# Patient Record
Sex: MALE | Race: WHITE | Employment: OTHER | URBAN - NONMETROPOLITAN AREA
[De-identification: names, ages, dates, MRNs, and addresses within clinical notes are randomized per-mention and may not be internally consistent; named-entity substitution may affect disease eponyms.]

---

## 2020-01-01 ENCOUNTER — HOSPITAL ENCOUNTER (INPATIENT)
Age: 55
LOS: 2 days | Discharge: ANOTHER HOSPITAL | DRG: 432 | End: 2020-12-14
Attending: EMERGENCY MEDICINE | Admitting: INTERNAL MEDICINE
Payer: COMMERCIAL

## 2020-01-01 ENCOUNTER — APPOINTMENT (OUTPATIENT)
Dept: GENERAL RADIOLOGY | Age: 55
DRG: 432 | End: 2020-01-01
Payer: COMMERCIAL

## 2020-01-01 ENCOUNTER — TELEPHONE (OUTPATIENT)
Dept: FAMILY MEDICINE CLINIC | Age: 55
End: 2020-01-01

## 2020-01-01 ENCOUNTER — APPOINTMENT (OUTPATIENT)
Dept: ULTRASOUND IMAGING | Age: 55
DRG: 432 | End: 2020-01-01
Payer: COMMERCIAL

## 2020-01-01 ENCOUNTER — HOSPITAL ENCOUNTER (INPATIENT)
Age: 55
LOS: 2 days | Discharge: ANOTHER HOSPITAL | DRG: 432 | End: 2020-11-23
Attending: EMERGENCY MEDICINE | Admitting: INTERNAL MEDICINE
Payer: COMMERCIAL

## 2020-01-01 ENCOUNTER — APPOINTMENT (OUTPATIENT)
Dept: CT IMAGING | Age: 55
DRG: 432 | End: 2020-01-01
Payer: COMMERCIAL

## 2020-01-01 ENCOUNTER — OFFICE VISIT (OUTPATIENT)
Dept: FAMILY MEDICINE CLINIC | Age: 55
End: 2020-01-01
Payer: COMMERCIAL

## 2020-01-01 ENCOUNTER — TELEPHONE (OUTPATIENT)
Dept: PULMONOLOGY | Age: 55
End: 2020-01-01

## 2020-01-01 ENCOUNTER — OFFICE VISIT (OUTPATIENT)
Dept: PULMONOLOGY | Age: 55
End: 2020-01-01
Payer: COMMERCIAL

## 2020-01-01 VITALS
TEMPERATURE: 98.2 F | SYSTOLIC BLOOD PRESSURE: 106 MMHG | RESPIRATION RATE: 18 BRPM | OXYGEN SATURATION: 93 % | DIASTOLIC BLOOD PRESSURE: 70 MMHG | BODY MASS INDEX: 33.96 KG/M2 | HEIGHT: 74 IN | HEART RATE: 69 BPM | WEIGHT: 264.6 LBS

## 2020-01-01 VITALS
TEMPERATURE: 97.1 F | HEART RATE: 70 BPM | BODY MASS INDEX: 35.92 KG/M2 | SYSTOLIC BLOOD PRESSURE: 90 MMHG | WEIGHT: 279.8 LBS | RESPIRATION RATE: 16 BRPM | DIASTOLIC BLOOD PRESSURE: 68 MMHG | OXYGEN SATURATION: 99 %

## 2020-01-01 VITALS
SYSTOLIC BLOOD PRESSURE: 102 MMHG | TEMPERATURE: 98.2 F | HEIGHT: 74 IN | WEIGHT: 315 LBS | RESPIRATION RATE: 16 BRPM | OXYGEN SATURATION: 95 % | BODY MASS INDEX: 40.43 KG/M2 | DIASTOLIC BLOOD PRESSURE: 62 MMHG | HEART RATE: 98 BPM

## 2020-01-01 VITALS
WEIGHT: 261 LBS | OXYGEN SATURATION: 93 % | HEART RATE: 62 BPM | TEMPERATURE: 97.9 F | DIASTOLIC BLOOD PRESSURE: 62 MMHG | SYSTOLIC BLOOD PRESSURE: 118 MMHG | BODY MASS INDEX: 33.5 KG/M2 | HEIGHT: 74 IN

## 2020-01-01 DIAGNOSIS — R06.81 WITNESSED EPISODE OF APNEA: ICD-10-CM

## 2020-01-01 DIAGNOSIS — E66.9 OBESITY (BMI 30-39.9): ICD-10-CM

## 2020-01-01 DIAGNOSIS — G47.10 HYPERSOMNIA: Primary | ICD-10-CM

## 2020-01-01 DIAGNOSIS — R06.83 LOUD SNORING: ICD-10-CM

## 2020-01-01 DIAGNOSIS — J90 PLEURAL EFFUSION: ICD-10-CM

## 2020-01-01 LAB
AFP-TUMOR MARKER: 3.4 UG/L
ALBUMIN SERPL-MCNC: 2.9 G/DL (ref 3.5–5.1)
ALBUMIN SERPL-MCNC: 3.2 G/DL (ref 3.5–5.1)
ALBUMIN SERPL-MCNC: 3.3 G/DL (ref 3.5–5.1)
ALBUMIN SERPL-MCNC: 3.5 G/DL (ref 3.5–5.1)
ALBUMIN SERPL-MCNC: 3.7 G/DL (ref 3.5–5.1)
ALP BLD-CCNC: 118 U/L (ref 38–126)
ALP BLD-CCNC: 143 U/L (ref 38–126)
ALP BLD-CCNC: 83 U/L (ref 38–126)
ALP BLD-CCNC: 96 U/L (ref 38–126)
ALP BLD-CCNC: 96 U/L (ref 38–126)
ALT SERPL-CCNC: 15 U/L (ref 11–66)
ALT SERPL-CCNC: 17 U/L (ref 11–66)
ALT SERPL-CCNC: 19 U/L (ref 11–66)
ALT SERPL-CCNC: 20 U/L (ref 11–66)
ALT SERPL-CCNC: 24 U/L (ref 11–66)
AMMONIA: 53 UMOL/L (ref 11–60)
AMMONIA: 95 UMOL/L (ref 11–60)
AMPHETAMINE+METHAMPHETAMINE URINE SCREEN: NEGATIVE
ANAEROBIC CULTURE: ABNORMAL
ANAEROBIC CULTURE: NORMAL
ANAEROBIC CULTURE: NORMAL
ANION GAP SERPL CALCULATED.3IONS-SCNC: 11 MEQ/L (ref 8–16)
ANION GAP SERPL CALCULATED.3IONS-SCNC: 12 MEQ/L (ref 8–16)
ANION GAP SERPL CALCULATED.3IONS-SCNC: 12 MEQ/L (ref 8–16)
ANION GAP SERPL CALCULATED.3IONS-SCNC: 15 MEQ/L (ref 8–16)
ANION GAP SERPL CALCULATED.3IONS-SCNC: 18 MEQ/L (ref 8–16)
ANISOCYTOSIS: PRESENT
ANISOCYTOSIS: PRESENT
APTT: 58.7 SECONDS (ref 22–38)
AST SERPL-CCNC: 33 U/L (ref 5–40)
AST SERPL-CCNC: 38 U/L (ref 5–40)
AST SERPL-CCNC: 46 U/L (ref 5–40)
AST SERPL-CCNC: 46 U/L (ref 5–40)
AST SERPL-CCNC: 57 U/L (ref 5–40)
AVERAGE GLUCOSE: 78 MG/DL (ref 70–126)
BACTERIA: ABNORMAL
BARBITURATE QUANTITATIVE URINE: NEGATIVE
BASOPHILIA: ABNORMAL
BASOPHILIA: ABNORMAL
BASOPHILIC STIPPLING: ABNORMAL
BASOPHILS # BLD: 0.5 %
BASOPHILS # BLD: 0.8 %
BASOPHILS # BLD: 0.9 %
BASOPHILS ABSOLUTE: 0 THOU/MM3 (ref 0–0.1)
BASOPHILS ABSOLUTE: 0.1 THOU/MM3 (ref 0–0.1)
BASOPHILS ABSOLUTE: 0.1 THOU/MM3 (ref 0–0.1)
BENZODIAZEPINE QUANTITATIVE URINE: NEGATIVE
BILIRUB SERPL-MCNC: 4.4 MG/DL (ref 0.3–1.2)
BILIRUB SERPL-MCNC: 5.1 MG/DL (ref 0.3–1.2)
BILIRUB SERPL-MCNC: 5.2 MG/DL (ref 0.3–1.2)
BILIRUB SERPL-MCNC: 5.4 MG/DL (ref 0.3–1.2)
BILIRUB SERPL-MCNC: 5.9 MG/DL (ref 0.3–1.2)
BILIRUBIN DIRECT: 1.7 MG/DL (ref 0–0.3)
BILIRUBIN DIRECT: 1.9 MG/DL (ref 0–0.3)
BILIRUBIN DIRECT: 2 MG/DL (ref 0–0.3)
BILIRUBIN URINE: NEGATIVE
BLOOD CULTURE, ROUTINE: NORMAL
BLOOD, URINE: ABNORMAL
BODY FLUID CULTURE, STERILE: ABNORMAL
BODY FLUID CULTURE, STERILE: ABNORMAL
BODY FLUID CULTURE, STERILE: NORMAL
BODY FLUID CULTURE, STERILE: NORMAL
BODY FLUID RBC: 9000 /CUMM
BODY FLUID RBC: NORMAL /CUMM
BODY FLUID RBC: NORMAL /CUMM
BUN BLDV-MCNC: 15 MG/DL (ref 7–22)
BUN BLDV-MCNC: 17 MG/DL (ref 7–22)
BUN BLDV-MCNC: 20 MG/DL (ref 7–22)
BUN BLDV-MCNC: 25 MG/DL (ref 7–22)
BUN BLDV-MCNC: 25 MG/DL (ref 7–22)
CALCIUM SERPL-MCNC: 8.7 MG/DL (ref 8.5–10.5)
CALCIUM SERPL-MCNC: 8.9 MG/DL (ref 8.5–10.5)
CALCIUM SERPL-MCNC: 9.3 MG/DL (ref 8.5–10.5)
CALCIUM SERPL-MCNC: 9.3 MG/DL (ref 8.5–10.5)
CALCIUM SERPL-MCNC: 9.8 MG/DL (ref 8.5–10.5)
CANNABINOID QUANTITATIVE URINE: NEGATIVE
CASTS: ABNORMAL /LPF
CASTS: ABNORMAL /LPF
CHARACTER, BODY FLUID: ABNORMAL
CHARACTER, BODY FLUID: NORMAL
CHARACTER, BODY FLUID: NORMAL
CHARACTER, URINE: CLEAR
CHLORIDE BLD-SCNC: 86 MEQ/L (ref 98–111)
CHLORIDE BLD-SCNC: 88 MEQ/L (ref 98–111)
CHLORIDE BLD-SCNC: 93 MEQ/L (ref 98–111)
CHLORIDE BLD-SCNC: 94 MEQ/L (ref 98–111)
CHLORIDE BLD-SCNC: 95 MEQ/L (ref 98–111)
CHLORIDE, URINE: 51 MEQ/L
CO2: 22 MEQ/L (ref 23–33)
CO2: 24 MEQ/L (ref 23–33)
CO2: 26 MEQ/L (ref 23–33)
CO2: 30 MEQ/L (ref 23–33)
CO2: 30 MEQ/L (ref 23–33)
COCAINE METABOLITE QUANTITATIVE URINE: NEGATIVE
COLOR: ABNORMAL
COLOR: NORMAL
COLOR: NORMAL
COLOR: YELLOW
CREAT SERPL-MCNC: 1.1 MG/DL (ref 0.4–1.2)
CREAT SERPL-MCNC: 1.3 MG/DL (ref 0.4–1.2)
CREAT SERPL-MCNC: 1.5 MG/DL (ref 0.4–1.2)
CREAT SERPL-MCNC: 2 MG/DL (ref 0.4–1.2)
CREAT SERPL-MCNC: 2.1 MG/DL (ref 0.4–1.2)
CREATININE URINE: 58.8 MG/DL
CRYSTALS: ABNORMAL
D-DIMER QUANTITATIVE: 4341 NG/ML FEU (ref 0–500)
EKG ATRIAL RATE: 102 BPM
EKG ATRIAL RATE: 104 BPM
EKG Q-T INTERVAL: 398 MS
EKG Q-T INTERVAL: 436 MS
EKG QRS DURATION: 86 MS
EKG QRS DURATION: 90 MS
EKG QTC CALCULATION (BAZETT): 446 MS
EKG QTC CALCULATION (BAZETT): 518 MS
EKG R AXIS: 48 DEGREES
EKG R AXIS: 59 DEGREES
EKG T AXIS: 143 DEGREES
EKG T AXIS: 147 DEGREES
EKG VENTRICULAR RATE: 102 BPM
EKG VENTRICULAR RATE: 63 BPM
EOSINOPHIL # BLD: 1.3 %
EOSINOPHIL # BLD: 1.7 %
EOSINOPHIL # BLD: 1.9 %
EOSINOPHIL # BLD: 2 %
EOSINOPHIL # BLD: 2.3 %
EOSINOPHILS ABSOLUTE: 0.1 THOU/MM3 (ref 0–0.4)
EPITHELIAL CELLS, UA: ABNORMAL /HPF
ERYTHROCYTE [DISTWIDTH] IN BLOOD BY AUTOMATED COUNT: 15.4 % (ref 11.5–14.5)
ERYTHROCYTE [DISTWIDTH] IN BLOOD BY AUTOMATED COUNT: 15.5 % (ref 11.5–14.5)
ERYTHROCYTE [DISTWIDTH] IN BLOOD BY AUTOMATED COUNT: 16.1 % (ref 11.5–14.5)
ERYTHROCYTE [DISTWIDTH] IN BLOOD BY AUTOMATED COUNT: 16.2 % (ref 11.5–14.5)
ERYTHROCYTE [DISTWIDTH] IN BLOOD BY AUTOMATED COUNT: 16.3 % (ref 11.5–14.5)
ERYTHROCYTE [DISTWIDTH] IN BLOOD BY AUTOMATED COUNT: 53.4 FL (ref 35–45)
ERYTHROCYTE [DISTWIDTH] IN BLOOD BY AUTOMATED COUNT: 54.4 FL (ref 35–45)
ERYTHROCYTE [DISTWIDTH] IN BLOOD BY AUTOMATED COUNT: 57.4 FL (ref 35–45)
ERYTHROCYTE [DISTWIDTH] IN BLOOD BY AUTOMATED COUNT: 57.9 FL (ref 35–45)
ERYTHROCYTE [DISTWIDTH] IN BLOOD BY AUTOMATED COUNT: 58.4 FL (ref 35–45)
GFR SERPL CREATININE-BSD FRML MDRD: 33 ML/MIN/1.73M2
GFR SERPL CREATININE-BSD FRML MDRD: 35 ML/MIN/1.73M2
GFR SERPL CREATININE-BSD FRML MDRD: 49 ML/MIN/1.73M2
GFR SERPL CREATININE-BSD FRML MDRD: 57 ML/MIN/1.73M2
GFR SERPL CREATININE-BSD FRML MDRD: 70 ML/MIN/1.73M2
GLUCOSE BLD-MCNC: 103 MG/DL (ref 70–108)
GLUCOSE BLD-MCNC: 112 MG/DL (ref 70–108)
GLUCOSE BLD-MCNC: 113 MG/DL (ref 70–108)
GLUCOSE BLD-MCNC: 114 MG/DL (ref 70–108)
GLUCOSE BLD-MCNC: 114 MG/DL (ref 70–108)
GLUCOSE BLD-MCNC: 116 MG/DL (ref 70–108)
GLUCOSE BLD-MCNC: 118 MG/DL (ref 70–108)
GLUCOSE BLD-MCNC: 118 MG/DL (ref 70–108)
GLUCOSE BLD-MCNC: 125 MG/DL (ref 70–108)
GLUCOSE BLD-MCNC: 132 MG/DL (ref 70–108)
GLUCOSE BLD-MCNC: 134 MG/DL (ref 70–108)
GLUCOSE BLD-MCNC: 136 MG/DL (ref 70–108)
GLUCOSE BLD-MCNC: 161 MG/DL (ref 70–108)
GLUCOSE BLD-MCNC: 165 MG/DL (ref 70–108)
GLUCOSE BLD-MCNC: 172 MG/DL (ref 70–108)
GLUCOSE BLD-MCNC: 183 MG/DL (ref 70–108)
GLUCOSE, FLUID: 127 MG/DL
GLUCOSE, FLUID: 134 MG/DL
GLUCOSE, FLUID: 154 MG/DL
GLUCOSE, URINE: NEGATIVE MG/DL
GRAM STAIN RESULT: ABNORMAL
GRAM STAIN RESULT: NORMAL
GRAM STAIN RESULT: NORMAL
HBA1C MFR BLD: 4.6 % (ref 4.4–6.4)
HCT VFR BLD CALC: 28.9 % (ref 42–52)
HCT VFR BLD CALC: 30.6 % (ref 42–52)
HCT VFR BLD CALC: 31.4 % (ref 42–52)
HCT VFR BLD CALC: 33.6 % (ref 42–52)
HCT VFR BLD CALC: 37.2 % (ref 42–52)
HEMOGLOBIN: 10.4 GM/DL (ref 14–18)
HEMOGLOBIN: 10.8 GM/DL (ref 14–18)
HEMOGLOBIN: 11.6 GM/DL (ref 14–18)
HEMOGLOBIN: 12.6 GM/DL (ref 14–18)
HEMOGLOBIN: 9.9 GM/DL (ref 14–18)
HEPATITIS C ANTIBODY: NEGATIVE
IMMATURE GRANS (ABS): 0.01 THOU/MM3 (ref 0–0.07)
IMMATURE GRANS (ABS): 0.02 THOU/MM3 (ref 0–0.07)
IMMATURE GRANS (ABS): 0.03 THOU/MM3 (ref 0–0.07)
IMMATURE GRANULOCYTES: 0.2 %
IMMATURE GRANULOCYTES: 0.3 %
IMMATURE GRANULOCYTES: 0.3 %
IMMATURE GRANULOCYTES: 0.4 %
IMMATURE GRANULOCYTES: 0.4 %
INR BLD: 1.73 (ref 0.85–1.13)
INR BLD: 1.82 (ref 0.85–1.13)
INR BLD: 3.32 (ref 0.85–1.13)
KETONES, URINE: NEGATIVE
LD, FLUID: 130 U/L
LD, FLUID: 80 U/L
LD, FLUID: 91 U/L
LD: 346 U/L (ref 100–190)
LEUKOCYTE EST, POC: NEGATIVE
LYMPHOCYTES # BLD: 12.7 %
LYMPHOCYTES # BLD: 13.3 %
LYMPHOCYTES # BLD: 13.5 %
LYMPHOCYTES # BLD: 19.1 %
LYMPHOCYTES # BLD: 22.3 %
LYMPHOCYTES ABSOLUTE: 0.8 THOU/MM3 (ref 1–4.8)
LYMPHOCYTES ABSOLUTE: 0.8 THOU/MM3 (ref 1–4.8)
LYMPHOCYTES ABSOLUTE: 1 THOU/MM3 (ref 1–4.8)
LYMPHOCYTES ABSOLUTE: 1.1 THOU/MM3 (ref 1–4.8)
LYMPHOCYTES ABSOLUTE: 1.2 THOU/MM3 (ref 1–4.8)
MAGNESIUM: 1.2 MG/DL (ref 1.6–2.4)
MAGNESIUM: 1.4 MG/DL (ref 1.6–2.4)
MAGNESIUM: 1.4 MG/DL (ref 1.6–2.4)
MAGNESIUM: 1.6 MG/DL (ref 1.6–2.4)
MCH RBC QN AUTO: 32.8 PG (ref 26–33)
MCH RBC QN AUTO: 33 PG (ref 26–33)
MCH RBC QN AUTO: 33.1 PG (ref 26–33)
MCH RBC QN AUTO: 33.2 PG (ref 26–33)
MCH RBC QN AUTO: 33.3 PG (ref 26–33)
MCHC RBC AUTO-ENTMCNC: 33.9 GM/DL (ref 32.2–35.5)
MCHC RBC AUTO-ENTMCNC: 34 GM/DL (ref 32.2–35.5)
MCHC RBC AUTO-ENTMCNC: 34.3 GM/DL (ref 32.2–35.5)
MCHC RBC AUTO-ENTMCNC: 34.4 GM/DL (ref 32.2–35.5)
MCHC RBC AUTO-ENTMCNC: 34.5 GM/DL (ref 32.2–35.5)
MCV RBC AUTO: 95.4 FL (ref 80–94)
MCV RBC AUTO: 95.7 FL (ref 80–94)
MCV RBC AUTO: 97 FL (ref 80–94)
MCV RBC AUTO: 97.5 FL (ref 80–94)
MCV RBC AUTO: 98.4 FL (ref 80–94)
MESOTHELIAL CELLS BODY FLUID: ABNORMAL
MESOTHELIAL CELLS BODY FLUID: NORMAL
MESOTHELIAL CELLS BODY FLUID: NORMAL
MISC. #1 REFERENCE GROUP TEST: NORMAL
MISCELLANEOUS LAB TEST RESULT: ABNORMAL
MONOCYTES # BLD: 14.8 %
MONOCYTES # BLD: 14.9 %
MONOCYTES # BLD: 15.4 %
MONOCYTES # BLD: 17.2 %
MONOCYTES # BLD: 9.6 %
MONOCYTES ABSOLUTE: 0.6 THOU/MM3 (ref 0.4–1.3)
MONOCYTES ABSOLUTE: 0.8 THOU/MM3 (ref 0.4–1.3)
MONOCYTES ABSOLUTE: 0.9 THOU/MM3 (ref 0.4–1.3)
MONOCYTES ABSOLUTE: 1 THOU/MM3 (ref 0.4–1.3)
MONOCYTES ABSOLUTE: 1.2 THOU/MM3 (ref 0.4–1.3)
MONONUCLEAR CELLS BODY FLUID: 68.6 %
MONONUCLEAR CELLS BODY FLUID: 77.6 %
MONONUCLEAR CELLS BODY FLUID: 89.1 %
NITRITE, URINE: NEGATIVE
NUCLEATED RED BLOOD CELLS: 0 /100 WBC
OPIATES, URINE: NEGATIVE
ORGANISM: ABNORMAL
OSMOLALITY CALCULATION: 257.8 MOSMOL/KG (ref 275–300)
OSMOLALITY CALCULATION: 261.9 MOSMOL/KG (ref 275–300)
OSMOLALITY CALCULATION: 272.3 MOSMOL/KG (ref 275–300)
OXYCODONE: NEGATIVE
PATHOLOGIST REVIEW: ABNORMAL
PATHOLOGIST REVIEW: NORMAL
PATHOLOGIST REVIEW: NORMAL
PH FLUID: 7.66
PH FLUID: 7.71
PH FLUID: 8.01
PH UA: 5 (ref 5–9)
PHENCYCLIDINE QUANTITATIVE URINE: NEGATIVE
PLATELET # BLD: 100 THOU/MM3 (ref 130–400)
PLATELET # BLD: 112 THOU/MM3 (ref 130–400)
PLATELET # BLD: 123 THOU/MM3 (ref 130–400)
PLATELET # BLD: 78 THOU/MM3 (ref 130–400)
PLATELET # BLD: 95 THOU/MM3 (ref 130–400)
PLATELET ESTIMATE: ABNORMAL
PLATELET ESTIMATE: ABNORMAL
PMV BLD AUTO: 10.3 FL (ref 9.4–12.4)
PMV BLD AUTO: 10.4 FL (ref 9.4–12.4)
PMV BLD AUTO: 10.7 FL (ref 9.4–12.4)
PMV BLD AUTO: 10.8 FL (ref 9.4–12.4)
PMV BLD AUTO: 10.9 FL (ref 9.4–12.4)
POIKILOCYTES: ABNORMAL
POLYMORPHONUCLEAR CELLS BODY FLUID: 10.9 %
POLYMORPHONUCLEAR CELLS BODY FLUID: 22.4 %
POLYMORPHONUCLEAR CELLS BODY FLUID: 31.4 %
POTASSIUM REFLEX MAGNESIUM: 3.4 MEQ/L (ref 3.5–5.2)
POTASSIUM REFLEX MAGNESIUM: 3.5 MEQ/L (ref 3.5–5.2)
POTASSIUM REFLEX MAGNESIUM: 3.6 MEQ/L (ref 3.5–5.2)
POTASSIUM REFLEX MAGNESIUM: 4.2 MEQ/L (ref 3.5–5.2)
POTASSIUM SERPL-SCNC: 3.7 MEQ/L (ref 3.5–5.2)
PRO-BNP: 610 PG/ML (ref 0–900)
PRO-BNP: 790.4 PG/ML (ref 0–900)
PROT/CREAT RATIO, UR: 0.08
PROTEIN FLUID: 1.8 GM/DL
PROTEIN FLUID: 1.9 GM/DL
PROTEIN FLUID: 2.5 GM/DL
PROTEIN UA: NEGATIVE MG/DL
PROTEIN, URINE: 4.7 MG/DL
RBC # BLD: 2.98 MILL/MM3 (ref 4.7–6.1)
RBC # BLD: 3.14 MILL/MM3 (ref 4.7–6.1)
RBC # BLD: 3.29 MILL/MM3 (ref 4.7–6.1)
RBC # BLD: 3.51 MILL/MM3 (ref 4.7–6.1)
RBC # BLD: 3.78 MILL/MM3 (ref 4.7–6.1)
RBC URINE: ABNORMAL /HPF
RENAL EPITHELIAL, UA: ABNORMAL
SARS-COV-2, NAAT: NOT DETECTED
SCAN OF BLOOD SMEAR: NORMAL
SCAN OF BLOOD SMEAR: NORMAL
SEG NEUTROPHILS: 59.3 %
SEG NEUTROPHILS: 63.1 %
SEG NEUTROPHILS: 66.4 %
SEG NEUTROPHILS: 68.9 %
SEG NEUTROPHILS: 74.9 %
SEGMENTED NEUTROPHILS ABSOLUTE COUNT: 3.1 THOU/MM3 (ref 1.8–7.7)
SEGMENTED NEUTROPHILS ABSOLUTE COUNT: 3.7 THOU/MM3 (ref 1.8–7.7)
SEGMENTED NEUTROPHILS ABSOLUTE COUNT: 3.9 THOU/MM3 (ref 1.8–7.7)
SEGMENTED NEUTROPHILS ABSOLUTE COUNT: 4.9 THOU/MM3 (ref 1.8–7.7)
SEGMENTED NEUTROPHILS ABSOLUTE COUNT: 5.2 THOU/MM3 (ref 1.8–7.7)
SODIUM BLD-SCNC: 127 MEQ/L (ref 135–145)
SODIUM BLD-SCNC: 130 MEQ/L (ref 135–145)
SODIUM BLD-SCNC: 132 MEQ/L (ref 135–145)
SODIUM BLD-SCNC: 133 MEQ/L (ref 135–145)
SODIUM BLD-SCNC: 134 MEQ/L (ref 135–145)
SODIUM URINE: 43 MEQ/L
SPECIFIC GRAVITY UA: 1.01 (ref 1–1.03)
SPECIMEN: ABNORMAL
SPECIMEN: NORMAL
SPECIMEN: NORMAL
TOTAL NUCLEATED CELLS BODY FLUID: 339 /CUMM (ref 0–500)
TOTAL NUCLEATED CELLS BODY FLUID: 454 /CUMM (ref 0–500)
TOTAL NUCLEATED CELLS BODY FLUID: 511 /CUMM (ref 0–500)
TOTAL PROTEIN: 5.6 G/DL (ref 6.1–8)
TOTAL PROTEIN: 5.8 G/DL (ref 6.1–8)
TOTAL PROTEIN: 6.4 G/DL (ref 6.1–8)
TOTAL PROTEIN: 6.8 G/DL (ref 6.1–8)
TOTAL PROTEIN: 6.9 G/DL (ref 6.1–8)
TOTAL PROTEIN: 7 G/DL (ref 6.1–8)
TOTAL VOLUME RECEIVED BODY FLUID: 2000 ML
TOTAL VOLUME RECEIVED BODY FLUID: 30 ML
TOTAL VOLUME RECEIVED BODY FLUID: 35 ML
TROPONIN T: 0.02 NG/ML
TROPONIN T: < 0.01 NG/ML
UROBILINOGEN, URINE: 0.2 EU/DL (ref 0–1)
WBC # BLD: 5.2 THOU/MM3 (ref 4.8–10.8)
WBC # BLD: 5.8 THOU/MM3 (ref 4.8–10.8)
WBC # BLD: 5.9 THOU/MM3 (ref 4.8–10.8)
WBC # BLD: 6.6 THOU/MM3 (ref 4.8–10.8)
WBC # BLD: 7.5 THOU/MM3 (ref 4.8–10.8)
WBC UA: ABNORMAL /HPF
YEAST: ABNORMAL

## 2020-01-01 PROCEDURE — 82140 ASSAY OF AMMONIA: CPT

## 2020-01-01 PROCEDURE — 87205 SMEAR GRAM STAIN: CPT

## 2020-01-01 PROCEDURE — 99233 SBSQ HOSP IP/OBS HIGH 50: CPT | Performed by: INTERNAL MEDICINE

## 2020-01-01 PROCEDURE — 80053 COMPREHEN METABOLIC PANEL: CPT

## 2020-01-01 PROCEDURE — 89050 BODY FLUID CELL COUNT: CPT

## 2020-01-01 PROCEDURE — 84157 ASSAY OF PROTEIN OTHER: CPT

## 2020-01-01 PROCEDURE — 2580000003 HC RX 258: Performed by: INTERNAL MEDICINE

## 2020-01-01 PROCEDURE — 85025 COMPLETE CBC W/AUTO DIFF WBC: CPT

## 2020-01-01 PROCEDURE — 82105 ALPHA-FETOPROTEIN SERUM: CPT

## 2020-01-01 PROCEDURE — 2700000000 HC OXYGEN THERAPY PER DAY

## 2020-01-01 PROCEDURE — 6370000000 HC RX 637 (ALT 250 FOR IP): Performed by: INTERNAL MEDICINE

## 2020-01-01 PROCEDURE — 83735 ASSAY OF MAGNESIUM: CPT

## 2020-01-01 PROCEDURE — P9047 ALBUMIN (HUMAN), 25%, 50ML: HCPCS | Performed by: INTERNAL MEDICINE

## 2020-01-01 PROCEDURE — 6360000002 HC RX W HCPCS: Performed by: INTERNAL MEDICINE

## 2020-01-01 PROCEDURE — 97116 GAIT TRAINING THERAPY: CPT

## 2020-01-01 PROCEDURE — 88305 TISSUE EXAM BY PATHOLOGIST: CPT

## 2020-01-01 PROCEDURE — 85610 PROTHROMBIN TIME: CPT

## 2020-01-01 PROCEDURE — 99223 1ST HOSP IP/OBS HIGH 75: CPT | Performed by: INTERNAL MEDICINE

## 2020-01-01 PROCEDURE — 87102 FUNGUS ISOLATION CULTURE: CPT

## 2020-01-01 PROCEDURE — 2500000003 HC RX 250 WO HCPCS: Performed by: INTERNAL MEDICINE

## 2020-01-01 PROCEDURE — 32551 INSERTION OF CHEST TUBE: CPT

## 2020-01-01 PROCEDURE — 2060000000 HC ICU INTERMEDIATE R&B

## 2020-01-01 PROCEDURE — 71045 X-RAY EXAM CHEST 1 VIEW: CPT

## 2020-01-01 PROCEDURE — 94760 N-INVAS EAR/PLS OXIMETRY 1: CPT

## 2020-01-01 PROCEDURE — 87040 BLOOD CULTURE FOR BACTERIA: CPT

## 2020-01-01 PROCEDURE — 83615 LACTATE (LD) (LDH) ENZYME: CPT

## 2020-01-01 PROCEDURE — 88112 CYTOPATH CELL ENHANCE TECH: CPT

## 2020-01-01 PROCEDURE — 99239 HOSP IP/OBS DSCHRG MGMT >30: CPT | Performed by: INTERNAL MEDICINE

## 2020-01-01 PROCEDURE — 99285 EMERGENCY DEPT VISIT HI MDM: CPT

## 2020-01-01 PROCEDURE — 82948 REAGENT STRIP/BLOOD GLUCOSE: CPT

## 2020-01-01 PROCEDURE — 94660 CPAP INITIATION&MGMT: CPT

## 2020-01-01 PROCEDURE — 6370000000 HC RX 637 (ALT 250 FOR IP): Performed by: EMERGENCY MEDICINE

## 2020-01-01 PROCEDURE — 99232 SBSQ HOSP IP/OBS MODERATE 35: CPT | Performed by: INTERNAL MEDICINE

## 2020-01-01 PROCEDURE — 71250 CT THORAX DX C-: CPT

## 2020-01-01 PROCEDURE — 93010 ELECTROCARDIOGRAM REPORT: CPT | Performed by: NUCLEAR MEDICINE

## 2020-01-01 PROCEDURE — 80076 HEPATIC FUNCTION PANEL: CPT

## 2020-01-01 PROCEDURE — 87070 CULTURE OTHR SPECIMN AEROBIC: CPT

## 2020-01-01 PROCEDURE — 87075 CULTR BACTERIA EXCEPT BLOOD: CPT

## 2020-01-01 PROCEDURE — 99253 IP/OBS CNSLTJ NEW/EST LOW 45: CPT | Performed by: INTERNAL MEDICINE

## 2020-01-01 PROCEDURE — 36415 COLL VENOUS BLD VENIPUNCTURE: CPT

## 2020-01-01 PROCEDURE — 97166 OT EVAL MOD COMPLEX 45 MIN: CPT

## 2020-01-01 PROCEDURE — 84484 ASSAY OF TROPONIN QUANT: CPT

## 2020-01-01 PROCEDURE — 80048 BASIC METABOLIC PNL TOTAL CA: CPT

## 2020-01-01 PROCEDURE — U0002 COVID-19 LAB TEST NON-CDC: HCPCS

## 2020-01-01 PROCEDURE — 97162 PT EVAL MOD COMPLEX 30 MIN: CPT

## 2020-01-01 PROCEDURE — 99254 IP/OBS CNSLTJ NEW/EST MOD 60: CPT | Performed by: INTERNAL MEDICINE

## 2020-01-01 PROCEDURE — 76705 ECHO EXAM OF ABDOMEN: CPT

## 2020-01-01 PROCEDURE — 83036 HEMOGLOBIN GLYCOSYLATED A1C: CPT

## 2020-01-01 PROCEDURE — 82248 BILIRUBIN DIRECT: CPT

## 2020-01-01 PROCEDURE — 82945 GLUCOSE OTHER FLUID: CPT

## 2020-01-01 PROCEDURE — 83986 ASSAY PH BODY FLUID NOS: CPT

## 2020-01-01 PROCEDURE — 84155 ASSAY OF PROTEIN SERUM: CPT

## 2020-01-01 PROCEDURE — 86803 HEPATITIS C AB TEST: CPT

## 2020-01-01 PROCEDURE — 97530 THERAPEUTIC ACTIVITIES: CPT

## 2020-01-01 PROCEDURE — 85379 FIBRIN DEGRADATION QUANT: CPT

## 2020-01-01 PROCEDURE — 93005 ELECTROCARDIOGRAM TRACING: CPT | Performed by: EMERGENCY MEDICINE

## 2020-01-01 PROCEDURE — 93005 ELECTROCARDIOGRAM TRACING: CPT | Performed by: INTERNAL MEDICINE

## 2020-01-01 PROCEDURE — 93975 VASCULAR STUDY: CPT

## 2020-01-01 PROCEDURE — 76770 US EXAM ABDO BACK WALL COMP: CPT

## 2020-01-01 PROCEDURE — 0W993ZZ DRAINAGE OF RIGHT PLEURAL CAVITY, PERCUTANEOUS APPROACH: ICD-10-PCS | Performed by: INTERNAL MEDICINE

## 2020-01-01 PROCEDURE — 99283 EMERGENCY DEPT VISIT LOW MDM: CPT

## 2020-01-01 PROCEDURE — 99214 OFFICE O/P EST MOD 30 MIN: CPT | Performed by: INTERNAL MEDICINE

## 2020-01-01 PROCEDURE — 2140000000 HC CCU INTERMEDIATE R&B

## 2020-01-01 PROCEDURE — 94761 N-INVAS EAR/PLS OXIMETRY MLT: CPT

## 2020-01-01 PROCEDURE — 85730 THROMBOPLASTIN TIME PARTIAL: CPT

## 2020-01-01 PROCEDURE — 83880 ASSAY OF NATRIURETIC PEPTIDE: CPT

## 2020-01-01 PROCEDURE — 0W9930Z DRAINAGE OF RIGHT PLEURAL CAVITY WITH DRAINAGE DEVICE, PERCUTANEOUS APPROACH: ICD-10-PCS | Performed by: STUDENT IN AN ORGANIZED HEALTH CARE EDUCATION/TRAINING PROGRAM

## 2020-01-01 PROCEDURE — 87116 MYCOBACTERIA CULTURE: CPT

## 2020-01-01 PROCEDURE — 99204 OFFICE O/P NEW MOD 45 MIN: CPT | Performed by: NURSE PRACTITIONER

## 2020-01-01 RX ORDER — PANTOPRAZOLE SODIUM 40 MG/1
40 TABLET, DELAYED RELEASE ORAL DAILY
COMMUNITY
End: 2021-01-01 | Stop reason: SDUPTHER

## 2020-01-01 RX ORDER — ALBUMIN (HUMAN) 12.5 G/50ML
25 SOLUTION INTRAVENOUS EVERY 6 HOURS
Status: COMPLETED | OUTPATIENT
Start: 2020-01-01 | End: 2020-01-01

## 2020-01-01 RX ORDER — SPIRONOLACTONE 25 MG/1
100 TABLET ORAL DAILY
Status: DISCONTINUED | OUTPATIENT
Start: 2020-01-01 | End: 2020-01-01 | Stop reason: HOSPADM

## 2020-01-01 RX ORDER — VARENICLINE TARTRATE 1 MG/1
1 TABLET, FILM COATED ORAL 2 TIMES DAILY
Status: ON HOLD | COMMUNITY
End: 2021-01-01

## 2020-01-01 RX ORDER — SODIUM CHLORIDE 0.9 % (FLUSH) 0.9 %
10 SYRINGE (ML) INJECTION EVERY 12 HOURS SCHEDULED
Status: DISCONTINUED | OUTPATIENT
Start: 2020-01-01 | End: 2020-01-01 | Stop reason: HOSPADM

## 2020-01-01 RX ORDER — SODIUM CHLORIDE 0.9 % (FLUSH) 0.9 %
10 SYRINGE (ML) INJECTION EVERY 12 HOURS SCHEDULED
Status: DISCONTINUED | OUTPATIENT
Start: 2020-01-01 | End: 2020-01-01 | Stop reason: SDUPTHER

## 2020-01-01 RX ORDER — POTASSIUM CHLORIDE 20 MEQ/1
40 TABLET, EXTENDED RELEASE ORAL ONCE
Status: COMPLETED | OUTPATIENT
Start: 2020-01-01 | End: 2020-01-01

## 2020-01-01 RX ORDER — ONDANSETRON 2 MG/ML
4 INJECTION INTRAMUSCULAR; INTRAVENOUS EVERY 6 HOURS PRN
Status: DISCONTINUED | OUTPATIENT
Start: 2020-01-01 | End: 2020-01-01 | Stop reason: HOSPADM

## 2020-01-01 RX ORDER — DILTIAZEM HYDROCHLORIDE 240 MG/1
240 CAPSULE, EXTENDED RELEASE ORAL DAILY
COMMUNITY
End: 2021-01-01 | Stop reason: ALTCHOICE

## 2020-01-01 RX ORDER — SODIUM CHLORIDE 0.9 % (FLUSH) 0.9 %
10 SYRINGE (ML) INJECTION PRN
Status: DISCONTINUED | OUTPATIENT
Start: 2020-01-01 | End: 2020-01-01 | Stop reason: SDUPTHER

## 2020-01-01 RX ORDER — GABAPENTIN 300 MG/1
300 CAPSULE ORAL 3 TIMES DAILY
COMMUNITY
End: 2021-01-01

## 2020-01-01 RX ORDER — GABAPENTIN 300 MG/1
300 CAPSULE ORAL 3 TIMES DAILY
Status: DISCONTINUED | OUTPATIENT
Start: 2020-01-01 | End: 2020-01-01

## 2020-01-01 RX ORDER — GLUCOSAMINE HCL/CHONDROITIN SU 500-400 MG
CAPSULE ORAL
Qty: 200 STRIP | Refills: 3 | Status: SHIPPED | OUTPATIENT
Start: 2020-01-01

## 2020-01-01 RX ORDER — POLYETHYLENE GLYCOL 3350 17 G/17G
17 POWDER, FOR SOLUTION ORAL DAILY PRN
Status: DISCONTINUED | OUTPATIENT
Start: 2020-01-01 | End: 2020-01-01 | Stop reason: HOSPADM

## 2020-01-01 RX ORDER — DILTIAZEM HYDROCHLORIDE 240 MG/1
240 CAPSULE, COATED, EXTENDED RELEASE ORAL DAILY
Status: DISCONTINUED | OUTPATIENT
Start: 2020-01-01 | End: 2020-01-01

## 2020-01-01 RX ORDER — SPIRONOLACTONE 100 MG/1
50 TABLET, FILM COATED ORAL DAILY
COMMUNITY
End: 2021-01-01

## 2020-01-01 RX ORDER — BLOOD-GLUCOSE METER
EACH MISCELLANEOUS
Qty: 1 KIT | Refills: 0 | Status: SHIPPED | OUTPATIENT
Start: 2020-01-01

## 2020-01-01 RX ORDER — SODIUM CHLORIDE 0.9 % (FLUSH) 0.9 %
10 SYRINGE (ML) INJECTION PRN
Status: DISCONTINUED | OUTPATIENT
Start: 2020-01-01 | End: 2020-01-01 | Stop reason: HOSPADM

## 2020-01-01 RX ORDER — MIDODRINE HYDROCHLORIDE 10 MG/1
10 TABLET ORAL
DISCHARGE
Start: 2020-01-01 | End: 2020-01-01 | Stop reason: ALTCHOICE

## 2020-01-01 RX ORDER — MIDODRINE HYDROCHLORIDE 10 MG/1
10 TABLET ORAL
Status: DISCONTINUED | OUTPATIENT
Start: 2020-01-01 | End: 2020-01-01 | Stop reason: HOSPADM

## 2020-01-01 RX ORDER — SPIRONOLACTONE 25 MG/1
25 TABLET ORAL DAILY
COMMUNITY
End: 2020-01-01 | Stop reason: ALTCHOICE

## 2020-01-01 RX ORDER — CHLORDIAZEPOXIDE HYDROCHLORIDE 25 MG/1
25 CAPSULE, GELATIN COATED ORAL 3 TIMES DAILY PRN
Status: ON HOLD | COMMUNITY
End: 2020-01-01 | Stop reason: HOSPADM

## 2020-01-01 RX ORDER — LACTULOSE 10 G/15ML
20 SOLUTION ORAL 3 TIMES DAILY
Status: DISCONTINUED | OUTPATIENT
Start: 2020-01-01 | End: 2020-01-01 | Stop reason: HOSPADM

## 2020-01-01 RX ORDER — GABAPENTIN 300 MG/1
300 CAPSULE ORAL 3 TIMES DAILY
Status: DISCONTINUED | OUTPATIENT
Start: 2020-01-01 | End: 2020-01-01 | Stop reason: HOSPADM

## 2020-01-01 RX ORDER — TORSEMIDE 20 MG/1
40 TABLET ORAL 2 TIMES DAILY
Status: DISCONTINUED | OUTPATIENT
Start: 2020-01-01 | End: 2020-01-01 | Stop reason: HOSPADM

## 2020-01-01 RX ORDER — DEXTROSE MONOHYDRATE 25 G/50ML
12.5 INJECTION, SOLUTION INTRAVENOUS PRN
Status: DISCONTINUED | OUTPATIENT
Start: 2020-01-01 | End: 2020-01-01 | Stop reason: HOSPADM

## 2020-01-01 RX ORDER — MAGNESIUM SULFATE HEPTAHYDRATE 40 MG/ML
2000 INJECTION, SOLUTION INTRAVENOUS ONCE
Status: COMPLETED | OUTPATIENT
Start: 2020-01-01 | End: 2020-01-01

## 2020-01-01 RX ORDER — ACETAMINOPHEN 650 MG/1
650 SUPPOSITORY RECTAL EVERY 6 HOURS PRN
Status: DISCONTINUED | OUTPATIENT
Start: 2020-01-01 | End: 2020-01-01 | Stop reason: HOSPADM

## 2020-01-01 RX ORDER — LACTULOSE 10 G/15ML
20 SOLUTION ORAL 3 TIMES DAILY
Status: DISCONTINUED | OUTPATIENT
Start: 2020-01-01 | End: 2020-01-01

## 2020-01-01 RX ORDER — PROMETHAZINE HYDROCHLORIDE 25 MG/1
12.5 TABLET ORAL EVERY 6 HOURS PRN
Status: DISCONTINUED | OUTPATIENT
Start: 2020-01-01 | End: 2020-01-01 | Stop reason: HOSPADM

## 2020-01-01 RX ORDER — DILTIAZEM HYDROCHLORIDE 120 MG/1
240 CAPSULE, COATED, EXTENDED RELEASE ORAL DAILY
Status: DISCONTINUED | OUTPATIENT
Start: 2020-01-01 | End: 2020-01-01 | Stop reason: HOSPADM

## 2020-01-01 RX ORDER — SODIUM CHLORIDE 0.9 % (FLUSH) 0.9 %
10 SYRINGE (ML) INJECTION EVERY 12 HOURS SCHEDULED
Status: DISCONTINUED | OUTPATIENT
Start: 2020-01-01 | End: 2020-01-01

## 2020-01-01 RX ORDER — OCTREOTIDE ACETATE 100 UG/ML
100 INJECTION, SOLUTION INTRAVENOUS; SUBCUTANEOUS EVERY 8 HOURS
Status: DISCONTINUED | OUTPATIENT
Start: 2020-01-01 | End: 2020-01-01 | Stop reason: HOSPADM

## 2020-01-01 RX ORDER — LANCETS 30 GAUGE
1 EACH MISCELLANEOUS DAILY
Qty: 200 EACH | Refills: 3 | Status: SHIPPED | OUTPATIENT
Start: 2020-01-01

## 2020-01-01 RX ORDER — FUROSEMIDE 10 MG/ML
40 INJECTION INTRAMUSCULAR; INTRAVENOUS DAILY
Status: DISCONTINUED | OUTPATIENT
Start: 2020-01-01 | End: 2020-01-01

## 2020-01-01 RX ORDER — SODIUM CHLORIDE 0.9 % (FLUSH) 0.9 %
10 SYRINGE (ML) INJECTION PRN
Status: DISCONTINUED | OUTPATIENT
Start: 2020-01-01 | End: 2020-01-01

## 2020-01-01 RX ORDER — ESCITALOPRAM OXALATE 10 MG/1
10 TABLET ORAL DAILY
Qty: 30 TABLET | Refills: 1 | Status: SHIPPED | OUTPATIENT
Start: 2020-01-01 | End: 2021-01-01 | Stop reason: ALTCHOICE

## 2020-01-01 RX ORDER — CHLORHEXIDINE GLUCONATE 0.12 MG/ML
15 RINSE ORAL 2 TIMES DAILY
Status: DISCONTINUED | OUTPATIENT
Start: 2020-01-01 | End: 2020-01-01 | Stop reason: HOSPADM

## 2020-01-01 RX ORDER — GABAPENTIN 400 MG/1
400 CAPSULE ORAL 3 TIMES DAILY
Status: DISCONTINUED | OUTPATIENT
Start: 2020-01-01 | End: 2020-01-01 | Stop reason: HOSPADM

## 2020-01-01 RX ORDER — FUROSEMIDE 40 MG/1
40 TABLET ORAL DAILY
Status: DISCONTINUED | OUTPATIENT
Start: 2020-01-01 | End: 2020-01-01 | Stop reason: HOSPADM

## 2020-01-01 RX ORDER — DILTIAZEM HYDROCHLORIDE 240 MG/1
240 CAPSULE, EXTENDED RELEASE ORAL DAILY
Status: ON HOLD | COMMUNITY
End: 2020-01-01 | Stop reason: HOSPADM

## 2020-01-01 RX ORDER — SPIRONOLACTONE 25 MG/1
25 TABLET ORAL DAILY
Status: DISCONTINUED | OUTPATIENT
Start: 2020-01-01 | End: 2020-01-01

## 2020-01-01 RX ORDER — METOPROLOL TARTRATE 50 MG/1
50 TABLET, FILM COATED ORAL 2 TIMES DAILY
COMMUNITY
End: 2020-01-01 | Stop reason: ALTCHOICE

## 2020-01-01 RX ORDER — METOPROLOL TARTRATE 100 MG/1
100 TABLET ORAL 2 TIMES DAILY
COMMUNITY

## 2020-01-01 RX ORDER — LACTULOSE 10 G/15ML
10 SOLUTION ORAL 2 TIMES DAILY
Status: DISCONTINUED | OUTPATIENT
Start: 2020-01-01 | End: 2020-01-01 | Stop reason: HOSPADM

## 2020-01-01 RX ORDER — SPIRONOLACTONE 25 MG/1
50 TABLET ORAL DAILY
Status: DISCONTINUED | OUTPATIENT
Start: 2020-01-01 | End: 2020-01-01 | Stop reason: HOSPADM

## 2020-01-01 RX ORDER — METOPROLOL TARTRATE 50 MG/1
50 TABLET, FILM COATED ORAL 2 TIMES DAILY
Status: DISCONTINUED | OUTPATIENT
Start: 2020-01-01 | End: 2020-01-01 | Stop reason: HOSPADM

## 2020-01-01 RX ORDER — DEXTROSE MONOHYDRATE 50 MG/ML
100 INJECTION, SOLUTION INTRAVENOUS PRN
Status: DISCONTINUED | OUTPATIENT
Start: 2020-01-01 | End: 2020-01-01 | Stop reason: HOSPADM

## 2020-01-01 RX ORDER — METOPROLOL TARTRATE 100 MG/1
100 TABLET ORAL 2 TIMES DAILY
Status: DISCONTINUED | OUTPATIENT
Start: 2020-01-01 | End: 2020-01-01 | Stop reason: HOSPADM

## 2020-01-01 RX ORDER — ASPIRIN 81 MG/1
81 TABLET ORAL DAILY
Status: DISCONTINUED | OUTPATIENT
Start: 2020-01-01 | End: 2020-01-01 | Stop reason: HOSPADM

## 2020-01-01 RX ORDER — TORSEMIDE 20 MG/1
20 TABLET ORAL DAILY
COMMUNITY
End: 2021-01-01 | Stop reason: ALTCHOICE

## 2020-01-01 RX ORDER — NICOTINE POLACRILEX 4 MG
15 LOZENGE BUCCAL PRN
Status: DISCONTINUED | OUTPATIENT
Start: 2020-01-01 | End: 2020-01-01 | Stop reason: HOSPADM

## 2020-01-01 RX ORDER — ACETAMINOPHEN 325 MG/1
650 TABLET ORAL EVERY 6 HOURS PRN
Status: DISCONTINUED | OUTPATIENT
Start: 2020-01-01 | End: 2020-01-01 | Stop reason: HOSPADM

## 2020-01-01 RX ORDER — LACTULOSE 10 G/10G
45 SOLUTION ORAL 3 TIMES DAILY
Status: ON HOLD | COMMUNITY
End: 2021-01-01 | Stop reason: SDUPTHER

## 2020-01-01 RX ORDER — FLUTICASONE PROPIONATE 50 MCG
1 SPRAY, SUSPENSION (ML) NASAL DAILY
Status: DISCONTINUED | OUTPATIENT
Start: 2020-01-01 | End: 2020-01-01 | Stop reason: HOSPADM

## 2020-01-01 RX ORDER — PANTOPRAZOLE SODIUM 40 MG/1
40 TABLET, DELAYED RELEASE ORAL 2 TIMES DAILY
Status: DISCONTINUED | OUTPATIENT
Start: 2020-01-01 | End: 2020-01-01 | Stop reason: HOSPADM

## 2020-01-01 RX ORDER — DILTIAZEM HYDROCHLORIDE 240 MG/1
240 CAPSULE, COATED, EXTENDED RELEASE ORAL DAILY
Status: DISCONTINUED | OUTPATIENT
Start: 2020-01-01 | End: 2020-01-01 | Stop reason: HOSPADM

## 2020-01-01 RX ORDER — CHLORDIAZEPOXIDE HYDROCHLORIDE 25 MG/1
25 CAPSULE, GELATIN COATED ORAL 3 TIMES DAILY PRN
Status: DISCONTINUED | OUTPATIENT
Start: 2020-01-01 | End: 2020-01-01 | Stop reason: HOSPADM

## 2020-01-01 RX ORDER — PANTOPRAZOLE SODIUM 40 MG/1
40 TABLET, DELAYED RELEASE ORAL DAILY
Status: DISCONTINUED | OUTPATIENT
Start: 2020-01-01 | End: 2020-01-01 | Stop reason: HOSPADM

## 2020-01-01 RX ADMIN — METOPROLOL TARTRATE 100 MG: 100 TABLET, FILM COATED ORAL at 08:35

## 2020-01-01 RX ADMIN — FUROSEMIDE 40 MG: 10 INJECTION, SOLUTION INTRAMUSCULAR; INTRAVENOUS at 00:22

## 2020-01-01 RX ADMIN — GABAPENTIN 300 MG: 300 CAPSULE ORAL at 00:10

## 2020-01-01 RX ADMIN — ALBUMIN (HUMAN) 25 G: 0.25 INJECTION, SOLUTION INTRAVENOUS at 23:59

## 2020-01-01 RX ADMIN — METOPROLOL TARTRATE 50 MG: 50 TABLET, FILM COATED ORAL at 08:44

## 2020-01-01 RX ADMIN — Medication 10 ML: at 08:41

## 2020-01-01 RX ADMIN — OCTREOTIDE ACETATE 100 MCG: 100 INJECTION, SOLUTION INTRAVENOUS; SUBCUTANEOUS at 23:55

## 2020-01-01 RX ADMIN — PANTOPRAZOLE SODIUM 40 MG: 40 TABLET, DELAYED RELEASE ORAL at 08:41

## 2020-01-01 RX ADMIN — OCTREOTIDE ACETATE 100 MCG: 100 INJECTION, SOLUTION INTRAVENOUS; SUBCUTANEOUS at 13:21

## 2020-01-01 RX ADMIN — Medication 10 ML: at 22:17

## 2020-01-01 RX ADMIN — LACTULOSE 10 G: 20 SOLUTION ORAL at 20:53

## 2020-01-01 RX ADMIN — METOPROLOL TARTRATE 50 MG: 50 TABLET, FILM COATED ORAL at 20:53

## 2020-01-01 RX ADMIN — DILTIAZEM HYDROCHLORIDE 240 MG: 240 CAPSULE, EXTENDED RELEASE ORAL at 12:37

## 2020-01-01 RX ADMIN — Medication 10 ML: at 00:30

## 2020-01-01 RX ADMIN — Medication 15 ML: at 20:29

## 2020-01-01 RX ADMIN — LACTULOSE 20 G: 20 SOLUTION ORAL at 20:29

## 2020-01-01 RX ADMIN — LACTULOSE 20 G: 20 SOLUTION ORAL at 22:17

## 2020-01-01 RX ADMIN — Medication 10 ML: at 08:35

## 2020-01-01 RX ADMIN — GABAPENTIN 400 MG: 400 CAPSULE ORAL at 13:32

## 2020-01-01 RX ADMIN — MIDODRINE HYDROCHLORIDE 10 MG: 10 TABLET ORAL at 19:00

## 2020-01-01 RX ADMIN — ALBUMIN (HUMAN) 25 G: 0.25 INJECTION, SOLUTION INTRAVENOUS at 06:09

## 2020-01-01 RX ADMIN — LACTULOSE 10 G: 20 SOLUTION ORAL at 00:09

## 2020-01-01 RX ADMIN — METOPROLOL TARTRATE 50 MG: 50 TABLET, FILM COATED ORAL at 00:10

## 2020-01-01 RX ADMIN — OCTREOTIDE ACETATE 100 MCG: 100 INJECTION, SOLUTION INTRAVENOUS; SUBCUTANEOUS at 06:06

## 2020-01-01 RX ADMIN — GABAPENTIN 300 MG: 300 CAPSULE ORAL at 08:31

## 2020-01-01 RX ADMIN — SPIRONOLACTONE 100 MG: 25 TABLET ORAL at 08:35

## 2020-01-01 RX ADMIN — ACETAMINOPHEN 650 MG: 325 TABLET ORAL at 00:19

## 2020-01-01 RX ADMIN — LACTULOSE 20 G: 20 SOLUTION ORAL at 08:41

## 2020-01-01 RX ADMIN — LACTULOSE 10 G: 20 SOLUTION ORAL at 08:35

## 2020-01-01 RX ADMIN — POTASSIUM CHLORIDE 40 MEQ: 1500 TABLET, EXTENDED RELEASE ORAL at 12:48

## 2020-01-01 RX ADMIN — ALBUMIN (HUMAN) 25 G: 0.25 INJECTION, SOLUTION INTRAVENOUS at 13:22

## 2020-01-01 RX ADMIN — SPIRONOLACTONE 50 MG: 25 TABLET ORAL at 11:21

## 2020-01-01 RX ADMIN — Medication 15 ML: at 13:31

## 2020-01-01 RX ADMIN — Medication 10 ML: at 08:47

## 2020-01-01 RX ADMIN — APIXABAN 5 MG: 5 TABLET, FILM COATED ORAL at 00:10

## 2020-01-01 RX ADMIN — DILTIAZEM HYDROCHLORIDE 240 MG: 120 CAPSULE, EXTENDED RELEASE ORAL at 08:31

## 2020-01-01 RX ADMIN — MIDODRINE HYDROCHLORIDE 10 MG: 10 TABLET ORAL at 17:13

## 2020-01-01 RX ADMIN — MIDODRINE HYDROCHLORIDE 10 MG: 10 TABLET ORAL at 13:20

## 2020-01-01 RX ADMIN — PANTOPRAZOLE SODIUM 40 MG: 40 TABLET, DELAYED RELEASE ORAL at 00:19

## 2020-01-01 RX ADMIN — GABAPENTIN 400 MG: 400 CAPSULE ORAL at 20:29

## 2020-01-01 RX ADMIN — INSULIN LISPRO 1 UNITS: 100 INJECTION, SOLUTION INTRAVENOUS; SUBCUTANEOUS at 20:54

## 2020-01-01 RX ADMIN — GABAPENTIN 300 MG: 300 CAPSULE ORAL at 22:17

## 2020-01-01 RX ADMIN — OCTREOTIDE ACETATE 100 MCG: 100 INJECTION, SOLUTION INTRAVENOUS; SUBCUTANEOUS at 12:48

## 2020-01-01 RX ADMIN — TORSEMIDE 40 MG: 20 TABLET ORAL at 20:29

## 2020-01-01 RX ADMIN — INSULIN LISPRO 1 UNITS: 100 INJECTION, SOLUTION INTRAVENOUS; SUBCUTANEOUS at 12:26

## 2020-01-01 RX ADMIN — PANTOPRAZOLE SODIUM 40 MG: 40 TABLET, DELAYED RELEASE ORAL at 20:53

## 2020-01-01 RX ADMIN — TORSEMIDE 40 MG: 20 TABLET ORAL at 22:17

## 2020-01-01 RX ADMIN — LACTULOSE 20 G: 20 SOLUTION ORAL at 13:32

## 2020-01-01 RX ADMIN — TORSEMIDE 40 MG: 20 TABLET ORAL at 08:35

## 2020-01-01 RX ADMIN — LACTULOSE 10 G: 20 SOLUTION ORAL at 08:44

## 2020-01-01 RX ADMIN — ALBUMIN (HUMAN) 25 G: 0.25 INJECTION, SOLUTION INTRAVENOUS at 16:58

## 2020-01-01 RX ADMIN — CHLORDIAZEPOXIDE HYDROCHLORIDE 25 MG: 25 CAPSULE ORAL at 00:10

## 2020-01-01 RX ADMIN — ALBUMIN (HUMAN) 25 G: 0.25 INJECTION, SOLUTION INTRAVENOUS at 12:49

## 2020-01-01 RX ADMIN — LACTULOSE 20 G: 20 SOLUTION ORAL at 16:05

## 2020-01-01 RX ADMIN — PANTOPRAZOLE SODIUM 40 MG: 40 TABLET, DELAYED RELEASE ORAL at 08:44

## 2020-01-01 RX ADMIN — FLUTICASONE PROPIONATE 1 SPRAY: 50 SPRAY, METERED NASAL at 18:10

## 2020-01-01 RX ADMIN — ALBUMIN (HUMAN) 25 G: 0.25 INJECTION, SOLUTION INTRAVENOUS at 19:01

## 2020-01-01 RX ADMIN — MIDODRINE HYDROCHLORIDE 10 MG: 10 TABLET ORAL at 12:37

## 2020-01-01 RX ADMIN — PROMETHAZINE HYDROCHLORIDE 12.5 MG: 25 TABLET ORAL at 14:30

## 2020-01-01 RX ADMIN — Medication 10 ML: at 20:30

## 2020-01-01 RX ADMIN — GABAPENTIN 300 MG: 300 CAPSULE ORAL at 08:35

## 2020-01-01 RX ADMIN — MAGNESIUM SULFATE HEPTAHYDRATE 2000 MG: 40 INJECTION, SOLUTION INTRAVENOUS at 00:30

## 2020-01-01 SDOH — HEALTH STABILITY: MENTAL HEALTH: HOW OFTEN DO YOU HAVE A DRINK CONTAINING ALCOHOL?: 4 OR MORE TIMES A WEEK

## 2020-01-01 ASSESSMENT — PAIN - FUNCTIONAL ASSESSMENT
PAIN_FUNCTIONAL_ASSESSMENT: ACTIVITIES ARE NOT PREVENTED
PAIN_FUNCTIONAL_ASSESSMENT: ACTIVITIES ARE NOT PREVENTED
PAIN_FUNCTIONAL_ASSESSMENT: PREVENTS OR INTERFERES SOME ACTIVE ACTIVITIES AND ADLS

## 2020-01-01 ASSESSMENT — PAIN DESCRIPTION - ORIENTATION
ORIENTATION: RIGHT;MID
ORIENTATION: RIGHT
ORIENTATION: RIGHT;ANTERIOR
ORIENTATION: LEFT;RIGHT

## 2020-01-01 ASSESSMENT — PATIENT HEALTH QUESTIONNAIRE - PHQ9
SUM OF ALL RESPONSES TO PHQ QUESTIONS 1-9: 0
SUM OF ALL RESPONSES TO PHQ9 QUESTIONS 1 & 2: 0
1. LITTLE INTEREST OR PLEASURE IN DOING THINGS: 0
2. FEELING DOWN, DEPRESSED OR HOPELESS: 0
SUM OF ALL RESPONSES TO PHQ QUESTIONS 1-9: 0
SUM OF ALL RESPONSES TO PHQ QUESTIONS 1-9: 0

## 2020-01-01 ASSESSMENT — ENCOUNTER SYMPTOMS
BLOOD IN STOOL: 0
VOMITING: 0
CHEST TIGHTNESS: 0
SINUS PRESSURE: 0
DIARRHEA: 0
VOICE CHANGE: 0
WHEEZING: 0
EYE DISCHARGE: 0
BACK PAIN: 0
ABDOMINAL PAIN: 0
BLOOD IN STOOL: 0
RHINORRHEA: 0
SINUS PAIN: 0
BACK PAIN: 0
DIARRHEA: 0
ABDOMINAL PAIN: 0
TROUBLE SWALLOWING: 0
CHEST TIGHTNESS: 0
COUGH: 0
ABDOMINAL PAIN: 0
SORE THROAT: 0
EYES NEGATIVE: 1
VOMITING: 0
SHORTNESS OF BREATH: 1
CONSTIPATION: 0
TROUBLE SWALLOWING: 0
SHORTNESS OF BREATH: 1
COUGH: 0
NAUSEA: 0
SHORTNESS OF BREATH: 1
CHEST TIGHTNESS: 0
COLOR CHANGE: 0
EYE PAIN: 0

## 2020-01-01 ASSESSMENT — PAIN SCALES - GENERAL
PAINLEVEL_OUTOF10: 4
PAINLEVEL_OUTOF10: 0
PAINLEVEL_OUTOF10: 6
PAINLEVEL_OUTOF10: 9
PAINLEVEL_OUTOF10: 7
PAINLEVEL_OUTOF10: 10
PAINLEVEL_OUTOF10: 0
PAINLEVEL_OUTOF10: 10
PAINLEVEL_OUTOF10: 0
PAINLEVEL_OUTOF10: 4
PAINLEVEL_OUTOF10: 0

## 2020-01-01 ASSESSMENT — PAIN DESCRIPTION - PROGRESSION
CLINICAL_PROGRESSION: NOT CHANGED

## 2020-01-01 ASSESSMENT — PAIN DESCRIPTION - LOCATION
LOCATION: LEG;KNEE
LOCATION: BACK;CHEST
LOCATION: CHEST
LOCATION: LEG
LOCATION: CHEST

## 2020-01-01 ASSESSMENT — PAIN DESCRIPTION - DESCRIPTORS
DESCRIPTORS: ACHING
DESCRIPTORS: TIGHTNESS
DESCRIPTORS: ACHING
DESCRIPTORS: ACHING

## 2020-01-01 ASSESSMENT — PAIN DESCRIPTION - PAIN TYPE
TYPE: ACUTE PAIN
TYPE: ACUTE PAIN
TYPE: ACUTE PAIN;SURGICAL PAIN
TYPE: ACUTE PAIN
TYPE: ACUTE PAIN

## 2020-01-01 ASSESSMENT — PAIN DESCRIPTION - ONSET
ONSET: ON-GOING

## 2020-01-01 ASSESSMENT — PAIN DESCRIPTION - FREQUENCY
FREQUENCY: CONTINUOUS

## 2020-11-21 PROBLEM — J91.8 PLEURAL EFFUSION ASSOCIATED WITH HEPATIC DISORDER: Status: ACTIVE | Noted: 2020-01-01

## 2020-11-21 PROBLEM — K76.9 PLEURAL EFFUSION ASSOCIATED WITH HEPATIC DISORDER: Status: ACTIVE | Noted: 2020-01-01

## 2020-11-21 NOTE — ACP (ADVANCE CARE PLANNING)
Advance Care Planning     Advance Care Planning Activator (Inpatient)  Conversation Note      Date of ACP Conversation: 11/21/2020    Conversation Conducted with: Patient with Decision Making Capacity    ACP Activator: [id number] Decision Maker:   If no Authorized Decision Maker has previously been identified, then patient chooses Health Care Decision Maker:  \"Who would you like to name as your primary health care decision-maker? \"               Name: Yogesh Molina        Relationship: brother POA          Phone number: [phone]  \"Can this person be reached easily? \" Yes  \"Who would you like to name as your back-up decision maker? \"   Name: Raul Ellsworth        Relationship: [email or web address]          Phone number: [phone]   \"Can this person be reached easily? \" Yes    Care Preferences    Ventilation: \"If you were in your present state of health and suddenly became very ill and were unable to breathe on your own, what would your preference be about the use of a ventilator (breathing machine) if it were available to you? \"      Would the patient desire the use of ventilator (breathing machine)?: yes    \"If your health worsens and it becomes clear that your chance of recovery is unlikely, what would your preference be about the use of a ventilator (breathing machine) if it were available to you? \"     Would the patient desire the use of ventilator (breathing machine)?: No    Resuscitation  \"CPR works best to restart the heart when there is a sudden event, like a heart attack, in someone who is otherwise healthy. Unfortunately, CPR does not typically restart the heart for people who have serious health conditions or who are very sick. \"    \"In the event your heart stopped as a result of an underlying serious health condition, would you want attempts to be made to restart your heart (answer \"yes\" for attempt to resuscitate) or would you prefer a natural death (answer \"no\" for do not attempt to resuscitate)? \" yes     [] Yes   [x] No   Educated Patient / Connor Delatorre regarding differences between Advance Directives and portable DNR orders. Length of ACP Conversation in minutes:  5  Conversation Outcomes:  [x] ACP discussion completed  [] Existing advance directive reviewed with patient; no changes to patient's previously recorded wishes  [] New Advance Directive completed  [] Portable Do Not Rescitate prepared for Provider review and signature  [] POLST/POST/MOLST/MOST prepared for Provider review and signature      Follow-up plan:    [] Schedule follow-up conversation to continue planning  [x] Referred individual to Provider for additional questions/concerns   [] Advised patient/agent/surrogate to review completed ACP document and update if needed with changes in condition, patient preferences or care setting    [] This note routed to one or more involved healthcare providers      Pt came into ED due to SOB and leg edema. Met with pt and brother Yancy Carrero in ED room 22 to discuss ACP. Pt would like to remain full code - or ask his brother Luis Hoffman who is his POA at this time. He is a  at [employer]. He has no children. No Sabianist. Understands that we are a [address] and will not be offended by anything that we might do.

## 2020-11-21 NOTE — ED NOTES
ED nurse-to-nurse bedside report    Chief Complaint   Patient presents with    Shortness of Breath    Leg Swelling      LOC: alert and orientated to name, place, date  Vital signs   Vitals:    11/21/20 1229 11/21/20 1415 11/21/20 1556   BP: 106/69  110/61   Pulse: 103 93 103   Resp: 24 22 20   Temp: 98.7 °F (37.1 °C)     TempSrc: Oral     SpO2: 93% 96% 97%   Weight: (!) 320 lb (145.2 kg)     Height: 6' 2\" (1.88 m)        Pain:    Pain Interventions: none  Pain Goal: 0  Oxygen: No    Current needs required room air     Telemetry: Yes  LDAs:   Peripheral IV 11/21/20 Left Antecubital (Active)   Site Assessment Clean; Intact;Dry 11/21/20 1258   Line Status Blood return noted;Normal saline locked 11/21/20 1258   Dressing Status Intact;Dry;Clean 11/21/20 1258   Dressing Intervention New 11/21/20 1258     Continuous Infusions:   Mobility: Requires assistance * 1  Mccarty Fall Risk Score: No flowsheet data found.   Fall Interventions: side rails up; call light in reach  Report given to: Vinnie Spivey RN  11/21/20 0508

## 2020-11-21 NOTE — ED NOTES
ED to inpatient nurses report    Chief Complaint   Patient presents with    Shortness of Breath    Leg Swelling      Present to ED from home  LOC: alert and orientated to name, place, date  Vital signs   Vitals:    11/21/20 1229 11/21/20 1415 11/21/20 1556   BP: 106/69  110/61   Pulse: 103 93 103   Resp: 24 22 20   Temp: 98.7 °F (37.1 °C)     TempSrc: Oral     SpO2: 93% 96% 97%   Weight: (!) 320 lb (145.2 kg)     Height: 6' 2\" (1.88 m)        Oxygen Baseline room air    Current needs required room air   LDAs:   Peripheral IV 11/21/20 Left Antecubital (Active)   Site Assessment Clean; Intact;Dry 11/21/20 1258   Line Status Blood return noted;Normal saline locked 11/21/20 1258   Dressing Status Intact;Dry;Clean 11/21/20 1258   Dressing Intervention New 11/21/20 1258     Mobility: Requires assistance * 1  Pending ED orders: none  Present condition: stable; chest tube posterior axillary   Person of contract brother Eneida Mcclelland, phone number [phone]  Our promise was given to family; patient    Electronically signed by Sherry Schaefer RN on 11/21/2020 at [address] [facility], [facility]  11/21/20 [facility]  11/21/20 9782

## 2020-11-21 NOTE — ED NOTES
Pt to the ED with SOB and leg edema/pain. Has been on diuretics and after a recent hospitalization, his diuretic dose was decreased. POx WNL on room air. EKG completed. On tele.       Tanvi Lance RN  11/21/20 3053

## 2020-11-21 NOTE — H&P
Hospitalist H+P      Patient:  Los Oliveros    Unit/Bed:22/022A  YOB: 1965  MRN: [id number]   Acct: [de-identified]     PCP: Dariusz Chavis MD  Date of Admission: 11/21/2020        Assessment and Plan:        1. 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 Patient underwent thoracentesis while in the emergency department the fluid will be sent for analysis. Will obtain a pulmonary consult. 2. Cirrhosis of liver: Patient admits to drinking 2 bottles of wine per day but has not had any for approximately 2 weeks. We will check ammonia level, will also obtain a GI consult. Will trend hepatic profile  3. Atrial fibrillation currently rate controlled: We will continue with Lopressor Eliquis and diltiazem. Will obtain a baseline EKG. 4. Diabetes mellitus: We will hold his Metformin with a creatinine of 2.1 placing on insulin sliding scale. We will check a hemoglobin A1c urine for microalbumin. 5. Pancytopenia: Possibly secondary to his long-term alcohol consumption, will need to abstain. 6. COPD: Currently stable  7. Leg edema bilateral doubt if due to DVT patient is anticoagulated on Eliquis. 8. Elevated INR: Secondary to Eliquis cannot be reversed with vitamin K.  9. Congestive heart failure history of: We will obtain an echocardiogram for baseline. We will check a BNP, will place the patient on low-sodium diet. CC: Shortness of breath and leg swelling. HPI: 31-year-old obese  male presents shortness of breath and leg edema. Patient has a past medical history of atrial fibrillation, diabetes, cirrhosis, COPD, and CHF. Patient states that this occurred on Wednesday after road trip from [place].  He states his leg swelling is bilateral with little pain. His family doctor has recently adjusted his diuretics. He does not admit to any chest pain, fever, numbness, or tingling.   Patient admits to drinking 2 bottles of wine daily but states he has not had a had a drink for 2 weeks. ROS (14 point review of systems completed. Pertinent positives noted. Otherwise ROS is negative) : Shortness of breath, leg edema    PMH:  Per HPI and       Diagnosis Date    Advanced cirrhosis of liver (HCC)     Atrial fibrillation (HCC)     CHF (congestive heart failure) (HCC)     Chronic kidney disease     Diabetes mellitus ([facility]     Gallstones     GERD (gastroesophageal reflux disease)     Headache     Portal hypertension ([facility]      SHX:        Procedure Laterality Date    APPENDECTOMY      HERNIA REPAIR       FHX: History reviewed. No pertinent family history. SOCHX:   Social History     Socioeconomic History    Marital status: Single     Spouse name: None    Number of children: None    Years of education: None    Highest education level: None   Occupational History    None   Social Needs    Financial resource strain: None    Food insecurity     Worry: None     Inability: None    Transportation needs     Medical: None     Non-medical: None   Tobacco Use    Smoking status: Former Smoker    Smokeless tobacco: Never Used   Substance and Sexual Activity    Alcohol use: None    Drug use: None    Sexual activity: None   Lifestyle    Physical activity     Days per week: None     Minutes per session: None    Stress: None   Relationships    Social connections     Talks on phone: None     Gets together: None     Attends Mandaen service: None     Active member of club or organization: None     Attends meetings of clubs or organizations: None     Relationship status: None    Intimate partner violence     Fear of current or ex partner: None     Emotionally abused: None     Physically abused: None     Forced sexual activity: None   Other Topics Concern    None   Social History Narrative    None      Allergies: Patient has no known allergies.   Medications:       sodium chloride flush  10 mL Intravenous 2 times per day     Prior to Admission medications    Medication Sig Start Date End Date Taking? Authorizing Provider   ASPIRIN 81 PO Take by mouth daily   Yes Historical Provider, MD   varenicline (CHANTIX) 1 MG tablet Take 1 mg by mouth 2 times daily   Yes Historical Provider, MD   chlordiazePOXIDE (LIBRIUM) 25 MG capsule Take 25 mg by mouth 3 times daily as needed for Anxiety. Yes Historical Provider, MD   dilTIAZem (DILACOR XR) 240 MG extended release capsule Take 240 mg by mouth daily   Yes Historical Provider, MD   apixaban (ELIQUIS) 5 MG TABS tablet Take by mouth 2 times daily   Yes Historical Provider, MD   gabapentin (NEURONTIN) 300 MG capsule Take 300 mg by mouth 3 times daily. Yes Historical Provider, MD   lactulose (CEPHULAC) 10 g packet Take 10 g by mouth 2 times daily as needed   Yes Historical Provider, MD   metFORMIN (GLUCOPHAGE) 500 MG tablet Take 500 mg by mouth 2 times daily (with meals)   Yes Historical Provider, MD   metoprolol tartrate (LOPRESSOR) 50 MG tablet Take 50 mg by mouth 2 times daily   Yes Historical Provider, MD   pantoprazole (PROTONIX) 40 MG tablet Take 40 mg by mouth 2 times daily   Yes Historical Provider, MD   spironolactone (ALDACTONE) 25 MG tablet Take 25 mg by mouth daily   Yes Historical Provider, MD      PHYSICAL EXAM:    /61   Pulse 103   Temp 98.7 °F (37.1 °C) (Oral)   Resp 20   Ht 6' 2\" (1.88 m)   Wt (!) 320 lb (145.2 kg)   SpO2 97%   BMI 41.09 kg/m²     General appearance: Mild distress, appears stated age and cooperative. HEENT:  Normal cephalic, atraumatic without obvious deformity. Pupils equal, round, and reactive to light. Extra ocular muscles intact. Conjunctivae/corneas clear. Sclera jaundice  Neck: Supple, with full range of motion. no jugular venous distention. Trachea midline. no carotid bruits  Respiratory: No breath sounds on the right   cardiovascular:  Regular rate and rhythm with normal S1/S2 without murmurs, rubs or gallops. PMI non displaced  Abdomen: Obese with normal bowel sounds. No guarding, rebound. Ascites  Musculoskeletal: Bilateral lower extremity edema. Skin: Skin color, texture, turgor normal.  No rashes or lesions, or suspicious lesions. Neurologic:  Neurovascularly intact without any focal sensory/motor deficits. Cranial nerves: II-XII intact, grossly non-focal.  Psychiatric:  Alert and oriented, thought content appropriate, normal insight  Capillary Refill: Brisk,< 2 seconds   Peripheral Pulses: +2 palpable, equal bilaterally upper and lower extremities  Lymphatics: no lymphadenopathy    Data: (All radiographs, tracings, PFTs, and imaging are personally viewed and interpreted unless otherwise noted).  WBC 6.6   Hemoglobin 12.6   Platelets 828   Creatinine 2.1   AST 57   ALT 24   Total bili 5.9  Recent Labs     11/21/20  1331   WBC 6.6   HGB 12.6*   HCT 37.2*   *      Recent Labs     11/21/20  1331   *   K 3.5   CL 93*   CO2 22*   BUN 25*   CREATININE 2.1*   CALCIUM 9.3      Recent Labs     11/21/20  1331   AST 57*   ALT 24   BILITOT 5.9*   ALKPHOS 143*      Recent Labs     11/21/20  1331   INR 3.32*       No results for input(s): Kofi Breeze in the last 72 hours. Radiology reports-images in PACS reviewed   Xr Chest Portable    Result Date: 11/21/2020  PROCEDURE: XR CHEST PORTABLE CLINICAL INFORMATION: 56-year-old male with shortness of breath. COMPARISON: No prior study. TECHNIQUE: AP upright view of the chest was obtained. FINDINGS: There is near complete opacification of the right hemithorax. This may be due to a large pleural effusion. Pneumonia may also be present. The right side of the cardiac silhouette is obscured. The left lung appears clear. There is no pneumothorax. Visualized portions of the upper abdomen are within normal limits. The osseous structures are intact. No acute fractures or suspicious osseous lesions.      Near complete opacification of the right hemithorax. This may be due to a large pleural effusion. Pneumonia may also be present. **This report has been created using voice recognition software. It may contain minor errors which are inherent in voice recognition technology. ** Final report electronically signed by Dr Paty Tan on 11/21/2020 1:50 PM      Electronically signed by Rocío Childers DO on 11/21/2020 at 4:28 PM

## 2020-11-21 NOTE — ED NOTES
Chest tube taped and secured at this time. Suction turned off at this time and tube clamped. XR at bedside for tube placement.       Celso De RN  11/21/20 1998

## 2020-11-22 NOTE — PROGRESS NOTES
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 Pig tail cathter placed in the ED, which I called Pulmonary to removed as this is likely a hepatic hydrothorax. Pt will need to be transferred to tertiary care center for hepatology evaluation. Cont PO Lasix, Aldactone, lactulose. Hold blood thinners. --KARAN: elevated Cr levels, can possibly have HRS. Spoke with hepatologist at [facility], while pt is waiting for bed will start Albumin, Octreotide, and Midodrine. Nephrology consulted. --Atrial Fib with RVR: currently in atrial fibrillation. Hold all blood thinners given coagulapathy. Cont Lopressor 50 BID with BP hold parameters.     -Pleural Effusion with Associated Hepatic Hydrothorax: pig tail catheter placed in ED. CXR revealed a large pleural effusion on the right side, a 14f bore chest tube place with output of 2L overnight, emily pink colored fluid. Removed today by Pulmonary medicine.     --COPD: no wheezing. Resume home inhalers    --Type 2 DM: Humalog sliding scale. Diabetic diet. Accuchecks. Dispo: spoke with the Hepatologist at [facility], might take until tomorrow until pt gets a bed. Recommended to start Albumin, Octreotide, and Midodrine in the meantime. Hold all blood thinners             Chief Complaint: sob     Subjective: no acute events overnight. Pt sitting up in chair, had lengthy discussion with both patient and brother at bedside. Has overall poor prognosis if pt does not get a liver. They would like to get transferred to [facility] to seek transplant evaluation.  Will guarding, rebound. Ascites  Musculoskeletal: Bilateral lower extremity edema. Skin: Skin color, texture, turgor normal.  No rashes or lesions, or suspicious lesions. Neurologic:  Neurovascularly intact without any focal sensory/motor deficits. Cranial nerves: II-XII intact, grossly non-focal.  Psychiatric:  Alert and oriented, thought content appropriate, normal insight  Capillary Refill: Brisk,< 2 seconds   Peripheral Pulses: +2 palpable, equal bilaterally upper and lower extremities  Lymphatics: no lymphadenopathy      Labs:   Recent Labs     11/22/20  0531   WBC 5.8   HGB 10.4*   HCT 30.6*   *     Recent Labs     11/22/20  0531   *   K 3.4*   CL 95*   CO2 24   BUN 25*   CREATININE 2.0*   CALCIUM 8.9     Recent Labs     11/22/20  0531   AST 46*   ALT 20   BILIDIR 2.0*   BILITOT 4.4*   ALKPHOS 118     Recent Labs     11/21/20  1331   INR 3.32*     No results for input(s): CKTOTAL, TROPONINI in the last 72 hours. Urinalysis:    No results found for: Crystal Buckley, [address] Pete Spivey Professor Florencio Brown [facility] 298, [address] [place] [address] 89., [facility], [facility] 99    Radiology:   All imaging reviewed     Diet: DIET CARB CONTROL; Carb Control: 3 carb choices (45 gms)/meal; Low Sodium (2 GM)      Code Status: Full Code            Electronically signed by Navin Armando MD on 11/22/2020 at 12:23 PM

## 2020-11-22 NOTE — CONSULTS
[address] [id number]                                  CONSULTATION    PATIENT NAME: Suman Naidu                        :        1965  MED REC NO:   [id number]                           ROOM:       0002  ACCOUNT NO:   [de-identified]                           ADMIT DATE: 2020  PROVIDER:     Yin Fish. Andrew Herzog M.D.    Richar Johnson:  2020    REASON FOR CONSULTATION:  We were asked to see the patient for  cirrhosis. HISTORY OF PRESENT ILLNESS:  The patient is a 62-year-old male who has  known cirrhosis (presumably due to alcohol); atrial fibrillation, on  Eliquis; diabetes; COPD; tobacco use in the past and coagulopathy. The patient could not tell me at what point he was diagnosed with  cirrhosis, but he has long history of alcohol abuse. He states he has  abstained from alcohol for at least several weeks. He admits to  drinking two bottles of wine daily. This has been intermittent  according to patient. The patient states earlier in  he was to be evaluated for cirrhosis,  but this was put on hold \"due to [place]. \"  He notes he has had problems  with edema. He states he did have an EGD and he states was \"a little  bit of bleeding\" and there may have been some mild varices. He states  \"did not need to treat anything. \"    The patient presented with shortness of breath and pleural effusion,  which was drained. He was seen by Dr. Tanya Toney and Dr. Roly Cardoza.  When I  came to the floor, I was informed by the nurse that he is in the process  of being transferred to [facility] which is appropriate. He states he does not need a paracentesis or he did have a pleural  effusion as noted. The patient had CAT scan of the chest showing the  gallstones, cirrhosis and ascites. MEDICATIONS:  As an outpatient are:  Aspirin, librium, _____, Eliquis,  Neurontin, lactulose, Glucophage, Lopressor, Protonix and Aldactone.     SOCIAL needs to  stop any alcohol use. As noted above, he has pending transfer to [facility]  which is appropriate. 2.  We discussed the MELD score. I told him he had a MELD score of 33. This suggests approximately 50% mortality in the next three months. He  had the elevated bilirubin, elevated PT/INR. Although this may be  contributed to by the Eliquis, I suspect this due to the underlying  liver disease. 3.  He does have ascites. He has been on Lasix and Aldactone. 4.  On Lactulose, may have a degree of hepatic encephalopathy. 5.  Atrial fibrillation. He is on Eliquis and Cardizem. 6.  Diabetes, on metformin. He may need to be changed to insulin. 7.  COPD. 8.  History of CHF. 9.  Pleural effusion. He did improve after this was tapped. 10.  Normally we will check alpha-fetoprotein level, right upper  quadrant ultrasound with Dopplers, but since he is being transferred to  [facility], we will defer any further workup to the transplant center. We will sign off at this time and be available as needed. He can follow  up with us. He may just follow up with [facility] at this point. They  can further discuss the implications of the high MELD score and we can  see if this improves. He may show some improvement with abstinence. Total time was 55 minutes. NINA Millan M.D.    D: 11/22/2020 12:07:12       T: 11/22/2020 13:23:47     [email or web address]  Job#: [id number]     Doc#: [id number]    CC:

## 2020-11-22 NOTE — PROGRESS NOTES
[address]  PHYSICAL THERAPY MISSED TREATMENT NOTE  [facility] ICU STEPDOWN TELEMETRY 4K    Date: 2020  Patient Name: Javier Greenwood        MRN: [id number]   : 1965  (48 y.o.)  Gender: male                REASON FOR MISSED TREATMENT:  Patient refused treatment. Stated he was exhausted and wanted to sleep. Already had seen OT this morning. PT will check back later as able.

## 2020-11-22 NOTE — CONSULTS
[facility]    Patient - Kamala Thyaer   MRN -  [id number]   [place] # - [de-identified]   - 1965      Date of Admission -  2020 12:20 PM  Date of evaluation -  2020  Room - -02002-A   [address] [place] [address] Road, MD Primary Care Physician - Leno Steven MD   Chief Complaint   CC: Large hepatic hydrothorax  Active Hospital Problem List      Active Hospital Problems    Diagnosis Date Noted    Pleural effusion associated with hepatic disorder [K76.9, J91.8] 2020     HPI   Kamala Thayer is a 48 y.o. male presents to ED with SOB, alcoholic known h/o cirrhosis, CXR revealed a large pleural effusion on the right side, a 14f bore chest tube place with output of 2L overnight, emily pink colored fluid. CT reveals chest tube in the right upper chest coiled around the medial pleura and just posterior to the trachea. INR 3.5  H/O cirrhosis, AF on Eliquis, COPD, DM    Past Medical History         Diagnosis Date    Advanced cirrhosis of liver (HCC)     Atrial fibrillation (HCC)     CHF (congestive heart failure) (HCC)     Chronic kidney disease     COPD (chronic obstructive pulmonary disease) (HCC)     Diabetes mellitus (HCC)     Gallstones     GERD (gastroesophageal reflux disease)     Headache     Portal hypertension (HCC)       Past Surgical History           Procedure Laterality Date    APPENDECTOMY      HERNIA REPAIR       Diet    DIET CARB CONTROL; Carb Control: 3 carb choices (45 gms)/meal; Low Sodium (2 GM)  Allergies    Patient has no known allergies.   Social History     Social History     Socioeconomic History    Marital status: Single     Spouse name: Not on file    Number of children: Not on file    Years of education: Not on file    Highest education level: Not on file   Occupational History    Not on file   Social Needs    Financial resource strain: Not on file    Food insecurity     Worry: Not on file Inability: Not on file    Transportation needs     Medical: Not on file     Non-medical: Not on file   Tobacco Use    Smoking status: Former Smoker    Smokeless tobacco: Never Used   Substance and Sexual Activity    Alcohol use: Yes     Frequency: 4 or more times a week     Comment: 2 bottles of wine daily    Drug use: Not on file    Sexual activity: Not on file   Lifestyle    Physical activity     Days per week: Not on file     Minutes per session: Not on file    Stress: Not on file   Relationships    Social connections     Talks on phone: Not on file     Gets together: Not on file     Attends Congregational service: Not on file     Active member of club or organization: Not on file     Attends meetings of clubs or organizations: Not on file     Relationship status: Not on file    Intimate partner violence     Fear of current or ex partner: Not on file     Emotionally abused: Not on file     Physically abused: Not on file     Forced sexual activity: Not on file   Other Topics Concern    Not on file   Social History Narrative    Not on file     Family History    History reviewed. No pertinent family history. ROS    General/Constitutional: weakness, poor appetite  HENT: Negative. Eyes: Negative. Upper respiratory tract: No nasal stuffiness or post nasal drip. Lower respiratory tract/ lungs: No cough or sputum production. No hemoptysis. Cardiovascular: Bilateral LE edema  Gastrointestinal: No nausea or vomiting. Neurological: generalized weakness  Extremities: B lower edema   Musculoskeletal: no complaints  Genitourinary: No complaints. Hematological: Negative. Denies easy buising  Skin: No itching.   Meds    Current Medications    [START ON 11/23/2020] furosemide  40 mg Oral Daily    spironolactone  50 mg Oral Daily    [Held by provider] apixaban  5 mg Oral BID    [Held by provider] aspirin  81 mg Oral Daily    [Held by provider] dilTIAZem  240 mg Oral Daily    [Held by provider] gabapentin  300 mg Oral TID    lactulose  10 g Oral BID    metoprolol tartrate  50 mg Oral BID    pantoprazole  40 mg Oral BID    sodium chloride flush  10 mL Intravenous 2 times per day    insulin lispro  0-6 Units Subcutaneous TID WC    insulin lispro  0-3 Units Subcutaneous Nightly     chlordiazePOXIDE, sodium chloride flush, acetaminophen **OR** acetaminophen, promethazine **OR** ondansetron, glucose, dextrose, glucagon (rDNA), dextrose  IV Drips/Infusions   dextrose       Vitals    Vitals    height is 6' 2\" (1.88 m) and weight is 327 lb (148.3 kg) (abnormal). His oral temperature is 97.5 °F (36.4 °C). His blood pressure is 94/54 (abnormal) and his pulse is 98. His respiration is 20 and oxygen saturation is 96%. O2 Flow Rate (L/min): 2 L/min  I/O    Intake/Output Summary (Last 24 hours) at 11/22/2020 1103  Last data filed at 11/22/2020 6433  Gross per 24 hour   Intake 508.96 ml   Output 2900 ml   Net -2391.04 ml     Patient Vitals for the past 96 hrs (Last 3 readings):   Weight   11/21/20 2047 (!) 327 lb (148.3 kg)   11/21/20 1229 (!) 320 lb (145.2 kg)     Exam   Constitutional: obese gentleman on room air, looks tired and chronically but no distressed. Head: Normocephalic and atraumatic. Mouth/Throat: Oropharynx is clear and moist.  No oral thrush. Eyes: Conjunctivae are normal. Pupils are equal, round, and reactive to light. No scleral icterus. Neck: Neck supple. No JVD or tracheal deviation present. Cardiovascular: Regular rate, regular rhythm, S1 and S2 with no murmur. No peripheral edema  Pulmonary/Chest: decreased BS right hemithorax, pigtail in R anterior axillary line 7th ICS  Abdominal: Soft distended, fluid wave  Musculoskeletal: bilateral LE edema  Lymphadenopathy:  No cervical adenopathy. Neurological: Patient is alert and oriented to person, place, and time. Skin: Skin is warm and dry.       Labs   ABG  No results found for: PH, PO2, PCO2, HCO3, O2SAT  No results found for: IFIO2, MODE, SETTIDVOL, SETPEEP  CBC  Recent Labs     11/21/20  1331 11/22/20  0531   WBC 6.6 5.8   RBC 3.78* 3.14*   HGB 12.6* 10.4*   HCT 37.2* 30.6*   MCV 98.4* 97.5*   MCH 33.3* 33.1*   MCHC 33.9 34.0   * 112*   MPV 10.8 10.9      BMP  Recent Labs     11/21/20  1331 11/21/20  2228 11/22/20  0531   *  --  134*   K 3.5  --  3.4*   CL 93*  --  95*   CO2 22*  --  24   BUN 25*  --  25*   CREATININE 2.1*  --  2.0*   GLUCOSE 125*  --  112*   MG 1.4* 1.2* 1.6   CALCIUM 9.3  --  8.9     LFT  Recent Labs     11/21/20  1331 11/22/20  0531   AST 57* 46*   ALT 24 20   BILITOT 5.9* 4.4*   ALKPHOS 143* 118     TROP  Lab Results   Component Value Date    TROPONINT < 0.010 11/21/2020     BNP  Lab Results   Component Value Date    PROBNP 610.0 11/21/2020     D-Dimer  Lab Results   Component Value Date    DDIMER 4341.00 11/21/2020     Lactic Acid  No results for input(s): LACTA in the last 72 hours. INR  Recent Labs     11/21/20  1331   INR 3.32*     PTT  No results for input(s): APTT in the last 72 hours. Glucose  Recent Labs     11/22/20  0624   POCGLU 116*     UA   Recent Labs     11/22/20  0334   COLORU ORANGE   . PFTs   N/A  Echo    N/A  Cultures    Procalcitonin  No results found for: [facility]    Radiology    CXR    CT Scans  FINDINGS: There is a small caliber pigtail drainage catheter in the upper right chest. Pigtails coiled and is along the medial pleura just posterior to the trachea. There is large fluid collection there is loss of volume of most of the right lung. Minimal residual aeration of the anterior right upper lobe is present. There is also an anterior small pneumothorax seen at the right lung base. Left lung is clear. Multiple nonenlarged mediastinal lymph nodes are present. No axillary lymphadenopathy is seen. Upper abdomen there is ascites. Liver is cirrhotic in appearance. Gallbladder is distended with multiple gallstones.  Spleen is unremarkable.         Bones    No suspicious bone lesions           Impression    1.  Right hydropneumothorax. Very small basilar pneumothorax. Collapse of most of the right lung with minimal aerated right upper lobe. 2. Ascites. Cholelithiasis. Cirrhotic liver.                   **This report has been created using voice recognition software.  It may contain minor errors which are inherent in voice recognition technology. **         Final report electronically signed by Dr. Aurora Cm on 11/21/2020 5:36 PM      CXR:         FINDINGS: Interval placement of a pigtail drainage catheter entering at the upper chest laterally C3-4 level anteriorly. There is been improved aeration of the upper chest. Most of the right chest remains completely opacified. There is no midline shift    noted. Left lung is clear. Heart size normal. Mediastinum is not widened. There is no pneumothorax identified              Impression    Placement of a pigtail drainage catheter right chest with improved aeration of the uppermost long. No evidence of pneumothorax however position is not determined                   **This report has been created using voice recognition software.  It may contain minor errors which are inherent in voice recognition technology. **         Final report electronically signed by Dr. 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  Discussed with Chan Harrell, primary team GI and RN    Thank you for the consult and allowing us to participate in the care of your patient. Case discussed with nurse and patient/family. Questions and concerns addressed. Meds and Orders reviewed.     Electronically signed by     Ame Barker MD on 11/22/2020 at 11:03 AM

## 2020-11-22 NOTE — PROGRESS NOTES
[facility] ICU STEPDOWN TELEMETRY 4K  EVALUATION    Time:   Time In: 6  Time Out: 6339  Timed Code Treatment Minutes: 16 Minutes  Minutes: 26          Date: 2020  Patient Name: Bharati Buckley,   Gender: male      MRN: [id number]  : 1965  (48 y.o.)  Referring Practitioner: Alirio Ribera DO  Diagnosis: Pleural Effusion  Additional Pertinent Hx: per 2020 ED note; Bharati Buckley is a 48 y.o. male with past medical history of atrial fibrillation, diabetes, cirrhosis, COPD, and CHF who presents to the emergency department for evaluation of lower extremity edema and shortness of breath. Patient states this occurred on Wednesday after a road trip from [place].  He states that the leg swelling is bilaterally with little pain. Restrictions/Precautions:  Restrictions/Precautions: Fall Risk, General Precautions  Position Activity Restriction  Other position/activity restrictions: R thoracentesis tube-watch gait belt placement. no pushing/pulling with R UE. Subjective  Chart Reviewed: Yes, Orders, Progress Notes, History and Physical, Imaging  Patient assessed for rehabilitation services?: Yes  Response to previous treatment: Patient with no complaints from previous session  Family / Caregiver Present: No    Subjective: RN approved session, patient pleasant and cooperative. A & Ox 4. responds to some questions with random remarks and question cognition at times. reports mild discomfort in R lateral rib region; did not rate pain.     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 VISION:Corrected    HEARING:  WFL    COGNITION: Decreased Insight    RANGE OF MOTION:  Right Upper Extremity: WFL  Left Upper Extremity:  WFL    STRENGTH:  Left Upper Extremity:  WFL    ADL:   Lower Extremity Dressing: Moderate Assistance. required assist for R foot to don sock, required SBA for L sock and was able to cross leg to reach. .  Donned compression socks while supine in bed with MAX A. BALANCE:  Sitting Balance:  Contact Guard Assistance. due to change in position and has not been active since admission to hospital.   Standing Balance: Contact Guard Assistance. with use of 2 w/w for support    BED MOBILITY:  Supine to Sit: Minimal Assistance cues for tech and hand placement    TRANSFERS:  Sit to Stand:  Minimal Assistance. patient fearful he was too heavy for this writer; encouragement provided and reassurance of his safety. cues for hand placement. FUNCTIONAL MOBILITY:  Assistive Device: Rolling Walker  Assist Level:  Contact Guard Assistance. Distance: from EOB to recliner       Exercise:  none completed this date    Activity Tolerance:  Patient tolerance of  treatment: good. Patient on O2 and able to tolerate treatment with no signs of SOB or fatigue t/o evaluation. Cues for pacing self to prevent SOB. Assessment:  Assessment: Patient is pleasant and cooperative and motivated to participate in therapy. His activity tolerance is limited due to his recent pleural effusion with thoracentesis which affects his ability to participate in his ADL routine and complete functional transfers at this time.   Performance deficits / Impairments: Decreased functional mobility , Decreased ADL status, Decreased endurance, Decreased balance, Decreased high-level IADLs  Prognosis: Good  REQUIRES OT FOLLOW UP: Yes  Decision Making: Medium Complexity    Treatment Initiated: Treatment and education initiated within context of evaluation. Evaluation time included review of current medical information, gathering information related to past medical, social and functional history, completion of standardized testing, formal and informal observation of tasks, assessment of data and development of plan of care and goals. Treatment time included skilled education and facilitation of tasks to increase safety and independence with ADL's for improved functional independence and quality of life. Discharge Recommendations:  Continue to assess pending progress, Patient would benefit from continued therapy after discharge    Patient Education:  OT Education: OT Role, Precautions, ADL Adaptive Strategies, Plan of Care  Barriers to Learning: none    Equipment Recommendations:       Plan:  Times per week: 3-5 x  Times per day: Daily  Current Treatment Recommendations: Endurance Training, Neuromuscular Re-education, Patient/Caregiver Education & Training, Self-Care / ADL. See long-term goal time frame for expected duration of plan of care. If no long-term goals established, a short length of stay is anticipated. Goals:  Patient goals : return [facility]  Short term goals  Time Frame for Short term goals: by discharge  Short term goal 1: Patient will toelrate 5 min functional standing with (S) with O2 > or equal to 90% to incresae activity tolerance for ADL routine. Short term goal 2: patient will complete ADL routine wtih (S). Short term goal 3: Patient will functionally ambulate to bathroom with least restrictive device with (S). Short term goal 4: Patient will complete functional transfers with (S). Following session, patient left in safe position with all fall risk precautions in place.

## 2020-11-22 NOTE — ED NOTES
ED nurse-to-nurse bedside report    Chief Complaint   Patient presents with    Shortness of Breath    Leg Swelling      LOC: alert and orientated to name, place, date  Vital signs   Vitals:    11/21/20 1415 11/21/20 1556 11/21/20 1700 11/21/20 1830   BP:  110/61     Pulse: 93 103 85 86   Resp: 22 20 18 18   Temp:       TempSrc:       SpO2: 96% 97% 95% 97%   Weight:       Height:          Pain:    Pain Interventions: 0  Pain Goal: none  Oxygen: Yes    Current needs required 2 L NC   Telemetry: Yes  LDAs:   Peripheral IV 11/21/20 Left Antecubital (Active)   Site Assessment Clean; Intact;Dry 11/21/20 1258   Line Status Blood return noted;Normal saline locked 11/21/20 1258   Dressing Status Intact;Dry;Clean 11/21/20 1258   Dressing Intervention New 11/21/20 1258     Continuous Infusions:   Mobility: Requires assistance * 1  [place] Fall Risk Score: No flowsheet data found.   Fall Interventions: side rails up; all light in reach  Report given to: Mario Draper., RN  11/21/20 7699

## 2020-11-22 NOTE — CONSULTS
Advanced cirrhosis of liver (HCC)     Atrial fibrillation (HCC)     CHF (congestive heart failure) (HCC)     Chronic kidney disease     COPD (chronic obstructive pulmonary disease) (HCC)     Diabetes mellitus (HCC)     Gallstones     GERD (gastroesophageal reflux disease)     Headache     Portal hypertension (HCC)        Past Surgical History:  Past Surgical History:   Procedure Laterality Date    APPENDECTOMY      HERNIA REPAIR         Family History:  History reviewed. No pertinent family history.     Social History:  Social History     Socioeconomic History    Marital status: Single     Spouse name: Not on file    Number of children: Not on file    Years of education: Not on file    Highest education level: Not on file   Occupational History    Not on file   Social Needs    Financial resource strain: Not on file    Food insecurity     Worry: Not on file     Inability: Not on file    Transportation needs     Medical: Not on file     Non-medical: Not on file   Tobacco Use    Smoking status: Former Smoker    Smokeless tobacco: Never Used   Substance and Sexual Activity    Alcohol use: Yes     Frequency: 4 or more times a week     Comment: 2 bottles of wine daily    Drug use: Not on file    Sexual activity: Not on file   Lifestyle    Physical activity     Days per week: Not on file     Minutes per session: Not on file    Stress: Not on file   Relationships    Social connections     Talks on phone: Not on file     Gets together: Not on file     Attends Jain service: Not on file     Active member of club or organization: Not on file     Attends meetings of clubs or organizations: Not on file     Relationship status: Not on file    Intimate partner violence     Fear of current or ex partner: Not on file     Emotionally abused: Not on file     Physically abused: Not on file     Forced sexual activity: Not on file   Other Topics Concern    Not on file   Social History Narrative    Not on file       Home Meds:  Prior to Admission medications    Medication Sig Start Date End Date Taking? Authorizing Provider   ASPIRIN 81 PO Take by mouth daily   Yes Historical Provider, MD   varenicline (CHANTIX) 1 MG tablet Take 1 mg by mouth 2 times daily   Yes Historical Provider, MD   chlordiazePOXIDE (LIBRIUM) 25 MG capsule Take 25 mg by mouth 3 times daily as needed for Anxiety. Yes Historical Provider, MD   dilTIAZem (DILACOR XR) 240 MG extended release capsule Take 240 mg by mouth daily   Yes Historical Provider, MD   apixaban (ELIQUIS) 5 MG TABS tablet Take by mouth 2 times daily   Yes Historical Provider, MD   gabapentin (NEURONTIN) 300 MG capsule Take 300 mg by mouth 3 times daily. Yes Historical Provider, MD   lactulose (CEPHULAC) 10 g packet Take 10 g by mouth 2 times daily as needed   Yes Historical Provider, MD   metFORMIN (GLUCOPHAGE) 500 MG tablet Take 500 mg by mouth 2 times daily (with meals)   Yes Historical Provider, MD   metoprolol tartrate (LOPRESSOR) 50 MG tablet Take 50 mg by mouth 2 times daily   Yes Historical Provider, MD   pantoprazole (PROTONIX) 40 MG tablet Take 40 mg by mouth 2 times daily   Yes Historical Provider, MD   spironolactone (ALDACTONE) 25 MG tablet Take 25 mg by mouth daily   Yes Historical Provider, MD       Review of Systems:  Constitutional: Positive for shortness of breath, weight gain as well as generalized weakness and bilateral lower extremity swelling  Head: Negative for headaches  Eyes: Negative for blurry vision or discharge  Ears: Negative for ear pain or hearing changes  Nose: Negative for runny nose or epistaxis  Respiratory: Positive for shortness of breath. Negative for cough or sputum production. Negative for hemoptysis  Cardiovascular: Negative for chest pain  GI: Positive for nausea without any vomiting or diarrhea. Negative for hematochezia and melena.   Positive for abdominal distention and increasing abdominal girth  : Negative for discharge, dysuria, or hematuria  Skin: Negative for rash  Musculoskeletal: Positive for multiple joint pains. Positive for back pain. Moves all ext  Neuro: Negative for numbness or tingling, negative for slurred speech  Psychiatric: Reports stable mood, negative for depression or insomnia    All other review of systems were reviewed and negative    Current Meds:  Infusion:    dextrose       Meds:    [START ON 11/23/2020] furosemide  40 mg Oral Daily    spironolactone  50 mg Oral Daily    [Held by provider] apixaban  5 mg Oral BID    [Held by provider] aspirin  81 mg Oral Daily    [Held by provider] dilTIAZem  240 mg Oral Daily    [Held by provider] gabapentin  300 mg Oral TID    lactulose  10 g Oral BID    metoprolol tartrate  50 mg Oral BID    pantoprazole  40 mg Oral BID    sodium chloride flush  10 mL Intravenous 2 times per day    insulin lispro  0-6 Units Subcutaneous TID WC    insulin lispro  0-3 Units Subcutaneous Nightly     Meds prn: chlordiazePOXIDE, sodium chloride flush, acetaminophen **OR** acetaminophen, promethazine **OR** ondansetron, glucose, dextrose, glucagon (rDNA), dextrose     Allergies/Intolerances: ALLERGIES: Patient has no known allergies. 24HR INTAKE/OUTPUT:      Intake/Output Summary (Last 24 hours) at 11/22/2020 1048  Last data filed at 11/22/2020 0835  Gross per 24 hour   Intake 508.96 ml   Output 2900 ml   Net -2391.04 ml     I/O last 3 completed shifts: In: 499 [P.O.:450; I.V.:49]  Out: 2900 [Urine:900; Chest Tube:2000]  I/O this shift:  In: 10 [I.V.:10]  Out: -   Admission weight: (!) 320 lb (145.2 kg)  Wt Readings from Last 3 Encounters:   11/21/20 (!) 327 lb (148.3 kg)     Body mass index is 41.98 kg/m².     Physical Examination:  VITALS:   Vitals:    11/21/20 2345 11/22/20 0332 11/22/20 0445 11/22/20 0815   BP: 108/67 (!) 169/118 (!) 104/59 (!) 94/54   Pulse: 108 90  98   Resp: 20 20 20   Temp: 98.2 °F (36.8 °C) 98 °F (36.7 °C)  97.5 °F (36.4 °C)   TempSrc: Oral Oral  Oral SpO2: 93% 94%  96%   Weight:       Height:         Weight:   Wt Readings from Last 3 Encounters:   11/21/20 (!) 327 lb (148.3 kg)     Constitutional and General Appearance: alert and cooperative with exam, appears comfortable, no distress, not diaphoretic, ill-appearing  Eyes: Very mild icteric sclera, mild pallor conjunctiva, no discharge seen from left eye or right eye  Ears and Nose: normal external appearance of left and right ear. Normal external appearance of nose. No active drainage from nose. Oral: moist oral mucus membranes  Neck: No jugular venous distention, appears symmetric  Lungs: Air entry diminished at the bases, no crackles or rales, no use of accessory muscles or labored breathing  Chest: No chest wall tenderness  Heart: S1, S2  Extremities: Bilateral 2-3+ LE edema, no tenderness  GI: Abdominal distention, no guarding  Skin: no rash seen on exposed extremities, warm to touch  Musculo: moves all extremities  Neuro: no slurred speech, no facial drooping  Psychiatric: Normal mood and affect, Not agitated    Lab Data  CBC:   Recent Labs     11/21/20  1331 11/22/20  0531   WBC 6.6 5.8   HGB 12.6* 10.4*   HCT 37.2* 30.6*   * 112*     BMP:  Recent Labs     11/21/20  1331 11/21/20  2228 11/22/20  0531   *  --  134*   K 3.5  --  3.4*   CL 93*  --  95*   CO2 22*  --  24   BUN 25*  --  25*   CREATININE 2.1*  --  2.0*   GLUCOSE 125*  --  112*   CALCIUM 9.3  --  8.9   MG 1.4* 1.2* 1.6     PTH: @PTH@  TSH: No results for input(s): TSH in the last 72 hours.   HgBa1c:   Recent Labs     11/21/20  2134   LABA1C 4.6     Hepatic:   Recent Labs     11/21/20  1331 11/22/20  0531   LABALBU 3.3* 2.9*   AST 57* 46*   ALT 24 20   BILITOT 5.9* 4.4*   ALKPHOS 143* 118       Additional Labs: No UA available on this admission  Diagnostics: CT chest shows right hydropneumothorax with small basilar pneumothorax, collapse of most of the right lung with minimally aerated right upper lobe, ascites and cirrhosis of the liver    Old labs and diagnostics reviewed. Echo: October 2020 (care everywhere): EF 60 to 65%, mildly dilated right ventricle, severely dilated left and right atrium    Impression and Plan:  1. Acute kidney injury. Will need further evaluation. Will send UA, urine sodium and urine chloride. Will obtain baseline kidney ultrasound to evaluate echogenicity and kidney size. Multiple risk factors to consider including diabetes, heavy use of nonsteroidals (patient claims to be taking 6 to 12 tablets of ibuprofen every day), as well as acute decompensated liver cirrhosis with ascites and therefore cannot rule out acute hepatorenal syndrome/physiology. Will need urgent urine sodium and urine chloride. For now agree with holding diuretics. Give IV fluids/IV albumin. Agree with octreotide and midodrine for supportive management. Blood pressure is relatively stable. No ACE inhibitors no ARB. No nonsteroidals. This was clearly discussed with patient. 2. Mild hyponatremia in setting of fluid overload and underlying cirrhosis. Will monitor  3. Mild hypokalemia. Will replace with 40 mEq potassium x1 dose  4. Acute decompensated liver cirrhosis with ascites. GI following. Plan is for patient to be [facility] to [facility] when bed available  5. Alcoholic cirrhosis with ascites. Patient reports drinking 2 bottles of wine and sometimes more. 6. Excessive use of nonsteroidals. Patient reports taking 6 to 12 tablets of ibuprofen every day. Discussed with patient and strongly advised against all type of nonsteroidals. Patient and brother both voiced understanding. 7. Hypertension. 8. History of diabetes mellitus. Will check urine protein creatinine ratio, UA as well as hemoglobin A1c.  9. Atrial fibrillation on Eliquis. 10. Hypomagnesemia secondary to chronic alcoholism. Replace    Thank you for the consult. Please feel free to call me if you have any questions.    Discussed with patient and brother    Kayleigh Linton

## 2020-11-22 NOTE — CONSULTS
Patient seen examined,   Note dictated. A-P 1. Cirrhosis. He states he was going to be evaluated for this before he moved. We discussed his meld score is 33 with over 50% 3 month mortality. I emphazied no more alcohol. He is being transferred to [facility] which is appropriate. We will sign off and be available as needed.

## 2020-11-23 NOTE — PROGRESS NOTES
[facility] here to take patient to [facility] - report was called earlier and papers were faxed to [phone]. Patient leaves with a 20 gauge INT in the LFA - on room air - urinated prior to transfer - on a stretcher - with no other lines or drains. Papers were handed to [facility] to give to [facility]. [facility] called and notified of late transfer.

## 2020-11-23 NOTE — PROGRESS NOTES
[address] [place] [address]  INPATIENT PHYSICAL THERAPY  EVALUATION  STR ICU STEPDOWN TELEMETRY 4K - 4K-02/002-A    Time In: 8858  Time Out: 7061  Timed Code Treatment Minutes: 23 Minutes  Minutes: 34    Date: 2020  Patient Name: Nathan Alba,  Gender:  male        MRN: [id number]  : 1965  (48 y.o.)      Referring Practitioner: Bibi Palacio DO  Diagnosis: Pleural effusion associated with hepatic disorder  Additional Pertinent Hx: Per H&P: \"48year-old obese  male presents shortness of breath and leg edema. Patient has a past medical history of atrial fibrillation, diabetes, cirrhosis, COPD, and CHF. Patient states that this occurred on Wednesday after road trip from [place].  He states his leg swelling is bilateral with little pain. His family doctor has recently adjusted his diuretics. He does not admit to any chest pain, fever, numbness, or tingling. Patient admits to drinking 2 bottles of wine daily but states he has not had a had a drink for 2 weeks. \"     Restrictions/Precautions:  Restrictions/Precautions: Fall Risk, General Precautions  Position Activity Restriction  Other position/activity restrictions: R thoracentesis tube-watch gait belt placement. no pushing/pulling with R UE. Subjective:  Chart Reviewed: Yes  Patient assessed for rehabilitation services?: Yes  Family / Caregiver Present: No  Subjective: RN apporved therapy session. Pt supine in bed upon entry and agreeable to therapy services. Post session py in bedside chair with all needs in reach. Chair alarm on.     General:  Overall Orientation Status: Within Functional Limits  Follows Commands: Within Functional Limits    Vision: Impaired  Vision Exceptions: Wears glasses for distance, Wears glasses for reading    Hearing: Within functional limits    Pain: reports no pain    Social/Functional History:    Lives With: Family(2 brothers)  Type of Home: Apartment  Home Layout: One level  Home Access: Level entry(garage has 1 stair)  Home Equipment: Quad cane     Bathroom Shower/Tub: Tub/Shower unit  Bathroom Toilet: Standard  Bathroom Equipment: Shower chair, Toilet raiser  Bathroom Accessibility: Accessible    Receives Help From: Family  ADL Assistance: Independent  Homemaking Assistance: Independent  Ambulation Assistance: Independent  Transfer Assistance: Independent    Active : Yes  Type of occupation: 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 OBJECTIVE:  Range of Motion:  Bilateral Lower Extremity: WFL    Strength:  Bilateral Lower Extremity: GRACIELA/[facility] [facility] - 4/5 for bilat hip flexion, 5/5 on rest      Balance:  Static Standing Balance: Stand By Assistance, with cues for safety  Dynamic Standing Balance: Stand By Assistance, [employer], with cues for safety, pt reports unsteadiness with standing    Bed Mobility:  Rolling to Left: Stand By Assistance, with verbal cues , with increased time for completion   Sit to Supine: Stand By Assistance, with verbal cues , with increased time for completion    With HOB flat    Transfers:  Sit to Stand: Stand By Assistance, with increased time for completion, cues for hand placement, to/from chair with arms  Stand to Sit:Stand By Assistance, with increased time for completion, cues for hand placement, to/from chair with arms    Ambulation:  Stand By Assistance, with verbal cues , with increased time for completion  Distance: 250 ft  Surface: Level Tile  Device:No Device  Gait Deviations:  Decreased Step Length Bilaterally, Decreased Weight Shift Left, Lean to Right, Decreased Arm Swing, Decreased Trunk Rotation, Decreased Gait Speed, Decreased Heel Strike on Left, Wide Base of Support and Mild Path Deviations  Required verbal cues to improve posture during gait    Exercise:  Patient was guided in 1 set(s) 10 reps of exercise to both lower extremities.   Ankle pumps, Heelslides, Hip abduction/adduction, Seated marches and Standing janelle. Exercises were completed for increased independence with functional mobility. Pt required cueing for proper exercises completion     Functional Outcome Measures: Completed  AM-PAC Inpatient Mobility without Stair Climbing Raw Score : 15  AM-PAC Inpatient without Stair Climbing T-Scale Score : 43.03    ASSESSMENT:  Activity Tolerance:  Patient tolerance of  treatment: good. Treatment Initiated: Treatment and education initiated within context of evaluation. Evaluation time included review of current medical information, gathering information related to past medical, social and functional history, completion of standardized testing, formal and informal observation of tasks, assessment of data and development of plan of care and goals. Treatment time included skilled education and facilitation of tasks to increase safety and independence with functional mobility for improved independence and quality of life. Pt educated on assistive device use as needed. Pt educated on proper cane placement and sequencing during gait. Assessment: Body structures, Functions, Activity limitations: Decreased functional mobility , Decreased posture, Decreased endurance, Decreased sensation, Decreased balance  Assessment: Pt demonstrates bed mobility, transfers, and gait with stand by assist. Pt educated on assistive device use only as needed. Pt instructed on proper use of cane. Pt would benefit from skilled PT services for duration of admission to increase functional independence and mobility.   Prognosis: Good    REQUIRES PT FOLLOW UP: Yes    Discharge Recommendations:  Discharge Recommendations: Home with assist PRN    Patient Education:  PT Education: Goals, Equipment, Transfer Training, PT Role, Functional Mobility Training, Gait Training    Equipment Recommendations:  Equipment Needed: No    Plan:  Times per week: 3-5x GM  Current Treatment Recommendations: Gait Training, Balance Training, Functional Mobility Training, Endurance Training, Transfer Training, Safety Education & Training    Goals:  Patient goals : d/c to [facility] term goals  Time Frame for Short term goals: by d/c  Short term goal 1: supine <> sit with supervision A for safe bed mobility  Short term goal 2: sit <> stand with no AD and supervision A for ease of transfer  Short term goal 3: ambualte >300 ft with no AD or cane in R hand for safe community ambulation  Short term goal 4: stand 5 minutes with no AD while performing dynamic standinf activity for increase of functional independence  Long term goals  Time Frame for Long term goals : N/A due to short ELOS    Following session, patient left in safe position with all fall risk precautions in place.

## 2020-11-23 NOTE — PROGRESS NOTES
[address] THERAPY MISSED TREATMENT NOTE  STRZ ICU STEPDOWN TELEMETRY 4K  4K-02/002-A      Date: 2020  Patient Name: Kirill Joseph        CSN: [id number]   : 1965  (48 y.o.)  Gender: male   Referring Practitioner: Anabell Rodriguez DO  Diagnosis: Pleural Effusion         REASON FOR MISSED TREATMENT: Pt stated that he just woke up from a nap and he did not feel like getting up at all. Pt stated he just got up and went to the bathroom a little bit ago.

## 2020-11-23 NOTE — PROGRESS NOTES
[facility]   Nephrology progress note  11/23/2020, 10:55 AM      Pt Name:    Canelo Alicia  MRN:     [id number]     [place]:    1965  Admit Date:    11/21/2020 12:20 PM  Primary Care Physician:  Adis Boyce MD   Room number  4K-02/002-A    Chief Complaint: Nephrology following for KARAN    Subjective:  Patient seen and examined  No chest pain or shortness of breath  Feels okay  Feels better today    Objective:  24HR INTAKE/OUTPUT:      Intake/Output Summary (Last 24 hours) at 11/23/2020 1055  Last data filed at 11/23/2020 0847  Gross per 24 hour   Intake 992.52 ml   Output 3375 ml   Net -2382.48 ml     I/O last 3 completed shifts: In: 1292.5 [P.O.:1140; I.V.:152.5]  Out: 3375 [OSRBY:7355; Chest Tube:2000]  I/O this shift:  In: 10 [I.V.:10]  Out: -   Admission weight: (!) 320 lb (145.2 kg)  Wt Readings from Last 3 Encounters:   11/21/20 (!) 327 lb (148.3 kg)     Body mass index is 41.98 kg/m².     Physical examination  VITALS:     Vitals:    11/22/20 2023 11/22/20 2347 11/23/20 0355 11/23/20 0844   BP: 109/78 108/65 99/64 (!) 161/114   Pulse: 95 113 103 128   Resp: 16 18 20 20   Temp: 97.8 °F (36.6 °C) 98.2 °F (36.8 °C) 98.5 °F (36.9 °C) 98.2 °F (36.8 °C)   TempSrc: Oral Oral Oral Axillary   SpO2: 95% 92% 93% 94%   Weight:       Height:         General Appearance: alert and cooperative with exam, appears comfortable, no distress  Mouth/Throat: Oral mucosa moist  Neck: No JVD  Lungs: Air entry B/L, no rales, no use of accessory muscles  Heart:  S1, S2 heard  GI: soft, non-tender, no guarding  Extremities: Bilateral pitting LE edema      Lab Data  CBC:   Recent Labs     11/21/20  1331 11/22/20  0531 11/23/20  0432   WBC 6.6 5.8 5.2   HGB 12.6* 10.4* 9.9*   HCT 37.2* 30.6* 28.9*   * 112* 100*     BMP:  Recent Labs     11/21/20  1331 11/21/20 2228 11/22/20  0531 11/23/20  0432   *  --  134* 132*   K 3.5  --  3.4* 3.6   CL 93*  --  95* 94*   CO2 22*  --  24 26   BUN 25*  --  25* 20   CREATININE 2.1*  --  2.0* 1.5*   GLUCOSE 125*  --  112* 113*   CALCIUM 9.3  --  8.9 8.7   MG 1.4* 1.2* 1.6  --      Hepatic:   Recent Labs     11/21/20  1331 11/22/20  0531 11/23/20  0432   LABALBU 3.3* 2.9* 3.2*   AST 57* 46* 38   ALT 24 20 17   BILITOT 5.9* 4.4* 5.4*   ALKPHOS 143* 118 96         Meds:  Infusion:    dextrose       Meds:    [Held by provider] furosemide  40 mg Oral Daily    [Held by provider] spironolactone  50 mg Oral Daily    midodrine  10 mg Oral TID WC    albumin human  25 g Intravenous Q6H    octreotide  100 mcg Subcutaneous Q8H    [Held by provider] apixaban  5 mg Oral BID    [Held by provider] aspirin  81 mg Oral Daily    [Held by provider] dilTIAZem  240 mg Oral Daily    [Held by provider] gabapentin  300 mg Oral TID    lactulose  10 g Oral BID    metoprolol tartrate  50 mg Oral BID    pantoprazole  40 mg Oral BID    sodium chloride flush  10 mL Intravenous 2 times per day    insulin lispro  0-6 Units Subcutaneous TID WC    insulin lispro  0-3 Units Subcutaneous Nightly     Meds prn: chlordiazePOXIDE, sodium chloride flush, acetaminophen **OR** acetaminophen, promethazine **OR** ondansetron, glucose, dextrose, glucagon (rDNA), dextrose       Impression and Plan:  1. KARAN: Overall improved  Appears to be mostly prerenal  Creatinine down to 1.5  Continue with current management  UA mostly benign  Ultrasound shows nonobstructive kidney stone  Unlikely hepatorenal at this time    2. Acute decompensated liver cirrhosis with ascites  Primary team transferring patient to [facility] later today  3. Hyponatremia likely secondary to liver cirrhosis  4. Hypokalemia. Will replace with 40 mEq potassium x1 dose  5. Alcoholic cirrhosis with ascites  6. History of excessive nonsteroidal use  7. Acute decompensated liver cirrhosis  8. Atrial fibrillation on Eliquis  9. Hypomagnesemia secondary to chronic alcohol abuse.   Will monitor, improved    D/W patient     Rk Hutchison MD  [facility]

## 2020-11-23 NOTE — FLOWSHEET NOTE
11/23/20 1431   Handoff   Communication Given Transfer Handoff   Oncoming Nurse/Offgoing Nurse Ammy Tejeda / Xiao Martell Telephone   Time Handoff Given 0806       RN from [address] [place] [address] called this RN and received report. Phone number to writer's phone given and phone number to Jorge Gibson was taken: [phone]. Questions and concerns answered - Patient updated, expected transfer time at 1800.

## 2020-11-24 NOTE — PROGRESS NOTES
Physician Progress Note      PATIENT:               Mando Story  CSN #:                  [id number]  :                       1965  ADMIT DATE:       2020 12:20 PM  [address] DATE:        2020 7:00 PM  RESPONDING  PROVIDER #:        Deyanira Livingston MD          QUERY TEXT:    Patient admitted with alcoholic cirrhosis with ascites and noted to have   hepatic failure. If possible, please respond below and document in the   progress notes and discharge summary the acuity of the hepatic failure: The medical record reflects the following:  Risk Factors: alcoholic cirrhosis with ascites  Clinical Indicators: Pulmonary noted hepatic failure;  GI note states,   \"On Lactulose, may have a degree of hepatic encephalopathy\"; A&O x 4  Treatment: Lactulose, albumin, IV Lasix, Aldactone, labs, imaging, GI consult,   pending [facility] transfer    Thank you! Ronan Talbot, RN, BSN, RHIT, CCDS, [facility]  RN Clinical   [phone]  Options provided:  -- Acute hepatic failure  -- Chronic hepatic failure  -- Acute on chronic hepatic failure  -- Other - I will add my own diagnosis  -- Disagree - Not applicable / Not valid  -- Disagree - Clinically unable to determine / Unknown  -- Refer to Clinical Documentation Reviewer    PROVIDER RESPONSE TEXT:    This patient has acute on chronic hepatic failure. Query created by: Gallo Martinez on 2020 11:41 AM      QUERY TEXT:    Attending,    Pt admitted with alcoholic cirrhosis with ascites. Pt noted to have KARAN, CKD,   and CHF. If possible, please respond below and document in the progress   notes and discharge summary if you are evaluating and/or treating any of the   following: The medical record reflects the following:  Risk Factors: alcoholic cirrhosis with ascites, liver failure, KARAN, CKD, CHF  Clinical Indicators: urine sodium 43; KARAN  Treatment: GI & Nephrology consults, labs, diuresis, lactulose    Thank you!     Ronan Talbot RN, BSN, RHIT, CCDS, CRCR  RN Clinical   [phone]  Options provided:  -- Hepatorenal syndrome  -- Hepatorenal syndrome ruled out  -- Other - I will add my own diagnosis  -- Disagree - Not applicable / Not valid  -- Disagree - Clinically unable to determine / Unknown  -- Refer to Clinical Documentation Reviewer    PROVIDER RESPONSE TEXT:    Hepatorenal syndrome ruled out.     Query created by: Gallo Martinez on 11/23/2020 11:48 AM      Electronically signed by:  Deyanira Livingston MD 11/24/2020 8:45 AM

## 2020-12-02 NOTE — TELEPHONE ENCOUNTER
If pt is having severe issues breathing, he needs to go to ED. The office is not the correct place for evaluation for severe breathing issues. Sebastian.  ES

## 2020-12-02 NOTE — TELEPHONE ENCOUNTER
Pt's brother Wanda Shelton called in to request a Same Day appt due to the Pt was discharged yesterday from hospital and is having  issues breathing even though he was sent home on oxygen and was advised by the hospital to to follow up with doctor.     Please call Lizzy back at [phone]

## 2020-12-04 PROBLEM — I10 ESSENTIAL HYPERTENSION: Status: ACTIVE | Noted: 2020-01-01

## 2020-12-04 PROBLEM — E11.9 TYPE 2 DIABETES MELLITUS WITHOUT COMPLICATION (HCC): Status: ACTIVE | Noted: 2020-01-01

## 2020-12-04 PROBLEM — R60.0 LOWER LEG EDEMA: Status: ACTIVE | Noted: 2020-01-01

## 2020-12-04 PROBLEM — K70.30 ALCOHOLIC CIRRHOSIS OF LIVER WITHOUT ASCITES (HCC): Status: ACTIVE | Noted: 2020-04-03

## 2020-12-04 PROBLEM — I50.30 DIASTOLIC HEART FAILURE (HCC): Status: ACTIVE | Noted: 2020-01-01

## 2020-12-04 PROBLEM — E66.9 OBESITY, CLASS II, BMI 35-39.9: Status: ACTIVE | Noted: 2020-01-01

## 2020-12-04 PROBLEM — D68.9 COAGULOPATHY (HCC): Status: ACTIVE | Noted: 2020-01-01

## 2020-12-04 PROBLEM — I48.0 PAROXYSMAL ATRIAL FIBRILLATION (HCC): Status: ACTIVE | Noted: 2020-01-01

## 2020-12-04 PROBLEM — N17.9 ACUTE RENAL FAILURE (ARF) (HCC): Status: ACTIVE | Noted: 2020-01-01

## 2020-12-04 PROBLEM — D69.6 THROMBOCYTOPENIA (HCC): Status: ACTIVE | Noted: 2020-01-01

## 2020-12-04 PROBLEM — N17.9 ACUTE KIDNEY INJURY (HCC): Status: ACTIVE | Noted: 2020-01-01

## 2020-12-04 PROBLEM — I50.33 ACUTE ON CHRONIC DIASTOLIC CONGESTIVE HEART FAILURE (HCC): Status: ACTIVE | Noted: 2020-01-01

## 2020-12-04 PROBLEM — G93.41 ACUTE METABOLIC ENCEPHALOPATHY: Status: ACTIVE | Noted: 2020-01-01

## 2020-12-04 NOTE — LETTER
[id number] [place] [id number]  Phone: [phone]  Fax: [phone]    FELIX Zamora CNP        December 4, 2020     Patient: Kamala Thayer   YOB: 1965   Date of Visit: 12/4/2020       To Whom It May Concern: It is my medical opinion that Kamala Thayer requires a disability parking placard for multiple medical reasons. Duration of need: 5 years    If you have any questions or concerns, please don't hesitate to call.     Sincerely,          FELIX Zamora CNP

## 2020-12-04 NOTE — PROGRESS NOTES
Chief Complaint   Patient presents with    New Patient     pt new to office from [place], here with brother Kaylee Andres today, was admitted at [facility] for SOB, increased bilateral swelling lower legs, plueral effusion right leg, concerned about anxiety , trouble sleeping     Shortness of Breath     on home O2,     Other     needs orders for home health     Medication Refill     needs new orders for Diabetic supplies 4 times daily and as needed,        SUBJECTIVE     Lily Winters is a 48 y.o.male      Pt presents to establish PCP. Pt recently drove from [place] to [facility] to live with family. He presented to ED on 11/21/20 for c/o sob. He was later transferred to [facility] with end stage liver cirrhosis to be evaluated for liver transplant. He states his Liver is better than they thought originally and there are no plans for transplant at this time. Patient did have a thoracentesis completed and had dieretics adjusted. His abd bloating has greatly improved and he is seeing improvement of leg edema. He is wearing compression stockings as well. Is on a 2L fluid restriction. He has been having issues with breathing and is currently wearing oxygen at 2L. Had right pleural effusion while hospitalized. He has a virtual consult for sleep study through [facility] on Monday, but wants to establish most of his specialists locally. Breathing has mildly improved, but continues to become sob with any exertion. Pt is currently on Lovenox for a-fib as [facility] did not want him on oral anticoagulants at this time due to his liver. Cardiology is to address this at appt next month. Pt has diabetes and would like to get a new glucometer and testing supplies. Discharge diagnosis from [facility] at that time were Acute Alcoholic Decompensated Liver Cirrhosis with Ascites, KARAN, Atrial Fib with RVR, Pleural Effusion with Associated Hepatic Hydrothorax. Pt admits to some anxiety with his health and with the unknown.  He is not drinking or smoking currently. Sleep-OK  Interest- OK  Guilt- OK  Energy- OK  Concentration- OK  Appetite- OK  Psychomotor Retardation- NO  Suicidal/ Homicidal Ideations- NO  Anxiety-Increased. Feeling anxious daily. Unknown is hard for him.     Cardiology - Dr Jonelle Zhang - Referral today to Dr Marvin Mason  Pulmonology - Referral today to Dr Homero Patterson - Dr Fransico Tee      Current medical problems include:  Patient Active Problem List   Diagnosis    Pleural effusion associated with hepatic disorder    Acute on chronic diastolic congestive heart failure (HCC)    Al[address]olic cirrhosis of liver without ascites ([address]    Essential hypertension    Paroxysmal atrial fibrillation (HCC)    Thrombocytopenia (HCC)    Type 2 diabetes mellitus without complication ([address]    Acute kidney injury ([address]    Acute metabolic encephalopathy    Acute renal failure (ARF) ([address]    Coagulopathy ([address]    Diastolic heart failure ([address]    Lower leg edema    Obesity, Class II, BMI 35-39.9       Past Medical History:   Diagnosis Date    Advanced cirrhosis of liver (HCC)     Atrial fibrillation ([address]     CHF (congestive heart failure) (HCC)     Chronic kidney disease     COPD (chronic obstructive pulmonary disease) ([address]     Diabetes mellitus ([address]     Gallstones     GERD (gastroesophageal reflux disease)     Headache     Portal hypertension (HCC)        Past Surgical History:   Procedure Laterality Date    APPENDECTOMY      HERNIA REPAIR         No Known Allergies       Current Outpatient Medications:     dilTIAZem (DILACOR XR) 240 MG extended release capsule, Take 240 mg by mouth daily, Disp: , Rfl:     enoxaparin (LOVENOX) 150 MG/ML injection, Inject 1 mg/kg into the skin every 12 hours Expel 0.1 mL & inject 0.9 mL every 12 hours, Disp: , Rfl:     torsemide (DEMADEX) 20 MG tablet, Take 20 mg by mouth daily Take 2 tablets by mouth 2 times daily, Disp: , Rfl:     metoprolol (LOPRESSOR) 100 MG tablet, Take 100 mg by mouth 2 times daily, Disp: , Rfl:     spironolactone (ALDACTONE) 100 MG tablet, Take 100 mg by mouth daily Take 1 tablet by mouth daily, Disp: , Rfl:     escitalopram (LEXAPRO) 10 MG tablet, Take 1 tablet by mouth daily, Disp: 30 tablet, Rfl: 1    Lancets MISC, 1 each by Does not apply route daily Use to test blood sugar 4 times daily and as needed DX:E11.9, Disp: 200 each, Rfl: 3    blood glucose monitor strips, Test 4 times daily and as needed for irregular blood glucose. Dispense sufficient amount for indicated testing frequency plus additional to accommodate PRN testing needs. DX E11.9, Disp: 200 strip, Rfl: 3    Blood Glucose Monitoring Suppl (ONE TOUCH ULTRA 2) w/Device KIT, Use to test 4 times daily and as needed DX: E11.9, Disp: 1 kit, Rfl: 0    ASPIRIN 81 PO, Take by mouth daily, Disp: , Rfl:     gabapentin (NEURONTIN) 300 MG capsule, Take 300 mg by mouth 3 times daily. , Disp: , Rfl:     lactulose (CEPHULAC) 10 g packet, Take 15 mLs by mouth 2 times daily as needed , Disp: , Rfl:     metFORMIN (GLUCOPHAGE) 500 MG tablet, Take 500 mg by mouth 2 times daily (with meals), Disp: , Rfl:     pantoprazole (PROTONIX) 40 MG tablet, Take 40 mg by mouth daily , Disp: , Rfl:     varenicline (CHANTIX) 1 MG tablet, Take 1 mg by mouth 2 times daily, Disp: , Rfl:     History reviewed. No pertinent family history. Social History     Tobacco Use    Smoking status: Former Smoker    Smokeless tobacco: Never Used   Substance Use Topics    Alcohol use: Yes     Frequency: 4 or more times a week     Comment: 2 bottles of wine daily    Drug use: Not on file         Review of Systems   Constitutional: Negative for chills, fatigue, fever and unexpected weight change. HENT: Negative. Eyes: Negative. Respiratory: Positive for shortness of breath. Negative for chest tightness. Cardiovascular: Positive for leg swelling. Negative for chest pain and palpitations. Gastrointestinal: Negative for abdominal pain and blood in stool. Genitourinary: Negative for dysuria. Musculoskeletal: Negative for joint swelling. Skin: Negative for rash. Neurological: Positive for weakness. Negative for dizziness. Psychiatric/Behavioral: The patient is nervous/anxious. All other systems reviewed and are negative. OBJECTIVE     BP 90/68   Pulse 70   Temp 97.1 °F (36.2 °C) (Temporal)   Resp 16   Wt 279 lb 12.8 oz (126.9 kg)   SpO2 99%   BMI 35.92 kg/m²     Physical Exam  Vitals signs and nursing note reviewed. Constitutional:       Appearance: He is well-developed. HENT:      Head: Normocephalic and atraumatic. Right Ear: External ear normal.      Left Ear: External ear normal.      Nose: Nose normal.   Eyes:      Conjunctiva/sclera: Conjunctivae normal.      Pupils: Pupils are equal, round, and reactive to light. Neck:      Musculoskeletal: Normal range of motion and neck supple. Cardiovascular:      Rate and Rhythm: Normal rate. Rhythm irregular. Pulmonary:      Effort: Pulmonary effort is normal.      Breath sounds: Decreased breath sounds present. Abdominal:      General: Bowel sounds are normal.      Palpations: Abdomen is soft. Musculoskeletal: Normal range of motion. Right lower leg: Edema (compression stockings in place) present. Left lower leg: Edema (Compression stockings in place) present. Skin:     General: Skin is warm and dry. Neurological:      Mental Status: He is alert and oriented to person, place, and time. Deep Tendon Reflexes: Reflexes are normal and symmetric. Psychiatric:         Behavior: Behavior normal.         Thought Content: Thought content normal.         Judgment: Judgment normal.             There is no immunization history on file for this patient.       Health Maintenance   Topic Date Due    Hepatitis A vaccine (1 of 2 - Risk 2-dose series) 04/17/1967    Diabetic foot exam  04/17/1976    Diabetic retinal exam  04/17/1976    Lipid screen  04/17/1976    HIV screen  04/17/1981    Diabetic microalbuminuria test  04/17/1984    Hepatitis B vaccine (1 of 3 - Risk 3-dose series) 04/17/1985    DTaP/Tdap/Td vaccine (1 - Tdap) 04/17/1985    Shingles Vaccine (1 of 2) 04/17/2016    Colon cancer screen colonoscopy  04/17/2016    Flu vaccine (1) 09/01/2020    A1C test (Diabetic or Prediabetic)  11/21/2021    Potassium monitoring  11/23/2021    Creatinine monitoring  11/23/2021    Hib vaccine  Aged Out    Meningococcal (ACWY) vaccine  Aged Out    Pneumococcal 0-64 years Vaccine  Aged Out         ASSESSMENT       Diagnosis Orders   1. Alcoholic cirrhosis of liver without ascites (HCC)  Amb External Referral To Gastroenterology    CBC Auto Differential   2. Shortness of breath  Geovani Bates MD, Pulmonary Disease, [facility]    Full PFT Study With Bronchodilator   3. Essential hypertension  Comprehensive Metabolic Panel    CBC Auto Differential   4. Paroxysmal atrial fibrillation (HCC)     5. Type 2 diabetes mellitus without complication, without long-term current use of insulin [facility] Internal Medicine - Dietitian    T4, Free    TSH without Reflex    Comprehensive Metabolic Panel    Hemoglobin A1C    Amb External Referral To Podiatry    Lancets MISC    blood glucose monitor strips    Blood Glucose Monitoring Suppl (ONE TOUCH ULTRA 2) w/Device KIT   6. Obesity, Class II, BMI 35-39.9     7. Pleural effusion associated with hepatic disorder  Jose Donaldson MD, Pulmonary Disease, [facility]   8. Screening PSA (prostate specific antigen)  PSA, Prostatic Specific Antigen   9. Anemia, unspecified type  CBC Auto Differential   10. Physical deconditioning  [address] [place] [address]. Weakness  [address] [place] [address].  Screening, lipid  Lipid Panel           PLAN       Requested Prescriptions     Signed Prescriptions Disp Refills    escitalopram (LEXAPRO) 10 MG tablet 30 tablet 1     Sig: Take 1 tablet by mouth daily    Lancets MISC 200 each 3     Si each by Does not apply route daily Use to test blood sugar 4 times daily and as needed DX:E11.9    blood glucose monitor strips 200 strip 3     Sig: Test 4 times daily and as needed for irregular blood glucose. Dispense sufficient amount for indicated testing frequency plus additional to accommodate PRN testing needs.  DX E11.9    Blood Glucose Monitoring Suppl (ONE TOUCH ULTRA 2) w/Device KIT 1 kit 0     Sig: Use to test 4 times daily and as needed DX: E11.9       Orders Placed This Encounter   Procedures    T4, Free     Standing Status:   Future     Standing Expiration Date:   2021    TSH without Reflex     Standing Status:   Future     Standing Expiration Date:   2021    Comprehensive Metabolic Panel     Standing Status:   Future     Standing Expiration Date:   2021    CBC Auto Differential     Standing Status:   Future     Standing Expiration Date:   2021    Hemoglobin A1C     Standing Status:   Future     Standing Expiration Date:   2021    PSA, Prostatic Specific Antigen     Standing Status:   Future     Standing Expiration Date:   2021    Lipid Panel     Standing Status:   Future     Standing Expiration Date:   2021     Order Specific Question:   Is Patient Fasting?/# of Hours     Answer:   [id number] [facility]     Referral Priority:   Routine     Referral Type:   Home Health Care     Referral Reason:   Specialty Services Required     Referral Location:   [facility]     [facility]     Number of Visits Requested:   1    Amb External Referral To Gastroenterology     Referral Priority:   Routine     Referral Type:   Consult for Advice and Opinion     Referral Reason:   Specialty Services Required     Referred to Provider:   Renee Lane MD     Requested Specialty:   Gastroenterology     Number of Visits Requested:   [address], [address] Steve Hodgson MD, Pulmonary Disease, [facility]     Referral Priority:   Routine     Referral Type:   Eval and Treat     Referral Reason:   Specialty Services Required     Referred to Provider:   Matthew Apple MD     Requested Specialty:   Pulmonary Disease     Number of Visits Requested:   [facility] Internal Medicine - Dietitian     Referral Priority:   Routine     Referral Type:   Eval and Treat     Referral Reason:   Specialty Services Required     Requested Specialty:   Dietitian     Number of Visits Requested:   1    Amb External Referral To Podiatry     Referral Priority:   Routine     Referral Type:   Consult for Advice and Opinion     Referred to Provider:   Reinaldo Bailey DPM     Requested Specialty:   Podiatry     Number of Visits Requested:   1    Full PFT Study With Bronchodilator     Standing Status:   Future     Standing Expiration Date:   12/4/2021       Handicap placard signed  Labs - A1C, CMP, CBC, TSH, Free T4, PSA, Lipid prior to appt next month  Continue current medications  Referral to GI, pulmonology, podiatry, dietician, [facility] placed today  PFTs ordered. Pt aware he will need covid testing prior  After discussion with patient, Will start Lexapro 10 mg. Pt will follow up with ES in 1 month. New glucometer and testing supplies sent to pharmacy  Follow up in 1 month as previously scheduled      Electronically signed by Basil Romberg, APRN - CNP on 12/4/2020 at 1:41 PM   Greater than 30 minutes was spent in face-to face contact with patient with greater than 50% in counseling and coordination of care.

## 2020-12-09 NOTE — PROGRESS NOTES
Neck Circumference -  17.75 inches   Mallampati - 4    Lung Nodule Screening     [] Qualifies    [x] Does not qualify   [] Declined    [] Completed

## 2020-12-09 NOTE — TELEPHONE ENCOUNTER
It is possible that he could be having side effects from the Lexapro. Go ahead and stop it. I can start something new if patient desires or we can wait and see. Ear bleeding and nose bleed would not be related to Lexapro, but could be from blood thinners. Does he use Qtips or possibly been scratching his ear? Evaluation in office if he develops any drainage, pain, signs of infection. If patient is unable to keep down any food/fluids and dehydration is a concern, would recommend evaluation in ED.

## 2020-12-09 NOTE — TELEPHONE ENCOUNTER
Spoke to [employer] of [employer], he will continue to monitor pt at this time, will call back if any changes.  Pt will hold off on lexapro at this time, pt reports not a lot of anxiety right now

## 2020-12-09 NOTE — PROGRESS NOTES
Subjective:      Patient ID: Beth Ramirez is a 48 y.o. male. CC: Hepatic hydrothorax    HPI      Lilly Larson has alcoholic cirrhosis calculated MELD  Score of 33 by our GI service with implies a bad prognosis. I saw Lilly Larson briefly during hospital admission where he presented to ED with SOB, CT chest revealed a large right side pleural effusion and a small bore chest tube was placed, later on Lilly Larson was transferred to [facility] to be evaluated for the liver transplant team.  As part of w/u Lilly Larson has been scheduled for a sleep study in [facility] also waiting for PFTs. Very debilitated on efficacious Demadex/Spironolactone regimen for anasarca, ascites, pleural effusions, on wheel chair, supplemental oxygen 2 lpm 24/7    C/O chronic fatigue, interrupted sleep with frequent arousals, snores, has breathing difficulty to sleep with gasping arousals, witnessed episodes of apnea by family members. Falls sleep at inappropriate times like while eating, talking on the phone, family gatherings. Goes to bed at 9 pm and wakes up at 9 am spends 12h on bed, estimates 6h of sleep. Takes 1h to fall sleep and wakes up 4 times a night. BMI 29   SAQLI: 26  ESS: 21     Review of Systems   Constitutional: Positive for appetite change, fatigue and unexpected weight change. HENT: Negative for trouble swallowing and voice change. Eyes: Negative for visual disturbance. Respiratory: Positive for cough, choking, chest tightness and shortness of breath. Negative for apnea, wheezing and stridor. Cardiovascular: Positive for leg swelling. Negative for chest pain and palpitations. Gastrointestinal: Positive for abdominal distention and abdominal pain. Endocrine: Negative for polyuria. Genitourinary: Positive for decreased urine volume. Negative for difficulty urinating. Musculoskeletal: Positive for arthralgias, back pain and gait problem. Allergic/Immunologic: Positive for environmental allergies. Negative for immunocompromised state. Neurological: Positive for weakness. Hematological: Negative for adenopathy. Does not bruise/bleed easily. Psychiatric/Behavioral: Positive for sleep disturbance. Objective:   Physical Exam  Nursing note reviewed. Constitutional:       General: He is not in acute distress. Comments: Overweigh BMI 29.9   HENT:      Head: Normocephalic. Nose: Nose normal.      Mouth/Throat:      Mouth: Mucous membranes are moist.      Comments: Large tongue, crowded pharynx, mallampati class 3 . Eyes:      Pupils: Pupils are equal, round, and reactive to light. Neck:      Musculoskeletal: Normal range of motion. Comments: 17.7 inches circumference  Cardiovascular:      Rate and Rhythm: Normal rate and regular rhythm. Pulmonary:      Effort: Pulmonary effort is normal.      Comments: Decreased BS both bases  Abdominal:      General: Bowel sounds are normal. There is distension. Palpations: Abdomen is soft. Musculoskeletal:      Right lower leg: No edema. Left lower leg: No edema. Lymphadenopathy:      Cervical: No cervical adenopathy. Skin:     General: Skin is warm. Capillary Refill: Capillary refill takes less than 2 seconds. Neurological:      Mental Status: He is alert. Assessment:      .   Diagnosis Orders   1. Hypersomnia  Sleep Study with PAP Titration    XR CHEST (2 VW)   2. Loud snoring  Sleep Study with PAP Titration    XR CHEST (2 VW)   3. Witnessed episode of apnea  Sleep Study with PAP Titration    XR CHEST (2 VW)   4. Obesity (BMI 30-39. 9)  Sleep Study with PAP Titration    XR CHEST (2 VW)   5.  Pleural effusion  Sleep Study with PAP Titration    XR CHEST (2 VW)           Plan:      Orders Placed This Encounter   Procedures    XR CHEST (2 VW)     Standing Status:   Future     Standing Expiration Date:   12/9/2021    Sleep Study with PAP Titration     Standing Status:   Future     Standing Expiration Date:   12/9/2021

## 2020-12-12 PROBLEM — K74.69 DECOMPENSATED LIVER DISEASE (HCC): Status: ACTIVE | Noted: 2020-01-01

## 2020-12-12 NOTE — ED NOTES
ED to inpatient nurses report-  [phone]    Chief Complaint   Patient presents with    Shortness of Breath      Present to ED from home, Is to have [address] visits and Home PT started   LOC: alert and orientated to name, place, date  Vital signs   Vitals:    12/12/20 1601 12/12/20 1640 12/12/20 1737 12/12/20 1836   BP: 105/76 102/66 103/73 102/74   Pulse: 67 69 59 61   Resp: 18 20 11 15   Temp:       TempSrc:       SpO2: 96% 95% 95% 94%   Weight:       Height:            Current needs required- Will need C-Pap, Wears C-Pap at home   LDAs:   Peripheral IV 12/12/20 Left Antecubital (Active)   Site Assessment Clean;Dry; Intact 12/12/20 1448   Line Status Blood return noted;Brisk blood return;Capped;Normal saline locked; Flushed;Specimen collected 12/12/20 1448   Dressing Status Clean;Dry; Intact 12/12/20 1448   Dressing Intervention New 12/12/20 1448     Mobility: Requires assistance * 1  Present condition: Stable     Electronically signed by Jamila Byers RN on 12/12/2020 at [address] [place] [address]  12/12/20 [id number]

## 2020-12-12 NOTE — ED NOTES
Up to bedside commode with assistance. Returned to bed and sitting side of cot. Tolerated well. Oxygen continues at 4L NC. Waiting admission to 3B.        Jose Roberto Mcknight RN  12/12/20 1463

## 2020-12-12 NOTE — H&P
History & Physical        Patient:  Marie Walton  YOB: 1965    MRN: [id number]     Acct: [de-identified]    PCP: Candy Ambriz MD    Date of Admission: 12/12/2020    Date of Service: Pt seen/examined on 12/12/2020   and Admitted to Inpatient with expected LOS greater than two midnights due to medical therapy. Chief Complaint:  Sob, confusion       History Of Present Illness:      48 y.o. male who presented to [address] [place] [address] with increased sob and confusion. Has a pmh of Alcoholic Cirrhosis, A. Fib (on Toprol, Cardizem and Lovenox BID), COPD, DM2, HFpEF and Chronic Hypoxic Resp Failure on 2L NC. Pt was initially admitted to [place] [address] on 11/21 and [facility] for diagnosis of hepatic hydrothorax. At the time while here prior to transfer, pt had 3L removed by thoracentesis consistent with transudate effusion and had a pig tail catheter placed, which was removed prior to his [facility]. While [facility], imaging significant for TTE hyperdynamic EF>70%, severe biatrial enlargement, dilated RV, RVSP 42 & CXR showed worsening R pleural effusion. He was started on BiPAP and aggressively diuresed with improvement of symptoms. Pt was then discharged home on 12/1/2020 on Torsemide 40 BID and Aldactone 100 and on 2L NC. Pt reports to have started to feel sob as soon as being home, which continued to get worse each day. First it was with exertion, now at rest making it difficult to sleep. Pt reports taking all his diuretics, following a low Na and fluid restriction diet. Pt also reports having 2 bowel movements a day on average, compliant with his lactulose. Pt and brother (who is at bedside) do report increased confusion recently as well. Denies any recent fevers, chills, cough, chest pain, abdominal pain, blood in stool/urine, burning with urination. In the ED, pt on 4L NC. Na 127, .4, troponin 0.020. Ammonia 95. WBC 7.5, Hb 11.6, PLT 95k. INR 7.82.      CXR showing complete opacification of right hemithorax       Past Medical History:          Diagnosis Date    Advanced cirrhosis of liver (HCC)     Atrial fibrillation (HCC)     CHF (congestive heart failure) (HCC)     Chronic kidney disease     [facility]     Diabetes mellitus ([facility]     Gallstones     GERD (gastroesophageal reflux disease)     Headache     Portal hypertension (HCC)        Past Surgical History:          Procedure Laterality Date    APPENDECTOMY      HERNIA REPAIR         Medications Prior to Admission:      Prior to Admission medications    Medication Sig Start Date End Date Taking? Authorizing Provider   dilTIAZem (DILACOR XR) 240 MG extended release capsule Take 240 mg by mouth daily    Historical Provider, MD   enoxaparin (LOVENOX) 150 MG/ML injection Inject 1 mg/kg into the skin every 12 hours Expel 0.1 mL & inject 0.9 mL every 12 hours    Historical Provider, MD   torsemide (DEMADEX) 20 MG tablet Take 20 mg by mouth daily Take 2 tablets by mouth 2 times daily    Historical Provider, MD   metoprolol (LOPRESSOR) 100 MG tablet Take 100 mg by mouth 2 times daily    Historical Provider, MD   spironolactone (ALDACTONE) 100 MG tablet Take 100 mg by mouth daily Take 1 tablet by mouth daily    Historical Provider, MD   escitalopram (LEXAPRO) 10 MG tablet Take 1 tablet by mouth daily 12/4/20   FELIX Jaffe CNP   Lancets MISC 1 each by Does not apply route daily Use to test blood sugar 4 times daily and as needed DX:E11.9 12/4/20   FELIX Jaffe CNP   blood glucose monitor strips Test 4 times daily and as needed for irregular blood glucose. Dispense sufficient amount for indicated testing frequency plus additional to accommodate PRN testing needs.  DX E11.9 12/4/20   FELIX Jaffe CNP   Blood Glucose Monitoring Suppl (ONE TOUCH ULTRA 2) w/Device KIT Use to test 4 times daily and as needed DX: E11.9 12/4/20   FELIX Jaffe CNP ASPIRIN 81 PO Take by mouth daily    Historical Provider, MD   varenicline (CHANTIX) 1 MG tablet Take 1 mg by mouth 2 times daily    Historical Provider, MD   gabapentin (NEURONTIN) 300 MG capsule Take 300 mg by mouth 3 times daily. Historical Provider, MD   lactulose (CEPHULAC) 10 g packet Take 15 mLs by mouth 2 times daily as needed     Historical Provider, MD   metFORMIN (GLUCOPHAGE) 500 MG tablet Take 500 mg by mouth 2 times daily (with meals)    Historical Provider, MD   pantoprazole (PROTONIX) 40 MG tablet Take 40 mg by mouth daily     Historical Provider, MD       Allergies:  Patient has no known allergies.     Social History:     Social History     Socioeconomic History    Marital status: Single     Spouse name: None    Number of children: None    Years of education: None    Highest education level: None   Occupational History    None   Social Needs    Financial resource strain: None    Food insecurity     Worry: None     Inability: None    Transportation needs     Medical: None     Non-medical: None   Tobacco Use    Smoking status: Former Smoker     Packs/day: 1.00     Years: 29.00     Pack years: 29.00     Types: Cigarettes     Quit date: 2020     Years since quittin.0    Smokeless tobacco: Never Used   Substance and Sexual Activity    Alcohol use: Not Currently     Frequency: 4 or more times a week     Comment: No alcohol since 21    Drug use: Never    Sexual activity: None   Lifestyle    Physical activity     Days per week: None     Minutes per session: None    Stress: None   Relationships    Social connections     Talks on phone: None     Gets together: None     Attends Alevism service: None     Active member of club or organization: None     Attends meetings of clubs or organizations: None     Relationship status: None    Intimate partner violence     Fear of current or ex partner: None     Emotionally abused: None     Physically abused: None     Forced sexual activity: None   Other Topics Concern    None   Social History Narrative    None       Family History:       Reviewed in detail and negative for DM, CAD, Cancer, CVA. Positive as follows:    History reviewed. No pertinent family history. Diet:  No diet orders on file    REVIEW OF SYSTEMS:   Pertinent positives as noted in the HPI. All other systems reviewed and negative. PHYSICAL EXAM:    /66   Pulse 69   Temp 98.2 °F (36.8 °C) (Oral)   Resp 20   Ht 6' 2\" (1.88 m)   Wt 261 lb (118.4 kg)   SpO2 95%   BMI 33.51 kg/m²     General appearance:  Acute on chronically ill appearing elderly male   HEENT:  Normal cephalic, atraumatic without obvious deformity. Pupils equal, round, and reactive to light. Extra ocular muscles intact. Scleral icterus   Neck: Supple, with full range of motion. No jugular venous distention. Trachea midline. Respiratory:  Increased respiratory effort, difficult to assess breath sounds on the right   Cardiovascular:  Regular rate and rhythm with normal S1/S2 without murmurs, rubs or gallops. Abdomen: Soft, non-tender, non-distended with normal bowel sounds. Musculoskeletal:  No clubbing, cyanosis or edema bilaterally. Full range of motion without deformity. Skin: Skin color, texture, turgor normal.  No rashes or lesions. Neurologic:  Neurovascularly intact without any focal sensory/motor deficits.  Cranial nerves: II-XII intact, grossly non-focal.  Psychiatric:  Alert and oriented, thought content appropriate, normal insight  Capillary Refill: Brisk,< 3 seconds   Peripheral Pulses: +2 palpable, equal bilaterally       Labs:     Recent Labs     12/12/20  1448   WBC 7.5   HGB 11.6*   HCT 33.6*   PLT 95*     Recent Labs     12/12/20  1448   *   K 4.2   CL 86*   CO2 30   BUN 17   CREATININE 1.1   CALCIUM 9.8     Recent Labs     12/12/20  1448   AST 46*   ALT 19   BILITOT 5.2*   ALKPHOS 96     Recent Labs     12/12/20  1448   INR 1.82*     No results for input(s): Katelyn Ng in the last 72 hours. Urinalysis:      Lab Results   Component Value Date    NITRU NEGATIVE 11/22/2020    WBCUA 0-2 11/22/2020    BACTERIA NONE SEEN 11/22/2020    RBCUA 3-5 11/22/2020    BLOODU TRACE 11/22/2020    SPECGRAV 1.010 11/22/2020       Radiology:     CXR: I have reviewed the CXR with the following interpretation: see below     XR CHEST PORTABLE   Final Result   1. There is complete opacification of the right hemithorax which likely represents a combination of consolidation and large right-sided pleural effusion. Further characterization could be obtained with chest CT. **This report has been created using voice recognition software. It may contain minor errors which are inherent in voice recognition technology. **      Final report electronically signed by Dr. Landy Roche on 12/12/2020 2:59 PM            Code Status: Prior      ASSESSMENT:    RONN/Betito Sheffield 1106 Problems    Diagnosis Date Noted    Decompensated liver disease ([facility] [K74.69] 12/12/2020    Paroxysmal atrial fibrillation ([facility] [I48.0] 12/04/2020    Obesity, Class II, BMI 35-39.9 [E66.9] 11/23/2020    Pleural effusion associated with hepatic disorder [K76.9, J91.8] 11/21/2020    Acute metabolic encephalopathy [Y66.30] 02/59/9802    Alcoholic cirrhosis of liver without ascites ([facility] [facility] [K70.30] 04/03/2020       Assessment/Plan:     1. 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 2L NC at baseline, currently needing 4L NC. CXR showing complete opacification of right hemithorax. Spoke with Pulmonary, will likely need a pleurex cathter placed for this recurrent hydrothorax. Consult IR. Hold all blood thinners. 2. Decompensated Alcoholic Liver Cirrhosis: complicated by HE, Esophageal Varices, Portal HTN, Anemia, thrombocytopenia and hepatic hydrothorax.  Pending liver U/S to assess for any ascites. No history of prior SBP. EGD on 11/30 showing non bleeding grade 1 EV. Consult GI. Pending UA. 3. Hepatic Encephalopathy due to Hyperammonemia: elevated Ammonia 93. 2/2 to 2. Lactulose titrated to 3-4 BM's a day. 4. Paroxysmal Atrial Fibrillation: CHADSVASC 3 (HTN, DM, HF). Currently rate controlled on Lopressor and Cardizem and anticoagulated on Lovenox BID. Will hold blood thinners for now. 5. MAX: never diagnosed, high risk given STOP-BANG score of 7. Outpatient sleep study. 6. COPD: no PFT's on file. Follows up with Dr. Corina Montalvo. Appreciate recs. 7. Type 2 DM: hold home Metformin, place on Humalog Sliding Scale, Diabetic Diet. Accu-checks. 8. Tobacco Abuse: nicotine patch         Thank you Shine Arellano MD for the opportunity to be involved in this patient's care.     Electronically signed by Malena Finch MD on 12/12/2020 at 4:52 PM

## 2020-12-12 NOTE — ED PROVIDER NOTES
[facility]    EMERGENCY MEDICINE     Pt Name: Rl Bliss  MRN: [id number]  [place] 1965  Date of evaluation: 12/12/2020  Provider: Kristal Ferraro DO, [place] COMPLAINT       Chief Complaint   Patient presents with    Shortness of Breath       HISTORY OF PRESENT ILLNESS    Rl Bliss is a pleasant 48 y.o. male who presents to the emergency department from home for evaluation of progressive worsening shortness of breath, orthopnea, and fatigue. The patient has a history of alcoholic cirrhosis with a right-sided hepatic hydrothorax history recently, presented to the emergency department a month ago, was admitted at that point transfer to [facility] for further evaluation and care. He has been compliant with his Demadex/spironolactone for ascites, pleural effusion, and anasarca but despite this, states that although the swelling is down some, in the last several days to week he has developed more worsening shortness of breath. He is on 2 L via nasal cannula, and with recent increase to 4 L. His history of A. fib, has been maintaining himself on Lovenox shots twice daily with no complications. His brother is in the room today, describes the patient symptomology, also describes patients with significantly more anxious over the past week to 2 weeks as well. No cough, no fever    Triage notes and Nursing notes were reviewed by myself. Any discrepancies are addressed above.     PAST MEDICAL HISTORY     Past Medical History:   Diagnosis Date    Advanced cirrhosis of liver ([address]     Atrial fibrillation (HCC)     CHF (congestive heart failure) (HCC)     Chronic kidney disease     COPD (chronic obstructive pulmonary disease) (HCC)     Diabetes mellitus ([address]     Gallstones     GERD (gastroesophageal reflux disease)     Headache     Portal hypertension (HCC)        SURGICAL HISTORY       Past Surgical History:   Procedure Laterality Date    APPENDECTOMY      HERNIA REPAIR CURRENT MEDICATIONS       Previous Medications    ASPIRIN 81 PO    Take by mouth daily    BLOOD GLUCOSE MONITOR STRIPS    Test 4 times daily and as needed for irregular blood glucose. Dispense sufficient amount for indicated testing frequency plus additional to accommodate PRN testing needs. DX E11.9    BLOOD GLUCOSE MONITORING SUPPL (ONE TOUCH ULTRA 2) W/DEVICE KIT    Use to test 4 times daily and as needed DX: E11.9    DILTIAZEM (DILACOR XR) 240 MG EXTENDED RELEASE CAPSULE    Take 240 mg by mouth daily    ENOXAPARIN (LOVENOX) 150 MG/ML INJECTION    Inject 1 mg/kg into the skin every 12 hours Expel 0.1 mL & inject 0.9 mL every 12 hours    ESCITALOPRAM (LEXAPRO) 10 MG TABLET    Take 1 tablet by mouth daily    GABAPENTIN (NEURONTIN) 300 MG CAPSULE    Take 300 mg by mouth 3 times daily. LACTULOSE (CEPHULAC) 10 G PACKET    Take 15 mLs by mouth 2 times daily as needed     LANCETS MISC    1 each by Does not apply route daily Use to test blood sugar 4 times daily and as needed DX:E11.9    METFORMIN (GLUCOPHAGE) 500 MG TABLET    Take 500 mg by mouth 2 times daily (with meals)    METOPROLOL (LOPRESSOR) 100 MG TABLET    Take 100 mg by mouth 2 times daily    PANTOPRAZOLE (PROTONIX) 40 MG TABLET    Take 40 mg by mouth daily     SPIRONOLACTONE (ALDACTONE) 100 MG TABLET    Take 100 mg by mouth daily Take 1 tablet by mouth daily    TORSEMIDE (DEMADEX) 20 MG TABLET    Take 20 mg by mouth daily Take 2 tablets by mouth 2 times daily    VARENICLINE (CHANTIX) 1 MG TABLET    Take 1 mg by mouth 2 times daily       ALLERGIES     Patient has no known allergies. FAMILY HISTORY     History reviewed. No pertinent family history.      SOCIAL HISTORY       Social History     Socioeconomic History    Marital status: Single     Spouse name: None    Number of children: None    Years of education: None    Highest education level: None   Occupational History    None   Social Needs    Financial resource strain: None   [employer] insecurity     Worry: None     Inability: None    Transportation needs     Medical: None     Non-medical: None   Tobacco Use    Smoking status: Former Smoker     Packs/day: 1.00     Years: 29.00     Pack years: 29.00     Types: Cigarettes     Quit date: 2020     Years since quittin.0    Smokeless tobacco: Never Used   Substance and Sexual Activity    Alcohol use: Not Currently     Frequency: 4 or more times a week     Comment: No alcohol since 21    Drug use: Never    Sexual activity: None   Lifestyle    Physical activity     Days per week: None     Minutes per session: None    Stress: None   Relationships    Social connections     Talks on phone: None     Gets together: None     Attends Orthodoxy service: None     Active member of club or organization: None     Attends meetings of clubs or organizations: None     Relationship status: None    Intimate partner violence     Fear of current or ex partner: None     Emotionally abused: None     Physically abused: None     Forced sexual activity: None   Other Topics Concern    None   Social History Narrative    None       REVIEW OF SYSTEMS     Review of Systems   Constitutional: Negative for chills, diaphoresis and fever. Decreasing weight on the diuretics   HENT: Negative for trouble swallowing and voice change. Eyes: Negative for visual disturbance. Respiratory: Positive for shortness of breath. Negative for cough and chest tightness. Cardiovascular: Negative for chest pain and leg swelling. Gastrointestinal: Negative for abdominal pain, blood in stool, diarrhea, nausea and vomiting. Genitourinary: Negative for dysuria, frequency and hematuria. Musculoskeletal: Negative for back pain and neck pain. Skin: Negative for rash and wound. Neurological: Negative for speech difficulty, weakness, numbness and headaches. Psychiatric/Behavioral: Negative for confusion.        Except as noted above the remainder of the review of systems was reviewed and is. PHYSICAL EXAM    (up to 7 for level 4, 8 or more for level 5)     ED Triage Vitals   BP Temp Temp Source Pulse Resp SpO2 Height Weight   12/12/20 1357 12/12/20 1424 12/12/20 1424 12/12/20 1357 12/12/20 1357 12/12/20 1357 12/12/20 1424 12/12/20 1424   106/70 98.2 °F (36.8 °C) Oral 80 26 94 % 6' 2\" (1.88 m) 261 lb (118.4 kg)       Physical Exam  Vitals signs and nursing note reviewed. Constitutional:       General: He is not in acute distress. Appearance: He is well-developed. He is not ill-appearing, toxic-appearing or diaphoretic. Comments: Chronically ill 67-year-old male no acute distress, but is tachypneic while at rest.  On 4 L via nasal cannula with oxygen saturations of 95%. HENT:      Head: Normocephalic and atraumatic. Eyes:      General: No scleral icterus. Conjunctiva/sclera: Conjunctivae normal.      Right eye: Right conjunctiva is not injected. Left eye: Left conjunctiva is not injected. Pupils: Pupils are equal, round, and reactive to light. Neck:      Musculoskeletal: Normal range of motion and neck supple. Thyroid: No thyromegaly. Trachea: No tracheal deviation. Cardiovascular:      Rate and Rhythm: Normal rate and regular rhythm. Heart sounds: Normal heart sounds. No murmur. No friction rub. No gallop. Pulmonary:      Effort: No respiratory distress. Breath sounds: No stridor. Examination of the right-middle field reveals decreased breath sounds. Examination of the right-lower field reveals decreased breath sounds. Decreased breath sounds present. No wheezing or rales. Comments: Increased work of breathing at rest  Abdominal:      General: Bowel sounds are normal. There is no distension. Palpations: Abdomen is soft. There is no mass. Tenderness: There is no abdominal tenderness. There is no guarding or rebound.       Comments: Negative Bauer's sign  Nontender McBurney's Point  Negative Rovsig's sign  No bruising or echymosis of abdomen   Musculoskeletal:         General: No tenderness. Right lower leg: He exhibits no tenderness. No edema. Left lower leg: He exhibits no tenderness. Edema present. Comments: Negative Lew's Sign bilaterally  Mild pitting edema left lower extremity   Lymphadenopathy:      Cervical: No cervical adenopathy. Skin:     General: Skin is warm and dry. Coloration: Skin is not pale. Findings: No erythema or rash. Neurological:      Mental Status: He is alert and oriented to person, place, and time. Cranial Nerves: No cranial nerve deficit. Motor: No abnormal muscle tone. Coordination: Coordination normal.      Comments: No nystagmus   Psychiatric:         Behavior: Behavior normal.         Thought Content: Thought content normal.         DIAGNOSTIC RESULTS     EKG:(none if blank)  All EKG's are interpreted by [facility] Phys[facility] who either signs or Co-signs this chart in the absence of a cardiologist.    Atrial fibrillation, rate controlled, no acute ischemic changes    RADIOLOGY: (none if blank)   Interpretation per the Radiologistbelow, if available at the time of this note:    XR CHEST PORTABLE   Final Result   1. There is complete opacification of the right hemithorax which likely represents a combination of consolidation and large right-sided pleural effusion. Further characterization could be obtained with chest CT. **This report has been created using voice recognition software. It may contain minor errors which are inherent in voice recognition technology. **      Final report electronically signed by Dr. Carlotta Nazario on 12/12/2020 2:59 PM          LABS:  Labs Reviewed   CBC WITH AUTO DIFFERENTIAL - Abnormal; Notable for the following components:       Result Value    RBC 3.51 (*)     Hemoglobin 11.6 (*)     Hematocrit 33.6 (*)     MCV 95.7 (*)     RDW-CV 15.5 (*)     RDW-SD 54.4 (*)     Platelets 95 (*) All other components within normal limits   COMPREHENSIVE METABOLIC PANEL W/ REFLEX TO MG FOR LOW K - Abnormal; Notable for the following components:    Glucose 118 (*)     Sodium 127 (*)     Chloride 86 (*)     AST 46 (*)     Total Bilirubin 5.2 (*)     All other components within normal limits   TROPONIN - Abnormal; Notable for the following components:    Troponin T 0.020 (*)     All other components within normal limits   AMMONIA - Abnormal; Notable for the following components:    Ammonia 95 (*)     All other components within normal limits   PROTIME-INR - Abnormal; Notable for the following components:    INR 1.82 (*)     All other components within normal limits   APTT - Abnormal; Notable for the following components:    aPTT 58.7 (*)     All other components within normal limits   GLOMERULAR FILTRATION RATE, ESTIMATED - Abnormal; Notable for the following components:    Est, Glom Filt Rate 70 (*)     All other components within normal limits   OSMOLALITY - Abnormal; Notable for the following components:    Osmolality Calc 257.8 (*)     All other components within normal limits   BRAIN NATRIURETIC PEPTIDE   ANION GAP   SCAN OF BLOOD SMEAR       All other labs were within normal range or not returned as of this dictation. Please note, any cultures that may have been sent were not resulted at the time of this patient visit. EMERGENCY DEPARTMENT COURSE andMedical Decision Making:     MDM/   Patient presents with increased oxygen requirement, has evidence of recurrent right-sided hydrothorax, is not in any distress at this time, although has notable exertional dyspnea and tachypnea despite the increased oxygen. He will be admitted for further evaluation care. Discussed potential for a therapeutic thoracentesis now, spoke with Dr. Dyllan Howard of IR who felt prudent that we wait for the procedure given the patient is on Lovenox at this time.   Spoke with hospitalist who agrees to admit but did asked that we discussed with pulmonology. Dr. Maggy Anderson is in agreement and will see the patient in consult      ED Medications administered this visit:    Medications   lactulose (CHRONULAC) 10 GM/15ML solution 20 g (20 g Oral Given 12/12/20 9131)   sodium chloride flush 0.9 % injection 10 mL (has no administration in time range)   sodium chloride flush 0.9 % injection 10 mL (has no administration in time range)         Procedures: (None if blank)       CLINICAL       1. Hydrothorax    2. Dyspnea and respiratory abnormalities    3. Alcoholic cirrhosis, unspecified whether ascites present ([facility]    4. Hepatic encephalopathy ([facility]          DISPOSITION/PLAN   DISPOSITION Decision To Admit 12/12/2020 04:22:32 PM      PATIENT REFERRED TO:  No follow-up provider specified.     DISCHARGE MEDICATIONS:  New Prescriptions    No medications on file              (Please note that portions of this note were completed with a voice recognition program.  Efforts were made to edit the dictations but occasionallywords are mis-transcribed.)      Dania Spencer DO,FACEP (electronically signed)  Attending Physician, Emergency [address] [place], [place] [address]DO robert  12/12/20 [id number]

## 2020-12-13 NOTE — PROCEDURES
PROCEDURE:   US guided right Thoracentesis    INDICATIONS:   Recurrent pleural effusion     CONTRAINDICATIONS:  None   PROCEDURE : Dr. Noemí Fernández: Obtained from patient family after risks and benefits explained including pneumothorax and bleeding     A time-out was completed verifying correct patient, procedure, site, positioning, and implant(s) or special equipment if applicable. PROCEDURE SUMMARY:      The patient was on sitting position leaning on a table. An US was used to identify a pleural fluid pocket by the right lower chest. A pleural fluid catheter attached to a 20 G needle was placed at the 7th ICS. 1800 ml of serosanguinous fluid was obtained and sent for analysis. A follow up CXR was requested. Patient tolerated procedure well without immediate complications. Bleeding was minimum.          COMPLICATIONS: None           Evangelina Maya MD  Pulmonary/critical care

## 2020-12-13 NOTE — PROGRESS NOTES
Dr. Mercedes Rosen in patient room, consent obtained, thoracentesis complete. 2000 ml body fluid obtained.

## 2020-12-13 NOTE — PROGRESS NOTES
Pt admitted to  [phone] in a wheelchair and from ED. Complaints: Shortness of breath. IV none infusing into the antecubital left, condition patent and no redness at a rate of 0 mls/ hour with about 0 mls in the bag still. IV site free of s/s of infection or infiltration. Vital signs obtained. Assessment and data collection initiated. Two nurse skin assessment performed by Liz SHIRLEY and Efren Burt RN. Oriented to room. Policies and procedures for 3B explained. Liz SHIRLEY discussed hourly rounding with patient addressing 5 P's. Fall prevention and safety brochure discussed with patient. Bed alarm on. Call light in reach. The best day to schedule a follow up Dr appointment is:  Monday a.m. Explained patients right to have family, representative or physician notified of their admission. Patient has Declined for physician to be notified. Patient has Declined for family/representative to be notified. All questions answered with no further questions at this time.

## 2020-12-13 NOTE — ED NOTES
ED nurse-to-nurse bedside report    Chief Complaint   Patient presents with    Shortness of Breath      LOC: alert and orientated to name, place, date  Vital signs   Vitals:    12/12/20 1601 12/12/20 1640 12/12/20 1737 12/12/20 1836   BP: 105/76 102/66 103/73 102/74   Pulse: 67 69 59 61   Resp: 18 20 11 15   Temp:       TempSrc:       SpO2: 96% 95% 95% 94%   Weight:       Height:          Pain:  Denies   Oxygen: Yes    Current needs required - Admit to [facility]  Telemetry: Yes  LDAs:   Peripheral IV 12/12/20 Left Antecubital (Active)   Site Assessment Clean;Dry; Intact 12/12/20 1448   Line Status Blood return noted;Brisk blood return;Capped;Normal saline locked; Flushed;Specimen collected 12/12/20 1448   Dressing Status Clean;Dry; Intact 12/12/20 1448   Dressing Intervention New 12/12/20 1448     Continuous Infusions: INT   dextrose       Mobility: Requires assistance * 1, Bedside commode   Lul Fall Risk Score: No flowsheet data found.   Fall Interventions: Fall band   Report given to: Irene Velez RN  12/12/20 3316

## 2020-12-13 NOTE — CONSULTS
failure) ([facility]     Chronic kidney disease     COPD (chronic obstructive pulmonary disease) ([facility])     Diabetes mellitus ([facility]     Gallstones     GERD (gastroesophageal reflux disease)     Headache     Portal hypertension ([facility])        Home Medications:  Prior to Admission medications    Medication Sig Start Date End Date Taking? Authorizing Provider   dilTIAZem (DILACOR XR) 240 MG extended release capsule Take 240 mg by mouth daily   Yes Historical Provider, MD   enoxaparin (LOVENOX) 150 MG/ML injection Inject 1 mg/kg into the skin every 12 hours Expel 0.1 mL & inject 0.9 mL every 12 hours   Yes Historical Provider, MD   torsemide (DEMADEX) 20 MG tablet Take 20 mg by mouth daily Take 2 tablets by mouth 2 times daily   Yes Historical Provider, MD   metoprolol (LOPRESSOR) 100 MG tablet Take 100 mg by mouth 2 times daily   Yes Historical Provider, MD   spironolactone (ALDACTONE) 100 MG tablet Take 100 mg by mouth daily Take 1 tablet by mouth daily   Yes Historical Provider, MD   escitalopram (LEXAPRO) 10 MG tablet Take 1 tablet by mouth daily 12/4/20  Yes FELIX Antonio CNP   ASPIRIN 81 PO Take by mouth daily   Yes Historical Provider, MD   gabapentin (NEURONTIN) 300 MG capsule Take 300 mg by mouth 3 times daily. Yes Historical Provider, MD   lactulose (CEPHULAC) 10 g packet Take 15 mLs by mouth 2 times daily as needed    Yes Historical Provider, MD   metFORMIN (GLUCOPHAGE) 500 MG tablet Take 500 mg by mouth 2 times daily (with meals)   Yes Historical Provider, MD   pantoprazole (PROTONIX) 40 MG tablet Take 40 mg by mouth daily    Yes Historical Provider, MD   Lancets MISC 1 each by Does not apply route daily Use to test blood sugar 4 times daily and as needed DX:E11.9 12/4/20   FELIX Nam CNP   blood glucose monitor strips Test 4 times daily and as needed for irregular blood glucose.  Dispense sufficient amount for indicated testing frequency plus additional to accommodate PRN testing needs. DX E11.9 12/4/20   Jodine Cola, APRN - CNP   Blood Glucose Monitoring Suppl (ONE TOUCH ULTRA 2) w/Device KIT Use to test 4 times daily and as needed DX: E11.9 12/4/20   Jodine ColaFELIX - CNP   varenicline (CHANTIX) 1 MG tablet Take 1 mg by mouth 2 times daily    Historical Provider, MD       Surgical History:  Past Surgical History:   Procedure Laterality Date    APPENDECTOMY      HERNIA REPAIR         Family History:  History reviewed. No pertinent family history. Past GI History:  Esophageal varices, alcoholic cirrhosis, recurrent hepatic hydrothorax, EGD  Dr. Marybeth Hernandez patient    Allergies:  Patient has no known allergies. Social History:   TOBACCO:   reports that he quit smoking about 4 weeks ago. His smoking use included cigarettes. He has a 29.00 pack-year smoking history. He has never used smokeless tobacco.  ETOH:   reports previous alcohol use. Review Of Systems  GENERAL: No fever, chills or weight loss. EYES:  No  blurred vision, double vision   CARDIOVASCULAR: No chest pain or palpitations. RESPIRATORY:  +dyspnea  MUSCULOSKELETAL: No new painful or swollen joints or myalgias. :   No dysuria or hematuria. SKIN:  No rashes or jaundice. NEUROLOGIC:  No headaches or seizures, numbness or tingling of arms, or legs. PSYCH:  No anxiety or depression. ENDOCRINE:  No polyuria or polydipsia. BLOOD:  No anemia, bleeding disorder, blood or blood product transfusion. PHYSICAL EXAM:  /68   Pulse 67   Temp 98.2 °F (36.8 °C) (Oral)   Resp 18   Ht 6' 2\" (1.88 m)   Wt 264 lb 9.6 oz (120 kg)   SpO2 94%   BMI 33.97 kg/m²     General appearance: Acute on chronically ill appearing male  HEENT: Normal cephalic, atraumatic without obvious deformity. Pupils equal, round, and reactive to light. Neck: Supple, with full range of motion. No jugular venous distention. Trachea midline. Respiratory:  Increased respiratory effort.  Clear, diminished to auscultation, bilaterally without Rales/Wheezes/Rhonchi. Distant breath sounds on the right. Cardiovascular: Regular rate and rhythm without murmurs, rubs or gallops. Abdomen: Soft, non-tender, non-distended with active bowel sounds. Musculoskeletal: No clubbing, cyanosis or edema bilaterally. Skin: Pink, warm, dry. No rashes or lesions. Psychiatric: Alert and oriented, thought content appropriate, normal insight    Labs:   Recent Labs     12/13/20  0621   WBC 5.9   HGB 10.8*   HCT 31.4*   PLT 78*     Recent Labs     12/13/20  0621   *   K 3.7   CL 88*   CO2 30   BUN 15   CREATININE 1.3*   CALCIUM 9.3     Recent Labs     12/13/20  0621   AST 33   ALT 15   BILIDIR 1.7*   BILITOT 5.1*   ALKPHOS 83     Recent Labs     12/13/20  0621   INR 1.73*       Radiology:   CXR 12/12/20      Impression   1. There is complete opacification of the right hemithorax which likely represents a combination of consolidation and large right-sided pleural effusion. Further characterization could be obtained with chest CT. US abdomen 12/12/20      Impression   Presence of ascites localized to the right upper quadrant. Code Status: Full Code    ASSESSMENT:  1. Decompensated alcoholic cirrhosis  2. Recurrent hepatic hydrothorax  3. Acute on chronic respiratory failure secondary to #2  4. Hepatic encephalopathy- resolved  5. afib- on aspirin & BB  6. Hyperbilirubinemia  7. Thrombocytopenia  8. COPD  9. DM  10. MAX  11. Tobacco abuse  12. GERD  13. CKD  14. Esophageal varices noted on EGD 11/30/20, not banded  15. Chronic anemia  16.  Very acutely ill appearing male    PLAN:     Monitor H & H, transfuse prn   Nursing to monitor stool output   Continue PPI daily, home dose   Continue Aldactone   Continue Torsemide   Monitor hepatic labs daily   Avoid hepatotoxic meds   Daily weights   Continue Lactulose TID   Do not recommend PleurX drain   2g sodium, carb count diet with 2000 mL fluid restriction   Strict I & O   Pulmonology on board   Recommend transfer to [facility] for possible TIPS and liver transplant eval   Case discussed at length with primary Dr. Justin Irby who has agreed to facilitate transfer   Supportive car per primary team  Will follow       Case reviewed and impression/plan reviewed in collaboration with Dr. Caitlyn Sr  Electronically signed by FELIX Snyder CNP on 12/13/2020 at 11:01 AM    [facility]  Thank you for the consultation.

## 2020-12-13 NOTE — PROGRESS NOTES
Hospitalist Progress Note      Patient:  Antonette Bhatia    Unit/Bed:3B-27/027-A  YOB: 1965  MRN: [id number]   Acct: [de-identified]   PCP: Annmarie Huston MD  Date of Admission: 12/12/2020    Assessment/Plan:     1. 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 2L NC at baseline, currently needing 4L NC. CXR showing complete opacification of right hemithorax. 12/13: Discussed with GI and they recommended against Pleurx catheter and recommended transfer to tertiary center for TIPS and transplantation evaluation. Will initiate transfer to [facility] pending bed availability. Discussed with pulmonary services, will get thoracentesis for symptomatic relief. Patient understands that this likely will recur.     2. Decompensated Alcoholic Liver Cirrhosis: complicated by HE, Esophageal Varices, Portal HTN, Anemia, thrombocytopenia and hepatic hydrothorax. EGD on 11/30 showing non bleeding grade 1 EV.     12/13: No confusion today. Continue lactulose, Aldactone, metoprolol, torsemide.      3. Paroxysmal Atrial Fibrillation: CHADSVASC 3. Currently rate controlled on Lopressor and Cardizem. Continue to hold Lovenox for now.     4.  Likely underlying MAX:  Outpatient sleep study.      6. COPD -stable     7. Type 2 DM: hold home Metformin, place on Humalog Sliding Scale, Diabetic Diet. Accu-checks.     8. Tobacco Abuse: nicotine patch     Chief Complaint: SOB    Initial H and P:-      48 y.o. male who presented to [facility]. [place] [address] with increased sob and confusion. Has a pmh of Alcoholic Cirrhosis, A. Fib (on Toprol, Cardizem and Lovenox BID), COPD, DM2, HFpEF and Chronic Hypoxic Resp Failure on 2L NC. Pt was initially admitted to [place] [address] on 11/21 and transferred to [facility] on 11/23 for diagnosis of hepatic hydrothorax.  At the time while here prior to transfer, pt had 3L removed by thoracentesis consistent with transudate effusion and had a pig tail catheter placed, which was removed prior to his transfer to [facility]. While at [facility], imaging significant for TTE hyperdynamic EF>70%, severe biatrial enlargement, dilated RV, RVSP 42 & CXR showed worsening R pleural effusion. He was started on BiPAP and aggressively diuresed with improvement of symptoms. Pt was then discharged home on 12/1/2020 on Torsemide 40 BID and Aldactone 100 and on 2L NC. Pt reports to have started to feel sob as soon as being home, which continued to get worse each day. First it was with exertion, now at rest making it difficult to sleep. Pt reports taking all his diuretics, following a low Na and fluid restriction diet. Pt also reports having 2 bowel movements a day on average, compliant with his lactulose. Pt and brother (who is at bedside) do report increased confusion recently as well. Denies any recent fevers, chills, cough, chest pain, abdominal pain, blood in stool/urine, burning with urination.      In the ED, pt on 4L NC. Na 127, .4, troponin 0.020. Ammonia 95. WBC 7.5, Hb 11.6, PLT 95k. INR 7.82.      CXR showing complete opacification of right hemithorax        Subjective (past 24 hours):     Still feels short of breath, fatigue. States that his dyspnea is worsened with minimal ambulation. Denies any chest pain. No other complaints. Past medical history, family history, social history and allergies reviewed again and is unchanged since admission. ROS (12 point review of systems completed. Pertinent positives noted.  Otherwise ROS is negative)     Medications:  Reviewed    Infusion Medications    dextrose       Scheduled Medications    gabapentin  400 mg Oral TID    fluticasone  1 spray Each Nostril Daily    chlorhexidine  15 mL Mouth/Throat BID    dilTIAZem  240 mg Oral Daily    metoprolol  100 mg Oral BID    pantoprazole  40 mg Oral Daily    spironolactone  100 mg Oral Daily    torsemide  40 mg Oral BID    sodium chloride flush  10 mL Intravenous 2 times per day    lactulose  20 g Oral TID    insulin lispro  0-6 Units Subcutaneous TID WC    insulin lispro  0-3 Units Subcutaneous Nightly     PRN Meds: sodium chloride flush, promethazine **OR** ondansetron, polyethylene glycol, acetaminophen **OR** acetaminophen, glucose, dextrose, glucagon (rDNA), dextrose      Intake/Output Summary (Last 24 hours) at 12/13/2020 1433  Last data filed at 12/13/2020 1057  Gross per 24 hour   Intake 660 ml   Output 1300 ml   Net -640 ml       Diet:  DIET CARB CONTROL; Carb Control: 3 carb choices (45 gms)/meal; Low Sodium (2 GM); Daily Fluid Restriction: 2000 ml    Exam:  /68   Pulse 67   Temp 98.2 °F (36.8 °C) (Oral)   Resp 18   Ht 6' 2\" (1.88 m)   Wt 264 lb 9.6 oz (120 kg)   SpO2 94%   BMI 33.97 kg/m²   General appearance: No apparent distress, appears stated age and cooperative. HEENT: Pupils equal, round, and reactive to light. Conjunctivae/corneas clear. Neck: Supple, with full range of motion. No jugular venous distention. Trachea midline. Respiratory:  Normal respiratory effort. Clear to auscultation, bilaterally without Rales/Wheezes/Rhonchi. Cardiovascular: Regular rate and rhythm with normal S1/S2 without murmurs, rubs or gallops. Abdomen: Soft, non-tender, non-distended with normal bowel sounds. Musculoskeletal: passive and active ROM x 4 extremities. Skin: Skin color, texture, turgor normal.  No rashes or lesions. Neurologic:  Neurovascularly intact without any focal sensory/motor deficits.  Cranial nerves: II-XII intact, grossly non-focal.  Psychiatric: Alert and oriented, thought content appropriate, normal insight  Capillary Refill: Brisk,< 3 seconds   Peripheral Pulses: +2 palpable, equal bilaterally     Labs:   Recent Labs     12/12/20  1448 12/13/20  0621   WBC 7.5 5.9   HGB 11.6* 10.8*   HCT 33.6* 31.4*   PLT 95* 78*     Recent Labs     12/12/20  1448 12/13/20  0621   * 130*   K 4.2 3.7   CL 86* 88*   CO2 30 30   BUN 17 15   CREATININE 1.1 1.3*   CALCIUM 9.8 9.3     Recent Labs     12/12/20  1448 12/13/20  0621   AST 46* 33   ALT 19 15   BILIDIR  --  1.7*   BILITOT 5.2* 5.1*   ALKPHOS 96 83     Recent Labs     12/12/20  1448 12/13/20  0621   INR 1.82* 1.73*     No results for input(s): Maynor Barrow in the last 72 hours. Microbiology:    Blood culture #1:   Lab Results   Component Value Date    BC No growth-preliminary No growth  11/21/2020       Blood culture #2:No results found for: April     Organism:  Lab Results   Component Value Date    ORG Staphylococcus epidermidis 11/22/2020         Lab Results   Component Value Date    LABGRAM  11/22/2020     performed on cytospun specimen Few segmented neutrophils observed. No bacteria seen. MRSA culture only:No results found for: [facility]    Urine culture: No results found for: LABURIN    Respiratory culture: No results found for: CULTRESP    Aerobic and Anaerobic :  No results found for: LABAERO  Lab Results   Component Value Date    LABANAE No growth-preliminary No anaerobes isolated  11/22/2020       Urinalysis:      Lab Results   Component Value Date    NITRU NEGATIVE 11/22/2020    WBCUA 0-2 11/22/2020    BACTERIA NONE SEEN 11/22/2020    RBCUA 3-5 11/22/2020    BLOODU TRACE 11/22/2020    SPECGRAV 1.010 11/22/2020       Radiology:  US ABDOMEN LIMITED   Final Result   Presence of ascites localized to the right upper quadrant. **This report has been created using voice recognition software. It may contain minor errors which are inherent in voice recognition technology. **      Final report electronically signed by Dr. Katherine Zavala on 12/12/2020 6:38 PM      XR CHEST PORTABLE   Final Result   1. There is complete opacification of the right hemithorax which likely represents a combination of consolidation and large right-sided pleural effusion.  Further characterization could be obtained with chest CT. **This report has been created using voice recognition software. It may contain minor errors which are inherent in voice recognition technology. **      Final report electronically signed by Dr. Carlotta Nazario on 12/12/2020 2:59 PM        Xr Chest Portable    Result Date: 12/12/2020  PROCEDURE: XR CHEST PORTABLE CLINICAL INFORMATION: shortness of breath COMPARISON: 11/21/2020 TECHNIQUE:  AP mobile chest single view  FINDINGS: There is complete opacification of the right hemithorax which may represent a combination of consolidation and large right-sided pleural effusion. The heart size is normal. No acute osseous findings are seen. 1. There is complete opacification of the right hemithorax which likely represents a combination of consolidation and large right-sided pleural effusion. Further characterization could be obtained with chest CT. **This report has been created using voice recognition software. It may contain minor errors which are inherent in voice recognition technology. ** Final report electronically signed by Dr. Carlotta Nazario on 12/12/2020 2:59 PM    Us Abdomen Limited    Result Date: 12/12/2020  PROCEDURE: US ABDOMEN LIMITED CLINICAL INFORMATION: asses for ascites . COMPARISON: No prior study. TECHNIQUE: Grayscale ultrasound FINDINGS: Pocket of fluid in the right upper quadrant of the abdomen 3 other quadrants are submitted show no abnormal fluid collections. Presence of ascites localized to the right upper quadrant. **This report has been created using voice recognition software. It may contain minor errors which are inherent in voice recognition technology. ** Final report electronically signed by Dr. Deb Encinas on 12/12/2020 6:38 PM      Electronically signed by Guy Arciniega MD on 12/13/2020 at 2:33 PM

## 2020-12-13 NOTE — CONSULTS
[facility]    Patient - Austin Chapman   MRN -  [id number]   [facility] # - [de-identified]   - 1965      Date of Admission -  2020  1:43 PM  Date of evaluation -  2020  Room - [id number]   86 Aysha Hodgson MD Primary Care Physician - Inna Canela MD   Chief Complaint   Right pleural effusion   Active Hospital Problem List      Active Hospital Problems    Diagnosis Date Noted    Decompensated liver disease [facility]) [K74.69] 2020    Paroxysmal atrial fibrillation ([facility] [address] [I48.0] 2020    Obesity, Class II, BMI 35-39.9 [E66.9] 2020    Pleural effusion associated with hepatic disorder [K76.9, J91.8] 2020    Acute metabolic encephalopathy [X72.52]     Alcoholic cirrhosis of liver without ascites ([facility] [address] [K70.30] 2020     HPI   Austin Chapman is a 48 y.o. male with past medical history of alcoholic liver cirrhosis, atrial fibrillation, ascites and hepatic hydrothorax admitted for worsening shortness of breath and CXR showing complete opacification of the right chest with increasing right sided pleural effusion. Pulmonology consulted for thoracentesis. Patient was discharged from [facility]  after being admitted for decompensated liver cirrhosis and hepatic hydrothorax for which he underwent a thoracentesis. He was also evaluated for TIPS procedure. She is non-smoker. He quit drinking in October.   Past Medical History         Diagnosis Date    Advanced cirrhosis of liver (HCC)     Atrial fibrillation (HCC)     CHF (congestive heart failure) (HCC)     Chronic kidney disease     COPD (chronic obstructive pulmonary disease) (HCC)     Diabetes mellitus (HCC)     Gallstones     GERD (gastroesophageal reflux disease)     Headache     Portal hypertension (HCC)       Past Surgical History           Procedure Laterality Date    APPENDECTOMY      HERNIA REPAIR Diet    DIET CARB CONTROL; Carb Control: 3 carb choices (45 gms)/meal; Low Sodium (2 GM); Daily Fluid Restriction: 2000 ml  Allergies    Patient has no known allergies. Social History     Social History     Socioeconomic History    Marital status: Single     Spouse name: Not on file    Number of children: Not on file    Years of education: Not on file    Highest education level: Not on file   Occupational History    Not on file   Social Needs    Financial resource strain: Not on file    Food insecurity     Worry: Not on file     Inability: Not on file    Transportation needs     Medical: Not on file     Non-medical: Not on file   Tobacco Use    Smoking status: Former Smoker     Packs/day: 1.00     Years: 29.00     Pack years: 29.00     Types: Cigarettes     Quit date: 2020     Years since quittin.0    Smokeless tobacco: Never Used   Substance and Sexual Activity    Alcohol use: Not Currently     Frequency: 4 or more times a week     Comment: No alcohol since 21    Drug use: Never    Sexual activity: Not on file   Lifestyle    Physical activity     Days per week: Not on file     Minutes per session: Not on file    Stress: Not on file   Relationships    Social connections     Talks on phone: Not on file     Gets together: Not on file     Attends Evangelical service: Not on file     Active member of club or organization: Not on file     Attends meetings of clubs or organizations: Not on file     Relationship status: Not on file    Intimate partner violence     Fear of current or ex partner: Not on file     Emotionally abused: Not on file     Physically abused: Not on file     Forced sexual activity: Not on file   Other Topics Concern    Not on file   Social History Narrative    Not on file     Family History    History reviewed. No pertinent family history.   Sleep History    Patient was scheduled to have sleep study by the end of this month  ROS    General/Constitutional: Weight loss  HENT: Negative. Eyes: Negative. Upper respiratory tract: No nasal stuffiness or post nasal drip. Lower respiratory tract/ lungs: No cough or sputum production. No hemoptysis. Cardiovascular: No palpitations, chest pain or edema. Gastrointestinal: No nausea or vomiting. Neurological: No focal neurological weakness. Extremities: No tenderness. Musculoskeletal: no complaints  Genitourinary: No complaints. Hematological: Negative. Denies easy buising  Skin: No itching. Meds    Current Medications    gabapentin  400 mg Oral TID    fluticasone  1 spray Each Nostril Daily    chlorhexidine  15 mL Mouth/Throat BID    dilTIAZem  240 mg Oral Daily    metoprolol  100 mg Oral BID    pantoprazole  40 mg Oral Daily    spironolactone  100 mg Oral Daily    torsemide  40 mg Oral BID    sodium chloride flush  10 mL Intravenous 2 times per day    lactulose  20 g Oral TID    insulin lispro  0-6 Units Subcutaneous TID WC    insulin lispro  0-3 Units Subcutaneous Nightly     sodium chloride flush, promethazine **OR** ondansetron, polyethylene glycol, acetaminophen **OR** acetaminophen, glucose, dextrose, glucagon (rDNA), dextrose  IV Drips/Infusions   dextrose       Vitals    Vitals    height is 6' 2\" (1.88 m) and weight is 264 lb 9.6 oz (120 kg). His oral temperature is 98.2 °F (36.8 °C). His blood pressure is 102/68 and his pulse is 67. His respiration is 18 and oxygen saturation is 94%. O2 Flow Rate (L/min): 4 L/min  I/O    Intake/Output Summary (Last 24 hours) at 12/13/2020 1416  Last data filed at 12/13/2020 1057  Gross per 24 hour   Intake 660 ml   Output 1300 ml   Net -640 ml     Patient Vitals for the past 96 hrs (Last 3 readings):   Weight   12/12/20 2030 264 lb 9.6 oz (120 kg)   12/12/20 1424 261 lb (118.4 kg)     Exam   Constitutional: Patient appears moderately built and moderately nourished. Head: Normocephalic and atraumatic. Mouth/Throat: Oropharynx is clear and moist.  No oral thrush. Eyes: Conjunctivae are normal. Pupils are equal, round, and reactive to light. No scleral icterus. Neck: Neck supple. No JVD or tracheal deviation present. Cardiovascular: Regular rate, regular rhythm, S1 and S2 with no murmur. No peripheral edema  Pulmonary/Chest: Diminished breath sounds on the right chest  Abdominal: Soft. Bowel sounds audible. No distension or tenderness to palp  Musculoskeletal: Moves all extremities  Lymphadenopathy:  No cervical adenopathy. Neurological: Patient is alert and oriented to person, place, and time. Skin: Skin is warm and dry. Labs   ABG  No results found for: PH, PO2, PCO2, HCO3, O2SAT  No results found for: IFIO2, MODE, SETTIDVOL, SETPEEP  CBC  Recent Labs     12/12/20  1448 12/13/20  0621   WBC 7.5 5.9   RBC 3.51* 3.29*   HGB 11.6* 10.8*   HCT 33.6* 31.4*   MCV 95.7* 95.4*   MCH 33.0 32.8   MCHC 34.5 34.4   PLT 95* 78*   MPV 10.3 10.4      BMP  Recent Labs     12/12/20  1448 12/13/20  0621   * 130*   K 4.2 3.7   CL 86* 88*   CO2 30 30   BUN 17 15   CREATININE 1.1 1.3*   GLUCOSE 118* 103   MG  --  1.4*   CALCIUM 9.8 9.3     LFT  Recent Labs     12/12/20  1448 12/13/20  0621   AST 46* 33   ALT 19 15   BILITOT 5.2* 5.1*   ALKPHOS 96 83     TROP  Lab Results   Component Value Date    TROPONINT 0.020 12/12/2020    TROPONINT < 0.010 11/21/2020     BNP  Lab Results   Component Value Date    PROBNP 790.4 12/12/2020    PROBNP 610.0 11/21/2020     D-Dimer  Lab Results   Component Value Date    DDIMER 4341.00 11/21/2020     Lactic Acid  No results for input(s): LACTA in the last 72 hours.   INR  Recent Labs     12/12/20  1448 12/13/20  0621   INR 1.82* 1.73*     PTT  Recent Labs     12/12/20  1448   APTT 58.7*     Glucose  Recent Labs     12/12/20  2214 12/13/20  0746 12/13/20  1201   POCGLU 183* 118* 165*     UA No results for input(s): Sanjay Atilio, COLORU, CLARITYU, MUCUS, PROTEINU, BLOODU, RBCUA, WBCUA, BACTERIA, NITRU, GLUCOSEU, BILIRUBINUR, UROBILINOGEN,

## 2020-12-14 NOTE — FLOWSHEET NOTE
12/13/20 2017   Oxygen Therapy   SpO2 91 %   Pulse Oximeter Device Mode Intermittent   Pulse Oximeter Device Location Finger   O2 Device Nasal cannula   O2 Flow Rate (L/min) 4 L/min     Pt moved to 6L NC at this time.

## 2020-12-14 NOTE — FLOWSHEET NOTE
12/13/20 2345   Provider Notification   Reason for Communication Evaluate   Provider Name [facility]   Provider Notification Advance Practice Clinician (CNS, NP, CNM, CRNA, PA)   Method of Communication Secure Message   Response No new orders   Notification Time [address] notified of BP 85/52, MAP 64. Via [facility]TR returned page and stated to get a manual BP.    [address] notified of manual /68. [facility] stated that if the BP is stable in 15 minutes, pt may be transferred to [facility].    0012-/70 manually. Per [facility], okay to transfer to [facility].

## 2020-12-23 NOTE — DISCHARGE SUMMARY
Hospitalist Discharge Summary        Patient: Los Oliveros  YOB: 1965  MRN: [id number]   Acct: [de-identified]    Primary Care Physician: Dariusz Chavis MD    Admit date  12/12/2020    Discharge date:  12/23/2020 12:07 PM    Chief Complaint on presentation :-  SOB    Discharge Assessment and Plan:-      1. 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 2L NC at baseline, currently needing 4L NC. CXR showing complete opacification of right hemithorax.     12/13: Discussed with GI and they recommended against Pleurx catheter and recommended transfer to tertiary center for TIPS and transplantation evaluation. Will initiate transfer to [facility] pending bed availability. Discussed with pulmonary services, will get thoracentesis for symptomatic relief. Patient understands that this likely will recur. 12/14: Patient was transferred to [facility] overnight. Had thoracentesis yesterday.     2. Decompensated Alcoholic Liver Cirrhosis: complicated by HE, Esophageal Varices, Portal HTN, Anemia, thrombocytopenia and hepatic hydrothorax. EGD on 11/30 showing non bleeding grade 1 EV.      12/13: No confusion today. Continue lactulose, Aldactone, metoprolol, torsemide.      3. Paroxysmal Atrial Fibrillation: CHADSVASC 3. Currently rate controlled on Lopressor and Cardizem. Continue to hold Lovenox for now.     4.  Likely underlying MAX:  Outpatient sleep study.      6. COPD -stable     7. Type 2 DM: hold home Metformin, place on Humalog Sliding Scale, Diabetic Diet. Accu-checks.     8. Tobacco Abuse: nicotine patch     Initial H and P and Hospital course:-    48 y. o. male who presented to [facility] with increased sob and confusion. Has a pmh of Alcoholic Cirrhosis, A. Fib (on Toprol, Cardizem and Lovenox BID), COPD, DM2, HFpEF and Chronic Hypoxic Resp Failure on 2L NC.  Pt was initially admitted to [place] [address] on 11/21 and transferred to [facility] on 11/23 for diagnosis of hepatic hydrothorax. At the time while here prior to transfer, pt had 3L removed by thoracentesis consistent with transudate effusion and had a pig tail catheter placed, which was removed prior to his transfer to [facility]. While at [facility], imaging significant for TTE hyperdynamic EF>70%, severe biatrial enlargement, dilated RV, RVSP 42 & CXR showed worsening R pleural effusion. He was started on BiPAP and aggressively diuresed with improvement of symptoms. Pt was then discharged home on 12/1/2020 on Torsemide 40 BID and Aldactone 100 and on 2L NC. Pt reports to have started to feel sob as soon as being home, which continued to get worse each day. First it was with exertion, now at rest making it difficult to sleep. Pt reports taking all his diuretics, following a low Na and fluid restriction diet. Pt also reports having 2 bowel movements a day on average, compliant with his lactulose. Pt and brother (who is at bedside) do report increased confusion recently as well. Denies any recent fevers, chills, cough, chest pain, abdominal pain, blood in stool/urine, burning with urination.      In the ED, pt on 4L NC. Na 127, .4, troponin 0.020. Ammonia 95. WBC 7.5, Hb 11.6, PLT 95k. INR 7.82.      CXR showing complete opacification of right hemithorax     Patient was admitted for worsening shortness of breath. Complicated case with known history of hepatic hydrothorax. Was last seen a month ago and was transferred to [facility]. Patient was evaluated by GI services who recommended patient to be transferred to tertiary center for possible TIPS, liver transplant evaluation. Patient also had paracentesis prior to transfer. Was transferred overnight in stable condition. Physical Exam:-  Vitals:   No data found.   Weight:   Weight: 264 lb 9.6 oz (120 kg)   24 hour intake/output:   No intake or output data in the 24 hours ending 12/23/20 1207    1. General appearance: No apparent distress, appears stated age and cooperative. 2. HEENT: Normal cephalic, atraumatic without obvious deformity. Pupils equal, round, and reactive to light. Extra ocular muscles intact. Conjunctivae/corneas clear. 3. Neck: Supple, with full range of motion. No jugular venous distention. Trachea midline. 4. Respiratory:  Normal respiratory effort. Clear to auscultation, bilaterally without Rales/Wheezes/Rhonchi. 5. Cardiovascular: Regular rate and rhythm with normal S1/S2 without murmurs, rubs or gallops. 6. Abdomen: Soft, non-tender, non-distended with normal bowel sounds. 7. Musculoskeletal:  No clubbing, cyanosis or edema bilaterally. 8. Skin: Skin color, texture, turgor normal.  No rashes or lesions. 9. Neurologic:  Neurovascularly intact without any focal sensory/motor deficits. Cranial nerves: II-XII intact, grossly non-focal.  10. Psychiatric: Alert and oriented, thought content appropriate, normal insight  11. Capillary Refill: Brisk,< 3 seconds   12. Peripheral Pulses: +2 palpable, equal bilaterally       Discharge Medications:-      Medication List      ASK your doctor about these medications    ASPIRIN 81 PO     blood glucose test strips  Test 4 times daily and as needed for irregular blood glucose. Dispense sufficient amount for indicated testing frequency plus additional to accommodate PRN testing needs.  DX E11.9     Chantix 1 MG tablet  Generic drug: varenicline     dilTIAZem 240 MG extended release capsule  Commonly known as: DILACOR XR     enoxaparin 150 MG/ML injection  Commonly known as: LOVENOX     escitalopram 10 MG tablet  Commonly known as: Lexapro  Take 1 tablet by mouth daily     gabapentin 300 MG capsule  Commonly known as: NEURONTIN     lactulose 10 g packet  Commonly known as: CEPHULAC     Lancets Misc  1 each by Does not apply route daily Use to test blood sugar 4 times daily and as needed DX:E11.9     metFORMIN 500 MG tablet  Commonly known as: GLUCOPHAGE     metoprolol 100 MG tablet  Commonly known as: LOPRESSOR     ONE TOUCH ULTRA 2 w/Device Kit  Use to test 4 times daily and as needed DX: E11.9     pantoprazole 40 MG tablet  Commonly known as: PROTONIX     spironolactone 100 MG tablet  Commonly known as: ALDACTONE     torsemide 20 MG tablet  Commonly known as: DEMADEX             Labs :-  No results found for this or any previous visit (from the past 72 hour(s)). Microbiology:    Blood culture #1:   Lab Results   Component Value Date    BC No growth-preliminary No growth  11/21/2020       Blood culture #2:No results found for: Clary Michel    Organism:    Lab Results   Component Value Date    LABGRAM  12/13/2020     performed on cytospun specimen Rare segmented neutrophils observed. No bacteria seen. MRSA culture only:No results found for: [facility]    Urine culture: No results found for: Joy Gutierrez  Lab Results   Component Value Date    ORG Staphylococcus epidermidis 11/22/2020        Respiratory culture: No results found for: CULTRESP    Aerobic and Anaerobic :  No results found for: LABAERO  Lab Results   Component Value Date    LABANAE No growth-preliminary No growth  12/13/2020       Urinalysis:      Lab Results   Component Value Date    NITRU NEGATIVE 11/22/2020    WBCUA 0-2 11/22/2020    BACTERIA NONE SEEN 11/22/2020    RBCUA 3-5 11/22/2020    BLOODU TRACE 11/22/2020    SPECGRAV 1.010 11/22/2020       Radiology:-  Xr Chest Portable    Result Date: 12/13/2020  PROCEDURE: XR CHEST PORTABLE CLINICAL INFORMATION: Thoracentesis . COMPARISON: 12/12/2020 TECHNIQUE: Portable upright FINDINGS: There remains complete opacification of the right lung. There is shift the mediastinum to the right. Left lung is clear. No pneumothorax is seen on the left. There is probable loss of volume right lung with shift the mediastinum to the right there remains complete opacification of the right lung.  **This report has been created using voice recognition software. It may contain minor errors which are inherent in voice recognition technology. ** Final report electronically signed by Dr. Merritt Chou on 12/13/2020 4:27 PM    Xr Chest Portable    Result Date: 12/12/2020  PROCEDURE: XR CHEST PORTABLE CLINICAL INFORMATION: shortness of breath COMPARISON: 11/21/2020 TECHNIQUE:  AP mobile chest single view  FINDINGS: There is complete opacification of the right hemithorax which may represent a combination of consolidation and large right-sided pleural effusion. The heart size is normal. No acute osseous findings are seen. 1. There is complete opacification of the right hemithorax which likely represents a combination of consolidation and large right-sided pleural effusion. Further characterization could be obtained with chest CT. **This report has been created using voice recognition software. It may contain minor errors which are inherent in voice recognition technology. ** Final report electronically signed by Dr. Marques Hutton on 12/12/2020 2:59 PM    Us Abdomen Limited    Result Date: 12/12/2020  PROCEDURE: US ABDOMEN LIMITED CLINICAL INFORMATION: asses for ascites . COMPARISON: No prior study. TECHNIQUE: Grayscale ultrasound FINDINGS: Pocket of fluid in the right upper quadrant of the abdomen 3 other quadrants are submitted show no abnormal fluid collections. Presence of ascites localized to the right upper quadrant. **This report has been created using voice recognition software. It may contain minor errors which are inherent in voice recognition technology. ** Final report electronically signed by Dr. Merritt Chou on 12/12/2020 6:38 PM       Follow-up scheduled after discharge :-    in the next few days with Betsy Ashraf MD    Consultations during this hospital stay:-  [] NONE [] Cardiology  [] Nephrology  [] Hemo onco   [] GI   [] ID  [] Endocrine  [] Pulm    [] Neuro    [] Psych   [] Urology  [] ENT   [] G SURGERY   []Ortho    []CV surg    [] Palliative [] Hospice [] Pain management   []    []TCU   [] PT/OT  OTHERS:-    Disposition: [facility]  Condition at Discharge: Stable    Time Spent:- 35 minutes    Electronically signed by Yahir Bee MD on 12/23/2020 at 12:07 PM  Discharging Hospitalist

## 2021-01-01 ENCOUNTER — NURSE ONLY (OUTPATIENT)
Dept: LAB | Age: 56
End: 2021-01-01

## 2021-01-01 ENCOUNTER — APPOINTMENT (OUTPATIENT)
Dept: GENERAL RADIOLOGY | Age: 56
DRG: 441 | End: 2021-01-01
Payer: COMMERCIAL

## 2021-01-01 ENCOUNTER — APPOINTMENT (OUTPATIENT)
Dept: ULTRASOUND IMAGING | Age: 56
DRG: 640 | End: 2021-01-01
Payer: COMMERCIAL

## 2021-01-01 ENCOUNTER — HOSPITAL ENCOUNTER (OUTPATIENT)
Age: 56
Discharge: HOME | DRG: 432 | End: 2021-05-18
Payer: COMMERCIAL

## 2021-01-01 ENCOUNTER — APPOINTMENT (OUTPATIENT)
Dept: ULTRASOUND IMAGING | Age: 56
DRG: 432 | End: 2021-01-01
Payer: COMMERCIAL

## 2021-01-01 ENCOUNTER — CARE COORDINATION (OUTPATIENT)
Dept: CASE MANAGEMENT | Age: 56
End: 2021-01-01

## 2021-01-01 ENCOUNTER — APPOINTMENT (OUTPATIENT)
Dept: MRI IMAGING | Age: 56
DRG: 432 | End: 2021-01-01
Payer: COMMERCIAL

## 2021-01-01 ENCOUNTER — OFFICE VISIT (OUTPATIENT)
Dept: FAMILY MEDICINE CLINIC | Age: 56
End: 2021-01-01
Payer: COMMERCIAL

## 2021-01-01 ENCOUNTER — APPOINTMENT (OUTPATIENT)
Dept: ULTRASOUND IMAGING | Age: 56
DRG: 187 | End: 2021-01-01
Payer: COMMERCIAL

## 2021-01-01 ENCOUNTER — APPOINTMENT (OUTPATIENT)
Dept: GENERAL RADIOLOGY | Age: 56
DRG: 432 | End: 2021-01-01
Payer: COMMERCIAL

## 2021-01-01 ENCOUNTER — APPOINTMENT (OUTPATIENT)
Dept: ULTRASOUND IMAGING | Age: 56
DRG: 441 | End: 2021-01-01
Payer: COMMERCIAL

## 2021-01-01 ENCOUNTER — HOSPITAL ENCOUNTER (OUTPATIENT)
Dept: GENERAL RADIOLOGY | Age: 56
Discharge: HOME | DRG: 441 | End: 2021-04-22
Payer: COMMERCIAL

## 2021-01-01 ENCOUNTER — CARE COORDINATION (OUTPATIENT)
Dept: CARE COORDINATION | Age: 56
End: 2021-01-01

## 2021-01-01 ENCOUNTER — TELEPHONE (OUTPATIENT)
Dept: FAMILY MEDICINE CLINIC | Age: 56
End: 2021-01-01

## 2021-01-01 ENCOUNTER — APPOINTMENT (OUTPATIENT)
Dept: CT IMAGING | Age: 56
DRG: 432 | End: 2021-01-01
Payer: COMMERCIAL

## 2021-01-01 ENCOUNTER — TELEPHONE (OUTPATIENT)
Dept: PULMONOLOGY | Age: 56
End: 2021-01-01

## 2021-01-01 ENCOUNTER — OFFICE VISIT (OUTPATIENT)
Dept: PULMONOLOGY | Age: 56
End: 2021-01-01
Payer: COMMERCIAL

## 2021-01-01 ENCOUNTER — HOSPITAL ENCOUNTER (INPATIENT)
Age: 56
LOS: 8 days | Discharge: HOME | DRG: 640 | End: 2021-04-07
Attending: EMERGENCY MEDICINE | Admitting: INTERNAL MEDICINE
Payer: COMMERCIAL

## 2021-01-01 ENCOUNTER — APPOINTMENT (OUTPATIENT)
Dept: CT IMAGING | Age: 56
DRG: 640 | End: 2021-01-01
Payer: COMMERCIAL

## 2021-01-01 ENCOUNTER — APPOINTMENT (OUTPATIENT)
Dept: GENERAL RADIOLOGY | Age: 56
DRG: 187 | End: 2021-01-01
Payer: COMMERCIAL

## 2021-01-01 ENCOUNTER — APPOINTMENT (OUTPATIENT)
Dept: GENERAL RADIOLOGY | Age: 56
DRG: 640 | End: 2021-01-01
Payer: COMMERCIAL

## 2021-01-01 ENCOUNTER — APPOINTMENT (OUTPATIENT)
Dept: CT IMAGING | Age: 56
DRG: 441 | End: 2021-01-01
Payer: COMMERCIAL

## 2021-01-01 ENCOUNTER — HOSPITAL ENCOUNTER (INPATIENT)
Age: 56
LOS: 9 days | Discharge: ANOTHER HOSPITAL | DRG: 432 | End: 2021-05-30
Attending: EMERGENCY MEDICINE | Admitting: INTERNAL MEDICINE
Payer: COMMERCIAL

## 2021-01-01 ENCOUNTER — HOSPITAL ENCOUNTER (OUTPATIENT)
Dept: NURSING | Age: 56
Discharge: HOME | DRG: 441 | End: 2021-04-22
Payer: COMMERCIAL

## 2021-01-01 ENCOUNTER — HOSPITAL ENCOUNTER (OUTPATIENT)
Dept: GENERAL RADIOLOGY | Age: 56
Discharge: HOME | DRG: 432 | End: 2021-05-18
Payer: COMMERCIAL

## 2021-01-01 ENCOUNTER — HOSPITAL ENCOUNTER (OUTPATIENT)
Dept: SLEEP CENTER | Age: 56
Discharge: HOME | End: 2021-02-20
Payer: COMMERCIAL

## 2021-01-01 ENCOUNTER — HOSPITAL ENCOUNTER (OUTPATIENT)
Dept: SLEEP CENTER | Age: 56
Discharge: HOME | End: 2021-01-30
Payer: COMMERCIAL

## 2021-01-01 ENCOUNTER — TELEPHONE (OUTPATIENT)
Dept: NEPHROLOGY | Age: 56
End: 2021-01-01

## 2021-01-01 ENCOUNTER — VIRTUAL VISIT (OUTPATIENT)
Dept: INTERNAL MEDICINE CLINIC | Age: 56
End: 2021-01-01
Payer: COMMERCIAL

## 2021-01-01 ENCOUNTER — APPOINTMENT (OUTPATIENT)
Dept: INTERVENTIONAL RADIOLOGY/VASCULAR | Age: 56
DRG: 441 | End: 2021-01-01
Payer: COMMERCIAL

## 2021-01-01 ENCOUNTER — TELEPHONE (OUTPATIENT)
Dept: SLEEP CENTER | Age: 56
End: 2021-01-01

## 2021-01-01 ENCOUNTER — OFFICE VISIT (OUTPATIENT)
Dept: ENT CLINIC | Age: 56
End: 2021-01-01
Payer: COMMERCIAL

## 2021-01-01 ENCOUNTER — HOSPITAL ENCOUNTER (INPATIENT)
Age: 56
LOS: 3 days | Discharge: LEFT BEFORE DISCHARGE | DRG: 432 | End: 2021-03-18
Attending: INTERNAL MEDICINE | Admitting: INTERNAL MEDICINE
Payer: COMMERCIAL

## 2021-01-01 ENCOUNTER — TELEPHONE (OUTPATIENT)
Dept: CARDIOLOGY CLINIC | Age: 56
End: 2021-01-01

## 2021-01-01 ENCOUNTER — HOSPITAL ENCOUNTER (INPATIENT)
Age: 56
LOS: 3 days | Discharge: HOME | DRG: 187 | End: 2021-02-27
Attending: INTERNAL MEDICINE | Admitting: INTERNAL MEDICINE
Payer: COMMERCIAL

## 2021-01-01 ENCOUNTER — HOSPITAL ENCOUNTER (OUTPATIENT)
Dept: ULTRASOUND IMAGING | Age: 56
Discharge: HOME | DRG: 441 | End: 2021-04-22
Payer: COMMERCIAL

## 2021-01-01 ENCOUNTER — APPOINTMENT (OUTPATIENT)
Dept: CT IMAGING | Age: 56
DRG: 187 | End: 2021-01-01
Payer: COMMERCIAL

## 2021-01-01 ENCOUNTER — APPOINTMENT (OUTPATIENT)
Dept: NUCLEAR MEDICINE | Age: 56
DRG: 432 | End: 2021-01-01
Payer: COMMERCIAL

## 2021-01-01 ENCOUNTER — HOSPITAL ENCOUNTER (INPATIENT)
Age: 56
LOS: 13 days | Discharge: HOME | DRG: 441 | End: 2021-05-06
Attending: NURSE PRACTITIONER
Payer: COMMERCIAL

## 2021-01-01 VITALS
HEIGHT: 74 IN | TEMPERATURE: 97.3 F | DIASTOLIC BLOOD PRESSURE: 76 MMHG | RESPIRATION RATE: 16 BRPM | BODY MASS INDEX: 30.45 KG/M2 | WEIGHT: 237.3 LBS | SYSTOLIC BLOOD PRESSURE: 128 MMHG | HEART RATE: 88 BPM

## 2021-01-01 VITALS
DIASTOLIC BLOOD PRESSURE: 78 MMHG | OXYGEN SATURATION: 94 % | HEART RATE: 157 BPM | TEMPERATURE: 97.4 F | SYSTOLIC BLOOD PRESSURE: 97 MMHG | RESPIRATION RATE: 18 BRPM | BODY MASS INDEX: 30.65 KG/M2 | HEIGHT: 74 IN | WEIGHT: 238.8 LBS

## 2021-01-01 VITALS
WEIGHT: 235.5 LBS | HEART RATE: 94 BPM | TEMPERATURE: 97.4 F | BODY MASS INDEX: 30.22 KG/M2 | HEIGHT: 74 IN | RESPIRATION RATE: 18 BRPM | DIASTOLIC BLOOD PRESSURE: 87 MMHG | OXYGEN SATURATION: 95 % | SYSTOLIC BLOOD PRESSURE: 109 MMHG

## 2021-01-01 VITALS
HEIGHT: 74 IN | HEART RATE: 130 BPM | OXYGEN SATURATION: 88 % | SYSTOLIC BLOOD PRESSURE: 92 MMHG | WEIGHT: 246 LBS | BODY MASS INDEX: 31.57 KG/M2 | DIASTOLIC BLOOD PRESSURE: 72 MMHG

## 2021-01-01 VITALS
BODY MASS INDEX: 29.89 KG/M2 | HEART RATE: 64 BPM | SYSTOLIC BLOOD PRESSURE: 114 MMHG | DIASTOLIC BLOOD PRESSURE: 68 MMHG | RESPIRATION RATE: 16 BRPM | TEMPERATURE: 97.2 F | WEIGHT: 232.8 LBS

## 2021-01-01 VITALS
BODY MASS INDEX: 30.22 KG/M2 | WEIGHT: 235.4 LBS | SYSTOLIC BLOOD PRESSURE: 130 MMHG | TEMPERATURE: 96.7 F | DIASTOLIC BLOOD PRESSURE: 86 MMHG | HEART RATE: 142 BPM | OXYGEN SATURATION: 99 %

## 2021-01-01 VITALS
WEIGHT: 240.6 LBS | BODY MASS INDEX: 30.88 KG/M2 | TEMPERATURE: 97.2 F | HEIGHT: 74 IN | DIASTOLIC BLOOD PRESSURE: 72 MMHG | SYSTOLIC BLOOD PRESSURE: 118 MMHG | HEART RATE: 128 BPM | RESPIRATION RATE: 18 BRPM

## 2021-01-01 VITALS
TEMPERATURE: 98.2 F | DIASTOLIC BLOOD PRESSURE: 59 MMHG | WEIGHT: 248.24 LBS | SYSTOLIC BLOOD PRESSURE: 92 MMHG | BODY MASS INDEX: 31.87 KG/M2 | OXYGEN SATURATION: 97 % | HEART RATE: 110 BPM | RESPIRATION RATE: 17 BRPM

## 2021-01-01 VITALS
DIASTOLIC BLOOD PRESSURE: 58 MMHG | TEMPERATURE: 97 F | SYSTOLIC BLOOD PRESSURE: 84 MMHG | RESPIRATION RATE: 16 BRPM | BODY MASS INDEX: 30.56 KG/M2 | OXYGEN SATURATION: 97 % | HEART RATE: 64 BPM | WEIGHT: 238 LBS

## 2021-01-01 VITALS
HEART RATE: 77 BPM | TEMPERATURE: 97 F | SYSTOLIC BLOOD PRESSURE: 130 MMHG | RESPIRATION RATE: 20 BRPM | DIASTOLIC BLOOD PRESSURE: 72 MMHG

## 2021-01-01 VITALS
TEMPERATURE: 98.1 F | RESPIRATION RATE: 18 BRPM | BODY MASS INDEX: 30.99 KG/M2 | OXYGEN SATURATION: 94 % | DIASTOLIC BLOOD PRESSURE: 64 MMHG | HEART RATE: 123 BPM | HEIGHT: 74 IN | SYSTOLIC BLOOD PRESSURE: 94 MMHG | WEIGHT: 241.5 LBS

## 2021-01-01 VITALS — HEART RATE: 133 BPM | SYSTOLIC BLOOD PRESSURE: 118 MMHG | OXYGEN SATURATION: 99 % | DIASTOLIC BLOOD PRESSURE: 70 MMHG

## 2021-01-01 VITALS
DIASTOLIC BLOOD PRESSURE: 65 MMHG | TEMPERATURE: 97.4 F | SYSTOLIC BLOOD PRESSURE: 106 MMHG | RESPIRATION RATE: 16 BRPM | HEIGHT: 74 IN | HEART RATE: 112 BPM | WEIGHT: 264.4 LBS | OXYGEN SATURATION: 95 % | BODY MASS INDEX: 33.93 KG/M2

## 2021-01-01 VITALS
TEMPERATURE: 96.9 F | OXYGEN SATURATION: 96 % | SYSTOLIC BLOOD PRESSURE: 90 MMHG | RESPIRATION RATE: 16 BRPM | DIASTOLIC BLOOD PRESSURE: 52 MMHG | HEART RATE: 84 BPM

## 2021-01-01 DIAGNOSIS — K74.60 ADVANCED CIRRHOSIS OF LIVER (HCC): ICD-10-CM

## 2021-01-01 DIAGNOSIS — J94.8 HYDROTHORAX: ICD-10-CM

## 2021-01-01 DIAGNOSIS — D69.6 THROMBOCYTOPENIA (HCC): ICD-10-CM

## 2021-01-01 DIAGNOSIS — G47.33 OSA (OBSTRUCTIVE SLEEP APNEA): ICD-10-CM

## 2021-01-01 DIAGNOSIS — R53.1 WEAKNESS GENERALIZED: ICD-10-CM

## 2021-01-01 DIAGNOSIS — N18.9 ACUTE KIDNEY INJURY SUPERIMPOSED ON CKD (HCC): ICD-10-CM

## 2021-01-01 DIAGNOSIS — N17.9 ACUTE KIDNEY INJURY SUPERIMPOSED ON CKD (HCC): Primary | ICD-10-CM

## 2021-01-01 DIAGNOSIS — G47.10 HYPERSOMNIA: ICD-10-CM

## 2021-01-01 DIAGNOSIS — R04.0 EPISTAXIS: Primary | ICD-10-CM

## 2021-01-01 DIAGNOSIS — Z48.02 VISIT FOR SUTURE REMOVAL: Primary | ICD-10-CM

## 2021-01-01 DIAGNOSIS — K76.9 PLEURAL EFFUSION ASSOCIATED WITH HEPATIC DISORDER: ICD-10-CM

## 2021-01-01 DIAGNOSIS — Z87.891 PERSONAL HISTORY OF TOBACCO USE: ICD-10-CM

## 2021-01-01 DIAGNOSIS — E87.1 HYPONATREMIA: ICD-10-CM

## 2021-01-01 DIAGNOSIS — R06.81 WITNESSED EPISODE OF APNEA: ICD-10-CM

## 2021-01-01 DIAGNOSIS — J91.8 PLEURAL EFFUSION ASSOCIATED WITH HEPATIC DISORDER: ICD-10-CM

## 2021-01-01 DIAGNOSIS — K70.31 ALCOHOLIC CIRRHOSIS OF LIVER WITH ASCITES (HCC): ICD-10-CM

## 2021-01-01 DIAGNOSIS — N18.9 CHRONIC KIDNEY DISEASE, UNSPECIFIED CKD STAGE: ICD-10-CM

## 2021-01-01 DIAGNOSIS — D68.9 COAGULOPATHY (HCC): ICD-10-CM

## 2021-01-01 DIAGNOSIS — D64.9 ANEMIA, UNSPECIFIED TYPE: ICD-10-CM

## 2021-01-01 DIAGNOSIS — N17.9 ACUTE KIDNEY INJURY (HCC): Primary | ICD-10-CM

## 2021-01-01 DIAGNOSIS — I95.9 HYPOTENSION, UNSPECIFIED HYPOTENSION TYPE: ICD-10-CM

## 2021-01-01 DIAGNOSIS — I48.0 PAROXYSMAL ATRIAL FIBRILLATION (HCC): ICD-10-CM

## 2021-01-01 DIAGNOSIS — Z12.5 SCREENING PSA (PROSTATE SPECIFIC ANTIGEN): ICD-10-CM

## 2021-01-01 DIAGNOSIS — I10 ESSENTIAL HYPERTENSION: ICD-10-CM

## 2021-01-01 DIAGNOSIS — E11.9 TYPE 2 DIABETES MELLITUS WITHOUT COMPLICATION, WITHOUT LONG-TERM CURRENT USE OF INSULIN (HCC): ICD-10-CM

## 2021-01-01 DIAGNOSIS — Z78.9 IMPAIRED ACTIVITIES OF DAILY LIVING: ICD-10-CM

## 2021-01-01 DIAGNOSIS — R09.89 POOR CIRCULATION: ICD-10-CM

## 2021-01-01 DIAGNOSIS — K72.10 CHRONIC LIVER FAILURE WITHOUT HEPATIC COMA (HCC): ICD-10-CM

## 2021-01-01 DIAGNOSIS — R09.81 SINUS CONGESTION: ICD-10-CM

## 2021-01-01 DIAGNOSIS — J90 PLEURAL EFFUSION: ICD-10-CM

## 2021-01-01 DIAGNOSIS — R53.1 WEAKNESS: ICD-10-CM

## 2021-01-01 DIAGNOSIS — R18.8 OTHER ASCITES: ICD-10-CM

## 2021-01-01 DIAGNOSIS — I50.33 ACUTE ON CHRONIC DIASTOLIC CONGESTIVE HEART FAILURE (HCC): ICD-10-CM

## 2021-01-01 DIAGNOSIS — R06.02 SOB (SHORTNESS OF BREATH): ICD-10-CM

## 2021-01-01 DIAGNOSIS — J90 RECURRENT RIGHT PLEURAL EFFUSION: ICD-10-CM

## 2021-01-01 DIAGNOSIS — Z87.19 HISTORY OF CIRRHOSIS: ICD-10-CM

## 2021-01-01 DIAGNOSIS — I50.32 CHRONIC DIASTOLIC HEART FAILURE (HCC): Primary | ICD-10-CM

## 2021-01-01 DIAGNOSIS — E66.9 OBESITY, CLASS II, BMI 35-39.9: ICD-10-CM

## 2021-01-01 DIAGNOSIS — N17.9 AKI (ACUTE KIDNEY INJURY) (HCC): ICD-10-CM

## 2021-01-01 DIAGNOSIS — K70.31 ALCOHOLIC CIRRHOSIS OF LIVER WITH ASCITES (HCC): Primary | ICD-10-CM

## 2021-01-01 DIAGNOSIS — E11.9 TYPE 2 DIABETES MELLITUS WITHOUT COMPLICATION, WITHOUT LONG-TERM CURRENT USE OF INSULIN (HCC): Primary | ICD-10-CM

## 2021-01-01 DIAGNOSIS — R79.89 ABNORMAL LFTS: Primary | ICD-10-CM

## 2021-01-01 DIAGNOSIS — R60.0 LOWER LEG EDEMA: ICD-10-CM

## 2021-01-01 DIAGNOSIS — K74.60 ADVANCED CIRRHOSIS OF LIVER (HCC): Primary | ICD-10-CM

## 2021-01-01 DIAGNOSIS — R06.02 SOB (SHORTNESS OF BREATH): Primary | ICD-10-CM
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
## 2021-01-01 DIAGNOSIS — J34.0 NASAL ABSCESS: Primary | ICD-10-CM

## 2021-01-01 DIAGNOSIS — N17.9 ACUTE KIDNEY INJURY SUPERIMPOSED ON CKD (HCC): ICD-10-CM

## 2021-01-01 DIAGNOSIS — J34.89 NASAL MUCOSA DRY: ICD-10-CM

## 2021-01-01 DIAGNOSIS — K70.30 ALCOHOLIC CIRRHOSIS OF LIVER WITHOUT ASCITES (HCC): ICD-10-CM

## 2021-01-01 DIAGNOSIS — K74.69 DECOMPENSATED LIVER DISEASE (HCC): ICD-10-CM

## 2021-01-01 DIAGNOSIS — K72.10 END STAGE LIVER DISEASE (HCC): Primary | ICD-10-CM

## 2021-01-01 DIAGNOSIS — E11.42 TYPE 2 DIABETES MELLITUS WITH DIABETIC POLYNEUROPATHY, WITHOUT LONG-TERM CURRENT USE OF INSULIN (HCC): ICD-10-CM

## 2021-01-01 DIAGNOSIS — R53.81 PHYSICAL DECONDITIONING: Primary | ICD-10-CM

## 2021-01-01 DIAGNOSIS — K72.10 END STAGE LIVER DISEASE (HCC): ICD-10-CM

## 2021-01-01 DIAGNOSIS — G47.33 OSA (OBSTRUCTIVE SLEEP APNEA): Primary | ICD-10-CM

## 2021-01-01 DIAGNOSIS — R53.1 GENERALIZED WEAKNESS: ICD-10-CM

## 2021-01-01 DIAGNOSIS — J90 PLEURAL EFFUSION ON LEFT: ICD-10-CM

## 2021-01-01 DIAGNOSIS — R06.83 LOUD SNORING: ICD-10-CM

## 2021-01-01 DIAGNOSIS — I50.32 CHRONIC DIASTOLIC HEART FAILURE (HCC): ICD-10-CM

## 2021-01-01 DIAGNOSIS — N18.9 ACUTE KIDNEY INJURY SUPERIMPOSED ON CKD (HCC): Primary | ICD-10-CM

## 2021-01-01 DIAGNOSIS — R06.02 SHORTNESS OF BREATH: Primary | ICD-10-CM

## 2021-01-01 DIAGNOSIS — J34.89 SORE IN NOSE: ICD-10-CM

## 2021-01-01 DIAGNOSIS — E66.9 OBESITY (BMI 30-39.9): ICD-10-CM

## 2021-01-01 LAB
ABO: NORMAL
ACANTHOCYTES: ABNORMAL
AEROBIC CULTURE: NORMAL
AFB CULTURE & SMEAR: NORMAL
AFP-TUMOR MARKER: 2.7 UG/L
AFP-TUMOR MARKER: 4.3 UG/L
ALBUMIN FLUID: 0.3 GM/DL
ALBUMIN FLUID: 0.6 GM/DL
ALBUMIN FLUID: 1.1 GM/DL
ALBUMIN SERPL-MCNC: 2.5 G/DL (ref 3.5–5.1)
ALBUMIN SERPL-MCNC: 2.6 G/DL (ref 3.5–5.1)
ALBUMIN SERPL-MCNC: 2.6 G/DL (ref 3.5–5.1)
ALBUMIN SERPL-MCNC: 2.7 G/DL (ref 3.5–5.1)
ALBUMIN SERPL-MCNC: 2.8 G/DL (ref 3.5–5.1)
ALBUMIN SERPL-MCNC: 2.9 G/DL (ref 3.5–5.1)
ALBUMIN SERPL-MCNC: 2.9 G/DL (ref 3.5–5.1)
ALBUMIN SERPL-MCNC: 3 G/DL (ref 3.5–5.1)
ALBUMIN SERPL-MCNC: 3.1 G/DL (ref 3.5–5.1)
ALBUMIN SERPL-MCNC: 3.2 G/DL (ref 3.5–5.1)
ALBUMIN SERPL-MCNC: 3.3 G/DL (ref 3.5–5.1)
ALBUMIN SERPL-MCNC: 3.4 G/DL (ref 3.5–5.1)
ALBUMIN SERPL-MCNC: 3.5 G/DL (ref 3.5–5.1)
ALBUMIN SERPL-MCNC: 3.6 G/DL (ref 3.5–5.1)
ALBUMIN SERPL-MCNC: 3.7 G/DL (ref 3.5–5.1)
ALBUMIN SERPL-MCNC: 3.7 G/DL (ref 3.5–5.1)
ALBUMIN SERPL-MCNC: 3.8 G/DL (ref 3.5–5.1)
ALBUMIN SERPL-MCNC: 3.9 G/DL (ref 3.5–5.1)
ALLEN TEST: ABNORMAL
ALLEN TEST: POSITIVE
ALP BLD-CCNC: 101 U/L (ref 38–126)
ALP BLD-CCNC: 104 U/L (ref 38–126)
ALP BLD-CCNC: 109 U/L (ref 38–126)
ALP BLD-CCNC: 110 U/L (ref 38–126)
ALP BLD-CCNC: 111 U/L (ref 38–126)
ALP BLD-CCNC: 111 U/L (ref 38–126)
ALP BLD-CCNC: 112 U/L (ref 38–126)
ALP BLD-CCNC: 113 U/L (ref 38–126)
ALP BLD-CCNC: 113 U/L (ref 38–126)
ALP BLD-CCNC: 118 U/L (ref 38–126)
ALP BLD-CCNC: 124 U/L (ref 38–126)
ALP BLD-CCNC: 128 U/L (ref 38–126)
ALP BLD-CCNC: 130 U/L (ref 38–126)
ALP BLD-CCNC: 131 U/L (ref 38–126)
ALP BLD-CCNC: 133 U/L (ref 38–126)
ALP BLD-CCNC: 143 U/L (ref 38–126)
ALP BLD-CCNC: 144 U/L (ref 38–126)
ALP BLD-CCNC: 153 U/L (ref 38–126)
ALP BLD-CCNC: 161 U/L (ref 38–126)
ALP BLD-CCNC: 169 U/L (ref 38–126)
ALP BLD-CCNC: 170 U/L (ref 38–126)
ALP BLD-CCNC: 175 U/L (ref 38–126)
ALP BLD-CCNC: 177 U/L (ref 38–126)
ALP BLD-CCNC: 184 U/L (ref 38–126)
ALP BLD-CCNC: 198 U/L (ref 38–126)
ALP BLD-CCNC: 82 U/L (ref 38–126)
ALP BLD-CCNC: 87 U/L (ref 38–126)
ALP BLD-CCNC: 88 U/L (ref 38–126)
ALP BLD-CCNC: 90 U/L (ref 38–126)
ALP BLD-CCNC: 92 U/L (ref 38–126)
ALP BLD-CCNC: 96 U/L (ref 38–126)
ALP BLD-CCNC: 96 U/L (ref 38–126)
ALP BLD-CCNC: 99 U/L (ref 38–126)
ALT SERPL-CCNC: 12 U/L (ref 11–66)
ALT SERPL-CCNC: 14 U/L (ref 11–66)
ALT SERPL-CCNC: 15 U/L (ref 11–66)
ALT SERPL-CCNC: 15 U/L (ref 11–66)
ALT SERPL-CCNC: 16 U/L (ref 11–66)
ALT SERPL-CCNC: 17 U/L (ref 11–66)
ALT SERPL-CCNC: 18 U/L (ref 11–66)
ALT SERPL-CCNC: 19 U/L (ref 11–66)
ALT SERPL-CCNC: 21 U/L (ref 11–66)
ALT SERPL-CCNC: 22 U/L (ref 11–66)
ALT SERPL-CCNC: 23 U/L (ref 11–66)
ALT SERPL-CCNC: 25 U/L (ref 11–66)
ALT SERPL-CCNC: 26 U/L (ref 11–66)
ALT SERPL-CCNC: 27 U/L (ref 11–66)
ALT SERPL-CCNC: 27 U/L (ref 11–66)
ALT SERPL-CCNC: 28 U/L (ref 11–66)
ALT SERPL-CCNC: 30 U/L (ref 11–66)
ALT SERPL-CCNC: 31 U/L (ref 11–66)
ALT SERPL-CCNC: 33 U/L (ref 11–66)
ALT SERPL-CCNC: 33 U/L (ref 11–66)
ALT SERPL-CCNC: 34 U/L (ref 11–66)
AMMONIA: 20 UMOL/L (ref 11–60)
AMMONIA: 21 UMOL/L (ref 11–60)
AMMONIA: 24 UMOL/L (ref 11–60)
AMMONIA: 26 UMOL/L (ref 11–60)
AMMONIA: 32 UMOL/L (ref 11–60)
AMMONIA: 35 UMOL/L (ref 11–60)
AMMONIA: 35 UMOL/L (ref 11–60)
AMMONIA: 37 UMOL/L (ref 11–60)
AMMONIA: 38 UMOL/L (ref 11–60)
AMMONIA: 41 UMOL/L (ref 11–60)
AMMONIA: 52 UMOL/L (ref 11–60)
AMMONIA: 53 UMOL/L (ref 11–60)
AMMONIA: 56 UMOL/L (ref 11–60)
AMMONIA: 57 UMOL/L (ref 11–60)
AMMONIA: 59 UMOL/L (ref 11–60)
AMMONIA: 69 UMOL/L (ref 11–60)
AMMONIA: 78 UMOL/L (ref 11–60)
AMMONIA: 78 UMOL/L (ref 11–60)
AMORPHOUS: ABNORMAL
AMYLASE FLUID: 5 U/L
AMYLASE FLUID: 5 U/L
AMYLASE FLUID: 6 U/L
AMYLASE FLUID: 7 U/L
AMYLASE FLUID: 8 U/L
ANAEROBIC CULTURE: NORMAL
ANION GAP SERPL CALCULATED.3IONS-SCNC: 11 MEQ/L (ref 8–16)
ANION GAP SERPL CALCULATED.3IONS-SCNC: 12 MEQ/L (ref 8–16)
ANION GAP SERPL CALCULATED.3IONS-SCNC: 13 MEQ/L (ref 8–16)
ANION GAP SERPL CALCULATED.3IONS-SCNC: 14 MEQ/L (ref 8–16)
ANION GAP SERPL CALCULATED.3IONS-SCNC: 15 MEQ/L (ref 8–16)
ANION GAP SERPL CALCULATED.3IONS-SCNC: 15 MEQ/L (ref 8–16)
ANION GAP SERPL CALCULATED.3IONS-SCNC: 16 MEQ/L (ref 8–16)
ANION GAP SERPL CALCULATED.3IONS-SCNC: 17 MEQ/L (ref 8–16)
ANION GAP SERPL CALCULATED.3IONS-SCNC: 18 MEQ/L (ref 8–16)
ANION GAP SERPL CALCULATED.3IONS-SCNC: 19 MEQ/L (ref 8–16)
ANION GAP SERPL CALCULATED.3IONS-SCNC: 19 MEQ/L (ref 8–16)
ANION GAP SERPL CALCULATED.3IONS-SCNC: 20 MEQ/L (ref 8–16)
ANION GAP SERPL CALCULATED.3IONS-SCNC: 20 MEQ/L (ref 8–16)
ANION GAP SERPL CALCULATED.3IONS-SCNC: 21 MEQ/L (ref 8–16)
ANION GAP SERPL CALCULATED.3IONS-SCNC: 23 MEQ/L (ref 8–16)
ANION GAP SERPL CALCULATED.3IONS-SCNC: 7 MEQ/L (ref 8–16)
ANION GAP SERPL CALCULATED.3IONS-SCNC: 8 MEQ/L (ref 8–16)
ANION GAP SERPL CALCULATED.3IONS-SCNC: 9 MEQ/L (ref 8–16)
ANISOCYTOSIS: PRESENT
ANTIBODY SCREEN: NORMAL
APTT: 39.7 SECONDS (ref 22–38)
APTT: 46.5 SECONDS (ref 22–38)
APTT: 48.7 SECONDS (ref 22–38)
APTT: 51.7 SECONDS (ref 22–38)
APTT: 51.8 SECONDS (ref 22–38)
APTT: 52.3 SECONDS (ref 22–38)
AST SERPL-CCNC: 29 U/L (ref 5–40)
AST SERPL-CCNC: 30 U/L (ref 5–40)
AST SERPL-CCNC: 30 U/L (ref 5–40)
AST SERPL-CCNC: 31 U/L (ref 5–40)
AST SERPL-CCNC: 32 U/L (ref 5–40)
AST SERPL-CCNC: 32 U/L (ref 5–40)
AST SERPL-CCNC: 33 U/L (ref 5–40)
AST SERPL-CCNC: 38 U/L (ref 5–40)
AST SERPL-CCNC: 39 U/L (ref 5–40)
AST SERPL-CCNC: 39 U/L (ref 5–40)
AST SERPL-CCNC: 41 U/L (ref 5–40)
AST SERPL-CCNC: 44 U/L (ref 5–40)
AST SERPL-CCNC: 44 U/L (ref 5–40)
AST SERPL-CCNC: 47 U/L (ref 5–40)
AST SERPL-CCNC: 48 U/L (ref 5–40)
AST SERPL-CCNC: 50 U/L (ref 5–40)
AST SERPL-CCNC: 51 U/L (ref 5–40)
AST SERPL-CCNC: 52 U/L (ref 5–40)
AST SERPL-CCNC: 52 U/L (ref 5–40)
AST SERPL-CCNC: 53 U/L (ref 5–40)
AST SERPL-CCNC: 53 U/L (ref 5–40)
AST SERPL-CCNC: 55 U/L (ref 5–40)
AST SERPL-CCNC: 56 U/L (ref 5–40)
AST SERPL-CCNC: 59 U/L (ref 5–40)
AST SERPL-CCNC: 60 U/L (ref 5–40)
AST SERPL-CCNC: 60 U/L (ref 5–40)
AST SERPL-CCNC: 63 U/L (ref 5–40)
AST SERPL-CCNC: 64 U/L (ref 5–40)
AST SERPL-CCNC: 68 U/L (ref 5–40)
AST SERPL-CCNC: 69 U/L (ref 5–40)
AST SERPL-CCNC: 76 U/L (ref 5–40)
AVERAGE GLUCOSE: 66 MG/DL (ref 70–126)
BACTERIA: ABNORMAL
BACTERIA: ABNORMAL /HPF
BACTERIA: NORMAL
BASE EXCESS (CALCULATED): -0.8 MMOL/L (ref -2.5–2.5)
BASE EXCESS (CALCULATED): -3.1 MMOL/L (ref -2.5–2.5)
BASOPHILS # BLD: 0 %
BASOPHILS # BLD: 0.2 %
BASOPHILS # BLD: 0.6 %
BASOPHILS # BLD: 0.6 %
BASOPHILS # BLD: 0.9 %
BASOPHILS # BLD: 1.1 %
BASOPHILS # BLD: 1.1 %
BASOPHILS # BLD: 1.2 %
BASOPHILS # BLD: 1.3 %
BASOPHILS # BLD: 1.3 %
BASOPHILS # BLD: 1.4 %
BASOPHILS # BLD: 1.6 %
BASOPHILS # BLD: 1.7 %
BASOPHILS # BLD: 1.7 %
BASOPHILS # BLD: 1.8 %
BASOPHILS # BLD: 1.8 %
BASOPHILS ABSOLUTE: 0 THOU/MM3 (ref 0–0.1)
BASOPHILS ABSOLUTE: 0.1 THOU/MM3 (ref 0–0.1)
BILIRUB SERPL-MCNC: 10.6 MG/DL (ref 0.3–1.2)
BILIRUB SERPL-MCNC: 3.9 MG/DL (ref 0.3–1.2)
BILIRUB SERPL-MCNC: 4.4 MG/DL (ref 0.3–1.2)
BILIRUB SERPL-MCNC: 4.6 MG/DL (ref 0.3–1.2)
BILIRUB SERPL-MCNC: 4.9 MG/DL (ref 0.3–1.2)
BILIRUB SERPL-MCNC: 4.9 MG/DL (ref 0.3–1.2)
BILIRUB SERPL-MCNC: 5 MG/DL (ref 0.3–1.2)
BILIRUB SERPL-MCNC: 5.1 MG/DL (ref 0.3–1.2)
BILIRUB SERPL-MCNC: 5.2 MG/DL (ref 0.3–1.2)
BILIRUB SERPL-MCNC: 5.3 MG/DL (ref 0.3–1.2)
BILIRUB SERPL-MCNC: 5.6 MG/DL (ref 0.3–1.2)
BILIRUB SERPL-MCNC: 5.8 MG/DL (ref 0.3–1.2)
BILIRUB SERPL-MCNC: 5.9 MG/DL (ref 0.3–1.2)
BILIRUB SERPL-MCNC: 6.1 MG/DL (ref 0.3–1.2)
BILIRUB SERPL-MCNC: 6.2 MG/DL (ref 0.3–1.2)
BILIRUB SERPL-MCNC: 6.3 MG/DL (ref 0.3–1.2)
BILIRUB SERPL-MCNC: 6.4 MG/DL (ref 0.3–1.2)
BILIRUB SERPL-MCNC: 6.6 MG/DL (ref 0.3–1.2)
BILIRUB SERPL-MCNC: 6.6 MG/DL (ref 0.3–1.2)
BILIRUB SERPL-MCNC: 6.7 MG/DL (ref 0.3–1.2)
BILIRUB SERPL-MCNC: 6.9 MG/DL (ref 0.3–1.2)
BILIRUB SERPL-MCNC: 6.9 MG/DL (ref 0.3–1.2)
BILIRUB SERPL-MCNC: 7 MG/DL (ref 0.3–1.2)
BILIRUB SERPL-MCNC: 7 MG/DL (ref 0.3–1.2)
BILIRUB SERPL-MCNC: 7.4 MG/DL (ref 0.3–1.2)
BILIRUB SERPL-MCNC: 7.5 MG/DL (ref 0.3–1.2)
BILIRUB SERPL-MCNC: 7.5 MG/DL (ref 0.3–1.2)
BILIRUB SERPL-MCNC: 7.7 MG/DL (ref 0.3–1.2)
BILIRUB SERPL-MCNC: 8.2 MG/DL (ref 0.3–1.2)
BILIRUB SERPL-MCNC: 8.3 MG/DL (ref 0.3–1.2)
BILIRUB SERPL-MCNC: 8.8 MG/DL (ref 0.3–1.2)
BILIRUBIN DIRECT: 1.7 MG/DL (ref 0–0.3)
BILIRUBIN DIRECT: 1.8 MG/DL (ref 0–0.3)
BILIRUBIN DIRECT: 1.9 MG/DL (ref 0–0.3)
BILIRUBIN DIRECT: 2.2 MG/DL (ref 0–0.3)
BILIRUBIN DIRECT: 2.2 MG/DL (ref 0–0.3)
BILIRUBIN DIRECT: 2.3 MG/DL (ref 0–0.3)
BILIRUBIN DIRECT: 2.5 MG/DL (ref 0–0.3)
BILIRUBIN DIRECT: 2.6 MG/DL (ref 0–0.3)
BILIRUBIN DIRECT: 2.7 MG/DL (ref 0–0.3)
BILIRUBIN DIRECT: 3.7 MG/DL (ref 0–0.3)
BILIRUBIN URINE: ABNORMAL
BILIRUBIN URINE: ABNORMAL
BILIRUBIN URINE: NEGATIVE
BILIRUBIN URINE: NEGATIVE
BLOOD CULTURE, ROUTINE: NORMAL
BLOOD, URINE: ABNORMAL
BLOOD, URINE: ABNORMAL
BLOOD, URINE: NEGATIVE
BLOOD, URINE: NEGATIVE
BODY FLUID CULTURE, STERILE: NORMAL
BODY FLUID RBC: 5000 /CUMM
BODY FLUID RBC: < 2000 /CUMM
BODY FLUID RBC: NORMAL /CUMM
BUN BLDV-MCNC: 18 MG/DL (ref 7–22)
BUN BLDV-MCNC: 20 MG/DL (ref 7–22)
BUN BLDV-MCNC: 21 MG/DL (ref 7–22)
BUN BLDV-MCNC: 21 MG/DL (ref 7–22)
BUN BLDV-MCNC: 22 MG/DL (ref 7–22)
BUN BLDV-MCNC: 24 MG/DL (ref 7–22)
BUN BLDV-MCNC: 25 MG/DL (ref 7–22)
BUN BLDV-MCNC: 25 MG/DL (ref 7–22)
BUN BLDV-MCNC: 26 MG/DL (ref 7–22)
BUN BLDV-MCNC: 26 MG/DL (ref 7–22)
BUN BLDV-MCNC: 27 MG/DL (ref 7–22)
BUN BLDV-MCNC: 28 MG/DL (ref 7–22)
BUN BLDV-MCNC: 29 MG/DL (ref 7–22)
BUN BLDV-MCNC: 29 MG/DL (ref 7–22)
BUN BLDV-MCNC: 30 MG/DL (ref 7–22)
BUN BLDV-MCNC: 31 MG/DL (ref 7–22)
BUN BLDV-MCNC: 31 MG/DL (ref 7–22)
BUN BLDV-MCNC: 32 MG/DL (ref 7–22)
BUN BLDV-MCNC: 34 MG/DL (ref 7–22)
BUN BLDV-MCNC: 36 MG/DL (ref 7–22)
BUN BLDV-MCNC: 36 MG/DL (ref 7–22)
BUN BLDV-MCNC: 37 MG/DL (ref 7–22)
BUN BLDV-MCNC: 38 MG/DL (ref 7–22)
BUN BLDV-MCNC: 39 MG/DL (ref 7–22)
BUN BLDV-MCNC: 39 MG/DL (ref 7–22)
BUN BLDV-MCNC: 42 MG/DL (ref 7–22)
BUN BLDV-MCNC: 43 MG/DL (ref 7–22)
BUN BLDV-MCNC: 45 MG/DL (ref 7–22)
BUN BLDV-MCNC: 45 MG/DL (ref 7–22)
BUN BLDV-MCNC: 47 MG/DL (ref 7–22)
BUN BLDV-MCNC: 49 MG/DL (ref 7–22)
BUN BLDV-MCNC: 50 MG/DL (ref 7–22)
BUN BLDV-MCNC: 50 MG/DL (ref 7–22)
BUN BLDV-MCNC: 52 MG/DL (ref 7–22)
BUN BLDV-MCNC: 52 MG/DL (ref 7–22)
BUN BLDV-MCNC: 54 MG/DL (ref 7–22)
BUN BLDV-MCNC: 55 MG/DL (ref 7–22)
BUN BLDV-MCNC: 55 MG/DL (ref 7–22)
BUN BLDV-MCNC: 56 MG/DL (ref 7–22)
BUN BLDV-MCNC: 57 MG/DL (ref 7–22)
BUN BLDV-MCNC: 62 MG/DL (ref 7–22)
BUN BLDV-MCNC: 64 MG/DL (ref 7–22)
CALCIUM SERPL-MCNC: 10 MG/DL (ref 8.5–10.5)
CALCIUM SERPL-MCNC: 10 MG/DL (ref 8.5–10.5)
CALCIUM SERPL-MCNC: 8.5 MG/DL (ref 8.5–10.5)
CALCIUM SERPL-MCNC: 8.6 MG/DL (ref 8.5–10.5)
CALCIUM SERPL-MCNC: 8.6 MG/DL (ref 8.5–10.5)
CALCIUM SERPL-MCNC: 8.7 MG/DL (ref 8.5–10.5)
CALCIUM SERPL-MCNC: 8.8 MG/DL (ref 8.5–10.5)
CALCIUM SERPL-MCNC: 8.9 MG/DL (ref 8.5–10.5)
CALCIUM SERPL-MCNC: 9 MG/DL (ref 8.5–10.5)
CALCIUM SERPL-MCNC: 9.1 MG/DL (ref 8.5–10.5)
CALCIUM SERPL-MCNC: 9.1 MG/DL (ref 8.5–10.5)
CALCIUM SERPL-MCNC: 9.2 MG/DL (ref 8.5–10.5)
CALCIUM SERPL-MCNC: 9.3 MG/DL (ref 8.5–10.5)
CALCIUM SERPL-MCNC: 9.4 MG/DL (ref 8.5–10.5)
CALCIUM SERPL-MCNC: 9.5 MG/DL (ref 8.5–10.5)
CALCIUM SERPL-MCNC: 9.6 MG/DL (ref 8.5–10.5)
CALCIUM SERPL-MCNC: 9.7 MG/DL (ref 8.5–10.5)
CALCIUM SERPL-MCNC: 9.8 MG/DL (ref 8.5–10.5)
CALCIUM SERPL-MCNC: 9.8 MG/DL (ref 8.5–10.5)
CASTS UA: ABNORMAL /LPF
CASTS: ABNORMAL /LPF
CASTS: ABNORMAL /LPF
CASTS: NORMAL /LPF
CASTS: NORMAL /LPF
CHARACTER, BODY FLUID: CLEAR
CHARACTER, BODY FLUID: NORMAL
CHARACTER, URINE: ABNORMAL
CHARACTER, URINE: CLEAR
CHLORIDE BLD-SCNC: 84 MEQ/L (ref 98–111)
CHLORIDE BLD-SCNC: 86 MEQ/L (ref 98–111)
CHLORIDE BLD-SCNC: 86 MEQ/L (ref 98–111)
CHLORIDE BLD-SCNC: 88 MEQ/L (ref 98–111)
CHLORIDE BLD-SCNC: 89 MEQ/L (ref 98–111)
CHLORIDE BLD-SCNC: 90 MEQ/L (ref 98–111)
CHLORIDE BLD-SCNC: 91 MEQ/L (ref 98–111)
CHLORIDE BLD-SCNC: 92 MEQ/L (ref 98–111)
CHLORIDE BLD-SCNC: 93 MEQ/L (ref 98–111)
CHLORIDE BLD-SCNC: 94 MEQ/L (ref 98–111)
CHLORIDE BLD-SCNC: 95 MEQ/L (ref 98–111)
CHLORIDE BLD-SCNC: 96 MEQ/L (ref 98–111)
CHLORIDE BLD-SCNC: 96 MEQ/L (ref 98–111)
CHLORIDE BLD-SCNC: 97 MEQ/L (ref 98–111)
CHLORIDE BLD-SCNC: 97 MEQ/L (ref 98–111)
CHLORIDE BLD-SCNC: 98 MEQ/L (ref 98–111)
CHLORIDE, URINE: < 20 MEQ/L
CHLORIDE, URINE: < 20 MEQ/L
CO2: 14 MEQ/L (ref 23–33)
CO2: 15 MEQ/L (ref 23–33)
CO2: 16 MEQ/L (ref 23–33)
CO2: 17 MEQ/L (ref 23–33)
CO2: 17 MEQ/L (ref 23–33)
CO2: 18 MEQ/L (ref 23–33)
CO2: 19 MEQ/L (ref 23–33)
CO2: 20 MEQ/L (ref 23–33)
CO2: 21 MEQ/L (ref 23–33)
CO2: 22 MEQ/L (ref 23–33)
CO2: 23 MEQ/L (ref 23–33)
CO2: 24 MEQ/L (ref 23–33)
CO2: 24 MEQ/L (ref 23–33)
CO2: 25 MEQ/L (ref 23–33)
CO2: 26 MEQ/L (ref 23–33)
CO2: 28 MEQ/L (ref 23–33)
COLLECTED BY:: ABNORMAL
COLLECTED BY:: ABNORMAL
COLOR: ABNORMAL
COLOR: NORMAL
COLOR: YELLOW
CORTISOL COLLECTION INFO: NORMAL
CORTISOL: 4.48 UG/DL
CREAT SERPL-MCNC: 1.3 MG/DL (ref 0.4–1.2)
CREAT SERPL-MCNC: 1.5 MG/DL (ref 0.4–1.2)
CREAT SERPL-MCNC: 1.5 MG/DL (ref 0.4–1.2)
CREAT SERPL-MCNC: 1.9 MG/DL (ref 0.4–1.2)
CREAT SERPL-MCNC: 2 MG/DL (ref 0.4–1.2)
CREAT SERPL-MCNC: 2.1 MG/DL (ref 0.4–1.2)
CREAT SERPL-MCNC: 2.2 MG/DL (ref 0.4–1.2)
CREAT SERPL-MCNC: 2.4 MG/DL (ref 0.4–1.2)
CREAT SERPL-MCNC: 2.6 MG/DL (ref 0.4–1.2)
CREAT SERPL-MCNC: 2.9 MG/DL (ref 0.4–1.2)
CREAT SERPL-MCNC: 3 MG/DL (ref 0.4–1.2)
CREAT SERPL-MCNC: 3.2 MG/DL (ref 0.4–1.2)
CREAT SERPL-MCNC: 3.3 MG/DL (ref 0.4–1.2)
CREAT SERPL-MCNC: 3.4 MG/DL (ref 0.4–1.2)
CREAT SERPL-MCNC: 3.5 MG/DL (ref 0.4–1.2)
CREAT SERPL-MCNC: 3.6 MG/DL (ref 0.4–1.2)
CREAT SERPL-MCNC: 3.8 MG/DL (ref 0.4–1.2)
CREAT SERPL-MCNC: 3.9 MG/DL (ref 0.4–1.2)
CREAT SERPL-MCNC: 4 MG/DL (ref 0.4–1.2)
CREAT SERPL-MCNC: 4 MG/DL (ref 0.4–1.2)
CREAT SERPL-MCNC: 4.1 MG/DL (ref 0.4–1.2)
CREAT SERPL-MCNC: 4.2 MG/DL (ref 0.4–1.2)
CREAT SERPL-MCNC: 4.3 MG/DL (ref 0.4–1.2)
CREAT SERPL-MCNC: 4.5 MG/DL (ref 0.4–1.2)
CREAT SERPL-MCNC: 4.6 MG/DL (ref 0.4–1.2)
CREAT SERPL-MCNC: 4.6 MG/DL (ref 0.4–1.2)
CREAT SERPL-MCNC: 4.8 MG/DL (ref 0.4–1.2)
CREAT SERPL-MCNC: 4.8 MG/DL (ref 0.4–1.2)
CREAT SERPL-MCNC: 5 MG/DL (ref 0.4–1.2)
CREAT SERPL-MCNC: 5.1 MG/DL (ref 0.4–1.2)
CREAT SERPL-MCNC: 5.1 MG/DL (ref 0.4–1.2)
CREAT SERPL-MCNC: 5.3 MG/DL (ref 0.4–1.2)
CREAT SERPL-MCNC: 5.5 MG/DL (ref 0.4–1.2)
CREAT SERPL-MCNC: 5.5 MG/DL (ref 0.4–1.2)
CREAT SERPL-MCNC: 5.7 MG/DL (ref 0.4–1.2)
CREAT SERPL-MCNC: 6.1 MG/DL (ref 0.4–1.2)
CREAT SERPL-MCNC: 6.1 MG/DL (ref 0.4–1.2)
CREAT SERPL-MCNC: 6.2 MG/DL (ref 0.4–1.2)
CREAT SERPL-MCNC: 6.3 MG/DL (ref 0.4–1.2)
CREAT SERPL-MCNC: 6.7 MG/DL (ref 0.4–1.2)
CREATININE URINE: 134.2 MG/DL
CREATININE URINE: 94.5 MG/DL
CRENATED RBC'S: ABNORMAL
CRYSTALS, UA: ABNORMAL
CRYSTALS: ABNORMAL
CRYSTALS: NORMAL
D-DIMER QUANTITATIVE: 5566 NG/ML FEU (ref 0–500)
DEVICE: ABNORMAL
DEVICE: ABNORMAL
DIFFERENTIAL TYPE: ABNORMAL
DIFFERENTIAL TYPE: ABNORMAL
DIGOXIN LEVEL: 1 NG/ML (ref 0.5–2)
EKG ATRIAL RATE: 100 BPM
EKG ATRIAL RATE: 159 BPM
EKG ATRIAL RATE: 315 BPM
EKG ATRIAL RATE: 337 BPM
EKG ATRIAL RATE: 92 BPM
EKG P-R INTERVAL: 96 MS
EKG Q-T INTERVAL: 330 MS
EKG Q-T INTERVAL: 358 MS
EKG Q-T INTERVAL: 358 MS
EKG Q-T INTERVAL: 370 MS
EKG Q-T INTERVAL: 396 MS
EKG Q-T INTERVAL: 398 MS
EKG Q-T INTERVAL: 432 MS
EKG QRS DURATION: 64 MS
EKG QRS DURATION: 64 MS
EKG QRS DURATION: 68 MS
EKG QRS DURATION: 80 MS
EKG QRS DURATION: 86 MS
EKG QRS DURATION: 88 MS
EKG QRS DURATION: 96 MS
EKG QTC CALCULATION (BAZETT): 412 MS
EKG QTC CALCULATION (BAZETT): 479 MS
EKG QTC CALCULATION (BAZETT): 479 MS
EKG QTC CALCULATION (BAZETT): 492 MS
EKG QTC CALCULATION (BAZETT): 501 MS
EKG QTC CALCULATION (BAZETT): 508 MS
EKG QTC CALCULATION (BAZETT): 516 MS
EKG R AXIS: 12 DEGREES
EKG R AXIS: 15 DEGREES
EKG R AXIS: 23 DEGREES
EKG R AXIS: 26 DEGREES
EKG R AXIS: 4 DEGREES
EKG T AXIS: -111 DEGREES
EKG T AXIS: -117 DEGREES
EKG T AXIS: -139 DEGREES
EKG T AXIS: -76 DEGREES
EKG T AXIS: 102 DEGREES
EKG T AXIS: 116 DEGREES
EKG T AXIS: 61 DEGREES
EKG VENTRICULAR RATE: 108 BPM
EKG VENTRICULAR RATE: 117 BPM
EKG VENTRICULAR RATE: 118 BPM
EKG VENTRICULAR RATE: 74 BPM
EKG VENTRICULAR RATE: 92 BPM
EKG VENTRICULAR RATE: 94 BPM
EKG VENTRICULAR RATE: 99 BPM
EOSINOPHIL # BLD: 0 %
EOSINOPHIL # BLD: 0 %
EOSINOPHIL # BLD: 0.6 %
EOSINOPHIL # BLD: 1.4 %
EOSINOPHIL # BLD: 1.7 %
EOSINOPHIL # BLD: 2.2 %
EOSINOPHIL # BLD: 2.5 %
EOSINOPHIL # BLD: 2.6 %
EOSINOPHIL # BLD: 2.7 %
EOSINOPHIL # BLD: 2.8 %
EOSINOPHIL # BLD: 3.3 %
EOSINOPHIL # BLD: 3.7 %
EOSINOPHIL # BLD: 4.2 %
EOSINOPHIL # BLD: 5 %
EOSINOPHIL # BLD: 5.4 %
EOSINOPHIL # BLD: 5.5 %
EOSINOPHIL # BLD: 5.6 %
EOSINOPHIL # BLD: 5.7 %
EOSINOPHIL # BLD: 5.9 %
EOSINOPHIL # BLD: 7 %
EOSINOPHIL # BLD: 7 %
EOSINOPHIL # BLD: 7.5 %
EOSINOPHIL # BLD: 8.5 %
EOSINOPHIL # BLD: 8.6 %
EOSINOPHILS ABSOLUTE: 0 THOU/MM3 (ref 0–0.4)
EOSINOPHILS ABSOLUTE: 0 THOU/MM3 (ref 0–0.4)
EOSINOPHILS ABSOLUTE: 0.1 THOU/MM3 (ref 0–0.4)
EOSINOPHILS ABSOLUTE: 0.2 THOU/MM3 (ref 0–0.4)
EOSINOPHILS ABSOLUTE: 0.3 THOU/MM3 (ref 0–0.4)
EOSINOPHILS ABSOLUTE: 0.4 THOU/MM3 (ref 0–0.4)
EOSINOPHILS ABSOLUTE: 0.5 THOU/MM3 (ref 0–0.4)
EOSINOPHILS ABSOLUTE: 0.5 THOU/MM3 (ref 0–0.4)
EOSINOPHILS ABSOLUTE: 0.6 THOU/MM3 (ref 0–0.4)
EOSINOPHILS ABSOLUTE: 0.6 THOU/MM3 (ref 0–0.4)
EPITHELIAL CELLS, UA: ABNORMAL /HPF
EPITHELIAL CELLS, UA: ABNORMAL /HPF
EPITHELIAL CELLS, UA: NORMAL /HPF
ERYTHROCYTE [DISTWIDTH] IN BLOOD BY AUTOMATED COUNT: 16.5 % (ref 11.5–14.5)
ERYTHROCYTE [DISTWIDTH] IN BLOOD BY AUTOMATED COUNT: 16.6 % (ref 11.5–14.5)
ERYTHROCYTE [DISTWIDTH] IN BLOOD BY AUTOMATED COUNT: 16.7 % (ref 11.5–14.5)
ERYTHROCYTE [DISTWIDTH] IN BLOOD BY AUTOMATED COUNT: 16.8 % (ref 11.5–14.5)
ERYTHROCYTE [DISTWIDTH] IN BLOOD BY AUTOMATED COUNT: 16.9 % (ref 11.5–14.5)
ERYTHROCYTE [DISTWIDTH] IN BLOOD BY AUTOMATED COUNT: 16.9 % (ref 11.5–14.5)
ERYTHROCYTE [DISTWIDTH] IN BLOOD BY AUTOMATED COUNT: 17 % (ref 11.5–14.5)
ERYTHROCYTE [DISTWIDTH] IN BLOOD BY AUTOMATED COUNT: 17.1 % (ref 11.5–14.5)
ERYTHROCYTE [DISTWIDTH] IN BLOOD BY AUTOMATED COUNT: 17.2 % (ref 11.5–14.5)
ERYTHROCYTE [DISTWIDTH] IN BLOOD BY AUTOMATED COUNT: 17.5 % (ref 11.5–14.5)
ERYTHROCYTE [DISTWIDTH] IN BLOOD BY AUTOMATED COUNT: 17.9 % (ref 11.5–14.5)
ERYTHROCYTE [DISTWIDTH] IN BLOOD BY AUTOMATED COUNT: 18 % (ref 11.5–14.5)
ERYTHROCYTE [DISTWIDTH] IN BLOOD BY AUTOMATED COUNT: 18.2 % (ref 11.5–14.5)
ERYTHROCYTE [DISTWIDTH] IN BLOOD BY AUTOMATED COUNT: 18.2 % (ref 11.5–14.5)
ERYTHROCYTE [DISTWIDTH] IN BLOOD BY AUTOMATED COUNT: 18.3 % (ref 11.5–14.5)
ERYTHROCYTE [DISTWIDTH] IN BLOOD BY AUTOMATED COUNT: 18.4 % (ref 11.5–14.5)
ERYTHROCYTE [DISTWIDTH] IN BLOOD BY AUTOMATED COUNT: 18.5 % (ref 11.5–14.5)
ERYTHROCYTE [DISTWIDTH] IN BLOOD BY AUTOMATED COUNT: 18.6 % (ref 11.5–14.5)
ERYTHROCYTE [DISTWIDTH] IN BLOOD BY AUTOMATED COUNT: 18.6 % (ref 11.5–14.5)
ERYTHROCYTE [DISTWIDTH] IN BLOOD BY AUTOMATED COUNT: 18.8 % (ref 11.5–14.5)
ERYTHROCYTE [DISTWIDTH] IN BLOOD BY AUTOMATED COUNT: 19 % (ref 11.5–14.5)
ERYTHROCYTE [DISTWIDTH] IN BLOOD BY AUTOMATED COUNT: 19.1 % (ref 11.5–14.5)
ERYTHROCYTE [DISTWIDTH] IN BLOOD BY AUTOMATED COUNT: 19.3 % (ref 11.5–14.5)
ERYTHROCYTE [DISTWIDTH] IN BLOOD BY AUTOMATED COUNT: 19.5 % (ref 11.5–14.5)
ERYTHROCYTE [DISTWIDTH] IN BLOOD BY AUTOMATED COUNT: 19.8 % (ref 11.5–14.5)
ERYTHROCYTE [DISTWIDTH] IN BLOOD BY AUTOMATED COUNT: 57.1 FL (ref 35–45)
ERYTHROCYTE [DISTWIDTH] IN BLOOD BY AUTOMATED COUNT: 57.7 FL (ref 35–45)
ERYTHROCYTE [DISTWIDTH] IN BLOOD BY AUTOMATED COUNT: 57.7 FL (ref 35–45)
ERYTHROCYTE [DISTWIDTH] IN BLOOD BY AUTOMATED COUNT: 58.2 FL (ref 35–45)
ERYTHROCYTE [DISTWIDTH] IN BLOOD BY AUTOMATED COUNT: 58.4 FL (ref 35–45)
ERYTHROCYTE [DISTWIDTH] IN BLOOD BY AUTOMATED COUNT: 59 FL (ref 35–45)
ERYTHROCYTE [DISTWIDTH] IN BLOOD BY AUTOMATED COUNT: 59.1 FL (ref 35–45)
ERYTHROCYTE [DISTWIDTH] IN BLOOD BY AUTOMATED COUNT: 59.2 FL (ref 35–45)
ERYTHROCYTE [DISTWIDTH] IN BLOOD BY AUTOMATED COUNT: 59.3 FL (ref 35–45)
ERYTHROCYTE [DISTWIDTH] IN BLOOD BY AUTOMATED COUNT: 59.9 FL (ref 35–45)
ERYTHROCYTE [DISTWIDTH] IN BLOOD BY AUTOMATED COUNT: 60 FL (ref 35–45)
ERYTHROCYTE [DISTWIDTH] IN BLOOD BY AUTOMATED COUNT: 60.1 FL (ref 35–45)
ERYTHROCYTE [DISTWIDTH] IN BLOOD BY AUTOMATED COUNT: 60.9 FL (ref 35–45)
ERYTHROCYTE [DISTWIDTH] IN BLOOD BY AUTOMATED COUNT: 61.4 FL (ref 35–45)
ERYTHROCYTE [DISTWIDTH] IN BLOOD BY AUTOMATED COUNT: 61.4 FL (ref 35–45)
ERYTHROCYTE [DISTWIDTH] IN BLOOD BY AUTOMATED COUNT: 61.7 FL (ref 35–45)
ERYTHROCYTE [DISTWIDTH] IN BLOOD BY AUTOMATED COUNT: 62.4 FL (ref 35–45)
ERYTHROCYTE [DISTWIDTH] IN BLOOD BY AUTOMATED COUNT: 62.5 FL (ref 35–45)
ERYTHROCYTE [DISTWIDTH] IN BLOOD BY AUTOMATED COUNT: 62.8 FL (ref 35–45)
ERYTHROCYTE [DISTWIDTH] IN BLOOD BY AUTOMATED COUNT: 63.4 FL (ref 35–45)
ERYTHROCYTE [DISTWIDTH] IN BLOOD BY AUTOMATED COUNT: 63.6 FL (ref 35–45)
ERYTHROCYTE [DISTWIDTH] IN BLOOD BY AUTOMATED COUNT: 63.7 FL (ref 35–45)
ERYTHROCYTE [DISTWIDTH] IN BLOOD BY AUTOMATED COUNT: 63.8 FL (ref 35–45)
ERYTHROCYTE [DISTWIDTH] IN BLOOD BY AUTOMATED COUNT: 64.1 FL (ref 35–45)
ERYTHROCYTE [DISTWIDTH] IN BLOOD BY AUTOMATED COUNT: 64.3 FL (ref 35–45)
ERYTHROCYTE [DISTWIDTH] IN BLOOD BY AUTOMATED COUNT: 66.1 FL (ref 35–45)
ERYTHROCYTE [DISTWIDTH] IN BLOOD BY AUTOMATED COUNT: 66.3 FL (ref 35–45)
ERYTHROCYTE [DISTWIDTH] IN BLOOD BY AUTOMATED COUNT: 67.2 FL (ref 35–45)
ERYTHROCYTE [DISTWIDTH] IN BLOOD BY AUTOMATED COUNT: 68.7 FL (ref 35–45)
ERYTHROCYTE [DISTWIDTH] IN BLOOD BY AUTOMATED COUNT: 69 FL (ref 35–45)
ERYTHROCYTE [DISTWIDTH] IN BLOOD BY AUTOMATED COUNT: 69.4 FL (ref 35–45)
ERYTHROCYTE [DISTWIDTH] IN BLOOD BY AUTOMATED COUNT: 70 FL (ref 35–45)
ERYTHROCYTE [DISTWIDTH] IN BLOOD BY AUTOMATED COUNT: 71.3 FL (ref 35–45)
ERYTHROCYTE [DISTWIDTH] IN BLOOD BY AUTOMATED COUNT: 72.2 FL (ref 35–45)
ERYTHROCYTE [DISTWIDTH] IN BLOOD BY AUTOMATED COUNT: 74.3 FL (ref 35–45)
ERYTHROCYTE [DISTWIDTH] IN BLOOD BY AUTOMATED COUNT: 75.4 FL (ref 35–45)
ETHYL ALCOHOL, SERUM: < 0.01 %
ETHYL ALCOHOL, SERUM: < 0.01 %
FERRITIN: 548 NG/ML (ref 22–322)
FERRITIN: 808 NG/ML (ref 22–322)
FIBRINOGEN: 64 MG/100ML (ref 155–475)
FIBRINOGEN: 65 MG/100ML (ref 155–475)
FIBRINOGEN: 76 MG/100ML (ref 155–475)
FIBRINOGEN: 79 MG/100ML (ref 155–475)
FOLATE: 9.5 NG/ML (ref 4.8–24.2)
FUNGUS IDENTIFIED: NORMAL
FUNGUS IDENTIFIED: NORMAL
FUNGUS SMEAR: NORMAL
FUNGUS SMEAR: NORMAL
GFR SERPL CREATININE-BSD FRML MDRD: 10 ML/MIN/1.73M2
GFR SERPL CREATININE-BSD FRML MDRD: 11 ML/MIN/1.73M2
GFR SERPL CREATININE-BSD FRML MDRD: 12 ML/MIN/1.73M2
GFR SERPL CREATININE-BSD FRML MDRD: 13 ML/MIN/1.73M2
GFR SERPL CREATININE-BSD FRML MDRD: 14 ML/MIN/1.73M2
GFR SERPL CREATININE-BSD FRML MDRD: 14 ML/MIN/1.73M2
GFR SERPL CREATININE-BSD FRML MDRD: 15 ML/MIN/1.73M2
GFR SERPL CREATININE-BSD FRML MDRD: 15 ML/MIN/1.73M2
GFR SERPL CREATININE-BSD FRML MDRD: 16 ML/MIN/1.73M2
GFR SERPL CREATININE-BSD FRML MDRD: 17 ML/MIN/1.73M2
GFR SERPL CREATININE-BSD FRML MDRD: 18 ML/MIN/1.73M2
GFR SERPL CREATININE-BSD FRML MDRD: 18 ML/MIN/1.73M2
GFR SERPL CREATININE-BSD FRML MDRD: 19 ML/MIN/1.73M2
GFR SERPL CREATININE-BSD FRML MDRD: 20 ML/MIN/1.73M2
GFR SERPL CREATININE-BSD FRML MDRD: 20 ML/MIN/1.73M2
GFR SERPL CREATININE-BSD FRML MDRD: 22 ML/MIN/1.73M2
GFR SERPL CREATININE-BSD FRML MDRD: 23 ML/MIN/1.73M2
GFR SERPL CREATININE-BSD FRML MDRD: 26 ML/MIN/1.73M2
GFR SERPL CREATININE-BSD FRML MDRD: 28 ML/MIN/1.73M2
GFR SERPL CREATININE-BSD FRML MDRD: 31 ML/MIN/1.73M2
GFR SERPL CREATININE-BSD FRML MDRD: 33 ML/MIN/1.73M2
GFR SERPL CREATININE-BSD FRML MDRD: 35 ML/MIN/1.73M2
GFR SERPL CREATININE-BSD FRML MDRD: 37 ML/MIN/1.73M2
GFR SERPL CREATININE-BSD FRML MDRD: 49 ML/MIN/1.73M2
GFR SERPL CREATININE-BSD FRML MDRD: 49 ML/MIN/1.73M2
GFR SERPL CREATININE-BSD FRML MDRD: 57 ML/MIN/1.73M2
GFR SERPL CREATININE-BSD FRML MDRD: 9 ML/MIN/1.73M2
GLUCOSE BLD-MCNC: 100 MG/DL (ref 70–108)
GLUCOSE BLD-MCNC: 100 MG/DL (ref 70–108)
GLUCOSE BLD-MCNC: 101 MG/DL (ref 70–108)
GLUCOSE BLD-MCNC: 102 MG/DL (ref 70–108)
GLUCOSE BLD-MCNC: 102 MG/DL (ref 70–108)
GLUCOSE BLD-MCNC: 103 MG/DL (ref 70–108)
GLUCOSE BLD-MCNC: 103 MG/DL (ref 70–108)
GLUCOSE BLD-MCNC: 104 MG/DL (ref 70–108)
GLUCOSE BLD-MCNC: 105 MG/DL (ref 70–108)
GLUCOSE BLD-MCNC: 106 MG/DL (ref 70–108)
GLUCOSE BLD-MCNC: 107 MG/DL (ref 70–108)
GLUCOSE BLD-MCNC: 108 MG/DL (ref 70–108)
GLUCOSE BLD-MCNC: 110 MG/DL (ref 70–108)
GLUCOSE BLD-MCNC: 111 MG/DL (ref 70–108)
GLUCOSE BLD-MCNC: 112 MG/DL (ref 70–108)
GLUCOSE BLD-MCNC: 113 MG/DL (ref 70–108)
GLUCOSE BLD-MCNC: 114 MG/DL (ref 70–108)
GLUCOSE BLD-MCNC: 115 MG/DL (ref 70–108)
GLUCOSE BLD-MCNC: 116 MG/DL (ref 70–108)
GLUCOSE BLD-MCNC: 117 MG/DL (ref 70–108)
GLUCOSE BLD-MCNC: 118 MG/DL (ref 70–108)
GLUCOSE BLD-MCNC: 119 MG/DL (ref 70–108)
GLUCOSE BLD-MCNC: 119 MG/DL (ref 70–108)
GLUCOSE BLD-MCNC: 120 MG/DL (ref 70–108)
GLUCOSE BLD-MCNC: 120 MG/DL (ref 70–108)
GLUCOSE BLD-MCNC: 121 MG/DL (ref 70–108)
GLUCOSE BLD-MCNC: 122 MG/DL (ref 70–108)
GLUCOSE BLD-MCNC: 123 MG/DL (ref 70–108)
GLUCOSE BLD-MCNC: 124 MG/DL (ref 70–108)
GLUCOSE BLD-MCNC: 125 MG/DL (ref 70–108)
GLUCOSE BLD-MCNC: 125 MG/DL (ref 70–108)
GLUCOSE BLD-MCNC: 126 MG/DL (ref 70–108)
GLUCOSE BLD-MCNC: 127 MG/DL (ref 70–108)
GLUCOSE BLD-MCNC: 129 MG/DL (ref 70–108)
GLUCOSE BLD-MCNC: 130 MG/DL (ref 70–108)
GLUCOSE BLD-MCNC: 131 MG/DL (ref 70–108)
GLUCOSE BLD-MCNC: 131 MG/DL (ref 70–108)
GLUCOSE BLD-MCNC: 132 MG/DL (ref 70–108)
GLUCOSE BLD-MCNC: 133 MG/DL (ref 70–108)
GLUCOSE BLD-MCNC: 134 MG/DL (ref 70–108)
GLUCOSE BLD-MCNC: 135 MG/DL (ref 70–108)
GLUCOSE BLD-MCNC: 135 MG/DL (ref 70–108)
GLUCOSE BLD-MCNC: 136 MG/DL (ref 70–108)
GLUCOSE BLD-MCNC: 137 MG/DL (ref 70–108)
GLUCOSE BLD-MCNC: 137 MG/DL (ref 70–108)
GLUCOSE BLD-MCNC: 138 MG/DL (ref 70–108)
GLUCOSE BLD-MCNC: 140 MG/DL (ref 70–108)
GLUCOSE BLD-MCNC: 142 MG/DL (ref 70–108)
GLUCOSE BLD-MCNC: 144 MG/DL (ref 70–108)
GLUCOSE BLD-MCNC: 145 MG/DL (ref 70–108)
GLUCOSE BLD-MCNC: 145 MG/DL (ref 70–108)
GLUCOSE BLD-MCNC: 146 MG/DL (ref 70–108)
GLUCOSE BLD-MCNC: 148 MG/DL (ref 70–108)
GLUCOSE BLD-MCNC: 149 MG/DL (ref 70–108)
GLUCOSE BLD-MCNC: 151 MG/DL (ref 70–108)
GLUCOSE BLD-MCNC: 152 MG/DL (ref 70–108)
GLUCOSE BLD-MCNC: 153 MG/DL (ref 70–108)
GLUCOSE BLD-MCNC: 154 MG/DL (ref 70–108)
GLUCOSE BLD-MCNC: 155 MG/DL (ref 70–108)
GLUCOSE BLD-MCNC: 157 MG/DL (ref 70–108)
GLUCOSE BLD-MCNC: 158 MG/DL (ref 70–108)
GLUCOSE BLD-MCNC: 158 MG/DL (ref 70–108)
GLUCOSE BLD-MCNC: 160 MG/DL (ref 70–108)
GLUCOSE BLD-MCNC: 162 MG/DL (ref 70–108)
GLUCOSE BLD-MCNC: 162 MG/DL (ref 70–108)
GLUCOSE BLD-MCNC: 166 MG/DL (ref 70–108)
GLUCOSE BLD-MCNC: 167 MG/DL (ref 70–108)
GLUCOSE BLD-MCNC: 173 MG/DL (ref 70–108)
GLUCOSE BLD-MCNC: 174 MG/DL (ref 70–108)
GLUCOSE BLD-MCNC: 176 MG/DL (ref 70–108)
GLUCOSE BLD-MCNC: 177 MG/DL (ref 70–108)
GLUCOSE BLD-MCNC: 179 MG/DL (ref 70–108)
GLUCOSE BLD-MCNC: 187 MG/DL (ref 70–108)
GLUCOSE BLD-MCNC: 194 MG/DL (ref 70–108)
GLUCOSE BLD-MCNC: 230 MG/DL (ref 70–108)
GLUCOSE BLD-MCNC: 245 MG/DL (ref 70–108)
GLUCOSE BLD-MCNC: 257 MG/DL (ref 70–108)
GLUCOSE BLD-MCNC: 265 MG/DL (ref 70–108)
GLUCOSE BLD-MCNC: 60 MG/DL (ref 70–108)
GLUCOSE BLD-MCNC: 62 MG/DL (ref 70–108)
GLUCOSE BLD-MCNC: 76 MG/DL (ref 70–108)
GLUCOSE BLD-MCNC: 83 MG/DL (ref 70–108)
GLUCOSE BLD-MCNC: 84 MG/DL (ref 70–108)
GLUCOSE BLD-MCNC: 85 MG/DL (ref 70–108)
GLUCOSE BLD-MCNC: 86 MG/DL (ref 70–108)
GLUCOSE BLD-MCNC: 87 MG/DL (ref 70–108)
GLUCOSE BLD-MCNC: 87 MG/DL (ref 70–108)
GLUCOSE BLD-MCNC: 90 MG/DL (ref 70–108)
GLUCOSE BLD-MCNC: 91 MG/DL (ref 70–108)
GLUCOSE BLD-MCNC: 92 MG/DL (ref 70–108)
GLUCOSE BLD-MCNC: 92 MG/DL (ref 70–108)
GLUCOSE BLD-MCNC: 93 MG/DL (ref 70–108)
GLUCOSE BLD-MCNC: 94 MG/DL (ref 70–108)
GLUCOSE BLD-MCNC: 95 MG/DL (ref 70–108)
GLUCOSE BLD-MCNC: 96 MG/DL (ref 70–108)
GLUCOSE BLD-MCNC: 97 MG/DL (ref 70–108)
GLUCOSE BLD-MCNC: 98 MG/DL (ref 70–108)
GLUCOSE BLD-MCNC: 99 MG/DL (ref 70–108)
GLUCOSE BLD-MCNC: 99 MG/DL (ref 70–108)
GLUCOSE FASTING: 140 MG/DL (ref 70–108)
GLUCOSE URINE: NEGATIVE MG/DL
GLUCOSE, FLUID: 122 MG/DL
GLUCOSE, FLUID: 126 MG/DL
GLUCOSE, FLUID: 139 MG/DL
GLUCOSE, URINE: NEGATIVE MG/DL
GRAM STAIN RESULT: NORMAL
HBA1C MFR BLD: < 4.2 % (ref 4.4–6.4)
HBV SURFACE AB TITR SER: NEGATIVE {TITER}
HCO3: 21 MMOL/L (ref 23–28)
HCO3: 24 MMOL/L (ref 23–28)
HCT VFR BLD CALC: 21.8 % (ref 42–52)
HCT VFR BLD CALC: 22.7 % (ref 42–52)
HCT VFR BLD CALC: 23.3 % (ref 42–52)
HCT VFR BLD CALC: 23.6 % (ref 42–52)
HCT VFR BLD CALC: 23.6 % (ref 42–52)
HCT VFR BLD CALC: 23.7 % (ref 42–52)
HCT VFR BLD CALC: 24.2 % (ref 42–52)
HCT VFR BLD CALC: 24.3 % (ref 42–52)
HCT VFR BLD CALC: 24.5 % (ref 42–52)
HCT VFR BLD CALC: 24.7 % (ref 42–52)
HCT VFR BLD CALC: 24.9 % (ref 42–52)
HCT VFR BLD CALC: 25.2 % (ref 42–52)
HCT VFR BLD CALC: 25.6 % (ref 42–52)
HCT VFR BLD CALC: 25.7 % (ref 42–52)
HCT VFR BLD CALC: 25.8 % (ref 42–52)
HCT VFR BLD CALC: 25.9 % (ref 42–52)
HCT VFR BLD CALC: 26.2 % (ref 42–52)
HCT VFR BLD CALC: 26.3 % (ref 42–52)
HCT VFR BLD CALC: 26.5 % (ref 42–52)
HCT VFR BLD CALC: 26.8 % (ref 42–52)
HCT VFR BLD CALC: 27.1 % (ref 42–52)
HCT VFR BLD CALC: 27.8 % (ref 42–52)
HCT VFR BLD CALC: 28.9 % (ref 42–52)
HCT VFR BLD CALC: 29.6 % (ref 42–52)
HCT VFR BLD CALC: 29.7 % (ref 42–52)
HCT VFR BLD CALC: 30.1 % (ref 42–52)
HCT VFR BLD CALC: 30.7 % (ref 42–52)
HCT VFR BLD CALC: 31.8 % (ref 42–52)
HCT VFR BLD CALC: 32 % (ref 42–52)
HCT VFR BLD CALC: 32 % (ref 42–52)
HCT VFR BLD CALC: 32.2 % (ref 42–52)
HCT VFR BLD CALC: 32.6 % (ref 42–52)
HCT VFR BLD CALC: 32.6 % (ref 42–52)
HCT VFR BLD CALC: 33.9 % (ref 42–52)
HCT VFR BLD CALC: 34.8 % (ref 42–52)
HCT VFR BLD CALC: 34.8 % (ref 42–52)
HCT VFR BLD CALC: 37.8 % (ref 42–52)
HCT VFR BLD CALC: 37.9 % (ref 42–52)
HCT VFR BLD CALC: 38.1 % (ref 42–52)
HCT VFR BLD CALC: 38.3 % (ref 42–52)
HEMOCCULT STL QL: NEGATIVE
HEMOGLOBIN: 10 GM/DL (ref 14–18)
HEMOGLOBIN: 10.2 GM/DL (ref 14–18)
HEMOGLOBIN: 10.4 GM/DL (ref 14–18)
HEMOGLOBIN: 10.4 GM/DL (ref 14–18)
HEMOGLOBIN: 10.5 GM/DL (ref 14–18)
HEMOGLOBIN: 10.6 GM/DL (ref 14–18)
HEMOGLOBIN: 10.6 GM/DL (ref 14–18)
HEMOGLOBIN: 10.9 GM/DL (ref 14–18)
HEMOGLOBIN: 11 GM/DL (ref 14–18)
HEMOGLOBIN: 11.1 GM/DL (ref 14–18)
HEMOGLOBIN: 11.4 GM/DL (ref 14–18)
HEMOGLOBIN: 11.6 GM/DL (ref 14–18)
HEMOGLOBIN: 11.9 GM/DL (ref 14–18)
HEMOGLOBIN: 12.5 GM/DL (ref 14–18)
HEMOGLOBIN: 12.6 GM/DL (ref 14–18)
HEMOGLOBIN: 12.8 GM/DL (ref 14–18)
HEMOGLOBIN: 13 GM/DL (ref 14–18)
HEMOGLOBIN: 7.1 GM/DL (ref 14–18)
HEMOGLOBIN: 7.3 GM/DL (ref 14–18)
HEMOGLOBIN: 7.5 GM/DL (ref 14–18)
HEMOGLOBIN: 7.5 GM/DL (ref 14–18)
HEMOGLOBIN: 7.7 GM/DL (ref 14–18)
HEMOGLOBIN: 7.7 GM/DL (ref 14–18)
HEMOGLOBIN: 7.9 GM/DL (ref 14–18)
HEMOGLOBIN: 8 GM/DL (ref 14–18)
HEMOGLOBIN: 8 GM/DL (ref 14–18)
HEMOGLOBIN: 8.1 GM/DL (ref 14–18)
HEMOGLOBIN: 8.2 GM/DL (ref 14–18)
HEMOGLOBIN: 8.5 GM/DL (ref 14–18)
HEMOGLOBIN: 8.5 GM/DL (ref 14–18)
HEMOGLOBIN: 8.6 GM/DL (ref 14–18)
HEMOGLOBIN: 8.6 GM/DL (ref 14–18)
HEMOGLOBIN: 8.8 GM/DL (ref 14–18)
HEMOGLOBIN: 8.8 GM/DL (ref 14–18)
HEMOGLOBIN: 9 GM/DL (ref 14–18)
HEMOGLOBIN: 9 GM/DL (ref 14–18)
HEMOGLOBIN: 9.9 GM/DL (ref 14–18)
HEPATITIS B CORE IGM ANTIBODY: NEGATIVE
HEPATITIS B SURFACE ANTIGEN: NEGATIVE
ICTOTEST: NEGATIVE
ICTOTEST: NEGATIVE
IFIO2: 21
IMMATURE GRANS (ABS): 0.02 THOU/MM3 (ref 0–0.07)
IMMATURE GRANS (ABS): 0.03 THOU/MM3 (ref 0–0.07)
IMMATURE GRANS (ABS): 0.04 THOU/MM3 (ref 0–0.07)
IMMATURE GRANS (ABS): 0.05 THOU/MM3 (ref 0–0.07)
IMMATURE GRANS (ABS): 0.06 THOU/MM3 (ref 0–0.07)
IMMATURE GRANS (ABS): 0.06 THOU/MM3 (ref 0–0.07)
IMMATURE GRANULOCYTES: 0.3 %
IMMATURE GRANULOCYTES: 0.4 %
IMMATURE GRANULOCYTES: 0.5 %
IMMATURE GRANULOCYTES: 0.6 %
IMMATURE GRANULOCYTES: 0.7 %
IMMATURE GRANULOCYTES: 0.9 %
IMMATURE GRANULOCYTES: 0.9 %
IMMATURE GRANULOCYTES: 1 %
INR BLD: 1.66 (ref 0.85–1.13)
INR BLD: 1.7 (ref 0.85–1.13)
INR BLD: 1.7 (ref 0.85–1.13)
INR BLD: 1.75 (ref 0.85–1.13)
INR BLD: 1.78 (ref 0.85–1.13)
INR BLD: 1.8 (ref 0.85–1.13)
INR BLD: 1.8 (ref 0.85–1.13)
INR BLD: 1.81 (ref 0.85–1.13)
INR BLD: 1.85 (ref 0.85–1.13)
INR BLD: 1.86 (ref 0.85–1.13)
INR BLD: 1.86 (ref 0.85–1.13)
INR BLD: 1.89 (ref 0.85–1.13)
INR BLD: 1.92 (ref 0.85–1.13)
INR BLD: 1.94 (ref 0.85–1.13)
INR BLD: 1.94 (ref 0.85–1.13)
INR BLD: 1.97 (ref 0.85–1.13)
INR BLD: 2 (ref 0.85–1.13)
INR BLD: 2.03 (ref 0.85–1.13)
INR BLD: 2.05 (ref 0.85–1.13)
INR BLD: 2.2 (ref 0.85–1.13)
INR BLD: 2.21 (ref 0.85–1.13)
INR BLD: 2.31 (ref 0.85–1.13)
INR BLD: 2.36 (ref 0.85–1.13)
INR BLD: 2.37 (ref 0.85–1.13)
INR BLD: 2.47 (ref 0.85–1.13)
INR BLD: 2.48 (ref 0.85–1.13)
INR BLD: 2.75 (ref 0.85–1.13)
INR BLD: 2.76 (ref 0.85–1.13)
INR BLD: 2.76 (ref 0.85–1.13)
INTERPRETATION: NORMAL
INTERPRETATION: NORMAL
IRON SATURATION: 84 % (ref 20–50)
IRON SATURATION: 84 % (ref 20–50)
IRON: 88 UG/DL (ref 65–195)
IRON: 89 UG/DL (ref 65–195)
KETONES, URINE: ABNORMAL
KETONES, URINE: ABNORMAL
KETONES, URINE: NEGATIVE
KETONES, URINE: NEGATIVE
LACTIC ACID, SEPSIS: 3.8 MMOL/L (ref 0.5–1.9)
LACTIC ACID, SEPSIS: 4 MMOL/L (ref 0.5–1.9)
LACTIC ACID: 2.7 MMOL/L (ref 0.5–2)
LACTIC ACID: 4 MMOL/L (ref 0.5–2)
LD, FLUID: 105 U/L
LD, FLUID: 76 U/L
LD, FLUID: 83 U/L
LD: 231 U/L (ref 100–190)
LD: 257 U/L (ref 100–190)
LEUKOCYTE EST, POC: NEGATIVE
LEUKOCYTE ESTERASE, URINE: ABNORMAL
LEUKOCYTE ESTERASE, URINE: ABNORMAL
LEUKOCYTE ESTERASE, URINE: NEGATIVE
LIPASE: 27.5 U/L (ref 5.6–51.3)
LYMPHOCYTES # BLD: 10.8 %
LYMPHOCYTES # BLD: 11.4 %
LYMPHOCYTES # BLD: 11.4 %
LYMPHOCYTES # BLD: 11.7 %
LYMPHOCYTES # BLD: 12.1 %
LYMPHOCYTES # BLD: 12.3 %
LYMPHOCYTES # BLD: 12.4 %
LYMPHOCYTES # BLD: 12.8 %
LYMPHOCYTES # BLD: 12.8 %
LYMPHOCYTES # BLD: 12.9 %
LYMPHOCYTES # BLD: 13.1 %
LYMPHOCYTES # BLD: 13.5 %
LYMPHOCYTES # BLD: 13.7 %
LYMPHOCYTES # BLD: 14.8 %
LYMPHOCYTES # BLD: 14.9 %
LYMPHOCYTES # BLD: 16.5 %
LYMPHOCYTES # BLD: 17.3 %
LYMPHOCYTES # BLD: 18.6 %
LYMPHOCYTES # BLD: 4 %
LYMPHOCYTES # BLD: 4.6 %
LYMPHOCYTES # BLD: 7.2 %
LYMPHOCYTES # BLD: 7.2 %
LYMPHOCYTES # BLD: 8.1 %
LYMPHOCYTES # BLD: 8.2 %
LYMPHOCYTES # BLD: 8.6 %
LYMPHOCYTES # BLD: 9.4 %
LYMPHOCYTES ABSOLUTE: 0.3 THOU/MM3 (ref 1–4.8)
LYMPHOCYTES ABSOLUTE: 0.3 THOU/MM3 (ref 1–4.8)
LYMPHOCYTES ABSOLUTE: 0.4 THOU/MM3 (ref 1–4.8)
LYMPHOCYTES ABSOLUTE: 0.5 THOU/MM3 (ref 1–4.8)
LYMPHOCYTES ABSOLUTE: 0.6 THOU/MM3 (ref 1–4.8)
LYMPHOCYTES ABSOLUTE: 0.7 THOU/MM3 (ref 1–4.8)
LYMPHOCYTES ABSOLUTE: 0.8 THOU/MM3 (ref 1–4.8)
LYMPHOCYTES ABSOLUTE: 0.9 THOU/MM3 (ref 1–4.8)
LYMPHOCYTES ABSOLUTE: 1 THOU/MM3 (ref 1–4.8)
MAGNESIUM: 1.4 MG/DL (ref 1.6–2.4)
MAGNESIUM: 1.5 MG/DL (ref 1.6–2.4)
MAGNESIUM: 1.7 MG/DL (ref 1.6–2.4)
MAGNESIUM: 1.8 MG/DL (ref 1.6–2.4)
MAGNESIUM: 1.9 MG/DL (ref 1.6–2.4)
MAGNESIUM: 2 MG/DL (ref 1.6–2.4)
MAGNESIUM: 2 MG/DL (ref 1.6–2.4)
MAGNESIUM: 2.1 MG/DL (ref 1.6–2.4)
MAGNESIUM: 2.2 MG/DL (ref 1.6–2.4)
MAGNESIUM: 2.3 MG/DL (ref 1.6–2.4)
MCH RBC QN AUTO: 31.3 PG (ref 26–33)
MCH RBC QN AUTO: 31.5 PG (ref 26–33)
MCH RBC QN AUTO: 31.5 PG (ref 26–33)
MCH RBC QN AUTO: 31.8 PG (ref 26–33)
MCH RBC QN AUTO: 31.9 PG (ref 26–33)
MCH RBC QN AUTO: 32 PG (ref 26–33)
MCH RBC QN AUTO: 32.1 PG (ref 26–33)
MCH RBC QN AUTO: 32.3 PG (ref 26–33)
MCH RBC QN AUTO: 32.4 PG (ref 26–33)
MCH RBC QN AUTO: 32.5 PG (ref 26–33)
MCH RBC QN AUTO: 32.6 PG (ref 26–33)
MCH RBC QN AUTO: 32.7 PG (ref 26–33)
MCH RBC QN AUTO: 32.7 PG (ref 26–33)
MCH RBC QN AUTO: 32.8 PG (ref 26–33)
MCH RBC QN AUTO: 33 PG (ref 26–33)
MCH RBC QN AUTO: 33.1 PG (ref 26–33)
MCH RBC QN AUTO: 33.2 PG (ref 26–33)
MCH RBC QN AUTO: 33.5 PG (ref 26–33)
MCHC RBC AUTO-ENTMCNC: 31.3 GM/DL (ref 32.2–35.5)
MCHC RBC AUTO-ENTMCNC: 31.3 GM/DL (ref 32.2–35.5)
MCHC RBC AUTO-ENTMCNC: 31.6 GM/DL (ref 32.2–35.5)
MCHC RBC AUTO-ENTMCNC: 31.7 GM/DL (ref 32.2–35.5)
MCHC RBC AUTO-ENTMCNC: 31.8 GM/DL (ref 32.2–35.5)
MCHC RBC AUTO-ENTMCNC: 31.9 GM/DL (ref 32.2–35.5)
MCHC RBC AUTO-ENTMCNC: 32.2 GM/DL (ref 32.2–35.5)
MCHC RBC AUTO-ENTMCNC: 32.4 GM/DL (ref 32.2–35.5)
MCHC RBC AUTO-ENTMCNC: 32.4 GM/DL (ref 32.2–35.5)
MCHC RBC AUTO-ENTMCNC: 32.5 GM/DL (ref 32.2–35.5)
MCHC RBC AUTO-ENTMCNC: 32.6 GM/DL (ref 32.2–35.5)
MCHC RBC AUTO-ENTMCNC: 33 GM/DL (ref 32.2–35.5)
MCHC RBC AUTO-ENTMCNC: 33.1 GM/DL (ref 32.2–35.5)
MCHC RBC AUTO-ENTMCNC: 33.2 GM/DL (ref 32.2–35.5)
MCHC RBC AUTO-ENTMCNC: 33.3 GM/DL (ref 32.2–35.5)
MCHC RBC AUTO-ENTMCNC: 33.3 GM/DL (ref 32.2–35.5)
MCHC RBC AUTO-ENTMCNC: 33.4 GM/DL (ref 32.2–35.5)
MCHC RBC AUTO-ENTMCNC: 33.5 GM/DL (ref 32.2–35.5)
MCHC RBC AUTO-ENTMCNC: 33.6 GM/DL (ref 32.2–35.5)
MCHC RBC AUTO-ENTMCNC: 33.7 GM/DL (ref 32.2–35.5)
MCHC RBC AUTO-ENTMCNC: 33.9 GM/DL (ref 32.2–35.5)
MCHC RBC AUTO-ENTMCNC: 34 GM/DL (ref 32.2–35.5)
MCHC RBC AUTO-ENTMCNC: 34.1 GM/DL (ref 32.2–35.5)
MCHC RBC AUTO-ENTMCNC: 34.1 GM/DL (ref 32.2–35.5)
MCHC RBC AUTO-ENTMCNC: 34.2 GM/DL (ref 32.2–35.5)
MCHC RBC AUTO-ENTMCNC: 34.2 GM/DL (ref 32.2–35.5)
MCHC RBC AUTO-ENTMCNC: 34.3 GM/DL (ref 32.2–35.5)
MCHC RBC AUTO-ENTMCNC: 34.5 GM/DL (ref 32.2–35.5)
MCHC RBC AUTO-ENTMCNC: 34.6 GM/DL (ref 32.2–35.5)
MCHC RBC AUTO-ENTMCNC: 34.6 GM/DL (ref 32.2–35.5)
MCV RBC AUTO: 100 FL (ref 80–94)
MCV RBC AUTO: 100.4 FL (ref 80–94)
MCV RBC AUTO: 101.1 FL (ref 80–94)
MCV RBC AUTO: 101.3 FL (ref 80–94)
MCV RBC AUTO: 101.5 FL (ref 80–94)
MCV RBC AUTO: 102.7 FL (ref 80–94)
MCV RBC AUTO: 103 FL (ref 80–94)
MCV RBC AUTO: 103.5 FL (ref 80–94)
MCV RBC AUTO: 103.5 FL (ref 80–94)
MCV RBC AUTO: 103.6 FL (ref 80–94)
MCV RBC AUTO: 103.7 FL (ref 80–94)
MCV RBC AUTO: 104.9 FL (ref 80–94)
MCV RBC AUTO: 92.1 FL (ref 80–94)
MCV RBC AUTO: 93.3 FL (ref 80–94)
MCV RBC AUTO: 94 FL (ref 80–94)
MCV RBC AUTO: 94 FL (ref 80–94)
MCV RBC AUTO: 94.4 FL (ref 80–94)
MCV RBC AUTO: 95.2 FL (ref 80–94)
MCV RBC AUTO: 95.3 FL (ref 80–94)
MCV RBC AUTO: 95.4 FL (ref 80–94)
MCV RBC AUTO: 95.5 FL (ref 80–94)
MCV RBC AUTO: 95.8 FL (ref 80–94)
MCV RBC AUTO: 95.9 FL (ref 80–94)
MCV RBC AUTO: 95.9 FL (ref 80–94)
MCV RBC AUTO: 96 FL (ref 80–94)
MCV RBC AUTO: 96.1 FL (ref 80–94)
MCV RBC AUTO: 96.4 FL (ref 80–94)
MCV RBC AUTO: 96.8 FL (ref 80–94)
MCV RBC AUTO: 97.2 FL (ref 80–94)
MCV RBC AUTO: 97.6 FL (ref 80–94)
MCV RBC AUTO: 97.7 FL (ref 80–94)
MCV RBC AUTO: 98.2 FL (ref 80–94)
MCV RBC AUTO: 98.3 FL (ref 80–94)
MCV RBC AUTO: 98.5 FL (ref 80–94)
MCV RBC AUTO: 98.7 FL (ref 80–94)
MCV RBC AUTO: 98.8 FL (ref 80–94)
MCV RBC AUTO: 99.2 FL (ref 80–94)
MCV RBC AUTO: 99.6 FL (ref 80–94)
MESOTHELIAL CELLS BODY FLUID: NORMAL
MISCELLANEOUS LAB TEST RESULT: ABNORMAL
MISCELLANEOUS LAB TEST RESULT: NORMAL
MONOCYTES # BLD: 10.3 %
MONOCYTES # BLD: 10.4 %
MONOCYTES # BLD: 10.7 %
MONOCYTES # BLD: 11.5 %
MONOCYTES # BLD: 12.3 %
MONOCYTES # BLD: 12.4 %
MONOCYTES # BLD: 12.6 %
MONOCYTES # BLD: 12.6 %
MONOCYTES # BLD: 12.7 %
MONOCYTES # BLD: 12.7 %
MONOCYTES # BLD: 13 %
MONOCYTES # BLD: 13.2 %
MONOCYTES # BLD: 13.2 %
MONOCYTES # BLD: 13.3 %
MONOCYTES # BLD: 13.3 %
MONOCYTES # BLD: 13.4 %
MONOCYTES # BLD: 13.5 %
MONOCYTES # BLD: 13.5 %
MONOCYTES # BLD: 13.6 %
MONOCYTES # BLD: 13.7 %
MONOCYTES # BLD: 13.9 %
MONOCYTES # BLD: 14.3 %
MONOCYTES # BLD: 14.6 %
MONOCYTES # BLD: 14.7 %
MONOCYTES # BLD: 15.2 %
MONOCYTES # BLD: 9.8 %
MONOCYTES ABSOLUTE: 0.6 THOU/MM3 (ref 0.4–1.3)
MONOCYTES ABSOLUTE: 0.7 THOU/MM3 (ref 0.4–1.3)
MONOCYTES ABSOLUTE: 0.8 THOU/MM3 (ref 0.4–1.3)
MONOCYTES ABSOLUTE: 0.9 THOU/MM3 (ref 0.4–1.3)
MONOCYTES ABSOLUTE: 1 THOU/MM3 (ref 0.4–1.3)
MONONUCLEAR CELLS BODY FLUID: 43.8 %
MONONUCLEAR CELLS BODY FLUID: 61.4 %
MONONUCLEAR CELLS BODY FLUID: 69.8 %
MONONUCLEAR CELLS BODY FLUID: 75 %
MONONUCLEAR CELLS BODY FLUID: 79.7 %
MONONUCLEAR CELLS BODY FLUID: 83.9 %
MONONUCLEAR CELLS BODY FLUID: 84.8 %
MONONUCLEAR CELLS BODY FLUID: 85.4 %
MRSA SCREEN RT-PCR: NEGATIVE
MRSA SCREEN: NORMAL
MUCUS: ABNORMAL
NITRITE, URINE: NEGATIVE
NUCLEATED RED BLOOD CELLS: 0 /100 WBC
NUCLEATED RED BLOOD CELLS: 1 /100 WBC
O2 SATURATION: 91 %
O2 SATURATION: 96 %
ORGANISM: ABNORMAL
OSMOLALITY CALCULATION: 252.4 MOSMOL/KG (ref 275–300)
OSMOLALITY CALCULATION: 257.9 MOSMOL/KG (ref 275–300)
OSMOLALITY CALCULATION: 258.3 MOSMOL/KG (ref 275–300)
OSMOLALITY CALCULATION: 264.2 MOSMOL/KG (ref 275–300)
OSMOLALITY CALCULATION: 268 MOSMOL/KG (ref 275–300)
OSMOLALITY CALCULATION: 271.9 MOSMOL/KG (ref 275–300)
OSMOLALITY URINE: 288 MOSMOL/KG (ref 250–750)
OSMOLALITY URINE: 356 MOSMOL/KG (ref 250–750)
PATHOLOGIST REVIEW: ABNORMAL
PATHOLOGIST REVIEW: NORMAL
PCO2: 30 MMHG (ref 35–45)
PCO2: 36 MMHG (ref 35–45)
PH BLOOD GAS: 7.42 (ref 7.35–7.45)
PH BLOOD GAS: 7.44 (ref 7.35–7.45)
PH FLUID: 7.64
PH FLUID: 7.79
PH UA: 5 (ref 5–9)
PH UA: 5 (ref 5–9)
PH UA: 5.5 (ref 5–9)
PH UA: 5.5 (ref 5–9)
PH VENOUS: 7.27 (ref 7.31–7.41)
PHOSPHORUS: 2.4 MG/DL (ref 2.4–4.7)
PHOSPHORUS: 2.9 MG/DL (ref 2.4–4.7)
PHOSPHORUS: 3.4 MG/DL (ref 2.4–4.7)
PHOSPHORUS: 3.5 MG/DL (ref 2.4–4.7)
PHOSPHORUS: 3.8 MG/DL (ref 2.4–4.7)
PHOSPHORUS: 4.2 MG/DL (ref 2.4–4.7)
PHOSPHORUS: 4.7 MG/DL (ref 2.4–4.7)
PHOSPHORUS: 4.7 MG/DL (ref 2.4–4.7)
PHOSPHORUS: 4.8 MG/DL (ref 2.4–4.7)
PHOSPHORUS: 4.9 MG/DL (ref 2.4–4.7)
PHOSPHORUS: 6.1 MG/DL (ref 2.4–4.7)
PHOSPHORUS: 6.5 MG/DL (ref 2.4–4.7)
PLATELET # BLD: 101 THOU/MM3 (ref 130–400)
PLATELET # BLD: 102 THOU/MM3 (ref 130–400)
PLATELET # BLD: 108 THOU/MM3 (ref 130–400)
PLATELET # BLD: 120 THOU/MM3 (ref 130–400)
PLATELET # BLD: 30 THOU/MM3 (ref 130–400)
PLATELET # BLD: 33 THOU/MM3 (ref 130–400)
PLATELET # BLD: 35 THOU/MM3 (ref 130–400)
PLATELET # BLD: 35 THOU/MM3 (ref 130–400)
PLATELET # BLD: 38 THOU/MM3 (ref 130–400)
PLATELET # BLD: 39 THOU/MM3 (ref 130–400)
PLATELET # BLD: 42 THOU/MM3 (ref 130–400)
PLATELET # BLD: 44 THOU/MM3 (ref 130–400)
PLATELET # BLD: 45 THOU/MM3 (ref 130–400)
PLATELET # BLD: 50 THOU/MM3 (ref 130–400)
PLATELET # BLD: 50 THOU/MM3 (ref 130–400)
PLATELET # BLD: 52 THOU/MM3 (ref 130–400)
PLATELET # BLD: 55 THOU/MM3 (ref 130–400)
PLATELET # BLD: 57 THOU/MM3 (ref 130–400)
PLATELET # BLD: 57 THOU/MM3 (ref 130–400)
PLATELET # BLD: 59 THOU/MM3 (ref 130–400)
PLATELET # BLD: 61 THOU/MM3 (ref 130–400)
PLATELET # BLD: 62 THOU/MM3 (ref 130–400)
PLATELET # BLD: 62 THOU/MM3 (ref 130–400)
PLATELET # BLD: 65 THOU/MM3 (ref 130–400)
PLATELET # BLD: 70 THOU/MM3 (ref 130–400)
PLATELET # BLD: 71 THOU/MM3 (ref 130–400)
PLATELET # BLD: 71 THOU/MM3 (ref 130–400)
PLATELET # BLD: 74 THOU/MM3 (ref 130–400)
PLATELET # BLD: 75 THOU/MM3 (ref 130–400)
PLATELET # BLD: 80 THOU/MM3 (ref 130–400)
PLATELET # BLD: 80 THOU/MM3 (ref 130–400)
PLATELET # BLD: 82 THOU/MM3 (ref 130–400)
PLATELET # BLD: 83 THOU/MM3 (ref 130–400)
PLATELET # BLD: 95 THOU/MM3 (ref 130–400)
PLATELET ESTIMATE: ABNORMAL
PMV BLD AUTO: 10 FL (ref 9.4–12.4)
PMV BLD AUTO: 10.3 FL (ref 9.4–12.4)
PMV BLD AUTO: 10.5 FL (ref 9.4–12.4)
PMV BLD AUTO: 10.6 FL (ref 9.4–12.4)
PMV BLD AUTO: 10.6 FL (ref 9.4–12.4)
PMV BLD AUTO: 10.7 FL (ref 9.4–12.4)
PMV BLD AUTO: 10.9 FL (ref 9.4–12.4)
PMV BLD AUTO: 10.9 FL (ref 9.4–12.4)
PMV BLD AUTO: 11 FL (ref 9.4–12.4)
PMV BLD AUTO: 11.1 FL (ref 9.4–12.4)
PMV BLD AUTO: 11.2 FL (ref 9.4–12.4)
PMV BLD AUTO: 11.4 FL (ref 9.4–12.4)
PMV BLD AUTO: 11.5 FL (ref 9.4–12.4)
PMV BLD AUTO: 11.6 FL (ref 9.4–12.4)
PMV BLD AUTO: 11.7 FL (ref 9.4–12.4)
PMV BLD AUTO: 11.9 FL (ref 9.4–12.4)
PMV BLD AUTO: 12 FL (ref 9.4–12.4)
PMV BLD AUTO: 12 FL (ref 9.4–12.4)
PMV BLD AUTO: 12.2 FL (ref 9.4–12.4)
PMV BLD AUTO: 12.2 FL (ref 9.4–12.4)
PMV BLD AUTO: 12.4 FL (ref 9.4–12.4)
PMV BLD AUTO: 12.5 FL (ref 9.4–12.4)
PMV BLD AUTO: 12.7 FL (ref 9.4–12.4)
PO2: 58 MMHG (ref 71–104)
PO2: 74 MMHG (ref 71–104)
POIKILOCYTES: ABNORMAL
POLYMORPHONUCLEAR CELLS BODY FLUID: 14.6 %
POLYMORPHONUCLEAR CELLS BODY FLUID: 15.2 %
POLYMORPHONUCLEAR CELLS BODY FLUID: 16.1 %
POLYMORPHONUCLEAR CELLS BODY FLUID: 20.3 %
POLYMORPHONUCLEAR CELLS BODY FLUID: 25 %
POLYMORPHONUCLEAR CELLS BODY FLUID: 30.2 %
POLYMORPHONUCLEAR CELLS BODY FLUID: 38.6 %
POLYMORPHONUCLEAR CELLS BODY FLUID: 56.2 %
POTASSIUM REFLEX MAGNESIUM: 3 MEQ/L (ref 3.5–5.2)
POTASSIUM REFLEX MAGNESIUM: 3.3 MEQ/L (ref 3.5–5.2)
POTASSIUM REFLEX MAGNESIUM: 3.4 MEQ/L (ref 3.5–5.2)
POTASSIUM REFLEX MAGNESIUM: 3.5 MEQ/L (ref 3.5–5.2)
POTASSIUM REFLEX MAGNESIUM: 3.7 MEQ/L (ref 3.5–5.2)
POTASSIUM REFLEX MAGNESIUM: 4.1 MEQ/L (ref 3.5–5.2)
POTASSIUM SERPL-SCNC: 3 MEQ/L (ref 3.5–5.2)
POTASSIUM SERPL-SCNC: 3.1 MEQ/L (ref 3.5–5.2)
POTASSIUM SERPL-SCNC: 3.5 MEQ/L (ref 3.5–5.2)
POTASSIUM SERPL-SCNC: 3.6 MEQ/L (ref 3.5–5.2)
POTASSIUM SERPL-SCNC: 3.7 MEQ/L (ref 3.5–5.2)
POTASSIUM SERPL-SCNC: 3.7 MEQ/L (ref 3.5–5.2)
POTASSIUM SERPL-SCNC: 3.8 MEQ/L (ref 3.5–5.2)
POTASSIUM SERPL-SCNC: 3.9 MEQ/L (ref 3.5–5.2)
POTASSIUM SERPL-SCNC: 4 MEQ/L (ref 3.5–5.2)
POTASSIUM SERPL-SCNC: 4 MEQ/L (ref 3.5–5.2)
POTASSIUM SERPL-SCNC: 4.1 MEQ/L (ref 3.5–5.2)
POTASSIUM SERPL-SCNC: 4.2 MEQ/L (ref 3.5–5.2)
POTASSIUM SERPL-SCNC: 4.3 MEQ/L (ref 3.5–5.2)
POTASSIUM SERPL-SCNC: 4.4 MEQ/L (ref 3.5–5.2)
POTASSIUM SERPL-SCNC: 4.5 MEQ/L (ref 3.5–5.2)
POTASSIUM SERPL-SCNC: 4.6 MEQ/L (ref 3.5–5.2)
POTASSIUM SERPL-SCNC: 4.7 MEQ/L (ref 3.5–5.2)
POTASSIUM SERPL-SCNC: 4.7 MEQ/L (ref 3.5–5.2)
POTASSIUM SERPL-SCNC: 4.8 MEQ/L (ref 3.5–5.2)
POTASSIUM SERPL-SCNC: 4.9 MEQ/L (ref 3.5–5.2)
POTASSIUM SERPL-SCNC: 5 MEQ/L (ref 3.5–5.2)
POTASSIUM SERPL-SCNC: 5.1 MEQ/L (ref 3.5–5.2)
POTASSIUM SERPL-SCNC: 5.7 MEQ/L (ref 3.5–5.2)
POTASSIUM, URINE: 25.3 MEQ/L
PRO-BNP: 1455 PG/ML (ref 0–900)
PRO-BNP: 4770 PG/ML (ref 0–900)
PRO-BNP: ABNORMAL PG/ML (ref 0–900)
PROTEIN FLUID: 0.8 GM/DL
PROTEIN FLUID: 0.9 GM/DL
PROTEIN FLUID: 1 GM/DL
PROTEIN FLUID: 1 GM/DL
PROTEIN FLUID: 1.1 GM/DL
PROTEIN FLUID: 1.4 GM/DL
PROTEIN FLUID: 1.6 GM/DL
PROTEIN UA: 100
PROTEIN UA: NEGATIVE MG/DL
RBC # BLD: 2.21 MILL/MM3 (ref 4.7–6.1)
RBC # BLD: 2.22 MILL/MM3 (ref 4.7–6.1)
RBC # BLD: 2.29 MILL/MM3 (ref 4.7–6.1)
RBC # BLD: 2.33 MILL/MM3 (ref 4.7–6.1)
RBC # BLD: 2.37 MILL/MM3 (ref 4.7–6.1)
RBC # BLD: 2.38 MILL/MM3 (ref 4.7–6.1)
RBC # BLD: 2.43 MILL/MM3 (ref 4.7–6.1)
RBC # BLD: 2.44 MILL/MM3 (ref 4.7–6.1)
RBC # BLD: 2.48 MILL/MM3 (ref 4.7–6.1)
RBC # BLD: 2.5 MILL/MM3 (ref 4.7–6.1)
RBC # BLD: 2.52 MILL/MM3 (ref 4.7–6.1)
RBC # BLD: 2.53 MILL/MM3 (ref 4.7–6.1)
RBC # BLD: 2.59 MILL/MM3 (ref 4.7–6.1)
RBC # BLD: 2.59 MILL/MM3 (ref 4.7–6.1)
RBC # BLD: 2.61 MILL/MM3 (ref 4.7–6.1)
RBC # BLD: 2.62 MILL/MM3 (ref 4.7–6.1)
RBC # BLD: 2.63 MILL/MM3 (ref 4.7–6.1)
RBC # BLD: 2.71 MILL/MM3 (ref 4.7–6.1)
RBC # BLD: 2.74 MILL/MM3 (ref 4.7–6.1)
RBC # BLD: 2.82 MILL/MM3 (ref 4.7–6.1)
RBC # BLD: 3.03 MILL/MM3 (ref 4.7–6.1)
RBC # BLD: 3.04 MILL/MM3 (ref 4.7–6.1)
RBC # BLD: 3.15 MILL/MM3 (ref 4.7–6.1)
RBC # BLD: 3.17 MILL/MM3 (ref 4.7–6.1)
RBC # BLD: 3.19 MILL/MM3 (ref 4.7–6.1)
RBC # BLD: 3.24 MILL/MM3 (ref 4.7–6.1)
RBC # BLD: 3.27 MILL/MM3 (ref 4.7–6.1)
RBC # BLD: 3.33 MILL/MM3 (ref 4.7–6.1)
RBC # BLD: 3.33 MILL/MM3 (ref 4.7–6.1)
RBC # BLD: 3.38 MILL/MM3 (ref 4.7–6.1)
RBC # BLD: 3.4 MILL/MM3 (ref 4.7–6.1)
RBC # BLD: 3.53 MILL/MM3 (ref 4.7–6.1)
RBC # BLD: 3.63 MILL/MM3 (ref 4.7–6.1)
RBC # BLD: 3.78 MILL/MM3 (ref 4.7–6.1)
RBC # BLD: 3.83 MILL/MM3 (ref 4.7–6.1)
RBC # BLD: 4 MILL/MM3 (ref 4.7–6.1)
RBC # BLD: 4 MILL/MM3 (ref 4.7–6.1)
RBC # BLD: 4.03 MILL/MM3 (ref 4.7–6.1)
RBC URINE: > 200 /HPF
RBC URINE: ABNORMAL /HPF
RBC URINE: NORMAL /HPF
REASON FOR REJECTION: NORMAL
REASON FOR REJECTION: NORMAL
REJECTED TEST: NORMAL
REJECTED TEST: NORMAL
RENAL EPITHELIAL, UA: ABNORMAL
RENAL EPITHELIAL, UA: ABNORMAL
RENAL EPITHELIAL, UA: NORMAL
REVIEWED BY: NORMAL
REVIEWED BY: NORMAL
RH FACTOR: NORMAL
ROULEAUX: SLIGHT
SCAN OF BLOOD SMEAR: NORMAL
SEG NEUTROPHILS: 63.8 %
SEG NEUTROPHILS: 64.2 %
SEG NEUTROPHILS: 64.2 %
SEG NEUTROPHILS: 64.5 %
SEG NEUTROPHILS: 64.7 %
SEG NEUTROPHILS: 64.8 %
SEG NEUTROPHILS: 64.9 %
SEG NEUTROPHILS: 65.4 %
SEG NEUTROPHILS: 66.1 %
SEG NEUTROPHILS: 66.4 %
SEG NEUTROPHILS: 66.8 %
SEG NEUTROPHILS: 68.1 %
SEG NEUTROPHILS: 68.6 %
SEG NEUTROPHILS: 69 %
SEG NEUTROPHILS: 69 %
SEG NEUTROPHILS: 69.2 %
SEG NEUTROPHILS: 69.7 %
SEG NEUTROPHILS: 70.3 %
SEG NEUTROPHILS: 71.4 %
SEG NEUTROPHILS: 71.9 %
SEG NEUTROPHILS: 73.7 %
SEG NEUTROPHILS: 74.4 %
SEG NEUTROPHILS: 77.6 %
SEG NEUTROPHILS: 80.5 %
SEG NEUTROPHILS: 81.9 %
SEG NEUTROPHILS: 83.9 %
SEGMENTED NEUTROPHILS ABSOLUTE COUNT: 3.2 THOU/MM3 (ref 1.8–7.7)
SEGMENTED NEUTROPHILS ABSOLUTE COUNT: 3.3 THOU/MM3 (ref 1.8–7.7)
SEGMENTED NEUTROPHILS ABSOLUTE COUNT: 3.3 THOU/MM3 (ref 1.8–7.7)
SEGMENTED NEUTROPHILS ABSOLUTE COUNT: 3.4 THOU/MM3 (ref 1.8–7.7)
SEGMENTED NEUTROPHILS ABSOLUTE COUNT: 3.5 THOU/MM3 (ref 1.8–7.7)
SEGMENTED NEUTROPHILS ABSOLUTE COUNT: 3.7 THOU/MM3 (ref 1.8–7.7)
SEGMENTED NEUTROPHILS ABSOLUTE COUNT: 3.7 THOU/MM3 (ref 1.8–7.7)
SEGMENTED NEUTROPHILS ABSOLUTE COUNT: 3.8 THOU/MM3 (ref 1.8–7.7)
SEGMENTED NEUTROPHILS ABSOLUTE COUNT: 4 THOU/MM3 (ref 1.8–7.7)
SEGMENTED NEUTROPHILS ABSOLUTE COUNT: 4 THOU/MM3 (ref 1.8–7.7)
SEGMENTED NEUTROPHILS ABSOLUTE COUNT: 4.1 THOU/MM3 (ref 1.8–7.7)
SEGMENTED NEUTROPHILS ABSOLUTE COUNT: 4.2 THOU/MM3 (ref 1.8–7.7)
SEGMENTED NEUTROPHILS ABSOLUTE COUNT: 4.2 THOU/MM3 (ref 1.8–7.7)
SEGMENTED NEUTROPHILS ABSOLUTE COUNT: 4.4 THOU/MM3 (ref 1.8–7.7)
SEGMENTED NEUTROPHILS ABSOLUTE COUNT: 4.4 THOU/MM3 (ref 1.8–7.7)
SEGMENTED NEUTROPHILS ABSOLUTE COUNT: 4.6 THOU/MM3 (ref 1.8–7.7)
SEGMENTED NEUTROPHILS ABSOLUTE COUNT: 4.7 THOU/MM3 (ref 1.8–7.7)
SEGMENTED NEUTROPHILS ABSOLUTE COUNT: 4.8 THOU/MM3 (ref 1.8–7.7)
SEGMENTED NEUTROPHILS ABSOLUTE COUNT: 4.9 THOU/MM3 (ref 1.8–7.7)
SEGMENTED NEUTROPHILS ABSOLUTE COUNT: 5 THOU/MM3 (ref 1.8–7.7)
SEGMENTED NEUTROPHILS ABSOLUTE COUNT: 5.5 THOU/MM3 (ref 1.8–7.7)
SEGMENTED NEUTROPHILS ABSOLUTE COUNT: 5.5 THOU/MM3 (ref 1.8–7.7)
SEGMENTED NEUTROPHILS ABSOLUTE COUNT: 6 THOU/MM3 (ref 1.8–7.7)
SEGMENTED NEUTROPHILS ABSOLUTE COUNT: 7 THOU/MM3 (ref 1.8–7.7)
SMEAR REVIEW: NORMAL
SMEAR REVIEW: NORMAL
SODIUM BLD-SCNC: 122 MEQ/L (ref 135–145)
SODIUM BLD-SCNC: 123 MEQ/L (ref 135–145)
SODIUM BLD-SCNC: 124 MEQ/L (ref 135–145)
SODIUM BLD-SCNC: 125 MEQ/L (ref 135–145)
SODIUM BLD-SCNC: 126 MEQ/L (ref 135–145)
SODIUM BLD-SCNC: 127 MEQ/L (ref 135–145)
SODIUM BLD-SCNC: 128 MEQ/L (ref 135–145)
SODIUM BLD-SCNC: 129 MEQ/L (ref 135–145)
SODIUM BLD-SCNC: 130 MEQ/L (ref 135–145)
SODIUM BLD-SCNC: 131 MEQ/L (ref 135–145)
SODIUM BLD-SCNC: 131 MEQ/L (ref 135–145)
SODIUM BLD-SCNC: 132 MEQ/L (ref 135–145)
SODIUM BLD-SCNC: 132 MEQ/L (ref 135–145)
SODIUM BLD-SCNC: 133 MEQ/L (ref 135–145)
SODIUM BLD-SCNC: 134 MEQ/L (ref 135–145)
SODIUM BLD-SCNC: 135 MEQ/L (ref 135–145)
SODIUM URINE: < 20 MEQ/L
SOURCE, BLOOD GAS: ABNORMAL
SOURCE, BLOOD GAS: ABNORMAL
SPECIFIC GRAVITY UA: 1.01 (ref 1–1.03)
SPECIFIC GRAVITY UA: 1.01 (ref 1–1.03)
SPECIFIC GRAVITY UA: 1.02 (ref 1–1.03)
SPECIFIC GRAVITY, URINE: 1.02 (ref 1–1.03)
SPECIMEN: NORMAL
SPHEROCYTES: ABNORMAL
STATUS: NORMAL
STATUS: NORMAL
TARGET CELLS: ABNORMAL
TARGET CELLS: ABNORMAL
TOTAL IRON BINDING CAPACITY: < 105 UG/DL (ref 171–450)
TOTAL IRON BINDING CAPACITY: < 106 UG/DL (ref 171–450)
TOTAL NUCLEATED CELLS BODY FLUID: 152 /CUMM (ref 0–500)
TOTAL NUCLEATED CELLS BODY FLUID: 171 /CUMM (ref 0–500)
TOTAL NUCLEATED CELLS BODY FLUID: 239 /CUMM (ref 0–500)
TOTAL NUCLEATED CELLS BODY FLUID: 348 /CUMM (ref 0–500)
TOTAL NUCLEATED CELLS BODY FLUID: 55 /CUMM (ref 0–500)
TOTAL NUCLEATED CELLS BODY FLUID: 72 /CUMM (ref 0–500)
TOTAL NUCLEATED CELLS BODY FLUID: 80 /CUMM (ref 0–500)
TOTAL NUCLEATED CELLS BODY FLUID: 87 /CUMM (ref 0–500)
TOTAL PROTEIN: 4.7 G/DL (ref 6.1–8)
TOTAL PROTEIN: 4.8 G/DL (ref 6.1–8)
TOTAL PROTEIN: 4.9 G/DL (ref 6.1–8)
TOTAL PROTEIN: 5 G/DL (ref 6.1–8)
TOTAL PROTEIN: 5.2 G/DL (ref 6.1–8)
TOTAL PROTEIN: 5.4 G/DL (ref 6.1–8)
TOTAL PROTEIN: 5.5 G/DL (ref 6.1–8)
TOTAL PROTEIN: 5.5 G/DL (ref 6.1–8)
TOTAL PROTEIN: 5.6 G/DL (ref 6.1–8)
TOTAL PROTEIN: 5.6 G/DL (ref 6.1–8)
TOTAL PROTEIN: 5.7 G/DL (ref 6.1–8)
TOTAL PROTEIN: 5.7 G/DL (ref 6.1–8)
TOTAL PROTEIN: 5.8 G/DL (ref 6.1–8)
TOTAL PROTEIN: 5.9 G/DL (ref 6.1–8)
TOTAL PROTEIN: 6 G/DL (ref 6.1–8)
TOTAL PROTEIN: 6 G/DL (ref 6.1–8)
TOTAL PROTEIN: 6.1 G/DL (ref 6.1–8)
TOTAL PROTEIN: 6.2 G/DL (ref 6.1–8)
TOTAL PROTEIN: 6.4 G/DL (ref 6.1–8)
TOTAL PROTEIN: 6.4 G/DL (ref 6.1–8)
TOTAL PROTEIN: 6.5 G/DL (ref 6.1–8)
TOTAL PROTEIN: 6.6 G/DL (ref 6.1–8)
TOTAL PROTEIN: 6.8 G/DL (ref 6.1–8)
TOTAL VOLUME RECEIVED BODY FLUID: 70 ML
TOTAL VOLUME RECEIVED BODY FLUID: 73 ML
TROPONIN T: 0.04 NG/ML
TROPONIN T: 0.05 NG/ML
TROPONIN T: 0.1 NG/ML
TSH SERPL DL<=0.05 MIU/L-ACNC: 2.05 UIU/ML (ref 0.4–4.2)
UREA NITROGEN, UR: 352 MG/DL
UREA NITROGEN, UR: 585 MG/DL
URINE CULTURE REFLEX: ABNORMAL
URINE CULTURE REFLEX: ABNORMAL
UROBILINOGEN, URINE: 0.2 EU/DL (ref 0–1)
VANCOMYCIN RESISTANT ENTEROCOCCUS: POSITIVE
VITAMIN B-12: > 2000 PG/ML (ref 211–911)
WBC # BLD: 2.9 THOU/MM3 (ref 4.8–10.8)
WBC # BLD: 4.3 THOU/MM3 (ref 4.8–10.8)
WBC # BLD: 5 THOU/MM3 (ref 4.8–10.8)
WBC # BLD: 5 THOU/MM3 (ref 4.8–10.8)
WBC # BLD: 5.1 THOU/MM3 (ref 4.8–10.8)
WBC # BLD: 5.2 THOU/MM3 (ref 4.8–10.8)
WBC # BLD: 5.3 THOU/MM3 (ref 4.8–10.8)
WBC # BLD: 5.3 THOU/MM3 (ref 4.8–10.8)
WBC # BLD: 5.4 THOU/MM3 (ref 4.8–10.8)
WBC # BLD: 5.4 THOU/MM3 (ref 4.8–10.8)
WBC # BLD: 5.6 THOU/MM3 (ref 4.8–10.8)
WBC # BLD: 5.7 THOU/MM3 (ref 4.8–10.8)
WBC # BLD: 5.9 THOU/MM3 (ref 4.8–10.8)
WBC # BLD: 6 THOU/MM3 (ref 4.8–10.8)
WBC # BLD: 6.1 THOU/MM3 (ref 4.8–10.8)
WBC # BLD: 6.3 THOU/MM3 (ref 4.8–10.8)
WBC # BLD: 6.5 THOU/MM3 (ref 4.8–10.8)
WBC # BLD: 6.6 THOU/MM3 (ref 4.8–10.8)
WBC # BLD: 6.6 THOU/MM3 (ref 4.8–10.8)
WBC # BLD: 6.7 THOU/MM3 (ref 4.8–10.8)
WBC # BLD: 6.7 THOU/MM3 (ref 4.8–10.8)
WBC # BLD: 6.9 THOU/MM3 (ref 4.8–10.8)
WBC # BLD: 7 THOU/MM3 (ref 4.8–10.8)
WBC # BLD: 7.6 THOU/MM3 (ref 4.8–10.8)
WBC # BLD: 8.1 THOU/MM3 (ref 4.8–10.8)
WBC # BLD: 8.7 THOU/MM3 (ref 4.8–10.8)
WBC # BLD: 9.3 THOU/MM3 (ref 4.8–10.8)
WBC UA: ABNORMAL /HPF
WBC UA: ABNORMAL /HPF
WBC UA: NORMAL /HPF
YEAST: ABNORMAL
YEAST: ABNORMAL
YEAST: NORMAL

## 2021-01-01 PROCEDURE — 6370000000 HC RX 637 (ALT 250 FOR IP): Performed by: INTERNAL MEDICINE

## 2021-01-01 PROCEDURE — 90935 HEMODIALYSIS ONE EVALUATION: CPT

## 2021-01-01 PROCEDURE — 99285 EMERGENCY DEPT VISIT HI MDM: CPT

## 2021-01-01 PROCEDURE — 2060000000 HC ICU INTERMEDIATE R&B

## 2021-01-01 PROCEDURE — 6370000000 HC RX 637 (ALT 250 FOR IP): Performed by: NURSE PRACTITIONER

## 2021-01-01 PROCEDURE — 82948 REAGENT STRIP/BLOOD GLUCOSE: CPT

## 2021-01-01 PROCEDURE — 85027 COMPLETE CBC AUTOMATED: CPT

## 2021-01-01 PROCEDURE — 6360000002 HC RX W HCPCS: Performed by: INTERNAL MEDICINE

## 2021-01-01 PROCEDURE — 80076 HEPATIC FUNCTION PANEL: CPT

## 2021-01-01 PROCEDURE — 6360000002 HC RX W HCPCS: Performed by: STUDENT IN AN ORGANIZED HEALTH CARE EDUCATION/TRAINING PROGRAM

## 2021-01-01 PROCEDURE — 88112 CYTOPATH CELL ENHANCE TECH: CPT

## 2021-01-01 PROCEDURE — 02H633Z INSERTION OF INFUSION DEVICE INTO RIGHT ATRIUM, PERCUTANEOUS APPROACH: ICD-10-PCS | Performed by: RADIOLOGY

## 2021-01-01 PROCEDURE — 82140 ASSAY OF AMMONIA: CPT

## 2021-01-01 PROCEDURE — 94640 AIRWAY INHALATION TREATMENT: CPT

## 2021-01-01 PROCEDURE — 99232 SBSQ HOSP IP/OBS MODERATE 35: CPT | Performed by: FAMILY MEDICINE

## 2021-01-01 PROCEDURE — 36415 COLL VENOUS BLD VENIPUNCTURE: CPT

## 2021-01-01 PROCEDURE — 87086 URINE CULTURE/COLONY COUNT: CPT

## 2021-01-01 PROCEDURE — 94760 N-INVAS EAR/PLS OXIMETRY 1: CPT

## 2021-01-01 PROCEDURE — 1200000003 HC TELEMETRY R&B

## 2021-01-01 PROCEDURE — 82247 BILIRUBIN TOTAL: CPT

## 2021-01-01 PROCEDURE — 1111F DSCHRG MED/CURRENT MED MERGE: CPT | Performed by: NURSE PRACTITIONER

## 2021-01-01 PROCEDURE — 84157 ASSAY OF PROTEIN OTHER: CPT

## 2021-01-01 PROCEDURE — 83690 ASSAY OF LIPASE: CPT

## 2021-01-01 PROCEDURE — 99233 SBSQ HOSP IP/OBS HIGH 50: CPT | Performed by: INTERNAL MEDICINE

## 2021-01-01 PROCEDURE — 2580000003 HC RX 258: Performed by: STUDENT IN AN ORGANIZED HEALTH CARE EDUCATION/TRAINING PROGRAM

## 2021-01-01 PROCEDURE — 82042 OTHER SOURCE ALBUMIN QUAN EA: CPT

## 2021-01-01 PROCEDURE — G8482 FLU IMMUNIZE ORDER/ADMIN: HCPCS | Performed by: NURSE PRACTITIONER

## 2021-01-01 PROCEDURE — 97110 THERAPEUTIC EXERCISES: CPT

## 2021-01-01 PROCEDURE — 89050 BODY FLUID CELL COUNT: CPT

## 2021-01-01 PROCEDURE — P9047 ALBUMIN (HUMAN), 25%, 50ML: HCPCS | Performed by: INTERNAL MEDICINE

## 2021-01-01 PROCEDURE — 99232 SBSQ HOSP IP/OBS MODERATE 35: CPT | Performed by: INTERNAL MEDICINE

## 2021-01-01 PROCEDURE — 97530 THERAPEUTIC ACTIVITIES: CPT

## 2021-01-01 PROCEDURE — 83735 ASSAY OF MAGNESIUM: CPT

## 2021-01-01 PROCEDURE — 84100 ASSAY OF PHOSPHORUS: CPT

## 2021-01-01 PROCEDURE — 2500000003 HC RX 250 WO HCPCS: Performed by: STUDENT IN AN ORGANIZED HEALTH CARE EDUCATION/TRAINING PROGRAM

## 2021-01-01 PROCEDURE — 82570 ASSAY OF URINE CREATININE: CPT

## 2021-01-01 PROCEDURE — C9113 INJ PANTOPRAZOLE SODIUM, VIA: HCPCS | Performed by: NURSE PRACTITIONER

## 2021-01-01 PROCEDURE — 99239 HOSP IP/OBS DSCHRG MGMT >30: CPT | Performed by: INTERNAL MEDICINE

## 2021-01-01 PROCEDURE — P9045 ALBUMIN (HUMAN), 5%, 250 ML: HCPCS | Performed by: INTERNAL MEDICINE

## 2021-01-01 PROCEDURE — 6370000000 HC RX 637 (ALT 250 FOR IP): Performed by: PHYSICIAN ASSISTANT

## 2021-01-01 PROCEDURE — 99222 1ST HOSP IP/OBS MODERATE 55: CPT | Performed by: NUCLEAR MEDICINE

## 2021-01-01 PROCEDURE — 85730 THROMBOPLASTIN TIME PARTIAL: CPT

## 2021-01-01 PROCEDURE — 99233 SBSQ HOSP IP/OBS HIGH 50: CPT | Performed by: FAMILY MEDICINE

## 2021-01-01 PROCEDURE — 85610 PROTHROMBIN TIME: CPT

## 2021-01-01 PROCEDURE — 83036 HEMOGLOBIN GLYCOSYLATED A1C: CPT

## 2021-01-01 PROCEDURE — 82945 GLUCOSE OTHER FLUID: CPT

## 2021-01-01 PROCEDURE — 86901 BLOOD TYPING SEROLOGIC RH(D): CPT

## 2021-01-01 PROCEDURE — 1036F TOBACCO NON-USER: CPT | Performed by: PHYSICIAN ASSISTANT

## 2021-01-01 PROCEDURE — 80048 BASIC METABOLIC PNL TOTAL CA: CPT

## 2021-01-01 PROCEDURE — 2580000003 HC RX 258: Performed by: NURSE PRACTITIONER

## 2021-01-01 PROCEDURE — 99495 TRANSJ CARE MGMT MOD F2F 14D: CPT | Performed by: NURSE PRACTITIONER

## 2021-01-01 PROCEDURE — 87075 CULTR BACTERIA EXCEPT BLOOD: CPT

## 2021-01-01 PROCEDURE — 80053 COMPREHEN METABOLIC PANEL: CPT

## 2021-01-01 PROCEDURE — 1200000000 HC SEMI PRIVATE

## 2021-01-01 PROCEDURE — 2140000000 HC CCU INTERMEDIATE R&B

## 2021-01-01 PROCEDURE — 87186 SC STD MICRODIL/AGAR DIL: CPT

## 2021-01-01 PROCEDURE — A9537 TC99M MEBROFENIN: HCPCS | Performed by: INTERNAL MEDICINE

## 2021-01-01 PROCEDURE — 99213 OFFICE O/P EST LOW 20 MIN: CPT | Performed by: PHYSICIAN ASSISTANT

## 2021-01-01 PROCEDURE — P9047 ALBUMIN (HUMAN), 25%, 50ML: HCPCS | Performed by: STUDENT IN AN ORGANIZED HEALTH CARE EDUCATION/TRAINING PROGRAM

## 2021-01-01 PROCEDURE — 82248 BILIRUBIN DIRECT: CPT

## 2021-01-01 PROCEDURE — 2580000003 HC RX 258: Performed by: INTERNAL MEDICINE

## 2021-01-01 PROCEDURE — 6360000002 HC RX W HCPCS: Performed by: NURSE PRACTITIONER

## 2021-01-01 PROCEDURE — 99222 1ST HOSP IP/OBS MODERATE 55: CPT | Performed by: INTERNAL MEDICINE

## 2021-01-01 PROCEDURE — 85379 FIBRIN DEGRADATION QUANT: CPT

## 2021-01-01 PROCEDURE — 3017F COLORECTAL CA SCREEN DOC REV: CPT | Performed by: NURSE PRACTITIONER

## 2021-01-01 PROCEDURE — 3017F COLORECTAL CA SCREEN DOC REV: CPT | Performed by: FAMILY MEDICINE

## 2021-01-01 PROCEDURE — 85018 HEMOGLOBIN: CPT

## 2021-01-01 PROCEDURE — 83880 ASSAY OF NATRIURETIC PEPTIDE: CPT

## 2021-01-01 PROCEDURE — 87205 SMEAR GRAM STAIN: CPT

## 2021-01-01 PROCEDURE — 97116 GAIT TRAINING THERAPY: CPT

## 2021-01-01 PROCEDURE — G0378 HOSPITAL OBSERVATION PER HR: HCPCS

## 2021-01-01 PROCEDURE — G8427 DOCREV CUR MEDS BY ELIG CLIN: HCPCS | Performed by: PHYSICIAN ASSISTANT

## 2021-01-01 PROCEDURE — 30901 CONTROL OF NOSEBLEED: CPT | Performed by: PHYSICIAN ASSISTANT

## 2021-01-01 PROCEDURE — 82607 VITAMIN B-12: CPT

## 2021-01-01 PROCEDURE — 87500 VANOMYCIN DNA AMP PROBE: CPT

## 2021-01-01 PROCEDURE — 99239 HOSP IP/OBS DSCHRG MGMT >30: CPT | Performed by: HOSPITALIST

## 2021-01-01 PROCEDURE — 86705 HEP B CORE ANTIBODY IGM: CPT

## 2021-01-01 PROCEDURE — 85025 COMPLETE CBC W/AUTO DIFF WBC: CPT

## 2021-01-01 PROCEDURE — 85049 AUTOMATED PLATELET COUNT: CPT

## 2021-01-01 PROCEDURE — 6360000004 HC RX CONTRAST MEDICATION: Performed by: INTERNAL MEDICINE

## 2021-01-01 PROCEDURE — 88305 TISSUE EXAM BY PATHOLOGIST: CPT

## 2021-01-01 PROCEDURE — 3430000000 HC RX DIAGNOSTIC RADIOPHARMACEUTICAL: Performed by: INTERNAL MEDICINE

## 2021-01-01 PROCEDURE — 93005 ELECTROCARDIOGRAM TRACING: CPT | Performed by: INTERNAL MEDICINE

## 2021-01-01 PROCEDURE — 81003 URINALYSIS AUTO W/O SCOPE: CPT

## 2021-01-01 PROCEDURE — 0W9G3ZZ DRAINAGE OF PERITONEAL CAVITY, PERCUTANEOUS APPROACH: ICD-10-PCS | Performed by: RADIOLOGY

## 2021-01-01 PROCEDURE — P9035 PLATELET PHERES LEUKOREDUCED: HCPCS

## 2021-01-01 PROCEDURE — 87081 CULTURE SCREEN ONLY: CPT

## 2021-01-01 PROCEDURE — 87070 CULTURE OTHR SPECIMN AEROBIC: CPT

## 2021-01-01 PROCEDURE — 32555 ASPIRATE PLEURA W/ IMAGING: CPT

## 2021-01-01 PROCEDURE — 82803 BLOOD GASES ANY COMBINATION: CPT

## 2021-01-01 PROCEDURE — 36430 TRANSFUSION BLD/BLD COMPNT: CPT

## 2021-01-01 PROCEDURE — G8417 CALC BMI ABV UP PARAM F/U: HCPCS | Performed by: NURSE PRACTITIONER

## 2021-01-01 PROCEDURE — 84484 ASSAY OF TROPONIN QUANT: CPT

## 2021-01-01 PROCEDURE — 51798 US URINE CAPACITY MEASURE: CPT

## 2021-01-01 PROCEDURE — 94660 CPAP INITIATION&MGMT: CPT

## 2021-01-01 PROCEDURE — 82436 ASSAY OF URINE CHLORIDE: CPT

## 2021-01-01 PROCEDURE — 82728 ASSAY OF FERRITIN: CPT

## 2021-01-01 PROCEDURE — 86923 COMPATIBILITY TEST ELECTRIC: CPT

## 2021-01-01 PROCEDURE — 74018 RADEX ABDOMEN 1 VIEW: CPT

## 2021-01-01 PROCEDURE — 77001 FLUOROGUIDE FOR VEIN DEVICE: CPT | Performed by: RADIOLOGY

## 2021-01-01 PROCEDURE — 76770 US EXAM ABDO BACK WALL COMP: CPT

## 2021-01-01 PROCEDURE — 6360000002 HC RX W HCPCS

## 2021-01-01 PROCEDURE — 6370000000 HC RX 637 (ALT 250 FOR IP): Performed by: FAMILY MEDICINE

## 2021-01-01 PROCEDURE — 93005 ELECTROCARDIOGRAM TRACING: CPT | Performed by: STUDENT IN AN ORGANIZED HEALTH CARE EDUCATION/TRAINING PROGRAM

## 2021-01-01 PROCEDURE — 86706 HEP B SURFACE ANTIBODY: CPT

## 2021-01-01 PROCEDURE — 82272 OCCULT BLD FECES 1-3 TESTS: CPT

## 2021-01-01 PROCEDURE — 2500000003 HC RX 250 WO HCPCS: Performed by: FAMILY MEDICINE

## 2021-01-01 PROCEDURE — 71045 X-RAY EXAM CHEST 1 VIEW: CPT

## 2021-01-01 PROCEDURE — 84300 ASSAY OF URINE SODIUM: CPT

## 2021-01-01 PROCEDURE — 99222 1ST HOSP IP/OBS MODERATE 55: CPT | Performed by: SURGERY

## 2021-01-01 PROCEDURE — 6370000000 HC RX 637 (ALT 250 FOR IP): Performed by: STUDENT IN AN ORGANIZED HEALTH CARE EDUCATION/TRAINING PROGRAM

## 2021-01-01 PROCEDURE — 84540 ASSAY OF URINE/UREA-N: CPT

## 2021-01-01 PROCEDURE — 6360000002 HC RX W HCPCS: Performed by: RADIOLOGY

## 2021-01-01 PROCEDURE — 84443 ASSAY THYROID STIM HORMONE: CPT

## 2021-01-01 PROCEDURE — 99223 1ST HOSP IP/OBS HIGH 75: CPT | Performed by: INTERNAL MEDICINE

## 2021-01-01 PROCEDURE — 87040 BLOOD CULTURE FOR BACTERIA: CPT

## 2021-01-01 PROCEDURE — 87340 HEPATITIS B SURFACE AG IA: CPT

## 2021-01-01 PROCEDURE — 97535 SELF CARE MNGMENT TRAINING: CPT

## 2021-01-01 PROCEDURE — P9016 RBC LEUKOCYTES REDUCED: HCPCS

## 2021-01-01 PROCEDURE — 99232 SBSQ HOSP IP/OBS MODERATE 35: CPT | Performed by: UROLOGY

## 2021-01-01 PROCEDURE — G8427 DOCREV CUR MEDS BY ELIG CLIN: HCPCS | Performed by: NURSE PRACTITIONER

## 2021-01-01 PROCEDURE — 99283 EMERGENCY DEPT VISIT LOW MDM: CPT

## 2021-01-01 PROCEDURE — 86900 BLOOD TYPING SEROLOGIC ABO: CPT

## 2021-01-01 PROCEDURE — 2580000003 HC RX 258: Performed by: HOSPITALIST

## 2021-01-01 PROCEDURE — 36600 WITHDRAWAL OF ARTERIAL BLOOD: CPT

## 2021-01-01 PROCEDURE — P9047 ALBUMIN (HUMAN), 25%, 50ML: HCPCS | Performed by: NURSE PRACTITIONER

## 2021-01-01 PROCEDURE — 99220 PR INITIAL OBSERVATION CARE/DAY 70 MINUTES: CPT | Performed by: INTERNAL MEDICINE

## 2021-01-01 PROCEDURE — G8427 DOCREV CUR MEDS BY ELIG CLIN: HCPCS | Performed by: FAMILY MEDICINE

## 2021-01-01 PROCEDURE — 99214 OFFICE O/P EST MOD 30 MIN: CPT | Performed by: NURSE PRACTITIONER

## 2021-01-01 PROCEDURE — 90935 HEMODIALYSIS ONE EVALUATION: CPT | Performed by: INTERNAL MEDICINE

## 2021-01-01 PROCEDURE — 49083 ABD PARACENTESIS W/IMAGING: CPT

## 2021-01-01 PROCEDURE — 2500000003 HC RX 250 WO HCPCS: Performed by: INTERNAL MEDICINE

## 2021-01-01 PROCEDURE — 99213 OFFICE O/P EST LOW 20 MIN: CPT | Performed by: NURSE PRACTITIONER

## 2021-01-01 PROCEDURE — 99284 EMERGENCY DEPT VISIT MOD MDM: CPT

## 2021-01-01 PROCEDURE — 99232 SBSQ HOSP IP/OBS MODERATE 35: CPT | Performed by: PHYSICIAN ASSISTANT

## 2021-01-01 PROCEDURE — 82800 BLOOD PH: CPT

## 2021-01-01 PROCEDURE — 1111F DSCHRG MED/CURRENT MED MERGE: CPT | Performed by: PHYSICIAN ASSISTANT

## 2021-01-01 PROCEDURE — 82077 ASSAY SPEC XCP UR&BREATH IA: CPT

## 2021-01-01 PROCEDURE — 71046 X-RAY EXAM CHEST 2 VIEWS: CPT

## 2021-01-01 PROCEDURE — 94761 N-INVAS EAR/PLS OXIMETRY MLT: CPT

## 2021-01-01 PROCEDURE — P9041 ALBUMIN (HUMAN),5%, 50ML: HCPCS | Performed by: INTERNAL MEDICINE

## 2021-01-01 PROCEDURE — 1036F TOBACCO NON-USER: CPT | Performed by: NURSE PRACTITIONER

## 2021-01-01 PROCEDURE — 99232 SBSQ HOSP IP/OBS MODERATE 35: CPT | Performed by: STUDENT IN AN ORGANIZED HEALTH CARE EDUCATION/TRAINING PROGRAM

## 2021-01-01 PROCEDURE — 85014 HEMATOCRIT: CPT

## 2021-01-01 PROCEDURE — 5A1D70Z PERFORMANCE OF URINARY FILTRATION, INTERMITTENT, LESS THAN 6 HOURS PER DAY: ICD-10-PCS | Performed by: INTERNAL MEDICINE

## 2021-01-01 PROCEDURE — 97166 OT EVAL MOD COMPLEX 45 MIN: CPT

## 2021-01-01 PROCEDURE — 6360000002 HC RX W HCPCS: Performed by: FAMILY MEDICINE

## 2021-01-01 PROCEDURE — 97163 PT EVAL HIGH COMPLEX 45 MIN: CPT

## 2021-01-01 PROCEDURE — 0W993ZZ DRAINAGE OF RIGHT PLEURAL CAVITY, PERCUTANEOUS APPROACH: ICD-10-PCS | Performed by: RADIOLOGY

## 2021-01-01 PROCEDURE — 82150 ASSAY OF AMYLASE: CPT

## 2021-01-01 PROCEDURE — 95811 POLYSOM 6/>YRS CPAP 4/> PARM: CPT

## 2021-01-01 PROCEDURE — 82105 ALPHA-FETOPROTEIN SERUM: CPT

## 2021-01-01 PROCEDURE — 82746 ASSAY OF FOLIC ACID SERUM: CPT

## 2021-01-01 PROCEDURE — 83935 ASSAY OF URINE OSMOLALITY: CPT

## 2021-01-01 PROCEDURE — 97162 PT EVAL MOD COMPLEX 30 MIN: CPT

## 2021-01-01 PROCEDURE — 83550 IRON BINDING TEST: CPT

## 2021-01-01 PROCEDURE — 99221 1ST HOSP IP/OBS SF/LOW 40: CPT | Performed by: UROLOGY

## 2021-01-01 PROCEDURE — P9047 ALBUMIN (HUMAN), 25%, 50ML: HCPCS | Performed by: FAMILY MEDICINE

## 2021-01-01 PROCEDURE — 81001 URINALYSIS AUTO W/SCOPE: CPT

## 2021-01-01 PROCEDURE — 85385 FIBRINOGEN ANTIGEN: CPT

## 2021-01-01 PROCEDURE — 99214 OFFICE O/P EST MOD 30 MIN: CPT | Performed by: FAMILY MEDICINE

## 2021-01-01 PROCEDURE — 87641 MR-STAPH DNA AMP PROBE: CPT

## 2021-01-01 PROCEDURE — 96365 THER/PROPH/DIAG IV INF INIT: CPT

## 2021-01-01 PROCEDURE — 97802 MEDICAL NUTRITION INDIV IN: CPT | Performed by: DIETITIAN, REGISTERED

## 2021-01-01 PROCEDURE — 99238 HOSP IP/OBS DSCHRG MGMT 30/<: CPT | Performed by: INTERNAL MEDICINE

## 2021-01-01 PROCEDURE — 93010 ELECTROCARDIOGRAM REPORT: CPT | Performed by: INTERNAL MEDICINE

## 2021-01-01 PROCEDURE — G8417 CALC BMI ABV UP PARAM F/U: HCPCS | Performed by: FAMILY MEDICINE

## 2021-01-01 PROCEDURE — G8417 CALC BMI ABV UP PARAM F/U: HCPCS | Performed by: PHYSICIAN ASSISTANT

## 2021-01-01 PROCEDURE — 84132 ASSAY OF SERUM POTASSIUM: CPT

## 2021-01-01 PROCEDURE — 6370000000 HC RX 637 (ALT 250 FOR IP): Performed by: HOSPITALIST

## 2021-01-01 PROCEDURE — 95810 POLYSOM 6/> YRS 4/> PARAM: CPT

## 2021-01-01 PROCEDURE — G8482 FLU IMMUNIZE ORDER/ADMIN: HCPCS | Performed by: FAMILY MEDICINE

## 2021-01-01 PROCEDURE — 83540 ASSAY OF IRON: CPT

## 2021-01-01 PROCEDURE — 99221 1ST HOSP IP/OBS SF/LOW 40: CPT | Performed by: PHYSICIAN ASSISTANT

## 2021-01-01 PROCEDURE — 70450 CT HEAD/BRAIN W/O DYE: CPT

## 2021-01-01 PROCEDURE — 36556 INSERT NON-TUNNEL CV CATH: CPT | Performed by: RADIOLOGY

## 2021-01-01 PROCEDURE — 83986 ASSAY PH BODY FLUID NOS: CPT

## 2021-01-01 PROCEDURE — 87102 FUNGUS ISOLATION CULTURE: CPT

## 2021-01-01 PROCEDURE — 99231 SBSQ HOSP IP/OBS SF/LOW 25: CPT | Performed by: NURSE PRACTITIONER

## 2021-01-01 PROCEDURE — 2709999900 IR FLUORO GUIDED CVA DEVICE PLMT/REPLACE/REMOVAL

## 2021-01-01 PROCEDURE — APPSS30 APP SPLIT SHARED TIME 16-30 MINUTES: Performed by: NURSE PRACTITIONER

## 2021-01-01 PROCEDURE — 84155 ASSAY OF PROTEIN SERUM: CPT

## 2021-01-01 PROCEDURE — 72148 MRI LUMBAR SPINE W/O DYE: CPT

## 2021-01-01 PROCEDURE — 99233 SBSQ HOSP IP/OBS HIGH 50: CPT | Performed by: HOSPITALIST

## 2021-01-01 PROCEDURE — 1036F TOBACCO NON-USER: CPT | Performed by: FAMILY MEDICINE

## 2021-01-01 PROCEDURE — 76705 ECHO EXAM OF ABDOMEN: CPT

## 2021-01-01 PROCEDURE — 93975 VASCULAR STUDY: CPT

## 2021-01-01 PROCEDURE — 99232 SBSQ HOSP IP/OBS MODERATE 35: CPT | Performed by: SURGERY

## 2021-01-01 PROCEDURE — 3046F HEMOGLOBIN A1C LEVEL >9.0%: CPT | Performed by: NURSE PRACTITIONER

## 2021-01-01 PROCEDURE — 3017F COLORECTAL CA SCREEN DOC REV: CPT | Performed by: PHYSICIAN ASSISTANT

## 2021-01-01 PROCEDURE — 72125 CT NECK SPINE W/O DYE: CPT

## 2021-01-01 PROCEDURE — 99223 1ST HOSP IP/OBS HIGH 75: CPT | Performed by: NURSE PRACTITIONER

## 2021-01-01 PROCEDURE — 78226 HEPATOBILIARY SYSTEM IMAGING: CPT

## 2021-01-01 PROCEDURE — 3046F HEMOGLOBIN A1C LEVEL >9.0%: CPT | Performed by: FAMILY MEDICINE

## 2021-01-01 PROCEDURE — 86850 RBC ANTIBODY SCREEN: CPT

## 2021-01-01 PROCEDURE — 2022F DILAT RTA XM EVC RTNOPTHY: CPT | Performed by: NURSE PRACTITIONER

## 2021-01-01 PROCEDURE — 82533 TOTAL CORTISOL: CPT

## 2021-01-01 PROCEDURE — 99232 SBSQ HOSP IP/OBS MODERATE 35: CPT | Performed by: NURSE PRACTITIONER

## 2021-01-01 PROCEDURE — 83605 ASSAY OF LACTIC ACID: CPT

## 2021-01-01 PROCEDURE — 87116 MYCOBACTERIA CULTURE: CPT

## 2021-01-01 PROCEDURE — 74176 CT ABD & PELVIS W/O CONTRAST: CPT

## 2021-01-01 PROCEDURE — 30903 CONTROL OF NOSEBLEED: CPT | Performed by: NURSE PRACTITIONER

## 2021-01-01 PROCEDURE — 83615 LACTATE (LD) (LDH) ENZYME: CPT

## 2021-01-01 PROCEDURE — 2580000003 HC RX 258

## 2021-01-01 PROCEDURE — 2500000003 HC RX 250 WO HCPCS

## 2021-01-01 PROCEDURE — 2022F DILAT RTA XM EVC RTNOPTHY: CPT | Performed by: FAMILY MEDICINE

## 2021-01-01 PROCEDURE — 80162 ASSAY OF DIGOXIN TOTAL: CPT

## 2021-01-01 PROCEDURE — 1111F DSCHRG MED/CURRENT MED MERGE: CPT | Performed by: FAMILY MEDICINE

## 2021-01-01 PROCEDURE — 93005 ELECTROCARDIOGRAM TRACING: CPT | Performed by: PHYSICIAN ASSISTANT

## 2021-01-01 PROCEDURE — P9012 CRYOPRECIPITATE EACH UNIT: HCPCS

## 2021-01-01 PROCEDURE — 87077 CULTURE AEROBIC IDENTIFY: CPT

## 2021-01-01 PROCEDURE — 99215 OFFICE O/P EST HI 40 MIN: CPT | Performed by: NURSE PRACTITIONER

## 2021-01-01 PROCEDURE — 92610 EVALUATE SWALLOWING FUNCTION: CPT

## 2021-01-01 PROCEDURE — P9017 PLASMA 1 DONOR FRZ W/IN 8 HR: HCPCS

## 2021-01-01 PROCEDURE — 84133 ASSAY OF URINE POTASSIUM: CPT

## 2021-01-01 PROCEDURE — 6360000002 HC RX W HCPCS: Performed by: HOSPITALIST

## 2021-01-01 PROCEDURE — 93005 ELECTROCARDIOGRAM TRACING: CPT | Performed by: EMERGENCY MEDICINE

## 2021-01-01 PROCEDURE — 93010 ELECTROCARDIOGRAM REPORT: CPT | Performed by: NUCLEAR MEDICINE

## 2021-01-01 RX ORDER — BUMETANIDE 1 MG/1
1 TABLET ORAL DAILY
Qty: 30 TABLET | Refills: 1 | Status: ON HOLD | OUTPATIENT
Start: 2021-01-01 | End: 2021-01-01 | Stop reason: HOSPADM

## 2021-01-01 RX ORDER — ONDANSETRON 2 MG/ML
4 INJECTION INTRAMUSCULAR; INTRAVENOUS EVERY 6 HOURS PRN
Status: DISCONTINUED | OUTPATIENT
Start: 2021-01-01 | End: 2021-01-01 | Stop reason: HOSPADM

## 2021-01-01 RX ORDER — PREDNISONE 20 MG/1
60 TABLET ORAL DAILY
Status: COMPLETED | OUTPATIENT
Start: 2021-01-01 | End: 2021-01-01

## 2021-01-01 RX ORDER — AMIODARONE HYDROCHLORIDE 200 MG/1
200 TABLET ORAL DAILY
Status: DISCONTINUED | OUTPATIENT
Start: 2021-01-01 | End: 2021-01-01 | Stop reason: HOSPADM

## 2021-01-01 RX ORDER — NICOTINE POLACRILEX 4 MG
15 LOZENGE BUCCAL PRN
Status: DISCONTINUED | OUTPATIENT
Start: 2021-01-01 | End: 2021-01-01 | Stop reason: HOSPADM

## 2021-01-01 RX ORDER — 0.9 % SODIUM CHLORIDE 0.9 %
500 INTRAVENOUS SOLUTION INTRAVENOUS ONCE
Status: COMPLETED | OUTPATIENT
Start: 2021-01-01 | End: 2021-01-01

## 2021-01-01 RX ORDER — LACTULOSE 10 G/15ML
30 SOLUTION ORAL ONCE
Status: COMPLETED | OUTPATIENT
Start: 2021-01-01 | End: 2021-01-01

## 2021-01-01 RX ORDER — METOPROLOL TARTRATE 100 MG/1
100 TABLET ORAL 2 TIMES DAILY
Status: DISCONTINUED | OUTPATIENT
Start: 2021-01-01 | End: 2021-01-01 | Stop reason: HOSPADM

## 2021-01-01 RX ORDER — SPIRONOLACTONE 25 MG/1
50 TABLET ORAL 2 TIMES DAILY
Status: DISCONTINUED | OUTPATIENT
Start: 2021-01-01 | End: 2021-01-01 | Stop reason: HOSPADM

## 2021-01-01 RX ORDER — PROMETHAZINE HYDROCHLORIDE 25 MG/1
12.5 TABLET ORAL EVERY 6 HOURS PRN
Status: DISCONTINUED | OUTPATIENT
Start: 2021-01-01 | End: 2021-01-01 | Stop reason: HOSPADM

## 2021-01-01 RX ORDER — SODIUM CHLORIDE 0.9 % (FLUSH) 0.9 %
10 SYRINGE (ML) INJECTION PRN
Status: DISCONTINUED | OUTPATIENT
Start: 2021-01-01 | End: 2021-01-01 | Stop reason: HOSPADM

## 2021-01-01 RX ORDER — WHEELCHAIR
EACH MISCELLANEOUS
Qty: 1 EACH | Refills: 0 | Status: SHIPPED | OUTPATIENT
Start: 2021-01-01

## 2021-01-01 RX ORDER — LACTULOSE 10 G/15ML
45 SOLUTION ORAL 2 TIMES DAILY
Status: DISCONTINUED | OUTPATIENT
Start: 2021-01-01 | End: 2021-01-01

## 2021-01-01 RX ORDER — ALBUTEROL SULFATE 90 UG/1
2 AEROSOL, METERED RESPIRATORY (INHALATION) EVERY 6 HOURS PRN
COMMUNITY
End: 2021-01-01 | Stop reason: SDUPTHER

## 2021-01-01 RX ORDER — ALBUMIN (HUMAN) 12.5 G/50ML
25 SOLUTION INTRAVENOUS ONCE
Status: COMPLETED | OUTPATIENT
Start: 2021-01-01 | End: 2021-01-01

## 2021-01-01 RX ORDER — SODIUM CHLORIDE 0.9 % (FLUSH) 0.9 %
10 SYRINGE (ML) INJECTION EVERY 12 HOURS SCHEDULED
Status: DISCONTINUED | OUTPATIENT
Start: 2021-01-01 | End: 2021-01-01 | Stop reason: HOSPADM

## 2021-01-01 RX ORDER — MIDODRINE HYDROCHLORIDE 10 MG/1
10 TABLET ORAL 3 TIMES DAILY
Status: DISCONTINUED | OUTPATIENT
Start: 2021-01-01 | End: 2021-01-01

## 2021-01-01 RX ORDER — SPIRONOLACTONE 25 MG/1
25 TABLET ORAL DAILY
Status: DISCONTINUED | OUTPATIENT
Start: 2021-01-01 | End: 2021-01-01 | Stop reason: HOSPADM

## 2021-01-01 RX ORDER — SODIUM CHLORIDE 9 MG/ML
INJECTION, SOLUTION INTRAVENOUS CONTINUOUS
Status: DISCONTINUED | OUTPATIENT
Start: 2021-01-01 | End: 2021-01-01

## 2021-01-01 RX ORDER — SPIRONOLACTONE 50 MG/1
50 TABLET, FILM COATED ORAL 2 TIMES DAILY
Qty: 30 TABLET | Refills: 2 | Status: ON HOLD | OUTPATIENT
Start: 2021-01-01 | End: 2021-01-01 | Stop reason: HOSPADM

## 2021-01-01 RX ORDER — SPIRONOLACTONE 50 MG/1
25 TABLET, FILM COATED ORAL 2 TIMES DAILY
Status: ON HOLD | COMMUNITY
End: 2021-01-01 | Stop reason: HOSPADM

## 2021-01-01 RX ORDER — SODIUM CHLORIDE 9 MG/ML
25 INJECTION, SOLUTION INTRAVENOUS PRN
Status: DISCONTINUED | OUTPATIENT
Start: 2021-01-01 | End: 2021-01-01 | Stop reason: HOSPADM

## 2021-01-01 RX ORDER — SODIUM CHLORIDE 0.9 % (FLUSH) 0.9 %
5-40 SYRINGE (ML) INJECTION PRN
Status: DISCONTINUED | OUTPATIENT
Start: 2021-01-01 | End: 2021-01-01 | Stop reason: HOSPADM

## 2021-01-01 RX ORDER — CALCIUM CARBONATE 200(500)MG
500 TABLET,CHEWABLE ORAL 4 TIMES DAILY PRN
Status: DISCONTINUED | OUTPATIENT
Start: 2021-01-01 | End: 2021-01-01 | Stop reason: HOSPADM

## 2021-01-01 RX ORDER — ALBUMIN (HUMAN) 12.5 G/50ML
25 SOLUTION INTRAVENOUS
Status: COMPLETED | OUTPATIENT
Start: 2021-01-01 | End: 2021-01-01

## 2021-01-01 RX ORDER — SODIUM CHLORIDE 0.9 % (FLUSH) 0.9 %
10 SYRINGE (ML) INJECTION PRN
Status: DISCONTINUED | OUTPATIENT
Start: 2021-01-01 | End: 2021-01-01

## 2021-01-01 RX ORDER — DEXTROSE MONOHYDRATE 50 MG/ML
100 INJECTION, SOLUTION INTRAVENOUS PRN
Status: DISCONTINUED | OUTPATIENT
Start: 2021-01-01 | End: 2021-01-01 | Stop reason: HOSPADM

## 2021-01-01 RX ORDER — SIMETHICONE 80 MG
80 TABLET,CHEWABLE ORAL EVERY 6 HOURS PRN
Status: DISCONTINUED | OUTPATIENT
Start: 2021-01-01 | End: 2021-01-01 | Stop reason: HOSPADM

## 2021-01-01 RX ORDER — DEXTROSE MONOHYDRATE 25 G/50ML
12.5 INJECTION, SOLUTION INTRAVENOUS PRN
Status: DISCONTINUED | OUTPATIENT
Start: 2021-01-01 | End: 2021-01-01 | Stop reason: HOSPADM

## 2021-01-01 RX ORDER — LACTULOSE 10 G/15ML
30 SOLUTION ORAL 3 TIMES DAILY
Status: DISCONTINUED | OUTPATIENT
Start: 2021-01-01 | End: 2021-01-01

## 2021-01-01 RX ORDER — ALBUMIN, HUMAN INJ 5% 5 %
12.5 SOLUTION INTRAVENOUS
Status: COMPLETED | OUTPATIENT
Start: 2021-01-01 | End: 2021-01-01

## 2021-01-01 RX ORDER — ALBUMIN, HUMAN INJ 5% 5 %
25 SOLUTION INTRAVENOUS ONCE
Status: COMPLETED | OUTPATIENT
Start: 2021-01-01 | End: 2021-01-01

## 2021-01-01 RX ORDER — ACETAMINOPHEN 650 MG/1
650 SUPPOSITORY RECTAL EVERY 6 HOURS PRN
Status: DISCONTINUED | OUTPATIENT
Start: 2021-01-01 | End: 2021-01-01 | Stop reason: HOSPADM

## 2021-01-01 RX ORDER — MIDODRINE HYDROCHLORIDE 10 MG/1
10 TABLET ORAL 2 TIMES DAILY
Status: CANCELLED | OUTPATIENT
Start: 2021-01-01

## 2021-01-01 RX ORDER — AMMONIUM LACTATE 12 G/100G
CREAM TOPICAL PRN
COMMUNITY

## 2021-01-01 RX ORDER — MORPHINE SULFATE 4 MG/ML
4 INJECTION, SOLUTION INTRAMUSCULAR; INTRAVENOUS ONCE
Status: COMPLETED | OUTPATIENT
Start: 2021-01-01 | End: 2021-01-01

## 2021-01-01 RX ORDER — LACTULOSE 10 G/15ML
SOLUTION ORAL
COMMUNITY
Start: 2021-01-01

## 2021-01-01 RX ORDER — POTASSIUM CHLORIDE 20 MEQ/1
20 TABLET, EXTENDED RELEASE ORAL ONCE
Status: COMPLETED | OUTPATIENT
Start: 2021-01-01 | End: 2021-01-01

## 2021-01-01 RX ORDER — LACTULOSE 10 G/15ML
10 SOLUTION ORAL 3 TIMES DAILY
Status: DISCONTINUED | OUTPATIENT
Start: 2021-01-01 | End: 2021-01-01

## 2021-01-01 RX ORDER — OCTREOTIDE ACETATE 100 UG/ML
200 INJECTION, SOLUTION INTRAVENOUS; SUBCUTANEOUS EVERY 8 HOURS
Status: DISCONTINUED | OUTPATIENT
Start: 2021-01-01 | End: 2021-01-01

## 2021-01-01 RX ORDER — BUMETANIDE 1 MG/1
1 TABLET ORAL 2 TIMES DAILY
Status: DISCONTINUED | OUTPATIENT
Start: 2021-01-01 | End: 2021-01-01

## 2021-01-01 RX ORDER — 0.9 % SODIUM CHLORIDE 0.9 %
1000 INTRAVENOUS SOLUTION INTRAVENOUS ONCE
Status: DISCONTINUED | OUTPATIENT
Start: 2021-01-01 | End: 2021-01-01

## 2021-01-01 RX ORDER — BLOOD-GLUCOSE,RECEIVER,CONT
EACH MISCELLANEOUS
Qty: 1 DEVICE | Refills: 1 | Status: SHIPPED | OUTPATIENT
Start: 2021-01-01

## 2021-01-01 RX ORDER — ACETAMINOPHEN 325 MG/1
650 TABLET ORAL EVERY 6 HOURS PRN
Status: DISCONTINUED | OUTPATIENT
Start: 2021-01-01 | End: 2021-01-01 | Stop reason: HOSPADM

## 2021-01-01 RX ORDER — LACTULOSE 10 G/15ML
45 SOLUTION ORAL 3 TIMES DAILY
Status: DISCONTINUED | OUTPATIENT
Start: 2021-01-01 | End: 2021-01-01 | Stop reason: HOSPADM

## 2021-01-01 RX ORDER — SODIUM CHLORIDE 9 MG/ML
INJECTION, SOLUTION INTRAVENOUS PRN
Status: DISCONTINUED | OUTPATIENT
Start: 2021-01-01 | End: 2021-01-01 | Stop reason: HOSPADM

## 2021-01-01 RX ORDER — SPIRONOLACTONE 50 MG/1
50 TABLET, FILM COATED ORAL DAILY
COMMUNITY
Start: 2021-01-01 | End: 2021-01-01 | Stop reason: HOSPADM

## 2021-01-01 RX ORDER — BISACODYL 10 MG
10 SUPPOSITORY, RECTAL RECTAL DAILY
Status: DISPENSED | OUTPATIENT
Start: 2021-01-01 | End: 2021-01-01

## 2021-01-01 RX ORDER — HEPARIN SODIUM 5000 [USP'U]/ML
5000 INJECTION, SOLUTION INTRAVENOUS; SUBCUTANEOUS EVERY 8 HOURS
Status: DISCONTINUED | OUTPATIENT
Start: 2021-01-01 | End: 2021-01-01

## 2021-01-01 RX ORDER — OXYMETAZOLINE HYDROCHLORIDE 0.05 G/100ML
2 SPRAY NASAL 3 TIMES DAILY
Status: DISPENSED | OUTPATIENT
Start: 2021-01-01 | End: 2021-01-01

## 2021-01-01 RX ORDER — MIDODRINE HYDROCHLORIDE 10 MG/1
10 TABLET ORAL 2 TIMES DAILY WITH MEALS
Status: DISCONTINUED | OUTPATIENT
Start: 2021-01-01 | End: 2021-01-01

## 2021-01-01 RX ORDER — TRAMADOL HYDROCHLORIDE 50 MG/1
TABLET ORAL
Status: DISPENSED
Start: 2021-01-01 | End: 2021-01-01

## 2021-01-01 RX ORDER — LACTULOSE 10 G/15ML
45 SOLUTION ORAL DAILY
Status: DISCONTINUED | OUTPATIENT
Start: 2021-01-01 | End: 2021-01-01

## 2021-01-01 RX ORDER — SPIRONOLACTONE 25 MG/1
50 TABLET ORAL 2 TIMES DAILY
Status: DISCONTINUED | OUTPATIENT
Start: 2021-01-01 | End: 2021-01-01

## 2021-01-01 RX ORDER — ACETAMINOPHEN 325 MG/1
650 TABLET ORAL EVERY 4 HOURS PRN
Status: DISCONTINUED | OUTPATIENT
Start: 2021-01-01 | End: 2021-01-01

## 2021-01-01 RX ORDER — CALCIUM CARBONATE 200(500)MG
500 TABLET,CHEWABLE ORAL ONCE
Status: COMPLETED | OUTPATIENT
Start: 2021-01-01 | End: 2021-01-01

## 2021-01-01 RX ORDER — SODIUM CHLORIDE 0.9 % (FLUSH) 0.9 %
10 SYRINGE (ML) INJECTION EVERY 12 HOURS SCHEDULED
Status: DISCONTINUED | OUTPATIENT
Start: 2021-01-01 | End: 2021-01-01

## 2021-01-01 RX ORDER — LACTULOSE 10 G/15ML
30 SOLUTION ORAL 3 TIMES DAILY
Status: DISCONTINUED | OUTPATIENT
Start: 2021-01-01 | End: 2021-01-01 | Stop reason: HOSPADM

## 2021-01-01 RX ORDER — PREDNISONE 20 MG/1
40 TABLET ORAL DAILY
Status: COMPLETED | OUTPATIENT
Start: 2021-01-01 | End: 2021-01-01

## 2021-01-01 RX ORDER — AMIODARONE HYDROCHLORIDE 200 MG/1
200 TABLET ORAL DAILY
Status: DISCONTINUED | OUTPATIENT
Start: 2021-01-01 | End: 2021-01-01

## 2021-01-01 RX ORDER — GABAPENTIN 100 MG/1
100 CAPSULE ORAL 2 TIMES DAILY
Status: DISCONTINUED | OUTPATIENT
Start: 2021-01-01 | End: 2021-01-01

## 2021-01-01 RX ORDER — LIDOCAINE 4 G/G
1 PATCH TOPICAL DAILY
Status: DISCONTINUED | OUTPATIENT
Start: 2021-01-01 | End: 2021-01-01 | Stop reason: HOSPADM

## 2021-01-01 RX ORDER — MAGNESIUM SULFATE IN WATER 40 MG/ML
2000 INJECTION, SOLUTION INTRAVENOUS ONCE
Status: COMPLETED | OUTPATIENT
Start: 2021-01-01 | End: 2021-01-01

## 2021-01-01 RX ORDER — LACTULOSE 10 G/15ML
30 SOLUTION ORAL EVERY 6 HOURS SCHEDULED
Status: DISCONTINUED | OUTPATIENT
Start: 2021-01-01 | End: 2021-01-01 | Stop reason: HOSPADM

## 2021-01-01 RX ORDER — SODIUM CHLORIDE, SODIUM LACTATE, POTASSIUM CHLORIDE, AND CALCIUM CHLORIDE .6; .31; .03; .02 G/100ML; G/100ML; G/100ML; G/100ML
500 INJECTION, SOLUTION INTRAVENOUS ONCE
Status: COMPLETED | OUTPATIENT
Start: 2021-01-01 | End: 2021-01-01

## 2021-01-01 RX ORDER — AMIODARONE HYDROCHLORIDE 200 MG/1
200 TABLET ORAL DAILY
Qty: 30 TABLET | Refills: 0 | Status: SHIPPED | OUTPATIENT
Start: 2021-01-01 | End: 2021-01-01

## 2021-01-01 RX ORDER — PREDNISONE 1 MG/1
5 TABLET ORAL DAILY
Status: DISCONTINUED | OUTPATIENT
Start: 2021-01-01 | End: 2021-01-01 | Stop reason: HOSPADM

## 2021-01-01 RX ORDER — LACTULOSE 10 G/15ML
45 SOLUTION ORAL 3 TIMES DAILY
Status: DISCONTINUED | OUTPATIENT
Start: 2021-01-01 | End: 2021-01-01

## 2021-01-01 RX ORDER — PHYTONADIONE 5 MG/1
2.5 TABLET ORAL ONCE
Status: COMPLETED | OUTPATIENT
Start: 2021-01-01 | End: 2021-01-01

## 2021-01-01 RX ORDER — ASPIRIN 81 MG/1
81 TABLET ORAL DAILY
Status: DISCONTINUED | OUTPATIENT
Start: 2021-01-01 | End: 2021-01-01 | Stop reason: HOSPADM

## 2021-01-01 RX ORDER — MIDODRINE HYDROCHLORIDE 5 MG/1
15 TABLET ORAL 3 TIMES DAILY
Qty: 270 TABLET | Refills: 0 | Status: SHIPPED | OUTPATIENT
Start: 2021-01-01 | End: 2021-01-01 | Stop reason: SDUPTHER

## 2021-01-01 RX ORDER — ALBUTEROL SULFATE 90 UG/1
2 AEROSOL, METERED RESPIRATORY (INHALATION) EVERY 6 HOURS PRN
Qty: 1 INHALER | Refills: 2 | Status: CANCELLED | OUTPATIENT
Start: 2021-01-01

## 2021-01-01 RX ORDER — MIDODRINE HYDROCHLORIDE 10 MG/1
10 TABLET ORAL 2 TIMES DAILY
Status: ON HOLD | COMMUNITY
End: 2021-01-01 | Stop reason: SDUPTHER

## 2021-01-01 RX ORDER — SODIUM CHLORIDE 0.9 % (FLUSH) 0.9 %
5-40 SYRINGE (ML) INJECTION EVERY 12 HOURS SCHEDULED
Status: DISCONTINUED | OUTPATIENT
Start: 2021-01-01 | End: 2021-01-01 | Stop reason: HOSPADM

## 2021-01-01 RX ORDER — DIAPER,BRIEF,INFANT-TODD,DISP
EACH MISCELLANEOUS 2 TIMES DAILY
Status: DISCONTINUED | OUTPATIENT
Start: 2021-01-01 | End: 2021-01-01 | Stop reason: HOSPADM

## 2021-01-01 RX ORDER — POLYETHYLENE GLYCOL 3350 17 G/17G
17 POWDER, FOR SOLUTION ORAL DAILY PRN
Status: DISCONTINUED | OUTPATIENT
Start: 2021-01-01 | End: 2021-01-01 | Stop reason: HOSPADM

## 2021-01-01 RX ORDER — GABAPENTIN 100 MG/1
200 CAPSULE ORAL 2 TIMES DAILY
Status: DISCONTINUED | OUTPATIENT
Start: 2021-01-01 | End: 2021-01-01 | Stop reason: HOSPADM

## 2021-01-01 RX ORDER — GABAPENTIN 300 MG/1
300 CAPSULE ORAL 3 TIMES DAILY
Qty: 90 CAPSULE | Refills: 0 | Status: ON HOLD | OUTPATIENT
Start: 2021-01-01 | End: 2021-01-01 | Stop reason: SDUPTHER

## 2021-01-01 RX ORDER — ASPIRIN 81 MG/1
81 TABLET, CHEWABLE ORAL DAILY
Status: CANCELLED | OUTPATIENT
Start: 2021-01-01

## 2021-01-01 RX ORDER — GABAPENTIN 300 MG/1
300 CAPSULE ORAL 3 TIMES DAILY
Status: DISCONTINUED | OUTPATIENT
Start: 2021-01-01 | End: 2021-01-01 | Stop reason: HOSPADM

## 2021-01-01 RX ORDER — ALBUMIN (HUMAN) 12.5 G/50ML
25 SOLUTION INTRAVENOUS ONCE
Status: DISCONTINUED | OUTPATIENT
Start: 2021-01-01 | End: 2021-01-01

## 2021-01-01 RX ORDER — TRAMADOL HYDROCHLORIDE 50 MG/1
25 TABLET ORAL EVERY 6 HOURS PRN
Status: DISCONTINUED | OUTPATIENT
Start: 2021-01-01 | End: 2021-01-01

## 2021-01-01 RX ORDER — SULFAMETHOXAZOLE AND TRIMETHOPRIM 800; 160 MG/1; MG/1
1 TABLET ORAL 2 TIMES DAILY
Qty: 14 TABLET | Refills: 0 | Status: SHIPPED | OUTPATIENT
Start: 2021-01-01 | End: 2021-01-01 | Stop reason: SINTOL

## 2021-01-01 RX ORDER — DIGOXIN 0.25 MG/ML
250 INJECTION INTRAMUSCULAR; INTRAVENOUS DAILY
Status: DISCONTINUED | OUTPATIENT
Start: 2021-01-01 | End: 2021-01-01 | Stop reason: HOSPADM

## 2021-01-01 RX ORDER — BLOOD-GLUCOSE SENSOR
EACH MISCELLANEOUS
Qty: 9 EACH | Refills: 3 | Status: SHIPPED | OUTPATIENT
Start: 2021-01-01

## 2021-01-01 RX ORDER — BLOOD-GLUCOSE TRANSMITTER
EACH MISCELLANEOUS
Qty: 9 EACH | Refills: 3 | Status: SHIPPED | OUTPATIENT
Start: 2021-01-01

## 2021-01-01 RX ORDER — METOCLOPRAMIDE HYDROCHLORIDE 5 MG/ML
5 INJECTION INTRAMUSCULAR; INTRAVENOUS EVERY 6 HOURS
Status: DISCONTINUED | OUTPATIENT
Start: 2021-01-01 | End: 2021-01-01

## 2021-01-01 RX ORDER — VARENICLINE TARTRATE 0.5 MG/1
TABLET, FILM COATED ORAL
Qty: 49 TABLET | Refills: 0 | Status: SHIPPED | OUTPATIENT
Start: 2021-01-01

## 2021-01-01 RX ORDER — LACTULOSE 10 G/10G
30 SOLUTION ORAL 3 TIMES DAILY
Qty: 270 PACKET | Refills: 0 | Status: SHIPPED | OUTPATIENT
Start: 2021-01-01 | End: 2021-01-01 | Stop reason: SDUPTHER

## 2021-01-01 RX ORDER — IPRATROPIUM BROMIDE AND ALBUTEROL SULFATE 2.5; .5 MG/3ML; MG/3ML
1 SOLUTION RESPIRATORY (INHALATION) EVERY 4 HOURS PRN
Status: DISCONTINUED | OUTPATIENT
Start: 2021-01-01 | End: 2021-01-01

## 2021-01-01 RX ORDER — BUMETANIDE 1 MG/1
1 TABLET ORAL 2 TIMES DAILY
Qty: 30 TABLET | Refills: 2 | Status: SHIPPED | OUTPATIENT
Start: 2021-01-01

## 2021-01-01 RX ORDER — PANTOPRAZOLE SODIUM 40 MG/1
40 TABLET, DELAYED RELEASE ORAL DAILY
Status: DISCONTINUED | OUTPATIENT
Start: 2021-01-01 | End: 2021-01-01 | Stop reason: HOSPADM

## 2021-01-01 RX ORDER — PANTOPRAZOLE SODIUM 40 MG/1
40 TABLET, DELAYED RELEASE ORAL
Status: DISCONTINUED | OUTPATIENT
Start: 2021-01-01 | End: 2021-01-01 | Stop reason: HOSPADM

## 2021-01-01 RX ORDER — PROMETHAZINE HYDROCHLORIDE 25 MG/1
12.5 TABLET ORAL EVERY 8 HOURS
Status: DISCONTINUED | OUTPATIENT
Start: 2021-01-01 | End: 2021-01-01

## 2021-01-01 RX ORDER — BUMETANIDE 1 MG/1
1 TABLET ORAL 2 TIMES DAILY
Status: DISCONTINUED | OUTPATIENT
Start: 2021-01-01 | End: 2021-01-01 | Stop reason: HOSPADM

## 2021-01-01 RX ORDER — NEOSTIGMINE METHYLSULFATE 1 MG/ML
0.5 INJECTION, SOLUTION INTRAVENOUS EVERY 6 HOURS
Status: DISCONTINUED | OUTPATIENT
Start: 2021-01-01 | End: 2021-01-01 | Stop reason: HOSPADM

## 2021-01-01 RX ORDER — DEXTROSE AND SODIUM CHLORIDE 5; .9 G/100ML; G/100ML
INJECTION, SOLUTION INTRAVENOUS CONTINUOUS
Status: DISCONTINUED | OUTPATIENT
Start: 2021-01-01 | End: 2021-01-01

## 2021-01-01 RX ORDER — ONDANSETRON 2 MG/ML
8 INJECTION INTRAMUSCULAR; INTRAVENOUS EVERY 8 HOURS
Status: DISCONTINUED | OUTPATIENT
Start: 2021-01-01 | End: 2021-01-01 | Stop reason: HOSPADM

## 2021-01-01 RX ORDER — POTASSIUM CHLORIDE 20 MEQ/1
40 TABLET, EXTENDED RELEASE ORAL ONCE
Status: COMPLETED | OUTPATIENT
Start: 2021-01-01 | End: 2021-01-01

## 2021-01-01 RX ORDER — DICYCLOMINE HYDROCHLORIDE 10 MG/1
10 CAPSULE ORAL
Status: DISCONTINUED | OUTPATIENT
Start: 2021-01-01 | End: 2021-01-01

## 2021-01-01 RX ORDER — ONDANSETRON 2 MG/ML
4 INJECTION INTRAMUSCULAR; INTRAVENOUS EVERY 6 HOURS PRN
Status: DISCONTINUED | OUTPATIENT
Start: 2021-01-01 | End: 2021-01-01

## 2021-01-01 RX ORDER — ALBUMIN, HUMAN INJ 5% 5 %
25 SOLUTION INTRAVENOUS EVERY 8 HOURS
Status: DISCONTINUED | OUTPATIENT
Start: 2021-01-01 | End: 2021-01-01

## 2021-01-01 RX ORDER — BUMETANIDE 1 MG/1
1 TABLET ORAL DAILY
Qty: 30 TABLET | Refills: 1 | Status: CANCELLED | OUTPATIENT
Start: 2021-01-01

## 2021-01-01 RX ORDER — IPRATROPIUM BROMIDE AND ALBUTEROL SULFATE 2.5; .5 MG/3ML; MG/3ML
1 SOLUTION RESPIRATORY (INHALATION) EVERY 4 HOURS PRN
Status: DISCONTINUED | OUTPATIENT
Start: 2021-01-01 | End: 2021-01-01 | Stop reason: HOSPADM

## 2021-01-01 RX ORDER — ALBUMIN (HUMAN) 12.5 G/50ML
25 SOLUTION INTRAVENOUS EVERY 8 HOURS
Status: COMPLETED | OUTPATIENT
Start: 2021-01-01 | End: 2021-01-01

## 2021-01-01 RX ORDER — PROCHLORPERAZINE EDISYLATE 5 MG/ML
10 INJECTION INTRAMUSCULAR; INTRAVENOUS ONCE
Status: COMPLETED | OUTPATIENT
Start: 2021-01-01 | End: 2021-01-01

## 2021-01-01 RX ORDER — DIPHENHYDRAMINE HYDROCHLORIDE 50 MG/ML
25 INJECTION INTRAMUSCULAR; INTRAVENOUS ONCE
Status: COMPLETED | OUTPATIENT
Start: 2021-01-01 | End: 2021-01-01

## 2021-01-01 RX ORDER — SODIUM BICARBONATE 650 MG/1
1300 TABLET ORAL 3 TIMES DAILY
Status: DISCONTINUED | OUTPATIENT
Start: 2021-01-01 | End: 2021-01-01 | Stop reason: HOSPADM

## 2021-01-01 RX ORDER — METOPROLOL TARTRATE 50 MG/1
50 TABLET, FILM COATED ORAL 2 TIMES DAILY
Status: DISCONTINUED | OUTPATIENT
Start: 2021-01-01 | End: 2021-01-01

## 2021-01-01 RX ORDER — GABAPENTIN 100 MG/1
200 CAPSULE ORAL 2 TIMES DAILY
Qty: 120 CAPSULE | Refills: 0 | Status: SHIPPED | OUTPATIENT
Start: 2021-01-01 | End: 2021-01-01

## 2021-01-01 RX ORDER — CLINDAMYCIN HYDROCHLORIDE 300 MG/1
300 CAPSULE ORAL 4 TIMES DAILY
Qty: 28 CAPSULE | Refills: 0 | Status: SHIPPED | OUTPATIENT
Start: 2021-01-01 | End: 2021-01-01

## 2021-01-01 RX ORDER — SODIUM CHLORIDE 1000 MG
2 TABLET, SOLUBLE MISCELLANEOUS
Status: DISCONTINUED | OUTPATIENT
Start: 2021-01-01 | End: 2021-01-01 | Stop reason: HOSPADM

## 2021-01-01 RX ORDER — DEXTROSE MONOHYDRATE 25 G/50ML
INJECTION, SOLUTION INTRAVENOUS
Status: COMPLETED
Start: 2021-01-01 | End: 2021-01-01

## 2021-01-01 RX ORDER — SPIRONOLACTONE 50 MG/1
50 TABLET, FILM COATED ORAL 2 TIMES DAILY
Qty: 30 TABLET | Refills: 3 | Status: SHIPPED | OUTPATIENT
Start: 2021-01-01 | End: 2021-01-01

## 2021-01-01 RX ORDER — DIPHENHYDRAMINE HCL 25 MG
25 TABLET ORAL ONCE
Status: COMPLETED | OUTPATIENT
Start: 2021-01-01 | End: 2021-01-01

## 2021-01-01 RX ORDER — ALBUTEROL SULFATE 90 UG/1
2 AEROSOL, METERED RESPIRATORY (INHALATION) EVERY 6 HOURS PRN
Qty: 1 INHALER | Refills: 5 | Status: SHIPPED | OUTPATIENT
Start: 2021-01-01

## 2021-01-01 RX ORDER — MIDODRINE HYDROCHLORIDE 10 MG/1
10 TABLET ORAL 3 TIMES DAILY
Qty: 90 TABLET | Refills: 0 | Status: ON HOLD | OUTPATIENT
Start: 2021-01-01 | End: 2021-01-01 | Stop reason: SDUPTHER

## 2021-01-01 RX ORDER — GABAPENTIN 300 MG/1
300 CAPSULE ORAL 3 TIMES DAILY
Status: DISCONTINUED | OUTPATIENT
Start: 2021-01-01 | End: 2021-01-01

## 2021-01-01 RX ORDER — MAGNESIUM SULFATE IN WATER 40 MG/ML
2000 INJECTION, SOLUTION INTRAVENOUS PRN
Status: DISCONTINUED | OUTPATIENT
Start: 2021-01-01 | End: 2021-01-01 | Stop reason: HOSPADM

## 2021-01-01 RX ORDER — ONDANSETRON 4 MG/1
4 TABLET, FILM COATED ORAL EVERY 8 HOURS PRN
COMMUNITY
End: 2021-01-01

## 2021-01-01 RX ORDER — POTASSIUM CHLORIDE 20 MEQ/1
20 TABLET, EXTENDED RELEASE ORAL
Status: DISCONTINUED | OUTPATIENT
Start: 2021-01-01 | End: 2021-01-01 | Stop reason: HOSPADM

## 2021-01-01 RX ORDER — BUMETANIDE 1 MG/1
1 TABLET ORAL DAILY
COMMUNITY
End: 2021-01-01 | Stop reason: SDUPTHER

## 2021-01-01 RX ORDER — SPIRONOLACTONE 50 MG/1
50 TABLET, FILM COATED ORAL DAILY
Qty: 30 TABLET | Refills: 0 | Status: CANCELLED | OUTPATIENT
Start: 2021-01-01 | End: 2021-01-01

## 2021-01-01 RX ORDER — MIDODRINE HYDROCHLORIDE 2.5 MG/1
2.5 TABLET ORAL ONCE
Status: COMPLETED | OUTPATIENT
Start: 2021-01-01 | End: 2021-01-01

## 2021-01-01 RX ORDER — ALBUMIN (HUMAN) 12.5 G/50ML
25 SOLUTION INTRAVENOUS EVERY 6 HOURS
Status: COMPLETED | OUTPATIENT
Start: 2021-01-01 | End: 2021-01-01

## 2021-01-01 RX ORDER — SODIUM CHLORIDE AND POTASSIUM CHLORIDE .9; .15 G/100ML; G/100ML
SOLUTION INTRAVENOUS CONTINUOUS
Status: DISCONTINUED | OUTPATIENT
Start: 2021-01-01 | End: 2021-01-01

## 2021-01-01 RX ORDER — LACTULOSE 10 G/10G
30 SOLUTION ORAL 3 TIMES DAILY
Qty: 270 PACKET | Refills: 2 | Status: SHIPPED | OUTPATIENT
Start: 2021-01-01 | End: 2021-01-01

## 2021-01-01 RX ORDER — GABAPENTIN 300 MG/1
CAPSULE ORAL
Qty: 90 CAPSULE | Refills: 0 | OUTPATIENT
Start: 2021-01-01

## 2021-01-01 RX ORDER — ALBUTEROL SULFATE 2.5 MG/3ML
2.5 SOLUTION RESPIRATORY (INHALATION) EVERY 6 HOURS PRN
Status: DISCONTINUED | OUTPATIENT
Start: 2021-01-01 | End: 2021-01-01 | Stop reason: HOSPADM

## 2021-01-01 RX ORDER — FAMOTIDINE 20 MG/1
20 TABLET, FILM COATED ORAL DAILY
Status: DISCONTINUED | OUTPATIENT
Start: 2021-01-01 | End: 2021-01-01 | Stop reason: HOSPADM

## 2021-01-01 RX ORDER — SPIRONOLACTONE 25 MG/1
25 TABLET ORAL 2 TIMES DAILY
Status: DISCONTINUED | OUTPATIENT
Start: 2021-01-01 | End: 2021-01-01

## 2021-01-01 RX ORDER — MIDODRINE HYDROCHLORIDE 2.5 MG/1
2.5 TABLET ORAL 3 TIMES DAILY
Qty: 90 TABLET | Refills: 0 | Status: ON HOLD | OUTPATIENT
Start: 2021-01-01 | End: 2021-01-01 | Stop reason: HOSPADM

## 2021-01-01 RX ORDER — BUMETANIDE 0.25 MG/ML
1 INJECTION, SOLUTION INTRAMUSCULAR; INTRAVENOUS 2 TIMES DAILY
Status: DISCONTINUED | OUTPATIENT
Start: 2021-01-01 | End: 2021-01-01 | Stop reason: HOSPADM

## 2021-01-01 RX ORDER — PANTOPRAZOLE SODIUM 40 MG/1
40 TABLET, DELAYED RELEASE ORAL DAILY
Qty: 30 TABLET | Refills: 1 | Status: ON HOLD | OUTPATIENT
Start: 2021-01-01 | End: 2021-01-01 | Stop reason: HOSPADM

## 2021-01-01 RX ORDER — FAMOTIDINE 20 MG/1
20 TABLET, FILM COATED ORAL DAILY
Status: DISCONTINUED | OUTPATIENT
Start: 2021-01-01 | End: 2021-01-01

## 2021-01-01 RX ORDER — METOPROLOL TARTRATE 100 MG/1
100 TABLET ORAL 2 TIMES DAILY
Status: DISCONTINUED | OUTPATIENT
Start: 2021-01-01 | End: 2021-01-01

## 2021-01-01 RX ORDER — MIDODRINE HYDROCHLORIDE 10 MG/1
10 TABLET ORAL
Status: DISCONTINUED | OUTPATIENT
Start: 2021-01-01 | End: 2021-01-01

## 2021-01-01 RX ORDER — PREDNISONE 20 MG/1
20 TABLET ORAL DAILY
Status: DISCONTINUED | OUTPATIENT
Start: 2021-01-01 | End: 2021-01-01 | Stop reason: HOSPADM

## 2021-01-01 RX ORDER — DEXAMETHASONE SODIUM PHOSPHATE 4 MG/ML
4 INJECTION, SOLUTION INTRA-ARTICULAR; INTRALESIONAL; INTRAMUSCULAR; INTRAVENOUS; SOFT TISSUE ONCE
Status: COMPLETED | OUTPATIENT
Start: 2021-01-01 | End: 2021-01-01

## 2021-01-01 RX ORDER — METOPROLOL TARTRATE 5 MG/5ML
5 INJECTION INTRAVENOUS EVERY 6 HOURS PRN
Status: DISCONTINUED | OUTPATIENT
Start: 2021-01-01 | End: 2021-01-01

## 2021-01-01 RX ORDER — METOPROLOL TARTRATE 50 MG/1
100 TABLET, FILM COATED ORAL 2 TIMES DAILY
Status: DISCONTINUED | OUTPATIENT
Start: 2021-01-01 | End: 2021-01-01

## 2021-01-01 RX ORDER — PANTOPRAZOLE SODIUM 40 MG/10ML
40 INJECTION, POWDER, LYOPHILIZED, FOR SOLUTION INTRAVENOUS DAILY
Status: DISCONTINUED | OUTPATIENT
Start: 2021-01-01 | End: 2021-01-01 | Stop reason: HOSPADM

## 2021-01-01 RX ORDER — ALBUTEROL SULFATE 2.5 MG/3ML
2.5 SOLUTION RESPIRATORY (INHALATION) EVERY 4 HOURS PRN
Status: DISCONTINUED | OUTPATIENT
Start: 2021-01-01 | End: 2021-01-01 | Stop reason: HOSPADM

## 2021-01-01 RX ORDER — PROMETHAZINE HYDROCHLORIDE 25 MG/1
12.5 TABLET ORAL EVERY 6 HOURS PRN
Status: DISCONTINUED | OUTPATIENT
Start: 2021-01-01 | End: 2021-01-01

## 2021-01-01 RX ORDER — MIDODRINE HYDROCHLORIDE 5 MG/1
10 TABLET ORAL PRN
COMMUNITY
End: 2021-01-01 | Stop reason: DRUGHIGH

## 2021-01-01 RX ORDER — OCTREOTIDE ACETATE 100 UG/ML
100 INJECTION, SOLUTION INTRAVENOUS; SUBCUTANEOUS EVERY 8 HOURS
Status: DISCONTINUED | OUTPATIENT
Start: 2021-01-01 | End: 2021-01-01 | Stop reason: HOSPADM

## 2021-01-01 RX ORDER — MIDODRINE HYDROCHLORIDE 5 MG/1
15 TABLET ORAL 3 TIMES DAILY
Qty: 270 TABLET | Refills: 0 | Status: SHIPPED | OUTPATIENT
Start: 2021-01-01 | End: 2021-01-01

## 2021-01-01 RX ORDER — FAMOTIDINE 20 MG/1
20 TABLET, FILM COATED ORAL DAILY
Qty: 30 TABLET | Refills: 0 | Status: SHIPPED | OUTPATIENT
Start: 2021-01-01 | End: 2021-01-01

## 2021-01-01 RX ORDER — MIDODRINE HYDROCHLORIDE 10 MG/1
20 TABLET ORAL
Status: DISCONTINUED | OUTPATIENT
Start: 2021-01-01 | End: 2021-01-01 | Stop reason: HOSPADM

## 2021-01-01 RX ORDER — PREDNISONE 10 MG/1
10 TABLET ORAL DAILY
Status: DISCONTINUED | OUTPATIENT
Start: 2021-01-01 | End: 2021-01-01 | Stop reason: HOSPADM

## 2021-01-01 RX ORDER — DIGOXIN 0.25 MG/ML
500 INJECTION INTRAMUSCULAR; INTRAVENOUS ONCE
Status: COMPLETED | OUTPATIENT
Start: 2021-01-01 | End: 2021-01-01

## 2021-01-01 RX ADMIN — GABAPENTIN 200 MG: 100 CAPSULE ORAL at 21:00

## 2021-01-01 RX ADMIN — HYDROCORTISONE: 1 CREAM TOPICAL at 09:26

## 2021-01-01 RX ADMIN — SPIRONOLACTONE 50 MG: 25 TABLET ORAL at 21:03

## 2021-01-01 RX ADMIN — LACTULOSE 45 G: 20 SOLUTION ORAL at 20:01

## 2021-01-01 RX ADMIN — MIDODRINE HYDROCHLORIDE 15 MG: 10 TABLET ORAL at 18:10

## 2021-01-01 RX ADMIN — BUMETANIDE 1 MG: 0.25 INJECTION, SOLUTION INTRAMUSCULAR; INTRAVENOUS at 23:44

## 2021-01-01 RX ADMIN — SODIUM BICARBONATE 1300 MG: 650 TABLET ORAL at 19:55

## 2021-01-01 RX ADMIN — OCTREOTIDE ACETATE 100 MCG: 100 INJECTION, SOLUTION INTRAVENOUS; SUBCUTANEOUS at 15:54

## 2021-01-01 RX ADMIN — SODIUM CHLORIDE, POTASSIUM CHLORIDE, SODIUM LACTATE AND CALCIUM CHLORIDE 500 ML: 600; 310; 30; 20 INJECTION, SOLUTION INTRAVENOUS at 23:30

## 2021-01-01 RX ADMIN — SODIUM CHLORIDE, PRESERVATIVE FREE 10 ML: 5 INJECTION INTRAVENOUS at 21:55

## 2021-01-01 RX ADMIN — OCTREOTIDE ACETATE 100 MCG: 100 INJECTION, SOLUTION INTRAVENOUS; SUBCUTANEOUS at 22:20

## 2021-01-01 RX ADMIN — ANTACID TABLETS 500 MG: 500 TABLET, CHEWABLE ORAL at 22:41

## 2021-01-01 RX ADMIN — METOPROLOL TARTRATE 25 MG: 25 TABLET, FILM COATED ORAL at 12:03

## 2021-01-01 RX ADMIN — INSULIN LISPRO 1 UNITS: 100 INJECTION, SOLUTION INTRAVENOUS; SUBCUTANEOUS at 21:27

## 2021-01-01 RX ADMIN — RIFAXIMIN 550 MG: 550 TABLET ORAL at 08:04

## 2021-01-01 RX ADMIN — FAMOTIDINE 20 MG: 20 TABLET, FILM COATED ORAL at 12:44

## 2021-01-01 RX ADMIN — RIFAXIMIN 550 MG: 550 TABLET ORAL at 22:36

## 2021-01-01 RX ADMIN — TIOTROPIUM BROMIDE INHALATION SPRAY 2 PUFF: 3.12 SPRAY, METERED RESPIRATORY (INHALATION) at 09:40

## 2021-01-01 RX ADMIN — METOCLOPRAMIDE 5 MG: 5 INJECTION, SOLUTION INTRAMUSCULAR; INTRAVENOUS at 04:48

## 2021-01-01 RX ADMIN — OCTREOTIDE ACETATE 200 MCG: 100 INJECTION, SOLUTION INTRAVENOUS; SUBCUTANEOUS at 03:00

## 2021-01-01 RX ADMIN — GABAPENTIN 200 MG: 100 CAPSULE ORAL at 22:04

## 2021-01-01 RX ADMIN — RIFAXIMIN 550 MG: 550 TABLET ORAL at 08:12

## 2021-01-01 RX ADMIN — DILTIAZEM HYDROCHLORIDE 15 MG: 30 TABLET, FILM COATED ORAL at 17:45

## 2021-01-01 RX ADMIN — MIDODRINE HYDROCHLORIDE 15 MG: 10 TABLET ORAL at 11:38

## 2021-01-01 RX ADMIN — RIFAXIMIN 550 MG: 550 TABLET ORAL at 22:03

## 2021-01-01 RX ADMIN — HYDROCORTISONE: 1 CREAM TOPICAL at 15:55

## 2021-01-01 RX ADMIN — GABAPENTIN 300 MG: 300 CAPSULE ORAL at 21:38

## 2021-01-01 RX ADMIN — RIFAXIMIN 550 MG: 550 TABLET ORAL at 21:07

## 2021-01-01 RX ADMIN — DILTIAZEM HYDROCHLORIDE 30 MG: 30 TABLET, FILM COATED ORAL at 20:47

## 2021-01-01 RX ADMIN — PIPERACILLIN AND TAZOBACTAM 2250 MG: 2; .25 INJECTION, POWDER, LYOPHILIZED, FOR SOLUTION INTRAVENOUS at 21:10

## 2021-01-01 RX ADMIN — GABAPENTIN 200 MG: 100 CAPSULE ORAL at 08:55

## 2021-01-01 RX ADMIN — METOPROLOL TARTRATE 25 MG: 25 TABLET, FILM COATED ORAL at 11:39

## 2021-01-01 RX ADMIN — ASPIRIN 81 MG: 81 TABLET, COATED ORAL at 08:27

## 2021-01-01 RX ADMIN — MIDODRINE HYDROCHLORIDE 15 MG: 10 TABLET ORAL at 12:35

## 2021-01-01 RX ADMIN — SODIUM CHLORIDE, PRESERVATIVE FREE 10 ML: 5 INJECTION INTRAVENOUS at 08:34

## 2021-01-01 RX ADMIN — GABAPENTIN 300 MG: 300 CAPSULE ORAL at 20:39

## 2021-01-01 RX ADMIN — METOCLOPRAMIDE 5 MG: 5 INJECTION, SOLUTION INTRAMUSCULAR; INTRAVENOUS at 12:44

## 2021-01-01 RX ADMIN — LACTULOSE 10 G: 20 SOLUTION ORAL at 08:55

## 2021-01-01 RX ADMIN — METOPROLOL TARTRATE 100 MG: 100 TABLET, FILM COATED ORAL at 08:54

## 2021-01-01 RX ADMIN — SODIUM CHLORIDE AND POTASSIUM CHLORIDE: .9; .15 SOLUTION INTRAVENOUS at 18:27

## 2021-01-01 RX ADMIN — RIFAXIMIN 550 MG: 550 TABLET ORAL at 09:16

## 2021-01-01 RX ADMIN — RIFAXIMIN 550 MG: 550 TABLET ORAL at 11:03

## 2021-01-01 RX ADMIN — OCTREOTIDE ACETATE 100 MCG: 100 INJECTION, SOLUTION INTRAVENOUS; SUBCUTANEOUS at 08:51

## 2021-01-01 RX ADMIN — LACTULOSE 45 G: 20 SOLUTION ORAL at 12:44

## 2021-01-01 RX ADMIN — GABAPENTIN 200 MG: 100 CAPSULE ORAL at 21:51

## 2021-01-01 RX ADMIN — AMIODARONE HYDROCHLORIDE 0.5 MG/MIN: 1.8 INJECTION, SOLUTION INTRAVENOUS at 18:33

## 2021-01-01 RX ADMIN — SODIUM CHLORIDE, PRESERVATIVE FREE 10 ML: 5 INJECTION INTRAVENOUS at 09:20

## 2021-01-01 RX ADMIN — MIDODRINE HYDROCHLORIDE 10 MG: 10 TABLET ORAL at 16:05

## 2021-01-01 RX ADMIN — MIDODRINE HYDROCHLORIDE 15 MG: 10 TABLET ORAL at 16:39

## 2021-01-01 RX ADMIN — GABAPENTIN 200 MG: 100 CAPSULE ORAL at 20:35

## 2021-01-01 RX ADMIN — METOCLOPRAMIDE 5 MG: 5 INJECTION, SOLUTION INTRAMUSCULAR; INTRAVENOUS at 17:36

## 2021-01-01 RX ADMIN — LACTULOSE 30 G: 20 SOLUTION ORAL at 05:01

## 2021-01-01 RX ADMIN — MIDODRINE HYDROCHLORIDE 15 MG: 10 TABLET ORAL at 17:20

## 2021-01-01 RX ADMIN — OCTREOTIDE ACETATE 100 MCG: 100 INJECTION, SOLUTION INTRAVENOUS; SUBCUTANEOUS at 11:23

## 2021-01-01 RX ADMIN — MIDODRINE HYDROCHLORIDE 15 MG: 10 TABLET ORAL at 11:41

## 2021-01-01 RX ADMIN — LACTULOSE 30 G: 20 SOLUTION ORAL at 13:59

## 2021-01-01 RX ADMIN — HYDROCORTISONE: 1 CREAM TOPICAL at 12:22

## 2021-01-01 RX ADMIN — SODIUM CHLORIDE, PRESERVATIVE FREE 10 ML: 5 INJECTION INTRAVENOUS at 20:53

## 2021-01-01 RX ADMIN — OCTREOTIDE ACETATE 100 MCG: 100 INJECTION, SOLUTION INTRAVENOUS; SUBCUTANEOUS at 22:04

## 2021-01-01 RX ADMIN — TIOTROPIUM BROMIDE INHALATION SPRAY 2 PUFF: 3.12 SPRAY, METERED RESPIRATORY (INHALATION) at 08:31

## 2021-01-01 RX ADMIN — MIDODRINE HYDROCHLORIDE 15 MG: 10 TABLET ORAL at 13:17

## 2021-01-01 RX ADMIN — HEPARIN SODIUM 5000 UNITS: 5000 INJECTION INTRAVENOUS; SUBCUTANEOUS at 17:30

## 2021-01-01 RX ADMIN — PANTOPRAZOLE SODIUM 40 MG: 40 TABLET, DELAYED RELEASE ORAL at 05:13

## 2021-01-01 RX ADMIN — DILTIAZEM HYDROCHLORIDE 30 MG: 30 TABLET, FILM COATED ORAL at 13:09

## 2021-01-01 RX ADMIN — INSULIN LISPRO 6 UNITS: 100 INJECTION, SOLUTION INTRAVENOUS; SUBCUTANEOUS at 13:18

## 2021-01-01 RX ADMIN — EPOETIN ALFA-EPBX 5000 UNITS: 10000 INJECTION, SOLUTION INTRAVENOUS; SUBCUTANEOUS at 22:39

## 2021-01-01 RX ADMIN — ONDANSETRON 8 MG: 2 INJECTION INTRAMUSCULAR; INTRAVENOUS at 23:39

## 2021-01-01 RX ADMIN — FAMOTIDINE 20 MG: 20 TABLET ORAL at 09:25

## 2021-01-01 RX ADMIN — ASPIRIN 81 MG: 81 TABLET, COATED ORAL at 09:25

## 2021-01-01 RX ADMIN — LACTULOSE 30 G: 20 SOLUTION ORAL at 05:53

## 2021-01-01 RX ADMIN — RIFAXIMIN 550 MG: 550 TABLET ORAL at 20:35

## 2021-01-01 RX ADMIN — SODIUM CHLORIDE 500 ML: 9 INJECTION, SOLUTION INTRAVENOUS at 20:47

## 2021-01-01 RX ADMIN — MIDODRINE HYDROCHLORIDE 10 MG: 10 TABLET ORAL at 23:48

## 2021-01-01 RX ADMIN — GABAPENTIN 200 MG: 100 CAPSULE ORAL at 12:00

## 2021-01-01 RX ADMIN — EPOETIN ALFA-EPBX 3000 UNITS: 3000 INJECTION, SOLUTION INTRAVENOUS; SUBCUTANEOUS at 15:52

## 2021-01-01 RX ADMIN — MIDODRINE HYDROCHLORIDE 15 MG: 10 TABLET ORAL at 12:03

## 2021-01-01 RX ADMIN — MORPHINE SULFATE 4 MG: 4 INJECTION, SOLUTION INTRAMUSCULAR; INTRAVENOUS at 08:53

## 2021-01-01 RX ADMIN — OCTREOTIDE ACETATE 200 MCG: 100 INJECTION, SOLUTION INTRAVENOUS; SUBCUTANEOUS at 18:33

## 2021-01-01 RX ADMIN — GABAPENTIN 300 MG: 300 CAPSULE ORAL at 20:25

## 2021-01-01 RX ADMIN — OCTREOTIDE ACETATE 100 MCG: 100 INJECTION, SOLUTION INTRAVENOUS; SUBCUTANEOUS at 08:29

## 2021-01-01 RX ADMIN — LACTULOSE 45 G: 20 SOLUTION ORAL at 21:24

## 2021-01-01 RX ADMIN — MIDODRINE HYDROCHLORIDE 15 MG: 10 TABLET ORAL at 13:57

## 2021-01-01 RX ADMIN — MIDODRINE HYDROCHLORIDE 15 MG: 10 TABLET ORAL at 22:08

## 2021-01-01 RX ADMIN — SODIUM BICARBONATE 1300 MG: 650 TABLET ORAL at 14:54

## 2021-01-01 RX ADMIN — MIDODRINE HYDROCHLORIDE 2.5 MG: 2.5 TABLET ORAL at 10:39

## 2021-01-01 RX ADMIN — SODIUM CHLORIDE, PRESERVATIVE FREE 10 ML: 5 INJECTION INTRAVENOUS at 08:04

## 2021-01-01 RX ADMIN — LACTULOSE 45 G: 20 SOLUTION ORAL at 08:49

## 2021-01-01 RX ADMIN — TRAMADOL HYDROCHLORIDE 25 MG: 50 TABLET, FILM COATED ORAL at 19:12

## 2021-01-01 RX ADMIN — SODIUM BICARBONATE 1300 MG: 650 TABLET ORAL at 08:55

## 2021-01-01 RX ADMIN — DEXTROSE MONOHYDRATE 50 ML: 25 INJECTION, SOLUTION INTRAVENOUS at 07:44

## 2021-01-01 RX ADMIN — HYDROCORTISONE: 1 CREAM TOPICAL at 08:55

## 2021-01-01 RX ADMIN — SPIRONOLACTONE 50 MG: 25 TABLET ORAL at 07:43

## 2021-01-01 RX ADMIN — ALBUMIN (HUMAN) 25 G: 0.25 INJECTION, SOLUTION INTRAVENOUS at 18:15

## 2021-01-01 RX ADMIN — RIFAXIMIN 550 MG: 550 TABLET ORAL at 22:05

## 2021-01-01 RX ADMIN — SODIUM CHLORIDE, POTASSIUM CHLORIDE, SODIUM LACTATE AND CALCIUM CHLORIDE 500 ML: 600; 310; 30; 20 INJECTION, SOLUTION INTRAVENOUS at 17:26

## 2021-01-01 RX ADMIN — OCTREOTIDE ACETATE 100 MCG: 100 INJECTION, SOLUTION INTRAVENOUS; SUBCUTANEOUS at 13:30

## 2021-01-01 RX ADMIN — RIFAXIMIN 550 MG: 550 TABLET ORAL at 20:56

## 2021-01-01 RX ADMIN — SODIUM CHLORIDE, PRESERVATIVE FREE 10 ML: 5 INJECTION INTRAVENOUS at 18:10

## 2021-01-01 RX ADMIN — ONDANSETRON 4 MG: 2 INJECTION INTRAMUSCULAR; INTRAVENOUS at 13:28

## 2021-01-01 RX ADMIN — METOPROLOL TARTRATE 5 MG: 5 INJECTION INTRAVENOUS at 06:41

## 2021-01-01 RX ADMIN — TIOTROPIUM BROMIDE INHALATION SPRAY 2 PUFF: 3.12 SPRAY, METERED RESPIRATORY (INHALATION) at 08:42

## 2021-01-01 RX ADMIN — HYDROCORTISONE: 1 CREAM TOPICAL at 20:45

## 2021-01-01 RX ADMIN — MIDODRINE HYDROCHLORIDE 15 MG: 10 TABLET ORAL at 13:30

## 2021-01-01 RX ADMIN — MIDODRINE HYDROCHLORIDE 15 MG: 10 TABLET ORAL at 08:26

## 2021-01-01 RX ADMIN — GABAPENTIN 200 MG: 100 CAPSULE ORAL at 22:36

## 2021-01-01 RX ADMIN — ONDANSETRON 4 MG: 2 INJECTION INTRAMUSCULAR; INTRAVENOUS at 03:26

## 2021-01-01 RX ADMIN — CEFTRIAXONE SODIUM 1000 MG: 1 INJECTION, POWDER, FOR SOLUTION INTRAMUSCULAR; INTRAVENOUS at 13:30

## 2021-01-01 RX ADMIN — GABAPENTIN 300 MG: 300 CAPSULE ORAL at 10:00

## 2021-01-01 RX ADMIN — OCTREOTIDE ACETATE 100 MCG: 100 INJECTION, SOLUTION INTRAVENOUS; SUBCUTANEOUS at 05:53

## 2021-01-01 RX ADMIN — INSULIN LISPRO 2 UNITS: 100 INJECTION, SOLUTION INTRAVENOUS; SUBCUTANEOUS at 12:35

## 2021-01-01 RX ADMIN — TRAMADOL HYDROCHLORIDE 25 MG: 50 TABLET, FILM COATED ORAL at 21:44

## 2021-01-01 RX ADMIN — ALBUMIN (HUMAN) 25 G: 0.25 INJECTION, SOLUTION INTRAVENOUS at 09:15

## 2021-01-01 RX ADMIN — FAMOTIDINE 20 MG: 20 TABLET ORAL at 12:04

## 2021-01-01 RX ADMIN — LACTULOSE 30 G: 20 SOLUTION ORAL at 13:23

## 2021-01-01 RX ADMIN — ALBUMIN (HUMAN) 25 G: 0.25 INJECTION, SOLUTION INTRAVENOUS at 01:12

## 2021-01-01 RX ADMIN — FAMOTIDINE 20 MG: 20 TABLET, FILM COATED ORAL at 08:34

## 2021-01-01 RX ADMIN — SODIUM CHLORIDE, PRESERVATIVE FREE 10 ML: 5 INJECTION INTRAVENOUS at 09:31

## 2021-01-01 RX ADMIN — RIFAXIMIN 550 MG: 550 TABLET ORAL at 21:27

## 2021-01-01 RX ADMIN — MIDODRINE HYDROCHLORIDE 15 MG: 10 TABLET ORAL at 16:04

## 2021-01-01 RX ADMIN — LACTULOSE 45 G: 20 SOLUTION ORAL at 08:50

## 2021-01-01 RX ADMIN — DILTIAZEM HYDROCHLORIDE 15 MG: 30 TABLET, FILM COATED ORAL at 23:21

## 2021-01-01 RX ADMIN — MIDODRINE HYDROCHLORIDE 10 MG: 10 TABLET ORAL at 06:54

## 2021-01-01 RX ADMIN — LACTULOSE 45 G: 20 SOLUTION ORAL at 15:43

## 2021-01-01 RX ADMIN — DILTIAZEM HYDROCHLORIDE 30 MG: 30 TABLET, FILM COATED ORAL at 13:23

## 2021-01-01 RX ADMIN — SODIUM CHLORIDE, PRESERVATIVE FREE 10 ML: 5 INJECTION INTRAVENOUS at 20:35

## 2021-01-01 RX ADMIN — GABAPENTIN 200 MG: 100 CAPSULE ORAL at 20:52

## 2021-01-01 RX ADMIN — ONDANSETRON 4 MG: 2 INJECTION INTRAMUSCULAR; INTRAVENOUS at 08:36

## 2021-01-01 RX ADMIN — GABAPENTIN 300 MG: 300 CAPSULE ORAL at 23:48

## 2021-01-01 RX ADMIN — FAMOTIDINE 20 MG: 20 TABLET, FILM COATED ORAL at 13:04

## 2021-01-01 RX ADMIN — GABAPENTIN 200 MG: 100 CAPSULE ORAL at 10:28

## 2021-01-01 RX ADMIN — OCTREOTIDE ACETATE 100 MCG: 100 INJECTION, SOLUTION INTRAVENOUS; SUBCUTANEOUS at 06:11

## 2021-01-01 RX ADMIN — LACTULOSE 30 G: 20 SOLUTION ORAL at 09:18

## 2021-01-01 RX ADMIN — RIFAXIMIN 550 MG: 550 TABLET ORAL at 20:47

## 2021-01-01 RX ADMIN — LACTULOSE 30 G: 20 SOLUTION ORAL at 05:52

## 2021-01-01 RX ADMIN — ALBUMIN (HUMAN) 25 G: 0.25 INJECTION, SOLUTION INTRAVENOUS at 13:18

## 2021-01-01 RX ADMIN — MIDODRINE HYDROCHLORIDE 10 MG: 10 TABLET ORAL at 20:26

## 2021-01-01 RX ADMIN — ALBUMIN (HUMAN) 25 G: 0.25 INJECTION, SOLUTION INTRAVENOUS at 08:03

## 2021-01-01 RX ADMIN — METOPROLOL TARTRATE 5 MG: 5 INJECTION INTRAVENOUS at 18:14

## 2021-01-01 RX ADMIN — LACTULOSE 45 G: 20 SOLUTION ORAL at 21:54

## 2021-01-01 RX ADMIN — LACTULOSE 30 G: 20 SOLUTION ORAL at 00:42

## 2021-01-01 RX ADMIN — TIOTROPIUM BROMIDE INHALATION SPRAY 2 PUFF: 3.12 SPRAY, METERED RESPIRATORY (INHALATION) at 08:06

## 2021-01-01 RX ADMIN — HYDROCORTISONE: 1 CREAM TOPICAL at 09:04

## 2021-01-01 RX ADMIN — SODIUM BICARBONATE 1300 MG: 650 TABLET ORAL at 20:21

## 2021-01-01 RX ADMIN — LACTULOSE 45 G: 20 SOLUTION ORAL at 20:22

## 2021-01-01 RX ADMIN — MIDODRINE HYDROCHLORIDE 15 MG: 10 TABLET ORAL at 17:23

## 2021-01-01 RX ADMIN — SODIUM CHLORIDE, PRESERVATIVE FREE 10 ML: 5 INJECTION INTRAVENOUS at 21:42

## 2021-01-01 RX ADMIN — MIDODRINE HYDROCHLORIDE 15 MG: 10 TABLET ORAL at 07:42

## 2021-01-01 RX ADMIN — TIOTROPIUM BROMIDE INHALATION SPRAY 2 PUFF: 3.12 SPRAY, METERED RESPIRATORY (INHALATION) at 08:46

## 2021-01-01 RX ADMIN — ALBUMIN (HUMAN) 25 G: 0.25 INJECTION, SOLUTION INTRAVENOUS at 08:33

## 2021-01-01 RX ADMIN — GABAPENTIN 200 MG: 100 CAPSULE ORAL at 08:58

## 2021-01-01 RX ADMIN — DILTIAZEM HYDROCHLORIDE 30 MG: 30 TABLET, FILM COATED ORAL at 09:17

## 2021-01-01 RX ADMIN — SPIRONOLACTONE 50 MG: 25 TABLET ORAL at 08:21

## 2021-01-01 RX ADMIN — METOCLOPRAMIDE 5 MG: 5 INJECTION, SOLUTION INTRAMUSCULAR; INTRAVENOUS at 12:21

## 2021-01-01 RX ADMIN — AMIODARONE HYDROCHLORIDE 200 MG: 200 TABLET ORAL at 22:36

## 2021-01-01 RX ADMIN — MIDODRINE HYDROCHLORIDE 10 MG: 10 TABLET ORAL at 18:47

## 2021-01-01 RX ADMIN — DILTIAZEM HYDROCHLORIDE 30 MG: 30 TABLET, FILM COATED ORAL at 08:05

## 2021-01-01 RX ADMIN — PANTOPRAZOLE SODIUM 40 MG: 40 TABLET, DELAYED RELEASE ORAL at 05:57

## 2021-01-01 RX ADMIN — RIFAXIMIN 550 MG: 550 TABLET ORAL at 10:24

## 2021-01-01 RX ADMIN — ALBUMIN (HUMAN) 25 G: 0.25 INJECTION, SOLUTION INTRAVENOUS at 13:35

## 2021-01-01 RX ADMIN — LACTULOSE 30 G: 20 SOLUTION ORAL at 20:48

## 2021-01-01 RX ADMIN — MIDODRINE HYDROCHLORIDE 15 MG: 10 TABLET ORAL at 09:18

## 2021-01-01 RX ADMIN — GABAPENTIN 200 MG: 100 CAPSULE ORAL at 21:07

## 2021-01-01 RX ADMIN — DEXAMETHASONE SODIUM PHOSPHATE 4 MG: 4 INJECTION, SOLUTION INTRA-ARTICULAR; INTRALESIONAL; INTRAMUSCULAR; INTRAVENOUS; SOFT TISSUE at 15:22

## 2021-01-01 RX ADMIN — METOPROLOL TARTRATE 75 MG: 25 TABLET ORAL at 21:11

## 2021-01-01 RX ADMIN — INSULIN LISPRO 2 UNITS: 100 INJECTION, SOLUTION INTRAVENOUS; SUBCUTANEOUS at 08:38

## 2021-01-01 RX ADMIN — PIPERACILLIN AND TAZOBACTAM 2250 MG: 2; .25 INJECTION, POWDER, LYOPHILIZED, FOR SOLUTION INTRAVENOUS at 20:17

## 2021-01-01 RX ADMIN — INSULIN LISPRO 6 UNITS: 100 INJECTION, SOLUTION INTRAVENOUS; SUBCUTANEOUS at 16:16

## 2021-01-01 RX ADMIN — LACTULOSE 30 G: 20 SOLUTION ORAL at 22:34

## 2021-01-01 RX ADMIN — PANTOPRAZOLE SODIUM 40 MG: 40 INJECTION, POWDER, FOR SOLUTION INTRAVENOUS at 17:07

## 2021-01-01 RX ADMIN — LACTULOSE 30 G: 20 SOLUTION ORAL at 17:21

## 2021-01-01 RX ADMIN — SODIUM CHLORIDE, PRESERVATIVE FREE 10 ML: 5 INJECTION INTRAVENOUS at 21:19

## 2021-01-01 RX ADMIN — LACTULOSE 10 G: 20 SOLUTION ORAL at 09:26

## 2021-01-01 RX ADMIN — SODIUM CHLORIDE, PRESERVATIVE FREE 10 ML: 5 INJECTION INTRAVENOUS at 13:35

## 2021-01-01 RX ADMIN — MIDODRINE HYDROCHLORIDE 15 MG: 10 TABLET ORAL at 22:10

## 2021-01-01 RX ADMIN — TIOTROPIUM BROMIDE INHALATION SPRAY 2 PUFF: 3.12 SPRAY, METERED RESPIRATORY (INHALATION) at 09:42

## 2021-01-01 RX ADMIN — MIDODRINE HYDROCHLORIDE 15 MG: 10 TABLET ORAL at 14:54

## 2021-01-01 RX ADMIN — POTASSIUM BICARBONATE 40 MEQ: 782 TABLET, EFFERVESCENT ORAL at 10:35

## 2021-01-01 RX ADMIN — GABAPENTIN 200 MG: 100 CAPSULE ORAL at 20:47

## 2021-01-01 RX ADMIN — RIFAXIMIN 550 MG: 550 TABLET ORAL at 14:27

## 2021-01-01 RX ADMIN — GABAPENTIN 200 MG: 100 CAPSULE ORAL at 20:56

## 2021-01-01 RX ADMIN — AMIODARONE HYDROCHLORIDE 200 MG: 200 TABLET ORAL at 09:16

## 2021-01-01 RX ADMIN — RIFAXIMIN 550 MG: 550 TABLET ORAL at 21:42

## 2021-01-01 RX ADMIN — FAMOTIDINE 20 MG: 20 TABLET, FILM COATED ORAL at 09:18

## 2021-01-01 RX ADMIN — ONDANSETRON 4 MG: 2 INJECTION INTRAMUSCULAR; INTRAVENOUS at 01:54

## 2021-01-01 RX ADMIN — PHYTONADIONE 2.5 MG: 5 TABLET ORAL at 17:50

## 2021-01-01 RX ADMIN — MIDODRINE HYDROCHLORIDE 15 MG: 10 TABLET ORAL at 12:27

## 2021-01-01 RX ADMIN — AMIODARONE HYDROCHLORIDE 200 MG: 200 TABLET ORAL at 09:28

## 2021-01-01 RX ADMIN — SODIUM BICARBONATE 1300 MG: 650 TABLET ORAL at 12:21

## 2021-01-01 RX ADMIN — MIDODRINE HYDROCHLORIDE 10 MG: 10 TABLET ORAL at 12:44

## 2021-01-01 RX ADMIN — MIDODRINE HYDROCHLORIDE 15 MG: 10 TABLET ORAL at 08:58

## 2021-01-01 RX ADMIN — RIFAXIMIN 550 MG: 550 TABLET ORAL at 09:18

## 2021-01-01 RX ADMIN — LACTULOSE 30 G: 20 SOLUTION ORAL at 05:06

## 2021-01-01 RX ADMIN — METOCLOPRAMIDE 5 MG: 5 INJECTION, SOLUTION INTRAMUSCULAR; INTRAVENOUS at 23:37

## 2021-01-01 RX ADMIN — LACTULOSE 30 G: 20 SOLUTION ORAL at 23:11

## 2021-01-01 RX ADMIN — INSULIN LISPRO 2 UNITS: 100 INJECTION, SOLUTION INTRAVENOUS; SUBCUTANEOUS at 09:14

## 2021-01-01 RX ADMIN — LACTULOSE 45 G: 20 SOLUTION ORAL at 15:55

## 2021-01-01 RX ADMIN — GABAPENTIN 200 MG: 100 CAPSULE ORAL at 09:25

## 2021-01-01 RX ADMIN — ALBUMIN (HUMAN) 25 G: 0.25 INJECTION, SOLUTION INTRAVENOUS at 18:14

## 2021-01-01 RX ADMIN — SODIUM BICARBONATE 1300 MG: 650 TABLET ORAL at 21:07

## 2021-01-01 RX ADMIN — LACTULOSE 30 G: 20 SOLUTION ORAL at 13:27

## 2021-01-01 RX ADMIN — SIMETHICONE 80 MG: 80 TABLET, CHEWABLE ORAL at 18:18

## 2021-01-01 RX ADMIN — SODIUM BICARBONATE 1300 MG: 650 TABLET ORAL at 12:29

## 2021-01-01 RX ADMIN — AMIODARONE HYDROCHLORIDE 200 MG: 200 TABLET ORAL at 09:25

## 2021-01-01 RX ADMIN — SODIUM CHLORIDE, PRESERVATIVE FREE 10 ML: 5 INJECTION INTRAVENOUS at 23:15

## 2021-01-01 RX ADMIN — LACTULOSE 30 G: 20 SOLUTION ORAL at 20:26

## 2021-01-01 RX ADMIN — NEOSTIGMINE METHYLSULFATE 0.5 MG: 1 INJECTION, SOLUTION INTRAVENOUS at 20:37

## 2021-01-01 RX ADMIN — OCTREOTIDE ACETATE 200 MCG: 100 INJECTION, SOLUTION INTRAVENOUS; SUBCUTANEOUS at 05:00

## 2021-01-01 RX ADMIN — SODIUM CHLORIDE, PRESERVATIVE FREE 10 ML: 5 INJECTION INTRAVENOUS at 20:40

## 2021-01-01 RX ADMIN — MIDODRINE HYDROCHLORIDE 15 MG: 10 TABLET ORAL at 10:28

## 2021-01-01 RX ADMIN — MIDODRINE HYDROCHLORIDE 15 MG: 10 TABLET ORAL at 09:08

## 2021-01-01 RX ADMIN — MIDODRINE HYDROCHLORIDE 15 MG: 10 TABLET ORAL at 21:51

## 2021-01-01 RX ADMIN — MIDODRINE HYDROCHLORIDE 15 MG: 10 TABLET ORAL at 20:57

## 2021-01-01 RX ADMIN — OCTREOTIDE ACETATE 100 MCG: 100 INJECTION, SOLUTION INTRAVENOUS; SUBCUTANEOUS at 22:13

## 2021-01-01 RX ADMIN — DILTIAZEM HYDROCHLORIDE 30 MG: 30 TABLET, FILM COATED ORAL at 07:45

## 2021-01-01 RX ADMIN — ASPIRIN 81 MG: 81 TABLET, COATED ORAL at 15:48

## 2021-01-01 RX ADMIN — ASPIRIN 81 MG: 81 TABLET, COATED ORAL at 10:00

## 2021-01-01 RX ADMIN — MIDODRINE HYDROCHLORIDE 15 MG: 10 TABLET ORAL at 10:08

## 2021-01-01 RX ADMIN — OCTREOTIDE ACETATE 100 MCG: 100 INJECTION, SOLUTION INTRAVENOUS; SUBCUTANEOUS at 00:12

## 2021-01-01 RX ADMIN — ALBUMIN (HUMAN) 25 G: 0.25 INJECTION, SOLUTION INTRAVENOUS at 08:48

## 2021-01-01 RX ADMIN — NEOSTIGMINE METHYLSULFATE 0.5 MG: 1 INJECTION, SOLUTION INTRAVENOUS at 17:44

## 2021-01-01 RX ADMIN — RIFAXIMIN 550 MG: 550 TABLET ORAL at 12:00

## 2021-01-01 RX ADMIN — MIDODRINE HYDROCHLORIDE 15 MG: 10 TABLET ORAL at 17:26

## 2021-01-01 RX ADMIN — SODIUM BICARBONATE: 84 INJECTION, SOLUTION INTRAVENOUS at 10:23

## 2021-01-01 RX ADMIN — RIFAXIMIN 550 MG: 550 TABLET ORAL at 08:44

## 2021-01-01 RX ADMIN — OCTREOTIDE ACETATE 200 MCG: 100 INJECTION, SOLUTION INTRAVENOUS; SUBCUTANEOUS at 21:00

## 2021-01-01 RX ADMIN — DIPHENHYDRAMINE HYDROCHLORIDE 25 MG: 50 INJECTION, SOLUTION INTRAMUSCULAR; INTRAVENOUS at 09:43

## 2021-01-01 RX ADMIN — Medication 2 G: at 17:45

## 2021-01-01 RX ADMIN — HEPARIN SODIUM 5000 UNITS: 5000 INJECTION INTRAVENOUS; SUBCUTANEOUS at 23:02

## 2021-01-01 RX ADMIN — AMIODARONE HYDROCHLORIDE 200 MG: 200 TABLET ORAL at 08:37

## 2021-01-01 RX ADMIN — SODIUM BICARBONATE 1300 MG: 650 TABLET ORAL at 09:03

## 2021-01-01 RX ADMIN — SODIUM CHLORIDE, PRESERVATIVE FREE 10 ML: 5 INJECTION INTRAVENOUS at 08:25

## 2021-01-01 RX ADMIN — TIOTROPIUM BROMIDE INHALATION SPRAY 2 PUFF: 3.12 SPRAY, METERED RESPIRATORY (INHALATION) at 09:47

## 2021-01-01 RX ADMIN — MIDODRINE HYDROCHLORIDE 15 MG: 10 TABLET ORAL at 22:03

## 2021-01-01 RX ADMIN — GABAPENTIN 200 MG: 300 CAPSULE ORAL at 21:21

## 2021-01-01 RX ADMIN — MIDODRINE HYDROCHLORIDE 10 MG: 10 TABLET ORAL at 08:05

## 2021-01-01 RX ADMIN — AMIODARONE HYDROCHLORIDE 200 MG: 200 TABLET ORAL at 12:21

## 2021-01-01 RX ADMIN — METOPROLOL TARTRATE 50 MG: 50 TABLET, FILM COATED ORAL at 08:36

## 2021-01-01 RX ADMIN — TIOTROPIUM BROMIDE INHALATION SPRAY 2 PUFF: 3.12 SPRAY, METERED RESPIRATORY (INHALATION) at 10:55

## 2021-01-01 RX ADMIN — METOCLOPRAMIDE 5 MG: 5 INJECTION, SOLUTION INTRAMUSCULAR; INTRAVENOUS at 05:33

## 2021-01-01 RX ADMIN — RIFAXIMIN 550 MG: 550 TABLET ORAL at 10:28

## 2021-01-01 RX ADMIN — METOPROLOL TARTRATE 25 MG: 25 TABLET, FILM COATED ORAL at 18:10

## 2021-01-01 RX ADMIN — TRAMADOL HYDROCHLORIDE 25 MG: 50 TABLET, FILM COATED ORAL at 03:38

## 2021-01-01 RX ADMIN — SODIUM CHLORIDE, PRESERVATIVE FREE 10 ML: 5 INJECTION INTRAVENOUS at 12:43

## 2021-01-01 RX ADMIN — FAMOTIDINE 20 MG: 20 TABLET, FILM COATED ORAL at 08:13

## 2021-01-01 RX ADMIN — PIPERACILLIN AND TAZOBACTAM 2250 MG: 2; .25 INJECTION, POWDER, LYOPHILIZED, FOR SOLUTION INTRAVENOUS at 05:23

## 2021-01-01 RX ADMIN — GABAPENTIN 300 MG: 300 CAPSULE ORAL at 09:18

## 2021-01-01 RX ADMIN — LACTULOSE 45 G: 20 SOLUTION ORAL at 21:50

## 2021-01-01 RX ADMIN — LACTULOSE 45 G: 20 SOLUTION ORAL at 09:47

## 2021-01-01 RX ADMIN — SODIUM CHLORIDE, PRESERVATIVE FREE 10 ML: 5 INJECTION INTRAVENOUS at 20:58

## 2021-01-01 RX ADMIN — OCTREOTIDE ACETATE 100 MCG: 100 INJECTION, SOLUTION INTRAVENOUS; SUBCUTANEOUS at 23:26

## 2021-01-01 RX ADMIN — TIOTROPIUM BROMIDE INHALATION SPRAY 2 PUFF: 3.12 SPRAY, METERED RESPIRATORY (INHALATION) at 08:28

## 2021-01-01 RX ADMIN — SODIUM CHLORIDE, PRESERVATIVE FREE 10 ML: 5 INJECTION INTRAVENOUS at 21:03

## 2021-01-01 RX ADMIN — MIDODRINE HYDROCHLORIDE 20 MG: 10 TABLET ORAL at 17:34

## 2021-01-01 RX ADMIN — FAMOTIDINE 20 MG: 20 TABLET ORAL at 08:55

## 2021-01-01 RX ADMIN — METOCLOPRAMIDE 5 MG: 5 INJECTION, SOLUTION INTRAMUSCULAR; INTRAVENOUS at 18:35

## 2021-01-01 RX ADMIN — AMIODARONE HYDROCHLORIDE 0.5 MG/MIN: 1.8 INJECTION, SOLUTION INTRAVENOUS at 05:10

## 2021-01-01 RX ADMIN — TRAMADOL HYDROCHLORIDE 25 MG: 50 TABLET, FILM COATED ORAL at 21:27

## 2021-01-01 RX ADMIN — ONDANSETRON 4 MG: 2 INJECTION INTRAMUSCULAR; INTRAVENOUS at 13:01

## 2021-01-01 RX ADMIN — HYDROCORTISONE: 1 CREAM TOPICAL at 19:56

## 2021-01-01 RX ADMIN — DILTIAZEM HYDROCHLORIDE 15 MG: 30 TABLET, FILM COATED ORAL at 08:54

## 2021-01-01 RX ADMIN — METOPROLOL TARTRATE 12.5 MG: 25 TABLET, FILM COATED ORAL at 11:43

## 2021-01-01 RX ADMIN — DILTIAZEM HYDROCHLORIDE 15 MG: 30 TABLET, FILM COATED ORAL at 05:58

## 2021-01-01 RX ADMIN — INSULIN LISPRO 1 UNITS: 100 INJECTION, SOLUTION INTRAVENOUS; SUBCUTANEOUS at 23:01

## 2021-01-01 RX ADMIN — DILTIAZEM HYDROCHLORIDE 30 MG: 30 TABLET, FILM COATED ORAL at 15:55

## 2021-01-01 RX ADMIN — MIDODRINE HYDROCHLORIDE 15 MG: 10 TABLET ORAL at 08:55

## 2021-01-01 RX ADMIN — BUMETANIDE 1 MG: 0.25 INJECTION, SOLUTION INTRAMUSCULAR; INTRAVENOUS at 20:46

## 2021-01-01 RX ADMIN — ONDANSETRON 8 MG: 2 INJECTION INTRAMUSCULAR; INTRAVENOUS at 23:20

## 2021-01-01 RX ADMIN — NEOSTIGMINE METHYLSULFATE 0.5 MG: 1 INJECTION, SOLUTION INTRAVENOUS at 09:32

## 2021-01-01 RX ADMIN — MIDODRINE HYDROCHLORIDE 15 MG: 10 TABLET ORAL at 18:08

## 2021-01-01 RX ADMIN — GABAPENTIN 200 MG: 100 CAPSULE ORAL at 09:47

## 2021-01-01 RX ADMIN — SPIRONOLACTONE 25 MG: 25 TABLET ORAL at 09:17

## 2021-01-01 RX ADMIN — BUMETANIDE 1 MG: 1 TABLET ORAL at 21:21

## 2021-01-01 RX ADMIN — PANTOPRAZOLE SODIUM 40 MG: 40 TABLET, DELAYED RELEASE ORAL at 10:50

## 2021-01-01 RX ADMIN — MIDODRINE HYDROCHLORIDE 10 MG: 10 TABLET ORAL at 13:02

## 2021-01-01 RX ADMIN — OCTREOTIDE ACETATE 200 MCG: 100 INJECTION, SOLUTION INTRAVENOUS; SUBCUTANEOUS at 21:53

## 2021-01-01 RX ADMIN — SODIUM CHLORIDE, PRESERVATIVE FREE 10 ML: 5 INJECTION INTRAVENOUS at 20:54

## 2021-01-01 RX ADMIN — ALBUMIN (HUMAN) 25 G: 0.25 INJECTION, SOLUTION INTRAVENOUS at 22:21

## 2021-01-01 RX ADMIN — PANTOPRAZOLE SODIUM 40 MG: 40 TABLET, DELAYED RELEASE ORAL at 07:43

## 2021-01-01 RX ADMIN — LACTULOSE 30 G: 20 SOLUTION ORAL at 13:58

## 2021-01-01 RX ADMIN — METOPROLOL TARTRATE 100 MG: 100 TABLET, FILM COATED ORAL at 08:05

## 2021-01-01 RX ADMIN — TIOTROPIUM BROMIDE INHALATION SPRAY 2 PUFF: 3.12 SPRAY, METERED RESPIRATORY (INHALATION) at 09:14

## 2021-01-01 RX ADMIN — LACTULOSE 30 G: 20 SOLUTION ORAL at 18:00

## 2021-01-01 RX ADMIN — ONDANSETRON 4 MG: 2 INJECTION INTRAMUSCULAR; INTRAVENOUS at 03:40

## 2021-01-01 RX ADMIN — SODIUM CHLORIDE, PRESERVATIVE FREE 10 ML: 5 INJECTION INTRAVENOUS at 09:26

## 2021-01-01 RX ADMIN — BUMETANIDE 1 MG: 0.25 INJECTION, SOLUTION INTRAMUSCULAR; INTRAVENOUS at 08:22

## 2021-01-01 RX ADMIN — MIDODRINE HYDROCHLORIDE 15 MG: 10 TABLET ORAL at 16:36

## 2021-01-01 RX ADMIN — AMIODARONE HYDROCHLORIDE 200 MG: 200 TABLET ORAL at 10:24

## 2021-01-01 RX ADMIN — POTASSIUM CHLORIDE 20 MEQ: 1500 TABLET, EXTENDED RELEASE ORAL at 00:50

## 2021-01-01 RX ADMIN — SODIUM CHLORIDE, PRESERVATIVE FREE 10 ML: 5 INJECTION INTRAVENOUS at 21:01

## 2021-01-01 RX ADMIN — FAMOTIDINE 20 MG: 20 TABLET ORAL at 09:28

## 2021-01-01 RX ADMIN — SODIUM CHLORIDE, PRESERVATIVE FREE 10 ML: 5 INJECTION INTRAVENOUS at 20:01

## 2021-01-01 RX ADMIN — RIFAXIMIN 550 MG: 550 TABLET ORAL at 22:35

## 2021-01-01 RX ADMIN — MIDODRINE HYDROCHLORIDE 15 MG: 10 TABLET ORAL at 08:49

## 2021-01-01 RX ADMIN — MIDODRINE HYDROCHLORIDE 15 MG: 10 TABLET ORAL at 11:39

## 2021-01-01 RX ADMIN — PREDNISONE 60 MG: 20 TABLET ORAL at 09:24

## 2021-01-01 RX ADMIN — DILTIAZEM HYDROCHLORIDE 30 MG: 30 TABLET, FILM COATED ORAL at 22:35

## 2021-01-01 RX ADMIN — SIMETHICONE 80 MG: 80 TABLET, CHEWABLE ORAL at 09:18

## 2021-01-01 RX ADMIN — SODIUM BICARBONATE: 84 INJECTION, SOLUTION INTRAVENOUS at 15:52

## 2021-01-01 RX ADMIN — METOCLOPRAMIDE 5 MG: 5 INJECTION, SOLUTION INTRAMUSCULAR; INTRAVENOUS at 04:21

## 2021-01-01 RX ADMIN — RIFAXIMIN 550 MG: 550 TABLET ORAL at 20:38

## 2021-01-01 RX ADMIN — DILTIAZEM HYDROCHLORIDE 30 MG: 30 TABLET, FILM COATED ORAL at 20:32

## 2021-01-01 RX ADMIN — MIDODRINE HYDROCHLORIDE 15 MG: 10 TABLET ORAL at 17:56

## 2021-01-01 RX ADMIN — PANTOPRAZOLE SODIUM 40 MG: 40 TABLET, DELAYED RELEASE ORAL at 08:54

## 2021-01-01 RX ADMIN — OCTREOTIDE ACETATE 100 MCG: 100 INJECTION, SOLUTION INTRAVENOUS; SUBCUTANEOUS at 12:29

## 2021-01-01 RX ADMIN — AMIODARONE HYDROCHLORIDE 200 MG: 200 TABLET ORAL at 10:28

## 2021-01-01 RX ADMIN — GABAPENTIN 200 MG: 100 CAPSULE ORAL at 12:44

## 2021-01-01 RX ADMIN — TIOTROPIUM BROMIDE INHALATION SPRAY 2 PUFF: 3.12 SPRAY, METERED RESPIRATORY (INHALATION) at 07:46

## 2021-01-01 RX ADMIN — OXYMETAZOLINE HYDROCHLORIDE 2 SPRAY: 0.05 SPRAY NASAL at 08:55

## 2021-01-01 RX ADMIN — TIOTROPIUM BROMIDE INHALATION SPRAY 2 PUFF: 3.12 SPRAY, METERED RESPIRATORY (INHALATION) at 13:19

## 2021-01-01 RX ADMIN — LACTULOSE 30 G: 20 SOLUTION ORAL at 04:43

## 2021-01-01 RX ADMIN — LACTULOSE 30 G: 20 SOLUTION ORAL at 15:45

## 2021-01-01 RX ADMIN — LACTULOSE 45 G: 20 SOLUTION ORAL at 11:01

## 2021-01-01 RX ADMIN — SIMETHICONE 80 MG: 80 TABLET, CHEWABLE ORAL at 13:55

## 2021-01-01 RX ADMIN — SODIUM CHLORIDE, PRESERVATIVE FREE 10 ML: 5 INJECTION INTRAVENOUS at 20:13

## 2021-01-01 RX ADMIN — PANTOPRAZOLE SODIUM 40 MG: 40 TABLET, DELAYED RELEASE ORAL at 21:54

## 2021-01-01 RX ADMIN — RIFAXIMIN 550 MG: 550 TABLET ORAL at 08:29

## 2021-01-01 RX ADMIN — BUMETANIDE 1 MG: 1 TABLET ORAL at 20:47

## 2021-01-01 RX ADMIN — PREDNISONE 60 MG: 20 TABLET ORAL at 13:41

## 2021-01-01 RX ADMIN — SPIRONOLACTONE 25 MG: 25 TABLET ORAL at 17:56

## 2021-01-01 RX ADMIN — MIDODRINE HYDROCHLORIDE 15 MG: 10 TABLET ORAL at 13:29

## 2021-01-01 RX ADMIN — OCTREOTIDE ACETATE 100 MCG: 100 INJECTION, SOLUTION INTRAVENOUS; SUBCUTANEOUS at 05:37

## 2021-01-01 RX ADMIN — GABAPENTIN 200 MG: 100 CAPSULE ORAL at 09:28

## 2021-01-01 RX ADMIN — LACTULOSE 45 G: 20 SOLUTION ORAL at 09:15

## 2021-01-01 RX ADMIN — MIDODRINE HYDROCHLORIDE 15 MG: 10 TABLET ORAL at 20:06

## 2021-01-01 RX ADMIN — TIOTROPIUM BROMIDE INHALATION SPRAY 2 PUFF: 3.12 SPRAY, METERED RESPIRATORY (INHALATION) at 07:47

## 2021-01-01 RX ADMIN — FAMOTIDINE 20 MG: 20 TABLET ORAL at 10:28

## 2021-01-01 RX ADMIN — ALBUMIN (HUMAN) 25 G: 0.25 INJECTION, SOLUTION INTRAVENOUS at 10:09

## 2021-01-01 RX ADMIN — GABAPENTIN 200 MG: 100 CAPSULE ORAL at 09:11

## 2021-01-01 RX ADMIN — OXYMETAZOLINE HYDROCHLORIDE 2 SPRAY: 0.05 SPRAY NASAL at 19:56

## 2021-01-01 RX ADMIN — FAMOTIDINE 20 MG: 20 TABLET, FILM COATED ORAL at 09:17

## 2021-01-01 RX ADMIN — HYDROCORTISONE: 1 CREAM TOPICAL at 20:07

## 2021-01-01 RX ADMIN — LACTULOSE 45 G: 20 SOLUTION ORAL at 17:34

## 2021-01-01 RX ADMIN — OCTREOTIDE ACETATE 200 MCG: 100 INJECTION, SOLUTION INTRAVENOUS; SUBCUTANEOUS at 05:07

## 2021-01-01 RX ADMIN — ALBUMIN (HUMAN) 25 G: 0.25 INJECTION, SOLUTION INTRAVENOUS at 20:50

## 2021-01-01 RX ADMIN — SPIRONOLACTONE 50 MG: 25 TABLET ORAL at 21:11

## 2021-01-01 RX ADMIN — INSULIN LISPRO 1 UNITS: 100 INJECTION, SOLUTION INTRAVENOUS; SUBCUTANEOUS at 20:56

## 2021-01-01 RX ADMIN — RIFAXIMIN 550 MG: 550 TABLET ORAL at 10:07

## 2021-01-01 RX ADMIN — ONDANSETRON 8 MG: 2 INJECTION INTRAMUSCULAR; INTRAVENOUS at 15:23

## 2021-01-01 RX ADMIN — SODIUM CHLORIDE, POTASSIUM CHLORIDE, SODIUM LACTATE AND CALCIUM CHLORIDE 500 ML: 600; 310; 30; 20 INJECTION, SOLUTION INTRAVENOUS at 20:30

## 2021-01-01 RX ADMIN — SODIUM BICARBONATE 1300 MG: 650 TABLET ORAL at 22:04

## 2021-01-01 RX ADMIN — PANTOPRAZOLE SODIUM 40 MG: 40 TABLET, DELAYED RELEASE ORAL at 05:30

## 2021-01-01 RX ADMIN — TIOTROPIUM BROMIDE INHALATION SPRAY 2 PUFF: 3.12 SPRAY, METERED RESPIRATORY (INHALATION) at 04:18

## 2021-01-01 RX ADMIN — MIDODRINE HYDROCHLORIDE 15 MG: 10 TABLET ORAL at 08:22

## 2021-01-01 RX ADMIN — TIOTROPIUM BROMIDE INHALATION SPRAY 2 PUFF: 3.12 SPRAY, METERED RESPIRATORY (INHALATION) at 08:50

## 2021-01-01 RX ADMIN — FAMOTIDINE 20 MG: 20 TABLET ORAL at 10:24

## 2021-01-01 RX ADMIN — SODIUM CHLORIDE AND POTASSIUM CHLORIDE: .9; .15 SOLUTION INTRAVENOUS at 10:00

## 2021-01-01 RX ADMIN — MIDODRINE HYDROCHLORIDE 10 MG: 10 TABLET ORAL at 09:16

## 2021-01-01 RX ADMIN — GABAPENTIN 200 MG: 100 CAPSULE ORAL at 21:08

## 2021-01-01 RX ADMIN — LACTULOSE 30 G: 20 SOLUTION ORAL at 13:53

## 2021-01-01 RX ADMIN — LACTULOSE 20 G: 20 SOLUTION ORAL at 18:49

## 2021-01-01 RX ADMIN — LACTULOSE 45 G: 20 SOLUTION ORAL at 20:50

## 2021-01-01 RX ADMIN — LACTULOSE 20 G: 20 SOLUTION ORAL at 17:57

## 2021-01-01 RX ADMIN — RIFAXIMIN 550 MG: 550 TABLET ORAL at 08:59

## 2021-01-01 RX ADMIN — ALBUMIN (HUMAN) 25 G: 0.25 INJECTION, SOLUTION INTRAVENOUS at 12:37

## 2021-01-01 RX ADMIN — MIDODRINE HYDROCHLORIDE 15 MG: 10 TABLET ORAL at 22:58

## 2021-01-01 RX ADMIN — SODIUM CHLORIDE: 9 INJECTION, SOLUTION INTRAVENOUS at 17:49

## 2021-01-01 RX ADMIN — MIDODRINE HYDROCHLORIDE 15 MG: 10 TABLET ORAL at 12:51

## 2021-01-01 RX ADMIN — LACTULOSE 45 G: 20 SOLUTION ORAL at 09:28

## 2021-01-01 RX ADMIN — ALBUMIN (HUMAN) 25 G: 0.25 INJECTION, SOLUTION INTRAVENOUS at 14:11

## 2021-01-01 RX ADMIN — ALBUMIN (HUMAN) 25 G: 0.25 INJECTION, SOLUTION INTRAVENOUS at 15:04

## 2021-01-01 RX ADMIN — SODIUM BICARBONATE 1300 MG: 650 TABLET ORAL at 08:48

## 2021-01-01 RX ADMIN — MIDODRINE HYDROCHLORIDE 15 MG: 10 TABLET ORAL at 12:29

## 2021-01-01 RX ADMIN — METOPROLOL TARTRATE 50 MG: 50 TABLET, FILM COATED ORAL at 20:06

## 2021-01-01 RX ADMIN — GABAPENTIN 300 MG: 300 CAPSULE ORAL at 20:46

## 2021-01-01 RX ADMIN — POTASSIUM CHLORIDE 40 MEQ: 1500 TABLET, EXTENDED RELEASE ORAL at 09:55

## 2021-01-01 RX ADMIN — LACTULOSE 45 G: 10 SOLUTION ORAL at 10:11

## 2021-01-01 RX ADMIN — SODIUM CHLORIDE, PRESERVATIVE FREE 10 ML: 5 INJECTION INTRAVENOUS at 20:38

## 2021-01-01 RX ADMIN — RIFAXIMIN 550 MG: 550 TABLET ORAL at 23:16

## 2021-01-01 RX ADMIN — MIDODRINE HYDROCHLORIDE 15 MG: 10 TABLET ORAL at 09:11

## 2021-01-01 RX ADMIN — INSULIN LISPRO 1 UNITS: 100 INJECTION, SOLUTION INTRAVENOUS; SUBCUTANEOUS at 22:41

## 2021-01-01 RX ADMIN — DILTIAZEM HYDROCHLORIDE 30 MG: 30 TABLET, FILM COATED ORAL at 21:42

## 2021-01-01 RX ADMIN — RIFAXIMIN 550 MG: 550 TABLET ORAL at 20:46

## 2021-01-01 RX ADMIN — SODIUM CHLORIDE, PRESERVATIVE FREE 10 ML: 5 INJECTION INTRAVENOUS at 09:57

## 2021-01-01 RX ADMIN — HYDROCORTISONE: 1 CREAM TOPICAL at 08:38

## 2021-01-01 RX ADMIN — MIDODRINE HYDROCHLORIDE 15 MG: 10 TABLET ORAL at 21:07

## 2021-01-01 RX ADMIN — MIDODRINE HYDROCHLORIDE 15 MG: 10 TABLET ORAL at 12:58

## 2021-01-01 RX ADMIN — MIDODRINE HYDROCHLORIDE 15 MG: 10 TABLET ORAL at 12:00

## 2021-01-01 RX ADMIN — SODIUM CHLORIDE, PRESERVATIVE FREE 10 ML: 5 INJECTION INTRAVENOUS at 08:54

## 2021-01-01 RX ADMIN — FAMOTIDINE 20 MG: 20 TABLET, FILM COATED ORAL at 08:27

## 2021-01-01 RX ADMIN — ALBUMIN (HUMAN) 25 G: 12.5 INJECTION, SOLUTION INTRAVENOUS at 21:14

## 2021-01-01 RX ADMIN — DILTIAZEM HYDROCHLORIDE 15 MG: 30 TABLET, FILM COATED ORAL at 21:54

## 2021-01-01 RX ADMIN — LACTULOSE 45 G: 20 SOLUTION ORAL at 20:35

## 2021-01-01 RX ADMIN — LACTULOSE 45 G: 20 SOLUTION ORAL at 09:25

## 2021-01-01 RX ADMIN — GABAPENTIN 300 MG: 300 CAPSULE ORAL at 08:22

## 2021-01-01 RX ADMIN — GABAPENTIN 200 MG: 100 CAPSULE ORAL at 21:04

## 2021-01-01 RX ADMIN — ONDANSETRON 4 MG: 2 INJECTION INTRAMUSCULAR; INTRAVENOUS at 18:05

## 2021-01-01 RX ADMIN — LACTULOSE 30 G: 20 SOLUTION ORAL at 23:28

## 2021-01-01 RX ADMIN — AMIODARONE HYDROCHLORIDE 200 MG: 200 TABLET ORAL at 12:00

## 2021-01-01 RX ADMIN — ONDANSETRON 4 MG: 2 INJECTION INTRAMUSCULAR; INTRAVENOUS at 21:03

## 2021-01-01 RX ADMIN — LACTULOSE 45 G: 20 SOLUTION ORAL at 20:56

## 2021-01-01 RX ADMIN — SODIUM BICARBONATE 1300 MG: 650 TABLET ORAL at 10:23

## 2021-01-01 RX ADMIN — NEOSTIGMINE METHYLSULFATE 0.5 MG: 1 INJECTION, SOLUTION INTRAVENOUS at 11:42

## 2021-01-01 RX ADMIN — LACTULOSE 45 G: 20 SOLUTION ORAL at 12:04

## 2021-01-01 RX ADMIN — DILTIAZEM HYDROCHLORIDE 30 MG: 30 TABLET, FILM COATED ORAL at 08:13

## 2021-01-01 RX ADMIN — AMIODARONE HYDROCHLORIDE 200 MG: 200 TABLET ORAL at 07:53

## 2021-01-01 RX ADMIN — ALBUMIN (HUMAN) 25 G: 0.25 INJECTION, SOLUTION INTRAVENOUS at 16:33

## 2021-01-01 RX ADMIN — LACTULOSE 30 G: 20 SOLUTION ORAL at 20:39

## 2021-01-01 RX ADMIN — SODIUM CHLORIDE, PRESERVATIVE FREE 10 ML: 5 INJECTION INTRAVENOUS at 21:27

## 2021-01-01 RX ADMIN — ALBUMIN (HUMAN) 25 G: 0.25 INJECTION, SOLUTION INTRAVENOUS at 13:25

## 2021-01-01 RX ADMIN — SODIUM CHLORIDE, PRESERVATIVE FREE 10 ML: 5 INJECTION INTRAVENOUS at 08:13

## 2021-01-01 RX ADMIN — FAMOTIDINE 20 MG: 20 TABLET, FILM COATED ORAL at 08:58

## 2021-01-01 RX ADMIN — PANTOPRAZOLE SODIUM 40 MG: 40 INJECTION, POWDER, FOR SOLUTION INTRAVENOUS at 08:34

## 2021-01-01 RX ADMIN — OCTREOTIDE ACETATE 200 MCG: 100 INJECTION, SOLUTION INTRAVENOUS; SUBCUTANEOUS at 13:32

## 2021-01-01 RX ADMIN — NEOSTIGMINE METHYLSULFATE 0.5 MG: 1 INJECTION, SOLUTION INTRAVENOUS at 20:40

## 2021-01-01 RX ADMIN — TRAMADOL HYDROCHLORIDE 25 MG: 50 TABLET, FILM COATED ORAL at 13:08

## 2021-01-01 RX ADMIN — RIFAXIMIN 550 MG: 550 TABLET ORAL at 20:32

## 2021-01-01 RX ADMIN — MIDODRINE HYDROCHLORIDE 15 MG: 10 TABLET ORAL at 18:00

## 2021-01-01 RX ADMIN — SODIUM BICARBONATE 1300 MG: 650 TABLET ORAL at 21:27

## 2021-01-01 RX ADMIN — METOPROLOL TARTRATE 75 MG: 25 TABLET ORAL at 21:03

## 2021-01-01 RX ADMIN — RIFAXIMIN 550 MG: 550 TABLET ORAL at 21:06

## 2021-01-01 RX ADMIN — RIFAXIMIN 550 MG: 550 TABLET ORAL at 09:17

## 2021-01-01 RX ADMIN — MIDODRINE HYDROCHLORIDE 15 MG: 10 TABLET ORAL at 10:00

## 2021-01-01 RX ADMIN — LACTULOSE 45 G: 20 SOLUTION ORAL at 22:18

## 2021-01-01 RX ADMIN — GABAPENTIN 300 MG: 300 CAPSULE ORAL at 22:10

## 2021-01-01 RX ADMIN — METOCLOPRAMIDE 5 MG: 5 INJECTION, SOLUTION INTRAMUSCULAR; INTRAVENOUS at 10:07

## 2021-01-01 RX ADMIN — EPOETIN ALFA-EPBX 3000 UNITS: 3000 INJECTION, SOLUTION INTRAVENOUS; SUBCUTANEOUS at 08:50

## 2021-01-01 RX ADMIN — LACTULOSE 45 G: 20 SOLUTION ORAL at 08:55

## 2021-01-01 RX ADMIN — LACTULOSE 30 G: 20 SOLUTION ORAL at 11:38

## 2021-01-01 RX ADMIN — HEPARIN SODIUM 5000 UNITS: 5000 INJECTION INTRAVENOUS; SUBCUTANEOUS at 01:12

## 2021-01-01 RX ADMIN — DILTIAZEM HYDROCHLORIDE 15 MG: 30 TABLET, FILM COATED ORAL at 23:14

## 2021-01-01 RX ADMIN — RIFAXIMIN 550 MG: 550 TABLET ORAL at 21:56

## 2021-01-01 RX ADMIN — RIFAXIMIN 550 MG: 550 TABLET ORAL at 21:51

## 2021-01-01 RX ADMIN — MIDODRINE HYDROCHLORIDE 10 MG: 10 TABLET ORAL at 19:36

## 2021-01-01 RX ADMIN — METOPROLOL TARTRATE 50 MG: 50 TABLET, FILM COATED ORAL at 21:04

## 2021-01-01 RX ADMIN — METOPROLOL TARTRATE 50 MG: 50 TABLET, FILM COATED ORAL at 09:17

## 2021-01-01 RX ADMIN — RIFAXIMIN 550 MG: 550 TABLET ORAL at 21:21

## 2021-01-01 RX ADMIN — LACTULOSE 45 G: 10 SOLUTION ORAL at 09:11

## 2021-01-01 RX ADMIN — DILTIAZEM HYDROCHLORIDE 15 MG: 30 TABLET, FILM COATED ORAL at 16:04

## 2021-01-01 RX ADMIN — MIDODRINE HYDROCHLORIDE 15 MG: 10 TABLET ORAL at 09:03

## 2021-01-01 RX ADMIN — LACTULOSE 30 G: 20 SOLUTION ORAL at 18:08

## 2021-01-01 RX ADMIN — LACTULOSE 45 G: 20 SOLUTION ORAL at 21:03

## 2021-01-01 RX ADMIN — METOPROLOL TARTRATE 25 MG: 25 TABLET, FILM COATED ORAL at 23:28

## 2021-01-01 RX ADMIN — AMIODARONE HYDROCHLORIDE 200 MG: 200 TABLET ORAL at 17:33

## 2021-01-01 RX ADMIN — SODIUM BICARBONATE 1300 MG: 650 TABLET ORAL at 17:20

## 2021-01-01 RX ADMIN — FAMOTIDINE 20 MG: 20 TABLET ORAL at 09:11

## 2021-01-01 RX ADMIN — GABAPENTIN 200 MG: 300 CAPSULE ORAL at 20:29

## 2021-01-01 RX ADMIN — SODIUM CHLORIDE, PRESERVATIVE FREE 10 ML: 5 INJECTION INTRAVENOUS at 10:51

## 2021-01-01 RX ADMIN — SODIUM CHLORIDE: 9 INJECTION, SOLUTION INTRAVENOUS at 21:22

## 2021-01-01 RX ADMIN — OCTREOTIDE ACETATE 100 MCG: 100 INJECTION, SOLUTION INTRAVENOUS; SUBCUTANEOUS at 13:21

## 2021-01-01 RX ADMIN — AMIODARONE HYDROCHLORIDE 200 MG: 200 TABLET ORAL at 09:03

## 2021-01-01 RX ADMIN — RIFAXIMIN 550 MG: 550 TABLET ORAL at 12:21

## 2021-01-01 RX ADMIN — ALBUMIN (HUMAN) 12.5 G: 12.5 INJECTION, SOLUTION INTRAVENOUS at 23:32

## 2021-01-01 RX ADMIN — GABAPENTIN 200 MG: 100 CAPSULE ORAL at 10:23

## 2021-01-01 RX ADMIN — SPIRONOLACTONE 50 MG: 25 TABLET ORAL at 10:50

## 2021-01-01 RX ADMIN — GABAPENTIN 300 MG: 300 CAPSULE ORAL at 08:54

## 2021-01-01 RX ADMIN — OCTREOTIDE ACETATE 100 MCG: 100 INJECTION, SOLUTION INTRAVENOUS; SUBCUTANEOUS at 14:55

## 2021-01-01 RX ADMIN — ONDANSETRON 4 MG: 2 INJECTION INTRAMUSCULAR; INTRAVENOUS at 15:00

## 2021-01-01 RX ADMIN — MIDODRINE HYDROCHLORIDE 15 MG: 10 TABLET ORAL at 12:13

## 2021-01-01 RX ADMIN — LACTULOSE 45 G: 10 SOLUTION ORAL at 10:24

## 2021-01-01 RX ADMIN — ALBUMIN (HUMAN) 25 G: 0.25 INJECTION, SOLUTION INTRAVENOUS at 05:16

## 2021-01-01 RX ADMIN — SODIUM BICARBONATE 1300 MG: 650 TABLET ORAL at 13:26

## 2021-01-01 RX ADMIN — AMIODARONE HYDROCHLORIDE 200 MG: 200 TABLET ORAL at 08:49

## 2021-01-01 RX ADMIN — ONDANSETRON 4 MG: 2 INJECTION INTRAMUSCULAR; INTRAVENOUS at 11:37

## 2021-01-01 RX ADMIN — GABAPENTIN 200 MG: 100 CAPSULE ORAL at 20:08

## 2021-01-01 RX ADMIN — METOPROLOL TARTRATE 25 MG: 25 TABLET, FILM COATED ORAL at 00:42

## 2021-01-01 RX ADMIN — MAGNESIUM SULFATE HEPTAHYDRATE 1000 MG: 500 INJECTION, SOLUTION INTRAMUSCULAR; INTRAVENOUS at 00:50

## 2021-01-01 RX ADMIN — LACTULOSE 45 G: 20 SOLUTION ORAL at 13:26

## 2021-01-01 RX ADMIN — SODIUM CHLORIDE, PRESERVATIVE FREE 10 ML: 5 INJECTION INTRAVENOUS at 20:21

## 2021-01-01 RX ADMIN — METOPROLOL TARTRATE 12.5 MG: 25 TABLET, FILM COATED ORAL at 20:38

## 2021-01-01 RX ADMIN — GABAPENTIN 200 MG: 300 CAPSULE ORAL at 08:26

## 2021-01-01 RX ADMIN — MIDODRINE HYDROCHLORIDE 10 MG: 10 TABLET ORAL at 17:03

## 2021-01-01 RX ADMIN — OCTREOTIDE ACETATE 100 MCG: 100 INJECTION, SOLUTION INTRAVENOUS; SUBCUTANEOUS at 03:33

## 2021-01-01 RX ADMIN — LACTULOSE 30 G: 20 SOLUTION ORAL at 14:22

## 2021-01-01 RX ADMIN — Medication 2 G: at 11:35

## 2021-01-01 RX ADMIN — OXYMETAZOLINE HYDROCHLORIDE 2 SPRAY: 0.05 SPRAY NASAL at 15:43

## 2021-01-01 RX ADMIN — MIDODRINE HYDROCHLORIDE 15 MG: 10 TABLET ORAL at 12:21

## 2021-01-01 RX ADMIN — DILTIAZEM HYDROCHLORIDE 30 MG: 30 TABLET, FILM COATED ORAL at 18:00

## 2021-01-01 RX ADMIN — TRAMADOL HYDROCHLORIDE 25 MG: 50 TABLET, FILM COATED ORAL at 17:03

## 2021-01-01 RX ADMIN — RIFAXIMIN 550 MG: 550 TABLET ORAL at 21:11

## 2021-01-01 RX ADMIN — RIFAXIMIN 550 MG: 550 TABLET ORAL at 10:00

## 2021-01-01 RX ADMIN — GABAPENTIN 200 MG: 100 CAPSULE ORAL at 19:56

## 2021-01-01 RX ADMIN — DICYCLOMINE HYDROCHLORIDE 10 MG: 10 CAPSULE ORAL at 11:40

## 2021-01-01 RX ADMIN — DILTIAZEM HYDROCHLORIDE 15 MG: 30 TABLET, FILM COATED ORAL at 10:28

## 2021-01-01 RX ADMIN — OCTREOTIDE ACETATE 100 MCG: 100 INJECTION, SOLUTION INTRAVENOUS; SUBCUTANEOUS at 12:50

## 2021-01-01 RX ADMIN — OCTREOTIDE ACETATE 100 MCG: 100 INJECTION, SOLUTION INTRAVENOUS; SUBCUTANEOUS at 21:55

## 2021-01-01 RX ADMIN — GABAPENTIN 200 MG: 100 CAPSULE ORAL at 11:02

## 2021-01-01 RX ADMIN — ALBUMIN (HUMAN) 25 G: 0.25 INJECTION, SOLUTION INTRAVENOUS at 15:10

## 2021-01-01 RX ADMIN — GABAPENTIN 300 MG: 300 CAPSULE ORAL at 10:50

## 2021-01-01 RX ADMIN — MIDODRINE HYDROCHLORIDE 15 MG: 10 TABLET ORAL at 08:28

## 2021-01-01 RX ADMIN — AMIODARONE HYDROCHLORIDE 200 MG: 200 TABLET ORAL at 23:41

## 2021-01-01 RX ADMIN — HYDROCORTISONE: 1 CREAM TOPICAL at 08:56

## 2021-01-01 RX ADMIN — OCTREOTIDE ACETATE 200 MCG: 100 INJECTION, SOLUTION INTRAVENOUS; SUBCUTANEOUS at 23:32

## 2021-01-01 RX ADMIN — ALBUMIN (HUMAN) 25 G: 0.25 INJECTION, SOLUTION INTRAVENOUS at 10:22

## 2021-01-01 RX ADMIN — LACTULOSE 10 G: 20 SOLUTION ORAL at 21:53

## 2021-01-01 RX ADMIN — LACTULOSE 30 G: 20 SOLUTION ORAL at 22:55

## 2021-01-01 RX ADMIN — LACTULOSE 45 G: 20 SOLUTION ORAL at 07:50

## 2021-01-01 RX ADMIN — BUMETANIDE 1 MG: 0.25 INJECTION, SOLUTION INTRAMUSCULAR; INTRAVENOUS at 21:04

## 2021-01-01 RX ADMIN — TRAMADOL HYDROCHLORIDE 25 MG: 50 TABLET, FILM COATED ORAL at 13:14

## 2021-01-01 RX ADMIN — OCTREOTIDE ACETATE 100 MCG: 100 INJECTION, SOLUTION INTRAVENOUS; SUBCUTANEOUS at 04:40

## 2021-01-01 RX ADMIN — SODIUM CHLORIDE, PRESERVATIVE FREE 10 ML: 5 INJECTION INTRAVENOUS at 21:11

## 2021-01-01 RX ADMIN — MIDODRINE HYDROCHLORIDE 20 MG: 10 TABLET ORAL at 07:50

## 2021-01-01 RX ADMIN — POTASSIUM BICARBONATE 40 MEQ: 782 TABLET, EFFERVESCENT ORAL at 22:37

## 2021-01-01 RX ADMIN — LACTULOSE 45 G: 20 SOLUTION ORAL at 21:23

## 2021-01-01 RX ADMIN — PROCHLORPERAZINE EDISYLATE 10 MG: 5 INJECTION INTRAMUSCULAR; INTRAVENOUS at 04:14

## 2021-01-01 RX ADMIN — TIOTROPIUM BROMIDE INHALATION SPRAY 2 PUFF: 3.12 SPRAY, METERED RESPIRATORY (INHALATION) at 07:33

## 2021-01-01 RX ADMIN — LACTULOSE 30 G: 20 SOLUTION ORAL at 08:05

## 2021-01-01 RX ADMIN — MIDODRINE HYDROCHLORIDE 15 MG: 10 TABLET ORAL at 15:56

## 2021-01-01 RX ADMIN — DILTIAZEM HYDROCHLORIDE 15 MG: 30 TABLET, FILM COATED ORAL at 05:14

## 2021-01-01 RX ADMIN — ONDANSETRON 8 MG: 2 INJECTION INTRAMUSCULAR; INTRAVENOUS at 23:06

## 2021-01-01 RX ADMIN — LACTULOSE 45 G: 20 SOLUTION ORAL at 17:25

## 2021-01-01 RX ADMIN — OCTREOTIDE ACETATE 200 MCG: 100 INJECTION, SOLUTION INTRAVENOUS; SUBCUTANEOUS at 13:23

## 2021-01-01 RX ADMIN — METOCLOPRAMIDE 5 MG: 5 INJECTION, SOLUTION INTRAMUSCULAR; INTRAVENOUS at 06:08

## 2021-01-01 RX ADMIN — GABAPENTIN 200 MG: 100 CAPSULE ORAL at 08:49

## 2021-01-01 RX ADMIN — ONDANSETRON 4 MG: 2 INJECTION INTRAMUSCULAR; INTRAVENOUS at 08:27

## 2021-01-01 RX ADMIN — MIDODRINE HYDROCHLORIDE 15 MG: 10 TABLET ORAL at 21:00

## 2021-01-01 RX ADMIN — DIGOXIN 500 MCG: 0.25 INJECTION INTRAMUSCULAR; INTRAVENOUS at 01:45

## 2021-01-01 RX ADMIN — OCTREOTIDE ACETATE 100 MCG: 100 INJECTION, SOLUTION INTRAVENOUS; SUBCUTANEOUS at 15:07

## 2021-01-01 RX ADMIN — ONDANSETRON 8 MG: 2 INJECTION INTRAMUSCULAR; INTRAVENOUS at 17:35

## 2021-01-01 RX ADMIN — GABAPENTIN 200 MG: 100 CAPSULE ORAL at 09:16

## 2021-01-01 RX ADMIN — DILTIAZEM HYDROCHLORIDE 30 MG: 30 TABLET, FILM COATED ORAL at 15:06

## 2021-01-01 RX ADMIN — PIPERACILLIN AND TAZOBACTAM 2250 MG: 2; .25 INJECTION, POWDER, LYOPHILIZED, FOR SOLUTION INTRAVENOUS at 12:28

## 2021-01-01 RX ADMIN — DILTIAZEM HYDROCHLORIDE 30 MG: 30 TABLET, FILM COATED ORAL at 13:13

## 2021-01-01 RX ADMIN — SODIUM CHLORIDE, PRESERVATIVE FREE 10 ML: 5 INJECTION INTRAVENOUS at 10:23

## 2021-01-01 RX ADMIN — FAMOTIDINE 20 MG: 20 TABLET ORAL at 09:04

## 2021-01-01 RX ADMIN — LACTULOSE 45 G: 20 SOLUTION ORAL at 14:00

## 2021-01-01 RX ADMIN — GABAPENTIN 100 MG: 100 CAPSULE ORAL at 20:32

## 2021-01-01 RX ADMIN — SODIUM CHLORIDE, PRESERVATIVE FREE 10 ML: 5 INJECTION INTRAVENOUS at 21:38

## 2021-01-01 RX ADMIN — PANTOPRAZOLE SODIUM 40 MG: 40 TABLET, DELAYED RELEASE ORAL at 18:00

## 2021-01-01 RX ADMIN — RIFAXIMIN 550 MG: 550 TABLET ORAL at 20:25

## 2021-01-01 RX ADMIN — TIOTROPIUM BROMIDE INHALATION SPRAY 2 PUFF: 3.12 SPRAY, METERED RESPIRATORY (INHALATION) at 08:56

## 2021-01-01 RX ADMIN — TIOTROPIUM BROMIDE INHALATION SPRAY 2 PUFF: 3.12 SPRAY, METERED RESPIRATORY (INHALATION) at 08:23

## 2021-01-01 RX ADMIN — LACTULOSE 45 G: 20 SOLUTION ORAL at 21:12

## 2021-01-01 RX ADMIN — GABAPENTIN 300 MG: 300 CAPSULE ORAL at 13:58

## 2021-01-01 RX ADMIN — GABAPENTIN 200 MG: 300 CAPSULE ORAL at 20:46

## 2021-01-01 RX ADMIN — FAMOTIDINE 20 MG: 20 TABLET, FILM COATED ORAL at 08:05

## 2021-01-01 RX ADMIN — GABAPENTIN 200 MG: 100 CAPSULE ORAL at 21:27

## 2021-01-01 RX ADMIN — LACTULOSE 30 G: 20 SOLUTION ORAL at 12:03

## 2021-01-01 RX ADMIN — FAMOTIDINE 20 MG: 20 TABLET ORAL at 10:09

## 2021-01-01 RX ADMIN — TIOTROPIUM BROMIDE INHALATION SPRAY 2 PUFF: 3.12 SPRAY, METERED RESPIRATORY (INHALATION) at 08:17

## 2021-01-01 RX ADMIN — RIFAXIMIN 550 MG: 550 TABLET ORAL at 21:00

## 2021-01-01 RX ADMIN — SODIUM CHLORIDE, PRESERVATIVE FREE 10 ML: 5 INJECTION INTRAVENOUS at 11:04

## 2021-01-01 RX ADMIN — METOPROLOL TARTRATE 25 MG: 25 TABLET, FILM COATED ORAL at 21:06

## 2021-01-01 RX ADMIN — RIFAXIMIN 550 MG: 550 TABLET ORAL at 20:55

## 2021-01-01 RX ADMIN — GABAPENTIN 200 MG: 100 CAPSULE ORAL at 12:54

## 2021-01-01 RX ADMIN — MAGNESIUM SULFATE HEPTAHYDRATE 2000 MG: 40 INJECTION, SOLUTION INTRAVENOUS at 08:51

## 2021-01-01 RX ADMIN — DIPHENHYDRAMINE HCL 25 MG: 25 TABLET ORAL at 10:24

## 2021-01-01 RX ADMIN — ASPIRIN 81 MG: 81 TABLET, COATED ORAL at 09:18

## 2021-01-01 RX ADMIN — MIDODRINE HYDROCHLORIDE 10 MG: 10 TABLET ORAL at 15:33

## 2021-01-01 RX ADMIN — SODIUM CHLORIDE, PRESERVATIVE FREE 10 ML: 5 INJECTION INTRAVENOUS at 08:55

## 2021-01-01 RX ADMIN — LACTULOSE 45 G: 20 SOLUTION ORAL at 21:08

## 2021-01-01 RX ADMIN — MIDODRINE HYDROCHLORIDE 15 MG: 10 TABLET ORAL at 16:49

## 2021-01-01 RX ADMIN — SODIUM CHLORIDE, PRESERVATIVE FREE 10 ML: 5 INJECTION INTRAVENOUS at 09:28

## 2021-01-01 RX ADMIN — SODIUM BICARBONATE 1300 MG: 650 TABLET ORAL at 09:28

## 2021-01-01 RX ADMIN — OXYMETAZOLINE HYDROCHLORIDE 2 SPRAY: 0.05 SPRAY NASAL at 14:55

## 2021-01-01 RX ADMIN — TRAMADOL HYDROCHLORIDE 25 MG: 50 TABLET, FILM COATED ORAL at 09:14

## 2021-01-01 RX ADMIN — METOCLOPRAMIDE 5 MG: 5 INJECTION, SOLUTION INTRAMUSCULAR; INTRAVENOUS at 23:18

## 2021-01-01 RX ADMIN — GABAPENTIN 100 MG: 100 CAPSULE ORAL at 09:08

## 2021-01-01 RX ADMIN — INSULIN LISPRO 6 UNITS: 100 INJECTION, SOLUTION INTRAVENOUS; SUBCUTANEOUS at 12:28

## 2021-01-01 RX ADMIN — TIOTROPIUM BROMIDE INHALATION SPRAY 2 PUFF: 3.12 SPRAY, METERED RESPIRATORY (INHALATION) at 06:42

## 2021-01-01 RX ADMIN — NEOSTIGMINE METHYLSULFATE 0.5 MG: 1 INJECTION, SOLUTION INTRAVENOUS at 04:33

## 2021-01-01 RX ADMIN — INSULIN LISPRO 2 UNITS: 100 INJECTION, SOLUTION INTRAVENOUS; SUBCUTANEOUS at 17:23

## 2021-01-01 RX ADMIN — TIOTROPIUM BROMIDE INHALATION SPRAY 2 PUFF: 3.12 SPRAY, METERED RESPIRATORY (INHALATION) at 08:59

## 2021-01-01 RX ADMIN — LACTULOSE 20 G: 20 SOLUTION ORAL at 13:33

## 2021-01-01 RX ADMIN — TIOTROPIUM BROMIDE INHALATION SPRAY 2 PUFF: 3.12 SPRAY, METERED RESPIRATORY (INHALATION) at 07:56

## 2021-01-01 RX ADMIN — OXYMETAZOLINE HYDROCHLORIDE 2 SPRAY: 0.05 SPRAY NASAL at 08:38

## 2021-01-01 RX ADMIN — FAMOTIDINE 20 MG: 20 TABLET, FILM COATED ORAL at 11:02

## 2021-01-01 RX ADMIN — RIFAXIMIN 550 MG: 550 TABLET ORAL at 08:05

## 2021-01-01 RX ADMIN — OCTREOTIDE ACETATE 100 MCG: 100 INJECTION, SOLUTION INTRAVENOUS; SUBCUTANEOUS at 17:20

## 2021-01-01 RX ADMIN — RIFAXIMIN 550 MG: 550 TABLET ORAL at 09:28

## 2021-01-01 RX ADMIN — FAMOTIDINE 20 MG: 20 TABLET, FILM COATED ORAL at 22:37

## 2021-01-01 RX ADMIN — SODIUM BICARBONATE 1300 MG: 650 TABLET ORAL at 20:56

## 2021-01-01 RX ADMIN — OXYMETAZOLINE HYDROCHLORIDE 2 SPRAY: 0.05 SPRAY NASAL at 20:52

## 2021-01-01 RX ADMIN — ASPIRIN 81 MG: 81 TABLET, COATED ORAL at 09:17

## 2021-01-01 RX ADMIN — GABAPENTIN 300 MG: 300 CAPSULE ORAL at 09:26

## 2021-01-01 RX ADMIN — ALBUMIN (HUMAN) 25 G: 0.25 INJECTION, SOLUTION INTRAVENOUS at 02:24

## 2021-01-01 RX ADMIN — DILTIAZEM HYDROCHLORIDE 30 MG: 30 TABLET, FILM COATED ORAL at 13:33

## 2021-01-01 RX ADMIN — FAMOTIDINE 20 MG: 20 TABLET, FILM COATED ORAL at 08:50

## 2021-01-01 RX ADMIN — HEPARIN SODIUM 5000 UNITS: 5000 INJECTION INTRAVENOUS; SUBCUTANEOUS at 18:26

## 2021-01-01 RX ADMIN — SODIUM CHLORIDE: 9 INJECTION, SOLUTION INTRAVENOUS at 09:27

## 2021-01-01 RX ADMIN — HYDROCORTISONE: 1 CREAM TOPICAL at 21:08

## 2021-01-01 RX ADMIN — AMIODARONE HYDROCHLORIDE 200 MG: 200 TABLET ORAL at 12:45

## 2021-01-01 RX ADMIN — PREDNISONE 40 MG: 20 TABLET ORAL at 08:49

## 2021-01-01 RX ADMIN — FAMOTIDINE 20 MG: 20 TABLET ORAL at 08:38

## 2021-01-01 RX ADMIN — RIFAXIMIN 550 MG: 550 TABLET ORAL at 21:39

## 2021-01-01 RX ADMIN — EPOETIN ALFA-EPBX 3000 UNITS: 3000 INJECTION, SOLUTION INTRAVENOUS; SUBCUTANEOUS at 09:37

## 2021-01-01 RX ADMIN — TIOTROPIUM BROMIDE INHALATION SPRAY 2 PUFF: 3.12 SPRAY, METERED RESPIRATORY (INHALATION) at 09:26

## 2021-01-01 RX ADMIN — METOCLOPRAMIDE 5 MG: 5 INJECTION, SOLUTION INTRAMUSCULAR; INTRAVENOUS at 00:35

## 2021-01-01 RX ADMIN — RIFAXIMIN 550 MG: 550 TABLET ORAL at 09:26

## 2021-01-01 RX ADMIN — RIFAXIMIN 550 MG: 550 TABLET ORAL at 21:03

## 2021-01-01 RX ADMIN — NEOSTIGMINE METHYLSULFATE 0.5 MG: 1 INJECTION, SOLUTION INTRAVENOUS at 20:51

## 2021-01-01 RX ADMIN — LACTULOSE 45 G: 20 SOLUTION ORAL at 23:09

## 2021-01-01 RX ADMIN — GABAPENTIN 200 MG: 100 CAPSULE ORAL at 20:57

## 2021-01-01 RX ADMIN — OCTREOTIDE ACETATE 100 MCG: 100 INJECTION, SOLUTION INTRAVENOUS; SUBCUTANEOUS at 21:45

## 2021-01-01 RX ADMIN — METOPROLOL TARTRATE 25 MG: 25 TABLET, FILM COATED ORAL at 05:01

## 2021-01-01 RX ADMIN — MIDODRINE HYDROCHLORIDE 10 MG: 10 TABLET ORAL at 08:54

## 2021-01-01 RX ADMIN — LACTULOSE 45 G: 10 SOLUTION ORAL at 18:21

## 2021-01-01 RX ADMIN — ONDANSETRON 4 MG: 2 INJECTION INTRAMUSCULAR; INTRAVENOUS at 14:30

## 2021-01-01 RX ADMIN — METOPROLOL TARTRATE 25 MG: 25 TABLET, FILM COATED ORAL at 12:44

## 2021-01-01 RX ADMIN — INSULIN LISPRO 1 UNITS: 100 INJECTION, SOLUTION INTRAVENOUS; SUBCUTANEOUS at 23:13

## 2021-01-01 RX ADMIN — GABAPENTIN 300 MG: 300 CAPSULE ORAL at 13:27

## 2021-01-01 RX ADMIN — GABAPENTIN 200 MG: 100 CAPSULE ORAL at 08:44

## 2021-01-01 RX ADMIN — SODIUM CHLORIDE, PRESERVATIVE FREE 10 ML: 5 INJECTION INTRAVENOUS at 08:06

## 2021-01-01 RX ADMIN — TIOTROPIUM BROMIDE INHALATION SPRAY 2 PUFF: 3.12 SPRAY, METERED RESPIRATORY (INHALATION) at 08:15

## 2021-01-01 RX ADMIN — TIOTROPIUM BROMIDE INHALATION SPRAY 2 PUFF: 3.12 SPRAY, METERED RESPIRATORY (INHALATION) at 08:32

## 2021-01-01 RX ADMIN — TIOTROPIUM BROMIDE INHALATION SPRAY 2 PUFF: 3.12 SPRAY, METERED RESPIRATORY (INHALATION) at 09:50

## 2021-01-01 RX ADMIN — MIDODRINE HYDROCHLORIDE 15 MG: 10 TABLET ORAL at 15:43

## 2021-01-01 RX ADMIN — EPOETIN ALFA-EPBX 5000 UNITS: 10000 INJECTION, SOLUTION INTRAVENOUS; SUBCUTANEOUS at 09:50

## 2021-01-01 RX ADMIN — PANTOPRAZOLE SODIUM 40 MG: 40 TABLET, DELAYED RELEASE ORAL at 08:21

## 2021-01-01 RX ADMIN — SODIUM BICARBONATE 1300 MG: 650 TABLET ORAL at 09:24

## 2021-01-01 RX ADMIN — TRAMADOL HYDROCHLORIDE 25 MG: 50 TABLET, FILM COATED ORAL at 00:34

## 2021-01-01 RX ADMIN — METOPROLOL TARTRATE 50 MG: 50 TABLET, FILM COATED ORAL at 21:08

## 2021-01-01 RX ADMIN — POTASSIUM BICARBONATE 40 MEQ: 782 TABLET, EFFERVESCENT ORAL at 23:12

## 2021-01-01 RX ADMIN — INSULIN LISPRO 1 UNITS: 100 INJECTION, SOLUTION INTRAVENOUS; SUBCUTANEOUS at 21:00

## 2021-01-01 RX ADMIN — GABAPENTIN 300 MG: 300 CAPSULE ORAL at 08:05

## 2021-01-01 RX ADMIN — FAMOTIDINE 20 MG: 20 TABLET, FILM COATED ORAL at 09:15

## 2021-01-01 RX ADMIN — SODIUM BICARBONATE 1300 MG: 650 TABLET ORAL at 09:47

## 2021-01-01 RX ADMIN — HEPARIN SODIUM 5000 UNITS: 5000 INJECTION INTRAVENOUS; SUBCUTANEOUS at 09:12

## 2021-01-01 RX ADMIN — METOCLOPRAMIDE 5 MG: 5 INJECTION, SOLUTION INTRAMUSCULAR; INTRAVENOUS at 18:08

## 2021-01-01 RX ADMIN — ONDANSETRON 8 MG: 2 INJECTION INTRAMUSCULAR; INTRAVENOUS at 08:36

## 2021-01-01 RX ADMIN — SODIUM CHLORIDE, PRESERVATIVE FREE 10 ML: 5 INJECTION INTRAVENOUS at 08:37

## 2021-01-01 RX ADMIN — AMIODARONE HYDROCHLORIDE 200 MG: 200 TABLET ORAL at 09:48

## 2021-01-01 RX ADMIN — RIFAXIMIN 550 MG: 550 TABLET ORAL at 10:50

## 2021-01-01 RX ADMIN — SODIUM CHLORIDE AND POTASSIUM CHLORIDE: .9; .15 SOLUTION INTRAVENOUS at 02:43

## 2021-01-01 RX ADMIN — MIDODRINE HYDROCHLORIDE 10 MG: 10 TABLET ORAL at 13:27

## 2021-01-01 RX ADMIN — MIDODRINE HYDROCHLORIDE 15 MG: 10 TABLET ORAL at 20:21

## 2021-01-01 RX ADMIN — SPIRONOLACTONE 50 MG: 25 TABLET ORAL at 20:38

## 2021-01-01 RX ADMIN — RIFAXIMIN 550 MG: 550 TABLET ORAL at 23:48

## 2021-01-01 RX ADMIN — MIDODRINE HYDROCHLORIDE 15 MG: 10 TABLET ORAL at 07:01

## 2021-01-01 RX ADMIN — RIFAXIMIN 550 MG: 550 TABLET ORAL at 08:21

## 2021-01-01 RX ADMIN — OCTREOTIDE ACETATE 100 MCG: 100 INJECTION, SOLUTION INTRAVENOUS; SUBCUTANEOUS at 04:41

## 2021-01-01 RX ADMIN — METOPROLOL TARTRATE 25 MG: 25 TABLET, FILM COATED ORAL at 13:09

## 2021-01-01 RX ADMIN — IOHEXOL 50 ML: 240 INJECTION, SOLUTION INTRATHECAL; INTRAVASCULAR; INTRAVENOUS; ORAL at 22:26

## 2021-01-01 RX ADMIN — RIFAXIMIN 550 MG: 550 TABLET ORAL at 20:06

## 2021-01-01 RX ADMIN — MAGNESIUM SULFATE HEPTAHYDRATE 2000 MG: 40 INJECTION, SOLUTION INTRAVENOUS at 09:55

## 2021-01-01 RX ADMIN — SODIUM CHLORIDE, PRESERVATIVE FREE 10 ML: 5 INJECTION INTRAVENOUS at 20:49

## 2021-01-01 RX ADMIN — LACTULOSE 30 G: 20 SOLUTION ORAL at 10:00

## 2021-01-01 RX ADMIN — LACTULOSE 30 G: 20 SOLUTION ORAL at 08:59

## 2021-01-01 RX ADMIN — SODIUM BICARBONATE 1300 MG: 650 TABLET ORAL at 08:36

## 2021-01-01 RX ADMIN — DILTIAZEM HYDROCHLORIDE 15 MG: 30 TABLET, FILM COATED ORAL at 10:39

## 2021-01-01 RX ADMIN — RIFAXIMIN 550 MG: 550 TABLET ORAL at 08:37

## 2021-01-01 RX ADMIN — TIOTROPIUM BROMIDE INHALATION SPRAY 2 PUFF: 3.12 SPRAY, METERED RESPIRATORY (INHALATION) at 09:54

## 2021-01-01 RX ADMIN — SODIUM CHLORIDE, PRESERVATIVE FREE 10 ML: 5 INJECTION INTRAVENOUS at 08:48

## 2021-01-01 RX ADMIN — DILTIAZEM HYDROCHLORIDE 15 MG: 30 TABLET, FILM COATED ORAL at 02:54

## 2021-01-01 RX ADMIN — LACTULOSE 45 G: 20 SOLUTION ORAL at 08:38

## 2021-01-01 RX ADMIN — MIDODRINE HYDROCHLORIDE 15 MG: 10 TABLET ORAL at 20:48

## 2021-01-01 RX ADMIN — MIDODRINE HYDROCHLORIDE 15 MG: 10 TABLET ORAL at 21:55

## 2021-01-01 RX ADMIN — LACTULOSE 45 G: 20 SOLUTION ORAL at 18:29

## 2021-01-01 RX ADMIN — RIFAXIMIN 550 MG: 550 TABLET ORAL at 07:43

## 2021-01-01 RX ADMIN — RIFAXIMIN 550 MG: 550 TABLET ORAL at 09:08

## 2021-01-01 RX ADMIN — LACTULOSE 30 G: 20 SOLUTION ORAL at 10:52

## 2021-01-01 RX ADMIN — Medication 9 MILLICURIE: at 07:40

## 2021-01-01 RX ADMIN — RIFAXIMIN 550 MG: 550 TABLET ORAL at 08:48

## 2021-01-01 RX ADMIN — TRAMADOL HYDROCHLORIDE 25 MG: 50 TABLET, FILM COATED ORAL at 20:07

## 2021-01-01 RX ADMIN — ALBUMIN (HUMAN) 25 G: 0.25 INJECTION, SOLUTION INTRAVENOUS at 09:44

## 2021-01-01 RX ADMIN — DILTIAZEM HYDROCHLORIDE 30 MG: 30 TABLET, FILM COATED ORAL at 21:21

## 2021-01-01 RX ADMIN — SODIUM CHLORIDE, PRESERVATIVE FREE 10 ML: 5 INJECTION INTRAVENOUS at 09:01

## 2021-01-01 RX ADMIN — GABAPENTIN 200 MG: 300 CAPSULE ORAL at 09:17

## 2021-01-01 RX ADMIN — ALBUMIN (HUMAN) 25 G: 0.25 INJECTION, SOLUTION INTRAVENOUS at 10:25

## 2021-01-01 RX ADMIN — METOPROLOL TARTRATE 100 MG: 100 TABLET, FILM COATED ORAL at 21:38

## 2021-01-01 RX ADMIN — RIFAXIMIN 550 MG: 550 TABLET ORAL at 19:56

## 2021-01-01 RX ADMIN — AMIODARONE HYDROCHLORIDE 200 MG: 200 TABLET ORAL at 09:11

## 2021-01-01 RX ADMIN — OCTREOTIDE ACETATE 100 MCG: 100 INJECTION, SOLUTION INTRAVENOUS; SUBCUTANEOUS at 13:40

## 2021-01-01 RX ADMIN — ONDANSETRON 4 MG: 2 INJECTION INTRAMUSCULAR; INTRAVENOUS at 09:13

## 2021-01-01 RX ADMIN — MIDODRINE HYDROCHLORIDE 15 MG: 10 TABLET ORAL at 10:23

## 2021-01-01 RX ADMIN — MIDODRINE HYDROCHLORIDE 15 MG: 10 TABLET ORAL at 09:28

## 2021-01-01 RX ADMIN — MIDODRINE HYDROCHLORIDE 15 MG: 10 TABLET ORAL at 08:04

## 2021-01-01 RX ADMIN — ALBUTEROL SULFATE 2.5 MG: 2.5 SOLUTION RESPIRATORY (INHALATION) at 08:56

## 2021-01-01 RX ADMIN — MIDODRINE HYDROCHLORIDE 10 MG: 10 TABLET ORAL at 09:18

## 2021-01-01 RX ADMIN — ONDANSETRON 8 MG: 2 INJECTION INTRAMUSCULAR; INTRAVENOUS at 09:32

## 2021-01-01 RX ADMIN — LACTULOSE 45 G: 20 SOLUTION ORAL at 12:21

## 2021-01-01 RX ADMIN — SODIUM CHLORIDE, PRESERVATIVE FREE 10 ML: 5 INJECTION INTRAVENOUS at 21:04

## 2021-01-01 RX ADMIN — FAMOTIDINE 20 MG: 20 TABLET ORAL at 12:22

## 2021-01-01 RX ADMIN — TIOTROPIUM BROMIDE INHALATION SPRAY 2 PUFF: 3.12 SPRAY, METERED RESPIRATORY (INHALATION) at 09:09

## 2021-01-01 RX ADMIN — LACTULOSE 45 G: 20 SOLUTION ORAL at 17:19

## 2021-01-01 RX ADMIN — RIFAXIMIN 550 MG: 550 TABLET ORAL at 08:54

## 2021-01-01 RX ADMIN — MIDODRINE HYDROCHLORIDE 15 MG: 10 TABLET ORAL at 15:55

## 2021-01-01 RX ADMIN — LACTULOSE 30 G: 20 SOLUTION ORAL at 18:14

## 2021-01-01 RX ADMIN — SODIUM BICARBONATE 1300 MG: 650 TABLET ORAL at 22:08

## 2021-01-01 RX ADMIN — INSULIN LISPRO 2 UNITS: 100 INJECTION, SOLUTION INTRAVENOUS; SUBCUTANEOUS at 12:20

## 2021-01-01 RX ADMIN — RIFAXIMIN 550 MG: 550 TABLET ORAL at 20:21

## 2021-01-01 RX ADMIN — ALBUMIN (HUMAN) 25 G: 0.25 INJECTION, SOLUTION INTRAVENOUS at 18:25

## 2021-01-01 RX ADMIN — GABAPENTIN 200 MG: 100 CAPSULE ORAL at 21:55

## 2021-01-01 RX ADMIN — RIFAXIMIN 550 MG: 550 TABLET ORAL at 22:08

## 2021-01-01 RX ADMIN — RIFAXIMIN 550 MG: 550 TABLET ORAL at 09:11

## 2021-01-01 RX ADMIN — METOPROLOL TARTRATE 25 MG: 25 TABLET, FILM COATED ORAL at 18:12

## 2021-01-01 RX ADMIN — ALBUMIN (HUMAN) 25 G: 0.25 INJECTION, SOLUTION INTRAVENOUS at 15:23

## 2021-01-01 RX ADMIN — DIGOXIN 250 MCG: 0.25 INJECTION INTRAMUSCULAR; INTRAVENOUS at 09:55

## 2021-01-01 RX ADMIN — OCTREOTIDE ACETATE 200 MCG: 100 INJECTION, SOLUTION INTRAVENOUS; SUBCUTANEOUS at 20:47

## 2021-01-01 RX ADMIN — ALBUMIN (HUMAN) 25 G: 0.25 INJECTION, SOLUTION INTRAVENOUS at 09:39

## 2021-01-01 RX ADMIN — GABAPENTIN 200 MG: 100 CAPSULE ORAL at 07:52

## 2021-01-01 RX ADMIN — RIFAXIMIN 550 MG: 550 TABLET ORAL at 09:47

## 2021-01-01 RX ADMIN — ASPIRIN 81 MG: 81 TABLET, COATED ORAL at 08:26

## 2021-01-01 RX ADMIN — AMIODARONE HYDROCHLORIDE 0.5 MG/MIN: 1.8 INJECTION, SOLUTION INTRAVENOUS at 04:58

## 2021-01-01 RX ADMIN — BISACODYL 10 MG: 10 SUPPOSITORY RECTAL at 13:39

## 2021-01-01 RX ADMIN — FAMOTIDINE 20 MG: 20 TABLET ORAL at 08:49

## 2021-01-01 RX ADMIN — MIDODRINE HYDROCHLORIDE 15 MG: 10 TABLET ORAL at 13:33

## 2021-01-01 RX ADMIN — MIDODRINE HYDROCHLORIDE 15 MG: 10 TABLET ORAL at 17:44

## 2021-01-01 RX ADMIN — LACTULOSE 45 G: 20 SOLUTION ORAL at 13:04

## 2021-01-01 RX ADMIN — MIDODRINE HYDROCHLORIDE 15 MG: 10 TABLET ORAL at 08:36

## 2021-01-01 RX ADMIN — LACTULOSE 30 G: 20 SOLUTION ORAL at 12:12

## 2021-01-01 RX ADMIN — SODIUM BICARBONATE 1300 MG: 650 TABLET ORAL at 12:00

## 2021-01-01 RX ADMIN — GABAPENTIN 300 MG: 300 CAPSULE ORAL at 16:04

## 2021-01-01 RX ADMIN — LACTULOSE 45 G: 20 SOLUTION ORAL at 22:30

## 2021-01-01 RX ADMIN — NEOSTIGMINE METHYLSULFATE 0.5 MG: 1 INJECTION, SOLUTION INTRAVENOUS at 17:06

## 2021-01-01 RX ADMIN — BUMETANIDE 1 MG: 1 TABLET ORAL at 09:35

## 2021-01-01 RX ADMIN — LACTULOSE 30 G: 20 SOLUTION ORAL at 07:43

## 2021-01-01 RX ADMIN — PANTOPRAZOLE SODIUM 40 MG: 40 INJECTION, POWDER, FOR SOLUTION INTRAVENOUS at 09:32

## 2021-01-01 RX ADMIN — LACTULOSE 45 G: 20 SOLUTION ORAL at 14:54

## 2021-01-01 RX ADMIN — MIDODRINE HYDROCHLORIDE 15 MG: 10 TABLET ORAL at 12:15

## 2021-01-01 RX ADMIN — MIDODRINE HYDROCHLORIDE 10 MG: 10 TABLET ORAL at 08:53

## 2021-01-01 RX ADMIN — MIDODRINE HYDROCHLORIDE 10 MG: 10 TABLET ORAL at 09:25

## 2021-01-01 RX ADMIN — SODIUM BICARBONATE 1300 MG: 650 TABLET ORAL at 15:56

## 2021-01-01 RX ADMIN — GABAPENTIN 200 MG: 100 CAPSULE ORAL at 09:03

## 2021-01-01 RX ADMIN — TIOTROPIUM BROMIDE INHALATION SPRAY 2 PUFF: 3.12 SPRAY, METERED RESPIRATORY (INHALATION) at 07:55

## 2021-01-01 RX ADMIN — OCTREOTIDE ACETATE 100 MCG: 100 INJECTION, SOLUTION INTRAVENOUS; SUBCUTANEOUS at 20:56

## 2021-01-01 RX ADMIN — RIFAXIMIN 550 MG: 550 TABLET ORAL at 08:30

## 2021-01-01 RX ADMIN — METOCLOPRAMIDE 5 MG: 5 INJECTION, SOLUTION INTRAMUSCULAR; INTRAVENOUS at 17:03

## 2021-01-01 RX ADMIN — SODIUM CHLORIDE, PRESERVATIVE FREE 10 ML: 5 INJECTION INTRAVENOUS at 07:44

## 2021-01-01 RX ADMIN — RIFAXIMIN 550 MG: 550 TABLET ORAL at 12:44

## 2021-01-01 RX ADMIN — BUMETANIDE 1 MG: 1 TABLET ORAL at 08:04

## 2021-01-01 RX ADMIN — SODIUM CHLORIDE, PRESERVATIVE FREE 10 ML: 5 INJECTION INTRAVENOUS at 20:29

## 2021-01-01 RX ADMIN — ALBUMIN (HUMAN) 25 G: 0.25 INJECTION, SOLUTION INTRAVENOUS at 12:14

## 2021-01-01 RX ADMIN — RIFAXIMIN 550 MG: 550 TABLET ORAL at 08:53

## 2021-01-01 RX ADMIN — MIDODRINE HYDROCHLORIDE 15 MG: 10 TABLET ORAL at 19:56

## 2021-01-01 RX ADMIN — AMIODARONE HYDROCHLORIDE 1 MG/MIN: 1.8 INJECTION, SOLUTION INTRAVENOUS at 23:08

## 2021-01-01 RX ADMIN — MIDODRINE HYDROCHLORIDE 10 MG: 10 TABLET ORAL at 11:02

## 2021-01-01 RX ADMIN — OXYMETAZOLINE HYDROCHLORIDE 2 SPRAY: 0.05 SPRAY NASAL at 21:14

## 2021-01-01 RX ADMIN — ONDANSETRON 4 MG: 2 INJECTION INTRAMUSCULAR; INTRAVENOUS at 11:15

## 2021-01-01 RX ADMIN — SODIUM CHLORIDE 500 ML: 9 INJECTION, SOLUTION INTRAVENOUS at 17:27

## 2021-01-01 RX ADMIN — AMIODARONE HYDROCHLORIDE 150 MG: 1.5 INJECTION, SOLUTION INTRAVENOUS at 22:55

## 2021-01-01 RX ADMIN — ASPIRIN 81 MG: 81 TABLET, COATED ORAL at 08:13

## 2021-01-01 RX ADMIN — LACTULOSE 30 G: 20 SOLUTION ORAL at 08:22

## 2021-01-01 RX ADMIN — NEOSTIGMINE METHYLSULFATE 0.5 MG: 1 INJECTION, SOLUTION INTRAVENOUS at 05:00

## 2021-01-01 RX ADMIN — ONDANSETRON 4 MG: 2 INJECTION INTRAMUSCULAR; INTRAVENOUS at 21:30

## 2021-01-01 RX ADMIN — RIFAXIMIN 550 MG: 550 TABLET ORAL at 07:53

## 2021-01-01 RX ADMIN — MIDODRINE HYDROCHLORIDE 15 MG: 10 TABLET ORAL at 16:40

## 2021-01-01 RX ADMIN — PANTOPRAZOLE SODIUM 40 MG: 40 TABLET, DELAYED RELEASE ORAL at 09:26

## 2021-01-01 RX ADMIN — HYDROCORTISONE: 1 CREAM TOPICAL at 21:28

## 2021-01-01 RX ADMIN — MIDODRINE HYDROCHLORIDE 15 MG: 10 TABLET ORAL at 09:47

## 2021-01-01 RX ADMIN — SODIUM CHLORIDE, PRESERVATIVE FREE 10 ML: 5 INJECTION INTRAVENOUS at 12:29

## 2021-01-01 RX ADMIN — VANCOMYCIN HYDROCHLORIDE 1250 MG: 5 INJECTION, POWDER, LYOPHILIZED, FOR SOLUTION INTRAVENOUS at 22:30

## 2021-01-01 RX ADMIN — LACTULOSE 30 G: 20 SOLUTION ORAL at 22:05

## 2021-01-01 RX ADMIN — MIDODRINE HYDROCHLORIDE 15 MG: 10 TABLET ORAL at 12:19

## 2021-01-01 RX ADMIN — SODIUM CHLORIDE, PRESERVATIVE FREE 10 ML: 5 INJECTION INTRAVENOUS at 20:26

## 2021-01-01 RX ADMIN — LACTULOSE 30 G: 20 SOLUTION ORAL at 16:05

## 2021-01-01 RX ADMIN — NEOSTIGMINE METHYLSULFATE 0.5 MG: 1 INJECTION, SOLUTION INTRAVENOUS at 16:21

## 2021-01-01 RX ADMIN — MIDODRINE HYDROCHLORIDE 10 MG: 10 TABLET ORAL at 08:44

## 2021-01-01 RX ADMIN — GABAPENTIN 200 MG: 100 CAPSULE ORAL at 12:21

## 2021-01-01 RX ADMIN — SODIUM BICARBONATE 1300 MG: 650 TABLET ORAL at 15:42

## 2021-01-01 RX ADMIN — RIFAXIMIN 550 MG: 550 TABLET ORAL at 08:55

## 2021-01-01 RX ADMIN — LACTULOSE 30 G: 20 SOLUTION ORAL at 23:48

## 2021-01-01 RX ADMIN — MIDODRINE HYDROCHLORIDE 10 MG: 10 TABLET ORAL at 17:36

## 2021-01-01 RX ADMIN — INSULIN LISPRO 2 UNITS: 100 INJECTION, SOLUTION INTRAVENOUS; SUBCUTANEOUS at 12:26

## 2021-01-01 RX ADMIN — OCTREOTIDE ACETATE 100 MCG: 100 INJECTION, SOLUTION INTRAVENOUS; SUBCUTANEOUS at 03:56

## 2021-01-01 RX ADMIN — PROMETHAZINE HYDROCHLORIDE 12.5 MG: 25 TABLET ORAL at 11:48

## 2021-01-01 RX ADMIN — ALBUMIN (HUMAN) 25 G: 0.25 INJECTION, SOLUTION INTRAVENOUS at 16:58

## 2021-01-01 RX ADMIN — DILTIAZEM HYDROCHLORIDE 30 MG: 30 TABLET, FILM COATED ORAL at 09:08

## 2021-01-01 RX ADMIN — ALBUMIN (HUMAN) 25 G: 0.25 INJECTION, SOLUTION INTRAVENOUS at 17:35

## 2021-01-01 RX ADMIN — SODIUM CHLORIDE, PRESERVATIVE FREE 10 ML: 5 INJECTION INTRAVENOUS at 09:19

## 2021-01-01 RX ADMIN — ONDANSETRON 8 MG: 2 INJECTION INTRAMUSCULAR; INTRAVENOUS at 17:44

## 2021-01-01 RX ADMIN — RIFAXIMIN 550 MG: 550 TABLET ORAL at 09:03

## 2021-01-01 RX ADMIN — TIOTROPIUM BROMIDE INHALATION SPRAY 2 PUFF: 3.12 SPRAY, METERED RESPIRATORY (INHALATION) at 08:38

## 2021-01-01 RX ADMIN — ALBUMIN (HUMAN) 12.5 G: 12.5 INJECTION, SOLUTION INTRAVENOUS at 22:39

## 2021-01-01 RX ADMIN — ALBUMIN (HUMAN) 25 G: 0.25 INJECTION, SOLUTION INTRAVENOUS at 03:53

## 2021-01-01 RX ADMIN — GABAPENTIN 200 MG: 100 CAPSULE ORAL at 20:34

## 2021-01-01 RX ADMIN — TIOTROPIUM BROMIDE INHALATION SPRAY 2 PUFF: 3.12 SPRAY, METERED RESPIRATORY (INHALATION) at 13:01

## 2021-01-01 RX ADMIN — SODIUM CHLORIDE, PRESERVATIVE FREE 10 ML: 5 INJECTION INTRAVENOUS at 20:56

## 2021-01-01 RX ADMIN — DILTIAZEM HYDROCHLORIDE 30 MG: 30 TABLET, FILM COATED ORAL at 08:28

## 2021-01-01 RX ADMIN — SIMETHICONE 80 MG: 80 TABLET, CHEWABLE ORAL at 10:41

## 2021-01-01 RX ADMIN — PIPERACILLIN AND TAZOBACTAM 2250 MG: 2; .25 INJECTION, POWDER, LYOPHILIZED, FOR SOLUTION INTRAVENOUS at 14:24

## 2021-01-01 RX ADMIN — MIDODRINE HYDROCHLORIDE 15 MG: 10 TABLET ORAL at 08:13

## 2021-01-01 RX ADMIN — MIDODRINE HYDROCHLORIDE 15 MG: 10 TABLET ORAL at 09:24

## 2021-01-01 RX ADMIN — METOPROLOL TARTRATE 50 MG: 50 TABLET, FILM COATED ORAL at 11:05

## 2021-01-01 RX ADMIN — SODIUM BICARBONATE 1300 MG: 650 TABLET ORAL at 20:06

## 2021-01-01 RX ADMIN — Medication 2 G: at 09:28

## 2021-01-01 RX ADMIN — METOCLOPRAMIDE 5 MG: 5 INJECTION, SOLUTION INTRAMUSCULAR; INTRAVENOUS at 22:37

## 2021-01-01 RX ADMIN — GABAPENTIN 300 MG: 300 CAPSULE ORAL at 14:58

## 2021-01-01 RX ADMIN — AMIODARONE HYDROCHLORIDE 200 MG: 200 TABLET ORAL at 08:53

## 2021-01-01 RX ADMIN — AMIODARONE HYDROCHLORIDE 200 MG: 200 TABLET ORAL at 08:44

## 2021-01-01 RX ADMIN — METOPROLOL TARTRATE 75 MG: 50 TABLET, FILM COATED ORAL at 21:27

## 2021-01-01 RX ADMIN — SODIUM BICARBONATE 1300 MG: 650 TABLET ORAL at 13:30

## 2021-01-01 RX ADMIN — OCTREOTIDE ACETATE 100 MCG: 100 INJECTION, SOLUTION INTRAVENOUS; SUBCUTANEOUS at 20:45

## 2021-01-01 RX ADMIN — TIOTROPIUM BROMIDE INHALATION SPRAY 2 PUFF: 3.12 SPRAY, METERED RESPIRATORY (INHALATION) at 07:51

## 2021-01-01 RX ADMIN — METOPROLOL TARTRATE 100 MG: 100 TABLET, FILM COATED ORAL at 21:53

## 2021-01-01 RX ADMIN — ASPIRIN 81 MG: 81 TABLET, COATED ORAL at 08:58

## 2021-01-01 RX ADMIN — AMIODARONE HYDROCHLORIDE 200 MG: 200 TABLET ORAL at 09:35

## 2021-01-01 RX ADMIN — OXYMETAZOLINE HYDROCHLORIDE 2 SPRAY: 0.05 SPRAY NASAL at 16:55

## 2021-01-01 RX ADMIN — ALBUMIN (HUMAN) 25 G: 0.25 INJECTION, SOLUTION INTRAVENOUS at 03:06

## 2021-01-01 RX ADMIN — LACTULOSE 30 G: 20 SOLUTION ORAL at 21:39

## 2021-01-01 RX ADMIN — LACTULOSE 45 G: 20 SOLUTION ORAL at 19:36

## 2021-01-01 RX ADMIN — METOCLOPRAMIDE 5 MG: 5 INJECTION, SOLUTION INTRAMUSCULAR; INTRAVENOUS at 12:06

## 2021-01-01 RX ADMIN — INSULIN LISPRO 1 UNITS: 100 INJECTION, SOLUTION INTRAVENOUS; SUBCUTANEOUS at 13:13

## 2021-01-01 RX ADMIN — GABAPENTIN 200 MG: 300 CAPSULE ORAL at 08:04

## 2021-01-01 RX ADMIN — RIFAXIMIN 550 MG: 550 TABLET ORAL at 09:24

## 2021-01-01 RX ADMIN — INSULIN LISPRO 2 UNITS: 100 INJECTION, SOLUTION INTRAVENOUS; SUBCUTANEOUS at 08:49

## 2021-01-01 RX ADMIN — LACTULOSE 30 G: 20 SOLUTION ORAL at 22:10

## 2021-01-01 RX ADMIN — METOCLOPRAMIDE 5 MG: 5 INJECTION, SOLUTION INTRAMUSCULAR; INTRAVENOUS at 11:03

## 2021-01-01 RX ADMIN — PREDNISONE 40 MG: 20 TABLET ORAL at 09:03

## 2021-01-01 RX ADMIN — FAMOTIDINE 20 MG: 20 TABLET ORAL at 09:48

## 2021-01-01 RX ADMIN — LACTULOSE 30 G: 20 SOLUTION ORAL at 17:52

## 2021-01-01 RX ADMIN — AMIODARONE HYDROCHLORIDE 200 MG: 200 TABLET ORAL at 08:55

## 2021-01-01 RX ADMIN — AMIODARONE HYDROCHLORIDE 200 MG: 200 TABLET ORAL at 11:03

## 2021-01-01 RX ADMIN — RIFAXIMIN 550 MG: 550 TABLET ORAL at 12:55

## 2021-01-01 RX ADMIN — PIPERACILLIN AND TAZOBACTAM 2250 MG: 2; .25 INJECTION, POWDER, LYOPHILIZED, FOR SOLUTION INTRAVENOUS at 04:44

## 2021-01-01 RX ADMIN — AMIODARONE HYDROCHLORIDE 200 MG: 200 TABLET ORAL at 10:09

## 2021-01-01 RX ADMIN — ASPIRIN 81 MG: 81 TABLET, COATED ORAL at 08:06

## 2021-01-01 RX ADMIN — BUMETANIDE 1 MG: 0.25 INJECTION, SOLUTION INTRAMUSCULAR; INTRAVENOUS at 07:43

## 2021-01-01 RX ADMIN — LACTULOSE 20 G: 20 SOLUTION ORAL at 12:52

## 2021-01-01 RX ADMIN — ALBUMIN (HUMAN) 25 G: 0.25 INJECTION, SOLUTION INTRAVENOUS at 23:33

## 2021-01-01 RX ADMIN — GABAPENTIN 200 MG: 100 CAPSULE ORAL at 10:09

## 2021-01-01 RX ADMIN — ANTACID TABLETS 500 MG: 500 TABLET, CHEWABLE ORAL at 22:14

## 2021-01-01 RX ADMIN — EPOETIN ALFA-EPBX 3000 UNITS: 3000 INJECTION, SOLUTION INTRAVENOUS; SUBCUTANEOUS at 12:25

## 2021-01-01 RX ADMIN — SODIUM CHLORIDE, PRESERVATIVE FREE 10 ML: 5 INJECTION INTRAVENOUS at 20:06

## 2021-01-01 RX ADMIN — ASPIRIN 81 MG: 81 TABLET, COATED ORAL at 09:07

## 2021-01-01 RX ADMIN — SODIUM BICARBONATE 1300 MG: 650 TABLET ORAL at 21:55

## 2021-01-01 RX ADMIN — TRAMADOL HYDROCHLORIDE 25 MG: 50 TABLET, FILM COATED ORAL at 04:29

## 2021-01-01 RX ADMIN — RIFAXIMIN 550 MG: 550 TABLET ORAL at 08:58

## 2021-01-01 RX ADMIN — SODIUM CHLORIDE, PRESERVATIVE FREE 10 ML: 5 INJECTION INTRAVENOUS at 10:58

## 2021-01-01 RX ADMIN — GABAPENTIN 200 MG: 100 CAPSULE ORAL at 08:36

## 2021-01-01 RX ADMIN — DEXTROSE AND SODIUM CHLORIDE: 5; 900 INJECTION, SOLUTION INTRAVENOUS at 12:52

## 2021-01-01 RX ADMIN — AMIODARONE HYDROCHLORIDE 200 MG: 200 TABLET ORAL at 12:55

## 2021-01-01 RX ADMIN — GABAPENTIN 300 MG: 300 CAPSULE ORAL at 13:22

## 2021-01-01 RX ADMIN — GABAPENTIN 200 MG: 100 CAPSULE ORAL at 22:06

## 2021-01-01 RX ADMIN — METOPROLOL TARTRATE 100 MG: 100 TABLET, FILM COATED ORAL at 20:26

## 2021-01-01 RX ADMIN — DEXTROSE AND SODIUM CHLORIDE: 5; 900 INJECTION, SOLUTION INTRAVENOUS at 20:41

## 2021-01-01 RX ADMIN — MIDODRINE HYDROCHLORIDE 15 MG: 10 TABLET ORAL at 10:45

## 2021-01-01 RX ADMIN — GABAPENTIN 100 MG: 100 CAPSULE ORAL at 08:13

## 2021-01-01 RX ADMIN — DILTIAZEM HYDROCHLORIDE 30 MG: 30 TABLET, FILM COATED ORAL at 20:28

## 2021-01-01 RX ADMIN — LACTULOSE 30 G: 20 SOLUTION ORAL at 08:27

## 2021-01-01 RX ADMIN — GABAPENTIN 200 MG: 100 CAPSULE ORAL at 20:06

## 2021-01-01 RX ADMIN — OCTREOTIDE ACETATE 100 MCG: 100 INJECTION, SOLUTION INTRAVENOUS; SUBCUTANEOUS at 00:05

## 2021-01-01 RX ADMIN — BUMETANIDE 1 MG: 1 TABLET ORAL at 09:17

## 2021-01-01 RX ADMIN — LACTULOSE 20 G: 20 SOLUTION ORAL at 18:09

## 2021-01-01 RX ADMIN — ONDANSETRON 4 MG: 2 INJECTION INTRAMUSCULAR; INTRAVENOUS at 05:49

## 2021-01-01 RX ADMIN — MIDODRINE HYDROCHLORIDE 20 MG: 10 TABLET ORAL at 15:29

## 2021-01-01 ASSESSMENT — ENCOUNTER SYMPTOMS
CONSTIPATION: 0
RHINORRHEA: 1
WHEEZING: 0
SHORTNESS OF BREATH: 1
RHINORRHEA: 0
CHEST TIGHTNESS: 0
PHOTOPHOBIA: 0
SINUS PAIN: 0
RHINORRHEA: 0
COUGH: 0
STRIDOR: 0
TROUBLE SWALLOWING: 0
EYES NEGATIVE: 1
CONSTIPATION: 0
WHEEZING: 0
SHORTNESS OF BREATH: 1
SORE THROAT: 0
ABDOMINAL PAIN: 0
SHORTNESS OF BREATH: 0
STRIDOR: 0
COUGH: 1
COLOR CHANGE: 0
FACIAL SWELLING: 0
EYES NEGATIVE: 1
ANAL BLEEDING: 0
SORE THROAT: 0
NAUSEA: 1
DIARRHEA: 0
NAUSEA: 0
NAUSEA: 1
ABDOMINAL PAIN: 0
BACK PAIN: 1
RECTAL PAIN: 0
COLOR CHANGE: 1
EYE DISCHARGE: 0
FACIAL SWELLING: 0
FACIAL SWELLING: 0
ABDOMINAL DISTENTION: 1
DIARRHEA: 1
STRIDOR: 0
VOMITING: 0
FACIAL SWELLING: 0
CONSTIPATION: 0
WHEEZING: 0
NAUSEA: 0
DIARRHEA: 0
COUGH: 0
VOMITING: 1
BLOOD IN STOOL: 0
CHEST TIGHTNESS: 0
SHORTNESS OF BREATH: 1
CHOKING: 0
EYE PAIN: 0
EYE PAIN: 0
EYE REDNESS: 0
STRIDOR: 0
EYE ITCHING: 0
APNEA: 0
BACK PAIN: 1
DIARRHEA: 0
ABDOMINAL PAIN: 0
SINUS PAIN: 0
EYE ITCHING: 0
ABDOMINAL DISTENTION: 1
TROUBLE SWALLOWING: 0
EYE PAIN: 0
COUGH: 0
BLOOD IN STOOL: 0
SINUS PAIN: 0
ABDOMINAL DISTENTION: 1
SHORTNESS OF BREATH: 1
SHORTNESS OF BREATH: 1
RHINORRHEA: 0
ABDOMINAL PAIN: 0
CHEST TIGHTNESS: 0
SINUS PRESSURE: 0
ANAL BLEEDING: 0
TROUBLE SWALLOWING: 0
CHOKING: 0
SORE THROAT: 0
RHINORRHEA: 0
SINUS PRESSURE: 0
NAUSEA: 0
RHINORRHEA: 0
CHEST TIGHTNESS: 0
SHORTNESS OF BREATH: 0
EYE REDNESS: 0
EYE ITCHING: 0
RECTAL PAIN: 0
COUGH: 0
COLOR CHANGE: 1
BACK PAIN: 1
SHORTNESS OF BREATH: 1
CHOKING: 0
ANAL BLEEDING: 0
EYE DISCHARGE: 0
FACIAL SWELLING: 0
VOICE CHANGE: 0
ANAL BLEEDING: 0
STRIDOR: 0
SHORTNESS OF BREATH: 0
ABDOMINAL DISTENTION: 1
BLOOD IN STOOL: 0
DIARRHEA: 0
CONSTIPATION: 1
DIARRHEA: 0
SORE THROAT: 0
DIARRHEA: 1
SHORTNESS OF BREATH: 0
CHOKING: 0
CONSTIPATION: 0
WHEEZING: 0
COLOR CHANGE: 0
DIARRHEA: 0
CHEST TIGHTNESS: 0
EYE DISCHARGE: 0
WHEEZING: 0
BACK PAIN: 0
COLOR CHANGE: 0
STRIDOR: 0
BACK PAIN: 1
VOICE CHANGE: 0
CONSTIPATION: 0
SINUS PAIN: 0
ABDOMINAL DISTENTION: 0
CHEST TIGHTNESS: 0
ABDOMINAL PAIN: 1
RHINORRHEA: 0
BLOOD IN STOOL: 0
COUGH: 0
EYE REDNESS: 0
SORE THROAT: 0
VOMITING: 0
ABDOMINAL PAIN: 0
RECTAL PAIN: 0
WHEEZING: 0
GASTROINTESTINAL NEGATIVE: 1
COLOR CHANGE: 0
BACK PAIN: 0
TROUBLE SWALLOWING: 0
EYE PAIN: 0
RHINORRHEA: 0
VOICE CHANGE: 0
DIARRHEA: 0
COUGH: 0
EYE PAIN: 0
EYE PAIN: 0
BLOOD IN STOOL: 0
FACIAL SWELLING: 0
BACK PAIN: 0
NAUSEA: 0
APNEA: 0
COLOR CHANGE: 0
TROUBLE SWALLOWING: 0
NAUSEA: 0
COUGH: 0
ABDOMINAL PAIN: 1
ABDOMINAL PAIN: 0
NAUSEA: 1
NAUSEA: 1
SINUS PRESSURE: 0
PHOTOPHOBIA: 0
NAUSEA: 1
COLOR CHANGE: 0
EYES NEGATIVE: 1
COLOR CHANGE: 0
BLOOD IN STOOL: 0
BLOOD IN STOOL: 0
WHEEZING: 0
ABDOMINAL DISTENTION: 1
NAUSEA: 0
SINUS PAIN: 0
COLOR CHANGE: 1
WHEEZING: 0
VOMITING: 0
PHOTOPHOBIA: 0
SORE THROAT: 0
ALLERGIC/IMMUNOLOGIC NEGATIVE: 1
CHEST TIGHTNESS: 0
BLOOD IN STOOL: 0
ABDOMINAL PAIN: 0
ABDOMINAL DISTENTION: 1
EYE PAIN: 0
COUGH: 0
CHEST TIGHTNESS: 0
PHOTOPHOBIA: 0
WHEEZING: 0
BLOOD IN STOOL: 0
EYE PAIN: 0
EYE ITCHING: 0
ABDOMINAL PAIN: 0
SHORTNESS OF BREATH: 1
SINUS PAIN: 0
EYE DISCHARGE: 0
EYE PAIN: 0
BACK PAIN: 0
EYE ITCHING: 0
CHOKING: 0
EYE DISCHARGE: 0
VOMITING: 0
CHEST TIGHTNESS: 0
STRIDOR: 0
EYE REDNESS: 0
EYE ITCHING: 0
VOICE CHANGE: 0
EYE PAIN: 0
SINUS PAIN: 0
SHORTNESS OF BREATH: 1
EYE ITCHING: 0
WHEEZING: 0
APNEA: 0
ABDOMINAL PAIN: 0
VOMITING: 0
SINUS PAIN: 0
SINUS PAIN: 0
PHOTOPHOBIA: 0
STRIDOR: 0
SINUS PRESSURE: 0
RECTAL PAIN: 0
STRIDOR: 0
EYE PAIN: 0
APNEA: 0
VOMITING: 0
ABDOMINAL PAIN: 0
SHORTNESS OF BREATH: 0
ABDOMINAL DISTENTION: 1
BACK PAIN: 0
CHOKING: 0
NAUSEA: 0
TROUBLE SWALLOWING: 0
VOMITING: 0
SINUS PRESSURE: 0
CHEST TIGHTNESS: 0
RHINORRHEA: 0
ABDOMINAL DISTENTION: 1
SHORTNESS OF BREATH: 0
RHINORRHEA: 0
TROUBLE SWALLOWING: 0
TROUBLE SWALLOWING: 0
VOMITING: 0
COUGH: 0
SHORTNESS OF BREATH: 0
CONSTIPATION: 0
NAUSEA: 0
NAUSEA: 0
FACIAL SWELLING: 0
COLOR CHANGE: 0
CHEST TIGHTNESS: 0
PHOTOPHOBIA: 0
SORE THROAT: 0
COUGH: 0
SORE THROAT: 0
SHORTNESS OF BREATH: 0
ABDOMINAL PAIN: 0
ABDOMINAL DISTENTION: 1
TROUBLE SWALLOWING: 0
DIARRHEA: 0
DIARRHEA: 0
BACK PAIN: 1
SINUS PAIN: 0
SINUS PRESSURE: 0
EYE ITCHING: 0
CHOKING: 1
SINUS PAIN: 0
EYE ITCHING: 0
DIARRHEA: 1
CHEST TIGHTNESS: 0
SORE THROAT: 0
VOMITING: 0
DIARRHEA: 0
SORE THROAT: 0
NAUSEA: 0
EYE PAIN: 0
VOMITING: 0
EYE REDNESS: 0
VOICE CHANGE: 0
PHOTOPHOBIA: 0
EYE DISCHARGE: 0
ABDOMINAL DISTENTION: 1
SINUS PAIN: 0
VOICE CHANGE: 0
TROUBLE SWALLOWING: 0
BACK PAIN: 1
EYE ITCHING: 0
COLOR CHANGE: 0
SORE THROAT: 0
CONSTIPATION: 0
SINUS PRESSURE: 0
COLOR CHANGE: 1
SORE THROAT: 0
ABDOMINAL PAIN: 0
SINUS PAIN: 0
ABDOMINAL DISTENTION: 0
VOMITING: 0
APNEA: 0
SHORTNESS OF BREATH: 1
COLOR CHANGE: 1
ABDOMINAL PAIN: 1
SINUS PRESSURE: 0
APNEA: 0
ABDOMINAL PAIN: 1
CHEST TIGHTNESS: 0
ABDOMINAL PAIN: 0
ANAL BLEEDING: 0
VOICE CHANGE: 0
ABDOMINAL DISTENTION: 1
EYES NEGATIVE: 1
WHEEZING: 0
SHORTNESS OF BREATH: 1
SHORTNESS OF BREATH: 1
ABDOMINAL DISTENTION: 1
COUGH: 0
TROUBLE SWALLOWING: 0
SINUS PRESSURE: 0
COLOR CHANGE: 1
VOMITING: 1
SINUS PRESSURE: 0
CHEST TIGHTNESS: 0
SHORTNESS OF BREATH: 1
RECTAL PAIN: 0
EYE DISCHARGE: 0
NAUSEA: 1
BACK PAIN: 0
EYE REDNESS: 0
EYE PAIN: 0
WHEEZING: 0
BACK PAIN: 0
SINUS PRESSURE: 0
DIARRHEA: 1
DIARRHEA: 1
ABDOMINAL DISTENTION: 1
COLOR CHANGE: 1
NAUSEA: 0
RECTAL PAIN: 0
CHEST TIGHTNESS: 1
APNEA: 0
VOMITING: 0
COLOR CHANGE: 0
CHEST TIGHTNESS: 0
EYE REDNESS: 0
CONSTIPATION: 0
CONSTIPATION: 0
ROS SKIN COMMENTS: DIFFUSE PRURITUS
ABDOMINAL PAIN: 0
CHEST TIGHTNESS: 0
ABDOMINAL PAIN: 1
VOICE CHANGE: 0
TROUBLE SWALLOWING: 0
BACK PAIN: 1
CHEST TIGHTNESS: 0
COLOR CHANGE: 1
SORE THROAT: 0
SHORTNESS OF BREATH: 1
ANAL BLEEDING: 0
TROUBLE SWALLOWING: 0
ABDOMINAL DISTENTION: 0
NAUSEA: 1
EYE ITCHING: 0

## 2021-01-01 ASSESSMENT — PAIN - FUNCTIONAL ASSESSMENT
PAIN_FUNCTIONAL_ASSESSMENT: PREVENTS OR INTERFERES WITH MANY ACTIVE NOT PASSIVE ACTIVITIES
PAIN_FUNCTIONAL_ASSESSMENT: 0-10
PAIN_FUNCTIONAL_ASSESSMENT: PREVENTS OR INTERFERES SOME ACTIVE ACTIVITIES AND ADLS
PAIN_FUNCTIONAL_ASSESSMENT: PREVENTS OR INTERFERES WITH ALL ACTIVE AND SOME PASSIVE ACTIVITIES
PAIN_FUNCTIONAL_ASSESSMENT: ACTIVITIES ARE NOT PREVENTED
PAIN_FUNCTIONAL_ASSESSMENT: PREVENTS OR INTERFERES SOME ACTIVE ACTIVITIES AND ADLS
PAIN_FUNCTIONAL_ASSESSMENT: PREVENTS OR INTERFERES WITH MANY ACTIVE NOT PASSIVE ACTIVITIES
PAIN_FUNCTIONAL_ASSESSMENT: PREVENTS OR INTERFERES SOME ACTIVE ACTIVITIES AND ADLS
PAIN_FUNCTIONAL_ASSESSMENT: ACTIVITIES ARE NOT PREVENTED

## 2021-01-01 ASSESSMENT — PAIN SCALES - GENERAL
PAINLEVEL_OUTOF10: 10
PAINLEVEL_OUTOF10: 0
PAINLEVEL_OUTOF10: 8
PAINLEVEL_OUTOF10: 0
PAINLEVEL_OUTOF10: 0
PAINLEVEL_OUTOF10: 6
PAINLEVEL_OUTOF10: 0
PAINLEVEL_OUTOF10: 10
PAINLEVEL_OUTOF10: 0
PAINLEVEL_OUTOF10: 0
PAINLEVEL_OUTOF10: 7
PAINLEVEL_OUTOF10: 5
PAINLEVEL_OUTOF10: 0
PAINLEVEL_OUTOF10: 0
PAINLEVEL_OUTOF10: 3
PAINLEVEL_OUTOF10: 0
PAINLEVEL_OUTOF10: 7
PAINLEVEL_OUTOF10: 0
PAINLEVEL_OUTOF10: 7
PAINLEVEL_OUTOF10: 0
PAINLEVEL_OUTOF10: 8
PAINLEVEL_OUTOF10: 0
PAINLEVEL_OUTOF10: 10
PAINLEVEL_OUTOF10: 0
PAINLEVEL_OUTOF10: 3
PAINLEVEL_OUTOF10: 0
PAINLEVEL_OUTOF10: 8
PAINLEVEL_OUTOF10: 0
PAINLEVEL_OUTOF10: 8
PAINLEVEL_OUTOF10: 0
PAINLEVEL_OUTOF10: 8
PAINLEVEL_OUTOF10: 1
PAINLEVEL_OUTOF10: 10
PAINLEVEL_OUTOF10: 0
PAINLEVEL_OUTOF10: 0
PAINLEVEL_OUTOF10: 8
PAINLEVEL_OUTOF10: 0
PAINLEVEL_OUTOF10: 10
PAINLEVEL_OUTOF10: 0
PAINLEVEL_OUTOF10: 10
PAINLEVEL_OUTOF10: 5
PAINLEVEL_OUTOF10: 0
PAINLEVEL_OUTOF10: 1
PAINLEVEL_OUTOF10: 0
PAINLEVEL_OUTOF10: 0
PAINLEVEL_OUTOF10: 10
PAINLEVEL_OUTOF10: 5
PAINLEVEL_OUTOF10: 0
PAINLEVEL_OUTOF10: 10
PAINLEVEL_OUTOF10: 0
PAINLEVEL_OUTOF10: 5
PAINLEVEL_OUTOF10: 0
PAINLEVEL_OUTOF10: 10
PAINLEVEL_OUTOF10: 0
PAINLEVEL_OUTOF10: 8
PAINLEVEL_OUTOF10: 0
PAINLEVEL_OUTOF10: 7
PAINLEVEL_OUTOF10: 0
PAINLEVEL_OUTOF10: 10
PAINLEVEL_OUTOF10: 0
PAINLEVEL_OUTOF10: 0
PAINLEVEL_OUTOF10: 5
PAINLEVEL_OUTOF10: 7
PAINLEVEL_OUTOF10: 5

## 2021-01-01 ASSESSMENT — PATIENT HEALTH QUESTIONNAIRE - PHQ9
2. FEELING DOWN, DEPRESSED OR HOPELESS: 0
SUM OF ALL RESPONSES TO PHQ QUESTIONS 1-9: 0
SUM OF ALL RESPONSES TO PHQ9 QUESTIONS 1 & 2: 0

## 2021-01-01 ASSESSMENT — PAIN DESCRIPTION - FREQUENCY
FREQUENCY: CONTINUOUS
FREQUENCY: CONTINUOUS
FREQUENCY: INTERMITTENT
FREQUENCY: CONTINUOUS
FREQUENCY: INTERMITTENT
FREQUENCY: CONTINUOUS
FREQUENCY: INTERMITTENT
FREQUENCY: CONTINUOUS

## 2021-01-01 ASSESSMENT — PAIN DESCRIPTION - PROGRESSION
CLINICAL_PROGRESSION: NOT CHANGED
CLINICAL_PROGRESSION: NOT CHANGED
CLINICAL_PROGRESSION: GRADUALLY WORSENING
CLINICAL_PROGRESSION: NOT CHANGED
CLINICAL_PROGRESSION: NOT CHANGED
CLINICAL_PROGRESSION: GRADUALLY IMPROVING
CLINICAL_PROGRESSION: NOT CHANGED
CLINICAL_PROGRESSION: NOT CHANGED
CLINICAL_PROGRESSION: GRADUALLY WORSENING
CLINICAL_PROGRESSION: GRADUALLY IMPROVING

## 2021-01-01 ASSESSMENT — PAIN DESCRIPTION - ONSET
ONSET: ON-GOING
ONSET: PROGRESSIVE
ONSET: ON-GOING

## 2021-01-01 ASSESSMENT — PAIN DESCRIPTION - PAIN TYPE
TYPE: ACUTE PAIN
TYPE: ACUTE PAIN
TYPE: CHRONIC PAIN
TYPE: ACUTE PAIN
TYPE: ACUTE PAIN
TYPE: NEUROPATHIC PAIN
TYPE: ACUTE PAIN;CHRONIC PAIN
TYPE: ACUTE PAIN;CHRONIC PAIN
TYPE: ACUTE PAIN
TYPE: ACUTE PAIN;CHRONIC PAIN

## 2021-01-01 ASSESSMENT — PAIN DESCRIPTION - ORIENTATION
ORIENTATION: LOWER
ORIENTATION: RIGHT;LEFT
ORIENTATION: LOWER
ORIENTATION: UPPER
ORIENTATION: LOWER

## 2021-01-01 ASSESSMENT — PAIN DESCRIPTION - DESCRIPTORS
DESCRIPTORS: ACHING
DESCRIPTORS: SHARP;SHOOTING
DESCRIPTORS: ACHING
DESCRIPTORS: CRAMPING
DESCRIPTORS: ACHING
DESCRIPTORS: SHARP;SHOOTING
DESCRIPTORS: ACHING

## 2021-01-01 ASSESSMENT — PAIN DESCRIPTION - LOCATION
LOCATION: BACK
LOCATION: BACK;FOOT;LEG
LOCATION: BACK
LOCATION: HEAD
LOCATION: BACK
LOCATION: BACK
LOCATION: FOOT;BACK;LEG
LOCATION: BACK
LOCATION: BACK;BUTTOCKS;HEAD

## 2021-01-10 NOTE — PROGRESS NOTES
[facility]  [facility]  Dept: [phone]  Dept Fax: [phone]    VICENTA Del Toro is a 48 y.o.male    Pt presents for follow up of multiple medical issues, including X2NF, HTN, Alcoholic Cirrhosis, Anemia, CHF, PAF, Thrombocytopenia, COPD, Tobacco Abuse, Peripheral Neuropathy, and Obesity. Pt feeling ok since last visit- interval history and any new issues noted below:     Pt hoping to be on Liver Transplant list at [facility] after review of his case today at [facility]. Pt states he has completed Colonoscopy, Heart Cath, PFT's, 6 minute walk test, and pt states he \"passed them all\". Pt continues seeing [facility]- pt has recently been admitted to [facility] for Pleural Effusions due to Chronic Liver Disease. Pt has been out of the hospital for past 45 days. Pt now living at [facility] in [place], [place] in [address]. Pt in Physical Therapy (30 minutes weekly) at [facility]. Upon discharge, pt has bought Condo at [place]. Discharge from [facility] not planned to this point. Glucometer readings at home are running 80's- 110's. Checking randomly. The home BP readings have been \"within range\". Checking daily.       Wt Readings from Last 3 Encounters:   12/12/20 264 lb 9.6 oz (120 kg)   12/09/20 261 lb (118.4 kg)   12/04/20 279 lb 12.8 oz (126.9 kg)   pt states current weight in 240# (not able to be weighed today, 1/11/2021)    Patient Active Problem List   Diagnosis    Pleural effusion associated with hepatic disorder    Acute on chronic diastolic congestive heart failure (HCC)    Alcoholic cirrhosis of liver without ascites (HCC)    Essential hypertension    Paroxysmal atrial fibrillation (HCC)    Thrombocytopenia (HCC)    Type 2 diabetes mellitus without complication ([address]    Acute kidney injury ([address]    Acute metabolic encephalopathy    Acute renal failure (ARF) ([address]    Coagulopathy ([address] Review of Systems   Constitutional: Positive for fatigue. Negative for chills, diaphoresis, fever and unexpected weight change. Eyes: Negative for visual disturbance. Respiratory: Negative for chest tightness and shortness of breath. Cardiovascular: Negative for chest pain, palpitations and leg swelling. Gastrointestinal: Positive for diarrhea (due to Lactulose) and nausea (following administration of meds. ). Negative for abdominal pain, anal bleeding, blood in stool, constipation and vomiting. Genitourinary: Negative for dysuria and hematuria. Musculoskeletal: Negative for neck pain. Neurological: Positive for light-headedness (with rapid position changes). Negative for dizziness and headaches. OBJECTIVE     /72 (Site: Left Upper Arm, Position: Sitting, Cuff Size: Large Adult)   Pulse 77   Temp 97 °F (36.1 °C) (Temporal)   Resp 20   There is no height or weight on file to calculate BMI. BP Readings from Last 3 Encounters:   01/11/21 130/72   12/14/20 106/70   12/09/20 118/62         Physical Exam  Vitals signs and nursing note reviewed. Exam conducted with a chaperone present. Constitutional:       General: He is not in acute distress. Appearance: Normal appearance. He is not ill-appearing, toxic-appearing or diaphoretic. HENT:      Head: Normocephalic and atraumatic. Right Ear: External ear normal.      Left Ear: External ear normal.      Nose: Nose normal. No rhinorrhea. Mouth/Throat:      Mouth: Mucous membranes are moist.      Pharynx: Oropharynx is clear. Eyes:      General: No scleral icterus. Right eye: No discharge. Left eye: No discharge. Extraocular Movements: Extraocular movements intact. Conjunctiva/sclera: Conjunctivae normal.      Pupils: Pupils are equal, round, and reactive to light. Neck:      Musculoskeletal: Normal range of motion. Cardiovascular:      Rate and Rhythm: Normal rate and regular rhythm. Heart sounds: Normal heart sounds. No murmur. No friction rub. No gallop. Pulmonary:      Effort: Pulmonary effort is normal. No respiratory distress. Breath sounds: Normal breath sounds. No stridor. No wheezing, rhonchi or rales. Chest:      Chest wall: No tenderness. Abdominal:      General: Abdomen is flat. Bowel sounds are normal. There is no distension. Palpations: Abdomen is soft. There is no mass. Tenderness: There is no abdominal tenderness. There is no guarding or rebound. Hernia: No hernia is present. Genitourinary:     Comments: EDUARDO done 12/30/2020 per Dr. Damari Rascon at [facility]- unremarkable. ES  Musculoskeletal: Normal range of motion. General: No swelling, deformity or signs of injury. Skin:     General: Skin is warm and dry. Coloration: Skin is not jaundiced or pale. Findings: No bruising, erythema, lesion or rash. Comments: All incisions healing well at this time. Neurological:      General: No focal deficit present. Mental Status: He is alert and oriented to person, place, and time. Mental status is at baseline. Motor: No weakness. Coordination: Coordination normal.      Gait: Gait normal.   Psychiatric:         Mood and Affect: Mood normal.         Behavior: Behavior normal.         Thought Content: Thought content normal.         Judgment: Judgment normal.       Labs drawn weekly.      Lab Results   Component Value Date    LABA1C 4.6 11/21/2020       No results found for: CHOL, TRIG, HDL, LDLCALC, LDLDIRECT    The ASCVD Risk score (Ry Tompkins et al., 2013) failed to calculate for the following reasons:    Cannot find a previous HDL lab    Cannot find a previous total cholesterol lab        No results found for: Jose Jones        Lab Results   Component Value Date    WBC 5.9 12/13/2020    HGB 10.8 (L) 12/13/2020    HCT 31.4 (L) 12/13/2020    MCV 95.4 (H) 12/13/2020    PLT 78 (L) 12/13/2020 Microalbumin / Creatinine Urine Ratio    T4, Free    TSH without Reflex    AFL - Renna Babinski, MD, Opthalmology, [address]   2. Essential hypertension  Lipid Panel    T4, Free    TSH without Reflex   3. Alcoholic cirrhosis of liver without ascites (HCC)     4. Paroxysmal atrial fibrillation ([address]     5. Anemia, unspecified type     6. Chronic diastolic heart failure (HCC)     7. Obesity, Class II, BMI 35-39.9     8. Screening PSA (prostate specific antigen)  Psa screening       PLAN      Pt to check with [facility] re increased Physical Therapy frequency and let us know if available and desired. Follow up with all specialists as planned. Continue current medicines at this time  No refills needed at this time   CMP and CBC management per [facility]  Check HGA1C, FLP, SPOT MA, TSH/ FREE, and PSA in 4 months   Follow up in 4 months         Preventive Health Topics:  Pt declines HIV screening at this time- pt to check with [facility] re any previous results. (updated 1/11/2021)  Please check with [facility] re HEP A/ Hep B combined vaccine (Twinrix)   Encouraged TETANUS SHOT (TdaP/ ADACEL/ BOOSTRIX) per personal pharmacy. Encouraged SHINGLES SHOT [facility])- check with your local pharmacy  COLONOSCOPY done 12/30/2020 per Dr. Kathleen Leyva ([address] [place] [address])- Diverticula and 2 polyps- to follow up again in 12/2025. (updated 1/11/2021)  [address] to be done- will refer to Dr. Carlos Lundy for evaluation.             Electronically signed by Anu Mojica MD on 1/11/2021 at 12:23 PM

## 2021-01-11 NOTE — PATIENT INSTRUCTIONS
Pt to check with [facility] re increased Physical Therapy frequency and let us know if available and desired. Follow up with all specialists as planned. Continue current medicines at this time  No refills needed at this time   CMP and CBC management per [facility]  Check HGA1C, FLP, SPOT MA, TSH/ FREE, and PSA in 4 months   Follow up in 4 months                 Preventive Health Topics:  Pt declines HIV screening at this time (updated 1/11/2021)  Encouraged TETANUS SHOT (TdaP/ ADACEL/ BOOSTRIX) per personal pharmacy. Encouraged SHINGLES SHOT [facility])- check with your local pharmacy  COLONOSCOPY done 12/30/2020 per Dr. Fabio Baird ([facility])- Diverticula and 2 polyps- to follow up again in 12/2025. (updated 1/11/2021)  [address] to be done- will refer to Dr. Nalini Ramirez for evaluation.

## 2021-01-15 NOTE — TELEPHONE ENCOUNTER
Message left on nurse SUSAN. States that he needs our office to contact [facility] to give an order to discontinue his home oxygen. Has not been on oxygen since having his thoracentesis. Call [facility] at [phone]. Also, patient wants info on COVID vaccine since he is high risk. Please advise.

## 2021-01-18 NOTE — TELEPHONE ENCOUNTER
Pt is scheduled for a split night but they cannot get authorized until the note from 12/9/20 is completed. They moved him to the 27th.  Please advise, thank you

## 2021-01-20 NOTE — TELEPHONE ENCOUNTER
[facility] asking if you can addend your note from 1-11-21 and add a referral for [facility] in it as well. They are to start with him soon and do not have a face to face for him. They will check epic.

## 2021-01-20 NOTE — PROGRESS NOTES
Pt will need Home Health upon discharge from [facility] in [place], [place]- will refer.

## 2021-01-21 NOTE — PROGRESS NOTES
Chief Complaint   Patient presents with    Suture / Staple Removal     right sided, abdomen and shoulder, pt feeling better,     Nasal Congestion     nasal spray or saline         SUBJECTIVE     Jesus Henderson is a 48 y.o.male      Pt is here today to have sutures removed. States they are from the tubes that were in place. These were placed on a recent admission to [facility] as he has alcohol cirrhosis and had fluid drained. He was suppose to have sutures removed while in the nursing home but they forgot and discharged him with them in. He reports the sites have been healed and are not bothering him. He will get established with [facility] and PT as they are coming in today. Appt Feb 1st at [facility] and will discuss getting placed on liver transplant list. He has been sober for several months but sas to complete 12 weeks of AA. Pt has a scab on the inside of his left nostril. It is causing a runny nose and irritation. Review of Systems   Constitutional: Negative for chills, fatigue, fever and unexpected weight change. HENT: Negative. Scabbing on inside of right nostril   Eyes: Negative. Respiratory: Negative for chest tightness and shortness of breath. Cardiovascular: Negative for chest pain, palpitations and leg swelling. Gastrointestinal: Negative for abdominal pain and blood in stool. Genitourinary: Negative for dysuria. Musculoskeletal: Negative for joint swelling. Skin: Positive for wound (healed incisions on right chest and lower right flank from drainage tubes). Negative for rash. Neurological: Negative for dizziness. Psychiatric/Behavioral: Negative. All other systems reviewed and are negative.         OBJECTIVE     /68   Pulse 64   Temp 97.2 °F (36.2 °C) (Temporal)   Resp 16   Wt 232 lb 12.8 oz (105.6 kg)   BMI 29.89 kg/m²   Wt Readings from Last 3 Encounters:   01/21/21 232 lb 12.8 oz (105.6 kg)   12/12/20 264 lb 9.6 oz (120 kg)   12/09/20 261 lb (118.4 kg) Physical Exam  Vitals signs and nursing note reviewed. Constitutional:       Appearance: He is well-developed. HENT:      Head: Normocephalic and atraumatic. Right Ear: External ear normal.      Left Ear: External ear normal.      Nose: Nose normal.   Eyes:      Conjunctiva/sclera: Conjunctivae normal.      Pupils: Pupils are equal, round, and reactive to light. Neck:      Musculoskeletal: Normal range of motion and neck supple. Cardiovascular:      Rate and Rhythm: Normal rate and regular rhythm. Pulmonary:      Effort: Pulmonary effort is normal.      Breath sounds: Normal breath sounds. Abdominal:      General: Bowel sounds are normal.      Palpations: Abdomen is soft. Musculoskeletal: Normal range of motion. Right lower leg: Edema (1+) present. Left lower leg: Edema (1+) present. Skin:     General: Skin is warm and dry. Coloration: Skin is jaundiced. Neurological:      Mental Status: He is alert and oriented to person, place, and time. Deep Tendon Reflexes: Reflexes are normal and symmetric. Psychiatric:         Behavior: Behavior normal.         Thought Content: Thought content normal.         Judgment: Judgment normal.           No results found for this visit on 01/21/21. ASSESSMENT       Diagnosis Orders   1. Visit for suture removal     2. Type 2 diabetes mellitus without complication, without long-term current use of insulin ([facility]     3.  Alcoholic cirrhosis of liver without ascites (HCC)         PLAN     Sutures removed  Sites are healed with scabbing  Samples of saline spray and saline gel provided for nose  Follow up as previously scheduled          Electronically signed by FELIX Garrison CNP on 1/22/2021 at 12:28 PM

## 2021-01-21 NOTE — TELEPHONE ENCOUNTER
Message left on nurse VM from Serene Kim at [facility]. Would like see about an order for home health aid and OT. Can either fax to [phone] or call verbal order to her at [phone].

## 2021-01-26 NOTE — PROGRESS NOTES
[facility]  [address] [place] [id number]  Dept: [phone]  Dept Fax: [phone]: [phone]     Visit Date:  1/26/2021      Patient:  Bayron Rios  YOB: 1965    HPI:     Chief Complaint   Patient presents with    Other     scabb on nose is not healing and samples give are not helping       Pt presents to the office today with ongoing issues with a scab inside nose not healing. Been there about 2 months. He has tried saline nasal spray and leaving it alone, but it still comes back. He does not think its getting bigger, but it does make it hard to breath. It has clear/blood drainage at times. Pt denies any fever, chills or sinus pressure.      Medications    Current Outpatient Medications:     mupirocin (BACTROBAN) 2 % ointment, Apply topically 3 times daily for 10 days, Disp: 3 g, Rfl: 1    clindamycin (CLEOCIN) 300 MG capsule, Take 1 capsule by mouth 4 times daily for 7 days, Disp: 28 capsule, Rfl: 0    albuterol sulfate HFA (VENTOLIN HFA) 108 (90 Base) MCG/ACT inhaler, Inhale 2 puffs into the lungs every 6 hours as needed for Wheezing, Disp: , Rfl:     bumetanide (BUMEX) 1 MG tablet, Take 1 mg by mouth daily, Disp: , Rfl:     dilTIAZem (CARDIZEM) 30 MG tablet, Take 30 mg by mouth 4 times daily Take 0.5 tablets by mouth every 6 hrs, Disp: , Rfl:     midodrine (PROAMATINE) 5 MG tablet, Take 10 mg by mouth as needed Take 2 tablets by mouth every 8 hrs, Disp: , Rfl:     rifaximin (XIFAXAN) 550 MG tablet, Take 550 mg by mouth 2 times daily, Disp: , Rfl:     tiotropium (SPIRIVA) 18 MCG inhalation capsule, Inhale 18 mcg into the lungs daily, Disp: , Rfl:     metoprolol (LOPRESSOR) 100 MG tablet, Take 100 mg by mouth 2 times daily, Disp: , Rfl:     spironolactone (ALDACTONE) 100 MG tablet, Take 100 mg by mouth daily Take 1 tablet by mouth daily, Disp: , Rfl:   Lancets MISC, 1 each by Does not apply route daily Use to test blood sugar 4 times daily and as needed DX:E11.9, Disp: 200 each, Rfl: 3    blood glucose monitor strips, Test 4 times daily and as needed for irregular blood glucose. Dispense sufficient amount for indicated testing frequency plus additional to accommodate PRN testing needs. DX E11.9, Disp: 200 strip, Rfl: 3    Blood Glucose Monitoring Suppl (ONE TOUCH ULTRA 2) w/Device KIT, Use to test 4 times daily and as needed DX: E11.9, Disp: 1 kit, Rfl: 0    ASPIRIN 81 PO, Take by mouth daily, Disp: , Rfl:     varenicline (CHANTIX) 1 MG tablet, Take 1 mg by mouth 2 times daily, Disp: , Rfl:     gabapentin (NEURONTIN) 300 MG capsule, Take 300 mg by mouth 3 times daily. , Disp: , Rfl:     lactulose (CEPHULAC) 10 g packet, Take 45 mLs by mouth 3 times daily , Disp: , Rfl:     metFORMIN (GLUCOPHAGE) 500 MG tablet, Take 500 mg by mouth 2 times daily (with meals), Disp: , Rfl:     pantoprazole (PROTONIX) 40 MG tablet, Take 40 mg by mouth daily , Disp: , Rfl:     The patient has No Known Allergies. Past Medical History  Bridgett Funes  has a past medical history of Advanced cirrhosis of liver ([facility] Atrial fibrillation ([facility] CHF (congestive heart failure) ([facility] Chronic kidney disease, COPD (chronic obstructive pulmonary disease) ([facility] Diabetes mellitus ([facility] Gallstones, GERD (gastroesophageal reflux disease), Headache, and Portal hypertension ([facility] Subjective:      Review of Systems   Constitutional: Negative for chills, fatigue and fever. Eyes: Negative for pain and visual disturbance. Skin: Negative for color change and rash. Neurological: Negative for dizziness, weakness and headaches. Objective:     /86 (Site: Right Upper Arm)   Pulse 142   Temp 96.7 °F (35.9 °C) (Temporal)   Wt 235 lb 6.4 oz (106.8 kg)   SpO2 99%   BMI 30.22 kg/m²     Physical Exam  Vitals signs reviewed.    Constitutional: General: He is not in acute distress. Appearance: He is well-developed. HENT:      Head: Normocephalic and atraumatic. Right Ear: Tympanic membrane, ear canal and external ear normal.      Left Ear: Tympanic membrane, ear canal and external ear normal.      Nose: Mucosal edema present. Right Nostril: No foreign body, epistaxis, septal hematoma or occlusion. Left Nostril: No foreign body, epistaxis, septal hematoma or occlusion. Right Turbinates: Not enlarged, swollen or pale. Left Turbinates: Not enlarged, swollen or pale. Right Sinus: No maxillary sinus tenderness or frontal sinus tenderness. Left Sinus: No maxillary sinus tenderness or frontal sinus tenderness. Mouth/Throat:      Pharynx: Oropharynx is clear. No oropharyngeal exudate or posterior oropharyngeal erythema. Pulmonary:      Effort: Pulmonary effort is normal. No respiratory distress. Skin:     General: Skin is warm and dry. Neurological:      General: No focal deficit present. Mental Status: He is alert and oriented to person, place, and time. Coordination: Coordination normal.   Psychiatric:         Mood and Affect: Mood normal.         Behavior: Behavior normal.         Thought Content: Thought content normal.         Judgment: Judgment normal.         Assessment/Plan:      Amelia Lowery was seen today for other. Diagnoses and all orders for this visit:    Nasal abscess  -     mupirocin (BACTROBAN) 2 % ointment; Apply topically 3 times daily for 10 days  -     Culture, Aerobic and Anaerobic  -     clindamycin (CLEOCIN) 300 MG capsule; Take 1 capsule by mouth 4 times daily for 7 days    Alcoholic cirrhosis of liver without ascites (HCC)    Acute on chronic diastolic congestive heart failure (HCC)    - Avoid picking area. Use Bactroban ointment TID x 10 days. OK to use in both nostrils. - Will use Clindamycin due to renal and hepatic function, and since pt has a HX of MRSA and he has recently been hospitalized. - Culture today, we will call with results. Return if symptoms worsen or fail to improve. Patient given educational materials - see patient instructions. Discussed use, benefit, and side effects of prescribed medications. All patient questions answered. Pt voiced understanding.         Electronically signed by FELIX Curtis CNP on 1/26/2021 at 1:05 PM

## 2021-01-26 NOTE — PATIENT INSTRUCTIONS
Current as of: April 15, 2020               Content Version: 12.6  © 9247-0964 [employer]. Care instructions adapted under license by [facility] ([facility]). If you have questions about a medical condition or this instruction, always ask your healthcare professional. [address] any warranty or liability for your use of this information.

## 2021-01-28 NOTE — TELEPHONE ENCOUNTER
Message left on nurse SUSAN. Requesting clarification on the dose of the patient's aldactone. Should he be taking 50 mg qd or 100 mg qd. Our med list has it as 100 mg qd but patient states that his d/c instructions from [employer] [place] [address] have it as the 50 mg. If you look at medication reconcilation, both doses are listed. Please advise on which he should take.

## 2021-01-29 NOTE — TELEPHONE ENCOUNTER
Balta's sister, Gerda Gardner, on HIPAA has questions on several medications. Also needs refills on the meds. The strength, amount of pills, and frequency are different from what was prescribed by our office and the hospital/ and how he was taking the meds at the nursing home. Asking for a call today.   Her number is [phone]   [address]ses [address]

## 2021-02-01 NOTE — TELEPHONE ENCOUNTER
----- Message from FELIX Romero CNP sent at 1/29/2021 12:51 PM EST -----  Please let pt know that his culture showed a mixed growth. He is on an antibiotic to cover this. Finish antibiotic and let us know how he is doing.   Thanks -WS

## 2021-02-01 NOTE — TELEPHONE ENCOUNTER
----- Message from Kirtland Bence, MD sent at 1/30/2021  8:25 AM EST -----  Notify pt-   Results reviewed. INR stable at 1.73 (due to chronic liver diease)   LFT's worsening- with who and when does pt have next GI appt? Will need to forward results to their office. BUN/ CR ok with slightly improved GFR- nothing needed.   ES

## 2021-02-01 NOTE — TELEPHONE ENCOUNTER
Spoke to pt about results. Referral was created and faxed.  Letter was created to extend leave until 2/15 and F: [phone]

## 2021-02-01 NOTE — TELEPHONE ENCOUNTER
Spoke to pt about results. Pt states that OT wants him to extend his leave for another weeks. He states he spoke to  [facility] and they are needing a lettrer that states this to be faxed to them.

## 2021-02-02 NOTE — PROGRESS NOTES
SLEEP STAGIN.5 minutes in N1 sleep, 311 minutes in N2 sleep, 62  minutes in N3 sleep, 25 minutes in REM sleep. RESPIRATORY SUMMARY:  35 obstructive hypopneas for an AHI of 5.2. The  RDI was 5.2. The REM AHI was 9.6 and the supine AHI was 4.9. BODY POSITION SUMMARY:  401 minutes supine. SLEEP DISTURBANCE SUMMARY:  There were 30 arousals for an arousal index  of 4.5. There were 2 arousals related to respiratory obstruction for a  respiratory arousal index of 0.3. PERIODIC LIMB MOVEMENT SUMMARY:  There was none. PULSE OXIMETRY SUMMARY:  Mean oxygen saturation during wakefulness  94.7%, during sleep 90.8%. Lowest oxygen saturation 73%, there were 14  minutes of oxygen saturation below 88%. ECG SUMMARY:  They were at base rhythm sinus and there were periods of  atrial flutter/AFib. ASSESSMENT:  1.  Mild obstructive sleep apnea with an AHI of 5.2 which worsened  during REM sleep with a REM AHI of 9.6. Atrial fib/flutter. The  polysomnogram was not split due to lack of respiratory events during the  first two hours of sleep. 2.  Nocturnal oxygen desaturation with a karl of 73% and 14 minutes of  oxygen desaturation below 88%. RECOMMENDATIONS:  Due to the atrial arrhythmias, nocturnal oxygen  desaturation, I would strongly recommend PAP therapies.   The patient  will be contacted, and if he is agreeable, we will bring him back to the  sleep lab for PAP titration polysomnogram.        Srinivas Wood M.D.    D: 2021 22:23:54       T: 2021 6:07:38     [id number]  Job#: [id number]     Doc#: [id number]    CC:

## 2021-02-03 NOTE — PROGRESS NOTES
[address] [place] [address]. [address] [place] [address] Rd., [facility], [facility]  [phone] (phone)  [phone] (fax)    Patient Name: Soila Philip. Date of Birth: [id number]. MRN: [id number]      Assessment: Patient is a 48 y.o. 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 TELEHEALTH EVALUATION -- Audio/Visual (During AYDMV-17 public health emergency)     Services were provided through a video synchronous discussion virtually to substitute for in-person clinic visit. Patient located at their individual home and provider in office.   -Nutritionally relevant labs:   Lab Results   Component Value Date/Time    LABA1C 4.6 11/21/2020 09:34 PM    GLUCOSE 103 12/13/2020 06:21 AM    GLUCOSE 118 (H) 12/12/2020 02:48 PM     -Blood sugar trends: Starting to check his BS 4x/day as were doing when he was in the hospital - [facility]. Wants to do a spreadsheet on Excel of his BS readings. Pt states FBS:  70 - 80's and never over 130's as the highest.  Metformin only for his Diabetes. Pt with cirrhosis of the liver. Obtained Counseling - off and on with his alcohol abuse. After discharged from [facility], patients states he was in the hospital for over a month, was then placed in extended care facility for rehab and to help him with mobility issues. Currently getting home health therapy and nursing. He is now living in an apartment and lives alone. Has Siblings close by. He makes some of his own meals. His brother Ayana Cardenas does his grocery shopping for him. Lost over a 100# in the past 6 mo. Always has loose stools    Needs to get on a liver transplant list.  Needs to follow 2 liter fluid and 2000mg sodium/day for his cirrhosis. Pt states will be going back to work part time and can work from home.   Hours of work 9-6 pm and off on tuesdays and [address] recall:   Breakfast: light and fit yogurt, apple occ and occ will drink marybeth red grapefruit juice uses Exekiel bread. May have an egg with brkf. Has Hard boiled eggs on hand. Snacking - Eating a lot of carrots and celery and whipped yogurt. Only eats Swiss or sharp cheddar cheese. Lunch: Similar to brkf. Tend to do fruit. Banana. Does a lot of salad with grilled chicken. Dinner: Chicken and carrots and celery or sometimes with Cucumbers and vinegar dressing  Occ will drink a Protein shake OR Smoothies made with yogurt and berries. -Main Beverages: water, filtered water, soda stream, sweet n low to sweetened drinks. -Impression of Dietary Intake: on average, 2-3 meals per day, on average, 1-2 servings fruit per day, on average, 2-3 servings vegetables per day, low salt, nutrient dense snacking through the day.     Current Outpatient Medications on File Prior to Visit   Medication Sig Dispense Refill    bumetanide (BUMEX) 1 MG tablet Take 1 tablet by mouth daily 30 tablet 1    rifaximin (XIFAXAN) 550 MG tablet Take 1 tablet by mouth 2 times daily 60 tablet 1    pantoprazole (PROTONIX) 40 MG tablet Take 1 tablet by mouth daily 30 tablet 1    midodrine (PROAMATINE) 10 MG tablet Take 10 mg by mouth 2 times daily      spironolactone (ALDACTONE) 50 MG tablet Take 50 mg by mouth daily      dilTIAZem (CARDIZEM) 30 MG tablet Take 0.5 tablets by mouth every 6 hours 120 tablet 1    mupirocin (BACTROBAN) 2 % ointment Apply topically 3 times daily for 10 days 3 g 1    albuterol sulfate HFA (VENTOLIN HFA) 108 (90 Base) MCG/ACT inhaler Inhale 2 puffs into the lungs every 6 hours as needed for Wheezing      tiotropium (SPIRIVA) 18 MCG inhalation capsule Inhale 18 mcg into the lungs daily      metoprolol (LOPRESSOR) 100 MG tablet Take 100 mg by mouth 2 times daily      Lancets MISC 1 each by Does not apply route daily Use to test blood sugar 4 times daily and as needed DX:E11.9 200 each 3  blood glucose monitor strips Test 4 times daily and as needed for irregular blood glucose. Dispense sufficient amount for indicated testing frequency plus additional to accommodate PRN testing needs. DX E11.9 200 strip 3    Blood Glucose Monitoring Suppl (ONE TOUCH ULTRA 2) w/Device KIT Use to test 4 times daily and as needed DX: E11.9 1 kit 0    ASPIRIN 81 PO Take by mouth daily      varenicline (CHANTIX) 1 MG tablet Take 1 mg by mouth 2 times daily      gabapentin (NEURONTIN) 300 MG capsule Take 300 mg by mouth 3 times daily.  lactulose (CEPHULAC) 10 g packet Take 45 mLs by mouth 3 times daily       metFORMIN (GLUCOPHAGE) 500 MG tablet Take 500 mg by mouth 2 times daily (with meals)       No current facility-administered medications on file prior to visit. Vitals from current and previous visits:  1/26/2021 wt. 235#,   Ht. 6'2\"  There were no vitals taken for this visit. -There is no height or weight on file to calculate BMI. 30-34.9 - Obesity Grade I.     Nutrition Diagnosis:   Food and nutrition-related knowledge deficit related to Lack of previous MNT/currently undergoing MNT as evidenced by Conditions associated with a diagnosis or treatment: Type 2 DB. Intervention:  -Instructed the patient on: Carbohydrate Counting, Consistent Carbohydrate Intake, Food Label Reading and Meal Planning for Regular, Balanced Meals & Snacks complete food logging for 1-2 weeks and mail back to the Diabetes and [address] [facility] given for: carbohydrate counting, food logging, healthy snacks, sample meal plans/menus and low sodium guidelines and salt free seasoning recipes. Patient Instructions. 1.)  Get familiar with carb counting your food by reading the nutrition facts label for serving size and total carbohydrates. - Without a label refer to your Carb counting guide booklet. 2.)  Your meal plan is on the inside cover of your carb counting guide booklet. - Aim for 45 - 60 gms carbohydrates/meal eating 3 meals/day  - Add protein  - Add non-starchy veggies anytime to meals and snacks. snacktimes can be:  15-20 gms carbs + 1 protein and non-starchy veggies anytime. 3.)  Fasting BS or before a meal, BS goal:  90 - 130  2 hours after the start of a meal, BS goal:  100 - 150    Check your BS 1-2x/day at varying times of the day. 4.)  Mail back a 1-2 week food log before next dietitian appt. -Nutrition prescription: 1800 - 2000 calories/day, 200 - 225 g carbs/day. Comprehension verified using teachback method. Monitoring/Evaluation:   -Followup visit: 8 weeks with dietitian - virtual visit. -Receptiveness to education/goals: Agreeable.  -Evaluation of education: Indicates understanding.  -Readiness to change: precontemplative- carb counting his food. -Expected compliance: fair. Thank you for your referral of this patient. Total time involved in direct patient education: 45 minutes for initial MNT visit.

## 2021-02-04 NOTE — PATIENT INSTRUCTIONS
1. )  Get familiar with carb counting your food by reading the nutrition facts label for serving size and total carbohydrates. - Without a label refer to your Carb counting guide booklet. 2.)  Your meal plan is on the inside cover of your carb counting guide booklet. - Aim for 45 - 60 gms carbohydrates/meal eating 3 meals/day  - Add protein  - Add non-starchy veggies anytime to meals and snacks. snacktimes can be:  15-20 gms carbs + 1 protein and non-starchy veggies anytime. 3.)  Fasting BS or before a meal, BS goal:  90 - 130  2 hours after the start of a meal, BS goal:  100 - 150    Check your BS 1-2x/day at varying times of the day. 4.)  Mail back a 1-2 week food log before next dietitian appt.

## 2021-02-18 NOTE — TELEPHONE ENCOUNTER
Antonio Jaimes is scheduled for PAP Titration this evening is requesting an order for 1 time RX: Ambien to help him sleep. I will place RX Pad on your desk. Please advise. Patient will .

## 2021-02-19 NOTE — PROGRESS NOTES
distilled water)  c. Masks   4. The technologist should assess and measure patient for most appropriate mask prior to start of study. 5. CPAP should be initiated at 5 cm H20.  a. More pressure at start of study may be necessary if patient is morbidly obese or unable to fall asleep on a pressure of 5 cm H20   6. If apneas or frequent hypopneas are present, pressure settings should be increased by 2 cm H20. If occasional hypopneas, snoring, or mask flow limitation are present, pressure settings should be increased by 1 cm H20 and maintained for at least fie minutes to determine if events improve or resolve. Pressure settings may need to be increased more quickly during REM sleep given the limited amount of REM during sleep and the need to treat events during this stage. 7. If a mask leak occurs, the tech should first fix the leakage before raising the pressure. Otherwise, the final pressure setting chosen for the patient may be too high. Once the mask leak has been fixed, decrease the pressure to the last setting where mouth breathing and/or mask leakage was not present, and then re-titrate as indicated. Make sure to document directly on the study the steps taken to resolve the leak and the type of masks used. Pressure setting usually do not need to be set as high with a nasal-mask than with a full-face mask. 8. The recording technologist should document directly on the study at least every 30 minutes. 9. If the patient takes a break from wearing the mask, do not decrease the CPAP pressure on attempted return to sleep unless the patient remains awake for 15 minutes or the patient specifically requests that the pressure be lowered. 10. Do not raise pressure for central apneas. If the patient develops central apneas, pressure setting may need to be lowered. 11. If the patient is unable to tolerate CPAP secondary due to:  a. Persistent mouth breathing despite use of a full-face mask/chin strap  b.  Inability to exhale against higher expiratory pressures (typically beginning anywhere from 15 to 20 cm of H20.  c. Has frequent central apneas, the use of bilevel positive airway pressure may be indicated. Document directly on study why the patient is being switched from CPAP to bilevel. 12. Ensure that supine sleep has been seen on the chosen setting. Going above the chosen setting 1 or 2 cm H20 to show range may be helpful to ensure that the correct pressure has been established. BiLEVEL:    1. Technologist may change from CPAP to bilevel during a study if proven the patient is unable to tolerate CPAP. 2. Review the patients pertinent medical al history, previous sleep study or studies to ass the severity of sleep disordered breathing. Review of pertinent information will help to attain a better titration. 3. Applications of electrodes, montages, filters, sensitivities and scoring will be performed according to the current version of the AASM Scoring Manual.   4. Prior to initiating study collect all appropriate PAP supplies  a. Tubing   b. Humidifier (filled with distilled water)  c. Masks   5. The technologist should assess and measure patient for most appropriate mask prior to start of study. 6. If the patient has not previously been on CPAP, beginning pressures should be 8/4 cm H20 or higher if patient is morbidly obese or unable to fall asleep at lower pressures. If the patient has been previously successfully treated on CPAP start expiratory pressure at therapeutic setting and set inspiratory pressure 4 cm H20 higher. If advancing from CPAP to bilevel during a study expiratory pressure should be set at most successful CPAP setting and inspiratory pressure set 4 cm h20 higher. 7. The standard differential pressure utilized during bilevel titrations typically ranges from 3 to 5 cm H20, with 4 cm H20 being the most common.   Higher differential pressures may be needed in patients who are morbidly obese or who have neuromuscular diseases. 8. If apneas or frequent hypopneas are present, inspiratory and expiratory pressure settings should be increased by 2 cm H20. If occasional hypopneas, snoring, or mask flow limitation are present inspiratory and expiratory pressures settings should be increased by 1 cm h20 and maintained for at least 5 minutes to determine if events improve or resolve. Pressure settings may need to be increased more quickly during REM sleep given the limited amount of REM during sleep and the need to treat events during this stage. 9. If a mask leak occurs, the tech should first fix the leakage before raising the pressure. Otherwise, the final pressure setting chosen for the patient may be too high. Once the mask leak has been fixed, decrease the pressure to the last setting where mouth breathing and/or mask leakage was not present, and then re-titrate as indicated. Make sure to document directly on the study the steps taken to resolve the leak and the type of masks used. Pressure setting usually do not need to be set as high with a nasal-mask than with a full-face mask. 10. The recording technologist should document directly on the study at least every 30 minutes. 11. If the patient takes a break from wearing the mask, do not decrease the CPAP pressure on attempted return to sleep unless the patient remains awake for 15 minutes or the patient specifically requests that the pressure be lowered. 12. Do not raise pressure for central apneas. If the patient develops central apneas, pressure setting may need to be lowered. If the patient has central apneas on bilevel, the use of spontaneous (ST) mode may be indicated. a. ST mode may only be used in 2 cases  i. An order for ST mode with a primary diagnosis of central sleep apnea  ii. During a titration if obstructive events are less than 5/ hour and centrals must be greater than 50% of total respiratory events.    13. Ensure that supine sleep has

## 2021-02-23 NOTE — TELEPHONE ENCOUNTER
Patient requesting a medication refill.   Medication: spironolactone (ALDACTONE) 50 MG tablet  Ph[facility]acy: [id number] - [facility]. [address], [address] [facility] [address]   Phone:  [phone]  Fax:  [phone]  Last office visit: 1/21/21  Next office visit: 3/29/2021

## 2021-02-23 NOTE — TELEPHONE ENCOUNTER
Looks like DME orders were placed in [facility] today 2-23-21. Will forward on to Dr Tank Leyva office to verify.

## 2021-02-23 NOTE — PROGRESS NOTES
[address] [place] [address]                               SLEEP STUDY REPORT    PATIENT NAME: Green Dancer                      :        1965  MED REC NO:   [id number]                           ROOM:  ACCOUNT NO:   [de-identified]                           ADMIT DATE: 2021  PROVIDER:     [facility] STUDY:  2021    CPAP TITRATION POLYSOMNOGRAM    REFERRING PROVIDER:  Fred Wright MD    HISTORY OF PRESENT ILLNESS:  The patient is a 61-year-old gentleman  recently diagnosed with obstructive sleep apnea. He was brought back to  the sleep lab for PAP titration polysomnogram.  Weight 235 pounds,  height 74 inches, BMI 30. METHODS:  The patient underwent digital polysomnography in compliance  with the standards and specifications from the AASM Manual including the  simultaneous recording of 3 EEG channels (F4-M1, C4-M1, and O2-M1 with  back up electrodes F3-M2, C3-M2, and O1-M2), 2 EOG channels (E1-M2, and  E2-M1,), EMG (chin, left & right leg), EKG, Nonin pulse oximetry with  less than 2 second averaging time, body position, airflow recorded by  oral-nasal thermal sensor and nasal air pressure transducer, plus  respiratory effort recorded by calibrated respiratory inductance  plethysmography (RIP), flow volume loop, sound and video. Sleep staging  and scoring followed the standard put forth by the American Academy of  Sleep Medicine and utilized the 4A obstructive hypopnea event  desaturation of 4 percent or greater. DETAILS OF THE STUDY:  Lights out 09:44 p.m., lights on 05:18 a.m. Total recording time 454 minutes, sleep period time 454 minutes, total  sleep time 441 minutes, sleep efficiency 97.1%. Latency to sleep, the  patient was asleep at lights out. Wake after sleep onset 13 minutes. Latency to REM 68.5 minutes.     SLEEP STAGIN.5 minutes in N1 sleep, 363 minutes in N2 sleep, there  was no N3 sleep, 55 minutes in REM sleep. BODY POSITION SUMMARY:  441 minutes supine time. PERIODIC LIMB MOVEMENT SUMMARY:  There was none. ECG SUMMARY:  Atrial fibrillation. PAP TITRATION SUMMARY:  The patient tolerated the procedure quite well. CPAP was initiated at 5 cm of water pressure and increased to a maximal  pressure of 11. During this titration, optimal control was reached with  a CPAP of 8. At this pressure, the patient was monitored for 1 hour and  2 minutes. There were 23 minutes of REM sleep and 35 minutes of non-REM  sleep. There were no obstructive events. Mean oxygen saturation 90%. ASSESSMENT:  1.  Obstructive sleep apnea. 2.  Successful PAP titration polysomnogram.  3.  Atrial fibrillation. RECOMMENDATIONS:  To initiate CPAP 8 cm of water pressure. Chosen  interface was F20 medium-size full face mask. Follow up in the sleep  clinic eight weeks after initiating PAP therapies to review of download.         Srinivas Wood M.D.    D: 02/22/2021 23:20:48       T: 02/23/2021 5:40:25     [email or web address]  Job#: [id number]     Doc#: [id number]    CC:

## 2021-02-23 NOTE — TELEPHONE ENCOUNTER
[facility] received a call from:    Name of Caller: Cosmo Poor    Relationship to patient:Self    Organization name: [employer]    Best contact number: [phone]    Reason for call: Patient calling to get authorization for a C Pap machine. Patient states he has already completed the Sleep Study.  Please call patient to advise

## 2021-02-24 NOTE — ED TRIAGE NOTES
Pt to the ED with c/o SOB and bilateral lower leg edema. Pt states he has gained 7lbs in the last two weeks. Pt states his legs have continued to swell. Pt states fluid typically builds up in his lungs as well. EKG complete. VSS.

## 2021-02-24 NOTE — TELEPHONE ENCOUNTER
Please check with patient-typically, some of these medicines would be filled by his specialists-these include rifaximin, midodrine, bumetanide, and spironolactone. Is patient still seeing his other specialists? Are they refilling?   ES

## 2021-02-24 NOTE — ED PROVIDER NOTES
[place] [id number]       Chief Complaint   Patient presents with    Shortness of Breath    Leg Swelling     bilateral       Nurses Notes reviewed and I agree except as noted in the HPI. HISTORY OF PRESENT ILLNESS    Radha Pittman kye 48 y.o. male who presents to the ED for evaluation of shortness of breath. Patient notes increasing shortness of breath over the last week. He notes he has gained about 7 pounds this week. He notes continued swelling in his lower extremities. He has a history of cirrhosis. He has a history of a pleural effusion on the right side that recurs due to his cirrhosis. He notes that he was seen here in December, was transferred to [facility], he had a thoracentesis completed there and blood came out he ended up getting a chest tube, he laid at [facility] for 30 days, was released on 6 January, return to nursing home for 10 days and then was released on the 16th. He notes he has been out of the hospital for approximately a month. He denies any significant change in his medicines recently. He notes improvement in his swelling from in the past.  He notes he is been taking his lactulose as prescribed. He notes chest tightness associated with his shortness of breath. He notes it is difficult to take a deep breath. He has chronic diarrhea associated with his lactulose. He denies any change in urination. He notes slight dizziness with standing. He notes he recently was placed on clindamycin for a sinus infection, he did not note any significant improvement and was placed on Bactroban ointment. He notes a history of A. fib, he was on Xarelto, was taken off of this due to multiple procedures and bleeding. He now is only taking aspirin. HPI was provided by the patient. REVIEW OF SYSTEMS     Review of Systems   Constitutional: Negative for activity change, appetite change, chills, fatigue and fever.    HENT: Positive for postnasal drip and rhinorrhea. Negative for congestion, sinus pressure, sinus pain and sore throat. Respiratory: Negative for chest tightness and shortness of breath. Cardiovascular: Positive for leg swelling. Negative for chest pain and palpitations. Gastrointestinal: Positive for diarrhea (Associated with lactulose). Negative for abdominal distention, abdominal pain, nausea and vomiting. Endocrine: Positive for polyuria (Associated with diuretic). Genitourinary: Positive for frequency. Negative for decreased urine volume, difficulty urinating and dysuria. Musculoskeletal: Negative for arthralgias, back pain and joint swelling. Skin: Negative for color change, rash and wound. Allergic/Immunologic: Negative for immunocompromised state. Neurological: Negative for dizziness, weakness, light-headedness, numbness and headaches. Hematological: Does not bruise/bleed easily. Psychiatric/Behavioral: Negative for agitation, behavioral problems and confusion. PAST MEDICAL HISTORY     Past Medical History:   Diagnosis Date    [facility] liver [facility]     Atrial fibrillation (HCC)     CHF (congestive heart failure) (HCC)     Chronic kidney disease     COPD (chronic obstructive pulmonary disease) (HCC)     Diabetes mellitus [facility]     Gallstones     GERD (gastroesophageal reflux disease)     Headache     Portal hypertension (HCC)        SURGICALHISTORY      has a past surgical history that includes Appendectomy; hernia repair; and Lung surgery (Right, 11-24-20, 11-30-20). CURRENT MEDICATIONS       Previous Medications    ALBUTEROL SULFATE HFA (VENTOLIN HFA) 108 (90 BASE) MCG/ACT INHALER    Inhale 2 puffs into the lungs every 6 hours as needed for Wheezing    ASPIRIN 81 PO    Take by mouth daily    BLOOD GLUCOSE MONITOR STRIPS    Test 4 times daily and as needed for irregular blood glucose. Dispense sufficient amount for indicated testing frequency plus additional to accommodate PRN testing needs. DX E11.9    BLOOD GLUCOSE MONITORING SUPPL (ONE TOUCH ULTRA 2) W/DEVICE KIT    Use to test 4 times daily and as needed DX: E11.9    BUMETANIDE (BUMEX) 1 MG TABLET    Take 1 tablet by mouth daily    DILTIAZEM (CARDIZEM) 30 MG TABLET    Take 0.5 tablets by mouth every 6 hours    GABAPENTIN (NEURONTIN) 300 MG CAPSULE    Take 300 mg by mouth 3 times daily. LACTULOSE ([address] [place] [address]) 10 G PACKET    Take 45 mLs by mouth 3 times daily     LANCETS MISC    1 each by Does not apply route daily Use to test blood sugar 4 times daily and as needed DX:E11.9    METFORMIN (GLUCOPHAGE) 500 MG TABLET    Take 500 mg by mouth 2 times daily (with meals)    METOPROLOL (LOPRESSOR) 100 MG TABLET    Take 100 mg by mouth 2 times daily    MIDODRINE (PROAMATINE) 10 MG TABLET    Take 10 mg by mouth 2 times daily    PANTOPRAZOLE (PROTONIX) 40 MG TABLET    Take 1 tablet by mouth daily    RIFAXIMIN (XIFAXAN) 550 MG TABLET    Take 1 tablet by mouth 2 times daily    TIOTROPIUM (SPIRIVA) 18 MCG INHALATION CAPSULE    Inhale 18 mcg into the lungs daily    VARENICLINE (CHANTIX) 1 MG TABLET    Take 1 mg by mouth 2 times daily       ALLERGIES     has No Known Allergies. FAMILY HISTORY     He indicated that his mother is . He indicated that his father is . family history includes Cancer in his father and mother; Diabetes in his mother; Heart Disease in his mother; High Blood Pressure in his mother.     SOCIAL HISTORY       Social History     Socioeconomic History    Marital status: Single     Spouse name: Not on file    Number of children: Not on file    Years of education: Not on file    Highest education level: Not on file   Occupational History    Not on file   Social Needs    Financial resource strain: Not on file    Food insecurity     Worry: Not on file     Inability: Not on file    Transportation needs     Medical: Not on file     Non-medical: Not on file   Tobacco Use    Smoking status: Former Smoker     Packs/day: 1.00 Years: 29.00     Pack years: 29.00     Types: Cigarettes     Quit date: 2020     Years since quittin.2    Smokeless tobacco: Never Used   Substance and Sexual Activity    Alcohol use: Not Currently     Frequency: 4 or more times a week     Comment: No alcohol since 21    Drug use: Never    Sexual activity: Not on file   Lifestyle    Physical activity     Days per week: Not on file     Minutes per session: Not on file    Stress: Not on file   Relationships    Social connections     Talks on phone: Not on file     Gets together: Not on file     Attends Spiritism service: Not on file     Active member of club or organization: Not on file     Attends meetings of clubs or organizations: Not on file     Relationship status: Not on file    Intimate partner violence     Fear of current or ex partner: Not on file     Emotionally abused: Not on file     Physically abused: Not on file     Forced sexual activity: Not on file   Other Topics Concern    Not on file   Social History Narrative    Not on file       PHYSICAL EXAM     INITIAL VITALS:  height is 6' 2\" (1.88 m) and weight is 236 lb (107 kg). His oral temperature is 98.5 °F (36.9 °C). His blood pressure is 113/62 and his pulse is 115. His respiration is 18 and oxygen saturation is 96%. Physical Exam  Vitals signs and nursing note reviewed. Constitutional:       General: He is not in acute distress. Appearance: He is well-developed and normal weight. He is not toxic-appearing. HENT:      Head: Normocephalic. Mouth/Throat:      Mouth: Mucous membranes are moist.   Eyes:      General: Scleral icterus present. Pupils: Pupils are equal, round, and reactive to light. Cardiovascular:      Rate and Rhythm: Tachycardia present. Rhythm irregular. Pulses: No decreased pulses. Heart sounds: No murmur. Pulmonary:      Effort: Pulmonary effort is normal. No tachypnea.       Breath sounds: Examination of the right-upper field reveals decreased breath sounds. Examination of the right-middle field reveals decreased breath sounds. Examination of the right-lower field reveals decreased breath sounds. Decreased breath sounds present. Abdominal:      Palpations: Abdomen is soft. There is hepatomegaly. There is no mass. Tenderness: There is no abdominal tenderness. Musculoskeletal: Normal range of motion. Skin:     General: Skin is warm and dry. Capillary Refill: Capillary refill takes less than 2 seconds. Coloration: Skin is not cyanotic. Findings: No rash. Neurological:      General: No focal deficit present. Mental Status: He is alert and oriented to person, place, and time. Psychiatric:         Mood and Affect: Mood normal. Mood is not anxious. DIFFERENTIAL DIAGNOSIS:   Pleural effusion, pneumothorax, hydrothorax, hemothorax, CHF exacerbation  DIAGNOSTIC RESULTS     EKG: All EKG's are interpreted by the Emergency Department Physician who eithersigns or Co-signs this chart in the absence of a cardiologist.    EKG read and interpreted by myself with comparison to December 12, 2020 gives impression of a flutter with heart rate of 94; QRS 64;QTc 412; axis R-23, T-61. RADIOLOGY: non-plainfilm images(s) such as CT, Ultrasound and MRI are read by the radiologist.  Plain radiographic images are visualized and preliminarily interpreted by the emergency physician unless otherwise stated below. XR CHEST (2 VW)   Final Result      Moderate to large right pleural effusion. Displaced right posterior sixth rib fracture. **This report has been created using voice recognition software. It may contain minor errors which are inherent in voice recognition technology. **      Final report electronically signed by Dr. Licha Ortiz on 2/24/2021 3:24 PM            LABS:   Labs Reviewed   CBC WITH AUTO DIFFERENTIAL - Abnormal; Notable for the following components:       Result Value    RBC 4.03 (*) Hemoglobin 12.6 (*)     Hematocrit 37.9 (*)     RDW-CV 17.5 (*)     RDW-SD 60.1 (*)     Platelets 95 (*)     Lymphocytes Absolute 0.7 (*)     All other components within normal limits   COMPREHENSIVE METABOLIC PANEL W/ REFLEX TO MG FOR LOW K - Abnormal; Notable for the following components:    Glucose 129 (*)     CREATININE 2.1 (*)     BUN 27 (*)     Sodium 125 (*)     Chloride 90 (*)     AST 60 (*)     Alkaline Phosphatase 170 (*)     Albumin 3.1 (*)     Total Bilirubin 4.6 (*)     All other components within normal limits   TROPONIN - Abnormal; Notable for the following components:    Troponin T 0.045 (*)     All other components within normal limits   OSMOLALITY - Abnormal; Notable for the following components:    Osmolality Calc 258.3 (*)     All other components within normal limits   GLOMERULAR FILTRATION RATE, ESTIMATED - Abnormal; Notable for the following components:    Est, Glom Filt Rate 33 (*)     All other components within normal limits   ANION GAP       EMERGENCY DEPARTMENT COURSE:   Vitals:    Vitals:    02/24/21 1455 02/24/21 1630 02/24/21 1631   BP: 117/69  113/62   Pulse: 104 115    Resp: 20 18    Temp: 98.5 °F (36.9 °C)     TempSrc: Oral     SpO2: 97% 96%    Weight: 236 lb (107 kg)     Height: 6' 2\" (1.88 m)         MDM    Patient was seen and evaluated in the emergency department, patient appeared to be in no acute distress, vital signs were reviewed, irregular tachycardia noted, appears to be consistent with the patient's history of a flutter. Physical exam was completed, he has some mild pitting edema in his lower extremities, no significant ascites noted the abdomen, decreased lung sounds on the right side. Irregular tachycardic heart noted. Labs and imaging were ordered, chest x-ray shows moderate to large right pleural effusion. Lab work shows KARAN, hyponatremia. Feel the patient requires admission for these findings.   Discussed the case with on-call interventional radiologist

## 2021-02-24 NOTE — TELEPHONE ENCOUNTER
Pt calls in leaving a nurse , he stated that he has had a Weight gain 6-7 lbs in the last 2-3- days. Shortness of breath with labored breathing. Not feeling well with weakness. I called him back and he was actually at [facility] waiting to complete a cxr. He states that he has a standing order per his GI specialist so he thought he would try that. He stated that ES has advised him in the past to go to the ED if he had weight gain or SOB. I then advised him that he needed to go to the ED to be evaluated and asked Adela at [facility] outpt to assist him there. She advised she would help him there. Pt did not complete CXR as they will probably do this at the ED.

## 2021-02-24 NOTE — TELEPHONE ENCOUNTER
Per pulmonology notes    February 24, 2021  Sarah Nice LPN         9:33 AM  Note     Phoned patient to let him know orders were placed for pap. Ask patient what DME he would like to use.

## 2021-02-24 NOTE — H&P
History & Physical        Patient:  Estefany Kahn  YOB: 1965    MRN: [id number]     Acct: [de-identified]    PCP: Pedro Kirby MD    Date of Admission: 2/24/2021    Date of Service: Pt seen/examined on 2/24/2021   and Admitted to Inpatient with expected LOS greater than two midnights due to medical therapy. Chief Complaint:  Sob, weight gain       History Of Present Illness:      48 y.o. male who presented to [facility] with one week history of increased sob and 7 pound weight gain. Pt has a pmh pf EtOH cirrhosis c/b ascites, EV, recurrent hepatic hydrothorax, Afib (prev on systemic Lovenox), DM2, HFpEF, COPD, former smoker. In December 2020, he was managed at [address] [place] [address] for acute hypoxic respiratory failure 2/2 recurrent hepatic hydrothorax on 12/14 in the setting of recurrent R pleural effusion. Underwent thoracentesis on 12/16 c/b hemothorax requiring transfer to ICU with emergent thoracostomy and exploratory thoracotomy with right pulmonary decortication on 12/17. He was intubated from 12/17-12/18. Not a candidate for TIPS due to high MELD-Na per [facility]. Pt currently being worked up at [address] [place] [address] for a liver transplant. Pt was discharged home on PO Bumex and Aldactone. Pt returns now to the ED per request of his PCP given CXR findings and his increased sob. Pt reports being compliant with all his medications at home, however doesn't always following a low Na diet. Reports noticing a 7 pound weight gain over the past week, and has been very sob with minimal activity. Pt has also been nauseas with some vomiting with food. Denies any fevers, chills, cough, abdominal pain, chest pain, black or red colored stools, confusion, alcohol intake. In the ED, pt in A. Fib with HR ranging 100-120. Per pt this is normal. Afebrile, 96% on RA. Cr 2.1 (baseline 1.2), Na 125, troponin elevated. No ECG changes of ischemia. Spoke with GI who were ok for admission.      Past Medical History: Diagnosis Date    Advanced cirrhosis of liver (HCC)     Atrial fibrillation (HCC)     CHF (congestive heart failure) (HCC)     Chronic kidney disease     COPD (chronic obstructive pulmonary disease) (HCC)     Diabetes mellitus (HCC)     Gallstones     GERD (gastroesophageal reflux disease)     Headache     Portal hypertension (HCC)        Past Surgical History:          Procedure Laterality Date    APPENDECTOMY      HERNIA REPAIR      LUNG SURGERY Right 11-24-20, 11-30-20    [facility]       Medications Prior to Admission:      Prior to Admission medications    Medication Sig Start Date End Date Taking? Authorizing Provider   bumetanide (BUMEX) 1 MG tablet Take 1 tablet by mouth daily 1/29/21   Sveta Francois MD   rifaximin Kapil Guiles) 550 MG tablet Take 1 tablet by mouth 2 times daily 1/29/21   Sveta Francois MD   pantoprazole (PROTONIX) 40 MG tablet Take 1 tablet by mouth daily 1/29/21   Sveta Francois MD   midodrine (PROAMATINE) 10 MG tablet Take 10 mg by mouth 2 times daily    Historical Provider, MD   dilTIAZem (CARDIZEM) 30 MG tablet Take 0.5 tablets by mouth every 6 hours 1/29/21   Sveta Francois MD   albuterol sulfate HFA (VENTOLIN HFA) 108 (90 Base) MCG/ACT inhaler Inhale 2 puffs into the lungs every 6 hours as needed for Wheezing    Historical Provider, MD   tiotropium (SPIRIVA) 18 MCG inhalation capsule Inhale 18 mcg into the lungs daily    Historical Provider, MD   metoprolol (LOPRESSOR) 100 MG tablet Take 100 mg by mouth 2 times daily    Historical Provider, MD   Lancets MISC 1 each by Does not apply route daily Use to test blood sugar 4 times daily and as needed DX:E11.9 12/4/20   FELIX Rey CNP   blood glucose monitor strips Test 4 times daily and as needed for irregular blood glucose. Dispense sufficient amount for indicated testing frequency plus additional to accommodate PRN testing needs.  DX E11.9 12/4/20   FELIX Rey CNP   Blood Glucose Monitoring Suppl Forced sexual activity: None   Other Topics Concern    None   Social History Narrative    None       Family History:       Reviewed in detail and negative for DM, CAD, Cancer, CVA. Positive as follows:        Problem Relation Age of Onset    Heart Disease Mother     High Blood Pressure Mother     Diabetes Mother     Cancer Mother     Cancer Father        Diet:  No diet orders on file    REVIEW OF SYSTEMS:   Pertinent positives as noted in the HPI. All other systems reviewed and negative. PHYSICAL EXAM:    /62   Pulse 115   Temp 98.5 °F (36.9 °C) (Oral)   Resp 18   Ht 6' 2\" (1.88 m)   Wt 236 lb (107 kg)   SpO2 96%   BMI 30.30 kg/m²     General appearance:  Chronically ill appearing obese male   HEENT:  Normal cephalic, atraumatic without obvious deformity. Pupils icteric. Extra ocular muscles intact. Conjunctivae/corneas clear. Neck: Supple, with full range of motion. No jugular venous distention. Trachea midline. Respiratory:  Normal respiratory effort. Clear to auscultation, bilaterally without Rales/Wheezes/Rhonchi. Cardiovascular:  Regular rate and rhythm with normal S1/S2 without murmurs, rubs or gallops. Abdomen: Soft, obese, no ascites   Musculoskeletal:  No clubbing, cyanosis or edema bilaterally. Full range of motion without deformity. Skin: Skin color, texture, turgor normal.  No rashes or lesions. Neurologic:  Neurovascularly intact without any focal sensory/motor deficits.  Cranial nerves: II-XII intact, grossly non-focal.  Psychiatric:  Alert and oriented, thought content appropriate, normal insight  Capillary Refill: Brisk,< 3 seconds   Peripheral Pulses: +2 palpable, equal bilaterally       Labs:     Recent Labs     02/24/21  1500   WBC 5.4   HGB 12.6*   HCT 37.9*   PLT 95*     Recent Labs     02/24/21  1500   *   K 4.1   CL 90*   CO2 23   BUN 27*   CREATININE 2.1*   CALCIUM 9.2     Recent Labs     02/24/21  1500   AST 60*   ALT 30   BILITOT 4.6*   ALKPHOS 170* Recent Labs     02/24/21  1520   INR 1.75*     No results for input(s): Tuan Bernabe in the last 72 hours. Urinalysis:      Lab Results   Component Value Date    NITRU NEGATIVE 11/22/2020    WBCUA 0-2 11/22/2020    BACTERIA NONE SEEN 11/22/2020    RBCUA 3-5 11/22/2020    BLOODU TRACE 11/22/2020    SPECGRAV 1.010 11/22/2020       Radiology:     CXR: I have reviewed the CXR with the following interpretation: see below     XR CHEST (2 VW)   Final Result      Moderate to large right pleural effusion. Displaced right posterior sixth rib fracture. **This report has been created using voice recognition software. It may contain minor errors which are inherent in voice recognition technology. **      Final report electronically signed by Dr. Alonzo Aguiar on 2/24/2021 3:24 PM            ASSESSMENT:    Active Hospital Problems    Diagnosis Date Noted    Hydrothorax [J94.8]     Decompensated liver disease ([address] [K74.69] 12/12/2020    Type 2 diabetes mellitus without complication ([address] [R39.1] 12/04/2020    Paroxysmal atrial fibrillation ([address] [I48.0] 12/04/2020    Pleural effusion associated with hepatic disorder [K76.9, J91.8] 11/21/2020    Thrombocytopenia ([address] [D69.6] 10/05/2020    Acute renal failure (ARF) ([address] [N17.9] 10/03/2020     Assessment/Plan:    1. 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 CXR showing moderate to large right pleural effusion. Not a candidate for TIPS due to high MELD-Na per [facility]. Pt currently being worked up at [facility] for a liver [facility]. Pt on PO Bumex and Aldactone. Will arrange for IR guided thoracentesis tomorrow. 2. KARAN: Cr at 2.1 with baseline 1.2. Pt on Bumex and Aldactone. Likely pre-renal either to dehydration + use of diuretics or due to HRS. Will hold diuretics for now. Pending urine lytes, renal U/S. Consult Nephrology. Strict I/Os. Daily weights. Avoid nephrotoxic agents.      3. Paroxymal Atrial Fibrillation: currently in A. Fib with HR up to 120's at times. CHADSVASC- 3, not [address] candidate given cirrhosis. Hold ASA for thoracentesis tomorrow. Monitor on telemetry. 4. Thrombocytopenia and Elevated INR: 2/2 to liver cirrhosis. Will give 2.5 mg PO Vit K x 1 dose. Avoid blood thinners. 5. Type 2 DM: hold Metformin, start Humalog Sliding Scale. Diabetic Diet. Accu-checks. Thank you Kirtland Bence, MD for the opportunity to be involved in this patient's care.     Electronically signed by Abhijeet Grier MD on 2/24/2021 at 4:50 PM

## 2021-02-24 NOTE — TELEPHONE ENCOUNTER
Phoned patient to let him know orders were placed for pap. Ask patient what DME he would like to use.

## 2021-02-25 NOTE — PLAN OF CARE
Problem: Falls - Risk of:  Goal: Will remain free from falls  Description: Will remain free from falls  Outcome: Ongoing  Goal: Absence of physical injury  Description: Absence of physical injury  Outcome: Ongoing     Problem: Coping:  Goal: Level of anxiety will decrease  Description: Level of anxiety will decrease  Outcome: Ongoing   Patient resting in bed MD paged regarding tachycardia home meds given and improved. Lactulose given and patient had a BM. CPAP provided no other needs voiced call bell with in reach.

## 2021-02-25 NOTE — OP NOTE
Pre-Procedure Diagnosis:  pleural effusion    Procedure Performed:  Thoracentesis    Anesthesia: local     Findings: 3 ml andujar serous fluid removed    Immediate Complications:  None    Estimated Blood Loss: minimal    SEE DICTATED PROCEDURE NOTE FOR COMPLETE DETAILS.     Evon Herrera MD   2/25/2021 11:31 AM

## 2021-02-25 NOTE — PROGRESS NOTES
Hospitalist Progress Note      Patient:  Blake Kelley    Unit/Bed:4K-20/020-A  YOB: 1965  MRN: [id number]   Acct: [de-identified]   PCP: Raghavendra Bangura MD  Date of Admission: 2/24/2021    Assessment/Plan:    1. 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 CXR showing moderate to large right pleural effusion. Not a candidate for TIPS due to high MELD-Na per [facility]. Pt currently being worked up at [address] [place] [address] for a liver transplant. Pt on PO Bumex and Aldactone. IR guided thoracentesis today with cytology and fluid analysis.      2. KARAN: On admission :Cr at 2.1 with baseline 1.2. Pt on Bumex and Aldactone. Likely pre-renal either to dehydration + use of diuretics or due to HRS. Will hold diuretics for now. Pending urine lytes, renal U/S. Nephrology following. Strict I/Os. Daily weights. Avoid nephrotoxic agents -holding diuretics.     3. Paroxymal Atrial Fibrillation: currently in A. Fib with HR up to 120's at times. Rate controlled with metoprolol. CHADSVASC- 3, not [address] [place] [address] candidate given cirrhosis. Holding ASA for thoracentesis today. Monitor on telemetry.      4. Thrombocytopenia and Elevated INR: 2/2 to liver cirrhosis. Will give 2.5 mg PO Vit K x 1 dose. Avoid blood thinners.      5. Type 2 DM: hold Metformin, start Humalog Sliding Scale. Diabetic Diet. Accu-checks. Chief Complaint: SOB, weight gain    Initial H and P:-    Per Dr. Ashvin Chou initial note:    \"49 y.o. male who presented to [address] [place] [address] with one week history of increased sob and 7 pound weight gain. Pt has a pmh pf EtOH cirrhosis c/b ascites, EV, recurrent hepatic hydrothorax, Afib (prev on systemic Lovenox), DM2, HFpEF, COPD, former smoker.      In December 2020, he was managed at [address] [place] [address] for acute hypoxic respiratory failure 2/2 recurrent hepatic hydrothorax on 12/14 in the setting of recurrent R pleural effusion. Underwent thoracentesis on 12/16 c/b hemothorax requiring transfer to ICU with emergent thoracostomy and exploratory thoracotomy with right pulmonary decortication on 12/17. He was intubated from 12/17-12/18. Not a candidate for TIPS due to high MELD-Na per [facility]. Pt currently being worked up at [facility] for a liver transplant. Pt was discharged home on PO Bumex and Aldactone.      Pt returns now to the ED per request of his PCP given CXR findings and his increased sob. Pt reports being compliant with all his medications at home, however doesn't always following a low Na diet. Reports noticing a 7 pound weight gain over the past week, and has been very sob with minimal activity. Pt has also been nauseas with some vomiting with food. Denies any fevers, chills, cough, abdominal pain, chest pain, black or red colored stools, confusion, alcohol intake.      In the ED, pt in A. Fib with HR ranging 100-120. Per pt this is normal. Afebrile, 96% on RA. Cr 2.1 (baseline 1.2), Na 125, troponin elevated. No ECG changes of ischemia.      Spoke with GI who were ok for admission. \"    Subjective (past 24 hours):   No acute events overnight. Going for sore today. Cytology ordered. Denies abdominal pain, BLE edema, fever, headache, chest pain. Past medical history, family history, social history and allergies reviewed again and is unchanged since admission. ROS (12 point review of systems completed. Pertinent positives noted.  Otherwise ROS is negative)     Medications:  Reviewed    Infusion Medications    dextrose       Scheduled Medications    albumin human  25 g Intravenous Q8H    lactulose  30 g Oral TID    dilTIAZem  15 mg Oral Q6H    gabapentin  300 mg Oral TID    metoprolol  100 mg Oral BID    midodrine  10 mg Oral BID WC    pantoprazole  40 mg Oral QAM AC    rifaximin  550 mg Oral BID    tiotropium  2 puff Inhalation Daily    sodium chloride flush  10 mL Intravenous 2 times per day    insulin lispro  0-6 Units Subcutaneous TID     insulin lispro  0-3 Units Subcutaneous Nightly     PRN Meds: magnesium sulfate, metoprolol, sodium chloride flush, promethazine **OR** ondansetron, polyethylene glycol, acetaminophen **OR** acetaminophen, glucose, dextrose, glucagon (rDNA), dextrose      Intake/Output Summary (Last 24 hours) at 2/25/2021 1944  Last data filed at 2/25/2021 0534  Gross per 24 hour   Intake    Output 500 ml   Net -500 ml       Diet:  DIET CARB CONTROL; Carb Control: 3 carb choices (45 gms)/meal; Low Sodium (2 GM); Daily Fluid Restriction: 1800 ml    Exam:  /69   Pulse 117   Temp 98.7 °F (37.1 °C) (Oral)   Resp 18   Ht 6' 2\" (1.88 m)   Wt 249 lb 8 oz (113.2 kg)   SpO2 91%   BMI 32.03 kg/m²   General appearance: Chronically ill-appearing obese white male  HEENT: Pupils equal, round, and reactive to light. Pupils icteric. Neck: Supple, with full range of motion. No jugular venous distention. Trachea midline. Respiratory:  Normal respiratory effort. Diminished breath sounds at bases bilaterally. Cardiovascular: Tachycardic and regular rhythm with normal S1/S2 without murmurs, rubs or gallops. Abdomen: Soft, non-tender, nondistended with normal bowel sounds. Musculoskeletal: passive and active ROM x 4 extremities. Skin: Skin color, texture, turgor normal.  No rashes or lesions. Neurologic:  Neurovascularly intact without any focal sensory/motor deficits.  Cranial nerves: II-XII intact, grossly non-focal.  Psychiatric: Alert and oriented, thought content appropriate, normal insight  Capillary Refill: Brisk,< 3 seconds   Peripheral Pulses: +2 palpable, equal bilaterally     Labs:   Recent Labs     02/24/21  1500 02/24/21  1520 02/25/21  0506   WBC 5.4 5.0 8.7   HGB 12.6* 12.8* 11.9*   HCT 37.9* 38.3* 34.8*   PLT 95* 101* 80*     Recent Labs     02/24/21  1500 02/24/21  1520 02/25/21  0506   * 126* 129*   K 4.1 4.2 4.6   CL 90* 92* 93*   CO2 23 21* 25   BUN 27* 26* 26*   CREATININE 2.1* 2. 1* 2.0*   CALCIUM 9.2  --  9.0     Recent Labs     02/24/21  1500 02/24/21  1520 02/25/21  0506   AST 60* 63* 53*   ALT 30 31 28   BILIDIR  --  1.8* 1.7*   BILITOT 4.6* 4.9* 4.6*   ALKPHOS 170* 169* 161*     Recent Labs     02/24/21  1520 02/25/21  0506   INR 1.75* 1.80*     No results for input(s): Mike Gong in the last 72 hours. Microbiology:    Blood culture #1:   Lab Results   Component Value Date    BC No growth-preliminary No growth  11/21/2020       Blood culture #2:No results found for: [facility]    Organism:  Lab Results   Component Value Date    ORG Staphylococcus epidermidis 11/22/2020         Lab Results   Component Value Date    LABGRAM  02/25/2021     Moderate segmented neutrophils observed. No bacteria seen. performed on cytospun specimen        MRSA culture only:No results found for: [facility]    Urine culture: No results found for: LABURIN    Respiratory culture: No results found for: CULTRESP    Aerobic and Anaerobic :  Lab Results   Component Value Date    LABAERO No growth-preliminary No growth  01/26/2021     Lab Results   Component Value Date    LABANAE  01/26/2021     Culture yielded very light growth of anaerobic gram positive cocci. If a true mixed aerobic and anaerobic infection is suspected,then broad spectrum empiric antibiotic therapy is indicatedand should include coverage for anaerobic organisms. Urinalysis:      Lab Results   Component Value Date    NITRU NEGATIVE 11/22/2020    WBCUA 0-2 11/22/2020    BACTERIA NONE SEEN 11/22/2020    RBCUA 3-5 11/22/2020    BLOODU TRACE 11/22/2020    SPECGRAV 1.010 11/22/2020       Radiology:  CT ABDOMEN PELVIS WO CONTRAST   Final Result      No hydronephrosis, hydroureter or obstructing stone. Cirrhotic morphology of the liver with supply of portal hypertension and mild to moderate diffuse abdominal ascites.       Recanalized umbilical vein extending through the periumbilical fascial defect with large conglomeration of venous structure seen along the ventral midline abdomen. Circumferential wall thickening of the descending colon may be seen with portal colopathy. Loculated right pleural effusion with rounded atelectasis or mass at the right lung base. Recommend short interval follow-up chest CT. **This report has been created using voice recognition software. It may contain minor errors which are inherent in voice recognition technology. **      Final report electronically signed by Dr. Yelitza Bronson on 2/25/2021 12:30 PM      XR CHEST 1 VIEW   Final Result   1. Normal heart size. Acute appearing rib fracture right sixth rib posteriorly. 2. No residual effusion seen following recent thoracentesis. No pneumothorax. 3. Moderate right basilar atelectasis/pneumonia. 4. Overall appearance of chest improved from prior. Final report electronically signed by Dr. Zackary Hicks on 2/25/2021 11:56 AM      US THORACENTESIS Which side should the procedure be performed? Radiologist Recommendation   Final Result   Status post thoracentesis            **This report has been created using voice recognition software. It may contain minor errors which are inherent in voice recognition technology. **      Final report electronically signed by Dr. Zackary Hicks on 2/25/2021 11:54 AM      US RENAL COMPLETE   Final Result   Impression:   1. Kidneys normal size and echotexture. Limited Doppler interrogation. No hydronephrosis. Equivocal hydroureter versus bladder diverticulum on    the right. CT urogram would be confirmatory. No hydronephrosis. This document has been electronically signed by: Noreen Matthew MD on    02/25/2021 01:59 AM      XR CHEST (2 VW)   Final Result      Moderate to large right pleural effusion. Displaced right posterior sixth rib fracture. **This report has been created using voice recognition software.  It may contain minor errors which are inherent in voice recognition technology. **      Final report electronically signed by Dr. Brooklyn Rachel on 2/24/2021 3:24 PM        Xr Chest (2 Vw)    Result Date: 2/24/2021  PROCEDURE: XR CHEST (2 VW) CLINICAL INFORMATION: SOB. COMPARISON: Chest x-ray 12/13/2020. TECHNIQUE: PA and lateral views the chest. FINDINGS: Moderate to large right pleural effusion. No pneumothorax. The left lung is clear. Right heart border is obscured by right lung base opacity. Placed right sixth rib fracture posteriorly. Moderate to large right pleural effusion. Displaced right posterior sixth rib fracture. **This report has been created using voice recognition software. It may contain minor errors which are inherent in voice recognition technology. ** Final report electronically signed by Dr. Brooklyn Rachel on 2/24/2021 3:24 PM    Us Renal Complete    Result Date: 2/25/2021  US renal and bladder with color and duplex Doppler interrogation. Comparison:  US,SR  - US ABDOMEN LIMITED  - 12/12/2020 06:12 PM EST  US,SR  - US RENAL COMPLETE  - 11/22/2020 05:46 PM EST Findings: Right kidney normal size and echotexture, 10.0 cm length. No hydronephrosis calculus or mass. Normal color flow. Duplex interrogation was nondiagnostic on the right. Possible hydroureter versus bladder diverticulum on the right. No hydronephrosis demonstrated. Left kidney normal size and echotexture, 13.1 cm length. No hydronephrosis calculus or mass. Normal color flow. Resistive index of 0.57 with no parvus tardus to suggest renal artery stenosis. Bladder volume 442 cc. Ureteral jets not documented. Impression: 1. Kidneys normal size and echotexture. Limited Doppler interrogation. No hydronephrosis. Equivocal hydroureter versus bladder diverticulum on the right. CT urogram would be confirmatory. No hydronephrosis.  This document has been electronically signed by: Ger Figueroa MD on 02/25/2021 01:59 AM      Electronically signed by Adin Fulton MD on 2/25/2021 at 7:44 PM

## 2021-02-25 NOTE — H&P
Formulation and discussion of sedation / procedure plans, risks, benefits, side effects and alternatives with patient and/or responsible adult completed.     Electronically signed by Gabby Gray MD on 2/25/2021 at 11:31 AM

## 2021-02-25 NOTE — TELEPHONE ENCOUNTER
FYI   Message left on nurse SUSAN. Patient wanted to let you know that he is admitted at [facility] and the plan is to have a thoracentesis later today. He will keep us updated.

## 2021-02-25 NOTE — CONSULTS
[facility]    [address], [address]  [facility]  2/25/2021 6:35 AM    Pt Name:    Isaac Alcantara  MRN:     [id number]   [id number]  YOB: 1965  Admit Date:    2/24/2021  2:45 PM  Primary Care Physician:  Monica Leiva MD        Reason for Consult:  KARAN, hyponatremia    History:   The patient is a 48 y.o. Male with history of alcoholic cirrhosis, ascites, recurrent hepatic hydrothorax, DM, Afib, diastolic CHF, COPD and previous KARAN presented with worsening shortness of breath and weight gain. Patient was hospitalized at [address] [place] [address] in December due to resp failure from recurrent hepatic hydrothorax. At that time his stay was complicated by hemothorax and he required emergent thoracostomy and exploratory thoracotomy with right pulmonary decortication. Currently being worked up for [employer] transplant. Anyway patient presented with worsening shortness of breath. ED work up showed patient to be in Afib. CXR showed moderate to large right pleural effusion. His labs were significant for a sodium of 125, creat of 2.1 so nephrology was consulted for further management. Serum creatinine in January was 1.2 mg/dL. During his hospitalization at [address] [place] [address] in December his creatinine ranged 1.1- 2.2 peak. He is on bumex at home as well as spironolactone. Urine sodium is < 20. Past UAs were benign. Patient was admitted for recurrent hepatic hydrothorax. Thoracentesis ordered. Diuretics are currently on hold given KARAN. He reports edema in his legs. No difficulty urinating. He is on room air. Renal US showed no hydronephrosis, possible right hydroureter vs bladder diverticulum. Patient reports no further alcohol use in past few months. He had previous long term use of NSAIDs but none recently.   Patient does not currently follow with a nephrologist.    Past Medical History:  Past Medical History:   Diagnosis Date    Advanced cirrhosis of liver [facility])     Atrial fibrillation ([facility] [address]     CHF (congestive heart failure) (HCC)     Chronic kidney disease     COPD (chronic obstructive pulmonary disease) (HCC)     Diabetes mellitus (HCC)     Gallstones     GERD (gastroesophageal reflux disease)     Headache     Portal hypertension (HCC)        Past Surgical History:  Past Surgical History:   Procedure Laterality Date    APPENDECTOMY      HERNIA REPAIR      LUNG SURGERY Right 20, 20    [facility]       Family History:  Family History   Problem Relation Age of Onset    Heart Disease Mother     High Blood Pressure Mother     Diabetes Mother     Cancer Mother     Cancer Father        Social History:  Social History     Socioeconomic History    Marital status: Single     Spouse name: Not on file    Number of children: Not on file    Years of education: Not on file    Highest education level: Not on file   Occupational History    Not on file   Social Needs    Financial resource strain: Not on file    Food insecurity     Worry: Not on file     Inability: Not on file    Transportation needs     Medical: Not on file     Non-medical: Not on file   Tobacco Use    Smoking status: Former Smoker     Packs/day: 1.00     Years: 29.00     Pack years: 29.00     Types: Cigarettes     Quit date: 2020     Years since quittin.2    Smokeless tobacco: Never Used   Substance and Sexual Activity    Alcohol use: Not Currently     Frequency: 4 or more times a week     Comment: No alcohol since 21    Drug use: Never    Sexual activity: Not on file   Lifestyle    Physical activity     Days per week: Not on file     Minutes per session: Not on file    Stress: Not on file   Relationships    Social connections     Talks on phone: Not on file     Gets together: Not on file     Attends Worship service: Not on file     Active member of club or organization: Not on file     Attends meetings of clubs or organizations: Not on file     Relationship Provider, MD   Lancets MISC 1 each by Does not apply route daily Use to test blood sugar 4 times daily and as needed DX:E11.9 12/4/20   FELIX Her CNP   blood glucose monitor strips Test 4 times daily and as needed for irregular blood glucose. Dispense sufficient amount for indicated testing frequency plus additional to accommodate PRN testing needs. DX E11.9 12/4/20   FELIX Her CNP   Blood Glucose Monitoring Suppl (ONE TOUCH ULTRA 2) w/Device KIT Use to test 4 times daily and as needed DX: E11.9 12/4/20   FELIX Her CNP       Review of Systems:  Constitutional: no fever or chills +weight gain  Head: No headaches  Eyes: no blurry vision, no discharge  Ears: no ear pain or hearing changes  Nose: no runny nose or epistaxis  Respiratory: +shortness of breath  Cardiovascular: no chest pain, + LE edema  GI: no nausea, no vomiting or diarrhea  : denies any hematuria, no flank pain  Skin: no rash  Musculoskeletal: no joint pain, moves all ext  Neuro: no tremor, no slurred speech  Psychiatric: stable mood, no depression or insomnia    Current Meds:  Infusion:    dextrose       Meds:    dilTIAZem  15 mg Oral Q6H    gabapentin  300 mg Oral TID    lactulose  10 g Oral TID    metoprolol  100 mg Oral BID    midodrine  10 mg Oral BID WC    pantoprazole  40 mg Oral QAM AC    rifaximin  550 mg Oral BID    tiotropium  2 puff Inhalation Daily    sodium chloride flush  10 mL Intravenous 2 times per day    insulin lispro  0-6 Units Subcutaneous TID WC    insulin lispro  0-3 Units Subcutaneous Nightly     Meds prn: sodium chloride flush, promethazine **OR** ondansetron, polyethylene glycol, acetaminophen **OR** acetaminophen, glucose, dextrose, glucagon (rDNA), dextrose     Allergies/Intolerances: ALLERGIES: Patient has no known allergies.     24HR INTAKE/OUTPUT:      Intake/Output Summary (Last 24 hours) at 2/25/2021 0635  Last data filed at 2/25/2021 0534  Gross per 24 hour Intake    Output 500 ml   Net -500 ml     No intake/output data recorded. I/O this shift:  In: -   Out: 500 [Urine:500]  Admission weight: 236 lb (107 kg)  Wt Readings from Last 3 Encounters:   02/24/21 249 lb 8 oz (113.2 kg)   01/26/21 235 lb 6.4 oz (106.8 kg)   01/21/21 232 lb 12.8 oz (105.6 kg)     Body mass index is 32.03 kg/m². Physical Examination:  VITALS:  /60   Pulse 76   Temp 98.6 °F (37 °C) (Oral)   Resp 12   Ht 6' 2\" (1.88 m)   Wt 249 lb 8 oz (113.2 kg)   SpO2 92%   BMI 32.03 kg/m²   Weight:   Wt Readings from Last 3 Encounters:   02/24/21 249 lb 8 oz (113.2 kg)   01/26/21 235 lb 6.4 oz (106.8 kg)   01/21/21 232 lb 12.8 oz (105.6 kg)     Constitutional and General Appearance: awake, alert, comfortable, no distress  Eyes: no icteric sclera, no pallor conjunctiva, no discharge seen from either eye  Ears and Nose: normal external appearance of left and right ear and nose. No active drainage from nose. Oral: moist oral mucus membranes  Neck: No jugular venous distention, appears symmetric, good ROM  Lungs: decreased breath sounds on right  Heart: irregularly irregular  Extremities: 2+ LE edema B/L  GI: soft, non-tender, no guarding  Skin: no rash seen on exposed extremities  Musculo: moves all extremities  Neuro: no slurred speech, no facial drooping, no asterixis  Psychiatric: Awake, alert, Oriented    Lab Data  CBC:   Recent Labs     02/24/21  1500 02/24/21  1520 02/25/21  0506   WBC 5.4 5.0 8.7   HGB 12.6* 12.8* 11.9*   HCT 37.9* 38.3* 34.8*   PLT 95* 101* 80*     BMP:  Recent Labs     02/24/21  1500 02/24/21  1520 02/25/21  0506   * 126* 129*   K 4.1 4.2 4.6   CL 90* 92* 93*   CO2 23 21* 25   BUN 27* 26* 26*   CREATININE 2.1* 2.1* 2.0*   GLUCOSE 129*  --  108   CALCIUM 9.2  --  9.0   MG  --   --  1.4*     PTH: @PTH@  TSH: No results for input(s): TSH in the last 72 hours. HgBa1c: No results for input(s): LABA1C in the last 72 hours.   Hepatic:   Recent Labs     02/24/21  1500 02/24/21  1520 02/25/21  0506   LABALBU 3.1* 3.0* 2.5*   AST 60* 63* 53*   ALT 30 31 28   BILITOT 4.6* 4.9* 4.6*   ALKPHOS 170* 169* 161*     ABGs: No results found for: PHART, PO2ART, [id number]  Troponin: No results for input(s): TROPONINI in the last 72 hours. BNP: No results for input(s): BNP in the last 72 hours. Old labs reviewed. Impression and Plan:  1. Acute kidney injury on probable CKD III:  Urine studies appear prerenal vs cannot rule out any underlying hepatorenal syndrome. Urine sodium < 20.+ fluid balance. Will hold diuretics for now as he seems to be doing ok from resp standpoint, on room air. He is undergoing thoracentesis today. Will give IV Albumin. Hold nephrotoxic agents. Renal US showing possible right hydroureter. Will obtain CT (without contrast given KARAN)  2. Hyponatremia: Multifactorial from KARAN, cirrhosis, and diuretics: improved today  3. Hypomagnesemia: 2 grams Mag Sulfate IVPB  4. Chronic hypotension, continue Midodrine  5. Recurrent right pleural effusion likely hydrothorax  . Consider pulmonary evaluation  6. Volume overload  7. Cirrhosis  8. Anemia  9. Thrombocytopenia  10. PAfib  11. DM    Thank you for allowing me to participate in the care of this patient. Please feel free to call me if you have any questions.      Electronically signed by Tiffany Kilpatrick DO on 2/25/21 at 6:35 AM EST    [facility]

## 2021-02-25 NOTE — PLAN OF CARE
Problem: Falls - Risk of:  Goal: Will remain free from falls  Description: Will remain free from falls  2/25/2021 1342 by Sanna Zaidi RN  Outcome: Ongoing  Note: Call light in reach, bed in lowest position, and bed alarm activated. Education given on use of call light before ambulation and when in need of assistance. Patient expressed understanding. Hourly visual checks performed and charted. Toileting offered to patient. No falls this shift, at any time. Arm band and falling star in place. Will continue to monitor. Problem: Falls - Risk of:  Goal: Absence of physical injury  Description: Absence of physical injury  2/25/2021 1342 by Sanna Zaidi RN  Outcome: Ongoing  Note: Patient remains free of injury this shift. Call light within reach and hourly rounds performed. Problem: Coping:  Goal: Level of anxiety will decrease  Description: Level of anxiety will decrease  2/25/2021 1342 by Sanna Zaidi RN  Outcome: Ongoing  Note: Pt does not appear anxious at this time. Monitoring. Problem: Discharge Planning:  Goal: Discharged to appropriate level of care  Description: Discharged to appropriate level of care  Outcome: Ongoing  Note: Discharge planning in process and discussed with patient/family. Social work consulted for any additional needs. Care manager aware of discharge needs. Problem: Skin Integrity:  Goal: Will show no infection signs and symptoms  Description: Will show no infection signs and symptoms  Outcome: Ongoing  Note: Stage 2 noted to R buttocks. No signs of new skin breakdown with each assessment. Skin remains warm, dry, intact. Mucous membranes pink & moist. Patient is able to turn self. Problem: Serum Glucose Level - Abnormal:  Intervention: Assess risk for hypoglycemia  Note: Checking chems AC/HS. Covering with sliding scale insulin as needed. Care plan reviewed with patient.   Patient verbalizes understanding of the plan of care and contributed to goal setting.

## 2021-02-25 NOTE — CONSULTS
Consult History & Physical      Patient:  Sharia Seip  YOB: 1965  MRN: [id number]     Acct: [de-identified]    Chief Complaint:    Chief Complaint   Patient presents with    Shortness of Breath    Leg Swelling     bilateral       Date of Service: Pt seen/examined in consultation on 2/25/2021    History Of Present Illness:      48 y.o. male who we are asked to see/evaluate by Catalina Brown DO for medical management of hydrothorax. He was admitted yesterday for SOB and an approximate 7 pound weight gain. He has a history of alcoholic cirrhosis and hepatic hydrothorax. He was recently admitted in December for the same thing, but was encephalopathic at the time. He was transferred to [facility] for hepatic hydrothorax with recurrent right pleural effusion. Underwent thoracentesis 12/16/20 c/b hemothorax requiring transfer to ICU with emergent thoracostomy & exploratory thoracotomy with right pulmonary decortication on 12/17/20. He was intubated 12/17-12/18/20. Transferred back to the floor and completed ATBs until chest tube removed 12/30/20. At discharged, he was on room air and given prescriptions for Bumex 1mg daily & spironolactone 50mg daily. Started on Stiolto per pulmonary recs. He was not a candidate for TIPS due to high MELD-Na. He is undergoing work-up for the liver transplant list. He says he was weighing himself at home and called his PCP because he had gained 7 pounds. He was advised to go to the ED. He also complains of bilateral lower extremity swelling which he says he never had before. He denies abdominal pain, nausea, vomiting, diarrhea, constipation, melena, and hematochezia. He went to the nursing home temporarily after discharge from [facility]. He says they decreased his diuretics due to decreased kidney function. He is concerned because he is on a lower dose of Lactulose here. CXR demonstrated moderate to large right pleural effusion, displaced right posterior 6th rib fracture.  He had a thoracentesis today with 3L andujar serous fluid removed. CT A/P demonstrated cirrhotic liver with supply of portal hypertension & mild to moderate diffuse abdominal ascites, recanalized umbilical vein extending through the periumbilical fascial defect, circumferential wall thickening of the descending colon, loculated right pleural effusion with rounded atelectasis or mass at the right lung base. Last EGD 11/30/20 demonstrated non-bleeding grade I esophageal varices and portal hypertensive gastropathy. Last colonoscopy 12/30/20 demonstrated benign polyps and prolapse type change in the sigmoid colon. Past Medical History:    Past Medical History:   Diagnosis Date    Advanced cirrhosis of liver (HCC)     Alcoholic cirrhosis (HCC)     Atrial fibrillation (HCC)     CHF (congestive heart failure) (HCC)     Chronic kidney disease     COPD (chronic obstructive pulmonary disease) (HCC)     Diabetes mellitus (HCC)     Gallstones     GERD (gastroesophageal reflux disease)     Headache     Hydrothorax     Portal hypertension (HCC)        Home Medications:  Prior to Admission medications    Medication Sig Start Date End Date Taking?  Authorizing Provider   spironolactone (ALDACTONE) 50 MG tablet Take 25 mg by mouth 2 times daily   Yes Historical Provider, MD   bumetanide (BUMEX) 1 MG tablet Take 1 tablet by mouth daily 1/29/21  Yes Raghavendra Bangura MD   rifaximin (XIFAXAN) 550 MG tablet Take 1 tablet by mouth 2 times daily 1/29/21  Yes Raghavendra Bangura MD   pantoprazole (PROTONIX) 40 MG tablet Take 1 tablet by mouth daily 1/29/21  Yes Raghavendra Bangura MD   midodrine (PROAMATINE) 10 MG tablet Take 10 mg by mouth 2 times daily   Yes Historical Provider, MD   dilTIAZem (CARDIZEM) 30 MG tablet Take 0.5 tablets by mouth every 6 hours 1/29/21  Yes Raghavendra Bangura MD   tiotropium (SPIRIVA) 18 MCG inhalation capsule Inhale 18 mcg into the lungs daily   Yes Historical Provider, MD   metoprolol (LOPRESSOR) 100 MG tablet Take 100 mg by mouth 2 times daily   Yes Historical Provider, MD   ASPIRIN 81 PO Take by mouth daily   Yes Historical Provider, MD   gabapentin (NEURONTIN) 300 MG capsule Take 300 mg by mouth 3 times daily. Morning, Evening, Bedtime   Yes Historical Provider, MD   lactulose (CEPHULAC) 10 g packet Take 45 mLs by mouth 3 times daily    Yes Historical Provider, MD   metFORMIN (GLUCOPHAGE) 500 MG tablet Take 500 mg by mouth 2 times daily (with meals)   Yes Historical Provider, MD   ondansetron (ZOFRAN) 4 MG tablet Take 4 mg by mouth every 8 hours as needed for Nausea or Vomiting    Historical Provider, MD   albuterol sulfate HFA (VENTOLIN HFA) 108 (90 Base) MCG/ACT inhaler Inhale 2 puffs into the lungs every 6 hours as needed for Wheezing    Historical Provider, MD   Lancets MISC 1 each by Does not apply route daily Use to test blood sugar 4 times daily and as needed DX:E11.9 12/4/20   FELIX Mendez CNP   blood glucose monitor strips Test 4 times daily and as needed for irregular blood glucose. Dispense sufficient amount for indicated testing frequency plus additional to accommodate PRN testing needs.  DX E11.9 12/4/20   FELIX Mendez CNP   Blood Glucose Monitoring Suppl (ONE TOUCH ULTRA 2) w/Device KIT Use to test 4 times daily and as needed DX: E11.9 12/4/20   FELIX Mendez CNP       Surgical History:  Past Surgical History:   Procedure Laterality Date    APPENDECTOMY      HERNIA REPAIR      LUNG SURGERY Right 11-24-20, 11-30-20    [facility]       Family History:  Family History   Problem Relation Age of Onset    Heart Disease Mother     High Blood Pressure Mother     Diabetes Mother     Cancer Mother     Cancer Father        Past GI History:  Alcoholic cirrhosis, esophageal varices, hepatic hydrothorax with recurrent right pleural effusion, colon polyps, GERD, hepatic encephalopathy, EGD, colonoscopy  Dr. Raven Ignacio patient  Follows at [address] [place] [address] Street:  Patient has no known allergies. Social History:   TOBACCO:   reports that he quit smoking about 3 months ago. His smoking use included cigarettes. He has a 29.00 pack-year smoking history. He has never used smokeless tobacco.  ETOH:   reports previous alcohol use. Review Of Systems  GENERAL: +weight gain  EYES:  No  blurred vision, double vision   CARDIOVASCULAR: No chest pain or palpitations. RESPIRATORY:  +dyspnea  GI:  See HPI  MUSCULOSKELETAL: No new painful or swollen joints or myalgias. :   No dysuria or hematuria. SKIN:  No rashes or jaundice. NEUROLOGIC:  No headaches or seizures, numbness or tingling of arms, or legs. PSYCH:  No anxiety or depression. ENDOCRINE:  No polyuria or polydipsia. BLOOD:  +anemia    PHYSICAL EXAM:  /73   Pulse 116   Temp 98.3 °F (36.8 °C) (Oral)   Resp 20   Ht 6' 2\" (1.88 m)   Wt 249 lb 8 oz (113.2 kg)   SpO2 93%   BMI 32.03 kg/m²     General appearance: Chronically ill appearing male  HEENT: Normal cephalic, atraumatic without obvious deformity. Pupils equal, round, and reactive to light. Neck: Supple, with full range of motion. No jugular venous distention. Trachea midline. Respiratory:  Normal respiratory effort. Clear to auscultation, bilaterally without Rales/Wheezes/Rhonchi. Decreased on the right side. Cardiovascular: Regular rate and rhythm without murmurs, rubs or gallops. Abdomen: Soft, non-tender, non-distended with active bowel sounds. Musculoskeletal: No clubbing, cyanosis bilaterally. BLE edema. Skin: Pink, warm, dry. No rashes or lesions.   Psychiatric: Alert and oriented, thought content appropriate, normal insight    Labs:   Recent Labs     02/25/21  0506   WBC 8.7   HGB 11.9*   HCT 34.8*   PLT 80*     Recent Labs     02/25/21  0506   *   K 4.6   CL 93*   CO2 25   BUN 26*   CREATININE 2.0*   CALCIUM 9.0     Recent Labs     02/25/21  0506   AST 53*   ALT 28   BILIDIR 1.7*   BILITOT 4.6*   ALKPHOS 161*     Recent Labs 02/25/21  0506   INR 1.80*       Radiology:   CXR 02/24/21      Impression       Moderate to large right pleural effusion.       Displaced right posterior sixth rib fracture. US renal 02/25/21      Impression   Impression:   1.  Kidneys normal size and echotexture.  Limited Doppler interrogation.     No hydronephrosis.  Equivocal hydroureter versus bladder diverticulum on    the right.  CT urogram would be confirmatory.  No hydronephrosis. US thoracentesis 02/25/21      Impression   Status post thoracentesis   FLUID WITHDRAWN: 3 L andujar serous fluid    CXR 02/25/21      Impression   1. Normal heart size. Acute appearing rib fracture right sixth rib posteriorly. 2. No residual effusion seen following recent thoracentesis. No pneumothorax. 3. Moderate right basilar atelectasis/pneumonia. 4. Overall appearance of chest improved from prior. CT abdomen/pelvis WO contrast 02/25/21      Impression       No hydronephrosis, hydroureter or obstructing stone.       Cirrhotic morphology of the liver with supply of portal hypertension and mild to moderate diffuse abdominal ascites.       Recanalized umbilical vein extending through the periumbilical fascial defect with large conglomeration of venous structure seen along the ventral midline abdomen.       Circumferential wall thickening of the descending colon may be seen with portal colopathy.       Loculated right pleural effusion with rounded atelectasis or mass at the right lung base. Recommend short interval follow-up chest CT. Code Status: Full Code    ASSESSMENT:  1. Shortness of breath secondary to #3  2. Weight gain secondary to #3  3. 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 Alcoholic cirrhosis- MELD 25, MELD Na 29  5. GERD  6. KARAN on CKD  7. Afib- on aspirin  8. COPD  9. DM  10. Hyponatremia  11. Hypomagnesemia  12. Hypoalbuminemia  13. Hyperbilirubinemia  14. Chronic anemia  15.  Thrombocytopenia PLAN:     Monitor H & H, transfuse prn   Nursing to monitor stool output & document   PO PPI daily, home dose   Increase Lactulose to 30g TID, home dose   Continue Xifaxan BID, home dose   Resume Bumex 1mg when okay with nephrology   Resume Aldactone 50mg when okay with nephrology   2g sodium diet   Daily weights   Strict I & O   DO NOT RECOMMEND PLEURX DRAIN   Monitor hepatic labs & INR daily   Avoid hepatotoxic meds   Do not recommend narcotics or benzodiazepines as these can precipitate HE   Electrolyte management per nephrology   Nephrology on board   Supportive care per primary team  Will follow        Case reviewed and impression/plan reviewed in collaboration with Dr. Lane Coffey  Electronically signed by FELIX Suh - CNP on 2/25/2021 at 3:45 PM    [facility]  Thank you for the consultation.

## 2021-02-25 NOTE — FLOWSHEET NOTE
02/25/21 1756   Provider Notification   Reason for Communication Evaluate   Provider Name Dr. Madelin Butterfield   Provider Notification Resident   Method of Communication Secure Message   Response See orders   Notification Time 36     Notified Dr. Foreman Lack that pt HR sustaining 130-140 afib, see orders for 5mg Metoprolol q6hr prn. Pt medicated per order. HR improving into low 100's. Will monitor.

## 2021-02-26 NOTE — CARE COORDINATION
2/26/21, 10:45 AM EST  DISCHARGE PLANNING EVALUATION:    Jesus Henderson       Admitted: 2/24/2021/ [facility] day: 2   Location: 4-20/020-A Reason for admit: Decompensated liver disease ([address] [K74.69]   PMH:  has a past medical history of Advanced cirrhosis of liver ([address] Alcoholic cirrhosis ([address] Atrial fibrillation ([address] CHF (congestive heart failure) ([address] Chronic kidney disease, COPD (chronic obstructive pulmonary disease) ([address] Diabetes mellitus ([address] Gallstones, GERD (gastroesophageal reflux disease), Headache, Hydrothorax, and Portal hypertension ([address] Procedure:   2/25 Right Thoracentesis = 3L removed  2/25 CT Abdomen; moderate ascites, loculated right pleural effusion  Barriers to Discharge: ESLD with history cirrhosis, A-fib, DM, CKD, portal hypertension. Na+ 124 (goal 130 per report-discussed at rounds), elevated LFT/s; monitor. Nephrology following for KARAN, GI following. /s; monitor  PCP: Fred Wright MD  Readmission Risk Score: 24%    Patient Goals/Plan/Treatment Preferences: met with client; denied needs as plans home alone independently as PTA, has CPAP, [facility] in past for liver transplant workup (needs counseling for 3 months first; current with marquez JACOBS and [facility]), has cane, walker  Transportation/Food Security/Housekeeping Addressed:  No issues identified. 2/26/21, 11:04 AM EST    Patient goals/plan/ treatment preferences discussed by  and . Patient goals/plan/ treatment preferences reviewed with patient/ family. Patient/ family verbalize understanding of discharge plan and are in agreement with goal/plan/treatment preferences. Understanding was demonstrated using the teach back method. AVS provided by RN at time of discharge, which includes all necessary medical information pertaining to the patients current course of illness, treatment, post-discharge goals of care, and treatment preferences.

## 2021-02-26 NOTE — PROGRESS NOTES
[address] with one week history of increased sob and 7 pound weight gain. Pt has a pmh pf EtOH cirrhosis c/b ascites, EV, recurrent hepatic hydrothorax, Afib (prev on systemic Lovenox), DM2, HFpEF, COPD, former smoker.      In December 2020, he was managed at [facility] for acute hypoxic respiratory failure 2/2 recurrent hepatic hydrothorax on 12/14 in the setting of recurrent R pleural effusion. Underwent thoracentesis on 12/16 c/b hemothorax requiring transfer to ICU with emergent thoracostomy and exploratory thoracotomy with right pulmonary decortication on 12/17. He was intubated from 12/17-12/18. Not a candidate for TIPS due to high MELD-Na per [facility]. Pt currently being worked up at [facility] for a liver transplant. Pt was discharged home on PO Bumex and Aldactone.      Pt returns now to the ED per request of his PCP given CXR findings and his increased sob. Pt reports being compliant with all his medications at home, however doesn't always following a low Na diet. Reports noticing a 7 pound weight gain over the past week, and has been very sob with minimal activity. Pt has also been nauseas with some vomiting with food. Denies any fevers, chills, cough, abdominal pain, chest pain, black or red colored stools, confusion, alcohol intake.      In the ED, pt in A. Fib with HR ranging 100-120. Per pt this is normal. Afebrile, 96% on RA. Cr 2.1 (baseline 1.2), Na 125, troponin elevated. No ECG changes of ischemia.      Spoke with GI who were ok for admission. \"    Subjective (past 24 hours):   No acute events overnight. Patient is feeling much better after 3 L of pleural fluid drained. Tachycardia is improved today after midodrine frequency was changed to 10 mg 3 times daily. Will increase diltiazem dose as needed to further control heart rate. Patient denies any new symptoms, feels much better overall. Past medical history, family history, social history and allergies reviewed again and is unchanged since admission. ROS (12 point review of systems completed. Pertinent positives noted. Otherwise ROS is negative)     Medications:  Reviewed    Infusion Medications    dextrose       Scheduled Medications    lactulose  30 g Oral TID    dilTIAZem  15 mg Oral Q6H    gabapentin  300 mg Oral TID    metoprolol  100 mg Oral BID    midodrine  10 mg Oral BID WC    pantoprazole  40 mg Oral QAM AC    rifaximin  550 mg Oral BID    tiotropium  2 puff Inhalation Daily    sodium chloride flush  10 mL Intravenous 2 times per day    insulin lispro  0-6 Units Subcutaneous TID WC    insulin lispro  0-3 Units Subcutaneous Nightly     PRN Meds: magnesium sulfate, metoprolol, sodium chloride flush, promethazine **OR** ondansetron, polyethylene glycol, acetaminophen **OR** acetaminophen, glucose, dextrose, glucagon (rDNA), dextrose      Intake/Output Summary (Last 24 hours) at 2/26/2021 0700  Last data filed at 2/26/2021 0319  Gross per 24 hour   Intake 867.12 ml   Output 300 ml   Net 567.12 ml       Diet:  DIET CARB CONTROL; Carb Control: 3 carb choices (45 gms)/meal; Low Sodium (2 GM); Daily Fluid Restriction: 1800 ml    Exam:  /66   Pulse 120   Temp 97.5 °F (36.4 °C) (Oral)   Resp 20   Ht 6' 2\" (1.88 m)   Wt 241 lb 8 oz (109.5 kg)   SpO2 93%   BMI 31.01 kg/m²   General appearance: Chronically ill-appearing obese white male  HEENT: Pupils equal, round, and reactive to light. Pupils icteric. Neck: Supple, with full range of motion. No jugular venous distention. Trachea midline. Respiratory:  Normal respiratory effort. Diminished breath sounds at bases bilaterally. Cardiovascular: Tachycardic and regular rhythm with normal S1/S2 without murmurs, rubs or gallops. Abdomen: Soft, non-tender, nondistended with normal bowel sounds. Musculoskeletal: passive and active ROM x 4 extremities. Skin: Skin color, texture, turgor normal.  No rashes or lesions.   Neurologic:  Neurovascularly intact without any focal sensory/motor coverage for anaerobic organisms. Urinalysis:      Lab Results   Component Value Date    NITRU NEGATIVE 11/22/2020    WBCUA 0-2 11/22/2020    BACTERIA NONE SEEN 11/22/2020    RBCUA 3-5 11/22/2020    BLOODU TRACE 11/22/2020    SPECGRAV 1.010 11/22/2020       Radiology:  CT ABDOMEN PELVIS WO CONTRAST   Final Result      No hydronephrosis, hydroureter or obstructing stone. Cirrhotic morphology of the liver with supply of portal hypertension and mild to moderate diffuse abdominal ascites. Recanalized umbilical vein extending through the periumbilical fascial defect with large conglomeration of venous structure seen along the ventral midline abdomen. Circumferential wall thickening of the descending colon may be seen with portal colopathy. Loculated right pleural effusion with rounded atelectasis or mass at the right lung base. Recommend short interval follow-up chest CT. **This report has been created using voice recognition software. It may contain minor errors which are inherent in voice recognition technology. **      Final report electronically signed by Dr. Radha Wright on 2/25/2021 12:30 PM      XR CHEST 1 VIEW   Final Result   1. Normal heart size. Acute appearing rib fracture right sixth rib posteriorly. 2. No residual effusion seen following recent thoracentesis. No pneumothorax. 3. Moderate right basilar atelectasis/pneumonia. 4. Overall appearance of chest improved from prior. Final report electronically signed by Dr. Lorri Ramos on 2/25/2021 11:56 AM      US THORACENTESIS Which side should the procedure be performed? Radiologist Recommendation   Final Result   Status post thoracentesis            **This report has been created using voice recognition software. It may contain minor errors which are inherent in voice recognition technology. **      Final report electronically signed by Dr. Lorri Ramos on 2/25/2021 11:54 AM      US RENAL COMPLETE   Final Result Impression:   1. Kidneys normal size and echotexture. Limited Doppler interrogation. No hydronephrosis. Equivocal hydroureter versus bladder diverticulum on    the right. CT urogram would be confirmatory. No hydronephrosis. This document has been electronically signed by: Marv Mcdaniel MD on    02/25/2021 01:59 AM      XR CHEST (2 VW)   Final Result      Moderate to large right pleural effusion. Displaced right posterior sixth rib fracture. **This report has been created using voice recognition software. It may contain minor errors which are inherent in voice recognition technology. **      Final report electronically signed by Dr. Edgar Morgan on 2/24/2021 3:24 PM        Xr Chest (2 Vw)    Result Date: 2/24/2021  PROCEDURE: XR CHEST (2 VW) CLINICAL INFORMATION: SOB. COMPARISON: Chest x-ray 12/13/2020. TECHNIQUE: PA and lateral views the chest. FINDINGS: Moderate to large right pleural effusion. No pneumothorax. The left lung is clear. Right heart border is obscured by right lung base opacity. Placed right sixth rib fracture posteriorly. Moderate to large right pleural effusion. Displaced right posterior sixth rib fracture. **This report has been created using voice recognition software. It may contain minor errors which are inherent in voice recognition technology. ** Final report electronically signed by Dr. Edgar Morgan on 2/24/2021 3:24 PM    Us Renal Complete    Result Date: 2/25/2021  US renal and bladder with color and duplex Doppler interrogation. Comparison:  US,SR  - US ABDOMEN LIMITED  - 12/12/2020 06:12 PM EST  US,SR  - US RENAL COMPLETE  - 11/22/2020 05:46 PM EST Findings: Right kidney normal size and echotexture, 10.0 cm length. No hydronephrosis calculus or mass. Normal color flow. Duplex interrogation was nondiagnostic on the right. Possible hydroureter versus bladder diverticulum on the right. No hydronephrosis demonstrated.  Left kidney normal size and

## 2021-02-26 NOTE — PROGRESS NOTES
Gastroenterology Progress Note:     Patient Name:  Ruddy Oliveira   MRN: [id number]  [id number]  YOB: 1965  Admit Date: 2/24/2021  2:45 PM  Primary Care Physician: Florencio Bledsoe MD   4K-20/020-A     Patient seen and examined. 24 hours events and chart reviewed. Subjective: Patient resting in bed. Denies abdominal pain, nausea, & vomiting. States he is feeling much better and that they are discharging him tomorrow. Weight is down 8 pounds. Objective:  /69   Pulse 130   Temp 98.5 °F (36.9 °C) (Oral)   Resp 20   Ht 6' 2\" (1.88 m)   Wt 241 lb 8 oz (109.5 kg)   SpO2 92%   BMI 31.01 kg/m²     Physical Exam:    General:  Chronically ill appearing male  HEENT: Atraumatic, normocephalic. Moist oral mucous membranes. Neck: Supple without adenopathy, JVD, thyromegaly or masses. Trachea midline. CV: Heart RRR, no murmurs, rubs, gallops. Resp: Even, easy without cough or accessory use. Lungs clear to ascultation bilaterally. Abd: Round, soft, nontender. No hepatosplenomegaly or mass present. Active bowel sounds heard. No distention noted. Ext:  Without cyanosis, clubbing. BLE edema. Skin: Pink, warm, dry  Neuro:  Alert, oriented x 3 with no obvious deficits.        Rectal: deferred    Labs:   CBC:   Lab Results   Component Value Date    WBC 8.7 02/25/2021    HGB 11.9 02/25/2021    HCT 34.8 02/25/2021    MCV 92.1 02/25/2021    PLT 80 02/25/2021     BMP:   Lab Results   Component Value Date     02/26/2021    K 4.1 02/26/2021    K 4.1 02/24/2021    CL 93 02/26/2021    CO2 22 02/26/2021    BUN 27 02/26/2021    CREATININE 1.9 02/26/2021    CALCIUM 9.0 02/26/2021     PT/INR:   Lab Results   Component Value Date    INR 1.86 02/26/2021     Lipids:   Lab Results   Component Value Date    ALKPHOS 118 02/26/2021    ALT 22 02/26/2021    AST 39 02/26/2021    BILITOT 5.3 02/26/2021    BILIDIR 1.7 02/25/2021    LABALBU 3.1 02/26/2021     Current Meds:  Scheduled Meds:   midodrine  10 mg Oral TID    aspirin  81 mg Oral Daily    lactulose  30 g Oral TID    dilTIAZem  15 mg Oral Q6H    gabapentin  300 mg Oral TID    metoprolol  100 mg Oral BID    pantoprazole  40 mg Oral QAM AC    rifaximin  550 mg Oral BID    tiotropium  2 puff Inhalation Daily    sodium chloride flush  10 mL Intravenous 2 times per day    insulin lispro  0-6 Units Subcutaneous TID WC    insulin lispro  0-3 Units Subcutaneous Nightly     Continuous Infusions:   dextrose         Assessment:  49 yo M admitted 02/24/21 for weight gain & SOB. H/O alcoholic cirrhosis & hepatic hydrothorax. Recent admission at [facility], transferred from [facility], underwent thoracentesis 12/16/20 c/b hemothorax requiring transfer to ICU with emergent thoracostomy & exploratory thoracotomy with right pulmonary decortication on 12/17/20. Intubated 12/17-12/18/20. Transferred back to the floor and completed ATBs until chest tube removed 12/30/20. Discharged on Bumex 1mg daily & Aldactone 50mg daily. Not a candidate for TIPS procedure per [facility]. CXR demonstrated moderate to large right pleural effusion, displaced right posterior 6th rib fracture. He had a thoracentesis today with 3L andujar serous fluid removed. CT A/P demonstrated cirrhotic liver with supply of portal hypertension & mild to moderate diffuse abdominal ascites, recanalized umbilical vein extending through the periumbilical fascial defect, circumferential wall thickening of the descending colon, loculated right pleural effusion with rounded atelectasis or mass at the right lung base. Thoracentesis 02/25/21 with 3L removed. 1. Shortness of breath secondary to #3  2. Weight gain secondary to #3  3. 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 Alcoholic cirrhosis- MELD 26, MELD Na 32  5. GERD  6. KRAAN on CKD  7. Afib- on aspirin  8. COPD  9. DM  10. Hyponatremia  11. Hypomagnesemia- resolved  12. Hypoalbuminemia  13. Hyperbilirubinemia  14. Chronic anemia  15. Thrombocytopenia     Plan:    · Monitor H & H, transfuse prn  · Nursing to monitor stool output & document  · Continue PO PPI daily, home dose  · Continue Lactulose to 30g TID, home dose  · Continue Xifaxan BID, home dose  · Resume Bumex 1mg when okay with nephrology  · Resume Aldactone 50mg when okay with nephrology  · May need diuretics increased   · Midodrine 10mg TID per primary  · 2g sodium diet  · Daily weights  · Strict I & O  · DO NOT RECOMMEND PLEURX DRAIN  · Monitor hepatic labs & INR daily  · Avoid hepatotoxic meds  · Do not recommend narcotics or benzodiazepines as these can precipitate HE  · Electrolyte management per nephrology  · Nephrology on board  · Supportive care per primary team  Will follow    Case reviewed and impression/plan reviewed in collaboration with Dr. Lane Coffey  Electronically signed by FELIX Suh CNP on 2/26/2021 at 12:07 PM    [facility]     If there are any questions or concerns this weekend, please call Dr. Fareed Pastrana as he is covering for [facility].

## 2021-02-26 NOTE — PLAN OF CARE
Problem: Falls - Risk of:  Goal: Will remain free from falls  Description: Will remain free from falls  2/26/2021 0149 by Karen Oliveira RN  Outcome: Ongoing  Note: No falls this shift. Patient alert and oriented, call light within reach, bed alarm on, wheels locked. Problem: Discharge Planning:  Goal: Discharged to appropriate level of care  Description: Discharged to appropriate level of care  2/26/2021 0149 by Karen Oliveira RN  Outcome: Ongoing  Note: Discharge planning in process.  aware of discharge needs. Problem: Skin Integrity:  Goal: Absence of new skin breakdown  Description: Absence of new skin breakdown  2/26/2021 0149 by Karen Oliveira RN  Outcome: Ongoing  Note: No new skin issues this shift. Stage 2 on buttocks, patient able to turn self. Care plan reviewed with patient . Patient verbalize understanding of the plan of care and contribute to goal setting.

## 2021-02-26 NOTE — PROGRESS NOTES
[facility]   Nephrology progress note  2/26/2021, 10:35 AM      Pt Name:    Francisca Gudino  MRN:     [id number]     [place]:    1965  Admit Date:    2/24/2021  2:45 PM  Primary Care Physician:  Rebecca Maurice MD   Room number  4K-20/020-A    Chief Complaint: Nephrology following for KARAN/CKD    Subjective:  Patient seen and examined  No chest pain or shortness of breath  Feels okay/better today      Objective:  24HR INTAKE/OUTPUT:      Intake/Output Summary (Last 24 hours) at 2/26/2021 1035  Last data filed at 2/26/2021 0319  Gross per 24 hour   Intake 867.12 ml   Output 300 ml   Net 567.12 ml     I/O last 3 completed shifts: In: 867.1 [P.O.:600; I.V.:267.1]  Out: 300 [Urine:300]  No intake/output data recorded. Admission weight: 236 lb (107 kg)  Wt Readings from Last 3 Encounters:   02/26/21 241 lb 8 oz (109.5 kg)   01/26/21 235 lb 6.4 oz (106.8 kg)   01/21/21 232 lb 12.8 oz (105.6 kg)     Body mass index is 31.01 kg/m².     Physical examination  VITALS:     Vitals:    02/26/21 0315 02/26/21 0501 02/26/21 0745 02/26/21 0909   BP: 111/87 104/66 98/66    Pulse: 116 120 129    Resp: 20  18    Temp: 97.5 °F (36.4 °C)  98.1 °F (36.7 °C)    TempSrc: Oral  Oral    SpO2: 93%  93% 93%   Weight: 241 lb 8 oz (109.5 kg)      Height:         General Appearance: alert and cooperative with exam, appears comfortable, no distress  Mouth/Throat: Oral mucosa moist  Neck: No JVD  Lungs: Air entry B/L, no rales, no use of accessory muscles  Heart:  S1, S2 heard  GI: soft, non-tender, no guarding  Extremities: Trace left lower extremity edema      Lab Data  CBC:   Recent Labs     02/24/21  1500 02/24/21  1520 02/25/21  0506   WBC 5.4 5.0 8.7   HGB 12.6* 12.8* 11.9*   HCT 37.9* 38.3* 34.8*   PLT 95* 101* 80*     BMP:  Recent Labs     02/24/21  1500 02/24/21  1520 02/25/21  0506 02/26/21  0418   * 126* 129* 124*   K 4.1 4.2 4.6 4.1   CL 90* 92* 93* 93*   CO2 23 21* 25 22*   BUN 27* 26* 26* 27* CREATININE 2.1* 2.1* 2.0* 1.9*   GLUCOSE 129*  --  108 155*   CALCIUM 9.2  --  9.0 9.0   MG  --   --  1.4* 1.8     Hepatic:   Recent Labs     02/24/21  1520 02/25/21  0506 02/26/21  0418   LABALBU 3.0* 2.5* 3.1*   AST 63* 53* 39   ALT 31 28 22   BILITOT 4.9* 4.6* 5.3*   ALKPHOS 169* 161* 118         Meds:  Infusion:    dextrose       Meds:    midodrine  10 mg Oral TID    lactulose  30 g Oral TID    dilTIAZem  15 mg Oral Q6H    gabapentin  300 mg Oral TID    metoprolol  100 mg Oral BID    pantoprazole  40 mg Oral QAM AC    rifaximin  550 mg Oral BID    tiotropium  2 puff Inhalation Daily    sodium chloride flush  10 mL Intravenous 2 times per day    insulin lispro  0-6 Units Subcutaneous TID WC    insulin lispro  0-3 Units Subcutaneous Nightly     Meds prn: magnesium sulfate, sodium chloride flush, promethazine **OR** ondansetron, polyethylene glycol, acetaminophen **OR** acetaminophen, glucose, dextrose, glucagon (rDNA), dextrose       Impression and Plan:  1. KARAN on CKD 3  Baseline creatinine appears to be around 1.5 but now staying around 2.0 and this may be his new baseline but cannot rule out mild component of prerenal failure, diuretics temporarily on hold  Did not follow with my office after discharge from previous hospital stay  UA in November was mostly benign without any significant proteinuria  Will recheck  Ultrasound showed possible hydroureter but CAT scan is negative for any hydronephrosis    2. Hyponatremia: In setting of underlying cirrhosis/KARAN as well as recent diuretics. Will initiate free water restriction. Will start patient on sodium chloride tablets 2 g twice a day for now and monitor  3. History of alcoholic cirrhosis  4. Chronic hypotension. Continue with midodrine  5. History of hepatic hydrothorax  6. History of previous nonsteroidal use  7. Atrial fibrillation  8.   Hypomagnesemia secondary to previous alcohol abuse    D/W patient   Jordan Steven MD  [facility]

## 2021-02-26 NOTE — TELEPHONE ENCOUNTER
Spoke with patient orders sent to [facility].  Patient is currently admitted in hospital.

## 2021-02-26 NOTE — PLAN OF CARE
Problem: Falls - Risk of:  Goal: Will remain free from falls  Description: Will remain free from falls  2/26/2021 1243 by Candy Crowell RN  Outcome: Ongoing  Note: No falls this year  2/26/2021 0149 by Aniya Luis RN  Outcome: Ongoing  Note: No falls this shift. Patient alert and oriented, call light within reach, bed alarm on, wheels locked. Goal: Absence of physical injury  Description: Absence of physical injury  Outcome: Ongoing  Note: No physical injury this shift. Problem: Coping:  Goal: Level of anxiety will decrease  Description: Level of anxiety will decrease  Outcome: Ongoing  Note: Utilizing anxiety reduction techniques. Problem: Discharge Planning:  Goal: Discharged to appropriate level of care  Description: Discharged to appropriate level of care  2/26/2021 1243 by Candy Crowell RN  Outcome: Ongoing  Note: Plans to go home with family. 2/26/2021 0149 by Aniya Luis RN  Outcome: Ongoing  Note: Discharge planning in process.  aware of discharge needs. Problem: Skin Integrity:  Goal: Will show no infection signs and symptoms  Description: Will show no infection signs and symptoms  Outcome: Ongoing  Note: No signs of infection. Goal: Absence of new skin breakdown  Description: Absence of new skin breakdown  2/26/2021 1243 by Candy Crowell RN  Outcome: Ongoing  Note: No new skin breakdown this shift. 2/26/2021 0149 by Aniya Luis RN  Outcome: Ongoing  Note: No new skin issues this shift. Stage 2 on buttocks, patient able to turn self. Care plan reviewed with patient. Patient verbalize understanding of the plan of care and contribute to goal setting.

## 2021-02-27 NOTE — PROGRESS NOTES
Interventional-Structural Progress Note    Contacted by primary team regarding WATCHMAN candidacy    PAfib, Cirrhosis, low plts, high bleeding risk, not oac     Will set up for outpatient consultation and notify structural heart coordinator.     Reji Shah MD  Interventional Cardiology

## 2021-02-27 NOTE — PROGRESS NOTES
Gi Progress Note  Subjective:  Cc: cirrhosis  Patient reports:  No abdominal pain;  Less SOB    Diet:  DIET CARB CONTROL; Carb Control: 3 carb choices (45 gms)/meal; Low Sodium (2 GM);  Daily Fluid Restriction: 1500 ml      Medications:  Scheduled Meds:   midodrine  10 mg Oral TID    aspirin  81 mg Oral Daily    sodium chloride  2 g Oral TID WC    lactulose  30 g Oral TID    dilTIAZem  15 mg Oral Q6H    gabapentin  300 mg Oral TID    metoprolol  100 mg Oral BID    pantoprazole  40 mg Oral QAM AC    rifaximin  550 mg Oral BID    tiotropium  2 puff Inhalation Daily    sodium chloride flush  10 mL Intravenous 2 times per day    insulin lispro  0-6 Units Subcutaneous TID WC    insulin lispro  0-3 Units Subcutaneous Nightly     Continuous Infusions:   dextrose       PRN Meds:magnesium sulfate, sodium chloride flush, promethazine **OR** ondansetron, polyethylene glycol, acetaminophen **OR** acetaminophen, glucose, dextrose, glucagon (rDNA), dextrose    Objective:    Lab Results   Component Value Date    WBC 8.7 02/25/2021    RBC 3.78 02/25/2021    HGB 11.9 02/25/2021    HCT 34.8 02/25/2021    MCV 92.1 02/25/2021    MCH 31.5 02/25/2021    MCHC 34.2 02/25/2021    PLT 80 02/25/2021    MPV 11.2 02/25/2021     Lab Results   Component Value Date     02/27/2021    K 3.9 02/27/2021    K 4.1 02/24/2021    CL 96 02/27/2021    CO2 23 02/27/2021    BUN 25 02/27/2021    CREATININE 1.5 02/27/2021    GLUCOSE 142 02/27/2021    CALCIUM 8.9 02/27/2021     Lab Results   Component Value Date    CALCIUM 8.9 02/27/2021     No results found for: IONCA  Lab Results   Component Value Date    MG 1.9 02/27/2021     Lab Results   Component Value Date    PHOS 2.4 02/27/2021     No results found for: BNP  Lab Results   Component Value Date    ALKPHOS 113 02/27/2021    ALT 22 02/27/2021    AST 41 02/27/2021    PROT 5.5 02/27/2021    BILITOT 5.6 02/27/2021    BILIDIR 1.7 02/25/2021    LABALBU 2.9 02/27/2021     No results found for: LACTA  No results found for: AMYLASE  No results found for: LIPASE  No results found for: CHOL, TRIG, HDL, LDLCALC  Recent Labs     02/26/21  1726 02/26/21  2153 02/27/21  0900   POCGLU 124* 152* 83     No results for input(s): CKTOTAL, CKMB, TROPONINI in the last 72 hours. Lab Results   Component Value Date    LABA1C 4.6 11/21/2020     Lab Results   Component Value Date    INR 1.80 02/27/2021     No results found for: PHART, PO2ART, MZI0KPE, TDE6QPY, BEART      Physical Exam:    Vitals: BP 90/64 Comment: manual  Pulse 128   Temp 98.2 °F (36.8 °C) (Oral)   Resp 18   Ht 6' 2\" (1.88 m)   Wt 241 lb 8 oz (109.5 kg)   SpO2 93%   BMI 31.01 kg/m²   24 hour intake/output:    Intake/Output Summary (Last 24 hours) at 2/27/2021 0945  Last data filed at 2/27/2021 0216  Gross per 24 hour   Intake 730 ml   Output 1200 ml   Net -470 ml     Last 3 weights: Wt Readings from Last 3 Encounters:   02/26/21 241 lb 8 oz (109.5 kg)   01/26/21 235 lb 6.4 oz (106.8 kg)   01/21/21 232 lb 12.8 oz (105.6 kg)         General appearance - alert,  and in no distress  Abdomen -  nontender, mildly distended, no masses or organomegaly  Neurological - alert, oriented, normal speech, no focal findings or movement disorder noted  Assessment:  Patient Active Problem List   Diagnosis    Pleural effusion associated with hepatic disorder    Acute on chronic diastolic congestive heart failure (HCC)    Alcoholic cirrhosis of liver without ascites (HCC)    Essential hypertension    Paroxysmal atrial fibrillation (HCC)    Thrombocytopenia (HCC)    Type 2 diabetes mellitus without complication (HCC)    Acute kidney injury ([facility] [address]    Acute metabolic encephalopathy    Acute renal failure (ARF) (HCC)    Coagulopathy (HCC)    Diastolic heart failure (HCC)    Lower leg edema    Obesity, Class II, BMI 35-39.9    Decompensated liver disease (HCC)    Hydrothorax     A:  1. Alcoholic cirrhosis  2. Ascites  3. Pleural effusion  4. KARAN  5.   A Fib  6. Encephalopathy      Plan:  1. Alcoholic cirrhosis:  Was transferred last admission to [address] [place] [address] with work up as outlined. He is remaining free of alcohol; in counseling. Is following up at [address] [place] [address], and will be considered for transplant if needed. 2.  Encephalopathy:  On Lactulose and Xifaxan  3  KARAN:  Renal following;  Resume diuretics when ok  4. Ascities; On diuretics. On 2 gram sodium diet  5. Pleural effusion; treated  Total time 20 minutes;   Will sign off followed up as an outpatient (has appointment)    [facility]

## 2021-02-27 NOTE — PROGRESS NOTES
Discharge teaching and instructions for diagnosis/procedure of alcohol cirrhosis completed with patient using teachback method. AVS reviewed. Printed prescriptions given to patient. Patient voiced understanding regarding prescriptions, follow up appointments, and care of self at home. Discharged in a wheelchair to  home with support per self.

## 2021-02-27 NOTE — PROGRESS NOTES
[facility]         Renal Inpatient Follow-Up note         2/27/2021 1:10 PM    Pt Name:   Bladimir Mendez  YOB: 1965  Attending:   Mason Vanegas DO    Chief Complaint : Bladimir Mendez is a 48 y.o. male being followed by nephrology for acute kidney injury/CKD/hyponatremia    Interval History :   Patient seen and examined by me. No distress  Feels okay denies any complaints no chest pain no shortness of breath does have some leg swelling  Patient is eager to go home     Scheduled Medications :    dilTIAZem  30 mg Oral 3 times per day    midodrine  12.5 mg Oral TID    aspirin  81 mg Oral Daily    sodium chloride  2 g Oral TID WC    lactulose  30 g Oral TID    gabapentin  300 mg Oral TID    metoprolol  100 mg Oral BID    pantoprazole  40 mg Oral QAM AC    rifaximin  550 mg Oral BID    tiotropium  2 puff Inhalation Daily    sodium chloride flush  10 mL Intravenous 2 times per day    insulin lispro  0-6 Units Subcutaneous TID WC    insulin lispro  0-3 Units Subcutaneous Nightly      dextrose         Vitals :  BP 94/64   Pulse 123   Temp 98.1 °F (36.7 °C) (Oral)   Resp 18   Ht 6' 2\" (1.88 m)   Wt 241 lb 8 oz (109.5 kg)   SpO2 94%   BMI 31.01 kg/m²     24HR INTAKE/OUTPUT:      Intake/Output Summary (Last 24 hours) at 2/27/2021 1310  Last data filed at 2/27/2021 1148  Gross per 24 hour   Intake 870 ml   Output 1650 ml   Net -780 ml     Last 3 weights  Wt Readings from Last 3 Encounters:   02/26/21 241 lb 8 oz (109.5 kg)   01/26/21 235 lb 6.4 oz (106.8 kg)   01/21/21 232 lb 12.8 oz (105.6 kg)           Physical Exam :  General Appearance:  Well developed.  No distress  Mouth/Throat:  Oral mucosa moist  Neck:  Supple, no JVD  Lungs:  Breath sounds: clear  Heart[de-identified]  S1,S2 heard  Abdomen:  Soft, non - tender mild distention  Musculoskeletal:  Edema -noted bilaterally         Last 3 CBC   Recent Labs     02/24/21  1500 02/24/21  1520 02/25/21  0506   WBC 5.4 5.0 8.7 RBC 4.03* 4.00* 3.78*   HGB 12.6* 12.8* 11.9*   HCT 37.9* 38.3* 34.8*   PLT 95* 101* 80*     Last 3 CMP  Recent Labs     02/25/21  0506 02/26/21  0418 02/27/21  0447   * 124* 126*   K 4.6 4.1 3.9   CL 93* 93* 96*   CO2 25 22* 23   BUN 26* 27* 25*   CREATININE 2.0* 1.9* 1.5*   CALCIUM 9.0 9.0 8.9   LABALBU 2.5* 3.1* 2.9*   BILITOT 4.6* 5.3* 5.6*             ASSESSMENT / Plan   1 Renal -acute kidney injury on CKD stage III, most likely may be due to overdiuresis  ? Diuretics on hold and creatinine is improving  ? Follow renal function closely    2 Electrolytes -hyponatremia fluctuating mostly due to hypervolemic hyponatremia due to liver cirrhosis/acute kidney injury, mild improvement sodium is getting better continue salt tabs  3 Hx of alcoholic cirrhosis  4 Chronic hypotension on midodrine  5 Atrial fibrillation  6 Meds reviewed and discussed with patient    PAULINA Barron D.  [facility].

## 2021-02-27 NOTE — PROGRESS NOTES
26 Discussed with primary nurse Vincent Seay RN of patient's discharge while at bedside. Patient alert and oriented x4 gathering his belongings. Breathing is easy and unlabored. Patient denies any pain and has no complaints at this time. Reported off to primary nurse Vincent Seay RN.     Donnie Orona, [facility]-SN

## 2021-02-27 NOTE — FLOWSHEET NOTE
[facility]. [facility] PROGRESS NOTE      Patient: Francisca Gudino  Room #: 5A-99/251-I            YOB: 1965  Age: 48 y.o. Gender: male            Admit Date & Time: 2/24/2021  2:45 PM    Assessment:  Sohan Manjarrez is a 48year old male who is in bed on 4k. He is calm and welcoming of this  although he was surprised by the spiritual care contact. He spoke of his medical condition and how bad the last hospitalization was. He hopes to be discharged soon. Interventions:  Soahn Manjarrez welcomed prayer for healing and strength  Outcomes:  Encouraged and thankful    Plan: 1. He may be discharged.      Electronically signed by Arun Villeda on 2/27/2021 at 12:33 PM.  [address] [place] [address]  [phone]

## 2021-03-02 NOTE — TELEPHONE ENCOUNTER
Faxed CPAP setup order to [facility]  per patient's request.      Rocael Ryan  3/2/2021

## 2021-03-15 PROBLEM — K74.60 DECOMPENSATION OF CIRRHOSIS OF LIVER (HCC): Status: ACTIVE | Noted: 2021-01-01

## 2021-03-15 PROBLEM — K72.90 DECOMPENSATION OF CIRRHOSIS OF LIVER (HCC): Status: ACTIVE | Noted: 2021-01-01

## 2021-03-15 NOTE — PROGRESS NOTES
Pt admitted to  6K8 in a wheelchair and from ED. Complaints: Shortness of breath/BLE edema. IV site free of s/s of infection or infiltration. Vital signs obtained. Assessment and data collection initiated. Two nurse skin assessment performed by Jeannette SHIRLEY and Lissette Gray. Oriented to room. Policies and procedures for 6K explained. Jeannette SHIRLEY discussed hourly rounding with patient addressing 5 P's. Fall prevention and safety brochure discussed with patient. Bed alarm on. Call light in reach.

## 2021-03-15 NOTE — ED TRIAGE NOTES
Presents to ED with c/o leg edema, sob, jaundice, and weakness. Symptoms have worsened over the past week. Patient has +4 pitting edema to bilateral lower extremities and is jaundice. Patient was at GI today and sent here for further evaluation. Alert and oriented. Respirations easy and unlabored.

## 2021-03-15 NOTE — H&P
History & Physical     Patient: Rayna Saldivar  YOB: 1965    MRN: [id number]     Acct: [de-identified]    PCP: Ada Duane, MD    Date of Admission: 3/15/2021    Date of Service: Patient seen / examined on 03/15/21 and admitted to Inpatient with expected LOS greater than two midnights due to medical therapy. ASSESSMENT / PLAN:  1. Acute Decompensated Alcoholic Cirrhosis associated with Recurrent Hepatic Hydrothorax: Approximately 11 lb weight gain since discharge on 2/27. MELD-Na Score 31. Follows [facility] to be worked up for liver transplant. Not a candidate for TIPS due to high MELD-Na per [facility]. Moderate right sided pleural effusion on CXR. Ordered US Guided Thoracentesis. GI consulted. Continue home Lactulose 30g TID, Rifaximin 550mg BID. Start Bumex 1mg IV BID and Aldactone 50mg BID. 2. Hypotonic Hypervolemic Hyponatremia: Due to #1. His baseline Na appears to be 120-130s. Patient is A/Ox3. Due #1, low salt diet, and increased water intake. Correct <8 mEq per 24 hours. Continue low salt diet and fluid restrictions of 1.5L. Monitor BMP. 3. Paroxysmal Atrial Fibrillation with RVR: Takes Lopressor 100mg BID and Diltiazem 30mg TID. Currently -130s. Patient reports his baseline HR is 120s. Due to patient's hypotension, will decrease Lopressor to 75mg BID. If HR remains relatively stable, consider decreasing Lopressor to 50mg BID and increasing Diltiazem to 45mg TID on 3/16/21.    4. Hypotension: Labile blood pressure likely due to reflex tachycardia. On Midodrine 12.5mg TID. Will increase to 15mg TID to give more room to better control HR. 5. Thrombocytopenia and Elevated INR: 2/2 alcoholic cirrhosis. Avoid OAC. 6. CKD Stage 3: Baseline appears to be 1.5-2.1. Current Cr. 2.0. Avoid nephrotoxins. Monitor BMP. 7. Chronic Normocytic Anemia: Due to cirrhosis. Baseline Hgb 10-12. 6. Monitor CBC. 8. Hx NIDDM2: Hold home Metformin. Continue sliding scale insulin.  Hypoglycemic protocols in place. 9. Hx MAX on CPAP: Continue home CPAP    10. Hx COPD: Continue Spiriva      11. GERD: Continue Protonix 40mg QD    12. Obesity: BMI 32. Discussed lifestyle modifications. Chief Complaint:  Shortness of breath, lower leg swelling    History of Present Illness:  Patient is a 47 y/o M with PMH Atrial fibrillation, NIDDM2, CKD, COPD, MAX, GERD, Portal Hypertension, and Alcoholic Cirrhosis. He presents to [facility] ED with complaints of shortness of breath and lower leg swelling that has been gradually worsening in the past 10 days. He denies any recent sick contact, cough, or fevers. He is a cirrhosis patient currently working with [facility] to be put on the transplant list. He states he has been taking his Bumex and Spironolactone, but it has not been improving. He was recently discharged on 2/27/21 for similar symptoms and received IR guided thoracentesis with 3L drained on 2/25/21 - cytology was negative for malignancy at that time. Patient is also noted to be hyponatremic with Na 123. Patient states he eats salt-free foods at home and most of his salt intake are from seasonings. He has also been drinking more than his recommended fluid restrictions of 1.5L. In the ED VS: T97.5F, RR 24, HR 88, BP 90/78, SpO2 97% on RA. Labs significant for Hgb 12.5, MCV 98.7, Plt 120, Ammonia 35, INR 1.66, Serum Osm 252.4, Alb 3.1, Bili 6.4, Direct Bili 2.5, , AST 59, ALT 30, Na 123, Cl 91, Cr 2.0. CXR reports moderate right-sided pleural effusion with right basilar airspace disease which may represent adjacent passive atelectasis or pneumonia.      Past Medical History:    Past Medical History:   Diagnosis Date    Advanced cirrhosis of liver ([facility] [address]     Alcoholic cirrhosis (HCC)     Atrial fibrillation (HCC)     CHF (congestive heart failure) (HCC)     Chronic kidney disease     COPD (chronic obstructive pulmonary disease) (HCC)     Diabetes mellitus (HCC)     Gallstones     GERD (gastroesophageal reflux disease)     Headache     Hydrothorax     Portal hypertension (HCC)      Past Surgical History:    Past Surgical History:   Procedure Laterality Date    APPENDECTOMY      HERNIA REPAIR      LUNG SURGERY Right 11-24-20, 11-30-20    [facility]      Medications Prior to Admission:   No current facility-administered medications on file prior to encounter. Current Outpatient Medications on File Prior to Encounter   Medication Sig Dispense Refill    albuterol sulfate HFA (VENTOLIN HFA) 108 (90 Base) MCG/ACT inhaler Inhale 2 puffs into the lungs every 6 hours as needed for Wheezing 1 Inhaler 5    tiotropium (SPIRIVA) 18 MCG inhalation capsule Inhale 1 capsule into the lungs daily 30 capsule 5    dilTIAZem (CARDIZEM) 30 MG tablet Take 1 tablet by mouth 3 times daily 90 tablet 0    midodrine (PROAMATINE) 10 MG tablet Take 1 tablet by mouth 3 times daily PLUS 1 tablet of 2.5mg for total of 12.5mg 3 times daily. 90 tablet 0    midodrine (PROAMATINE) 2.5 MG tablet Take 1 tablet by mouth 3 times daily Along with 10mg dose for total 12.5mg 3 times daily. 90 tablet 0    spironolactone (ALDACTONE) 50 MG tablet Take 25 mg by mouth 2 times daily      ondansetron (ZOFRAN) 4 MG tablet Take 4 mg by mouth every 8 hours as needed for Nausea or Vomiting      bumetanide (BUMEX) 1 MG tablet Take 1 tablet by mouth daily 30 tablet 1    rifaximin (XIFAXAN) 550 MG tablet Take 1 tablet by mouth 2 times daily 60 tablet 1    pantoprazole (PROTONIX) 40 MG tablet Take 1 tablet by mouth daily 30 tablet 1    metoprolol (LOPRESSOR) 100 MG tablet Take 100 mg by mouth 2 times daily      Lancets MISC 1 each by Does not apply route daily Use to test blood sugar 4 times daily and as needed DX:E11.9 200 each 3    blood glucose monitor strips Test 4 times daily and as needed for irregular blood glucose. Dispense sufficient amount for indicated testing frequency plus additional to accommodate PRN testing needs.  DX E11.9 200 strip 3    Blood Glucose Monitoring Suppl (ONE TOUCH ULTRA 2) w/Device KIT Use to test 4 times daily and as needed DX: E11.9 1 kit 0    ASPIRIN 81 PO Take by mouth daily      gabapentin (NEURONTIN) 300 MG capsule Take 300 mg by mouth 3 times daily. Morning, Evening, Bedtime      lactulose (CEPHULAC) 10 g packet Take 45 mLs by mouth 3 times daily       metFORMIN (GLUCOPHAGE) 500 MG tablet Take 500 mg by mouth 2 times daily (with meals)       Allergies:   Patient has no known allergies.     Social History:   Social History     Socioeconomic History    Marital status: Single     Spouse name: Not on file    Number of children: Not on file    Years of education: Not on file    Highest education level: Not on file   Occupational History    Not on file   Social Needs    Financial resource strain: Not on file    Food insecurity     Worry: Not on file     Inability: Not on file    Transportation needs     Medical: Not on file     Non-medical: Not on file   Tobacco Use    Smoking status: Former Smoker     Packs/day: 1.00     Years: 29.00     Pack years: 29.00     Types: Cigarettes     Quit date: 2020     Years since quittin.3    Smokeless tobacco: Never Used   Substance and Sexual Activity    Alcohol use: Not Currently     Frequency: 4 or more times a week     Comment: No alcohol since 21    Drug use: Never    Sexual activity: Not on file   Lifestyle    Physical activity     Days per week: Not on file     Minutes per session: Not on file    Stress: Not on file   Relationships    Social connections     Talks on phone: Not on file     Gets together: Not on file     Attends Mandaeism service: Not on file     Active member of club or organization: Not on file     Attends meetings of clubs or organizations: Not on file     Relationship status: Not on file    Intimate partner violence     Fear of current or ex partner: Not on file     Emotionally abused: Not on file     Physically abused: Not on file Forced sexual activity: Not on file   Other Topics Concern    Not on file   Social History Narrative    Not on file     Family History:    Family History   Problem Relation Age of Onset    Heart Disease Mother     High Blood Pressure Mother     Diabetes Mother     Cancer Mother     Cancer Father      REVIEW OF SYSTEMS:  A 14-point ROS was obtained and negative, with the exception of pertinent positives as listed below:  +Shortness of breath  +Lower extremity swelling    PHYSICAL EXAM:  Vitals:    03/15/21 1319 03/15/21 1435 03/15/21 1605 03/15/21 1630   BP: 90/78 105/79 97/74 102/66   Pulse: 88 106 117 87   Resp: 24 17 18 16   Temp: 97.5 °F (36.4 °C)   97.8 °F (36.6 °C)   TempSrc: Oral   Oral   SpO2: 97% 97% 96% 96%   Weight: 241 lb (109.3 kg)   252 lb (114.3 kg)   Height: 6' 2\" (1.88 m)   6' 2\" (1.88 m)     General appearance: Chronically ill, jaundiced, appearing patient in no acute distress. HEENT:  Normocephalic / atraumatic. PERRL. EOM intact. Conjunctivae appear normal. Sceral icterus. Neck: Supple. No JVD. Respiratory: Increased respiratory effort on RA. Diminished on the right; CTA on left. No wheezing, rhonchi, or stridor noted. Cardiovascular: Irregularly irregular. Normal S1/S2. No murmurs / rubs / gallops. Abdomen: Soft / non-tender / non-distended. BS present. Musculoskeletal: No cyanosis. +2 pitting edema to BLE. Skin: Warm / dry. Normal turgor. Jaundice. Neurologic: A/O x 3. Speech normal. Answers questions appropriately. CN intact. No obvious focal neurologic deficits. No asterixis noted. Psychiatric: Thought content / judgment / insight appear appropriate. Capillary refill: Brisk bilaterally. Peripheral pulses: +2 bilaterally.     Labs:   Results for orders placed or performed during the hospital encounter of 03/15/21   CBC auto differential   Result Value Ref Range    WBC 6.0 4.8 - 10.8 thou/mm3    RBC 3.83 (L) 4.70 - 6.10 mill/mm3    Hemoglobin 12.5 (L) 14.0 - 18.0 gm/dl Gap 14.0 8.0 - 16.0 meq/L   Glomerular Filtration Rate, Estimated   Result Value Ref Range    Est, Glom Filt Rate 35 (A) ml/min/1.73m2   Osmolality   Result Value Ref Range    Osmolality Calc 252.4 (L) 275.0 - 300.0 mOsmol/kg     Radiology:     CXR:   Reviewed by me --    Xr Chest (2 Vw)    Result Date: 3/15/2021  PROCEDURE: XR CHEST (2 VW) CLINICAL INFORMATION: Shortness of Breath COMPARISON: 2/25/2021 TECHNIQUE:  PA and lateral chest x-ray  FINDINGS: The heart size appears within normal limits. There is a moderate right-sided pleural effusion. Right basilar airspace disease is also noted which may represent adjacent passive atelectasis or pneumonia. No pneumothorax is seen. There is a displaced posterior right sixth rib fracture again seen. 1. There is a displaced posterior right sixth rib fracture again seen. 2. There is a moderate right-sided pleural effusion. Right basilar airspace disease is also noted which may represent adjacent passive atelectasis or pneumonia. **This report has been created using voice recognition software. It may contain minor errors which are inherent in voice recognition technology. ** Final report electronically signed by Dr. Jacek Simmons on 3/15/2021 2:17 PM    FEN/GI/DVT:  IVF: None  Electrolytes: Monitor and replace per protocols  Diet: NPO  GI PPX: On PPI for GERD  DVT Prophylaxis: SCDs    CODE STATUS:  Full    Thank you Damaso Ny MD for the opportunity to be involved in this patient's care.     Electronically signed by Lara Anderson DO on 3/15/2021 at 4:51 PM

## 2021-03-15 NOTE — ED PROVIDER NOTES
[facility] [address] [place] [address]       Chief Complaint   Patient presents with    Leg Swelling    Shortness of Breath    Jaundice    Fatigue       Nurses Notes reviewed and I agree except as noted in the HPI. HISTORY OF PRESENT ILLNESS    Sharia Seip kye 48 y.o. male who presents to the ED for evaluation of shortness of breath. Patient states he was seeing his gastroenterologist today for follow-up on his chronic cirrhosis. They noted that his color appeared to be worse than normal, and he has worsening edema in his lower extremities. He notes history of cirrhosis, he has history of heart failure, diabetes. He has been taking diuretics Bumex and spironolactone as prescribed. Notes on February 25 of this year he had a large right pleural effusion, it was drained and he had improved shortness of breath. But not completely resolved. He denies nausea vomiting or diarrhea other than what is associated with his lactulose. He notes been taking as prescribed. He denies abdominal pain but does note some tightness. He denies any significant change in urination. Denies fevers or chills. Has a history of atrial fibrillation for which he is on any anticoagulation. He denies any bloody stools, or hematemesis. HPI was provided by the patient. REVIEW OF SYSTEMS     Review of Systems   Constitutional: Positive for fatigue. Negative for activity change, appetite change, chills and fever. HENT: Negative for congestion, rhinorrhea, sinus pain, trouble swallowing and voice change. Eyes: Negative for visual disturbance. Respiratory: Positive for shortness of breath. Negative for chest tightness and wheezing. Cardiovascular: Positive for leg swelling. Negative for chest pain and palpitations. Gastrointestinal: Positive for abdominal distention and diarrhea. Negative for abdominal pain, constipation, nausea and vomiting.    Genitourinary: Negative for decreased urine volume, difficulty urinating and dysuria. Musculoskeletal: Negative for arthralgias, back pain, gait problem and neck stiffness. Skin: Negative for color change and rash. Allergic/Immunologic: Positive for immunocompromised state. Neurological: Negative for dizziness, weakness, light-headedness, numbness and headaches. Hematological: Does not bruise/bleed easily. Psychiatric/Behavioral: Negative for agitation, behavioral problems and confusion. PAST MEDICAL HISTORY     Past Medical History:   Diagnosis Date    Advanced cirrhosis of liver ([facility]     Alcoholic cirrhosis (HCC)     Atrial fibrillation (HCC)     CHF (congestive heart failure) (HCC)     Chronic kidney disease     COPD (chronic obstructive pulmonary disease) ([facility]     Diabetes mellitus ([facility]     Gallstones     GERD (gastroesophageal reflux disease)     Headache     Hydrothorax     Portal hypertension (HCC)        SURGICALHISTORY      has a past surgical history that includes Appendectomy; hernia repair; and Lung surgery (Right, 11-24-20, 11-30-20). CURRENT MEDICATIONS       Current Discharge Medication List      CONTINUE these medications which have NOT CHANGED    Details   tiotropium (SPIRIVA) 18 MCG inhalation capsule Inhale 1 capsule into the lungs daily  Qty: 30 capsule, Refills: 5      dilTIAZem (CARDIZEM) 30 MG tablet Take 1 tablet by mouth 3 times daily  Qty: 90 tablet, Refills: 0      !! midodrine (PROAMATINE) 10 MG tablet Take 1 tablet by mouth 3 times daily PLUS 1 tablet of 2.5mg for total of 12.5mg 3 times daily. Qty: 90 tablet, Refills: 0      !! midodrine (PROAMATINE) 2.5 MG tablet Take 1 tablet by mouth 3 times daily Along with 10mg dose for total 12.5mg 3 times daily.   Qty: 90 tablet, Refills: 0      spironolactone (ALDACTONE) 50 MG tablet Take 25 mg by mouth 2 times daily      bumetanide (BUMEX) 1 MG tablet Take 1 tablet by mouth daily  Qty: 30 tablet, Refills: 1      rifaximin (XIFAXAN) 550 MG tablet Take 1 tablet by mouth 2 times daily  Qty: 60 tablet, Refills: 1      pantoprazole (PROTONIX) 40 MG tablet Take 1 tablet by mouth daily  Qty: 30 tablet, Refills: 1      metoprolol (LOPRESSOR) 100 MG tablet Take 100 mg by mouth 2 times daily      ASPIRIN 81 PO Take by mouth daily      gabapentin (NEURONTIN) 300 MG capsule Take 300 mg by mouth 3 times daily. Morning, Evening, Bedtime      lactulose (CEPHULAC) 10 g packet Take 45 mLs by mouth 3 times daily       metFORMIN (GLUCOPHAGE) 500 MG tablet Take 500 mg by mouth 2 times daily (with meals)      albuterol sulfate HFA (VENTOLIN HFA) 108 (90 Base) MCG/ACT inhaler Inhale 2 puffs into the lungs every 6 hours as needed for Wheezing  Qty: 1 Inhaler, Refills: 5      ondansetron (ZOFRAN) 4 MG tablet Take 4 mg by mouth every 8 hours as needed for Nausea or Vomiting      Lancets MISC 1 each by Does not apply route daily Use to test blood sugar 4 times daily and as needed DX:E11.9  Qty: 200 each, Refills: 3    Associated Diagnoses: Type 2 diabetes mellitus without complication, without long-term current use of insulin ([facility])      blood glucose monitor strips Test 4 times daily and as needed for irregular blood glucose. Dispense sufficient amount for indicated testing frequency plus additional to accommodate PRN testing needs. DX E11.9  Qty: 200 strip, Refills: 3    Comments: Brand per patient preference. May round up to next available package size. Associated Diagnoses: Type 2 diabetes mellitus without complication, without long-term current use of insulin ([facility])      Blood Glucose Monitoring Suppl (ONE TOUCH ULTRA 2) w/Device KIT Use to test 4 times daily and as needed DX: E11.9  Qty: 1 kit, Refills: 0    Associated Diagnoses: Type 2 diabetes mellitus without complication, without long-term current use of insulin ([facility]       ! ! - Potential duplicate medications found. Please discuss with provider. ALLERGIES     has No Known Allergies.     FAMILY HISTORY     He indicated that his mother is . He indicated that his father is . family history includes Cancer in his father and mother; Diabetes in his mother; Heart Disease in his mother; High Blood Pressure in his mother. SOCIAL HISTORY       Social History     Socioeconomic History    Marital status: Single     Spouse name: Not on file    Number of children: Not on file    Years of education: Not on file    Highest education level: Not on file   Occupational History    Not on file   Social Needs    Financial resource strain: Not on file    Food insecurity     Worry: Not on file     Inability: Not on file    Transportation needs     Medical: Not on file     Non-medical: Not on file   Tobacco Use    Smoking status: Former Smoker     Packs/day: 1.00     Years: 29.00     Pack years: 29.00     Types: Cigarettes     Quit date: 2020     Years since quittin.3    Smokeless tobacco: Never Used   Substance and Sexual Activity    Alcohol use: Not Currently     Frequency: 4 or more times a week     Comment: No alcohol since 21    Drug use: Never    Sexual activity: Not on file   Lifestyle    Physical activity     Days per week: Not on file     Minutes per session: Not on file    Stress: Not on file   Relationships    Social connections     Talks on phone: Not on file     Gets together: Not on file     Attends Anabaptist service: Not on file     Active member of club or organization: Not on file     Attends meetings of clubs or organizations: Not on file     Relationship status: Not on file    Intimate partner violence     Fear of current or ex partner: Not on file     Emotionally abused: Not on file     Physically abused: Not on file     Forced sexual activity: Not on file   Other Topics Concern    Not on file   Social History Narrative    Not on file       PHYSICAL EXAM     INITIAL VITALS:  height is 6' 2\" (1.88 m) and weight is 252 lb (114.3 kg). His oral temperature is 97.8 °F (36.6 °C).  His blood pressure is 102/66 and his pulse is 87. His respiration is 16 and oxygen saturation is 96%. Physical Exam  Vitals signs and nursing note reviewed. Constitutional:       Appearance: He is well-developed and overweight. He is not ill-appearing or toxic-appearing. HENT:      Head: Normocephalic. Nose: Nose normal.      Mouth/Throat:      Mouth: Mucous membranes are moist.   Eyes:      General: Scleral icterus present. Pupils: Pupils are equal, round, and reactive to light. Neck:      Musculoskeletal: Normal range of motion and neck supple. Cardiovascular:      Rate and Rhythm: Normal rate. Rhythm irregular. Pulses: Normal pulses. Heart sounds: No murmur. Pulmonary:      Effort: Pulmonary effort is normal. No respiratory distress. Breath sounds: Examination of the left-lower field reveals decreased breath sounds. Decreased breath sounds present. Abdominal:      General: There is distension. Tenderness: There is no abdominal tenderness. There is no guarding or rebound. Musculoskeletal: Normal range of motion. Right lower leg: 3+ Pitting Edema present. Left lower leg: 3+ Pitting Edema present. Skin:     General: Skin is warm. Capillary Refill: Capillary refill takes less than 2 seconds. Coloration: Skin is jaundiced. Neurological:      General: No focal deficit present. Mental Status: He is alert and oriented to person, place, and time. Mental status is at baseline. Cranial Nerves: No cranial nerve deficit. Sensory: No sensory deficit. Motor: No weakness. Psychiatric:         Mood and Affect: Mood normal.         Thought Content:  Thought content normal.         DIFFERENTIAL DIAGNOSIS:   Cirrhosis, pleural effusion, heart failure, SBP    DIAGNOSTIC RESULTS     EKG: All EKG's are interpreted by the Emergency Department Physician who eithersigns or Co-signs this chart in the absence of a cardiologist.    EKG read and interpreted by myself with comparison to 2/24/2021 gives impression of atrial fibrillation with heart rate of 108; QRS 80;QTc 479; axis R15, T221. RADIOLOGY: non-plainfilm images(s) such as CT, Ultrasound and MRI are read by the radiologist.  Plain radiographic images are visualized and preliminarily interpreted by the emergency physician unless otherwise stated below. XR CHEST (2 VW)   Final Result   1. There is a displaced posterior right sixth rib fracture again seen. 2. There is a moderate right-sided pleural effusion. Right basilar airspace disease is also noted which may represent adjacent passive atelectasis or pneumonia. **This report has been created using voice recognition software. It may contain minor errors which are inherent in voice recognition technology. **      Final report electronically signed by Dr. Roseanne Trinidad on 3/15/2021 2:17 PM      [address] [place] [address]    (Results Pending)         LABS:   Labs Reviewed   CBC WITH AUTO DIFFERENTIAL - Abnormal; Notable for the following components:       Result Value    RBC 3.83 (*)     Hemoglobin 12.5 (*)     Hematocrit 37.8 (*)     MCV 98.7 (*)     RDW-CV 18.6 (*)     RDW-SD 66.3 (*)     Platelets 583 (*)     Lymphocytes Absolute 0.7 (*)     All other components within normal limits   PROTIME-INR - Abnormal; Notable for the following components:    INR 1.66 (*)     All other components within normal limits   BASIC METABOLIC PANEL - Abnormal; Notable for the following components:    Sodium 123 (*)     Chloride 91 (*)     CO2 18 (*)     BUN 28 (*)     CREATININE 2.0 (*)     All other components within normal limits   HEPATIC FUNCTION PANEL - Abnormal; Notable for the following components:    Albumin 3.1 (*)     Total Bilirubin 6.4 (*)     Bilirubin, Direct 2.5 (*)     Alkaline Phosphatase 131 (*)     AST 59 (*)     All other components within normal limits   GLOMERULAR FILTRATION RATE, ESTIMATED - Abnormal; Notable for the following components:    Est, Glom Filt Rate 35 (*)     All other components within normal limits   OSMOLALITY - Abnormal; Notable for the following components:    Osmolality Calc 252.4 (*)     All other components within normal limits   CULTURE, BODY FLUID   AMMONIA   SPECIMEN REJECTION   MAGNESIUM   PHOSPHORUS   ETHANOL   SCAN OF BLOOD SMEAR   ANION GAP   URINALYSIS WITH MICROSCOPIC   BODY FLUID CELL COUNT WITH DIFFERENTIAL   CYTOLOGY, NON-GYN   PH, BODY FLUID   PROTEIN, BODY FLUID   LACTATE DEHYDROGENASE   PROTEIN, TOTAL   LACTATE DEHYDROGENASE, BODY FLUID   GLUCOSE, BODY FLUID       EMERGENCY DEPARTMENT COURSE:   Vitals:    Vitals:    03/15/21 1319 03/15/21 1435 03/15/21 1605 03/15/21 1630   BP: 90/78 105/79 97/74 102/66   Pulse: 88 106 117 87   Resp: 24 17 18 16   Temp: 97.5 °F (36.4 °C)   97.8 °F (36.6 °C)   TempSrc: Oral   Oral   SpO2: 97% 97% 96% 96%   Weight: 241 lb (109.3 kg)   252 lb (114.3 kg)   Height: 6' 2\" (1.88 m)   6' 2\" (1.88 m)       MDM    Patient was seen and evaluated in the emergency department, patient appears to be in no acute distress, vital signs are reviewed, hypertension noted. Physical exam was completed, he had scleral icterus, he had jaundice of the skin, he had decreased lung sounds in the right base, he had 3+ pitting edema in the lower extremities. Labs and imaging are ordered reoccurring pleural effusion noted in the right lower lung. Hyponatremia noted, chronic kidney disease noted. Discussed the case with Nola De Leon of the gastroenterology service, they agree with plan of care for admission. Discussed admission with the patient he is amenable to this plan of care. Discussed the case with , who graciously excepts patient for admission. While here in the emergency department patient maintained stable course appropriate for admission.   Medications   dilTIAZem (CARDIZEM) tablet 30 mg (has no administration in time range)   lactulose (CHRONULAC) 10 GM/15ML solution 30 g (has no administration in time range)   pantoprazole (PROTONIX) tablet 40 mg (has no administration in time range)   rifaximin (XIFAXAN) tablet 550 mg (has no administration in time range)   spironolactone (ALDACTONE) tablet 25 mg (has no administration in time range)   tiotropium (SPIRIVA RESPIMAT) 2.5 MCG/ACT inhaler 2 puff ( Inhalation Canceled Entry 3/15/21 7399)   promethazine (PHENERGAN) tablet 12.5 mg (has no administration in time range)     Or   ondansetron (ZOFRAN) injection 4 mg (has no administration in time range)   sodium chloride flush 0.9 % injection 10 mL (has no administration in time range)   sodium chloride flush 0.9 % injection 10 mL (has no administration in time range)   midodrine (PROAMATINE) tablet 15 mg (has no administration in time range)   metoprolol tartrate (LOPRESSOR) tablet 75 mg (has no administration in time range)   bumetanide (BUMEX) injection 1 mg (has no administration in time range)   insulin lispro (HUMALOG) injection vial 0-6 Units (has no administration in time range)   insulin lispro (HUMALOG) injection vial 0-3 Units (has no administration in time range)   glucose (GLUTOSE) 40 % oral gel 15 g (has no administration in time range)   dextrose 50 % IV solution (has no administration in time range)   glucagon (rDNA) injection 1 mg (has no administration in time range)   dextrose 5 % solution (has no administration in time range)       Patient was seenindependently by myself. The patient's final impression and disposition and plan was determined by myself. CRITICAL CARE:   None    CONSULTS:  [facility]  Dr. Manju Anne, hospitalist service    PROCEDURES:  None    FINAL IMPRESSION     1. Advanced cirrhosis of liver (HCC)    2. Hydrothorax    3. Hyponatremia    4. Chronic kidney disease, unspecified CKD stage          DISPOSITION/PLAN   Patient admitted to the hospitalist service  PATIENT REFERREDTO:  No follow-up provider specified.     DISCHARGE MEDICATIONS:  Current Discharge Medication List          (Please note that portions of this note were completed with a voice recognition program.  Efforts were made to edit the dictations but occasionally words are mis-transcribed.)      Provider:  I personally performed the services described in the documentation,reviewed and edited the documentation which was dictated to the scribe in my presence, and it accurately records my words and actions.     Ryley Bonilla CNP 03/15/21 5:25 PM    Joanie Bonilla, APRN - [place]        [employer], FELIX - CNP  03/15/21 3215

## 2021-03-15 NOTE — DISCHARGE SUMMARY
Discharge Summary     Patient Identification:  Tala Holbrook  : 1965  MRN: [id number]   Account: [de-identified]     Admit date: 2021  Discharge date: 2021   Attending provider: No att. providers found        Primary care provider: Rafael Nieves MD     Discharge Diagnoses: Active Problems:    Pleural effusion associated with hepatic disorder    Paroxysmal atrial fibrillation (HCC)    Thrombocytopenia (HCC)    Type 2 diabetes mellitus without complication (HCC)    Acute renal failure (ARF) (HCC)    Decompensated liver disease (HCC)    Hydrothorax  Resolved Problems:    * No resolved hospital problems. *    From prior note: \"Assessment/Plan:     1. 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 CXR showing moderate to large right pleural effusion. Not a candidate for TIPS due to high MELD-Na per [facility]. Pt currently being worked up at [address] [place] [address] for a liver transplant. Pt on PO Bumex and Aldactone. IR guided thoracentesis today with cytology and fluid analysis. 3 L drained 2021. Cytology with analysis pending. Labile blood pressure with likely reflex tachycardia, 2021 increased midodrine frequency to 10 mg TID to give room for increased diltiazem dose if needed to control HR. Patient is already on max dose of p.o. Lopressor BID.     2. KARAN: On admission :Cr at 2.1 with baseline 1.2. Pt on Bumex and Aldactone. Likely pre-renal either to dehydration + use of diuretics or due to HRS. Will hold diuretics for now. Nephrology following -suggesting Cr 2.0 may be new baseline, initiated free water restriction, initiated sodium chloride tablets 2 g twice daily with close monitoring. Strict I/Os. Daily weights. Avoid nephrotoxic agents -holding diuretics.     3. Paroxymal Atrial Fibrillation: currently in A. Fib with HR up to 120's at times. Rate controlled with metoprolol. CHADSVASC- 3, not OAC candidate given cirrhosis.  Holding ASA for thoracentesis today. Monitor on telemetry.      4. Thrombocytopenia and Elevated INR: 2/2 to liver cirrhosis. Will give 2.5 mg PO Vit K x 1 dose. Avoid blood thinners.      5. Type 2 DM: hold Metformin, start Humalog Sliding Scale. Diabetic Diet. Accu-checks.      Chief Complaint: SOB, weight gain     Initial H and P:-    Per Dr. Mari Tamez initial note:     \"55 y. o. male who presented to [facility] with one week history of increased sob and 7 pound weight gain. Pt has a pmh pf EtOH cirrhosis c/b ascites, EV, recurrent hepatic hydrothorax, Afib (prev on systemic Lovenox), DM2, HFpEF, COPD, former smoker.      In December 2020, he was managed at [facility] for acute hypoxic respiratory failure 2/2 recurrent hepatic hydrothorax on 12/14 in the setting of recurrent R pleural effusion. Underwent thoracentesis on 12/16 c/b hemothorax requiring transfer to ICU with emergent thoracostomy and exploratory thoracotomy with right pulmonary decortication on 12/17. He was intubated from 12/17-12/18. Not a candidate for TIPS due to high MELD-Na per [facility]. Pt currently being worked up at [facility] for a liver transplant. Pt was discharged home on PO Bumex and Aldactone.      Pt returns now to the ED per request of his PCP given CXR findings and his increased sob. Pt reports being compliant with all his medications at home, however doesn't always following a low Na diet. Reports noticing a 7 pound weight gain over the past week, and has been very sob with minimal activity. Pt has also been nauseas with some vomiting with food. Denies any fevers, chills, cough, abdominal pain, chest pain, black or red colored stools, confusion, alcohol intake.      In the ED, pt in A. Fib with HR ranging 100-120. Per pt this is normal. Afebrile, 96% on RA. Cr 2.1 (baseline 1.2), Na 125, troponin elevated. No ECG changes of ischemia.      Spoke with GI who were ok for admission. \"     Subjective (past 24 hours):   No acute events overnight.   Patient is feeling much better after 3 L of pleural fluid drained. Tachycardia is improved today after midodrine frequency was changed to 10 mg 3 times daily. Will increase diltiazem dose as needed to further control heart rate. Patient denies any new symptoms, feels much better overall. \"         - pt feeling well, HR and BP better controlled    Discharge Medications:   Hillaryjanessa Moore   Home Medication Instructions QR    Printed on:03/15/21 4984   Medication Information                      ASPIRIN 81 PO  Take by mouth daily             blood glucose monitor strips  Test 4 times daily and as needed for irregular blood glucose. Dispense sufficient amount for indicated testing frequency plus additional to accommodate PRN testing needs. DX E11.9             Blood Glucose Monitoring Suppl (ONE TOUCH ULTRA 2) w/Device KIT  Use to test 4 times daily and as needed DX: E11.9             bumetanide (BUMEX) 1 MG tablet  Take 1 tablet by mouth daily             dilTIAZem (CARDIZEM) 30 MG tablet  Take 1 tablet by mouth 3 times daily             gabapentin (NEURONTIN) 300 MG capsule  Take 300 mg by mouth 3 times daily. Morning, Evening, Bedtime             lactulose (CEPHULAC) 10 g packet  Take 45 mLs by mouth 3 times daily              Lancets MISC  1 each by Does not apply route daily Use to test blood sugar 4 times daily and as needed DX:E11.9             metFORMIN (GLUCOPHAGE) 500 MG tablet  Take 500 mg by mouth 2 times daily (with meals)             metoprolol (LOPRESSOR) 100 MG tablet  Take 100 mg by mouth 2 times daily             midodrine (PROAMATINE) 10 MG tablet  Take 1 tablet by mouth 3 times daily PLUS 1 tablet of 2.5mg for total of 12.5mg 3 times daily. midodrine (PROAMATINE) 2.5 MG tablet  Take 1 tablet by mouth 3 times daily Along with 10mg dose for total 12.5mg 3 times daily.              ondansetron (ZOFRAN) 4 MG tablet  Take 4 mg by mouth every 8 hours as needed for Nausea or Vomiting pantoprazole (PROTONIX) 40 MG tablet  Take 1 tablet by mouth daily             rifaximin (XIFAXAN) 550 MG tablet  Take 1 tablet by mouth 2 times daily             spironolactone (ALDACTONE) 50 MG tablet  Take 25 mg by mouth 2 times daily                 Patient Instructions:    Discharge lab work: Activity: activity as tolerated  Diet: No diet orders on file    Code Status: Prior    Follow-up visits:   Ty Costello MD  [address] [place] [address] [phone]    In 1 week  Hospital Follow Up, Follow up with primary care physician. FELIX Palma - [facility]  [address] [place] [address]  [phone]    Schedule an appointment as soon as possible for a visit in 2 weeks      Shine Sinclair MD  [address] [place] [address] Follow Up, Nephrology,        Procedures: thoracentesis    Consults:   IP CONSULT TO GI  IP CONSULT TO NEPHROLOGY    Examination:  Vitals:  Vitals:    02/27/21 0330 02/27/21 0557 02/27/21 0815 02/27/21 1230   BP: 101/63 101/65 90/64 94/64   Pulse: 99  128 123   Resp: 18  18 18   Temp: 98.1 °F (36.7 °C)  98.2 °F (36.8 °C) 98.1 °F (36.7 °C)   TempSrc: Oral  Oral Oral   SpO2:   93% 94%   Weight:       Height:         Weight: Weight: 241 lb 8 oz (109.5 kg)     24 hour intake/output:No intake or output data in the 24 hours ending 03/15/21 1525    General appearance - oriented to person, place, and time, normal appearing weight and chronically ill appearing  Chest - clear to auscultation, no wheezes, rales or rhonchi, symmetric air entry  Heart - normal rate, regular rhythm, normal S1, S2, no murmurs, rubs, clicks or gallops  Abdomen - soft, nontender, nondistended, no masses or organomegaly  Obese: No; Protuberant: Yes   Neurological - alert, oriented, normal speech, no focal findings or movement disorder noted  Extremities - peripheral pulses normal, no pedal edema, no clubbing or cyanosis  Skin - normal 252.4 (L) 275.0 - 300.0 mOsmol/kg       Discharge condition: good  Disposition: Home  Time spent on discharge: 35 minutes    Electronically signed by Jerilyn Enamorado DO on 3/15/2021 at 3:25 PM

## 2021-03-15 NOTE — ED NOTES
Pt is transported to [facility] without incident. Floor nurse was contacted prior to departure.

## 2021-03-15 NOTE — ED NOTES
Patient resting in bed. Respirations easy and unlabored. No distress noted. Call light within reach. Updated on POC. Will monitor.       David Coulter RN  03/15/21 014

## 2021-03-16 NOTE — CARE COORDINATION
3/16/21, 8:44 AM EDT  DISCHARGE PLANNING EVALUATION:    Ruddy Oliveira       Admitted: 3/15/2021/ [place] 9 day: 1   Location: [facility]08/008-A Reason for admit: Decompensation of cirrhosis of liver ([facility] [address] [K72.90, K74.60]   PMH:  has a past medical history of Advanced cirrhosis of liver ([address] Alcoholic cirrhosis ([address] Atrial fibrillation ([address] CHF (congestive heart failure) ([address] Chronic kidney disease, COPD (chronic obstructive pulmonary disease) ([facility] [address] [facility] Diabetes mellitus ([facility] [address] Gallstones, GERD (gastroesophageal reflux disease), Headache, Hydrothorax, and Portal hypertension ([facility] [address] Procedure: Thoracentesis planned  Barriers to Discharge:  Presented with 11lb wt gain since 2/27 discharge. Follows at [facility] and is being worked up for a liver transplant. Hospitalist following. GI consult Thoracentesis planned: cxr showed moderate pleural effusion. Afib with rvr, diltiazem dose increased on 3/15. IV bumex bid. Lactulose. Na+ 125, creatinine 2.0. Bilirubin 5. PCP: Florencio Bledsoe MD  Readmission Risk Score: 32%    Patient Goals/Plan/Treatment Preferences: Spoke with patient, plans to return home. Uses a walker and also has a cane. Has cpap in room. Request  for nursing visits again. He recently used [facility] and would like to use them again. Message left with referral. Will ask for orders during collaborative rounds. Transportation/Food Security/Housekeeping Addressed:  No issues identified.

## 2021-03-16 NOTE — PLAN OF CARE
Problem: Impaired respiratory status  Goal: Clear lung sounds  Outcome: Met This Shift  Note: Spiriva Daily to maintain clear breath sounds.

## 2021-03-16 NOTE — PROGRESS NOTES
Hospitalist Progress Note      Patient:  Ruddy Oliveira    Unit/Bed:6K-08/008-A  YOB: 1965  MRN: [id number]   Acct: [de-identified]   PCP: Florencio Bledsoe MD  Date of Admission: 3/15/2021    Assessment and Plan:        --- Moderate right-sided pleural effusion likely secondary to alcoholic hepatic cirrhosis with portal hypertension: Currently on Bumex 1 mg twice daily IV, continue, scheduled for thoracentesis as of 3/16/2021. Body fluid to be sent for pathology and cultures. --- Anasarca likely secondary to hypoalbumin anemia secondary to alcoholic liver cirrhosis: Albumin 2.8 as of 3/16/2021, the patient may benefit from albumin transfusion for 24 to 48 hours after his thoracentesis to improve his anasarca, currently on Bumex, repeat liver panel tomorrow, monitor closely. 1. Acute Decompensated Alcoholic Cirrhosis associated with Recurrent Hepatic Hydrothorax: Approximately 11 lb weight gain since discharge on 2/27. MELD-Na Score 31. Follows [facility] to be worked up for liver transplant. Not a candidate for TIPS due to high MELD-Na per [facility]. Moderate right sided pleural effusion on CXR. Ordered US Guided Thoracentesis. GI consulted. Continue home Lactulose 30g TID, Rifaximin 550mg BID. Start Bumex 1mg IV BID and Aldactone 50mg BID.   3/16/21: Child-Keller class B (8 points) compared to MELD score of 29 points with 19.6% estimated 3-month mortality, very risky, currently full code, will consult palliative to discuss code status and future goals of treatment. 2. Hypotonic Hypervolemic Hyponatremia: Due to #1. His baseline Na appears to be 120-130s. Patient is A/Ox3. Due #1, low salt diet, and increased water intake. Correct <8 mEq per 24 hours. Continue low salt diet and fluid restrictions of 1.5L. Monitor BMP.     3. Paroxysmal Atrial Fibrillation with RVR: Takes Lopressor 100mg BID and Diltiazem 30mg TID. Currently -130s. Patient reports his baseline HR is 120s.  Due to patient's hypotension, will decrease Lopressor to 75mg BID. If HR remains relatively stable, consider decreasing Lopressor to 50mg BID and increasing Diltiazem to 45mg TID on 3/16/21.  3/16/2021: Controlled blood pressure in addition to heart rate. 4. Hypotension: Labile blood pressure likely due to reflex tachycardia. On Midodrine 12.5mg TID. Will increase to 15mg TID to give more room to better control HR.  3/16/2021: We will add parameters to Cardizem, metoprolol to hold when systolic blood pressure less than 90 and heart rate less than 55, also parameters for midodrine to hold if systolic blood pressure more than 130. Monitor closely. 5. Thrombocytopenia and Elevated INR: 2/2 alcoholic cirrhosis. Avoid OAC.     6. CKD Stage 3: Baseline appears to be 1.5-2.1. Current Cr. 2.0. Avoid nephrotoxins. Monitor BMP.     7. Chronic Normocytic Anemia: Due to cirrhosis. Baseline Hgb 10-12. 6. Monitor CBC.     8. Hx NIDDM2: Hold home Metformin. Continue sliding scale insulin. Hypoglycemic protocols in place.     9. Hx MAX on CPAP: Continue home CPAP     10. Hx COPD: Continue Spiriva       11. GERD: Continue Protonix 40mg QD     12. Obesity: BMI 32. Discussed lifestyle modifications. PMH, PSH, SH, FHX reviewed in chart review snap shot in EPIC    CC: Right-sided pleural effusion large, anasarca    HPI: Initial admission HPI by admitting physician reviewed as below:  Patient is a 49 y/o M with PMH Atrial fibrillation, NIDDM2, CKD, COPD, MAX, GERD, Portal Hypertension, and Alcoholic Cirrhosis. He presents to [address] [place] [address] ED with complaints of shortness of breath and lower leg swelling that has been gradually worsening in the past 10 days. He denies any recent sick contact, cough, or fevers. He is a cirrhosis patient currently working with [facility] to be put on the transplant list. He states he has been taking his Bumex and Spironolactone, but it has not been improving.  He was recently discharged on 2/27/21 for similar symptoms and received IR guided thoracentesis with 3L drained on 2/25/21 - cytology was negative for malignancy at that time. Patient is also noted to be hyponatremic with Na 123. Patient states he eats salt-free foods at home and most of his salt intake are from seasonings. He has also been drinking more than his recommended fluid restrictions of 1.5L.     In the ED VS: T97.5F, RR 24, HR 88, BP 90/78, SpO2 97% on RA. Labs significant for Hgb 12.5, MCV 98.7, Plt 120, Ammonia 35, INR 1.66, Serum Osm 252.4, Alb 3.1, Bili 6.4, Direct Bili 2.5, , AST 59, ALT 30, Na 123, Cl 91, Cr 2.0. CXR reports moderate right-sided pleural effusion with right basilar airspace disease which may represent adjacent passive atelectasis or pneumonia  For further details and updates please refer to assessment and plan at the beginning of the note. ROS (10 point review of systems completed. Pertinent positives noted. Otherwise ROS is negative) :  Increased shortness of breath  PMH:  Per HPI and reviewed in the chart  SHX: Reviewed in the chart, also the patient  FHX: Reviewed in the chart, also with the patient  Allergies: Reviewed in the chart  Medications:     dextrose        dilTIAZem  30 mg Oral TID    lactulose  45 mL Oral TID    pantoprazole  40 mg Oral Daily    rifaximin  550 mg Oral BID    tiotropium  2 puff Inhalation Daily    sodium chloride flush  10 mL Intravenous 2 times per day    midodrine  15 mg Oral TID WC    metoprolol  75 mg Oral BID    bumetanide  1 mg Intravenous BID    insulin lispro  0-6 Units Subcutaneous TID WC    insulin lispro  0-3 Units Subcutaneous Nightly    spironolactone  50 mg Oral BID   Subjective 3/16/2021: Mentioned having increased shortness of breath, but is still not requiring oxygen, end-stage liver disease with meld score of 29 points and 19.6% mortality rate in the next 3 months, child Keller class B 8 point, palliative care to discuss CODE STATUS in addition to future goals of treatment, parameters for antihypertensive medications in addition to midodrine, thoracentesis for pleural effusion per pulmonary,    Vital Signs:   Vitals reviewed and compared with the previous ones  BP 92/66   Pulse 119   Temp 97.8 °F (36.6 °C) (Oral)   Resp 18   Ht 6' 2\" (1.88 m)   Wt 249 lb 4.8 oz (113.1 kg)   SpO2 91%   BMI 32.01 kg/m²      Intake/Output Summary (Last 24 hours) at 3/16/2021 0725  Last data filed at 3/16/2021 0404  Gross per 24 hour   Intake 0 ml   Output 750 ml   Net -750 ml        General:   Age-appropriate, in no acute distress, slightly jaundiced  HEENT:  normocephalic and atraumatic. Scleral icterus bilaterally. PERRLA. Neck: supple. No thyromegaly. Lungs: Bibasilar crackles, no wheeziness. Cardiac: S1, S2, JVD, ANU appreciated. Abdomen: soft. Increased abdominal girth, dilated superficial vessels appreciated, bowel sounds positive. Extremities: +4 bilateral pedal edema, no cyanosis  Vasculature: capillary refill < 3 seconds. Unable to detect pedal pulses because of increased edema bilateral lower extremities. Skin:  warm and dry. Not clammy. Psych:  Alert to time, person and place. Communicable. Affect appropriate  Lymph:  No supraclavicular ,and no anterior cervical lymphadenopathy. Neurologic: Motor intact all extremities, decreased sensation at bilateral lower extremity, cranial nerves II to XII intact  Labs:   Recent Labs     03/15/21  1319 03/16/21  0521   WBC 6.0 6.1   HGB 12.5* 11.6*   HCT 37.8* 34.8*   * 95*     Recent Labs     03/15/21  1418 03/16/21  0521   * 125*   K 4.1 3.7   CL 91* 92*   CO2 18* 22*   BUN 28* 29*   CREATININE 2.0* 2.0*   CALCIUM 9.4 8.8   PHOS 2.9  --      Recent Labs     03/15/21  1418 03/16/21  0521   AST 59* 52*   ALT 30 26   BILIDIR 2.5*  --    BILITOT 6.4* 5.0*   ALKPHOS 131* 133*     Recent Labs     03/15/21  1330   INR 1.66*     No results for input(s): CKTOTAL, TROPONINI in the last 72 hours.     Microbiology:    Blood culture #1:   Lab Results   Component Value Date    BC No growth-preliminary No growth  11/21/2020       Blood culture #2:No results found for: [facility]    Organism:  Lab Results   Component Value Date    ORG Staphylococcus epidermidis 11/22/2020         Lab Results   Component Value Date    LABGRAM  02/25/2021     Moderate segmented neutrophils observed. No bacteria seen. performed on cytospun specimen       MRSA culture only:No results found for: [facility]    Urine culture: No results found for: LABURIN    Respiratory culture: No results found for: CULTRESP    Aerobic and Anaerobic :  Lab Results   Component Value Date    LABAERO No growth-preliminary No growth  01/26/2021     Lab Results   Component Value Date    LABANAE No growth-preliminary No growth  02/25/2021       Urinalysis:      Lab Results   Component Value Date    NITRU NEGATIVE 03/16/2021    WBCUA NONE SEEN 03/16/2021    BACTERIA NONE SEEN 03/16/2021    RBCUA 0-2 03/16/2021    BLOODU NEGATIVE 03/16/2021    SPECGRAV 1.013 03/16/2021       Radiology:  XR CHEST (2 VW)   Final Result   1. There is a displaced posterior right sixth rib fracture again seen. 2. There is a moderate right-sided pleural effusion. Right basilar airspace disease is also noted which may represent adjacent passive atelectasis or pneumonia. **This report has been created using voice recognition software. It may contain minor errors which are inherent in voice recognition technology. **      Final report electronically signed by Dr. Carlos Hernandez on 3/15/2021 2:17 PM      [address] [place] [address]    (Results Pending)     Xr Chest (2 Vw)    Result Date: 3/15/2021  PROCEDURE: XR CHEST (2 VW) CLINICAL INFORMATION: Shortness of Breath COMPARISON: 2/25/2021 TECHNIQUE:  PA and lateral chest x-ray  FINDINGS: The heart size appears within normal limits. There is a moderate right-sided pleural effusion. Right basilar airspace disease is also noted which may represent adjacent passive atelectasis or pneumonia.  No pneumothorax is seen. There is a displaced posterior right sixth rib fracture again seen. 1. There is a displaced posterior right sixth rib fracture again seen. 2. There is a moderate right-sided pleural effusion. Right basilar airspace disease is also noted which may represent adjacent passive atelectasis or pneumonia. **This report has been created using voice recognition software. It may contain minor errors which are inherent in voice recognition technology. ** Final report electronically signed by Dr. Samir Buck on 3/15/2021 2:17 PM      Discussed plan with patient and nurse. Patient and nurse verbalized understanding and agree. All questions addressed with concerns. Please excuse my TYPOS!     Electronically signed by Kassi Baca MD on 3/16/2021 at 7:25 AM

## 2021-03-16 NOTE — CONSULTS
very well. He states that he has been previously diagnosed with congestive heart failure, however has not been admitted for heart failure in quite some time. He states that his legs have not swelled in a very long time. He also states a new onset of abdominal fullness which is new to him considering his past hydrothorax was without ascites. He states that he is currently off of anticoagulants, and has a appointment with Dr. Juan Maharaj this Wednesday for potential watchman procedure. Past Medical History:    Past Medical History:   Diagnosis Date    Advanced cirrhosis of liver (HCC)     Alcoholic cirrhosis (HCC)     Atrial fibrillation (HCC)     CHF (congestive heart failure) (HCC)     Chronic kidney disease     COPD (chronic obstructive pulmonary disease) (HCC)     Diabetes mellitus (HCC)     Gallstones     GERD (gastroesophageal reflux disease)     Headache     Hydrothorax     Portal hypertension (HCC)        Home Medications:  Prior to Admission medications    Medication Sig Start Date End Date Taking? Authorizing Provider   tiotropium (SPIRIVA) 18 MCG inhalation capsule Inhale 1 capsule into the lungs daily 3/5/21  Yes Ferdinand Hall MD   dilTIAZem (CARDIZEM) 30 MG tablet Take 1 tablet by mouth 3 times daily 2/27/21  Yes Jose Landau, DO   midodrine (PROAMATINE) 10 MG tablet Take 1 tablet by mouth 3 times daily PLUS 1 tablet of 2.5mg for total of 12.5mg 3 times daily. 2/27/21  Yes Jose Landau, DO   midodrine (PROAMATINE) 2.5 MG tablet Take 1 tablet by mouth 3 times daily Along with 10mg dose for total 12.5mg 3 times daily.  2/27/21  Yes Jose Landau, DO   spironolactone (ALDACTONE) 50 MG tablet Take 25 mg by mouth 2 times daily   Yes Historical Provider, MD   bumetanide (BUMEX) 1 MG tablet Take 1 tablet by mouth daily 1/29/21  Yes Lorrin Holstein, MD   rifaximin (XIFAXAN) 550 MG tablet Take 1 tablet by mouth 2 times daily 1/29/21  Yes Lorrin Holstein, MD   pantoprazole ([address]) 40 MG tablet Take 1 tablet by mouth daily 1/29/21  Yes Jeanine Del Cid MD   metoprolol (LOPRESSOR) 100 MG tablet Take 100 mg by mouth 2 times daily   Yes Historical Provider, MD   ASPIRIN 81 PO Take by mouth daily   Yes Historical Provider, MD   gabapentin (NEURONTIN) 300 MG capsule Take 300 mg by mouth 3 times daily. Morning, Evening, Bedtime   Yes Historical Provider, MD   lactulose (CEPHULAC) 10 g packet Take 45 mLs by mouth 3 times daily    Yes Historical Provider, MD   metFORMIN (GLUCOPHAGE) 500 MG tablet Take 500 mg by mouth 2 times daily (with meals)   Yes Historical Provider, MD   albuterol sulfate HFA (VENTOLIN HFA) 108 (90 Base) MCG/ACT inhaler Inhale 2 puffs into the lungs every 6 hours as needed for Wheezing 3/5/21   Marilou Nelson MD   ondansetron (ZOFRAN) 4 MG tablet Take 4 mg by mouth every 8 hours as needed for Nausea or Vomiting    Historical Provider, MD   Lancets MISC 1 each by Does not apply route daily Use to test blood sugar 4 times daily and as needed DX:E11.9 12/4/20   FELIX Her CNP   blood glucose monitor strips Test 4 times daily and as needed for irregular blood glucose. Dispense sufficient amount for indicated testing frequency plus additional to accommodate PRN testing needs.  DX E11.9 12/4/20   FELIX Her CNP   Blood Glucose Monitoring Suppl (ONE TOUCH ULTRA 2) w/Device KIT Use to test 4 times daily and as needed DX: E11.9 12/4/20   FELIX Her CNP       Surgical History:  Past Surgical History:   Procedure Laterality Date    APPENDECTOMY      HERNIA REPAIR      LUNG SURGERY Right 11-24-20, 11-30-20    [facility]       Family History:  Family History   Problem Relation Age of Onset    Heart Disease Mother     High Blood Pressure Mother     Diabetes Mother     Cancer Mother     Cancer Father        Past GI History:    Alcohol Abuse in Remission  Alcoholic Liver Cirrhosis with recurrent Hydrothorax  Non bleeding Grad 1 Varices  Moderate Portal Hypertensive Gastropathy  Rectal Bleeding  Colon Polyps  Sees Dr. Ellen Zapata ([facility]) and Abdoulaye WASHINGTON CNP    Allergies:  Patient has no known allergies. Social History:   TOBACCO:   reports that he quit smoking about 4 months ago. His smoking use included cigarettes. He has a 29.00 pack-year smoking history. He has never used smokeless tobacco.  ETOH:   reports previous alcohol use. Review Of Systems  GENERAL: No fever, chills or weight loss. EYES:  No  blurred vision, double vision   CARDIOVASCULAR: No chest pain or palpitations. States chronic A. fib, but does not notice any symptoms. RESPIRATORY: States shortness of breath that is worse when he is lying down. No cough or sputum production. No wheezing heard. GI:  See HPI. Significant to note new onset of abdominal fullness and tightness. MUSCULOSKELETAL: No new painful or swollen joints or myalgias. Significant edema in bilateral lower limbs. :   No dysuria or hematuria. SKIN:  No rashes. He was stated to be jaundiced on admission. NEUROLOGIC:  No headaches or seizures. Patient does state significant numbness in bilateral lower limbs secondary to restless leg syndrome and neuropathy. PSYCH:  No anxiety or depression. ENDOCRINE:  No polyuria or polydipsia. BLOOD:  No anemia, bleeding disorder, blood or blood product transfusion. PHYSICAL EXAM:  /68   Pulse 93   Temp 98.1 °F (36.7 °C) (Oral)   Resp 16   Ht 6' 2\" (1.88 m)   Wt 249 lb 4.8 oz (113.1 kg)   SpO2 90%   BMI 32.01 kg/m²     General appearance: No apparent distress, appears stated age and cooperative. HEENT: Normal cephalic, atraumatic without obvious deformity. Pupils equal, round, and reactive to light. Neck: Supple, with full range of motion. No jugular venous distention. Trachea midline. Respiratory:  Normal respiratory effort. Lungs clear to auscultation bilaterally without wheezes.   Dullness to auscultation in the right lower lung with mild rhonchi. Cardiovascular: Tachycardic with an irregular rhythm without murmurs, rubs or gallops. Abdomen: Abdomen feels tight, slight dullness to percussion. Mild fluid wave present. Scratch technique reveals enlarged liver size. No tenderness to palpation. Musculoskeletal: No clubbing or cyanosis. +2 pitting edema in lower limbs bilaterally, patient states swelling has decreased from over night. Currently on SCDs. Skin: Slightly jaundiced, does not extend into scleral space, warm, dry. No rashes or lesions. Psychiatric: Alert and oriented, thought content appropriate, normal insight    Labs:   Recent Labs     03/16/21  0521   WBC 6.1   HGB 11.6*   HCT 34.8*   PLT 95*     Recent Labs     03/15/21  1418 03/16/21  0521   * 125*   K 4.1 3.7   CL 91* 92*   CO2 18* 22*   BUN 28* 29*   CREATININE 2.0* 2.0*   CALCIUM 9.4 8.8   PHOS 2.9  --      Recent Labs     03/15/21  1418 03/16/21  0521   AST 59* 52*   ALT 30 26   BILIDIR 2.5*  --    BILITOT 6.4* 5.0*   ALKPHOS 131* 133*     Recent Labs     03/15/21  1330   INR 1.66*       Radiology:   CXR 2 view: Moderate riht-sided pleural effusion, displaced posterior right sixth rib fracture (old)  Code Status: Full Code    ASSESSMENT:    1.) Decompensated Alcoholic Liver Cirrhosis with recurrent Right Hydrothorax: Elevated liver enzymes, thrombocytopenia, elevated INR, hydrothorax re demonstrated on CXR. MELD-Na 31  2.) Hyponatremia: Currently 125, secondary to cirrhosis and hypervolemia   3.) Elevated Bilirubin: 6.4 on arrival, secondary to cirrohsis  4.) Chronic Normocytic Anemia: Baseline 10-12.6.  5.) Paroxsymal Afib with RVR: Afib on admission. States follow-up visit with Dr. Cruz Meek on Wednesday for potential watchman. PLAN:    1.) Therapeutic Right Thoracentesis, send fluid for cytology and studies, DO NOT place drain.   2.) US of Abdomen to evaluate for Ascites with doppler/AFP/Paracentesis with cell count and differential.  3.) Continue treatment with Lactulose 30g TID, Rifaximin 550mg TID, Bumex 1 mg BID, and Aldactone 50mg BID.  4.) Follow Hepatic Panel and INR daily to reassess MELD score. 5.) Fluids Restriction to 1.5L, Salt restricted diet. 6.) Patient following with [facility] for liver transplant work-up. Case reviewed and impression/plan reviewed in collaboration with Dr. Sachi Mina and Shelby Downs CNP. Electronically signed by Emliy Holliday DO on 3/16/2021 at 8:28 AM    [facility]  Thank you for the consultation. Attending Supervising Physicians Attestation Statement  I saw and evaluated the patient. I discussed the findings and plans with resident physician and agree as documented in her note except for recommend [address] screening and eval for SBP and thrombus with U/S with dopplers, AFP, and paracentesis. Electronically signed by Vazquez Finn MD on 3/17/21 at 8:11 AM EDT        Cirrhosis, acutely decompensated   Alcoholism   Cholestasis without Jaundice   Ascites - tap needed for SBP   Venous insufficiency - new   Hypoalbuminemia   Hyponatremia   Pancytopenia    Anemia, macro    thrombocytopenia   Korin Graft     Infection? - paracentesis needed   Tumor? - imaging and AFP needed   GI bleeding? - no signs of hemorrhage   Thrombosis? - dopplers needed   Alcoholic hepatitis? - no signs   Med Noncompliance? - no    Diet Noncompliance? - yes, noncompliance with fluid restriction   Sedatives? - no  P.      Check AFP  Check U/S with dopplers  Diagnostic paracentesis    Vazquez Finn MD  8:14 AM 3/17/2021

## 2021-03-16 NOTE — PLAN OF CARE
Problem: Falls - Risk of:  Goal: Will remain free from falls  Description: Will remain free from falls  Note: Pt free from falls, safety maintained   Goal: Absence of physical injury  Description: Absence of physical injury  Note: Pt free from injury, safety maintained      Problem: Infection:  Goal: Will remain free from infection  Description: Will remain free from infection  Note: Afebrile, no s/s of infection, vss     Problem: Pain:  Goal: Patient's pain/discomfort is manageable  Description: Patient's pain/discomfort is manageable  Note: Pt currently denies any pain     Problem: Skin Integrity:  Goal: Skin integrity will stabilize  Description: Skin integrity will stabilize  Note: No new skin breakdown noted

## 2021-03-16 NOTE — CARE COORDINATION
03/16/21 1410   Readmission Assessment   Number of Days since last admission? 8-30 days   Previous Disposition Other (comment)  (Transferred to [facility])   Who is being Interviewed Patient   What was the patient's/caregiver's perception as to why they think they needed to return back to the hospital? Other (Comment)  (symptom management)   Did you visit your Primary Care Physician after you left the hospital, before you returned this time? No   Why weren't you able to visit your PCP? Did not have an appointment   Did you see a specialist, such as Cardiac, Pulmonary, Orthopedic Physician, etc. after you left the hospital? No   Who advised the patient to return to the hospital? Self-referral   Does the patient report anything that got in the way of taking their medications? No   In our efforts to provide the best possible care to you and others like you, can you think of anything that we could have done to help you after you left the hospital the first time, so that you might not have needed to return so soon?  Other (Comment)  (denied.)

## 2021-03-16 NOTE — PLAN OF CARE
Problem: Falls - Risk of:  Goal: Will remain free from falls  Description: Will remain free from falls  3/16/2021 0948 by Corinne Crenshaw RN  Outcome: Ongoing  Note: Patient ambulates 1 assist with walker. Call light within reach. Side rails up x2. Bed alarm on. Non skid slippers available. Problem: Pain:  Goal: Patient's pain/discomfort is manageable  Description: Patient's pain/discomfort is manageable  3/16/2021 0948 by Corinne Crenshaw RN  Outcome: Ongoing  Note: Patient free from pain this shift. Pain rated on 0-10 pain rating scale. Will continue to reassess. Problem: Skin Integrity:  Goal: Skin integrity will stabilize  Description: Skin integrity will stabilize  3/16/2021 0948 by Corinne Crenshaw RN  Outcome: Ongoing  Note: No new skin issues, will continue to monitor. Care plan reviewed with patient. Patient verbalize understanding of the plan of care and contribute to goal setting.

## 2021-03-16 NOTE — PROGRESS NOTES
Patient left for thoracentesis, BP 94/60, Midodrine given per [facility] prior to procedure. Patient back to floor, BP 85/59. Notified hospitalist, no new orders.

## 2021-03-17 NOTE — PROGRESS NOTES
Pt. Alert and oriented x4. Speech Clear and appropriate. Bowel sounds active x4. Abdomen rounded, soft, and non tender without masses. Pt. Has no complaints or concerns at this time. Bed alarm on. Call light and tray table within reach.  Abi Alvares RSRONN/ SN

## 2021-03-17 NOTE — PLAN OF CARE
Problem: Falls - Risk of:  Goal: Will remain free from falls  Description: Will remain free from falls  Outcome: Met This Shift  Goal: Absence of physical injury  Description: Absence of physical injury  Outcome: Met This Shift     Problem: Infection:  Goal: Will remain free from infection  Description: Will remain free from infection  Outcome: Met This Shift     Problem: Pain:  Goal: Patient's pain/discomfort is manageable  Description: Patient's pain/discomfort is manageable  Outcome: Met This Shift     Problem: Skin Integrity:  Goal: Skin integrity will stabilize  Description: Skin integrity will stabilize  Outcome: Met This Shift     Problem: Impaired respiratory status  Goal: Clear lung sounds  Outcome: Met This Shift

## 2021-03-17 NOTE — PROGRESS NOTES
Gastroenterology Progress Note:     Patient Name:  Tala Holbrook   MRN: [id number]  [id number]  YOB: 1965  Admit Date: 3/15/2021  1:02 PM  Primary Care Physician: Rafael Nieves MD   [id number]     Patient seen and examined. 24 hours events and chart reviewed. Subjective: Today the patient states that he is feeling better. He states his breathing has improved, but does get short of breath if he lies on his side. He had the Paracentesis done this morning and stated he has no abdominal pain and less tightness afterward. He has been doing well with his water restriction in the hospital.  He denies any fever, chills, nausea, vomiting, or blood in his stool. He denies abdominal pain. The swelling in his legs has decreased significantly. There are no accurate I/Os to report. MELD-Na score today is 28. Objective:  BP (!) 94/59   Pulse 135   Temp 97.6 °F (36.4 °C) (Oral)   Resp 18   Ht 6' 2\" (1.88 m)   Wt 249 lb 4.8 oz (113.1 kg)   SpO2 96%   BMI 32.01 kg/m²     Physical Exam:    General appearance: No apparent distress, appears stated age and cooperative. HEENT: Normal cephalic, atraumatic without obvious deformity. Pupils equal, round, and reactive to light. Neck: Supple, with full range of motion. No jugular venous distention. Trachea midline. Respiratory:  Normal respiratory effort. Lungs clear to auscultation bilaterally without wheezes. Right lower lung has improved auscultation with minimal dullness. Cardiovascular: Tachycardic with an irregular rhythm without murmurs, rubs or gallops. Abdomen: Abdomen feels looser than yesterday. Fluid wave not appreciated. Scratch technique reveals enlarged liver size. No tenderness to palpation. Musculoskeletal: No clubbing or cyanosis. +1 pitting edema in lower limbs bilaterally, patient states swelling has decreased from over night. Skin: Slightly jaundiced, warm, dry. No rashes or lesions.   Psychiatric: Alert and oriented, thought content appropriate, normal insight    Labs:   CBC:   Lab Results   Component Value Date    WBC 5.4 03/17/2021    HGB 11.4 03/17/2021    HCT 33.9 03/17/2021    MCV 96.0 03/17/2021    PLT 82 03/17/2021     BMP:   Lab Results   Component Value Date     03/17/2021    K 3.5 03/17/2021    CL 93 03/17/2021    CO2 22 03/17/2021    PHOS 2.9 03/15/2021    BUN 24 03/17/2021    CREATININE 1.5 03/17/2021    CALCIUM 8.5 03/17/2021     PT/INR:   Lab Results   Component Value Date    INR 1.86 03/17/2021     Lipids:   Lab Results   Component Value Date    ALKPHOS 128 03/17/2021    ALT 27 03/17/2021    AST 56 03/17/2021    BILITOT 4.9 03/17/2021    BILIDIR 1.9 03/17/2021    LABALBU 2.6 03/17/2021     Significant Diagnostic Studies:     I/O: -440 (not accurate measure, only shows 500mL of urine output)    US Liver with Doppler  Pleural fluid is contiguous with an additional pocket of pleural fluid located outside the right lateral chest wall. The communication appears to be located at the intercostal space inferolaterally along the right chest wall. No thrombosis, no mass.     Thoracentesis on 3/17  2.1L of red-tinged fluid drained  LDH ratio: 0.323  Protein Ratio: 0.237  The fluid is Transudative, no bacteria seen, sample sent for cytology    Paracentesis on 3/17  1.1L of yellow fluid drained  No bacteria seen, few neutrophils  ALB 0.3  Amylase 7    Current Meds:  Scheduled Meds:   albumin human  25 g Intravenous Once    midodrine  15 mg Oral TID    gabapentin  300 mg Oral TID    dilTIAZem  30 mg Oral TID    lactulose  45 mL Oral TID    pantoprazole  40 mg Oral Daily    rifaximin  550 mg Oral BID    tiotropium  2 puff Inhalation Daily    sodium chloride flush  10 mL Intravenous 2 times per day    metoprolol  75 mg Oral BID    bumetanide  1 mg Intravenous BID    insulin lispro  0-6 Units Subcutaneous TID WC    insulin lispro  0-3 Units Subcutaneous Nightly    spironolactone  50 mg Oral BID     Continuous Infusions:   dextrose         Assessment:     Mr. Michael Núñez is a 48year old male who presented to [facility] due to increasing shortness of breath. He states his symptoms are the same as his previous admission for hydrothorax and decompensated Liver Cirrhosis. He states additional symptoms of increased leg swelling, tight/full abdomen, and increased jaundice. He states he was not following fluid restriction at home, but had cut salt out of his diet and is compliant with his medications. He takes Bumex, Spironolactone, Lactulose, and Rifaximin at home. He is being evaluated by [facility] for potential Liver transplant, but had been told he is not a candidate for a TIPS procedure. 1.) Decompensated Alcoholic Liver Cirrhosis with recurrent Right Hydrothorax: Elevated liver enzymes, thrombocytopenia, elevated INR, hypoalbuminemia, hydrothorax re demonstrated on CXR. MELD-Na 31. Question of Infection, Tumor, GI bleeding, Thrombosis, or Alcoholic Hepatitis. 2.) Therapeutic Right Thoracentesis shows transudate without bacteria present, drain not placed. 3.) Hyponatremia: Currently 128, secondary to cirrhosis and hypervolemia   4.) Elevated Bilirubin: 6.4 on arrival, secondary to cirrohsis  5.) Chronic Normocytic Anemia: Baseline 10-12.6. 6.) Pancytopenia: Anemia, Thrombocytopenia  7.) Elevated INR: Not on Coumadin  8.) Paroxsymal Afib with RVR: Afib on admission. States follow-up visit with Dr. Josias Coleman on Wednesday for potential watchman. Plan:      1.) Follow laboratory results of Paracentesis and AFP. 2.) Continue treatment with Lactulose 30g TID, Rifaximin 550mg TID, Bumex 1 mg BID, and Aldactone 50mg BID.  3.) Follow Hepatic Panel and INR daily to reassess MELD score. 4.) Fluids Restriction to 1.5L, Salt restricted diet. 5.) Strict I/O and daily weight.  6.) Patient following with [facility] for liver transplant work-up.     Case reviewed and impression/plan reviewed in collaboration with Dr. Sugey Mendiola and Skyler Joesph Sultana CNP.   Electronically signed by Emily Holliday DO on 3/17/2021 at 3:22 PM    [facility]         Attending Supervising Physicians Attestation Statement  I was present with the resident physician during the history and exam. I discussed the findings and plans with the resident physician and agree as documented in her note     Paracentesis with no SBP  U/S with dopplers show no mass or thrombosis  Pt feeling better  OK per GI to discharge  Reiterated importance of fluid restriction with patient  Follow-up with Abdoulaye Beach CNP in 3-4 weeks      Electronically signed by Vazquez Finn MD on 3/17/21 at 3:56 PM EDT

## 2021-03-17 NOTE — PROGRESS NOTES
Hospitalist Progress Note      Patient:  Nate Montez    Unit/Bed:6K-08/008-A  YOB: 1965  MRN: [id number]   Acct: [de-identified]   PCP: Becka Carrillo MD  Date of Admission: 3/15/2021    Assessment and Plan: Moderate right-sided pleural effusion likely secondary to alcoholic hepatic cirrhosis with portal hypertension: s/p thoracentesis. Doing well. No acute distress. Fluid sent for testing    Anasarca likely secondary to hypoalbuminemia secondary to alcoholic liver cirrhosis:   Will give albumin today as his Bps are systolic 96G with HR 965V and he has had a thoracentesis and a paracentesis. 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 MELD-Na Score 31. Follows [facility] to be worked up for liver transplant. Not a candidate for TIPS due to high MELD-Na per [facility]. Moderate right sided pleural effusion on CXR. Ordered US Guided Thoracentesis. GI consulted. Continue home Lactulose 30g TID, Rifaximin 550mg BID. on Bumex 1mg IV BID and Aldactone 50mg BID.  - Child-Keller class B (8 points) compared to MELD score of 29 points with 19.6% estimated 3-month mortality, very risky, currently full code, will consult palliative to discuss code status and future goals of treatment. Hypotonic Hypervolemic Hyponatremia: Due to #1. His baseline Na appears to be 120-130s. Patient is A/Ox3. Due #1, low salt diet, and increased water intake. Correct <8 mEq per 24 hours. Continue low salt diet and fluid restrictions of 1.5L. Monitor BMP.     Paroxysmal Atrial Fibrillation with RVR: Takes Lopressor 100mg BID and Diltiazem 30mg TID. Currently -130s. Patient reports his baseline HR is 120s. Due to patient's hypotension, will decrease Lopressor to 75mg BID. If HR remains relatively stable, consider decreasing Lopressor to 50mg BID and increasing Diltiazem.   Pt was supposed to see cardio for discussion for watchmann device today but had to resched appt as he is in the hosp. His Bps are [id number] and -140s. Will resume midodrine and consult cardio. Hypotension: Labile blood pressure likely due to reflex tachycardia. On Midodrine 12.5mg TID which was increased to 15mg tid 3/16. Skipped a dose prior to procedures today. Given afterwards and will monitor bp. Thrombocytopenia and Elevated INR: 2/2 alcoholic cirrhosis. Avoid OAC.     CKD Stage 3: Baseline appears to be 1.5-2.1. Current Cr. 2.0. Avoid nephrotoxins. Monitor BMP.     Chronic Normocytic Anemia: Due to cirrhosis. Baseline Hgb 10-12. 6. Monitor CBC.     Hx NIDDM2: Hold home Metformin. Continue sliding scale insulin. Hypoglycemic protocols in place.     Hx MAX on CPAP: Continue home CPAP     Hx COPD: Continue Spiriva       GERD: Continue Protonix 40mg QD     Obesity: BMI 32. Discussed lifestyle modifications. PMH, PSH, SH, FHX reviewed in chart review snap shot in EPIC    CC: Right-sided pleural effusion large, anasarca    HPI: Initial admission HPI by admitting physician reviewed as below:  Patient is a 47 y/o M with PMH Atrial fibrillation, NIDDM2, CKD, COPD, MAX, GERD, Portal Hypertension, and Alcoholic Cirrhosis. He presents to [facility] ED with complaints of shortness of breath and lower leg swelling that has been gradually worsening in the past 10 days. He denies any recent sick contact, cough, or fevers. He is a cirrhosis patient currently working with [facility] to be put on the transplant list. He states he has been taking his Bumex and Spironolactone, but it has not been improving. He was recently discharged on 2/27/21 for similar symptoms and received IR guided thoracentesis with 3L drained on 2/25/21 - cytology was negative for malignancy at that time. Patient is also noted to be hyponatremic with Na 123. Patient states he eats salt-free foods at home and most of his salt intake are from seasonings.  He has also been drinking more than his recommended fluid restrictions of 1.5L.     In the ED VS: T97.5F, RR 24, HR 88, BP 90/78, SpO2 97% on RA. Labs significant for Hgb 12.5, MCV 98.7, Plt 120, Ammonia 35, INR 1.66, Serum Osm 252.4, Alb 3.1, Bili 6.4, Direct Bili 2.5, , AST 59, ALT 30, Na 123, Cl 91, Cr 2.0. CXR reports moderate right-sided pleural effusion with right basilar airspace disease which may represent adjacent passive atelectasis or pneumonia      Subjective:  Feels tired but overall improved from yesterday  No acute events overnight. For further details and updates please refer to assessment and plan at the beginning of the note. ROS (10 point review of systems completed. Pertinent positives noted. Otherwise ROS is negative)     Medications:     dextrose        midodrine  15 mg Oral TID    gabapentin  300 mg Oral TID    dilTIAZem  30 mg Oral TID    lactulose  45 mL Oral TID    pantoprazole  40 mg Oral Daily    rifaximin  550 mg Oral BID    tiotropium  2 puff Inhalation Daily    sodium chloride flush  10 mL Intravenous 2 times per day    metoprolol  75 mg Oral BID    bumetanide  1 mg Intravenous BID    insulin lispro  0-6 Units Subcutaneous TID WC    insulin lispro  0-3 Units Subcutaneous Nightly    spironolactone  50 mg Oral BID     Vital Signs:   Vitals reviewed and compared with the previous ones  BP 98/81   Pulse 124   Temp 97.6 °F (36.4 °C) (Oral)   Resp 18   Ht 6' 2\" (1.88 m)   Wt 249 lb 4.8 oz (113.1 kg)   SpO2 96%   BMI 32.01 kg/m²      Intake/Output Summary (Last 24 hours) at 3/17/2021 1407  Last data filed at 3/17/2021 1342  Gross per 24 hour   Intake 300 ml   Output 500 ml   Net -200 ml        General:   Age-appropriate, in no acute distress, slightly jaundiced  HEENT:  normocephalic and atraumatic. Scleral icterus bilaterally. PERRLA. Neck: supple. No thyromegaly. Lungs: Bibasilar crackles, no wheeziness. Cardiac: S1, S2, JVD, ANU appreciated. Abdomen: soft.   Increased abdominal girth, dilated superficial vessels appreciated, bowel sounds positive. Extremities: +4 bilateral pedal edema, no cyanosis  Vasculature: capillary refill < 3 seconds. Unable to detect pedal pulses because of increased edema bilateral lower extremities. Skin:  warm and dry. Not clammy. Psych:  Alert to time, person and place. Communicable. Affect appropriate  Lymph:  No supraclavicular ,and no anterior cervical lymphadenopathy. Neurologic: Motor intact all extremities, decreased sensation at bilateral lower extremity, cranial nerves II to XII intact  Labs:   Recent Labs     03/15/21  1319 03/16/21  0521 03/17/21 0518   WBC 6.0 6.1 5.4   HGB 12.5* 11.6* 11.4*   HCT 37.8* 34.8* 33.9*   * 95* 82*     Recent Labs     03/15/21  1418 03/16/21  0521 03/17/21  0518   * 125* 128*   K 4.1 3.7 3.5   CL 91* 92* 93*   CO2 18* 22* 22*   BUN 28* 29* 24*   CREATININE 2.0* 2.0* 1.5*   CALCIUM 9.4 8.8 8.5   PHOS 2.9  --   --      Recent Labs     03/15/21  1418 03/16/21  0521 03/17/21  0518   AST 59* 52* 56*   ALT 30 26 27   BILIDIR 2.5*  --  1.9*   BILITOT 6.4* 5.0* 4.9*   ALKPHOS 131* 133* 128*     Recent Labs     03/15/21  1330 03/17/21  0518   INR 1.66* 1.86*     No results for input(s): CKTOTAL, TROPONINI in the last 72 hours. Microbiology:    Blood culture #1:   Lab Results   Component Value Date    BC No growth-preliminary No growth  11/21/2020       Blood culture #2:No results found for: Simon Vázquez    Organism:  Lab Results   Component Value Date    ORG Staphylococcus epidermidis 11/22/2020         Lab Results   Component Value Date    LABGRAM  03/17/2021     Few segmented neutrophils observed. No bacteria seen.  performed on cytospun specimen        MRSA culture only:No results found for: [facility]    Urine culture: No results found for: LABURIN    Respiratory culture: No results found for: CULTRESP    Aerobic and Anaerobic :  Lab Results   Component Value Date    LABAERO No growth-preliminary No growth  01/26/2021     Lab Results   Component Value Date    LABANAE No growth-preliminary No growth  02/25/2021       Urinalysis:      Lab Results   Component Value Date    NITRU NEGATIVE 03/16/2021    WBCUA NONE SEEN 03/16/2021    BACTERIA NONE SEEN 03/16/2021    RBCUA 0-2 03/16/2021    BLOODU NEGATIVE 03/16/2021    SPECGRAV 1.013 03/16/2021       Radiology:  US LIVER   Final Result   1. There is mild borderline gallbladder wall thickening and a mildly dilated common bile duct. This is nonspecific. Hyperdense material within the dependent portion of the gallbladder may represent sludge and/or stones. However, there is no    pericholecystic fluid and the sonographic Chu Les sign is negative. 2. Findings are suggestive of portal hypertension as described above to include bidirectional flow within the main portal vein and hepatofugal flow within the right portal vein along with recanalized umbilical vein. 3. There is pleural fluid at the right lung base. This pleural fluid is contiguous with an additional pocket of pleural fluid located outside the right lateral chest wall. The communication appears to be located at the intercostal space inferolaterally    along the right chest wall. 4. The liver appears nodular with increased echogenicity and lobulated contour consistent with cirrhosis. **This report has been created using voice recognition software. It may contain minor errors which are inherent in voice recognition technology. **      Final report electronically signed by Dr. Paty Sofia on 3/17/2021 12:38 PM      US DUP ABD PEL RETRO [employer]   Final Result   1. There is mild borderline gallbladder wall thickening and a mildly dilated common bile duct. This is nonspecific. Hyperdense material within the dependent portion of the gallbladder may represent sludge and/or stones. However, there is no    pericholecystic fluid and the sonographic Chu Les sign is negative.        2. Findings are suggestive of portal hypertension as described above to include bidirectional flow within the main portal vein and hepatofugal flow within the right portal vein along with recanalized umbilical vein. 3. There is pleural fluid at the right lung base. This pleural fluid is contiguous with an additional pocket of pleural fluid located outside the right lateral chest wall. The communication appears to be located at the intercostal space inferolaterally    along the right chest wall. 4. The liver appears nodular with increased echogenicity and lobulated contour consistent with cirrhosis. **This report has been created using voice recognition software. It may contain minor errors which are inherent in voice recognition technology. **      Final report electronically signed by Dr. Tanya Ibarra on 3/17/2021 12:38 PM      [address] [employer]   Final Result    Successful ultrasound-guided paracentesis. **This report has been created using voice recognition software. It may contain minor errors which are inherent in voice recognition technology. **      Final report electronically signed by Dr. Demetrius Quinonez MD on 3/17/2021 10:58 AM      US THORACENTESIS Which side should the procedure be performed? Right   Final Result    Successful ultrasound guided thoracentesis. **This report has been created using voice recognition software. It may contain minor errors which are inherent in voice recognition technology. **      Final report electronically signed by Dr. Demetrius Quinonez MD on 3/16/2021 1:58 PM      US ABDOMEN LIMITED   Final Result   1. Limited ultrasound evaluation of the 4 quadrants of the abdomen demonstrates mild ascites present within all 4 quadrants. **This report has been created using voice recognition software. It may contain minor errors which are inherent in voice recognition technology. **      Final report electronically signed by Dr. Tanya Ibarra on 3/16/2021 1:39 PM      XR CHEST 1 VIEW Final Result   Interval decrease in the right pleural effusion without evidence of pneumothorax. **This report has been created using voice recognition software. It may contain minor errors which are inherent in voice recognition technology. **      Final report electronically signed by Dr. India Kocher, MD on 3/16/2021 1:19 PM      XR CHEST (2 VW)   Final Result   1. There is a displaced posterior right sixth rib fracture again seen. 2. There is a moderate right-sided pleural effusion. Right basilar airspace disease is also noted which may represent adjacent passive atelectasis or pneumonia. **This report has been created using voice recognition software. It may contain minor errors which are inherent in voice recognition technology. **      Final report electronically signed by Dr. Justin Hamm on 3/15/2021 2:17 PM        Xr Chest (2 Vw)    Result Date: 3/15/2021  PROCEDURE: XR CHEST (2 VW) CLINICAL INFORMATION: Shortness of Breath COMPARISON: 2/25/2021 TECHNIQUE:  PA and lateral chest x-ray  FINDINGS: The heart size appears within normal limits. There is a moderate right-sided pleural effusion. Right basilar airspace disease is also noted which may represent adjacent passive atelectasis or pneumonia. No pneumothorax is seen. There is a displaced posterior right sixth rib fracture again seen. 1. There is a displaced posterior right sixth rib fracture again seen. 2. There is a moderate right-sided pleural effusion. Right basilar airspace disease is also noted which may represent adjacent passive atelectasis or pneumonia. **This report has been created using voice recognition software. It may contain minor errors which are inherent in voice recognition technology. ** Final report electronically signed by Dr. Justin Hamm on 3/15/2021 2:17 PM      Discussed plan with patient and nurse. Patient and nurse verbalized understanding and agree. All questions addressed with concerns. Please excuse my TYPOS!     Electronically signed by Claudean Peon, MD on 3/17/2021 at 2:07 PM

## 2021-03-17 NOTE — PROGRESS NOTES
[facility]           Progress Note    Patient Name:  Rafael López  Medical Record Number:  [id number]  [place]:  1965    Date:  3/17/2021  Time:  3:29 PM  Completed By:  Rebecca Diaz RN    Reason for Palliative Care Evaluation:  Goals of care/code status    Current Issues:  []  Pain  []  Fatigue  []  Nausea  []  Anxiety  []  Depression  [x]  Shortness of Breath  []  Constipation  []  Appetite  []  Other:    Advance Directives      [] [place] DNR Form   [] Living Will  [] Medical POA    Current Code Status  [x] Full Resuscitation  [] DNR-Comfort Care-Arrest  [] DNR-Comfort Care  [] Limited   [] No CPR   [] No shock   [] No ET intubation/reintubation   [] No resuscitative medications   [] Other limitation:    Performance Status:  80    Family/Patient Discussion:  Discussed pt medical condition at length. He has been sick for several years. He reports that he has AD and that his brother is his POA. He currently is being worked up for a watchman and also he has been to [facility] regarding a liver transplant work-up. He denies needs at this time. He wants to be a FULL code. Plan/Follow-Up:  Palliative care will be available if needed. Please call if needs arise.      Rebecca Diaz RN  3/17/2021,  3:29 PM

## 2021-03-17 NOTE — PLAN OF CARE
Problem: Impaired respiratory status  Goal: Clear lung sounds  3/17/2021 0817 by Reshma Cooper RCP  Outcome: Ongoing

## 2021-03-17 NOTE — CARE COORDINATION
3/17/21, 1:43 PM EDT    DISCHARGE ON GOING [facility] 12 day: 2  Location: -08/008-A Reason for admit: Decompensation of cirrhosis of liver ([facility] [place] [facility] [K72.90, K74.60]   Procedure:   3/16 Right sided thoracentesis: 2.1L removed  3/17 Paracentesis: 1.1L removed  3/17 US Liver, abdomen, pelvis: 1. There is mild borderline gallbladder wall thickening and a mildly dilated common bile duct. This is nonspecific. Hyperdense material within the dependent portion of the gallbladder may represent sludge and/or stones. However, there is no    pericholecystic fluid and the sonographic Natasha Clan sign is negative. 2. Findings are suggestive of portal hypertension as described above to include bidirectional flow within the main portal vein and hepatofugal flow within the right portal vein along with recanalized umbilical vein. 3. There is pleural fluid at the right lung base. This pleural fluid is contiguous with an additional pocket of pleural fluid located outside the right lateral chest wall. The communication appears to be located at the intercostal space inferolaterally    along the right chest wall. 4. The liver appears nodular with increased echogenicity and lobulated contour consistent with cirrhosis. Barriers to Discharge: Hospitalist and GI following. Remains on IV bumex bid. Aldactone. Lactulose. Xifaxan. Follows at [facility] for liver transplant. PCP: Fred Wright MD  Readmission Risk Score: 33%  Patient Goals/Plan/Treatment Preferences: Patient plans to return home. [facility] referral, will need new order. Denied further needs.

## 2021-03-17 NOTE — PROGRESS NOTES
Pt. Resting in bed comfortably at this time. Pt. Vinh [place] no complaints on concerns. Bed alarm on. Call light and tray table within reach.  Jackelin SABILLON/SN

## 2021-03-17 NOTE — PROGRESS NOTES
Pt. Alert and oriented x4. Speech clear and appropriate. PERRL. Yellow sclera of bilateral eyes present. Upper extremities pink, warm, and dry. Tingling present. Hand grasp moderate bilaterally. Capillary refill and skin turgor less than 3 seconds bilaterally. Lower extremities pink, cool, and dry. +3 edema on left leg and +2 edema on right leg. Pedal push/pull moderate. +1 pedal pulses and posterior tibialis pulses bilaterally. Heart sounds clear with irregular rhythm. Lung sounds clear and diminished bilaterally. Chest expansion symmetrical. Bowel sounds active x4. Abdomen soft, rounded, non tender, and without masses. Umbilical hernia present. Pt. Has no complaints or concerns at this time. Bed alarm on. Call light and tray table within reach.  Cheri Screen RSC/SN

## 2021-03-17 NOTE — PROGRESS NOTES
Pt. Resting comfortably in bed at this time. Pt. Has no complaints or concerns. Bed alarm on. Call light and tray table within reach.  Chiki SABILLON/SN

## 2021-03-18 PROBLEM — K72.90 DECOMPENSATION OF CIRRHOSIS OF LIVER (HCC): Status: RESOLVED | Noted: 2021-01-01 | Resolved: 2021-01-01

## 2021-03-18 PROBLEM — K74.60 DECOMPENSATION OF CIRRHOSIS OF LIVER (HCC): Status: RESOLVED | Noted: 2021-01-01 | Resolved: 2021-01-01

## 2021-03-18 PROBLEM — K74.69 DECOMPENSATED LIVER DISEASE (HCC): Status: RESOLVED | Noted: 2020-01-01 | Resolved: 2021-01-01

## 2021-03-18 PROBLEM — G93.41 ACUTE METABOLIC ENCEPHALOPATHY: Status: RESOLVED | Noted: 2020-01-01 | Resolved: 2021-01-01

## 2021-03-18 NOTE — CONSULTS
[address] [place] [address]                                  CONSULTATION    PATIENT NAME: Frances Casillas                      :        1965  MED REC NO:   [id number]                           ROOM:       [id number]  ACCOUNT NO:   [de-identified]                           ADMIT DATE: 03/15/2021  PROVIDER:     [facility] Lomeli Sender:  2021    REASON FOR CONSULTATION:  Evaluation of possible Watchman device for  atrial fibrillation. REQUESTING PROVIDER:  Hospitalist service. HISTORY OF PRESENT ILLNESS:  This is a pleasant unfortunate 50-year-old  gentleman who used to live in [place], who has a history of significant  liver cirrhosis for which he was taken off of anticoagulation due to  significant bleeding risk. The patient has had recurrent pleural  effusion and ascites, mostly pleural effusion requiring thoracentesis. He was admitted here with worsening symptoms and liver failure symptoms,  for which we were consulted to assist in the management of his  anticoagulation and discussion about possible Watchman device. He was  supposed to see Dr. Yoanna Donovan in the office today. The patient is not aware  of any previous significant coronary artery disease. REVIEW OF SYSTEMS:  No fever, chills or weight loss. No hematuria or  dysuria. The patient has had some chronic abdominal pain, nausea, no  obvious diarrhea. No obvious active psych problems or suicidal  ideation. No skin rashes. No obvious dizziness, lightheadedness or  loss of consciousness. No recent trauma. No bleeding problem. No  HEENT-related problems. PAST MEDICAL HISTORY:  1. Liver cirrhosis. 2.  Atrial fibrillation. 3.  Hypertension. 4.  No known history of coronary artery disease. ALLERGIES:  No known drug allergies. MEDICATIONS:  Bumex 2 mg twice a day, insulin, metoprolol 75 twice a  day, spironolactone 50 a day, Spiriva.     SOCIAL HISTORY:  History of smoking as well as alcohol abuse and no drug  abuse. FAMILY HISTORY:  Significant for coronary artery disease. PHYSICAL EXAMINATION:  VITAL SIGNS:  Showed a blood pressure 130/80, heart rate of 70. GENERAL APPEARANCE:  A pleasant gentleman, in no obvious acute distress,  seems to be pale and with jaundice. NECK:  No JVD. LUNGS:  Decreased air entry. No crackles or wheezes. HEART:  Normal S1, S2. Systolic murmur grade 2/6. ABDOMEN:  Soft, nontender. Positive bowel sounds. No organomegaly. EXTREMITIES:  No significant edema. NEUROLOGIC:  Grossly intact. Awake, alert. No focal deficits. PSYCHIATRIC:  No evidence of active pscyhosis. SKIN:  No rashes. LABORATORY DATA:  Sodium 128, potassium 3.5, BUN 24, creatinine 1.5. White count 5.4, hemoglobin 11.4, hematocrit 33.9, platelets 82. EKG  showed AFib, nonspecific ST-T wave changes. IMPRESSION:  This is a gentleman who has liver cirrhosis and has been  off of anticoagulation because of that. At this point I did have a  catherine discussion with the patient. I am not certain if this gentleman  is going to be a candidate for a Watchman device given the fact that he  would not be able to go on Coumadin long enough to get the device end. Likely he has a relatively lower KEMI score; however, for the time  being, I would continue off of blood thinners due to his significant  bleeding risk and have him follow with Dr. Agustin Martínez as an outpatient for  further discussion about the Watchman. In the meantime, the treatment  of the primary problem will be left to the admitting team as far as his  cirrhosis is concerned. We will be happy to assist in the management of  the patient as needed after that. In the meantime, we will have him  follow with Dr. Agustin Martínez in the office. Thank you for allowing me to participate in his care.         Cl Goodwin M.D.    D: 03/17/2021 15:51:28       T: 03/17/2021 15:58:58     [email or web address]  Job#: [id number]     Doc#: [id number]    CC:

## 2021-03-18 NOTE — PROGRESS NOTES
Hospitalist Progress Note      Patient:  Chaparro Subramanian    Unit/Bed:6K-08/008-A  YOB: 1965  MRN: [id number]   Acct: [de-identified]   PCP: Maria Esther Carpenter MD  Date of Admission: 3/15/2021    Assessment and Plan:          Paroxysmal Atrial Fibrillation with RVR: Takes Lopressor 100mg BID and Diltiazem 30mg TID. Currently -150s. Patient reports his baseline HR is 120s. Due to patient's hypotension, home meds were decreased and for last 24-36hrs he has not been getting them. Cardio is consulted and plan on EP consultation today. I decreased the patients cardizem parameters to hold it if < 95 systolic. Continue midodrine  Overnight started on digoxin as Hrs worsened. Chronic Hypotension: Labile blood pressure likely due to reflex tachycardia. On Midodrine 12.5mg TID which was increased to 15mg tid 3/16. Chronically Bps low in 26F systolic at home. Liver cirrhosis complicated medication choices as metop, cardizem are both metabolized in the liver. Moderate right-sided pleural effusion likely secondary to alcoholic hepatic cirrhosis with portal hypertension: s/p thoracentesis. Doing well. No acute distress. Fluid sent for testing    Anasarca likely secondary to hypoalbuminemia secondary to alcoholic liver cirrhosis:   Will give albumin today as his Bps are systolic 05S with HR 624J and he has had a thoracentesis and a paracentesis. 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 MELD-Na Score 31. Follows [facility] to be worked up for liver transplant. Not a candidate for TIPS due to high MELD-Na per [facility]. Moderate right sided pleural effusion on CXR. Ordered US Guided Thoracentesis. GI consulted. Continue home Lactulose 30g TID, Rifaximin 550mg BID.  on Bumex 1mg IV BID and Aldactone 50mg BID.  - Child-Keller class B (8 points) compared to MELD score of 29 points with 19.6% estimated 3-month mortality, very risky, currently full code, will consult palliative to discuss code status and future goals of treatment. Hypotonic Hypervolemic Hyponatremia: Due to #1. His baseline Na appears to be 120-130s. Patient is A/Ox3. Due #1, low salt diet, and increased water intake. Correct <8 mEq per 24 hours. Continue low salt diet and fluid restrictions of 1.5L. Monitor BMP. Thrombocytopenia and Elevated INR: 2/2 alcoholic cirrhosis. Avoid OAC.     CKD Stage 3: Baseline appears to be 1.5-2.1. Current Cr. 2.0. Avoid nephrotoxins. Monitor BMP.     Chronic Normocytic Anemia: Due to cirrhosis. Baseline Hgb 10-12. 6. Monitor CBC.     Hx NIDDM2: Hold home Metformin. Continue sliding scale insulin. Hypoglycemic protocols in place.     Hx MAX on CPAP: Continue home CPAP     Hx COPD: Continue Spiriva       GERD: Continue Protonix 40mg QD     Obesity: BMI 32. Discussed lifestyle modifications. PMH, PSH, SH, FHX reviewed in chart review snap shot in EPIC    CC: Right-sided pleural effusion large, anasarca    HPI: Initial admission HPI by admitting physician reviewed as below:  Patient is a 47 y/o M with PMH Atrial fibrillation, NIDDM2, CKD, COPD, MAX, GERD, Portal Hypertension, and Alcoholic Cirrhosis. He presents to [facility] ED with complaints of shortness of breath and lower leg swelling that has been gradually worsening in the past 10 days. He denies any recent sick contact, cough, or fevers. He is a cirrhosis patient currently working with [facility] to be put on the transplant list. He states he has been taking his Bumex and Spironolactone, but it has not been improving. He was recently discharged on 2/27/21 for similar symptoms and received IR guided thoracentesis with 3L drained on 2/25/21 - cytology was negative for malignancy at that time. Patient is also noted to be hyponatremic with Na 123. Patient states he eats salt-free foods at home and most of his salt intake are from seasonings.  He has also been drinking more than his recommended fluid restrictions of 1.5L.     In the ED VS: T97.5F, RR 24, HR 88, BP 90/78, SpO2 97% on RA. Labs significant for Hgb 12.5, MCV 98.7, Plt 120, Ammonia 35, INR 1.66, Serum Osm 252.4, Alb 3.1, Bili 6.4, Direct Bili 2.5, , AST 59, ALT 30, Na 123, Cl 91, Cr 2.0. CXR reports moderate right-sided pleural effusion with right basilar airspace disease which may represent adjacent passive atelectasis or pneumonia      Subjective:  Feels tired but overall improved from yesterday  No acute events overnight. For further details and updates please refer to assessment and plan at the beginning of the note. ROS (10 point review of systems completed. Pertinent positives noted. Otherwise ROS is negative)     Medications:     dextrose        digoxin  250 mcg Intravenous Daily    midodrine  15 mg Oral TID    gabapentin  300 mg Oral TID    dilTIAZem  30 mg Oral TID    lactulose  45 mL Oral TID    pantoprazole  40 mg Oral Daily    rifaximin  550 mg Oral BID    tiotropium  2 puff Inhalation Daily    sodium chloride flush  10 mL Intravenous 2 times per day    metoprolol  75 mg Oral BID    bumetanide  1 mg Intravenous BID    insulin lispro  0-6 Units Subcutaneous TID WC    insulin lispro  0-3 Units Subcutaneous Nightly    spironolactone  50 mg Oral BID     Vital Signs:   Vitals reviewed and compared with the previous ones  BP 97/78   Pulse 157   Temp 97.4 °F (36.3 °C) (Oral)   Resp 18   Ht 6' 2\" (1.88 m)   Wt 238 lb 12.8 oz (108.3 kg)   SpO2 94%   BMI 30.66 kg/m²      Intake/Output Summary (Last 24 hours) at 3/18/2021 1321  Last data filed at 3/18/2021 1223  Gross per 24 hour   Intake 721.76 ml   Output 1525 ml   Net -803.24 ml        General:   Age-appropriate, in no acute distress, slightly jaundiced  HEENT:  normocephalic and atraumatic. Scleral icterus bilaterally. PERRLA. Neck: supple. No thyromegaly. Lungs: Bibasilar crackles, no wheeziness. Cardiac: S1, S2, JVD, ANU appreciated. Abdomen: soft. Increased abdominal girth, dilated superficial vessels appreciated, bowel sounds positive. Extremities: +4 bilateral pedal edema, no cyanosis  Vasculature: capillary refill < 3 seconds. Unable to detect pedal pulses because of increased edema bilateral lower extremities. Skin:  warm and dry. Not clammy. Psych:  Alert to time, person and place. Communicable. Affect appropriate  Lymph:  No supraclavicular ,and no anterior cervical lymphadenopathy. Neurologic: Motor intact all extremities, decreased sensation at bilateral lower extremity, cranial nerves II to XII intact  Labs:   Recent Labs     03/16/21  0521 03/17/21 0518 03/18/21  0456   WBC 6.1 5.4 5.1   HGB 11.6* 11.4* 11.0*   HCT 34.8* 33.9* 32.6*   PLT 95* 82* 75*     Recent Labs     03/15/21  1418 03/16/21  0521 03/17/21 0518 03/18/21  0456   * 125* 128* 129*   K 4.1 3.7 3.5 3.4*   CL 91* 92* 93* 94*   CO2 18* 22* 22* 24   BUN 28* 29* 24* 21   CREATININE 2.0* 2.0* 1.5* 1.3*   CALCIUM 9.4 8.8 8.5 8.6   PHOS 2.9  --   --   --      Recent Labs     03/15/21  1418 03/16/21  0521 03/17/21 0518 03/18/21  0456   AST 59* 52* 56* 55*   ALT 30 26 27 26   BILIDIR 2.5*  --  1.9*  --    BILITOT 6.4* 5.0* 4.9* 5.2*   ALKPHOS 131* 133* 128* 124     Recent Labs     03/15/21  1330 03/17/21  0518 03/18/21  0456   INR 1.66* 1.86* 2.03*     No results for input(s): CKTOTAL, TROPONINI in the last 72 hours. Microbiology:    Blood culture #1:   Lab Results   Component Value Date    BC No growth-preliminary No growth  11/21/2020       Blood culture #2:No results found for: Alvaro Lockharte    Organism:  Lab Results   Component Value Date    ORG Staphylococcus epidermidis 11/22/2020         Lab Results   Component Value Date    LABGRAM  03/17/2021     Few segmented neutrophils observed. No bacteria seen. performed on cytospun specimen       MRSA culture only:No results found for: [facility]    Urine culture: No results found for: LABURIN    Respiratory culture: No results found for: CULTRESP    Aerobic and Anaerobic :  Lab Results   Component Value Date    LABAERO No growth-preliminary No growth  01/26/2021     Lab Results   Component Value Date    LABANAE No growth-preliminary   03/16/2021       Urinalysis:      Lab Results   Component Value Date    NITRU NEGATIVE 03/16/2021    WBCUA NONE SEEN 03/16/2021    BACTERIA NONE SEEN 03/16/2021    RBCUA 0-2 03/16/2021    BLOODU NEGATIVE 03/16/2021    SPECGRAV 1.013 03/16/2021       Radiology:  US LIVER   Final Result   1. There is mild borderline gallbladder wall thickening and a mildly dilated common bile duct. This is nonspecific. Hyperdense material within the dependent portion of the gallbladder may represent sludge and/or stones. However, there is no    pericholecystic fluid and the sonographic Campoverde Hammans sign is negative. 2. Findings are suggestive of portal hypertension as described above to include bidirectional flow within the main portal vein and hepatofugal flow within the right portal vein along with recanalized umbilical vein. 3. There is pleural fluid at the right lung base. This pleural fluid is contiguous with an additional pocket of pleural fluid located outside the right lateral chest wall. The communication appears to be located at the intercostal space inferolaterally    along the right chest wall. 4. The liver appears nodular with increased echogenicity and lobulated contour consistent with cirrhosis. **This report has been created using voice recognition software. It may contain minor errors which are inherent in voice recognition technology. **      Final report electronically signed by Dr. Yulisa Donovan on 3/17/2021 12:38 PM      US DUP ABD PEL RETRO [address]   Final Result   1. There is mild borderline gallbladder wall thickening and a mildly dilated common bile duct. This is nonspecific. Hyperdense material within the dependent portion of the gallbladder may represent sludge and/or stones.  However, there is no    pericholecystic fluid and the sonographic Vera Banker sign is negative. 2. Findings are suggestive of portal hypertension as described above to include bidirectional flow within the main portal vein and hepatofugal flow within the right portal vein along with recanalized umbilical vein. 3. There is pleural fluid at the right lung base. This pleural fluid is contiguous with an additional pocket of pleural fluid located outside the right lateral chest wall. The communication appears to be located at the intercostal space inferolaterally    along the right chest wall. 4. The liver appears nodular with increased echogenicity and lobulated contour consistent with cirrhosis. **This report has been created using voice recognition software. It may contain minor errors which are inherent in voice recognition technology. **      Final report electronically signed by Dr. Alistair Coley on 3/17/2021 12:38 PM      [id number]   Final Result    Successful ultrasound-guided paracentesis. **This report has been created using voice recognition software. It may contain minor errors which are inherent in voice recognition technology. **      Final report electronically signed by Dr. Carli Paul MD on 3/17/2021 10:58 AM      US THORACENTESIS Which side should the procedure be performed? Right   Final Result    Successful ultrasound guided thoracentesis. **This report has been created using voice recognition software. It may contain minor errors which are inherent in voice recognition technology. **      Final report electronically signed by Dr. Carli Paul MD on 3/16/2021 1:58 PM      US ABDOMEN LIMITED   Final Result   1. Limited ultrasound evaluation of the 4 quadrants of the abdomen demonstrates mild ascites present within all 4 quadrants. **This report has been created using voice recognition software.   It may contain minor errors which are inherent in voice recognition technology. **      Final report electronically signed by Dr. Bry Jc on 3/16/2021 1:39 PM      XR CHEST 1 VIEW   Final Result   Interval decrease in the right pleural effusion without evidence of pneumothorax. **This report has been created using voice recognition software. It may contain minor errors which are inherent in voice recognition technology. **      Final report electronically signed by Dr. Viola Cabezas MD on 3/16/2021 1:19 PM      XR CHEST (2 VW)   Final Result   1. There is a displaced posterior right sixth rib fracture again seen. 2. There is a moderate right-sided pleural effusion. Right basilar airspace disease is also noted which may represent adjacent passive atelectasis or pneumonia. **This report has been created using voice recognition software. It may contain minor errors which are inherent in voice recognition technology. **      Final report electronically signed by Dr. Bry Jc on 3/15/2021 2:17 PM        Xr Chest (2 Vw)    Result Date: 3/15/2021  PROCEDURE: XR CHEST (2 VW) CLINICAL INFORMATION: Shortness of Breath COMPARISON: 2/25/2021 TECHNIQUE:  PA and lateral chest x-ray  FINDINGS: The heart size appears within normal limits. There is a moderate right-sided pleural effusion. Right basilar airspace disease is also noted which may represent adjacent passive atelectasis or pneumonia. No pneumothorax is seen. There is a displaced posterior right sixth rib fracture again seen. 1. There is a displaced posterior right sixth rib fracture again seen. 2. There is a moderate right-sided pleural effusion. Right basilar airspace disease is also noted which may represent adjacent passive atelectasis or pneumonia. **This report has been created using voice recognition software. It may contain minor errors which are inherent in voice recognition technology. ** Final report electronically signed by Dr. Bry Jc on 3/15/2021 2:17 PM      Discussed plan with patient and nurse. Patient and nurse verbalized understanding and agree. All questions addressed with concerns. Please excuse my TYPOS!     Electronically signed by Marjan Vidal MD on 3/18/2021 at 1:21 PM

## 2021-03-18 NOTE — PROGRESS NOTES
Patient requesting to leave AMA, d/t employment issues. Dr. Carlton Pringle notified. AMA paperwork signed. Patient states he will drive himself home. IV removed.

## 2021-03-18 NOTE — CARE COORDINATION
3/18/21, 2:54 PM EDT    DISCHARGE ON GOING [employer] 12 day: 3  Location: -08/008-A Reason for admit: [facility] [K72.90, K74.60]   Procedure:   3/16 Right sided thoracentesis: 2.1L removed  3/17 Paracentesis: 1.1L removed  3/17 US Liver, abdomen, pelvis: 1. There is mild borderline gallbladder wall thickening and a mildly dilated common bile duct. This is nonspecific. Hyperdense material within the dependent portion of the gallbladder may represent sludge and/or stones. However, there is no    pericholecystic fluid and the sonographic Trude Gutierrez sign is negative. 2. Findings are suggestive of portal hypertension as described above to include bidirectional flow within the main portal vein and hepatofugal flow within the right portal vein along with recanalized umbilical vein. 3. There is pleural fluid at the right lung base. This pleural fluid is contiguous with an additional pocket of pleural fluid located outside the right lateral chest wall. The communication appears to be located at the intercostal space inferolaterally    along the right chest wall. 4. The liver appears nodular with increased echogenicity and lobulated contour consistent with cirrhosis. Barriers to Discharge: -150's cardio and ep consulted. Restarted on digoxin. PCP: Rafael Nieves MD  Readmission Risk Score: 30%  Patient Goals/Plan/Treatment Preferences: Plans home with [facility], will need order.

## 2021-03-18 NOTE — PROGRESS NOTES
Gastroenterology Progress Note:     Patient Name:  Nidia Stover   MRN: [id number]  [id number]  YOB: 1965  Admit Date: 3/15/2021  1:02 PM  Primary Care Physician: Abdirahman Bain MD   6K-08/008-A     Patient seen and examined. 24 hours events and chart reviewed. Subjective: Patient resting in bed. Denies abdominal pain, nausea, & vomiting. He states he is feeling much better. He is being discharged today. BLE edema has improved. He is being discharged today. Objective:  BP 90/68   Pulse 110   Temp 98 °F (36.7 °C) (Oral)   Resp 16   Ht 6' 2\" (1.88 m)   Wt 238 lb 12.8 oz (108.3 kg)   SpO2 96% Comment: no O2 needed pe protocol  BMI 30.66 kg/m²     Physical Exam:    General:  Chronically ill appearing male  HEENT: Atraumatic, normocephalic. Moist oral mucous membranes. Neck: Supple without adenopathy, JVD, thyromegaly or masses. Trachea midline. CV: Heart RRR, no murmurs, rubs, gallops. Resp: Even, easy without cough or accessory use. Lungs clear to ascultation bilaterally. Abd: Round, soft, nontender. No hepatosplenomegaly or mass present. Active bowel sounds heard. Mild distention noted. Ext:  Without cyanosis, clubbing. BLE edema. Skin: Jaundice, warm, dry  Neuro:  Alert, oriented x 3 with no obvious deficits.        Rectal: deferred    Labs:   CBC:   Lab Results   Component Value Date    WBC 5.1 03/18/2021    HGB 11.0 03/18/2021    HCT 32.6 03/18/2021    MCV 96.4 03/18/2021    PLT 75 03/18/2021     BMP:   Lab Results   Component Value Date     03/18/2021    K 3.4 03/18/2021    CL 94 03/18/2021    CO2 24 03/18/2021    PHOS 2.9 03/15/2021    BUN 21 03/18/2021    CREATININE 1.3 03/18/2021    CALCIUM 8.6 03/18/2021     PT/INR:   Lab Results   Component Value Date    INR 2.03 03/18/2021     Lipids:   Lab Results   Component Value Date    ALKPHOS 124 03/18/2021    ALT 26 03/18/2021    AST 55 03/18/2021    BILITOT 5.2 03/18/2021    BILIDIR 1.9 03/17/2021    LABALBU 3.1 03/18/2021 Significant Diagnostic Studies:   US liver with dopplers 03/17/21      Impression   1. There is mild borderline gallbladder wall thickening and a mildly dilated common bile duct. This is nonspecific. Hyperdense material within the dependent portion of the gallbladder may represent sludge and/or stones. However, there is no    pericholecystic fluid and the sonographic Bauer sign is negative.        2. Findings are suggestive of portal hypertension as described above to include bidirectional flow within the main portal vein and hepatofugal flow within the right portal vein along with recanalized umbilical vein.       3. There is pleural fluid at the right lung base. This pleural fluid is contiguous with an additional pocket of pleural fluid located outside the right lateral chest wall. The communication appears to be located at the intercostal space inferolaterally    along the right chest wall.       4. The liver appears nodular with increased echogenicity and lobulated contour consistent with cirrhosis. Current Meds:  Scheduled Meds:   digoxin  250 mcg Intravenous Daily    midodrine  15 mg Oral TID    gabapentin  300 mg Oral TID    dilTIAZem  30 mg Oral TID    lactulose  45 mL Oral TID    pantoprazole  40 mg Oral Daily    rifaximin  550 mg Oral BID    tiotropium  2 puff Inhalation Daily    sodium chloride flush  10 mL Intravenous 2 times per day    metoprolol  75 mg Oral BID    bumetanide  1 mg Intravenous BID    insulin lispro  0-6 Units Subcutaneous TID WC    insulin lispro  0-3 Units Subcutaneous Nightly    spironolactone  50 mg Oral BID     Continuous Infusions:   dextrose         Assessment:  47 yo M admitted 03/158/21 for increased SOB, BLE edema. H/O alcoholic cirrhosis & recurrent hydrothorax. Endorses jaundice. Not a candidate for TIPS procedure per GI. Says he was compliant with diuretics at home. CRX demonstrated right hydrothorax. Right thoracentesis with 2.1L removed 03/16/21. Paracentesis with 1.1L removed 03/17/21, negative for SBP. US liver with dopplers unremarkable. 1. Decompensated alcoholic cirrhosis with ascites- s/p paracentesis 03/17/21 negative for SBP, also follows at [facility]- MELD 23, MELDNa 27  2. Recurrent right hydrothorax- s/p thoracentesis 03/16/21  3. Hyponatremia  4. Hyperbilirubinemia   5. Chronic normocytic anemia  6. Pancytopenia secondary to #1  7. Coagulopathy secondary to #1  8. Afib with RVR  9.  Hypokalemia    Plan:     Monitor H & H, transfuse prn    Nursing to monitor stool output & document   Monitor hepatic labs & INR daily   Avoid hepatotoxic meds   Daily weights   Strict I & O   Continue Lactulose TID, home dose   Continue Xifaxan BID, home dose   Continue Bumex 1mg BID, script sent   Continue Aldactone 50mg BID, script sent   Continue fluid restriction, 2g sodium diet   Continue Midodrine TID   Continue PPI daily, home dose   Cardiology on board   Supportive care per primary team   Okay to discharge from GI standpoint    Will need a 4 week follow-up with Stephie ANGEL    Case reviewed and impression/plan reviewed in collaboration with Dr. Mami Phillips  Electronically signed by FELIX Cordero CNP on 3/18/2021 at 11:57 AM    [facility]

## 2021-03-18 NOTE — PROGRESS NOTES
Cardiology Progress Note      Patient:  Nidia Stover  YOB: 1965  MRN: [id number]   Acct: [de-identified]  Admit Date:  3/15/2021  Primary Cardiologist: Dr Sury Jones    Seen by Dr Ken Niño note:  [place]:  Evaluation of possible Watchman device for atrial fibrillation.     REQUESTING PROVIDER:  Hospitalist service.     HISTORY OF PRESENT ILLNESS:  This is a pleasant unfortunate 52-year-old gentleman who used to live in [place], who has a history of significant liver cirrhosis for which he was taken off of anticoagulation due to significant bleeding risk. The patient has had recurrent pleural  effusion and ascites, mostly pleural effusion requiring thoracentesis. He was admitted here with worsening symptoms and liver failure symptoms, for which we were consulted to assist in the management of his anticoagulation and discussion about possible Watchman device. He was supposed to see Dr. Sury Jones in the office today. The patient is not aware of any previous significant coronary artery disease. Subjective (Events in last 24 hours):   Pt awake, alert. Denies CP, SOB. States he feels better and basically back to his usual right now since getting the fluid off. [facility] he was seeing a cardiologist in [place] before moving back to [place] in November. [facility] he has had AFIB for a long time and his mother had the same medical conditions for years. UO-1525 ml + 4 unmeasured  Objective:   BP 90/68   Pulse 110   Temp 98 °F (36.7 °C) (Oral)   Resp 16   Ht 6' 2\" (1.88 m)   Wt 238 lb 12.8 oz (108.3 kg)   SpO2 96% Comment: no O2 needed pe protocol  BMI 30.66 kg/m²        TELEMETRY: AFIB, RVR---110s-150s.        General Appearance: alert and oriented to person, place and time, in no acute distress  Cardiovascular: high rate, irregular rhythm, normal S1 and S2, no murmur, no rubs, clicks, or gallops, distal pulses intact, no carotid bruits, no JVD  Pulmonary/Chest: no wheezes, rales or rhonchi, decreased air movement, no respiratory distress  Abdomen: soft, non-tender, slightly distended   Extremities: no cyanosis, clubbing, edema, pulse   Skin: warm and dry, no rash or erythema  Head: normocephalic and atraumatic  Eyes: pupils equal, round, and reactive to light  Neck: supple and non-tender without mass, no thyromegaly   Musculoskeletal: normal range of motion, no joint swelling, deformity or tenderness  Neurological: alert, oriented, normal speech, no focal findings or movement disorder noted    Medications:    magnesium sulfate  2,000 mg Intravenous Once    digoxin  250 mcg Intravenous Daily    midodrine  15 mg Oral TID    gabapentin  300 mg Oral TID    dilTIAZem  30 mg Oral TID    lactulose  45 mL Oral TID    pantoprazole  40 mg Oral Daily    rifaximin  550 mg Oral BID    tiotropium  2 puff Inhalation Daily    sodium chloride flush  10 mL Intravenous 2 times per day    metoprolol  75 mg Oral BID    bumetanide  1 mg Intravenous BID    insulin lispro  0-6 Units Subcutaneous TID WC    insulin lispro  0-3 Units Subcutaneous Nightly    spironolactone  50 mg Oral BID      dextrose       promethazine, 12.5 mg, Q6H PRN    Or  ondansetron, 4 mg, Q6H PRN  sodium chloride flush, 10 mL, PRN  glucose, 15 g, PRN  dextrose, 12.5 g, PRN  glucagon (rDNA), 1 mg, PRN  dextrose, 100 mL/hr, PRN        Diagnostics:  EKG:   Atrial fibrillation with rapid ventricular response  Low voltage QRS, consider pulmonary disease, pericardial effusion, or normal variant  Cannot rule out Anterior infarct (cited on or before 21-NOV-2020)  Abnormal ECG  When compared with ECG of 24-FEB-2021 14:52,  Atrial fibrillation has replaced Atrial flutter  Questionable change in initial forces of Anteroseptal leads  Nonspecific T wave abnormality has replaced inverted T waves in Anterior leads  QT has lengthened  Confirmed by [facility] MD, [id number] [facility] ([id number]) on 3/16/2021 4:55:53 AM    Echo:   Limited echo from [facility] 12/21/2020  - Limited study.  Rhythm appears to be afib. - Left ventricular chamber size is and systolic function are normal.  EF estimated at 60 - 65%.    - Right ventricular chamber size is enlarged.  Systolic function is normal.  - No shunt identified on agitated saline study. - Mild tricuspid valve regurgitation.  - RVSP is normal ~31 mmHg. Stress:     Left Heart Cath:    Lab Data:    Cardiac Enzymes:  No results for input(s): CKTOTAL, CKMB, CKMBINDEX, TROPONINI in the last 72 hours. CBC:   Lab Results   Component Value Date    WBC 5.1 03/18/2021    RBC 3.38 03/18/2021    HGB 11.0 03/18/2021    HCT 32.6 03/18/2021    PLT 75 03/18/2021       CMP:    Lab Results   Component Value Date     03/18/2021    K 3.4 03/18/2021    CL 94 03/18/2021    CO2 24 03/18/2021    BUN 21 03/18/2021    CREATININE 1.3 03/18/2021    LABGLOM 57 03/18/2021    GLUCOSE 107 03/18/2021    CALCIUM 8.6 03/18/2021       Hepatic Function Panel:    Lab Results   Component Value Date    ALKPHOS 124 03/18/2021    ALT 26 03/18/2021    AST 55 03/18/2021    PROT 5.9 03/18/2021    BILITOT 5.2 03/18/2021    BILIDIR 1.9 03/17/2021    LABALBU 3.1 03/18/2021       Magnesium:    Lab Results   Component Value Date    MG 1.5 03/18/2021       PT/INR:    Lab Results   Component Value Date    INR 2.03 03/18/2021       HgBA1c:    Lab Results   Component Value Date    LABA1C 4.6 11/21/2020       FLP:  No results found for: TRIG, HDL, LDLCALC, LDLDIRECT, LABVLDL    TSH:  No results found for: TSH      Assessment:  AFIB RVR-- asymptomatic, not on [facility] [place]   [id number]   HTN-currently borderline hypotensive--holding BB, cardizem  Cirrhosis-- GI following. Currently in progress to be place on  transplant list  CKD-3--improving  DM  COPD--on CPAP  MAX  Chronic anemia-IM following  GERD    Plan:  Continue Digoxin 250 mcg IV daily. Consider transition to PO tomorrow. EP Consult for AFIB-RVR, hypotension     Continue to hold BB, cardizem with hypotension.  May be

## 2021-03-18 NOTE — TELEPHONE ENCOUNTER
Patient was admitted to [facility] but left AMA today. States that the biggest reason that he left was that cardiology wanted to keep him admitted for several more days for observation because his heart rate and b/p weren't stable. If he stayed as instructed, he would lose his job. He would like to f/u with you sometime next week to discuss what to do at this point. He does not work on Tuesdays or Wednesdays. I wanted to check with you regarding scheduling as you may need an extended appt time based on the complexity of his case and current needs. Please let me know what day would work and how long you would like the appointment slot to be for.

## 2021-03-19 NOTE — CARE COORDINATION
was performed with patient, there is no AVS to review    Discussed follow-up appointments. If no appointment was previously scheduled, appointment scheduling offered: Yes. Is follow up appointment scheduled within 7 days of discharge? Pt stated waiting on call from PCP office    Plan for follow-up call in 5-7 days based on severity of symptoms and risk factors. Plan for next call: breathing okay? swelling? CTN provided contact information for future needs. Non-face-to-face services provided:  Scheduled appointment with PCP-offered PCP appt    Care Transitions 24 Hour Call    Do you have any ongoing symptoms?: No  Do you have a copy of your discharge instructions?: No  Have you scheduled your follow up appointment?: No  Were you discharged with any Home Care or Post Acute Services: No  Do you feel like you have everything you need to keep you well at home?: Yes  Care Transitions Interventions  No Identified Needs     Called pt for ETTA. Pt signed AMA, there is no AVS.  Pt stated he feels better after having the paracentesis & thoracentesis. Pt stated he is always SOB, he thinks he is at baseline. The leg swelling is gone. Pt reported he is still jaundiced, less then when he was admitted. Pt is to have a liver transplant sometime in the future. Pt has a call in to PCP for appt    Pt denied any needs or concerns. CTN will continue to follow.     Follow Up  Future Appointments   Date Time Provider Francie Lopez   3/29/2021 10:00 AM MD BRIANNE Gay [facility] Heart P - [place]   3/29/2021  2:00 PM Radha Rodgers APRN - CNP [facility] Medicine P - [place]   4/6/2021  2:45 PM Dillon Adhikari   4/7/2021  1:00 PM Bryant Ann RD, LD SRPX Physic P - [place]   4/22/2021  3:00 PM Kimi Douglass MD N SRPX Heart P - [place]   5/13/2021  9:15 AM aLrissa [place] [id number] [address] RN  Care Transition Nurse  [phone]

## 2021-03-19 NOTE — TELEPHONE ENCOUNTER
I would highly encourage patient to follow the advice of cardiology. If he is uncomfortable with the local team, can always set him up with someone at [facility] or he could ask for a transfer. NIDHI torres protect his job if that is the issue. I can see him his coming Tuesday at 11:15. Please block my 11:45 slot to accommodate. To ED over the weekend with any issues.  Thanks, TS

## 2021-03-19 NOTE — CARE COORDINATION
3/19/21, 1:54 PM EDT    Patient goals/plan/ treatment preferences discussed by  and . Patient goals/plan/ treatment preferences reviewed with patient/ family. Patient/ family verbalize understanding of discharge plan and are in agreement with goal/plan/treatment preferences. Understanding was demonstrated using the teach back method. AVS provided by RN at time of discharge, which includes all necessary medical information pertaining to the patients current course of illness, treatment, post-discharge goals of care, and treatment preferences. Pt left AMA prior to [facility] orders being completed. Understood risk of leaving, reported he would follow with his physicians.

## 2021-03-23 NOTE — PROGRESS NOTES
Chief Complaint   Patient presents with    Follow-Up from Hospital     Here today for [facility] hospital f/up, admitted 3/15/21 and left [facility] 3/18/21. Karen Phillips is a 48 y.o.male      Pt is here to follow up after hospitalization at [facility]. Paracentesis and thoracensis were completed during admission. Patient did leave AMA. Cardiology was wanting him to star for further evaluation of tachycardia and a-fib. Cardiology was wanting to give him digoxin and monitor for 3 days. He is not able to take blood thinners due to bleeding risks associated with his ascites. Pt states he did not have any more FMLA and would have lost his job if he stayed. Pt states his left leg is starting to swell some again. He is elevating legs and wearing compression stockings as well. He did not realize his bumex was increased to twice daily so he has only been taking it once daily. Pt is on gabapentin but he is almost out. States his feet \"hurt like hell\". He also has trouble sleeping when he is off the gabapentin due to itching. He has about enough for another 24 hours. Has an appt with Dr Jenny Lombardo tomorrow. Pt is trying to monitor his BS daily. States that he shakes so is having trouble with the [place]. He is wanting to try Dexcom. Patient is wanting Home health through [facility].      Patient Active Problem List   Diagnosis    Pleural effusion associated with hepatic disorder    Acute on chronic diastolic congestive heart failure (HCC)    Alcoholic cirrhosis of liver without ascites (HCC)    Essential hypertension    Paroxysmal atrial fibrillation (HCC)    Thrombocytopenia (HCC)    Type 2 diabetes mellitus without complication (HCC)    Acute kidney injury ([address]    Acute renal failure (ARF) (HCC)    Coagulopathy (HCC)    Diastolic heart failure (HCC)    Lower leg edema    Obesity, Class II, BMI 35-39.9    Hydrothorax    Advanced cirrhosis of liver (HCC)    Permanent atrial fibrillation ([address] Review of Systems   Constitutional: Positive for fatigue. Negative for chills, fever and unexpected weight change. HENT: Positive for congestion (with sores inside nose). Eyes: Negative. Respiratory: Positive for shortness of breath (with exertion). Negative for chest tightness. Cardiovascular: Positive for leg swelling (left). Negative for chest pain and palpitations. Gastrointestinal: Positive for diarrhea (lactulose). Negative for abdominal pain and blood in stool. Genitourinary: Negative for dysuria. Musculoskeletal: Negative for joint swelling. Feet pain   Skin: Negative for rash. Neurological: Positive for weakness (generalized). Negative for dizziness. Psychiatric/Behavioral: Negative. All other systems reviewed and are negative. OBJECTIVE     BP (!) 84/58 (Site: Left Upper Arm)   Pulse 64   Temp 97 °F (36.1 °C)   Resp 16   Wt 238 lb (108 kg)   SpO2 97%   BMI 30.56 kg/m²     Wt Readings from Last 3 Encounters:   03/23/21 238 lb (108 kg)   03/18/21 238 lb 12.8 oz (108.3 kg)   02/26/21 241 lb 8 oz (109.5 kg)       Physical Exam  Vitals signs and nursing note reviewed. Constitutional:       Appearance: He is well-developed. HENT:      Head: Normocephalic and atraumatic. Right Ear: External ear normal.      Left Ear: External ear normal.      Nose: Nose normal.   Eyes:      Conjunctiva/sclera: Conjunctivae normal.      Pupils: Pupils are equal, round, and reactive to light. Neck:      Musculoskeletal: Normal range of motion and neck supple. Cardiovascular:      Rate and Rhythm: Normal rate and regular rhythm. Pulmonary:      Effort: Pulmonary effort is normal.      Breath sounds: Normal breath sounds. Abdominal:      General: Bowel sounds are normal.      Palpations: Abdomen is soft. Musculoskeletal: Normal range of motion. Right lower leg: Edema (2+) present. Left lower leg: Edema (2+) present. Skin:     General: Skin is warm and dry. Coloration: Skin is jaundiced. Neurological:      Mental Status: He is alert and oriented to person, place, and time. Gait: Gait abnormal (in wheelchair today). Deep Tendon Reflexes: Reflexes are normal and symmetric. Psychiatric:         Behavior: Behavior normal.         Thought Content:  Thought content normal.         Judgment: Judgment normal.           Component      Latest Ref Rng & Units 3/18/2021 3/18/2021 3/18/2021          11:14 AM  6:37 AM  4:56 AM   WBC      4.8 - 10.8 thou/mm3   5.1   RBC      4.70 - 6.10 mill/mm3   3.38 (L)   Hemoglobin Quant      14.0 - 18.0 gm/dl   11.0 (L)   Hematocrit      42.0 - 52.0 %   32.6 (L)   MCV      80.0 - 94.0 fL   96.4 (H)   MCH      26.0 - 33.0 pg   32.5   MCHC      32.2 - 35.5 gm/dl   33.7   RDW-CV      11.5 - 14.5 %   18.2 (H)   RDW-SD      35.0 - 45.0 fL   63.7 (H)   Platelet Count      839 - 400 thou/mm3   75 (L)   MPV      9.4 - 12.4 fL   11.1   Seg Neutrophils      %   65.4   Lymphocytes      %   17.3   Monocytes      %   12.6   Eosinophils      %   2.5   Basophils      %   1.2   Immature Granulocytes      %   1.0   Platelet Estimate      Adequate   DECREASED   Segs Absolute      1 - 7 thou/mm3   3.3   Lymphocytes Absolute      1.0 - 4.8 thou/mm3   0.9 (L)   Monocytes Absolute      0.4 - 1.3 thou/mm3   0.6   Eosinophils Absolute      0.0 - 0.4 thou/mm3   0.1   Basophils Absolute      0.0 - 0.1 thou/mm3   0.1   Immature Grans (Abs)      0.00 - 0.07 thou/mm3   0.05   Nucleated Red Blood Cells      /100 wbc   0   Glucose      70 - 108 mg/dL   107   Creatinine      0.4 - 1.2 mg/dL   1.3 (H)   BUN      7 - 22 mg/dL   21   Sodium      135 - 145 meq/L   129 (L)   Potassium      3.5 - 5.2 meq/L   3.4 (L)   Chloride      98 - 111 meq/L   94 (L)   CO2      23 - 33 meq/L   24   Calcium      8.5 - 10.5 mg/dL   8.6   AST      5 - 40 U/L   55 (H)   Alk Phos      38 - 126 U/L   124   Total Protein      6.1 - 8.0 g/dL   5.9 (L)   Albumin      3.5 - 5.1 g/dL   3.1 (L)   Bilirubin      0.3 - 1.2 mg/dL   5.2 (H)   ALT      11 - 66 U/L   26   INR      0.85 - 1.13   2.03 (H)   Digoxin Lvl      0.5 - 2.0 ng/mL   1.0   Anion Gap      8.0 - 16.0 meq/L   11.0   Est, Glom Filt Rate      ml/min/1.73m2   57 (A)   Magnesium      1.6 - 2.4 mg/dL   1.5 (L)   SCAN OF BLOOD SMEAR         see below   POC Glucose      70 - 108 mg/dl 117 (H) 121 (H)      Narrative   PROCEDURE: US LIVER, US DUP ABD PEL RETRO SCROT COMPLETE       CLINICAL INFORMATION: Ascities, need Doppler on screen        COMPARISON: CT dated 2/25/2021       TECHNIQUE:  Transverse and longitudinal ultrasound images were obtained through the liver.         FINDINGS:        There is a cirrhotic appearing liver with a nodular contour and heterogeneous echotexture. There is ascites present in the perihepatic region. There is also a right-sided pleural effusion demonstrated.       The gallbladder wall thickness is borderline mildly prominent measuring 3.5 mm. The common bile duct is also mildly dilated measuring 8 mm. Dependent echogenic debris within the gallbladder is demonstrated which may be related to possible stones or    sludge. No pericholecystic fluid is seen.       The hepatic artery demonstrates normal directional flow with pulsatility.       The main portal vein demonstrates flow in both the hepatopedal and hepatofugal direction suggesting portal hypertension. There is also hepatofugal flow within the right portal vein. There is a recanalized umbilical vein which is also consistent with    portal hypertension.       There is a pocket of fluid demonstrated along the right lateral chest wall inferiorly measuring 6.6 x 4.1 x 3.9 cm. This is contiguous with the pleural space. This communication appears to be located through a right lateral intercostal space.       Liver - L= 10.8 cm   Gallbladder - 7.0 x 5.0 x 3.8 cm   Gallbladder Wall - 0.35 cm   Common Duct - 0.81 cm   Bauer's Sign: NEG           Impression   1.  There is mild borderline gallbladder wall thickening and a mildly dilated common bile duct. This is nonspecific. Hyperdense material within the dependent portion of the gallbladder may represent sludge and/or stones. However, there is no    pericholecystic fluid and the sonographic Bauer sign is negative.        2. Findings are suggestive of portal hypertension as described above to include bidirectional flow within the main portal vein and hepatofugal flow within the right portal vein along with recanalized umbilical vein.       3. There is pleural fluid at the right lung base. This pleural fluid is contiguous with an additional pocket of pleural fluid located outside the right lateral chest wall. The communication appears to be located at the intercostal space inferolaterally    along the right chest wall.       4. The liver appears nodular with increased echogenicity and lobulated contour consistent with cirrhosis.       **This report has been created using voice recognition software.  It may contain minor errors which are inherent in voice recognition technology. **       Final report electronically signed by Dr. Yulisa Donovan on 3/17/2021 12:38 PM           Immunization History   Administered Date(s) Administered    COVID-19, Bateman Peter, PF, 30mcg/0.3mL 03/19/2021    Influenza, Quadv, Recombinant, IM PF (Flublok 18 yrs and older) 12/15/2020    Pneumococcal Polysaccharide ([id number]) 12/15/2020         Health Maintenance   Topic Date Due    Hepatitis A vaccine (1 of 2 - Risk 2-dose series) Never done    Diabetic foot exam  Never done    Diabetic retinal exam  Never done    Lipid screen  Never done    HIV screen  Never done    Diabetic microalbuminuria test  Never done    Hepatitis B vaccine (1 of 3 - Risk 3-dose series) Never done    DTaP/Tdap/Td vaccine (1 - Tdap) Never done    Shingles Vaccine (1 of 2) Never done    Colon cancer screen colonoscopy  Never done    COVID-19 Vaccine (2 - Pfizer 2-dose series) 04/09/2021    A1C test (Diabetic or Prediabetic)  11/21/2021    Potassium monitoring  03/18/2022    Creatinine monitoring  03/18/2022    Flu vaccine  Completed    Pneumococcal 0-64 years Vaccine  Completed    Hepatitis C screen  Completed    Hib vaccine  Aged Out    Meningococcal (ACWY) vaccine  Aged Out       Future Appointments   Date Time Provider Francie Lopez   3/29/2021 10:00 AM Quintin Tejeda MD N [facility] Heart [facility] [place]   4/6/2021 10:30 AM BRAD Vieyra N ENT [facility] [place]   4/6/2021  2:45 PM Dillon Lozoya [address]   4/7/2021  1:00 PM Lisette Moon RD, LD SRPX Physic [facility] [place]   4/8/2021  9:20 AM Khadar Calhoun [place] [facility] [place]   4/22/2021  3:00 PM Zainab Francis MD N SRPX Heart [facility] [place]   5/13/2021  9:15 AM Kinnie Essex, MD [address] [facility]         ASSESSMENT       Diagnosis Orders   1. Alcoholic cirrhosis of liver with ascites [facility])  [address] [facility]   2. Essential hypertension  CBC Auto Differential    Comprehensive Metabolic Panel   3. Hyponatremia  CBC Auto Differential    Comprehensive Metabolic Panel   4. Lower leg edema  CBC Auto Differential   5. Type 2 diabetes mellitus without complication, without long-term current use of insulin (HCC)  CBC Auto Differential   6. Sinus congestion  Bessy Gates MD, Otolaryngology, [place]   7. Sore in Natalie Aguayo MD, Otolaryngology, [place]   8.  Paroxysmal atrial fibrillation [facility])  [address]          Orders Placed This Encounter   Procedures    CBC Auto Differential     Standing Status:   Future     Standing Expiration Date:   3/23/2022    Comprehensive Metabolic Panel     Standing Status:   Future     Standing Expiration Date:   3/23/2022   Bessy Gates MD, Otolaryngology, [facility] [place]     Referral Priority:   Routine     Referral Type:   Eval and Treat     Referral Reason:   Specialty Services Required

## 2021-03-23 NOTE — PATIENT INSTRUCTIONS
Follow up with all specialists as planned. Continue current medicines at this time  No refills needed at this time   CMP and CBC management per [facility] Dr Rosario Larson is his  from the transplant team - Will order follow up CBC and CMP at this time as levels were abnormal prior to leaving AMA  Check HGA1C, FLP, SPOT MA, TSH/ FREE, and PSA in 4 months   Follow up with Podiatry tomorrow as scheduled  Referral to ENT  Watch fluid intake - 1.5-2L restriction daily per specialists  Follow up in 4 months           Preventive Health Topics:  Pt declines HIV screening at this time- pt to check with [facility] re any previous results. (updated 3/23/2021)  Please check with [facility] re HEP A/ Hep B combined vaccine (Twinrix)   Encouraged TETANUS SHOT (TdaP/ ADACEL/ BOOSTRIX) per personal pharmacy.   Encouraged SHINGLES SHOT [facility])- check with your local pharmacy  COLONOSCOPY done 12/30/2020 per Dr. Jackie Kelly ([facility])- Diverticula and 2 polyps- to follow up again in 12/2025. (updated 3/23/2021)  Cathleen 20 to be done- will refer to Dr. Gabriel Alpers for evaluation. (updated 3/23/2021)

## 2021-03-23 NOTE — TELEPHONE ENCOUNTER
Pt called in and stated that he spoke with the pharmacy and they stated for him to get the full [facility] she would just have to call it in and if they have any questions they will follow up,

## 2021-03-24 NOTE — TELEPHONE ENCOUNTER
Med sent. TS    Controlled Substance Monitoring:    Acute and Chronic Pain Monitoring:   RX Monitoring 3/24/2021   Periodic Controlled Substance Monitoring No signs of potential drug abuse or diversion identified.

## 2021-03-24 NOTE — TELEPHONE ENCOUNTER
ECC received a call from:    Name of Caller: Rome Cui    Relationship to patient: Self    Organization name: Min contact number: [phone]    Reason for call: Pt calling regarding a couple of different things. He states he followed up with Dr. Alycia Katz and she was unable to prescribe him the gabapentin that he requested because she does not prescribe any medications. He was previously prescribed the medication from his PCP in [place] before he retired. He was told to let Rosales Mcmahon know so she can refill. Pt also states the [facility] [employer] requires a PA for the dexcom. Please suggest a doctor for pt's neuropathy.

## 2021-03-25 NOTE — TELEPHONE ENCOUNTER
ECC received a call from:    Name of Caller: Rl Correia    Relationship to patient:Patient    Organization name: N/A    Best contact number: [phone]    Reason for call: Pt called to report that he was able to get the [facility] with no problem.

## 2021-03-25 NOTE — CARE COORDINATION
[facility] Transitions Follow Up Call    3/25/2021    Patient: Rosie Morton  Patient : 1965   MRN: <[id number]>  Reason for Admission: Decompensation of cirrhosis of liver   Procedure:   Discharge Date: 3/18/21 RARS: Readmission Risk Score: 29         Spoke with: Called to speak with patient for update with transition of care. Left HIPPA compliant voice message with contact information [phone] for a call  Back with an update. Care Transitions Subsequent and Final Call    Subsequent and Final Calls  Care Transitions Interventions  Other Interventions:            Follow Up  Future Appointments   Date Time Provider Francie Lopez   3/29/2021 10:00 AM Saud Laureano MD N [facility] Heart [address]   2021 10:30 AM BRAD Little N ENT [address]   2021  2:45 PM [address]   2021  1:00 PM Yocasta Castillo RD, LD [facility] Physic [facility] - [address] [place] [address]   2021  9:20 AM MD BRIANNE Degroot   2021  3:00 PM Anshul Barry MD N [facility] Heart [facility] - [address] [facility]   2021  9:15 AM MD Srinivasan Ohara 5, LPN

## 2021-03-29 NOTE — ED NOTES
Bedside report provided by Imelda Hung to [facility], RN. Pt resting in bed with even and unlabored respriations. Pt states pain is a  3/10 at this time. Pt updated on plan of care. Pt has no further needs at this time. Pt call light in reach.         Kimberli Pedroza RN  03/29/21 1929

## 2021-03-29 NOTE — ED PROVIDER NOTES
[address] ENCOUNTER      PATIENT NAME: Hemalatha Abbasi  MRN: [id number]  : 1965  REIS: 3/29/2021  PROVIDER: Natalie Patterson MD      CHIEF COMPLAINT       Chief Complaint   Patient presents with   [address] [facility] [address] Fall       Nurses Notes reviewed and I agreeexcept as noted in the HPI. HISTORY OF PRESENT ILLNESS    Hemalatha Abbasi is a 48 y.o. male who presents to Emergency Department with fall. Fall twice today at home. First time he fell backward in the morning when he was sitting talking to [facility] transplant team, he then fell again this afternoon when he got up, wheelchair was not locked up, he fell backwards. No LOC. He feels tired today after he had a busy day working at home and he could not get up by himself after second fall, therefore he called EMS. History of alcoholic liver cirrhosis, end-stage liver disease, now being considered for liver transplant by [facility]  History of chronic lower extremity edema and fluid overload with third space fluid retention, requiring thoracentesis and paracentesis in the past.    This HPI was provided by the patient. REVIEW OF SYSTEMS     Review of Systems   Constitutional: Positive for activity change, appetite change and fatigue. Negative for chills, fever and unexpected weight change. HENT: Negative for congestion, ear discharge, ear pain, hearing loss, nosebleeds, rhinorrhea and sore throat. Eyes: Negative for photophobia, pain, discharge, redness and itching. Respiratory: Negative for cough, chest tightness, shortness of breath, wheezing and stridor. Cardiovascular: Positive for leg swelling. Negative for chest pain and palpitations. Gastrointestinal: Negative for abdominal distention, abdominal pain, constipation, diarrhea, nausea and vomiting. Endocrine: Negative for cold intolerance, heat intolerance, polydipsia and polyphagia. Genitourinary: Negative for dysuria, flank pain, frequency and hematuria. Musculoskeletal: Negative for arthralgias, back pain, gait problem, myalgias, neck pain and neck stiffness. Skin: Positive for color change. Negative for pallor, rash and wound. Allergic/Immunologic: Negative for environmental allergies and food allergies. Neurological: Positive for dizziness and weakness. Negative for tremors, syncope and headaches. Psychiatric/Behavioral: Negative for agitation, behavioral problems, confusion, self-injury, sleep disturbance and suicidal ideas. PAST MEDICAL HISTORY     Past Medical History:   Diagnosis Date    Advanced cirrhosis [facility] liver ([facility]     Alcoholic cirrhosis (HCC)     Atrial fibrillation (HCC)     CHF (congestive heart failure) (HCC)     Chronic kidney disease     COPD (chronic obstructive pulmonary disease) (HCC)     Diabetes mellitus ([facility]     Gallstones     GERD (gastroesophageal reflux disease)     Headache     Hydrothorax     [facility]        SURGICAL HISTORY       Past Surgical History:   Procedure Laterality Date    APPENDECTOMY      HERNIA REPAIR      LUNG SURGERY Right 11-24-20, 11-30-20    [facility]       CURRENT MEDICATIONS       Current Discharge Medication List      CONTINUE these medications which have NOT CHANGED    Details   gabapentin (NEURONTIN) 300 MG capsule Take 1 capsule by mouth 3 times daily for 30 days.   Qty: 90 capsule, Refills: 0      bumetanide (BUMEX) 1 MG tablet Take 1 tablet by mouth 2 times daily  Qty: 30 tablet, Refills: 2      spironolactone (ALDACTONE) 50 MG tablet Take 1 tablet by mouth 2 times daily  Qty: 30 tablet, Refills: 2      albuterol sulfate HFA (VENTOLIN HFA) 108 (90 Base) MCG/ACT inhaler Inhale 2 puffs into the lungs every 6 hours as needed for Wheezing  Qty: 1 Inhaler, Refills: 5      dilTIAZem (CARDIZEM) 30 MG tablet Take 1 tablet by mouth 3 times daily  Qty: 90 tablet, Refills: 0      !! midodrine (PROAMATINE) 10 MG tablet Take 1 tablet by mouth 3 times daily PLUS 1 tablet of 2.5mg for total of 12.5mg 3 times daily. Qty: 90 tablet, Refills: 0      metoprolol (LOPRESSOR) 100 MG tablet Take 100 mg by mouth 2 times daily      ASPIRIN 81 PO Take by mouth daily      metFORMIN (GLUCOPHAGE) 500 MG tablet Take 500 mg by mouth 2 times daily (with meals)      Continuous Blood Gluc  (DEXCOM G6 ) MENDY Use to test 4 times daily and as needed DX: E11.9  Qty: 1 Device, Refills: 1      Continuous Blood Gluc Transmit (DEXCOM G6 TRANSMITTER) MISC Use to test 4 times daily and as needed DX: E11.9  Qty: 9 each, Refills: 3      Continuous Blood Gluc Sensor (DEXCOM G6 SENSOR) MISC Use to test 4 times daily and as needed DX: E11.9  Qty: 9 each, Refills: 3      lactulose (CHRONULAC) 10 GM/15ML solution 45 ml by mouth TID      tiotropium (SPIRIVA) 18 MCG inhalation capsule Inhale 1 capsule into the lungs daily  Qty: 30 capsule, Refills: 5      !! midodrine (PROAMATINE) 2.5 MG tablet Take 1 tablet by mouth 3 times daily Along with 10mg dose for total 12.5mg 3 times daily. Qty: 90 tablet, Refills: 0      ondansetron (ZOFRAN) 4 MG tablet Take 4 mg by mouth every 8 hours as needed for Nausea or Vomiting      rifaximin (XIFAXAN) 550 MG tablet Take 1 tablet by mouth 2 times daily  Qty: 60 tablet, Refills: 1      pantoprazole (PROTONIX) 40 MG tablet Take 1 tablet by mouth daily  Qty: 30 tablet, Refills: 1      Lancets MISC 1 each by Does not apply route daily Use to test blood sugar 4 times daily and as needed DX:E11.9  Qty: 200 each, Refills: 3    Associated Diagnoses: Type 2 diabetes mellitus without complication, without long-term current use of insulin ([facility])      blood glucose monitor strips Test 4 times daily and as needed for irregular blood glucose. Dispense sufficient amount for indicated testing frequency plus additional to accommodate PRN testing needs. DX E11.9  Qty: 200 strip, Refills: 3    Comments: Brand per patient preference. May round up to next available package size.   Associated Diagnoses: Type 2 diabetes mellitus without complication, without long-term current use of insulin ([facility])      Blood Glucose Monitoring Suppl (ONE TOUCH ULTRA 2) w/Device KIT Use to test 4 times daily and as needed DX: E11.9  Qty: 1 kit, Refills: 0    Associated Diagnoses: Type 2 diabetes mellitus without complication, without long-term current use of insulin ([facility]       ! ! - Potential duplicate medications found. Please discuss with provider. ALLERGIES     Patient has no known allergies. FAMILY HISTORY     He indicated that his mother is . He indicated that his father is . family history includes Cancer in his father and mother; Diabetes in his mother; Heart Disease in his mother; High Blood Pressure in his mother. SOCIAL HISTORY      reports that he quit smoking about 4 months ago. His smoking use included cigarettes. He has a 29.00 pack-year smoking history. He has never used smokeless tobacco. He reports previous alcohol use. He reports that he does not use drugs. PHYSICAL EXAM     INITIAL VITALS:    height is 6' 2\" (1.88 m) and weight is 236 lb 3.2 oz (107.1 kg). His oral temperature is 96.5 °F (35.8 °C). His blood pressure is 83/52 (abnormal) and his pulse is 89. His respiration is 16 and oxygen saturation is 100%. Physical Exam  Vitals signs and nursing note reviewed. Constitutional:       Appearance: He is well-developed. He is not diaphoretic. HENT:      Head: Normocephalic and atraumatic. Nose: Nose normal.   Eyes:      General: Scleral icterus present. Right eye: No discharge. Left eye: No discharge. Conjunctiva/sclera: Conjunctivae normal.      Pupils: Pupils are equal, round, and reactive to light. Neck:      Musculoskeletal: Normal range of motion and neck supple. Vascular: No JVD. Trachea: No tracheal deviation. Cardiovascular:      Rate and Rhythm: Normal rate. Rhythm irregular. Heart sounds: Normal heart sounds. No murmur. No friction rub. No gallop. Pulmonary:      Effort: Pulmonary effort is normal. No respiratory distress. Breath sounds: No stridor. No wheezing or rales. Comments: No breath sounds to right lung  Chest:      Chest wall: No tenderness. Abdominal:      General: Bowel sounds are normal. There is no distension. Palpations: Abdomen is soft. There is no mass. Tenderness: There is no abdominal tenderness. There is no guarding or rebound. Hernia: No hernia is present. Musculoskeletal:         General: No tenderness or deformity. Right lower leg: Edema present. Left lower leg: Edema present. Lymphadenopathy:      Cervical: No cervical adenopathy. Skin:     General: Skin is warm and dry. Capillary Refill: Capillary refill takes less than 2 seconds. Coloration: Skin is jaundiced. Skin is not pale. Findings: No erythema or rash. Neurological:      Mental Status: He is alert and oriented to person, place, and time. Cranial Nerves: No cranial nerve deficit. Sensory: No sensory deficit. Motor: No abnormal muscle tone. Coordination: Coordination normal.      Deep Tendon Reflexes: Reflexes normal.   Psychiatric:         Behavior: Behavior normal.         Thought Content: Thought content normal.         Judgment: Judgment normal.         MEDICAL DEDISION MAKINGS:     7:48 PM: Patient is seen and evaluated in a timely fashion. ED nurse's documentations are reviewed. DIFFERENTIAL DIAGNOSIS:  Liver failure, KARAN, dehydration, electrolyte imbalance, AMI, pneumonia, sepsis, UTI, rule out internal injury after fall    EKG:    Interpreted by ED physician  Sinus rhythm, marked sinus arrhythmia  Ventricular rate 92 bpm  QRS duration 68 ms  Low voltage QRS  QTc 492 ms  No ST elevation or acute T wave    LAB RESULTS:  Results for orders placed or performed during the hospital encounter of 03/29/21   CBC Auto Differential   Result Value Ref Range WBC 7.0 4.8 - 10.8 thou/mm3    RBC 4.00 (L) 4.70 - 6.10 mill/mm3    Hemoglobin 13.0 (L) 14.0 - 18.0 gm/dl    Hematocrit 38.1 (L) 42.0 - 52.0 %    MCV 95.3 (H) 80.0 - 94.0 fL    MCH 32.5 26.0 - 33.0 pg    MCHC 34.1 32.2 - 35.5 gm/dl    RDW-CV 16.8 (H) 11.5 - 14.5 %    RDW-SD 59.0 (H) 35.0 - 45.0 fL    Platelets 432 (L) 624 - 400 thou/mm3    MPV 10.6 9.4 - 12.4 fL    Seg Neutrophils 71.4 %    Lymphocytes 12.1 %    Monocytes 12.7 %    Eosinophils 2.2 %    Basophils 0.9 %    Immature Granulocytes 0.7 %    Segs Absolute 5.0 1 - 7 thou/mm3    Lymphocytes Absolute 0.8 (L) 1.0 - 4.8 thou/mm3    Monocytes Absolute 0.9 0.4 - 1.3 thou/mm3    Eosinophils Absolute 0.2 0.0 - 0.4 thou/mm3    Basophils Absolute 0.1 0.0 - 0.1 thou/mm3    Immature Grans (Abs) 0.05 0.00 - 0.07 thou/mm3    nRBC 0 /100 wbc   SPECIMEN REJECTION   Result Value Ref Range    Rejected Test cmp     Reason for Rejection see below    Hepatic function panel   Result Value Ref Range    Albumin 3.2 (L) 3.5 - 5.1 g/dL    Total Bilirubin 5.8 (H) 0.3 - 1.2 mg/dL    Bilirubin, Direct 2.2 (H) 0.0 - 0.3 mg/dL    Alkaline Phosphatase 153 (H) 38 - 126 U/L    AST 68 (H) 5 - 40 U/L    ALT 33 11 - 66 U/L    Total Protein 6.4 6.1 - 8.0 g/dL   APTT   Result Value Ref Range    aPTT 39.7 (H) 22.0 - 38.0 seconds   Protime-INR   Result Value Ref Range    INR 1.70 (H) 0.85 - 1.13   Troponin   Result Value Ref Range    Troponin T 0.050 (A) ng/ml   Brain Natriuretic Peptide   Result Value Ref Range    Pro-BNP 1455.0 (H) 0.0 - 900.0 pg/mL   Ammonia   Result Value Ref Range    Ammonia 32 11 - 60 umol/L   Comprehensive Metabolic Panel   Result Value Ref Range    Glucose 106 70 - 108 mg/dL    CREATININE 3.4 (HH) 0.4 - 1.2 mg/dL    BUN 45 (H) 7 - 22 mg/dL    Sodium 122 (L) 135 - 145 meq/L    Potassium 5.0 3.5 - 5.2 meq/L    Chloride 84 (L) 98 - 111 meq/L    CO2 18 (L) 23 - 33 meq/L    Calcium 9.8 8.5 - 10.5 mg/dL   Anion Gap   Result Value Ref Range    Anion Gap 20.0 (H) 8.0 - 16.0 meq/L   Glomerular Filtration Rate, Estimated   Result Value Ref Range    Est, Glom Filt Rate 19 (A) ml/min/1.73m2   Osmolality   Result Value Ref Range    Osmolality Calc 257.9 (L) 275.0 - 300.0 mOsmol/kg   POCT Glucose   Result Value Ref Range    POC Glucose 125 (H) 70 - 108 mg/dl   EKG 12 Lead   Result Value Ref Range    Ventricular Rate 92 BPM    Atrial Rate 92 BPM    P-R Interval 96 ms    QRS Duration 68 ms    Q-T Interval 398 ms    QTc Calculation (Bazett) 492 ms    R Axis 12 degrees    T Axis 102 degrees       RADIOLOGY  XR CHEST 1 VIEW   Final Result   Stable prominent right pleural effusion. **This report has been created using voice recognition software. It may contain minor errors which are inherent in voice recognition technology. **      Final report electronically signed by Dr. Yadi Gooden MD on 3/29/2021 8:32 PM      CT HEAD WO CONTRAST   Final Result    No evidence of acute intracranial abnormality. **This report has been created using voice recognition software. It may contain minor errors which are inherent in voice recognition technology. **      Final report electronically signed by Dr. Yadi Gooden MD on 3/29/2021 8:27 PM      CT CERVICAL SPINE WO CONTRAST   Final Result       1. No evidence of acute osseous injury of the cervical spine. 2. Multilevel degenerative changes of the cervical spine most pronounced at C5-6.   3. Large right pleural effusion. **This report has been created using voice recognition software. It may contain minor errors which are inherent in voice recognition technology. **      Final report electronically signed by Dr. Yadi Gooden MD on 3/29/2021 8:30 PM          RATIONALE:    BP is borderline low likely due to fluid restriction, third space fluid accumulation, polypharmacy including Bumex, Aldactone, Cardizem, and metoprolol.   Patient is alert oriented, no tachycardia, given that patient has third space fluid accumulation, I am reluctant to give him IV fluid. Pertinent labs: Platelet 679, bilirubin 5.8, direct bilirubin 2.2, AST 68, ALT 33, INR 1.7, troponin 0 0.05, BNP 1455, ammonia 32, CO2 18, potassium 5.0, sodium 122, BUN/creatinine 45/3.4 with normal baseline creatinine. Patient develops worsening liver failure and renal insufficiency. Admission is warranted to rule out hepatorenal syndrome. Head CT has no evidence of acute intracranial injury. Chest x-ray shows stable prominent right pleural effusion    Case is discussed and the patient is admitted to hospitalist service. CRITICAL CARE:   None    CONSULTS:  Dr. Pepe Carter:  None    RE-EXAMINATION AND RE-EVALUATION   Stable and patient is feeling better. VITALS BEFORE DISPOSITION:   Vitals:    03/29/21 2240 03/29/21 2335 03/30/21 0035 03/30/21 0115   BP: (!) 85/57 87/64 87/61 (!) 83/52   Pulse: 90 89 100 89   Resp: 15 16 16 16   Temp:    96.5 °F (35.8 °C)   TempSrc:    Oral   SpO2: 94% 97% 100%    Weight:    236 lb 3.2 oz (107.1 kg)   Height:    6' 2\" (1.88 m)       FINAL IMPRESSION      1. End stage liver disease ([facility]    2. KARAN (acute kidney injury) ([facility]    3. Hypotension, unspecified hypotension type    4. Recurrent right pleural effusion          DISPOSITION/PLAN   Admit    PATIENT REFERRED TO:  No follow-up provider specified.     DISCHARGE MEDICATIONS:  Current Discharge Medication List          (Please note that portions of this note were completed with a voice recognition program.  Efforts were made to edit the dictations but occasionally words aremis-transcribed.)    MD Julia Hayes MD  03/30/21 0132

## 2021-03-30 PROBLEM — I95.1 SYMPATHOTONIC ORTHOSTATIC HYPOTENSION: Status: ACTIVE | Noted: 2021-01-01

## 2021-03-30 PROBLEM — M48.02 DEGENERATIVE CERVICAL SPINAL STENOSIS: Status: ACTIVE | Noted: 2021-01-01

## 2021-03-30 PROBLEM — K70.31 ALCOHOLIC CIRRHOSIS OF LIVER WITH ASCITES (HCC): Status: ACTIVE | Noted: 2020-04-03

## 2021-03-30 PROBLEM — N18.30 ACUTE RENAL FAILURE WITH ACUTE TUBULAR NECROSIS SUPERIMPOSED ON STAGE 3 CHRONIC KIDNEY DISEASE (HCC): Status: ACTIVE | Noted: 2020-01-01
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
## 2021-03-30 PROBLEM — N17.0 ACUTE RENAL FAILURE WITH ACUTE TUBULAR NECROSIS SUPERIMPOSED ON STAGE 3 CHRONIC KIDNEY DISEASE (HCC): Status: ACTIVE | Noted: 2020-01-01

## 2021-03-30 PROBLEM — E87.1 HYPO-OSMOLALITY AND HYPONATREMIA: Status: ACTIVE | Noted: 2021-01-01

## 2021-03-30 PROBLEM — K80.20 ASYMPTOMATIC CHOLELITHIASIS: Status: ACTIVE | Noted: 2021-01-01

## 2021-03-30 PROBLEM — I50.32 CHRONIC DIASTOLIC CONGESTIVE HEART FAILURE (HCC): Status: ACTIVE | Noted: 2020-01-01

## 2021-03-30 PROBLEM — R42 ORTHOSTATIC DIZZINESS: Status: ACTIVE | Noted: 2021-01-01

## 2021-03-30 PROBLEM — R53.1 WEAKNESS GENERALIZED: Status: ACTIVE | Noted: 2021-01-01

## 2021-03-30 NOTE — CARE COORDINATION
3/30/21, 7:44 AM EDT  DISCHARGE PLANNING EVALUATION:    Mary Weathers       Admitted: 3/29/2021/ [employer] [facility] 79 day: 0   Location: 8A-20/020-A Reason for admit: Sympathotonic orthostatic hypotension [I95.1]   PMH:  has a past medical history of Advanced cirrhosis of liver ([facility] Alcoholic cirrhosis ([facility] Atrial fibrillation ([facility] CHF (congestive heart failure) ([facility] Chronic kidney disease, COPD (chronic obstructive pulmonary disease) ([facility] Diabetes mellitus ([facility] Gallstones, GERD (gastroesophageal reflux disease), Headache, Hydrothorax, and Portal hypertension ([facility] Procedure: No  Barriers to Discharge: To ER following a fall. Hx liver disease. Hx of thoracentesis and paracentesis. Being considered for transplant at [facility]. Pro BNP 1455.0. Creat 3.4. Hypotension. Albumin given. GI and Nephrology consulted. PT/OT ordered. IVF at 75/hr. PCP: Jr Parker MD  Readmission Risk Score: 30%    Patient Goals/Plan/Treatment Preferences: Met with pt this am. He is from home alone with current services from [facility]. He has a rollator and a C-Pap. He has a PCP, transportation and no issues getting medications. CM will follow for needs. Transportation/Food Security/Housekeeping Addressed:  No issues identified.

## 2021-03-30 NOTE — ED NOTES
Pt. Resting in bed with even and unlabored respirations. Pt. Rates his pain a 3/10. Pt. Updated about plan of care and treatment. Family at bedside. Pt. Has no further concerns, questions or needs at this time. Call light within reach.         Kaleb Becerra RN  03/29/21 4436

## 2021-03-30 NOTE — CONSULTS
[facility]    [address], [address]  [facility]  3/30/2021 9:46 AM    Pt Name:    Gracie Yousif  MRN:     [id number]   [id number]  YOB: 1965  Admit Date:    3/29/2021  7:00 PM  Primary Care Physician:  Danilo Staton MD    CSN Number:   [id number]    Reason for Consult: KARAN  Requesting provider:  Dr. Sally Currie    History:   The patient is a 48 y. o. white male with history of chronic alcohol abuse, Hx cirrhosis w/ ascites, DM, HTN, gastroesophageal reflux disease, previous NSAID use, atrial fibrillation, chronic hypotension,  Hx hepatic hydrothorax, chronic hypomagnesemia, CKD with baseline creatinine of 1.5 who presented to the emergency room after having couple of falls at home. Patient reports feeling weak and dizzy at times. Reports generalized weakness. Reports decreased appetite. No specific alleviating or aggravating factors. In the emergency room his blood pressure was 83/52, sodium 122, potassium 5.0, creatinine 3.4, BUN 45. Chest x-ray showed stable prominent right pleural effusion. CT head was negative for any acute intracranial abnormality. CT of the cervical spine was also negative for any acute changes. Patient was admitted by the medical services. Blood pressure was in systolic 57A overnight. Nephrology has been consulted for management of acute kidney injury and hyponatremia. Currently patient is sitting at his bedside chair. He is attempting to eat breakfast.  He is awake and alert. No acute distress noted. Denies any diarrhea. Denies any chest pain. No nausea or vomiting reported. No fever or chills reported.   Patient is on Aldactone, Bumex, midodrine, Cardizem, lactulose at home amongst other medications    Past Medical History:  Past Medical History:   Diagnosis Date    [facility] cirrhosis [facility]     Alcoholic cirrhosis (HCC)     Atrial fibrillation (HCC)     [facility]congest[facility] failure) ([facility]     Chronic kidney disease     COPD (chronic obstructive pulmonary disease) (HCC)     Diabetes mellitus (HCC)     Gallstones     GERD (gastroesophageal reflux disease)     Headache     Hydrothorax     Portal hypertension (HCC)        Past Surgical History:  Past Surgical History:   Procedure Laterality Date    APPENDECTOMY      HERNIA REPAIR      [facility], [facility]       Family History:  Family History   Problem Relation Age of Onset    Heart Disease Mother     High Blood Pressure Mother     Diabetes Mother     Cancer Mother     Cancer Father        Social History:  Social History     Socioeconomic History    Marital status: Single     Spouse name: Not on file    Number of children: Not on file    Years of education: Not on file    Highest education level: Not on file   Occupational History    Not on file   Social Needs    Financial resource strain: Not on file    Food insecurity     Worry: Not on file     Inability: Not on file    Transportation needs     Medical: Not on file     Non-medical: Not on file   Tobacco Use    Smoking status: Former Smoker     Packs/day: 1.00     Years: 29.00     Pack years: 29.00     Types: Cigarettes     Quit date: 2020     Years since quittin.3    Smokeless tobacco: Never Used   Substance and Sexual Activity    Alcohol use: Not Currently     Frequency: 4 or more times a week     Comment: No alcohol since 21    Drug use: Never    Sexual activity: Not on file   Lifestyle    Physical activity     Days per week: Not on file     Minutes per session: Not on file    Stress: Not on file   Relationships    Social connections     Talks on phone: Not on file     Gets together: Not on file     Attends Hoahaoism service: Not on file     Active member of club or organization: Not on file     Attends meetings of clubs or organizations: Not on file     Relationship status: Not on file    Intimate partner violence     Fear of current or ex partner: Not on file     Emotionally abused: Not on file     Physically abused: Not on file     Forced sexual activity: Not on file   Other Topics Concern    Not on file   Social History Narrative    Not on file       Home Meds:  Prior to Admission medications    Medication Sig Start Date End Date Taking? Authorizing Provider   gabapentin (NEURONTIN) 300 MG capsule Take 1 capsule by mouth 3 times daily for 30 days. 3/24/21 4/23/21 Yes FELIX Palacio CNP   bumetanide (BUMEX) 1 MG tablet Take 1 tablet by mouth 2 times daily 3/18/21  Yes FELIX Perry CNP   spironolactone (ALDACTONE) 50 MG tablet Take 1 tablet by mouth 2 times daily 3/18/21  Yes FELIX Perry CNP   albuterol sulfate HFA (VENTOLIN HFA) 108 (90 Base) MCG/ACT inhaler Inhale 2 puffs into the lungs every 6 hours as needed for Wheezing 3/5/21  Yes Samm Marcano MD   dilTIAZem (CARDIZEM) 30 MG tablet Take 1 tablet by mouth 3 times daily 2/27/21  Yes Jayna Oleary DO   midodrine (PROAMATINE) 10 MG tablet Take 1 tablet by mouth 3 times daily PLUS 1 tablet of 2.5mg for total of 12.5mg 3 times daily.  2/27/21  Yes Jayna Oleary DO   metoprolol (LOPRESSOR) 100 MG tablet Take 100 mg by mouth 2 times daily   Yes Historical Provider, MD   ASPIRIN 81 PO Take by mouth daily   Yes Historical Provider, MD   metFORMIN (GLUCOPHAGE) 500 MG tablet Take 500 mg by mouth 2 times daily (with meals)   Yes Historical Provider, MD   Continuous Blood Gluc  (DEXCOM G6 ) MENDY Use to test 4 times daily and as needed DX: E11.9 3/24/21   FELIX Palacio CNP   Continuous Blood Gluc Transmit (DEXCOM G6 TRANSMITTER) MISC Use to test 4 times daily and as needed DX: E11.9 3/24/21   FELIX Palacio CNP   Continuous Blood Gluc Sensor (DEXCOM G6 SENSOR) MISC Use to test 4 times daily and as needed DX: E11.9 3/24/21   FELIX Palacio CNP   lactulose (CHRONULAC) 10 GM/15ML solution 45 ml by mouth TID 3/18/21   Historical Provider, MD   tiotropium (SPIRIVA) 18 MCG inhalation capsule Inhale 1 capsule into the lungs daily 3/5/21   Monie Miller MD   midodrine (PROAMATINE) 2.5 MG tablet Take 1 tablet by mouth 3 times daily Along with 10mg dose for total 12.5mg 3 times daily. 2/27/21   Cory Ribeiro DO   ondansetron (ZOFRAN) 4 MG tablet Take 4 mg by mouth every 8 hours as needed for Nausea or Vomiting    Historical Provider, MD   rifaximin (XIFAXAN) 550 MG tablet Take 1 tablet by mouth 2 times daily 1/29/21   Julio Cesar Hill MD   pantoprazole (PROTONIX) 40 MG tablet Take 1 tablet by mouth daily 1/29/21   Julio Cesar Hill MD   Lancets MISC 1 each by Does not apply route daily Use to test blood sugar 4 times daily and as needed DX:E11.9 12/4/20   FELIX Butt CNP   blood glucose monitor strips Test 4 times daily and as needed for irregular blood glucose. Dispense sufficient amount for indicated testing frequency plus additional to accommodate PRN testing needs. DX E11.9 12/4/20   FELIX Butt CNP   Blood Glucose Monitoring Suppl (ONE TOUCH ULTRA 2) w/Device KIT Use to test 4 times daily and as needed DX: E11.9 12/4/20   FELIX Butt CNP       Review of Systems:  Constitutional: Positive for generalized weakness, fatigue, dizziness, falls, decreased appetite  Head: Negative for headaches  Eyes: Negative for blurry vision or discharge  Ears: Negative for ear pain or hearing changes  Nose: Negative for runny nose or epistaxis  Respiratory: Negative for shortness of breath. Negative for cough or sputum production. Negative for hemoptysis  Cardiovascular: Negative for chest pain  GI: Positive for some nausea. No vomiting. Negative for hematochezia and melena  : Negative for discharge, dysuria, or hematuria  Skin: Negative for rash  Neuro: Positive for dizziness.   Psychiatric: Reports stable mood, negative for depression or insomnia    All other review of systems were reviewed and negative    Current Meds:  Infusion:    sodium chloride      sodium chloride 75 mL/hr at 03/30/21 9730     Meds:    aspirin  81 mg Oral Daily    dilTIAZem  30 mg Oral TID    gabapentin  300 mg Oral TID    midodrine  10 mg Oral TID    pantoprazole  40 mg Oral Daily    rifaximin  550 mg Oral BID    [Held by provider] spironolactone  50 mg Oral BID    tiotropium  2 puff Inhalation Daily    sodium chloride flush  10 mL Intravenous 2 times per day    insulin lispro  0-12 Units Subcutaneous TID WC    insulin lispro  0-6 Units Subcutaneous Nightly    lactulose  30 g Oral TID    [Held by provider] bumetanide  1 mg Oral BID     Meds prn: ipratropium-albuterol, sodium chloride flush, sodium chloride, promethazine **OR** ondansetron, polyethylene glycol     Allergies/Intolerances: ALLERGIES: Patient has no known allergies. 24HR INTAKE/OUTPUT:  No intake or output data in the 24 hours ending 03/30/21 0946  No intake/output data recorded. No intake/output data recorded. Admission weight: 229 lb (103.9 kg)  Wt Readings from Last 3 Encounters:   03/30/21 236 lb 3.2 oz (107.1 kg)   03/23/21 238 lb (108 kg)   03/18/21 238 lb 12.8 oz (108.3 kg)     Body mass index is 30.33 kg/m².     Physical Examination:  VITALS:   Vitals:    03/30/21 0115 03/30/21 0239 03/30/21 0745 03/30/21 0845   BP: (!) 83/52 (!) 81/56  82/64   Pulse: 89   109   Resp: 16   22   Temp: 96.5 °F (35.8 °C)   97.7 °F (36.5 °C)   TempSrc: Oral   Oral   SpO2:   100% 94%   Weight: 236 lb 3.2 oz (107.1 kg)      Height: 6' 2\" (1.88 m)        Weight:   Wt Readings from Last 3 Encounters:   03/30/21 236 lb 3.2 oz (107.1 kg)   03/23/21 238 lb (108 kg)   03/18/21 238 lb 12.8 oz (108.3 kg)     Constitutional and General Appearance: alert and cooperative with exam, appears comfortable, no distress, not diaphoretic  Eyes: no icteric sclera in left eye or right eye, mild pallor conjunctiva both eyes  Ears and Nose: normal external appearance of left and right ear. Normal external appearance of nose. No active drainage from nose. Oral: moist oral mucus membranes  Neck: No jugular venous distention, appears symmetric, good ROM  Lungs: Air entry B/L, no crackles or rales, no use of accessory muscles or labored breathing  Heart: regular rate, S1, S2  Extremities: 1-2+ lower extremity edema to knees,  but no presacral or thigh edema. No upper extremity edema  GI: soft, non-tender, no guarding, no distention  Skin:  warm to touch  Musculo: moves all extremities   Neuro: no slurred speech, no facial drooping  Psychiatric: Normal mood and affect, Not agitated    Lab Data  CBC:   Recent Labs     03/29/21 1910 03/30/21  0900   WBC 7.0 5.2   HGB 13.0* 11.1*   HCT 38.1* 32.6*   *  --      BMP:  Recent Labs     03/29/21 1945 03/30/21  0900   * 123*   K 5.0 4.3   CL 84* 86*   CO2 18* 20*   BUN 45* 49*   CREATININE 3.4* 3.6*   GLUCOSE 106 87   CALCIUM 9.8 10.0   MG  --  2.0     PTH: @PTH@  TSH:   Recent Labs     03/29/21 1945   TSH 2.050     HgBa1c:   Recent Labs     03/29/21 1910   LABA1C <4.2*     Hepatic:   Recent Labs     03/29/21 1945   LABALBU 3.2*   AST 68*   ALT 33   BILITOT 5.8*   ALKPHOS 153*     Additional Labs: UA negative for microhematuria, proteinuria  Diagnostics: Chest x-ray stable right pleural effusion    Echo: EF 60 to 65%  Labs: Baseline creatinine 1.5-1.6      Impression and Plan:  1. KARAN on CKD 3B. Serum creatinine baseline is 1.5-1.6, now up to 3.6. Patient was hypotensive yesterday with blood pressure dropping to systolic 34H to 92E. Cannot rule out underlying ATN vs pre-renal failure. Check urine sodium, urine chloride. We will also obtain kidney ultrasound to ensure no obstruction although doubt. Trial of isotonic saline. Hold diuretics. 2. Hyponatremia secondary to intravascular volume depletion. Initiate trial of IV normal saline. Hold Bumex and Aldactone. 3. History of alcoholic cirrhosis  4.  Hypotension, chronic. Continue with midodrine, increase dose to 15 mg 3 times daily  5. Hx A. Fib  6. Right pleural effusion  7. Thrombocytopenia    Thank you for the consult. Please feel free to call me if you have any questions.    Discussed with nursing staff and patient  We will follow    Tello Pinon MD  [facility]

## 2021-03-30 NOTE — ED NOTES
Pt appears comfortable, resting with eyes open.  Pt is alert and oriented, respirations are equal and unlabored, pt denies further needs at this time     [facility]  03/29/21 7965           Elizabeth Way RN  03/30/21 0003

## 2021-03-30 NOTE — ED NOTES
Pt appears to be comfortable, resting with eyes open. Pt is alert and oriented, respirations are equal and unlabored, pt denies pain at this time. Pt denies further needs at this time.       [facility]  03/30/21 0037           Reji Munoz RN  03/30/21 0107

## 2021-03-30 NOTE — CONSULTS
Consult History & Physical      Patient:  Bela Schuster  YOB: 1965  MRN: [id number]     Acct: [de-identified]    Chief Complaint:    Chief Complaint   Patient presents with   Kristine Stoddard Fall       Date of Service: Pt seen/examined in consultation on 3/30/2021    History Of Present Illness:      48 y.o. male who we are asked to see/evaluate by Ayana Caban MD for medical management of cirrhosis. He came to the ED yesterday for falling at home twice. He states the second time he fell, he hit his head. CT head negative. CT cervical spine negative. CXR demonstrated stable right pleural effusion. He says he only had some oatmeal yesterday and spent the majority of the day working. He endorses tinnitus prior to falling which he says he always gets right before \"his legs give out on him. \" He has a history of hypotension and is on Midodrine. There is a concern that his hypotension caused him to fall. He denies increased SOB, lightheadedness, and dizziness. He denies abdominal pain, nausea, vomiting, diarrhea, constipation, melena, and hematochezia. He says he did not take his Lactulose yesterday because he was too weak to walk. He was recently admitted 03/15/21-03/18/21 for SOB, BLE edema secondary to recurrent hepatic hydrothorax. He is not a candidate for TIPS procedure per [facility]. He has a history of alcoholic cirrhosis. His last thoracentesis was 03/16/21 with 2.1L removed. His last paracentesis was 03/17/21 with 1.1L removed. He follows both locally and at [address] [place] [address]. Last EGD 11/30/20 demonstrated non-bleeding grade I esophageal varices & portal hypertensive gastropathy. Last colonoscopy 12/30/20 demonstrated benign polyps & prolapse type change in the sigmoid colon.     Past Medical History:    Past Medical History:   Diagnosis Date    Advanced cirrhosis of liver (HCC)     Alcoholic cirrhosis (HCC)     Atrial fibrillation (HCC)     CHF (congestive heart failure) (HCC)     Chronic kidney disease     [facility] disease) [facility] [address] [facility]     Diabetes mellitus ([facility] [facility]     Gallstones     GERD (gastroesophageal reflux disease)     Headache     Hydrothorax     Portal hypertension (HCC)        Home Medications:  Prior to Admission medications    Medication Sig Start Date End Date Taking? Authorizing Provider   gabapentin (NEURONTIN) 300 MG capsule Take 1 capsule by mouth 3 times daily for 30 days. 3/24/21 4/23/21 Yes FELIX Tobar CNP   bumetanide (BUMEX) 1 MG tablet Take 1 tablet by mouth 2 times daily 3/18/21  Yes FELIX Solis CNP   spironolactone (ALDACTONE) 50 MG tablet Take 1 tablet by mouth 2 times daily 3/18/21  Yes FELIX Solis CNP   albuterol sulfate HFA (VENTOLIN HFA) 108 (90 Base) MCG/ACT inhaler Inhale 2 puffs into the lungs every 6 hours as needed for Wheezing 3/5/21  Yes Sylvia Anderson MD   dilTIAZem (CARDIZEM) 30 MG tablet Take 1 tablet by mouth 3 times daily 2/27/21  Yes Homa Leblanc DO   midodrine (PROAMATINE) 10 MG tablet Take 1 tablet by mouth 3 times daily PLUS 1 tablet of 2.5mg for total of 12.5mg 3 times daily.  2/27/21  Yes Homa Leblanc DO   metoprolol (LOPRESSOR) 100 MG tablet Take 100 mg by mouth 2 times daily   Yes Historical Provider, MD   ASPIRIN 81 PO Take by mouth daily   Yes Historical Provider, MD   metFORMIN (GLUCOPHAGE) 500 MG tablet Take 500 mg by mouth 2 times daily (with meals)   Yes Historical Provider, MD   Continuous Blood Gluc  (DEXCOM G6 ) MENDY Use to test 4 times daily and as needed DX: E11.9 3/24/21   FELIX Tobar CNP   Continuous Blood Gluc Transmit (DEXCOM G6 TRANSMITTER) MISC Use to test 4 times daily and as needed DX: E11.9 3/24/21   FELIX Tobar CNP   Continuous Blood Gluc Sensor (DEXCOM G6 SENSOR) MISC Use to test 4 times daily and as needed DX: E11.9 3/24/21   FELIX Tobar CNP   lactulose (CHRONULAC) 10 GM/15ML solution 45 ml by mouth TID 3/18/21   Historical Provider, MD   tiotropium (SPIRIVA) 18 MCG inhalation capsule Inhale 1 capsule into the lungs daily 3/5/21   Margot Lozano MD   midodrine (PROAMATINE) 2.5 MG tablet Take 1 tablet by mouth 3 times daily Along with 10mg dose for total 12.5mg 3 times daily. 2/27/21   Ruby Byrnes DO   ondansetron (ZOFRAN) 4 MG tablet Take 4 mg by mouth every 8 hours as needed for Nausea or Vomiting    Historical Provider, MD   rifaximin (XIFAXAN) 550 MG tablet Take 1 tablet by mouth 2 times daily 1/29/21   Nick Carney MD   pantoprazole (PROTONIX) 40 MG tablet Take 1 tablet by mouth daily 1/29/21   Nick Carney MD   Lancets MISC 1 each by Does not apply route daily Use to test blood sugar 4 times daily and as needed DX:E11.9 12/4/20   FELIX Alacntar CNP   blood glucose monitor strips Test 4 times daily and as needed for irregular blood glucose. Dispense sufficient amount for indicated testing frequency plus additional to accommodate PRN testing needs. DX E11.9 12/4/20   FELIX Alcantar CNP   Blood Glucose Monitoring Suppl (ONE TOUCH ULTRA 2) w/Device KIT Use to test 4 times daily and as needed DX: E11.9 12/4/20   FELIX Alcantar CNP       Surgical History:  Past Surgical History:   Procedure Laterality Date    APPENDECTOMY      HERNIA REPAIR      LUNG SURGERY Right 11-24-20, [facility]30-[facility]    [facility]       Family History:  Family History   Problem Relation Age of Onset    Heart Disease Mother     High Blood Pressure Mother     Diabetes Mother     Cancer Mother     Cancer Father        Past GI History:  Alcoholic cirrhosis, esophageal varices, hepatic hydrothorax with recurrent right pleural effusion, colon polyps, GERD, hepatic encephalopathy, EGD, colonoscopy  Dr. Jesús Rosales patient  Follows at [address] [place] [address]:  Patient has no known allergies. Social History:   TOBACCO:   reports that he quit smoking about 4 months ago. His smoking use included cigarettes.  He has a 29.00 pack-year smoking history. He has never used smokeless tobacco.  ETOH:   reports previous alcohol use. Review Of Systems  GENERAL: +fall  EYES:  No  blurred vision, double vision   CARDIOVASCULAR: No chest pain or palpitations. RESPIRATORY:  +dyspnea   GI:  See HPI  MUSCULOSKELETAL: No new painful or swollen joints or myalgias. :   No dysuria or hematuria. SKIN:  No rashes or jaundice. NEUROLOGIC:  No headaches or seizures, numbness or tingling of arms, or legs. PSYCH:  No anxiety or depression. ENDOCRINE:  No polyuria or polydipsia. BLOOD:  +anemia    PHYSICAL EXAM:  BP 82/64   Pulse 109   Temp 97.7 °F (36.5 °C) (Oral)   Resp 22   Ht 6' 2\" (1.88 m)   Wt 236 lb 3.2 oz (107.1 kg)   SpO2 94%   BMI 30.33 kg/m²     General appearance: Chronically ill appearing male  HEENT: Normal cephalic, atraumatic without obvious deformity. Pupils equal, round, and reactive to light. Scleral icterus. Neck: Supple, with full range of motion. No jugular venous distention. Trachea midline. Respiratory:  Normal respiratory effort. Clear, diminished to auscultation, bilaterally without Rales/Wheezes/Rhonchi. Cardiovascular: Regular rate and rhythm without murmurs, rubs or gallops. Abdomen: Soft, non-tender, mildly distended with active bowel sounds. Musculoskeletal: No clubbing, cyanosis or edema bilaterally. Skin: Pink, warm, dry. No rashes or lesions.   Psychiatric: Alert and oriented, thought content appropriate, normal insight    Labs:   Recent Labs     03/29/21  1910 03/30/21  0900   WBC 7.0 5.2   HGB 13.0* 11.1*   HCT 38.1* 32.6*   *  --      Recent Labs     03/30/21  0900   *   K 4.3   CL 86*   CO2 20*   BUN 49*   CREATININE 3.6*   CALCIUM 10.0   PHOS 3.4     Recent Labs     03/29/21  1945   AST 68*   ALT 33   BILIDIR 2.2*   BILITOT 5.8*   ALKPHOS 153*     Recent Labs     03/30/21  0900   INR 1.70*       Radiology:   CT head WO contrast 03/29/21  Impression    No evidence of acute intracranial abnormality. CT cervical spine WO contrast 03/29/21      Impression       1. No evidence of acute osseous injury of the cervical spine. 2. Multilevel degenerative changes of the cervical spine most pronounced at C5-6.   3. Large right pleural effusion. CXR 03/29/21      Impression   Stable prominent right pleural effusion. Code Status: Full Code    ASSESSMENT:  1. Fall x 2  2. Acute on chronic hypotension- on Midodrine  3. Recurrent right hepatic hydrothorax- stable  4. Alcoholic cirrhosis  5. KARAN on CKD- possible hepatorenal syndrome vs ARF  6. Afib- no OAC due to cirrhosis  7. Thrombocytopenia  8. Hypoantremia  9. Chronic anemia- no active GI bleeding  10. Coagulopathy    PLAN:    Monitor H & H, transfuse prn  Nursing to monitor stool output  Continue PPI daily, home dose  Continue Midodrine  Continue Xifaxan BID, home dose  Increase Lactulose to 30g TID, home dose  Diuretics on hold, resume when okay with nephro  Diet as tolerated, 2 g sodium  Daily weights  Strict I & O  Monitor hepatic labs & INR daily  Avoid narcotics & benzodiazepines as they can precipitate HE  Electrolyte management per nephrology  Supportive care per primary team  Will follow       Case reviewed and impression/plan reviewed in collaboration with Dr. Derrick Jordan  Electronically signed by FELIX Smyth - CNP on 3/30/2021 at 9:43 AM    [facility]  Thank you for the consultation.

## 2021-03-30 NOTE — ED NOTES
Pt unable to tolerate standing blood pressure during orthostatic vitals. Pt states he gets lightheaded and says it feels like his legs are not going to hold him up.      Collin Smith, EMT-P  03/29/21 2390

## 2021-03-30 NOTE — ED NOTES
ED to inpatient nurses report    Chief Complaint   Patient presents with    Fall      Present to ED from home  LOC: alert and orientated to name, place, date  Vital signs   Vitals:    03/29/21 2138 03/29/21 2240 03/29/21 2335 03/30/21 0035   BP: (!) 83/52 (!) 85/57 87/64 87/61   Pulse: 95 90 89 100   Resp: 25 15 16 16   Temp:       SpO2: 98% 94% 97% 100%   Weight:       Height:          Oxygen Baseline Room air     Current needs required 1 L NC Bipap/Cpap No  LDAs:   Peripheral IV 03/29/21 Left Forearm (Active)   Site Assessment Clean;Dry; Intact 03/29/21 2335   Line Status Normal saline locked 03/29/21 2335   Dressing Status Clean; Intact;Dry 03/29/21 2335   Dressing Intervention New 03/29/21 1914     Mobility: Requires assistance * 1  Pending ED orders: none  Present condition: Pt resting in bed with even and unlabored respirations. Pt headache has subsided with tylenol. Pt supposed to have dialysis tomorrow. Pt is stable.    Person of contract, phone number   Our promise was given to patient    Electronically signed by Sky Gamboa RN on 3/30/2021 at 1:07 AM       Sky Gamboa RN  03/30/21 4939

## 2021-03-30 NOTE — CARE COORDINATION
DISCHARGE/PLANNING EVALUATION  3/30/21, 11:14 AM EDT    Reason for Referral: \"current with [facility]\"  Mental Status: Patient is alert and oreitned  Decision Making: Patient makes own decisions  Family/Social/Home Environment: Spoke with patient, assessment completed. Patient lives alone in a one floor home with one step into garage. He has a tub/shower with a chair and is independent with with ADL's. He has groceries delivered. He either video conference Doctor appointments or his cousin or brother will drive. He will not be driving for awhile. He works from home. He is looking for a cleaning person for cleaning and laundry. He has supportive family in the area. Current Services including food security, transportation and housekeeping: [facility] was just starting to get [facility] set up. See above  Current Equipment: walker  Payment Source: [employer]  Concerns or Barriers to Discharge: none  Post acute provider list with quality measures, geographic area and applicable managed care information provided. Questions regarding selection process answered: current with [facility]    Teach Back Method used with patient regarding care plan and discharge planning. Patient  verbalize understanding of the plan of care and contribute to goal setting. Patient goals, treatment preferences and discharge plan: Patient plans home alone with new/resume [facility]. Left message with [facility] to confirm services or make referral.    Electronically signed by LIANE Galindo on 3/30/2021 at 11:14 AM     1:24 PM  Anamaria from [facility] called, they have orders for nursing, vitals and education. If he needs additional services, will need orders.

## 2021-03-30 NOTE — ED NOTES
Pt. Resting in bed with even and unlabored respirations. Pt. States his pain a 3/10 a this time. Pt repositioned in bed. Pt provided food box. Pt. Updated about plan of care and treatment. Family at bedside. Pt. Has no further concerns, questions or needs at this time. Call light within reach.         Bethany Ennis RN  03/29/21 6093

## 2021-03-30 NOTE — H&P
Patient: Anna Marie Tuttle    Unit/Bed: 92/879I    YOB: 1965    MRN: [id number]    Acct: [de-identified]     Admitting Diagnosis: Sympathotonic orthostatic hypotension [I95.1]    Admit Date:  3/29/2021    CC: Dizziness weakness and fall at home x1 day    HPI :  52-year-old male patient with alcoholic liver cirrhosispresented to the ED today because of weakness and fall at home x2. Patient fell and hit the back of his head. Denies any history of loss of consciousness. Patient following up with [facility] for liver transplant evaluation. He is not yet on the liver transplant list.    Low blood pressure on admission likely causing orthostatic dizziness and hypotension has home falls. Patient had therapeutic right thoracentesis 2.1 L removed on 03/16/2021 and therapeutic paracentesis 1 03/17/2021 with 1.1 L drained. This could explain the increased serum creatinine. Vital signs on admission temperature 36.5 °C, respirate of 18, heart rate 97, blood pressure 76/50, oxygen saturation 97% on room air. Initial labs sodium 122, potassium 5.0, BUN 45, creatinine 3.4, blood sugar 106, proBNP 1455, troponin first set 0.050, albumin 3.2, alkaline phosphatase 153, ALT 33, AST 68, total bilirubin 5.8 and direct bilirubin 2.2, TSH 2.050 and A1c less than 4.2. WBC 7.0, hemoglobin 13.0, MCV 95.3, platelets 029. A 12-lead EKG which reviewed shows sinus rhythm at 92 beats minute. QTC of 492 ms. No ST segment elevation or depression. Chest x-ray which I reviewed shows right pleural effusion. Review of Systems   Constitutional: Positive for activity change and fatigue. Negative for appetite change, chills, diaphoresis, fever and unexpected weight change.    HENT: Negative for congestion, dental problem, drooling, ear discharge, ear pain, facial swelling, hearing loss, mouth sores, nosebleeds, postnasal drip, rhinorrhea, sinus pressure, sinus pain, sneezing, sore throat, tinnitus, trouble swallowing and voice change. Eyes: Negative for photophobia, pain, discharge, redness, itching and visual disturbance. Respiratory: Negative for apnea, cough, choking, chest tightness, shortness of breath, wheezing and stridor. Cardiovascular: Positive for leg swelling. Negative for chest pain and palpitations. Gastrointestinal: Negative for abdominal distention, abdominal pain, anal bleeding, blood in stool, constipation, diarrhea, nausea, rectal pain and vomiting. Endocrine: Negative for cold intolerance, heat intolerance, polydipsia, polyphagia and polyuria. Genitourinary: Negative for decreased urine volume, difficulty urinating, discharge, dysuria, enuresis, flank pain, frequency, genital sores, hematuria, penile pain, penile swelling, scrotal swelling, testicular pain and urgency. Musculoskeletal: Negative for arthralgias, back pain, gait problem, joint swelling, myalgias, neck pain and neck stiffness. Skin: Negative for color change, pallor, rash and wound. Neurological: Positive for dizziness and weakness. Negative for tremors, seizures, syncope, facial asymmetry, speech difficulty, light-headedness, numbness and headaches. Hematological: Negative for adenopathy. Does not bruise/bleed easily. Psychiatric/Behavioral: Negative for agitation, behavioral problems, confusion, decreased concentration, dysphoric mood, hallucinations, self-injury, sleep disturbance and suicidal ideas. The patient is not nervous/anxious and is not hyperactive.         Past Medical History:        Diagnosis Date    Advanced cirrhosis of liver (HCC)     Alcoholic cirrhosis (HCC)     Atrial fibrillation (HCC)     CHF (congestive heart failure) (HCC)     Chronic kidney disease     COPD (chronic obstructive pulmonary disease) (HCC)     Diabetes mellitus (HCC)     Gallstones     GERD (gastroesophageal reflux disease)     Headache     Hydrothorax     Portal hypertension (HCC)        Past Surgical History: Procedure Laterality Date    APPENDECTOMY      HERNIA REPAIR      LUNG SURGERY Right 11-24-20, 11-30-20    [facility]       Medications Prior to Admission:    Prior to Admission medications    Medication Sig Start Date End Date Taking? Authorizing Provider   gabapentin (NEURONTIN) 300 MG capsule Take 1 capsule by mouth 3 times daily for 30 days. 3/24/21 4/23/21  Zeny TinyFELIX wilcox - CNP   Continuous Blood Gluc  (DEXCOM G6 ) MENDY Use to test 4 times daily and as needed DX: E11.9 3/24/21   Zeny TinyFELIX - CNP   Continuous Blood Gluc Transmit (DEXCOM G6 TRANSMITTER) MISC Use to test 4 times daily and as needed DX: E11.9 3/24/21   Zeny TinyFELIX - CNP   Continuous Blood Gluc Sensor (DEXCOM G6 SENSOR) MISC Use to test 4 times daily and as needed DX: E11.9 3/24/21   FELIX William CNP   lactulose (CHRONULAC) 10 GM/15ML solution 45 ml by mouth TID 3/18/21   Historical Provider, MD   bumetanide (BUMEX) 1 MG tablet Take 1 tablet by mouth 2 times daily 3/18/21   FELIX Ferraro CNP   spironolactone (ALDACTONE) 50 MG tablet Take 1 tablet by mouth 2 times daily 3/18/21   FELIX Ferraro CNP   albuterol sulfate HFA (VENTOLIN HFA) 108 (90 Base) MCG/ACT inhaler Inhale 2 puffs into the lungs every 6 hours as needed for Wheezing 3/5/21   Gela Mark MD   tiotropium [facility]) 18 MCG inhalation capsule Inhale 1 capsule into the lungs daily 3/5/21   Gela Mark MD   dilTIAZem (CARDIZEM) 30 MG tablet Take 1 tablet by mouth 3 times daily 2/27/21   Philomena Banda DO   midodrine (PROAMATINE) 10 MG tablet Take 1 tablet by mouth 3 times daily PLUS 1 tablet of 2.5mg for total of 12.5mg 3 times daily. 2/27/21   Philomena Banda DO   midodrine (PROAMATINE) 2.5 MG tablet Take 1 tablet by mouth 3 times daily Along with 10mg dose for total 12.5mg 3 times daily.  2/27/21   Philomena Banda DO   ondansetron (ZOFRAN) 4 MG tablet Take 4 mg by mouth every 8 hours as needed for Nausea or Vomiting    Historical Provider, MD   rifaximin (XIFAXAN) 550 MG tablet Take 1 tablet by mouth 2 times daily 1/29/21   Sonya Leblanc MD   pantoprazole (PROTONIX) 40 MG tablet Take 1 tablet by mouth daily 1/29/21   Sonya Leblanc MD   metoprolol (LOPRESSOR) 100 MG tablet Take 100 mg by mouth 2 times daily    Historical Provider, MD   Lancets MISC 1 each by Does not apply route daily Use to test blood sugar 4 times daily and as needed DX:E11.9 12/4/20   FELIX Gray CNP   blood glucose monitor strips Test 4 times daily and as needed for irregular blood glucose. Dispense sufficient amount for indicated testing frequency plus additional to accommodate PRN testing needs. DX E11.9 12/4/20   FELIX Gray CNP   Blood Glucose Monitoring Suppl (ONE TOUCH ULTRA 2) w/Device KIT Use to test 4 times daily and as needed DX: E11.9 12/4/20   FELIX Gray CNP   ASPIRIN 81 PO Take by mouth daily    Historical Provider, MD   metFORMIN (GLUCOPHAGE) 500 MG tablet Take 500 mg by mouth 2 times daily (with meals)    Historical Provider, MD       Allergies:    Patient has no known allergies. Social History:    TOBACCO:   reports that he quit smoking about 4 months ago. His smoking use included cigarettes. He has a 29.00 pack-year smoking history. He has never used smokeless tobacco.    ETOH:   reports previous alcohol use. Family History:        Problem Relation Age of Onset    Heart Disease Mother     High Blood Pressure Mother     Diabetes Mother     Cancer Mother     Cancer Father        Objective:   BP 87/64   Pulse 89   Temp 97.7 °F (36.5 °C)   Resp 16   Ht 6' 2\" (1.88 m)   Wt 229 lb (103.9 kg)   SpO2 97%   BMI 29.40 kg/m²   No intake or output data in the 24 hours ending 03/30/21 0018    Physical Exam  Vitals signs and nursing note reviewed. Constitutional:       General: He is not in acute distress. Appearance: Normal appearance. He is normal weight.  He is not is alert and oriented to person, place, and time. Mental status is at baseline. Cranial Nerves: No cranial nerve deficit. Sensory: No sensory deficit. Motor: No weakness. Coordination: Coordination normal.      Gait: Gait normal.      Deep Tendon Reflexes: Reflexes normal.   Psychiatric:         Mood and Affect: Mood normal.         Behavior: Behavior normal.         Thought Content: Thought content normal.         Judgment: Judgment normal.     :    Medications:       Continuous Infusions:    PRN Meds:    Data:    CBC:   Recent Labs     03/29/21 1910   WBC 7.0   RBC 4.00*   HGB 13.0*   HCT 38.1*   MCV 95.3*   *       BMP:   Recent Labs     03/29/21 1945   *   K 5.0   CL 84*   CO2 18*   BUN 45*   CREATININE 3.4*       PT/INR:   Recent Labs     03/29/21 1954   INR 1.70*       LIVER PROFILE:   Recent Labs     03/29/21 1945   AST 68*   ALT 33   BILIDIR 2.2*   BILITOT 5.8*   ALKPHOS 153*      Results for Tauna Hailee (MRN [id number]) as of 3/30/2021 07:59   Ref. Range 3/29/2021 19:45   TSH Latest Ref Range: 0.400 - 4.200 uIU/mL 2.050       Results for Tauna Hailee (MRN [id number]) as of 3/30/2021 07:59   Ref. Range 3/29/2021 19:10   Hemoglobin A1C Latest Ref Range: 4.4 - 6.4 % <4.2 (L)     Results for Tauna Hailee (MRN [id number]) as of 3/30/2021 07:59   Ref. Range 3/29/2021 19:54   Ammonia Latest Ref Range: 11 - 60 umol/L 32     Results for Tauna Hailee (MRN [id number]) as of 3/29/2021 23:13   Ref. Range 3/17/2021 05:18 3/18/2021 04:56 3/29/2021 19:45   Creatinine Latest Ref Range: 0.4 - 1.2 mg/dL 1.5 (H) 1.3 (H) 3.4 (HH)     ABG. None. URINALYSIS. None. SEROLOGY  Results for Tauna Hailee (MRN [id number]) as of 3/30/2021 07:59   Ref. Range 11/23/2020 04:32   Hepatitis C Ab Unknown Negative       TUMOR MARKERS. Results for Wilder Hailee (MRN [id number]) as of 3/30/2021 07:59   Ref.  Range 11/23/2020 04:32 3/17/2021 05:18   AFP-Tumor Marker Latest Ref Range: <8.4 ug/L 3.4 4.3     MICROBIOLOGY   None. HISTOPATHOLOGY. None. TOXICOLOGY. None. ENDOSCOPE STUDIES. None. PROCEDURES. Successful ultrasound-guided paracentesis-1.1 L drained03/17/2021. Successful ultrasound guided right thoracentesis-2.1 L drained-03/16/2021.     RADIOLOGY. CT head without contrast03/29/2021. FINDINGS:  The ventricles, cisterns and sulci are symmetric and normal in size and configuration. Gray-white matter differentiation appears grossly preserved. No intracranial hemorrhage, mass effect or midline shift is identified. The calvarium appears intact. Orbits are unremarkable. Visualized paranasal sinuses are clear. Mastoid air cells are clear.     IMPRESSION:  No evidence of acute intracranial abnormality. CT C-spine without contrast03/29/2021. FINDINGS:  There is minimal, grade 1, retrolisthesis of C5 relative C6 on the basis of degenerative change. There is mild straightening of the cervical lordosis which is nonspecific and may be positional.     There is otherwise anatomic vertebral body height and alignment. No fracture of the cervical vertebral column is identified. The craniocervical junction appears intact. No paraspinal or epidural fluid collection is identified. There are multilevel degenerative changes of the cervical spine most pronounced at C5-6 where there is partial uncovering the disc with superimposed shallow disc bulge causing mild to moderate spinal canal stenosis and mild left and moderate right neural foraminal stenosis in association with uncovertebral joint or change. There is a large right pleural effusion which is partially imaged.     IMPRESSION:  1. No evidence of acute osseous injury of the cervical spine. 2. Multilevel degenerative changes of the cervical spine most pronounced at C5-6.    3. Large right pleural effusion. CT abdomen pelvis without contrast02/25/2021.   IMPRESSION:  No hydronephrosis,

## 2021-03-30 NOTE — PROGRESS NOTES
Hospitalist Progress Note    Patient:  Debbie Stockton      Unit/Bed:8A-20/020-A    YOB: 1965    MRN: [id number]       Acct: [de-identified]     PCP: Frances Bunn MD    Date of Admission: 3/29/2021    Active Hospital Problems    Diagnosis Date Noted    Permanent atrial fibrillation [facility]) [I48.21]      Priority: High    Sympathotonic orthostatic hypotension [I95.1] 03/30/2021    Orthostatic dizziness [R42] 03/30/2021    Fall [W19. XXXA] 03/30/2021    Degenerative cervical spinal stenosis [M48.02] 03/30/2021    Hypo-osmolality and hyponatremia [E87.1] 03/30/2021    Asymptomatic cholelithiasis [K80.20] 03/30/2021    Weakness generalized [R53.1] 03/30/2021    Type 2 diabetes mellitus without complication ([address] [R48.2] 12/04/2020    Chronic diastolic congestive heart failure ([address] [I50.32] 12/04/2020    Class 1 obesity without serious comorbidity with body mass index (BMI) of 30.0 to 30.9 in adult [E66.9, Z68.30] 11/23/2020    Thrombocytopenia ([address] [D69.6] 10/05/2020    Coagulopathy ([address] [D68.9] 10/05/2020    Acute renal failure with acute tubular necrosis superimposed on stage 3 chronic kidney disease ([address] [N17.0, N18.30] 50/29/4849    Alcoholic cirrhosis of liver with ascites ([address] [K70.31] 04/03/2020       Assessment/Plan:    1. Weakness with Fall and Hypotension: BP has been low (76/50), pt has been having poor fluid intake while taking Bumex and Aldactone. Will hold diuretics, start trial IVF. Increase Midodrine to 15 TID. PT/OT. 2. 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 Currently pt not sob or having any abdominal distension or pain. Hold diuretics. Follows up with GI and is on transplant list. Will consult GI today. 3. KARAN on CKD Stage 3: Serum creatinine baseline is 1.5-1.6, now up to 3.6.  Likely pre-renal due to hypotension and diuretics vs HRS. Pending urine lytes and renal U/S. Hold diuretics. Trial of NS IVF. Cont to trend daily Cr. Avoid nephrotoxic agents. Strict I/Os. Daily weights. Nephrology following. 4. Hyponatremia: likely 2/2 to intravascular volume depletion. Initiate trial of IV normal saline. Hold Bumex and Aldactone. Cont to trend Na levels daily     5. Acute on Chronic Hypotension: Midodrine increased to 15 mg TID. 6. Paroxysmal Atrial Fibrillation: uncontrolled HR, difficult to control given low BP. No OAC given liver cirrhosis and coagulapathy    7. Thrombocytopenia: PLT 80K. 2/2 to liver cirrhosis. Avoid blood thinners. Chief Complaint: weakness, falls     Hospital Course: 48 y. o. white male with history of chronic alcohol abuse, Hx cirrhosis w/ ascites, DM, HTN, gastroesophageal reflux disease, previous NSAID use, atrial fibrillation, chronic hypotension, Hx hepatic hydrothorax, chronic hypomagnesemia, CKD with baseline creatinine of 1.5 who presented to the emergency room after having couple of falls at home. Patient reports feeling weak and dizzy at times. Reports generalized weakness. Reports decreased appetite. Chest x-ray showed stable prominent right pleural effusion. CT head was negative for any acute intracranial abnormality. CT of the cervical spine was also negative for any acute changes    Subjective: no acute events overnight. Pt sitting up in chair, denies any current complaints other then feeling very weak and fatigued. Denies any fevers, chills, chest pain, sob, abdominal pain, fevers or chills. No black or red colored stools. Tolerating PO intake.        Medications:  Reviewed    Infusion Medications    sodium chloride      sodium chloride 75 mL/hr at 03/30/21 7349    dextrose       Scheduled Medications    aspirin  81 mg Oral Daily    dilTIAZem  30 mg Oral TID    gabapentin  300 mg Oral TID    pantoprazole  40 mg Oral Daily    rifaximin  550 mg Oral BID    [Held by 03/30/21  0900   *   K 4.3   CL 86*   CO2 20*   BUN 49*   CREATININE 3.6*   CALCIUM 10.0   PHOS 3.4     Recent Labs     03/29/21  1945   AST 68*   ALT 33   BILIDIR 2.2*   BILITOT 5.8*   ALKPHOS 153*     Recent Labs     03/30/21  0900   INR 1.70*     No results for input(s): Rejeamarva Cook in the last 72 hours. Urinalysis:      Lab Results   Component Value Date    NITRU NEGATIVE 03/30/2021    WBCUA NONE SEEN 03/16/2021    BACTERIA NONE SEEN 03/16/2021    RBCUA 0-2 03/16/2021    BLOODU NEGATIVE 03/30/2021    SPECGRAV 1.015 03/30/2021       Radiology: All imaging reviewed     Diet: DIET GENERAL; Low Sodium (2 GM);  Daily Fluid Restriction: 1200 ml      Code Status: Full Code            Electronically signed by Edgar Feldman MD on 3/30/2021 at 1:35 PM

## 2021-03-30 NOTE — ED NOTES
Pt. Off the floor with tech in stable condition for CT scan.         Kaleb Becerra RN  03/29/21 2005

## 2021-03-30 NOTE — PLAN OF CARE
Problem: Falls - Risk of:  Goal: Will remain free from falls  Description: Will remain free from falls  Outcome: Ongoing  Note: Call light within reach, bed in lowest position, non skid footwear on, room door open, bed alarm on.  pt using call light appropriately. Problem: Pain:  Goal: Pain level will decrease  Description: Pain level will decrease  Outcome: Ongoing  Note: Patient rates pain 0/10 with a goal of 0/10. Pt states repositioning helps with pain control. Problem: Impaired respiratory status  Goal: Clear lung sounds  3/30/2021 0752 by Oren Cruz RCP  Outcome: Ongoing  Note: Spiriva Daily to improve breath sounds. Problem: Physical Regulation:  Goal: Prevent transmision of infection  Description: Prevent transmision of infection  Outcome: Ongoing  Note: Patient in isolation for VRE. Pt aware importance of hand hygiene and wearing gowns. Problem: Skin Integrity:  Goal: Risk for impaired skin integrity will decrease  Description: Risk for impaired skin integrity will decrease  Outcome: Ongoing  Note: Patient turns himself and aware importance of turning every 2 hours and prn to prevent skin breakdown. Problem: DISCHARGE BARRIERS  Goal: Patient's continuum of care needs are met  3/30/2021 1422 by Jessee Wood RN  Outcome: Ongoing  Note: Pt plans to return home.  following for discharge needs. 3/30/2021 1231 by LIANE Salgado  Outcome: Ongoing     Problem: Cardiovascular  Goal: Hemodynamic stability  Outcome: Ongoing  Note: Pt BP low, dizzy when up. Checking orthostatic blood pressures. Care plan reviewed with patient. Patient verbalizes understanding of the plan of care and contributes to goal setting.

## 2021-03-31 NOTE — FLOWSHEET NOTE
03/31/21 1500   Encounter Summary   Services provided to: Patient   Referral/Consult From: Rounding   Support System Unknown   Continue Visiting Yes  (3/31)   Complexity of Encounter Low   Length of Encounter 15 minutes     Patient was sitting comfortably and said,\" he was not in the need for my services. \"

## 2021-03-31 NOTE — FLOWSHEET NOTE
[address]  PHYSICAL THERAPY MISSED TREATMENT NOTE  [facility]    Date: 3/31/2021  Patient Name: Lexi Mccarthy        MRN: [id number]   : 1965  (48 y.o.)  Gender: male   Referring Practitioner: Dr. Vini Shepard  Diagnosis: sympathotonic orthostatic hypotension         REASON FOR MISSED TREATMENT:  Missed Treat. Patient working with OT. Will try back later as time allows.

## 2021-03-31 NOTE — PROGRESS NOTES
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 Per pt family has family that lives close by and he will call if needs assistance, per pt just ordered a lifealert, has a little brother that is around on weekends. Pt reports he does drive occasionally, however not as often as he will get a ride generally to appts if needed, and he has groceries delivered. Pt reports he completes cooking. Pt reports receiving [facility] nursing currently. VISION:WFL    HEARING:  WFL    COGNITION: Decreased Safety Awareness and Impulsive    RANGE OF MOTION:  Bilateral Upper Extremity:  WFL    STRENGTH:  Bilateral Upper Extremity:  WFL    SENSATION:   LE neuropathy    ADL:   Grooming: Supervision, with set-up and with verbal cues. Completed while sitting EOB. Pt reported slight dizziness while sitting EOB, however reported this feeling is his normal.      Lower Extremity Dressing: Moderate Assistance. Pt donning slip on shoes EOB. Pt required mod cues for encouragement to attempt to don slippers as indep as possible. Pt required assistance to don R slipper over his heel, and max A to don L slipper d/t increased swelling. Alcides Abdalla BALANCE:  Sitting Balance:  Supervision. Standing Balance: Contact Guard Assistance, X 1, with cues for safety, with increased time for completion.  Pt required vc's to use UE to support self on RW to give himself more stability and balance while experiencing dizziness in standing. BED MOBILITY:  Supine to Sit: Minimal Assistance, with verbal cues , with increased time for completion Pt required mod vc's to attempt to complete bed mobility as indep as possible. Pt required vc's for technique as pt reports it is harder to get out of the hospital bed, because the railings are not the same as his at home. TRANSFERS:  Sit to Stand:  [address], cues for hand placement, with verbal cues. CGA for safety d/t orthostatic hypotension  Stand to Sit: Stand By Assistance, X 1, cues for hand placement. FUNCTIONAL MOBILITY:  Assistive Device: Rolling Walker  Assist Level:  Contact Guard Assistance. Distance: 3 ft from EOB to recliner  No LOB noted. CGA for safety d/t orthostatic hypotension. Activity Tolerance:  Patient tolerance of  treatment: fair. Assessment:  Assessment: Pt demonstrates with orthostatic hypotension and overall deconditioning resulting in decreased functional mobility and ADLs from PLOF. Pt demonstrates with decreased safety and has a history of a fall including attempting to sit on 4WW with unlocked breaks. Pt educated on safety with 7KS and monitoring symptoms for hypotension, and adapting home environment to increase safety. Performance deficits / Impairments: Decreased functional mobility , Decreased ADL status, Decreased safe awareness, Decreased balance, Decreased endurance  Prognosis: Fair  REQUIRES OT FOLLOW UP: Yes  Decision Making: Medium Complexity  Safety Devices in place: Yes  Type of devices: All fall risk precautions in place, Call light within reach, Chair alarm in place, Nurse notified    Treatment Initiated: Treatment and education initiated within context of evaluation.   Evaluation time included review of current medical information, gathering information related to past medical, social and functional history, completion of standardized testing, formal and informal observation of tasks, assessment of data and development of plan of care and goals. Treatment time included skilled education and facilitation of tasks to increase safety and independence with ADL's for improved functional independence and quality of life. Discharge Recommendations:  Continue to assess pending progress, Patient would benefit from continued therapy after discharge    Patient Education:  OT Education: OT Role, Plan of Care, Precautions, ADL Adaptive Strategies    Equipment Recommendations:  Equipment Needed: No    Plan:  Times per week: 3-5x  Times per day: Daily  Current Treatment Recommendations: Functional Mobility Training, Endurance Training, Safety Education & Training, Patient/Caregiver Education & Training, Self-Care / ADL. See long-term goal time frame for expected duration of plan of care. If no long-term goals established, a short length of stay is anticipated. Goals:  Patient goals : To return home  Short term goals  Time Frame for Short term goals: Until discharge  Short term goal 1: Complete standing 2 min SBA with 1 UE release, 0-1 seated RB, and RW to complete self-care. Short term goal 2: Complete functional mobility with RW and SBA to/from the bathroom to increase indep ADLs. Short term goal 3: Complete LE dressing with S 0-1 vc's and use of LHAE to increase independence and safety. Long term goals  Time Frame for Long term goals : None d/t ELOS         Following session, patient left in safe position with all fall risk precautions in place.

## 2021-03-31 NOTE — PROGRESS NOTES
[address] [address]  INPATIENT PHYSICAL THERAPY  DAILY NOTE  [facility] MED SURG 8A - 8A-20/020-A    Time In: 9940  Time Out: 1508  Timed Code Treatment Minutes: 25 Minutes  Minutes: 25          Date: 3/31/2021  Patient Name: Aden Linares,  Gender:  male        MRN: [id number]  : 1965  (48 y.o.)     Referring Practitioner: Dr. Leticia Villarreal  Diagnosis: sympathotonic orthostatic hypotension  Additional Pertinent Hx: admit with above diagnosis, s/p fall, ESLD, KARAN, hypotension     Prior Level of Function:  Lives With: Alone  Type of Home: Apartment  Home Layout: One level  Home Access: Stairs to enter without rails  Entrance Stairs - Number of Steps: 1  Home Equipment: 4 wheeled walker   Bathroom Shower/Tub: Tub/Shower unit  Bathroom Toilet: Handicap height  Bathroom Equipment: Shower chair    ADL Assistance: [address] [place] [address] Avenue: [address] [place] [address] Place: Independent  Transfer Assistance: Independent  Active : Yes  Additional Comments: Pt reports using a 4WW in the home and community. Per pt family has family that lives close by and he will call if needs assistance, per pt just ordered a lifealert, has a little brother that is around on weekends. Pt reports he does drive occasionally, however not as often as he will get a ride generally to appts if needed, and he as groceries delivered. Pt reports he completes cooking. Restrictions/Precautions:  Restrictions/Precautions: General Precautions, Fall Risk  Position Activity Restriction  Other position/activity restrictions: orthostatic hypotension     SUBJECTIVE: RN approved session. Patient laying in bed upon arrival and agreeable to therapy.      PAIN: denies    Vitals: not assessed, patient reported dizziness throughout that would improve with time     OBJECTIVE:  Bed Mobility:  Supine to Sit: Minimal Assistance, with verbal cues , with increased time for completion, assist with trunk   **extra time to prepare for bed mobility to sit edge of bed, patient very talkative and requires cues to redirect to task. Transfers:  Sit to Stand: Moderate Assistance, X 1, with increased time for completion, cues for hand placement, with verbal cues, first attempt unable to complete due to LE weakness, completed second trial from bed  Stand to Sit:Contact Guard Assistance     Ambulation:  Not Tested  Not safe to attempt this date. Balance:  Static Sitting Balance:  Stand By Assistance, edge of bed ~5-6min in preparation for transfer  Static Standing Balance: Contact Guard Assistance, BUE support at [employer]  Dynamic Standing Balance: Contact Guard Assistance, performed LE exercises and weight shifting stood ~1-2min    Exercise:  Patient was guided in 1 set(s) 10 reps of exercise to both lower extremities. Standing marches. Exercises were completed for increased independence with functional mobility. Functional Outcome Measures: Completed  AM-PAC Inpatient Mobility Raw Score : 13  AM-PAC Inpatient T-Scale Score : 36.74    ASSESSMENT:  Assessment: Patient progressing toward established goals. and Limited by dizziness  Activity Tolerance:  Patient tolerance of  treatment: fair.       Equipment Recommendations:Equipment Needed: No  Other: cont to assess needs  Discharge Recommendations:  Continue to assess pending progress, Patient would benefit from continued therapy after discharge(per pt he placed a call with [facility] and RN is to come out at discharge and assess his needs for services)    Plan: Times per week: 5X GM  Times per day: Daily  Specific instructions for Next Treatment: therex and mobility    Patient Education  Patient Education: Plan of Care, Bed Mobility, Transfers, Reviewed Prior Education, Verbal Exercise Instruction    Goals:  Patient goals : return home  Short term goals  Time Frame for Short term goals: by discharge  Short term goal 1: bed mobility with MOD I to get in/out of bed  Short term goal 2: transfer with MOD I to get in/out of chairs  Short term goal 3: amb 50'x1 with walker and MOD I to walk safely in apt  Short term goal 4: negotiate 1 step without HR and MOD I to enter apt safely  Long term goals  Time Frame for Long term goals : no LTGs set secondary to short ELOS    Following session, patient left in safe position with all fall risk precautions in place.

## 2021-03-31 NOTE — CARE COORDINATION
3/31/21, 7:34 AM EDT    DISCHARGE ON GOING [facility] 12 day: 1  Location: 8A-20/020-A Reason for admit: Sympathotonic orthostatic hypotension [I95.1]   Procedure: No  Barriers to Discharge: Creat up to 4.0 today from 3.4 yesterday. Nephro following. IVF at 75/hr. Na+ 123. Bumex and Aldactone on hold. Will start Albumin and Octreotide. Continues to be hypotensive. On midodrine. PT/OT. Order placed by attending to transfer pt to [facility] with telemetry. PCP: Jennifer Kirby MD  Readmission Risk Score: 37%  Patient Goals/Plan/Treatment Preferences: From home alone. Family in area for support. Continue to follow.

## 2021-03-31 NOTE — PROGRESS NOTES
Impression   Ascites. No acute findings involving either kidney. Current Meds:  Scheduled Meds:   [START ON 4/3/2021] gabapentin  100 mg Oral BID    albumin human  25 g Intravenous Q6H    octreotide  200 mcg Subcutaneous Q8H    [START ON 4/1/2021] famotidine  20 mg Oral Daily    aspirin  81 mg Oral Daily    dilTIAZem  30 mg Oral TID    [Held by provider] pantoprazole  40 mg Oral Daily    rifaximin  550 mg Oral BID    [Held by provider] spironolactone  50 mg Oral BID    tiotropium  2 puff Inhalation Daily    sodium chloride flush  10 mL Intravenous 2 times per day    insulin lispro  0-12 Units Subcutaneous TID WC    insulin lispro  0-6 Units Subcutaneous Nightly    lactulose  30 g Oral TID    [Held by provider] bumetanide  1 mg Oral BID    midodrine  15 mg Oral TID WC     Continuous Infusions:   sodium chloride      sodium chloride 35 mL/hr at 03/31/21 1225    dextrose         Assessment:  47 yo M admitted 03/29/21 for a fall at home x 2. Endorses hitting his head during one fall. CT head negative. CT cervical spine negative. CXR demonstrated stable right pleural effusion. H/O hypotension, on Midodrine, noted to have worsening hypotension in the ED. H/O alcoholic cirrhosis with recurrent hepatic hydrothorax. Not a candidate for a TIPS procedure per [facility]. Last thoracentesis 03/16/21 with 2.1L removed. Last paracentesis 03/17/21 with 1.1L removed. 1. Fall x 2  2. Acute on chronic hypotension- on Midodrine  3. Recurrent right hepatic hydrothorax- stable  4. Alcoholic cirrhosis- MELD 31  5. KARAN on CKD- possible hepatorenal syndrome vs ARF  6. Afib- no OAC due to cirrhosis  7. Thrombocytopenia  8. Hypoantremia  9. Chronic anemia- no active GI bleeding  10.  Coagulopathy    Plan:      Monitor H & H, transfuse prn  Nursing to monitor stool output  Continue PPI daily, home dose  Continue Midodrine  Continue Xifaxan BID, home dose  Continue Lactulose to 30g TID, home dose  Octreotide & Albumin added

## 2021-03-31 NOTE — PLAN OF CARE
Problem: Falls - Risk of:  Goal: Will remain free from falls  Description: Will remain free from falls  3/31/2021 0052 by Emily Billingsley RN  Outcome: Ongoing  Note: Free from falls at this time. Will monitor. Problem: Falls - Risk of:  Goal: Absence of physical injury  Description: Absence of physical injury  Outcome: Ongoing  Note: No physical injury at this time. Will monitor. Problem: Pain:  Goal: Pain level will decrease  Description: Pain level will decrease  3/31/2021 0052 by Emily Billingsley RN  Outcome: Ongoing  Note: No pain at this time. Problem: Pain:  Goal: Control of acute pain  Description: Control of acute pain  Outcome: Ongoing  Note: No pain at this time. Problem: Pain:  Goal: Control of chronic pain  Description: Control of chronic pain  Outcome: Ongoing  Note: No pain at this time. Problem: Musculor/Skeletal Functional Status  Goal: Highest potential functional level  Outcome: Ongoing  Note: Pt. Ambulating well at this time. Will monitor. Problem: Physical Regulation:  Goal: Prevent transmision of infection  Description: Prevent transmision of infection  3/31/2021 0052 by Emily Billingsley RN  Outcome: Ongoing  Note: Pt. In contact isolation for VRE; no infection transmission at this time. Problem: Skin Integrity:  Goal: Risk for impaired skin integrity will decrease  Description: Risk for impaired skin integrity will decrease  3/31/2021 0052 by Emily Billingsley RN  Outcome: Ongoing  Note: Pt. Turning and repositioning well. Will continue to encourage. Problem: DISCHARGE BARRIERS  Goal: Patient's continuum of care needs are met  3/31/2021 0052 by Emily Billingsley RN  Outcome: Ongoing  Note: Pt. Plans to discharge home at this time. Problem: Cardiovascular  Goal: Hemodynamic stability  3/31/2021 0052 by Emily Billingsley RN  Outcome: Ongoing  Note: Pt. Vitals stable at this time. Will monitor. Care plan reviewed with patient.   Patient verbalize understanding of the plan of care and contribute to goal setting.

## 2021-03-31 NOTE — PROGRESS NOTES
[facility]   Nephrology progress note  3/31/2021, 10:40 AM      Pt Name:    Anna Marie Tuttle  MRN:     [id number]     [place]:    1965  Admit Date:    3/29/2021  7:00 PM  Primary Care Physician:  Bee Gutiérrez MD   Room number  8A-20/020-A    Chief Complaint: Nephrology following for KARAN/CKD    Subjective:  Patient seen and examined  Late entry-seen earlier during rounds  No chest pain or shortness of breath  Feels okay  Awake    Objective:  24HR INTAKE/OUTPUT:      Intake/Output Summary (Last 24 hours) at 3/31/2021 1040  Last data filed at 3/31/2021 0427  Gross per 24 hour   Intake 2107.31 ml   Output 650 ml   Net 1457.31 ml     I/O last 3 completed shifts: In: 2227.3 [P.O.:840; I.V.:1387.3]  Out: 650 [Urine:650]  No intake/output data recorded. Admission weight: 229 lb (103.9 kg)  Wt Readings from Last 3 Encounters:   03/31/21 237 lb 12.8 oz (107.9 kg)   03/23/21 238 lb (108 kg)   03/18/21 238 lb 12.8 oz (108.3 kg)     Body mass index is 30.53 kg/m².     Physical examination  VITALS:     Vitals:    03/31/21 0100 03/31/21 0427 03/31/21 0856 03/31/21 0945   BP:  92/63  90/66   Pulse:  88  96   Resp:  19  18   Temp:  98.4 °F (36.9 °C)  98.2 °F (36.8 °C)   TempSrc:  Oral  Oral   SpO2:  92% 94% 92%   Weight: 237 lb 12.8 oz (107.9 kg)      Height:         General Appearance: appears comfortable, no distress  Eyes: mild icteric sclera  Mouth/Throat: Oral mucosa moist  Neck: No JVD  Lungs: Air entry B/L, no rales, no use of accessory muscles  Heart:  S1, S2 heard  GI: +distention noted, no guarding, +fluid wave felt  Extremities: B/L 1-2+ edema      Lab Data  CBC:   Recent Labs     03/29/21  1910 03/30/21  0900 03/31/21  0529   WBC 7.0 5.2 6.0   HGB 13.0* 11.1* 10.4*   HCT 38.1* 32.6* 30.1*   * 80* 71*     BMP:  Recent Labs     03/29/21  1945 03/30/21  0900 03/31/21  0529   * 123* 123*   K 5.0 4.3 4.8   CL 84* 86* 86*   CO2 18* 20* 19*   BUN 45* 49* 52*   CREATININE 3.4* 3.6* 4.0*   GLUCOSE 106 87 97   CALCIUM 9.8 10.0 9.2   MG  --  2.0 2.1   PHOS  --  3.4  --      Hepatic:   Recent Labs     03/29/21 1945 03/31/21  0529   LABALBU 3.2* 3.0*   AST 68* 52*   ALT 33 26   BILITOT 5.8* 3.9*   ALKPHOS 153* 130*         Meds:  Infusion:    sodium chloride      sodium chloride 75 mL/hr at 03/30/21 5072    dextrose       Meds:    aspirin  81 mg Oral Daily    dilTIAZem  30 mg Oral TID    gabapentin  300 mg Oral TID    pantoprazole  40 mg Oral Daily    rifaximin  550 mg Oral BID    [Held by provider] spironolactone  50 mg Oral BID    tiotropium  2 puff Inhalation Daily    sodium chloride flush  10 mL Intravenous 2 times per day    insulin lispro  0-12 Units Subcutaneous TID WC    insulin lispro  0-6 Units Subcutaneous Nightly    lactulose  30 g Oral TID    [Held by provider] bumetanide  1 mg Oral BID    midodrine  15 mg Oral TID      Meds prn: ipratropium-albuterol, sodium chloride flush, sodium chloride, promethazine **OR** ondansetron, polyethylene glycol, glucose, dextrose, glucagon (rDNA), dextrose       Impression and Plan:  1. KARAN on CKD in setting of acute decompensated liver cirrhosis with significant ascites  No intrinsic renal disease, no evidence of GN  UA benign without hematuria or proteinuria  Urine sodium less than 20  Cannot rule out hepatorenal syndrome  Continue with normal saline  We will also add IV albumin, Octreotide  Continue with midodrine  May will need renal replacement therapy next 24 to 48 hours  Will reduce/adjust Neurontin dose due to acute renal failure  Reduce protonix dose due to KARAN    2. Hyponatremia secondary to prerenal causes from acute decompensated liver cirrhosis  3. Acute decompensated liver cirrhosis: check ammonia  4. Ascites: May need paracentesis, abdomen severely distended  5. History of alcoholic cirrhosis  6. Chronic hypotension: Continue midodrine  7. Hx A. Fib: On diltiazem  8.   Thrombocytopenia    D/W patient and hospitalist    Emerson Dobbs MD  [facility]

## 2021-03-31 NOTE — PLAN OF CARE
Problem: Falls - Risk of:  Goal: Will remain free from falls  Description: Will remain free from falls  Outcome: Ongoing  Note: Patient free from falls. Bed alarm, chair alarm, call light within reach, non skid footwear on when up. Goal: Absence of physical injury  Description: Absence of physical injury  Outcome: Ongoing  Note: Patient free from falls. Bed alarm, chair alarm, call light within reach, non skid footwear on when up. Problem: Pain:  Goal: Pain level will decrease  Description: Pain level will decrease  Outcome: Ongoing  Note: Patients pain level is a 0/10. Patients pain goal is no pain. Patient repositioned offered. Pain goal met at this time. Problem: Musculor/Skeletal Functional Status  Goal: Highest potential functional level  Outcome: Ongoing  Note: Continues to work with therapy     Problem: Physical Regulation:  Goal: Prevent transmision of infection  Description: Prevent transmision of infection  Outcome: Ongoing  Note: Patient in contact isolation for history of VRE     Problem: Skin Integrity:  Goal: Risk for impaired skin integrity will decrease  Description: Risk for impaired skin integrity will decrease  Outcome: Ongoing  Note: Patient turns and repositions self, elevate heels. Problem: DISCHARGE BARRIERS  Goal: Patient's continuum of care needs are met  Outcome: Ongoing  Note: Plans to return home with support at discharge. Problem: Cardiovascular  Goal: Hemodynamic stability  Outcome: Ongoing  Note: Continue to monitor labs and vitals   Care plan reviewed with patient. Patient verbalize understanding of the plan of care and contribute to goal setting.

## 2021-03-31 NOTE — PROGRESS NOTES
Hospitalist Progress Note    Patient:  Francisco Chicas      Unit/Bed:8A-20/020-A    YOB: 1965    MRN: [id number]       Acct: [de-identified]     PCP: Yue Wilder MD    Date of Admission: 3/29/2021    Active Hospital Problems    Diagnosis Date Noted    Permanent atrial fibrillation [facility]) [I48.21]      Priority: High    Sympathotonic orthostatic hypotension [I95.1] 03/30/2021    Orthostatic dizziness [R42] 03/30/2021    Fall [W19. XXXA] 03/30/2021    Degenerative cervical spinal stenosis [M48.02] 03/30/2021    Hypo-osmolality and hyponatremia [E87.1] 03/30/2021    Asymptomatic cholelithiasis [K80.20] 03/30/2021    Weakness generalized [R53.1] 03/30/2021    Type 2 diabetes mellitus without complication ([address] [M95.3] 12/04/2020    Chronic diastolic congestive heart failure ([address] [I50.32] 12/04/2020    Class 1 obesity without serious comorbidity with body mass index (BMI) of 30.0 to 30.9 in adult [E66.9, Z68.30] 11/23/2020    Thrombocytopenia ([address] [D69.6] 10/05/2020    Coagulopathy ([address] [D68.9] 10/05/2020    Acute renal failure with acute tubular necrosis superimposed on stage 3 chronic kidney disease ([address] [N17.0, N18.30] 64/95/0560    Alcoholic cirrhosis of liver with ascites ([address] [K70.31] 04/03/2020       Assessment/Plan:    1. Weakness with Fall and Hypotension: BP has been low (76/50), pt has been having poor fluid intake while taking Bumex and Aldactone. Will hold diuretics, start trial IVF. Increase Midodrine to 15 TID. PT/OT.     --3/31: BP improving with IVF and Midodrine. PT/OT. 2. 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 Currently pt not sob or having any abdominal distension or pain. Hold diuretics. Follows up with GI and is on transplant list. Will consult GI today. --3/31: stable, GI following.  Cont Lactulose and changes    Subjective: no acute events overnight. Pt currently denies any complaints. Still feels weak though. No fevers, chills, chest pain or sob. Tolerating PO intake. No black or red colored stools. No nausea or vomiting. Medications:  Reviewed    Infusion Medications    sodium chloride      sodium chloride 75 mL/hr at 03/30/21 2933    dextrose       Scheduled Medications    [START ON 4/3/2021] gabapentin  100 mg Oral BID    albumin human  25 g Intravenous Q6H    octreotide  200 mcg Subcutaneous Q8H    [START ON 4/1/2021] famotidine  20 mg Oral Daily    aspirin  81 mg Oral Daily    dilTIAZem  30 mg Oral TID    [Held by provider] pantoprazole  40 mg Oral Daily    rifaximin  550 mg Oral BID    [Held by provider] spironolactone  50 mg Oral BID    tiotropium  2 puff Inhalation Daily    sodium chloride flush  10 mL Intravenous 2 times per day    insulin lispro  0-12 Units Subcutaneous TID WC    insulin lispro  0-6 Units Subcutaneous Nightly    lactulose  30 g Oral TID    [Held by provider] bumetanide  1 mg Oral BID    midodrine  15 mg Oral TID WC     PRN Meds: ipratropium-albuterol, sodium chloride flush, sodium chloride, promethazine **OR** ondansetron, polyethylene glycol, glucose, dextrose, glucagon (rDNA), dextrose      Intake/Output Summary (Last 24 hours) at 3/31/2021 1205  Last data filed at 3/31/2021 0427  Gross per 24 hour   Intake 2107.31 ml   Output 650 ml   Net 1457.31 ml       Diet:  DIET GENERAL; Low Sodium (2 GM); Daily Fluid Restriction: 1200 ml    Exam:  BP 90/66   Pulse 96   Temp 98.2 °F (36.8 °C) (Oral)   Resp 18   Ht 6' 2\" (1.88 m)   Wt 237 lb 12.8 oz (107.9 kg)   SpO2 92%   BMI 30.53 kg/m²     General appearance: chronically ill appearing   HEENT: Pupils equal, round, and reactive to light. Conjunctivae icteric   Neck: Supple, with full range of motion. No jugular venous distention. Trachea midline. Respiratory:  Normal respiratory effort.  Clear to auscultation, bilaterally without Rales/Wheezes/Rhonchi. Cardiovascular: Regular rate and rhythm with normal S1/S2 without murmurs, rubs or gallops. Abdomen: Soft, non-tender, non-distended with normal bowel sounds. Musculoskeletal: passive and active ROM x 4 extremities. Skin: Skin color, texture, turgor normal.  No rashes or lesions. Neurologic:  Neurovascularly intact without any focal sensory/motor deficits. Cranial nerves: II-XII intact, grossly non-focal.  Psychiatric: Alert and oriented, thought content appropriate, normal insight  Capillary Refill: Brisk,< 3 seconds   Peripheral Pulses: +2 palpable, equal bilaterally       Labs:   Recent Labs     03/31/21  0529   WBC 6.0   HGB 10.4*   HCT 30.1*   PLT 71*     Recent Labs     03/30/21  0900 03/31/21  0529   * 123*   K 4.3 4.8   CL 86* 86*   CO2 20* 19*   BUN 49* 52*   CREATININE 3.6* 4.0*   CALCIUM 10.0 9.2   PHOS 3.4  --      Recent Labs     03/29/21  1945 03/31/21  0529   AST 68* 52*   ALT 33 26   BILIDIR 2.2*  --    BILITOT 5.8* 3.9*   ALKPHOS 153* 130*     Recent Labs     03/31/21  0529   INR 1.78*     No results for input(s): CKTOTAL, TROPONINI in the last 72 hours. Urinalysis:      Lab Results   Component Value Date    NITRU NEGATIVE 03/30/2021    WBCUA NONE SEEN 03/16/2021    BACTERIA NONE SEEN 03/16/2021    RBCUA 0-2 03/16/2021    BLOODU NEGATIVE 03/30/2021    SPECGRAV 1.015 03/30/2021       Radiology: All imaging reviewed     Diet: DIET GENERAL; Low Sodium (2 GM);  Daily Fluid Restriction: 1200 ml      Code Status: Full Code            Electronically signed by Connor Bee MD on 3/31/2021 at 12:05 PM

## 2021-04-01 NOTE — PROGRESS NOTES
Gastroenterology Progress Note:     Patient Name:  iKmmie Stone   MRN: [id number]  [id number]  YOB: 1965  Admit Date: 3/29/2021  7:00 PM  Primary Care Physician: Jeramy King MD   8A-20/020-A     Patient seen and examined. 24 hours events and chart reviewed. Subjective: Patient resting in bed. He denies abdominal pain, but complains of distention and gas pain. He is passing a large amount of flatus and passed a large amount of flatus during examination. Denies nausea and vomiting. He is drowsy during evaluation and states he feels more drowsy. He says he only got 2 hours of sleep last night. US paracentesis & KUB results reviewed. Objective:  /64   Pulse 86   Temp 97.5 °F (36.4 °C) (Oral)   Resp 18   Ht 6' 2\" (1.88 m)   Wt 237 lb 12.8 oz (107.9 kg)   SpO2 92%   BMI 30.53 kg/m²     Physical Exam:    General:  Chronically ill appearing male  HEENT: Atraumatic, normocephalic. Moist oral mucous membranes. Scleral icterus  Neck: Supple without adenopathy, JVD, thyromegaly or masses. Trachea midline. CV: Heart RRR, no murmurs, rubs, gallops. Resp: Even, easy without cough or accessory use. Lungs clear to ascultation bilaterally. Abd: Round, soft, nontender. No hepatosplenomegaly or mass present. Active bowel sounds heard. Mild distention noted. Ext:  Without cyanosis, clubbing, edema. Skin: Pink, warm, dry  Neuro:  Alert, oriented x 3 with no obvious deficits.        Rectal: deferred    Labs:   CBC:   Lab Results   Component Value Date    WBC 5.6 04/01/2021    HGB 10.0 04/01/2021    HCT 28.9 04/01/2021    MCV 95.4 04/01/2021    PLT 57 04/01/2021     BMP:   Lab Results   Component Value Date     04/01/2021    K 5.1 04/01/2021    K 3.4 03/18/2021    CL 90 04/01/2021    CO2 20 04/01/2021    PHOS 4.2 04/01/2021    BUN 50 04/01/2021    CREATININE 3.8 04/01/2021    CALCIUM 9.6 04/01/2021     PT/INR:   Lab Results   Component Value Date    INR 1.81 04/01/2021     Lipids:   Lab Recurrent right hepatic hydrothorax- stable  4. Decompensated alcoholic cirrhosis- MELD 33  5. KARAN on CKD- possible hepatorenal syndrome vs ARF  6. Afib- no OAC due to cirrhosis  7. Thrombocytopenia  8. Hypoantremia  9. Chronic anemia- no active GI bleeding  10. Coagulopathy  11. Ascites s/p paracentesis with 4.6L removed 03/31/21, no SBP     Plan:    Monitor H & H, transfuse prn  Nursing to monitor stool output  Continue PPI daily, home dose  Continue Midodrine  Continue Xifaxan BID, home dose  Continue Lactulose to 30g TID, home dose  Stop Bentyl  Simethicone as needed for gas  Octreotide & Albumin added by nephro  Diuretics on hold, resume when okay with nephro  Diet as tolerated, 2 g sodium  Daily weights  Strict I & O  Monitor hepatic labs & INR daily  Avoid narcotics & benzodiazepines as they can precipitate HE  Do not suspect ileus or bowel obstruction after reviewing imaging and examining patient  Electrolyte management per nephrology  Lengthy discussion had with patient regarding long-term prognosis and treatment plan, all questions answered  Nephrology on board  Supportive care per primary team  Will follow    Case reviewed and impression/plan reviewed in collaboration with Dr. Hirsch Figures  Electronically signed by FELIX Carballo CNP on 4/1/2021 at 3:01 PM    [facility]     Approximately 40 minutes spent doing the following: Examination, patient evaluation, 24 chart review done; all tests, results, and plan of care reviewed with the patient; all questions answered. Greater than 50% of the time was spent counseling and educated the patient.

## 2021-04-01 NOTE — PLAN OF CARE
Problem: Falls - Risk of:  Goal: Will remain free from falls  Description: Will remain free from falls  3/31/2021 2238 by Emily Billingsley RN  Outcome: Ongoing  Note: Remains free from falls at this time. Bed alarm on. Will monitor. Problem: Falls - Risk of:  Goal: Absence of physical injury  Description: Absence of physical injury  3/31/2021 2238 by Emily Billingsley RN  Outcome: Ongoing  Note: No injury at this time. Will monitor. Bed alarm on. Problem: Pain:  Goal: Pain level will decrease  Description: Pain level will decrease  3/31/2021 2238 by Emily Billingsley RN  Outcome: Ongoing  Note: Pain free at this time. Will monitor. Problem: Musculor/Skeletal Functional Status  Goal: Highest potential functional level  3/31/2021 2238 by Emily Billingsley RN  Outcome: Ongoing  Note: Pt. Up ambulating well at this time. Nurse at bedside for safety. Problem: Physical Regulation:  Goal: Prevent transmision of infection  Description: Prevent transmision of infection  3/31/2021 2238 by Emily Billingsley RN  Outcome: Ongoing  Note: Isolation for VRE. Cart outside door at this time. Problem: Skin Integrity:  Goal: Risk for impaired skin integrity will decrease  Description: Risk for impaired skin integrity will decrease  3/31/2021 2238 by Emily Billingsley RN  Outcome: Ongoing  Note: Skin integrity improving at this time. EPC cream and bandages to protect skin. Problem: DISCHARGE BARRIERS  Goal: Patient's continuum of care needs are met  3/31/2021 2238 by Emily Billingsley RN  Outcome: Ongoing  Note: Pt. Plans to discharge home with [address] and help with family. Problem: Cardiovascular  Goal: Hemodynamic stability  3/31/2021 2238 by Emily Billingsley RN  Outcome: Ongoing  Note: Vital Signs stable at this time.       Problem: Serum Glucose Level - Abnormal:  Goal: Ability to maintain appropriate glucose levels will improve  Description: Ability to maintain appropriate glucose levels will improve  Outcome: Ongoing  Note: Glucose level within normal limits, no insulin coverage required at this time. Problem: Pain:  Goal: Control of acute pain  Description: Control of acute pain  3/31/2021 1730 by Mari Moser RN  Outcome: Completed     Problem: Pain:  Goal: Control of chronic pain  Description: Control of chronic pain  3/31/2021 1730 by Mari Moser RN  Outcome: Completed   Care plan reviewed with patient. Patient verbalize understanding of the plan of care and contribute to goal setting.

## 2021-04-01 NOTE — PROGRESS NOTES
Patient lives alone and does not have much family around to help. Writer RN asked if anyone in family could be updated, patient states he will call a brother later throughout the day due to his work schedule. Pt. States he takes care of himself. No other concerns from the patient at this time.

## 2021-04-01 NOTE — PROGRESS NOTES
[address] [address]  INPATIENT PHYSICAL THERAPY  DAILY NOTE  [facility] SURG 8A - 8A-20/020-A    Time In: 0802  Time Out: 08  Timed Code Treatment Minutes: 23 Minutes  Minutes: 23          Date: 2021  Patient Name: Hemalatha Abbasi,  Gender:  male        MRN: [id number]  : 1965  (48 y.o.)     Referring Practitioner: Dr. Irasema Lund  Diagnosis: sympathotonic orthostatic hypotension  Additional Pertinent Hx: admit with above diagnosis, s/p fall, ESLD, KARAN, hypotension     Prior Level of Function:  Lives With: Alone  Type of Home: Apartment  Home Layout: One level  Home Access: Stairs to enter without rails  Entrance Stairs - Number of Steps: 1  Home Equipment: 4 wheeled walker   Bathroom Shower/Tub: Tub/Shower unit  Bathroom Toilet: Handicap height  Bathroom Equipment: Shower chair    ADL Assistance: [address] [place] [address] Avenue: [address] [place] [address] Place: Independent  Transfer Assistance: Independent  Active : Yes  Additional Comments: Pt reports using a 4WW in the home and community. Per pt family has family that lives close by and he will call if needs assistance, per pt just ordered a lifealert, has a little brother that is around on weekends. Pt reports he does drive occasionally, however not as often as he will get a ride generally to appts if needed, and he as groceries delivered. Pt reports he completes cooking. Restrictions/Precautions:  Restrictions/Precautions: General Precautions, Fall Risk  Position Activity Restriction  Other position/activity restrictions: orthostatic hypotension     SUBJECTIVE: RN approved session. Patient laying in bed upon arrival and agreeable to therapy with little encouragement.      PAIN: 9/10: abdomen    Vitals: Blood Pressure: supine 95/67, sitting 94/70, standing 84/57    OBJECTIVE:  Bed Mobility:  Supine to Sit: Stand By Assistance, with head of bed raised, with increased time for completion, used rail and momentum  Scooting: Stand By Assistance    Transfers:  Sit to Stand: Contact Guard Assistance  Stand to [address] [address]  bed>chair: Contact Guard Assistance, no device    Balance:  Static Sitting Balance:  Stand By Assistance  Static Standing Balance: Stand By Assistance   **extra time to for BP readings    Exercise:  Patient was guided in 1 set(s) 15 reps of exercise to both lower extremities. Seated marches, Seated heel/toe raises, Long arc quads and Seated isometric hip adduction. Exercises were completed for increased independence with functional mobility. Functional Outcome Measures: Completed  AM-PAC Inpatient Mobility Raw Score : 14  AM-PAC Inpatient T-Scale Score : 38.1    ASSESSMENT:  Assessment: Patient progressing toward established goals. Activity Tolerance:  Patient tolerance of  treatment: fair. Equipment Recommendations:Equipment Needed: No  Other: cont to assess needs  Discharge Recommendations:  Continue to assess pending progress, Patient would benefit from continued therapy after discharge(per pt he placed a call with [facility] agency and RN is to come out at discharge and assess his needs for services)    Plan: Times per week: 5X GM  Times per day: Daily  Specific instructions for Next Treatment: therex and mobility    Patient Education  Patient Education: Plan of Care, Bed Mobility, Transfers, Up in Chair for All Meals, Verbal Exercise Instruction    Goals:  Patient goals : return home  Short term goals  Time Frame for Short term goals: by discharge  Short term goal 1: bed mobility with MOD I to get in/out of bed  Short term goal 2: transfer with MOD I to get in/out of chairs  Short term goal 3: amb 50'x1 with walker and MOD I to walk safely in apt  Short term goal 4: negotiate 1 step without HR and MOD I to enter apt safely  Long term goals  Time Frame for Long term goals : no LTGs set secondary to short ELOS    Following session, patient left in safe position with all fall risk precautions in place.

## 2021-04-01 NOTE — PROGRESS NOTES
--3/31: stable, GI following. Cont Lactulose and Rifaximin. Diuretics on hold 2/2 to #3. Avoid narcotics & benzodiazepines as they can precipitate HE. Daily weights, strict I/Os. Daily LFTs and INR.   --4/1: s/p paracentesis with 4.5L fluid removal. No SBP. CCM. GI following. 3. KARAN on CKD Stage 3: Serum creatinine baseline is 1.5-1.6, now up to 3.6. Likely pre-renal due to hypotension and diuretics vs HRS. Pending urine lytes and renal U/S. Hold diuretics. Trial of NS IVF. Cont to trend daily Cr. Avoid nephrotoxic agents. Strict I/Os. Daily weights. Nephrology following.     --3/31: Cr continues to decline today, up from 3.6 to 4.0 despite holding diuretics and starting IVF. No intrinsic renal disease or GN. UA benign without hematuria or proteinuria. Cannot rule out HRS. Spoke with Nephrology, will start Albumin and Octreotide. May will need renal replacement therapy next 24 to 48 hours   --4/1: Cr down to 3.8 today. IVF reduced to 25 ml/hr. Cont Midodrine, Albumin and Octreotide. Nephrology following. Strict I/Os. Daily weights. Nephrology following. 4. Hyponatremia: likely 2/2 to intravascular volume depletion. Initiate trial of IV normal saline. Hold Bumex and Aldactone. Cont to trend Na levels daily     5. Acute on Chronic Hypotension: Midodrine increased to 15 mg TID. 6. Paroxysmal Atrial Fibrillation: uncontrolled HR, difficult to control given low BP. No OAC given liver cirrhosis and coagulapathy    7. Thrombocytopenia: PLT 80K. 2/2 to liver cirrhosis. Avoid blood thinners. 8. Ileus: having abdominal cramps with abdominal distension, KUB showing ileus. Will make GI aware. Chief Complaint: weakness, falls     Hospital Course: 48 y. o. white male with history of chronic alcohol abuse, Hx cirrhosis w/ ascites, DM, HTN, gastroesophageal reflux disease, previous NSAID use, atrial fibrillation, chronic hypotension, Hx hepatic hydrothorax, chronic hypomagnesemia, CKD with baseline creatinine of 1.5 who presented to the emergency room after having couple of falls at home. Patient reports feeling weak and dizzy at times. Reports generalized weakness. Reports decreased appetite. Chest x-ray showed stable prominent right pleural effusion. CT head was negative for any acute intracranial abnormality. CT of the cervical spine was also negative for any acute changes    Subjective: no acute events overnight. Pt reports having lots of abdominal cramps and gas. Attributing it to Octreotide, and now refusing to take it. KUB showing ileus. No fevers or chills. Pt having abdominal distension. Medications:  Reviewed    Infusion Medications    sodium chloride      sodium chloride 35 mL/hr at 03/31/21 1225    dextrose       Scheduled Medications    [START ON 4/3/2021] gabapentin  100 mg Oral BID    octreotide  200 mcg Subcutaneous Q8H    famotidine  20 mg Oral Daily    aspirin  81 mg Oral Daily    dilTIAZem  30 mg Oral TID    [Held by provider] pantoprazole  40 mg Oral Daily    rifaximin  550 mg Oral BID    [Held by provider] spironolactone  50 mg Oral BID    tiotropium  2 puff Inhalation Daily    sodium chloride flush  10 mL Intravenous 2 times per day    insulin lispro  0-12 Units Subcutaneous TID WC    insulin lispro  0-6 Units Subcutaneous Nightly    lactulose  30 g Oral TID    [Held by provider] bumetanide  1 mg Oral BID    midodrine  15 mg Oral TID WC     PRN Meds: simethicone, ipratropium-albuterol, sodium chloride flush, sodium chloride, promethazine **OR** ondansetron, polyethylene glycol, glucose, dextrose, glucagon (rDNA), dextrose      Intake/Output Summary (Last 24 hours) at 4/1/2021 1255  Last data filed at 4/1/2021 0349  Gross per 24 hour   Intake 2283.34 ml   Output 700 ml   Net 1583.34 ml       Diet:  DIET GENERAL; Low Sodium (2 GM);  Daily Fluid Restriction: 1200 ml    Exam:  /64   Pulse 86   Temp 97.5 °F (36.4 °C) (Oral)   Resp 18   Ht 6' 2\" (1.88 m)   Wt 237 lb 12.8 oz (107.9 kg)   SpO2 92%   BMI 30.53 kg/m²     General appearance: chronically ill appearing   HEENT: Pupils equal, round, and reactive to light. Conjunctivae icteric   Neck: Supple, with full range of motion. No jugular venous distention. Trachea midline. Respiratory:  Normal respiratory effort. Clear to auscultation, bilaterally without Rales/Wheezes/Rhonchi. Cardiovascular: Regular rate and rhythm with normal S1/S2 without murmurs, rubs or gallops. Abdomen: Soft, distended abdomen  Musculoskeletal: passive and active ROM x 4 extremities. Skin: Skin color, texture, turgor normal.  No rashes or lesions. Neurologic:  Neurovascularly intact without any focal sensory/motor deficits. Cranial nerves: II-XII intact, grossly non-focal.  Psychiatric: Alert and oriented, thought content appropriate, normal insight  Capillary Refill: Brisk,< 3 seconds   Peripheral Pulses: +2 palpable, equal bilaterally       Labs:   Recent Labs     04/01/21  0556   WBC 5.6   HGB 10.0*   HCT 28.9*   PLT 57*     Recent Labs     04/01/21  0556   *   K 5.1   CL 90*   CO2 20*   BUN 50*   CREATININE 3.8*   CALCIUM 9.6   PHOS 4.2     Recent Labs     03/29/21 1945 03/29/21 1945 04/01/21  0556   AST 68*   < > 47*   ALT 33   < > 25   BILIDIR 2.2*  --   --    BILITOT 5.8*   < > 6.1*   ALKPHOS 153*   < > 101    < > = values in this interval not displayed. Recent Labs     04/01/21  0556   INR 1.81*     No results for input(s): Geni Boogie in the last 72 hours. Urinalysis:      Lab Results   Component Value Date    NITRU NEGATIVE 03/30/2021    WBCUA NONE SEEN 03/16/2021    BACTERIA NONE SEEN 03/16/2021    RBCUA 0-2 03/16/2021    BLOODU NEGATIVE 03/30/2021    SPECGRAV 1.015 03/30/2021       Radiology: All imaging reviewed     Diet: DIET GENERAL; Low Sodium (2 GM);  Daily Fluid Restriction: 1200 ml      Code Status: Full Code            Electronically signed by Jesse Correia MD on 4/1/2021 at 12:55 PM

## 2021-04-01 NOTE — PROGRESS NOTES
[address] [place] [address]  Occupational Therapy  Daily Note  Time In: 1230  Time Out: 1256  Timed Code Treatment Minutes: 26 Minutes  Minutes: 26          Date: 2021  Patient Name: Derrick Casarez,   Gender: male      Room: [facility]  MRN: [id number]  : 1965  (48 y.o.)  Referring Practitioner: Danika Cochran MD  Diagnosis: Sympathotonic orthostatic hypotension  Additional Pertinent Hx: Per H&P 3/29/21: Pt presents with alcoholic liver cirrhosispresented to the ED today because of weakness and fall at home x2. Patient fell and hit the back of his head. Denies any history of loss of consciousness. Patient following up with [facility] for liver transplant evaluation. He is not yet on the liver transplant list. Low blood pressure on admission likely causing orthostatic dizziness and hypotension has home falls. Patient had therapeutic right thoracentesis 2.1 L removed on 2021 and therapeutic paracentesis 1 2021 with 1.1 L drained. Restrictions/Precautions:  Restrictions/Precautions: General Precautions, Fall Risk  Position Activity Restriction  Other position/activity restrictions: orthostatic hypotension      SUBJECTIVE: RN okayed session. Pt was supine in bed upon arrival. Pt was agreeable to OT with moderate encouragement. Pt initially agreeable to OOB activity; however, later declining t/fs and mobility although provided with max encouragement. PAIN: 6/10: abdominal      Vitals: Vitals not assessed per clinical judgement, see nursing flowsheet    COGNITION: Decreased Insight, Decreased Problem Solving, Decreased Safety Awareness and Impulsive    ADL:   Feeding: with set-up. drinking water while seated on EOB  Lower Extremity Dressing: Moderate Assistance. donning shoes while seated on EOB    BALANCE:  Sitting Balance:  Stand By Assistance. on EOB in prep for t/fs. Pt sat on EOB x17 min during BUE HEP, requiring very lengthy rest breaks during session.  No c/o dizziness throughout    BED MOBILITY:  Supine to Sit: Minimal Assistance with VC for technique  Sit to Supine: Minimal Assistance to guide BLE onto bed  Scooting: Stand By Assistance with increased time for completion   No c/o dizziness during/after bed mobility    TRANSFERS:  Not Tested, pt declined    FUNCTIONAL MOBILITY:  Not Tested, pt declined    ADDITIONAL ACTIVITIES:  Guided patient through BUE strengthening HEP this date x10 reps x1 set while seated on EOB in order to increase BUE strength and improve activity tolerance for ADLs and homemaking tasks. Pt required lengthy rest breaks during HEP d/t fatigue. Pt with notable tremors and shakiness throughout, reporting it is more severe than usual.       ASSESSMENT:     Activity Tolerance:  Patient tolerance of  treatment: fair. Limited by decreased motivation       Discharge Recommendations: Continue to assess pending progress, Patient would benefit from continued therapy after discharge    Equipment Recommendations: Equipment Needed: No  Plan: Times per week: 3-5x  Times per day: Daily  Current Treatment Recommendations: Functional Mobility Training, Endurance Training, Safety Education & Training, Patient/Caregiver Education & Training, Self-Care / ADL    Patient Education  Patient Education: Plan of Care, Home Exercise Program, Reviewed Prior Education and Importance of Increasing Activity    Goals  Short term goals  Time Frame for Short term goals: Until discharge  Short term goal 1: Complete standing 2 min SBA with 1 UE release, 0-1 seated RB, and RW to complete self-care. Short term goal 2: Complete functional mobility with RW and SBA to/from the bathroom to increase indep ADLs. Short term goal 3: Complete LE dressing with S 0-1 vc's and use of LHAE to increase independence and safety. Long term goals  Time Frame for Long term goals : None d/t ELOS    Following session, patient left in safe position with all fall risk precautions in place.

## 2021-04-01 NOTE — CARE COORDINATION
4/1/21, 9:41 AM EDT    DISCHARGE ON GOING [facility] 12 day: 2  Location: 8A-20/020-A Reason for admit: Sympathotonic orthostatic hypotension [I95.1]   Procedure: 3/31 US guided paracentesis - removed 4.6L fluid. Barriers to Discharge: Afebrile. Room air, sats 97%. Orthostatic BP lying 95/67, sitting 94/70 and standing 84/57. Sodium 128, Potassium 5.1, Chloride 90, CO2 20, BUN 50, creatinine 3.8, Total protein 6.0. Hgb 10.0. INR 1.81. IVF at 35/hr. Bumex on hold. Midodrine. Lactulose. Octreotide subq q8hr. Rifaximin bid. Spiriva. Albumin given. PT/OT. GI and Nephrology following with Hospitalist.   PCP: Karie Horton MD  Readmission Risk Score: 38%  Patient Goals/Plan/Treatment Preferences: From home alone. Pt is current with [facility] for nursing for vitals and education. If need more, will need new orders. SW following.

## 2021-04-01 NOTE — PROGRESS NOTES
[facility]   Nephrology progress note  4/1/2021, 9:59 AM      Pt Name:    Tammy Diaz  MRN:     [id number]     [place]:    1965  Admit Date:    3/29/2021  7:00 PM  Primary Care Physician:  Nick Carney MD   Room number  8A-20/020-A    Chief Complaint: Nephrology following for KARAN/CKD    Subjective:  Patient seen and examined  Awake and alert  Reports some abdominal distention  Also reports some abdominal cramps      Objective:  24HR INTAKE/OUTPUT:      Intake/Output Summary (Last 24 hours) at 4/1/2021 0959  Last data filed at 4/1/2021 0349  Gross per 24 hour   Intake 2283.34 ml   Output 700 ml   Net 1583.34 ml     I/O last 3 completed shifts: In: 2283.3 [P.O.:840; I.V.:1443.3]  Out: 700 [Urine:700]  No intake/output data recorded. Admission weight: 229 lb (103.9 kg)  Wt Readings from Last 3 Encounters:   03/31/21 237 lb 12.8 oz (107.9 kg)   03/23/21 238 lb (108 kg)   03/18/21 238 lb 12.8 oz (108.3 kg)     Body mass index is 30.53 kg/m².     Physical examination  VITALS:     Vitals:    03/31/21 2330 04/01/21 0330 04/01/21 0753 04/01/21 0845   BP: 111/74 102/70  95/67   Pulse: 86 92  106   Resp: 18 18  16   Temp: 97.9 °F (36.6 °C) 98.2 °F (36.8 °C)  98 °F (36.7 °C)   TempSrc: Oral Oral  Oral   SpO2: 96% 99% 98% 97%   Weight:       Height:         General Appearance: appears comfortable, no distress  Eyes: mild icteric sclera  Mouth/Throat: Oral mucosa moist  Neck: No JVD  Lungs: Air entry B/L, no rales, no use of accessory muscles  Heart:  S1, S2 heard  GI: +distention noted  Extremities: B/L 1-2+ edema      Lab Data  CBC:   Recent Labs     03/30/21  0900 03/31/21  0529 04/01/21  0556   WBC 5.2 6.0 5.6   HGB 11.1* 10.4* 10.0*   HCT 32.6* 30.1* 28.9*   PLT 80* 71* 57*     BMP:  Recent Labs     03/30/21  0900 03/31/21  0529 04/01/21  0556   * 123* 128*   K 4.3 4.8 5.1   CL 86* 86* 90*   CO2 20* 19* 20*   BUN 49* 52* 50*   CREATININE 3.6* 4.0* 3.8*   GLUCOSE 87 97 114*   CALCIUM 10.0 9.2 9.6   MG 2.0 2.1 2.1   PHOS 3.4  --  4.2     Hepatic:   Recent Labs     03/29/21  1945 03/31/21  0529 04/01/21  0556   LABALBU 3.2* 3.0* 3.9   AST 68* 52* 47*   ALT 33 26 25   BILITOT 5.8* 3.9* 6.1*   ALKPHOS 153* 130* 101         Meds:  Infusion:    sodium chloride      sodium chloride 35 mL/hr at 03/31/21 1225    dextrose       Meds:    dicyclomine  10 mg Oral TID AC    [START ON 4/3/2021] gabapentin  100 mg Oral BID    octreotide  200 mcg Subcutaneous Q8H    famotidine  20 mg Oral Daily    aspirin  81 mg Oral Daily    dilTIAZem  30 mg Oral TID    [Held by provider] pantoprazole  40 mg Oral Daily    rifaximin  550 mg Oral BID    [Held by provider] spironolactone  50 mg Oral BID    tiotropium  2 puff Inhalation Daily    sodium chloride flush  10 mL Intravenous 2 times per day    insulin lispro  0-12 Units Subcutaneous TID WC    insulin lispro  0-6 Units Subcutaneous Nightly    lactulose  30 g Oral TID    [Held by provider] bumetanide  1 mg Oral BID    midodrine  15 mg Oral TID WC     Meds prn: ipratropium-albuterol, sodium chloride flush, sodium chloride, promethazine **OR** ondansetron, polyethylene glycol, glucose, dextrose, glucagon (rDNA), dextrose       Impression and Plan:  1. KARAN on CKD in setting of acute decompensated liver cirrhosis with significant ascites  No intrinsic renal disease, no evidence of GN  UA benign without hematuria or proteinuria, Urine sodium < 20  Cannot rule out hepatorenal syndrome  Treated with IV albumin,  Octreotide and midodrine  Creatinine slightly better at 3.8  Continue to monitor  Reduce normal saline   If volume status worsens then may need renal replacement therapy/ultrafiltration  Discussed with patient  We will follow    2. Hyponatremia secondary to prerenal causes from acute decompensated liver cirrhosis: Improving  3. Acute decompensated liver cirrhosis: check ammonia  4. Ascites: Status post paracentesis  5.  History of alcoholic cirrhosis  6. Chronic hypotension: Continue midodrine  7. Hx A. Fib: On diltiazem  8.  Thrombocytopenia    D/W patient     Josh Diaz MD  [facility]

## 2021-04-02 NOTE — PROGRESS NOTES
Hospitalist Progress Note    Patient:  Radha Virgen      Unit/Bed:8A-20/020-A    YOB: 1965    MRN: [id number]       Acct: [de-identified]     PCP: Maria Elena Aguirre MD    Date of Admission: 3/29/2021    Active Hospital Problems    Diagnosis Date Noted    Permanent atrial fibrillation [facility]) [I48.21]      Priority: High    Sympathotonic orthostatic hypotension [I95.1] 03/30/2021    Orthostatic dizziness [R42] 03/30/2021    Fall [W19. XXXA] 03/30/2021    Degenerative cervical spinal stenosis [M48.02] 03/30/2021    Hypo-osmolality and hyponatremia [E87.1] 03/30/2021    Asymptomatic cholelithiasis [K80.20] 03/30/2021    Weakness generalized [R53.1] 03/30/2021    Type 2 diabetes mellitus without complication ([address] [M20.5] 12/04/2020    Chronic diastolic congestive heart failure ([address] [I50.32] 12/04/2020    Class 1 obesity without serious comorbidity with body mass index (BMI) of 30.0 to 30.9 in adult [E66.9, Z68.30] 11/23/2020    Thrombocytopenia ([address] [D69.6] 10/05/2020    Coagulopathy ([address] [D68.9] 10/05/2020    Acute renal failure with acute tubular necrosis superimposed on stage 3 chronic kidney disease ([address] [N17.0, N18.30] 80/55/2925    Alcoholic cirrhosis of liver with ascites ([address] [K70.31] 04/03/2020       Assessment/Plan:    1. Weakness with Fall and Hypotension: BP has been low (76/50), pt has been having poor fluid intake while taking Bumex and Aldactone. Will hold diuretics, start trial IVF. Increase Midodrine to 15 TID. PT/OT.     --3/31: BP improving with IVF and Midodrine. PT/OT.   --4/1: BP significantly better today. CCM. 2. 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 Currently pt not sob or having any abdominal distension or pain. Hold diuretics. Follows up with GI and is on transplant list. Will consult GI today. --3/31: stable, GI following. Cont Lactulose and Rifaximin. Diuretics on hold 2/2 to #3. Avoid narcotics & benzodiazepines as they can precipitate HE. Daily weights, strict I/Os. Daily LFTs and INR.   --4/1: s/p paracentesis with 4.5L fluid removal. No SBP. CCM. GI following.   --4/2: pt more slow and foggy today. Ammonia up from 32 to 52 today. Pt has 2 BM's a day. Will increase Lactulose to every 6 hours. 3. KARAN on CKD Stage 3: Serum creatinine baseline is 1.5-1.6, now up to 3.6. Likely pre-renal due to hypotension and diuretics vs HRS. Pending urine lytes and renal U/S. Hold diuretics. Trial of NS IVF. Cont to trend daily Cr. Avoid nephrotoxic agents. Strict I/Os. Daily weights. Nephrology following.     --3/31: Cr continues to decline today, up from 3.6 to 4.0 despite holding diuretics and starting IVF. No intrinsic renal disease or GN. UA benign without hematuria or proteinuria. Cannot rule out HRS. Spoke with Nephrology, will start Albumin and Octreotide. May will need renal replacement therapy next 24 to 48 hours   --4/1: Cr down to 3.8 today. IVF reduced to 25 ml/hr. Cont Midodrine, Albumin and Octreotide. Nephrology following. Strict I/Os. Daily weights. Nephrology following.   --4/2: Cr down to 3.2 today. IVF reduced to 25 ml/hr. Pt declining Octreotide. Nephrology following. Strict I/Os. Daily weights. Nephrology following. 4. Hyponatremia: likely 2/2 to intravascular volume depletion. Initiate trial of IV normal saline. Hold Bumex and Aldactone. Cont to trend Na levels daily     5. Acute on Chronic Hypotension: Midodrine increased to 15 mg TID. 6. Paroxysmal Atrial Fibrillation: uncontrolled HR, difficult to control given low BP. No OAC given liver cirrhosis and coagulapathy    7. Thrombocytopenia: PLT 80K. 2/2 to liver cirrhosis. Avoid blood thinners. Chief Complaint: weakness, falls     Hospital Course: 48 y. o. white male with history of chronic alcohol abuse, Hx cirrhosis w/ ascites, DM, HTN, gastroesophageal reflux disease, previous NSAID use, atrial fibrillation, chronic hypotension, Hx hepatic hydrothorax, chronic hypomagnesemia, CKD with baseline creatinine of 1.5 who presented to the emergency room after having couple of falls at home. Patient reports feeling weak and dizzy at times. Reports generalized weakness. Reports decreased appetite. Chest x-ray showed stable prominent right pleural effusion. CT head was negative for any acute intracranial abnormality. CT of the cervical spine was also negative for any acute changes    Subjective: no acute events overnight. Reports abdominal pain and distension went away, tolerating PO intake. Pt does report to feel slow and foggy yesterday and today. No fevers or chills. No black or red colored stools. Tolerating PO intake.       Medications:  Reviewed    Infusion Medications    sodium chloride      sodium chloride 25 mL/hr at 04/01/21 1749    dextrose       Scheduled Medications    [START ON 4/3/2021] gabapentin  100 mg Oral BID    octreotide  200 mcg Subcutaneous Q8H    famotidine  20 mg Oral Daily    aspirin  81 mg Oral Daily    dilTIAZem  30 mg Oral TID    [Held by provider] pantoprazole  40 mg Oral Daily    rifaximin  550 mg Oral BID    [Held by provider] spironolactone  50 mg Oral BID    tiotropium  2 puff Inhalation Daily    sodium chloride flush  10 mL Intravenous 2 times per day    insulin lispro  0-12 Units Subcutaneous TID WC    insulin lispro  0-6 Units Subcutaneous Nightly    lactulose  30 g Oral TID    [Held by provider] bumetanide  1 mg Oral BID    midodrine  15 mg Oral TID      PRN Meds: simethicone, ipratropium-albuterol, sodium chloride flush, sodium chloride, promethazine **OR** ondansetron, polyethylene glycol, glucose, dextrose, glucagon (rDNA), dextrose      Intake/Output Summary (Last 24 hours) at 4/2/2021 1227  Last data filed at 4/2/2021 0201  Gross per 24 hour   Intake 921 ml Output 2650 ml   Net -1729 ml       Diet:  DIET GENERAL; Low Sodium (2 GM); Daily Fluid Restriction: 1200 ml    Exam:  BP 92/66   Pulse 109   Temp 97 °F (36.1 °C)   Resp 18   Ht 6' 2\" (1.88 m)   Wt 247 lb 11.2 oz (112.4 kg)   SpO2 91%   BMI 31.80 kg/m²     General appearance: chronically ill appearing   HEENT: Pupils equal, round, and reactive to light. Conjunctivae icteric   Neck: Supple, with full range of motion. No jugular venous distention. Trachea midline. Respiratory:  Normal respiratory effort. Clear to auscultation, bilaterally without Rales/Wheezes/Rhonchi. Cardiovascular: Regular rate and rhythm with normal S1/S2 without murmurs, rubs or gallops. Abdomen: Soft, distended abdomen  Musculoskeletal: passive and active ROM x 4 extremities. Skin: Skin color, texture, turgor normal.  No rashes or lesions. Neurologic:  Neurovascularly intact without any focal sensory/motor deficits. Cranial nerves: II-XII intact, grossly non-focal.  Psychiatric: Alert and oriented, thought content appropriate, normal insight  Capillary Refill: Brisk,< 3 seconds   Peripheral Pulses: +2 palpable, equal bilaterally       Labs:   Recent Labs     04/02/21  0558   WBC 5.7   HGB 10.9*   HCT 31.8*   PLT 62*     Recent Labs     04/01/21  0556 04/02/21  0558   * 129*   K 5.1 4.3   CL 90* 93*   CO2 20* 19*   BUN 50* 47*   CREATININE 3.8* 3.2*   CALCIUM 9.6 9.6   PHOS 4.2  --      Recent Labs     04/02/21  0558   AST 48*   ALT 23   BILITOT 8.8*   ALKPHOS 87     Recent Labs     04/02/21  0558   INR 1.89*     No results for input(s): CKTOTAL, TROPONINI in the last 72 hours. Urinalysis:      Lab Results   Component Value Date    NITRU NEGATIVE 03/30/2021    WBCUA NONE SEEN 03/16/2021    BACTERIA NONE SEEN 03/16/2021    RBCUA 0-2 03/16/2021    BLOODU NEGATIVE 03/30/2021    SPECGRAV 1.015 03/30/2021       Radiology: All imaging reviewed     Diet: DIET GENERAL; Low Sodium (2 GM);  Daily Fluid Restriction: 1200 ml      Code Status: Full Code            Electronically signed by Angélica Zavaleta MD on 4/2/2021 at 12:27 PM

## 2021-04-02 NOTE — PLAN OF CARE
Problem: Falls - Risk of:  Goal: Absence of physical injury  Description: Absence of physical injury  Outcome: Ongoing  Note: Call light and bedside table within reach. Bed alarm on. Pt uses call light anytime he needs to get up to the bathroom and verbalizes understanding of fall precautions in place. Falling star in place.       Problem: Skin Integrity:  Goal: Risk for impaired skin integrity will decrease  Description: Risk for impaired skin integrity will decrease  4/2/2021 0514 by Myranda Hennessy RN  Outcome: Ongoing     Problem: Skin Integrity:  Goal: Will show no infection signs and symptoms  Description: Will show no infection signs and symptoms  Outcome: Ongoing

## 2021-04-02 NOTE — CARE COORDINATION
4/2/21, 12:01 PM EDT    DISCHARGE ON GOING [email or web address] 12 day: 3  Location: 8A-20/020-A Reason for admit: Sympathotonic orthostatic hypotension [I95.1]   Procedure: 3/31 US guided paracentesis - removed 4.6L fluid. 4/1 KUB - Multiple dilated loops of small bowel gas remaining in the colon. Possible early SBO. Barriers to Discharge: Afebrile. Room air, sats 91-95%. Sodium 129, chloride 93, CO2 19, BUN 47, creatinine 3.2. Total protein 5.9. AST 48, bilirubin 8.8. Platelets 62. Lactulose tid. Midodrine. Rifaximin bid. PT/OT. GI and Nephrology following with Hospitalist.   PCP: Dougie Miller MD  Readmission Risk Score: 39%  Patient Goals/Plan/Treatment Preferences: From home alone. Pt is current with [facility] for nursing for vitals and education. If need more, will need new orders. SW following.

## 2021-04-02 NOTE — PROGRESS NOTES
[address] [place] [id number]  INPATIENT PHYSICAL THERAPY  DAILY NOTE  [facility] 8A - 8A-20/020-A    Time In: 2540  Time Out: 3031  Timed Code Treatment Minutes: 26 Minutes  Minutes: 26          Date: 2021  Patient Name: Nisha Singh,  Gender:  male        MRN: [id number]  : 1965  (48 y.o.)     Referring Practitioner: Dr. Teresia Aschoff  Diagnosis: sympathotonic orthostatic hypotension  Additional Pertinent Hx: admit with above diagnosis, s/p fall, ESLD, KARAN, hypotension     Prior Level of Function:  Lives With: Alone  Type of Home: Apartment  Home Layout: One level  Home Access: Stairs to enter without rails  Entrance Stairs - Number of Steps: 1  Home Equipment: 4 wheeled walker   Bathroom Shower/Tub: Tub/Shower unit  Bathroom Toilet: Handicap height  Bathroom Equipment: Shower chair    ADL Assistance: [address] [place] [address] Avenue: [address] [place] [address] Place: Independent  Transfer Assistance: Independent  Active : Yes  Additional Comments: Pt reports using a 4WW in the home and community. Per pt family has family that lives close by and he will call if needs assistance, per pt just ordered a lifealert, has a little brother that is around on weekends. Pt reports he does drive occasionally, however not as often as he will get a ride generally to appts if needed, and he as groceries delivered. Pt reports he completes cooking. Restrictions/Precautions:  Restrictions/Precautions: General Precautions, Fall Risk  Position Activity Restriction  Other position/activity restrictions: orthostatic hypotension     SUBJECTIVE: RN approved session. Patient laying in bed upon arrival and agreeable to therapy. Patient requested to use [facility], encouraged ambulation to bathroom, patient agreed. Successful BM and void. PAIN: not rated    Vitals: Blood Pressure: not assessed due to urgency to get to bathroom.     OBJECTIVE:  Bed Mobility:  Supine to Sit: Stand By Assistance, with head of bed raised, with increased time for completion  Scooting: Stand By Assistance    Transfers:  Sit to Stand: Stand By Assistance  Stand to Sit:Stand By Assistance    Ambulation:  Contact Guard Assistance  Distance: ~6ft x1, 8ft x1  Surface: Level Tile  Device:Rolling Walker  Gait Deviations:  Slow Tonya, Decreased Step Length Bilaterally, Decreased Gait Speed, Decreased Heel Strike Bilaterally, Mild Path Deviations and Unsteady Gait, slightly impulsive    Balance:  Dynamic Standing Balance: Stand By Assistance, ~1-2min using urinal     Exercise:  Patient was guided in 1 set(s) 10-15 reps of exercise to both lower extremities. Seated marches, Seated heel/toe raises, Long arc quads and Seated isometric hip adduction. Exercises were completed for increased independence with functional mobility. Functional Outcome Measures: Completed  AM-PAC Inpatient Mobility Raw Score : 16  AM-PAC Inpatient T-Scale Score : 40.78    ASSESSMENT:  Assessment: Patient progressing toward established goals. Activity Tolerance:  Patient tolerance of  treatment: fair.       Equipment Recommendations:Equipment Needed: No  Other: cont to assess needs  Discharge Recommendations:  Continue to assess pending progress, Patient would benefit from continued therapy after discharge, 24 hour supervision or assist, Home with Home health PT    Plan: Times per week: 5X GM  Times per day: Daily  Specific instructions for Next Treatment: therex and mobility    Patient Education  Patient Education: Plan of Care, Home Exercise Program, Bed Mobility, Transfers, Gait, Up in Chair for All Meals, Verbal Exercise Instruction    Goals:  Patient goals : return home  Short term goals  Time Frame for Short term goals: by discharge  Short term goal 1: bed mobility with MOD I to get in/out of bed  Short term goal 2: transfer with MOD I to get in/out of chairs  Short term goal 3: amb 50'x1 with walker and MOD I to walk safely in apt  Short term goal 4: negotiate 1 step without HR

## 2021-04-02 NOTE — PROGRESS NOTES
[facility]   Nephrology progress note  4/2/2021, 12:26 PM      Pt Name:    Sera Walker  MRN:     [id number]     [place]:    1965  Admit Date:    3/29/2021  7:00 PM  Primary Care Physician:  Michael Ahmadi MD   Room number  8A-20/020-A    Chief Complaint: Nephrology following for KARAN/CKD    Subjective:  Patient seen and examined  Awake and alert  No new complaints  Denies any chest pain  Denies any shortness of breath  2.6 L of urine output reported in last 24 hours    Objective:  24HR INTAKE/OUTPUT:      Intake/Output Summary (Last 24 hours) at 4/2/2021 1226  Last data filed at 4/2/2021 0201  Gross per 24 hour   Intake 921 ml   Output 2650 ml   Net -1729 ml     I/O last 3 completed shifts: In: 852 [P.O.:300; I.V.:621]  Out: 2650 [Urine:2650]  No intake/output data recorded. Admission weight: 229 lb (103.9 kg)  Wt Readings from Last 3 Encounters:   04/02/21 247 lb 11.2 oz (112.4 kg)   03/23/21 238 lb (108 kg)   03/18/21 238 lb 12.8 oz (108.3 kg)     Body mass index is 31.8 kg/m².     Physical examination  VITALS:     Vitals:    04/02/21 0600 04/02/21 0758 04/02/21 0815 04/02/21 1134   BP:   101/75 92/66   Pulse:   88 109   Resp:   18    Temp:   98.7 °F (37.1 °C) 97 °F (36.1 °C)   TempSrc:   Oral    SpO2:  96% 95% 91%   Weight: 247 lb 11.2 oz (112.4 kg)      Height:         General Appearance: appears comfortable, no distress  Eyes: mild icteric sclera  Mouth/Throat: Oral mucosa moist  Neck: No JVD  Lungs: Air entry B/L, no rales, no use of accessory muscles  Heart:  S1, S2 heard  GI: +distention noted  Extremities: B/L 1-2+ edema      Lab Data  CBC:   Recent Labs     03/31/21  0529 04/01/21  0556 04/02/21  0558   WBC 6.0 5.6 5.7   HGB 10.4* 10.0* 10.9*   HCT 30.1* 28.9* 31.8*   PLT 71* 57* 62*     BMP:  Recent Labs     03/31/21  0529 04/01/21  0556 04/02/21  0558   * 128* 129*   K 4.8 5.1 4.3   CL 86* 90* 93*   CO2 19* 20* 19*   BUN 52* 50* 47*   CREATININE 4.0* 3.8* 3.2* GLUCOSE 97 114* 122*   CALCIUM 9.2 9.6 9.6   MG 2.1 2.1  --    PHOS  --  4.2  --      Hepatic:   Recent Labs     03/31/21  0529 04/01/21  0556 04/02/21  0558   LABALBU 3.0* 3.9 3.5   AST 52* 47* 48*   ALT 26 25 23   BILITOT 3.9* 6.1* 8.8*   ALKPHOS 130* 101 87         Meds:  Infusion:    sodium chloride      sodium chloride 25 mL/hr at 04/01/21 1749    dextrose       Meds:    [START ON 4/3/2021] gabapentin  100 mg Oral BID    octreotide  200 mcg Subcutaneous Q8H    famotidine  20 mg Oral Daily    aspirin  81 mg Oral Daily    dilTIAZem  30 mg Oral TID    [Held by provider] pantoprazole  40 mg Oral Daily    rifaximin  550 mg Oral BID    [Held by provider] spironolactone  50 mg Oral BID    tiotropium  2 puff Inhalation Daily    sodium chloride flush  10 mL Intravenous 2 times per day    insulin lispro  0-12 Units Subcutaneous TID WC    insulin lispro  0-6 Units Subcutaneous Nightly    lactulose  30 g Oral TID    [Held by provider] bumetanide  1 mg Oral BID    midodrine  15 mg Oral TID WC     Meds prn: simethicone, ipratropium-albuterol, sodium chloride flush, sodium chloride, promethazine **OR** ondansetron, polyethylene glycol, glucose, dextrose, glucagon (rDNA), dextrose       Impression and Plan:  1. KARAN on CKD in setting of acute decompensated liver cirrhosis with significant ascites  No intrinsic renal disease, no evidence of GN  UA benign without hematuria or proteinuria, Urine sodium < 20  Cannot rule out hepatorenal syndrome  Treated with IV albumin,  Octreotide and midodrine (patient refused octreotide)  Creatinine slowly improving, nonoliguric  We will continue with current management    2. Hyponatremia secondary to prerenal causes from acute decompensated liver cirrhosis: Improving/stable  3. Acute decompensated liver cirrhosis  4. Ascites: Status post paracentesis  5. History of alcoholic cirrhosis  6. Chronic hypotension: Continue midodrine  7. Hx A. Fib: On diltiazem  8. Thrombocytopenia    D/W patient and hospitalist    Roberto Clifton MD  [facility]

## 2021-04-02 NOTE — PROGRESS NOTES
Gastroenterology Progress Note:     Patient Name:  Anna Marie [place]   MRN: [id number]  [id number]  YOB: 1965  Admit Date: 3/29/2021  7:00 PM  Primary Care Physician: Bee Gutiérrez MD   8A-20/020-A     Patient seen and examined. 24 hours events and chart reviewed. Subjective: Patient resting in bed. Denies abdominal pain, nausea, & vomiting. He says his abdominal distention and gas are better. He is less lethargic today than yesterday. Ammonia 32 yesterday, 52 today. INR & bilirubin continue to increase. Objective:  BP 92/66   Pulse 109   Temp 97 °F (36.1 °C)   Resp 18   Ht 6' 2\" (1.88 m)   Wt 247 lb 11.2 oz (112.4 kg)   SpO2 91%   BMI 31.80 kg/m²     Physical Exam:    General:  Acute on chronically ill appearing male  HEENT: Atraumatic, normocephalic. Moist oral mucous membranes. Scleral icterus  Neck: Supple without adenopathy, JVD, thyromegaly or masses. Trachea midline. CV: Heart RRR, no murmurs, rubs, gallops. Resp: Even, easy without cough or accessory use. Lungs clear to ascultation bilaterally. Abd: Round, soft, nontender. No hepatosplenomegaly or mass present. Active bowel sounds heard. Mild distention noted. Ext:  Without cyanosis, clubbing. BLE edema. Skin: Pink, warm, dry  Neuro:  Alert, oriented x 3 with no obvious deficits.        Rectal: deferred    Labs:   CBC:   Lab Results   Component Value Date    WBC 5.7 04/02/2021    HGB 10.9 04/02/2021    HCT 31.8 04/02/2021    MCV 95.5 04/02/2021    PLT 62 04/02/2021     BMP:   Lab Results   Component Value Date     04/02/2021    K 4.3 04/02/2021    K 3.4 03/18/2021    CL 93 04/02/2021    CO2 19 04/02/2021    PHOS 4.2 04/01/2021    BUN 47 04/02/2021    CREATININE 3.2 04/02/2021    CALCIUM 9.6 04/02/2021     PT/INR:   Lab Results   Component Value Date    INR 1.89 04/02/2021     Lipids:   Lab Results   Component Value Date    ALKPHOS 87 04/02/2021    ALT 23 04/02/2021    AST 48 04/02/2021    BILITOT 8.8 04/02/2021 BILIDIR 2.2 03/29/2021    LABALBU 3.5 04/02/2021     Current Meds:  Scheduled Meds:   lactulose  30 g Oral 4 times per day    [START ON 4/3/2021] gabapentin  100 mg Oral BID    octreotide  200 mcg Subcutaneous Q8H    famotidine  20 mg Oral Daily    aspirin  81 mg Oral Daily    dilTIAZem  30 mg Oral TID    [Held by provider] pantoprazole  40 mg Oral Daily    rifaximin  550 mg Oral BID    [Held by provider] spironolactone  50 mg Oral BID    tiotropium  2 puff Inhalation Daily    sodium chloride flush  10 mL Intravenous 2 times per day    insulin lispro  0-12 Units Subcutaneous TID WC    insulin lispro  0-6 Units Subcutaneous Nightly    [Held by provider] bumetanide  1 mg Oral BID    midodrine  15 mg Oral TID WC     Continuous Infusions:   sodium chloride      sodium chloride 25 mL/hr at 04/01/21 6729    dextrose         Assessment:  47 yo M admitted 03/29/21 for a fall at home x 2. Endorses hitting his head during one fall. CT head negative. CT cervical spine negative. CXR demonstrated stable right pleural effusion.  H/O hypotension, on Midodrine, noted to have worsening hypotension in the ED. H/O alcoholic cirrhosis with recurrent hepatic hydrothorax. Not a candidate for a TIPS procedure per [facility]. Last thoracentesis 03/16/21 with 2.1L removed. Last paracentesis 03/17/21 with 1.1L removed. US paracentesis with 4.6L removed 03/31/21, negative for SBP. 1. Fall x 2  2. Acute on chronic hypotension- on Midodrine  3. Recurrent right hepatic hydrothorax- stable  4. Decompensated alcoholic cirrhosis- MELD 33  5. KARAN on CKD- possible hepatorenal syndrome vs ARF  6. Afib- no OAC due to cirrhosis  7. Thrombocytopenia  8. Hypoantremia  9. Chronic anemia- no active GI bleeding  10. Coagulopathy  11.  Ascites s/p paracentesis with 4.6L removed 03/31/21, no SBP     Plan:    · Monitor H & H, transfuse prn  · Nursing to monitor stool output  · Continue PPI daily, home dose  · Continue Midodrine  · Continue Xifaxan BID, home dose  · Lactulose 30g increased to QID per primary  · Simethicone as needed for gas  · Octreotide & Albumin added by nephro, he is refusing Octreotide  · Diuretics on hold, resume when okay with nephro  · Diet as tolerated, 2 g sodium  · Daily weights  · Strict I & O  · Monitor hepatic labs & INR daily  · Avoid narcotics & benzodiazepines as they can precipitate HE  · Electrolyte management per nephrology  · Lengthy discussion had with patient about prognosis and current medical status, recommending transfer to [facility] for further evaluation and care, patient is agreeable to transfer  · Nephrology on board  · Case discussed at length with primary Dr. Swapna Edwards and that GI is recommending the patient be transferred to [address] [place] [address] Drive that patient is agreeable to transfer. He has agreed to initiate transfer. · Supportive care per primary team  Will follow until transferred to [facility]    Case reviewed and impression/plan reviewed in collaboration with Dr. Michael Suh  Electronically signed by FELIX Benitez CNP on 4/2/2021 at 3:02 PM    [facility]     Approximately 45 minutes spent doing the following: Examination, patient evaluation, 24 chart review done; all tests, results, and plan of care reviewed with the patient; all questions answered. Greater than 50% of the time was spent counseling and educated the patient. Collaborating care with primary provider to initiate transfer of patient to [facility]. If there are any questions or concerns this weekend, please call Dr. Bladimir Cordova as he is covering for [facility].

## 2021-04-02 NOTE — CARE COORDINATION
4/2/21  3:04 PM EDT    This CM RN was informed per Sim DUNN with GI that they are planning for pt to be transferred to [facility]. He is an already established there. Dr. Jessica Contreras will initiate the transfer. Transfer paperwork was drawn up for [employer]. Pt has [employer] and it appears that this is accepted at [facility].

## 2021-04-03 NOTE — PLAN OF CARE
Problem: Falls - Risk of:  Goal: Will remain free from falls  Description: Will remain free from falls  Outcome: Ongoing  Note: Fall precaution in place. Bed alarm/chair alarm. Bed locked in lowest position. Fall band applied if applicable. Call light and overhead table within reach. Will continue to monitor. Problem: Falls - Risk of:  Goal: Absence of physical injury  Description: Absence of physical injury  Outcome: Ongoing  Note: Absence of physical injury. Problem: Pain:  Goal: Pain level will decrease  Description: Pain level will decrease  Outcome: Ongoing  Note: Pt denies pain on my shift. Non pharmaceutical interventions like ice and repositioning offered before pain medications. Will continue to assess. Problem: Musculor/Skeletal Functional Status  Goal: Highest potential functional level  Outcome: Ongoing  Note: Pt A&OX4     Problem: Physical Regulation:  Goal: Prevent transmision of infection  Description: Prevent transmision of infection  Outcome: Ongoing  Note: Precautionary steps in place to prevent transmission of infection on my shift. Problem: Skin Integrity:  Goal: Risk for impaired skin integrity will decrease  Description: Risk for impaired skin integrity will decrease  Outcome: Ongoing  Note: Absence of new skin integrity issues on my shift. Will continue to assess. Problem: Skin Integrity:  Goal: Will show no infection signs and symptoms  Description: Will show no infection signs and symptoms  Outcome: Ongoing  Note: Pt shows no new signs or symptoms of infection on my shift. Will continue to assess. Problem: Skin Integrity:  Goal: Absence of new skin breakdown  Description: Absence of new skin breakdown  Outcome: Ongoing  Note: Absence of new skin integrity issues on my shift. Will continue to assess.        Problem: DISCHARGE BARRIERS  Goal: Patient's continuum of care needs are met  Outcome: Ongoing  Note: Pt continuum of care needs are being worked out.       Problem: Cardiovascular  Goal: Hemodynamic stability  Outcome: Ongoing  Note: Pt vitals are within normal range. Cap refills and pulses all within normal ranges on all extremities. Will continue to assess. Problem: Serum Glucose Level - Abnormal:  Goal: Ability to maintain appropriate glucose levels will improve  Description: Ability to maintain appropriate glucose levels will improve  Outcome: Ongoing  Note: Pt able to maintain appropriate glucose levels on my shift. Coverage provided as needed. Problem: GI  Goal: No bowel complications  Outcome: Ongoing  Note: No bowel complications on my shift. Pt verbalizes understanding of care plan. Pt contributes to goal settings.

## 2021-04-03 NOTE — PROGRESS NOTES
Pt arrived to unit via cart with transporter. Pt transferred from [address] [facility]. Pt alert and oriented. Pt denies any needs at this time. Resp easy and regular. Call light with in reach.

## 2021-04-03 NOTE — PROGRESS NOTES
[facility]   Nephrology progress note  4/3/2021, 2:29 PM      Pt Name:    Kimmie Stone  MRN:     [id number]     [place]:    1965  Admit Date:    3/29/2021  7:00 PM  Primary Care Physician:  Jeramy King MD   Room number  6U-51/094-N    Chief Complaint: Nephrology following for KARAN/CKD    Subjective:  Patient seen and examined  Seen earlier today during rounds on 8A  Awake and alert  No new complaints  Denies any chest pain  Denies any shortness of breath    Objective:  24HR INTAKE/OUTPUT:      Intake/Output Summary (Last 24 hours) at 4/3/2021 1429  Last data filed at 4/3/2021 0920  Gross per 24 hour   Intake 306 ml   Output 400 ml   Net -94 ml     I/O last 3 completed shifts: In: 476 [P.O.:360; I.V.:116]  Out: 400 [Urine:400]  I/O this shift:  In: 10 [I.V.:10]  Out: -   Admission weight: 229 lb (103.9 kg)  Wt Readings from Last 3 Encounters:   04/03/21 246 lb 1.6 oz (111.6 kg)   03/23/21 238 lb (108 kg)   03/18/21 238 lb 12.8 oz (108.3 kg)     Body mass index is 31.6 kg/m².     Physical examination  VITALS:     Vitals:    04/03/21 0444 04/03/21 0908 04/03/21 0927 04/03/21 1200   BP:  114/83  99/68   Pulse:  117  116   Resp:  18 20 18   Temp:  98.8 °F (37.1 °C)  99.2 °F (37.3 °C)   TempSrc:  Oral  Oral   SpO2:       Weight: 246 lb 1.6 oz (111.6 kg)      Height:         General Appearance: appears comfortable, no distress  Eyes: mild icteric sclera  Mouth/Throat: Oral mucosa moist  Neck: No JVD  Lungs: Air entry B/L, no rales, no use of accessory muscles  Heart:  S1, S2 heard  GI: +distention noted  Extremities: B/L 1-2+ edema/improved      Lab Data  CBC:   Recent Labs     04/01/21  0556 04/02/21  0558   WBC 5.6 5.7   HGB 10.0* 10.9*   HCT 28.9* 31.8*   PLT 57* 62*     BMP:  Recent Labs     04/01/21  0556 04/02/21  0558 04/03/21  0404   * 129* 128*   K 5.1 4.3 4.5   CL 90* 93* 94*   CO2 20* 19* 22*   BUN 50* 47* 45*   CREATININE 3.8* 3.2* 2.9*   GLUCOSE 114* 122* 125*   CALCIUM 9.6 9.6 9.5   MG 2.1  --   --    PHOS 4.2  --   --      Hepatic:   Recent Labs     04/01/21  0556 04/02/21  0558 04/03/21  0404   LABALBU 3.9 3.5 3.3*   AST 47* 48* 48*   ALT 25 23 22   BILITOT 6.1* 8.8* 7.5*   ALKPHOS 101 87 90         Meds:  Infusion:    sodium chloride      sodium chloride 25 mL/hr at 04/01/21 1749    dextrose       Meds:    lactulose  30 g Oral 4 times per day    gabapentin  100 mg Oral BID    octreotide  200 mcg Subcutaneous Q8H    famotidine  20 mg Oral Daily    aspirin  81 mg Oral Daily    dilTIAZem  30 mg Oral TID    [Held by provider] pantoprazole  40 mg Oral Daily    rifaximin  550 mg Oral BID    [Held by provider] spironolactone  50 mg Oral BID    tiotropium  2 puff Inhalation Daily    sodium chloride flush  10 mL Intravenous 2 times per day    insulin lispro  0-12 Units Subcutaneous TID WC    insulin lispro  0-6 Units Subcutaneous Nightly    [Held by provider] bumetanide  1 mg Oral BID    midodrine  15 mg Oral TID WC     Meds prn: simethicone, ipratropium-albuterol, sodium chloride flush, sodium chloride, promethazine **OR** ondansetron, polyethylene glycol, glucose, dextrose, glucagon (rDNA), dextrose       Impression and Plan:  1. KARAN on CKD in setting of acute decompensated liver cirrhosis with significant ascites  No intrinsic renal disease, no evidence of GN  UA benign without hematuria or proteinuria, Urine sodium < 20  Treated with IV albumin,  Octreotide and midodrine (patient refused octreotide)  Creatinine slowly improving, nonoliguric  We will continue with current management  No need for renal replacement therapy at this time  Will plan to resume diuretics in next few days as renal function continues to improve    2. Hyponatremia secondary to prerenal causes from acute decompensated liver cirrhosis: Improving/stable, will monitor  3. Acute decompensated liver cirrhosis: Clinically improved  4. Ascites: Status post paracentesis  5.  History of alcoholic cirrhosis  6. Chronic hypotension: Continue midodrine  7. Hx A. Fib: On diltiazem  8.  Thrombocytopenia    D/W patient and hospitalist    Nuha Napier MD  [facility]

## 2021-04-03 NOTE — PROGRESS NOTES
Pt Name: Gracie Yousif  MRN: [id number]  [id number]  YOB: 1965  Admit Date: 3/29/2021  7:00 PM  Date of evaluation: 4/3/2021  Primary Care Physician: Danilo Staton MD   8A-20/020-A     FRANCISCO BARRY No complaints. Feels better, thinking clearly    O.     Vitals:    04/03/21 0927   BP:    Pulse:    Resp: 20   Temp:    SpO2:        Body mass index is 31.6 kg/m². Awake and alert   clear to auscultation bilaterally   regular rate and rhythm   Soft and nondistended with normal BS, nontender   3+ edema    Current Meds    lactulose  30 g Oral 4 times per day    gabapentin  100 mg Oral BID    octreotide  200 mcg Subcutaneous Q8H    famotidine  20 mg Oral Daily    aspirin  81 mg Oral Daily    dilTIAZem  30 mg Oral TID    [Held by provider] pantoprazole  40 mg Oral Daily    rifaximin  550 mg Oral BID    [Held by provider] spironolactone  50 mg Oral BID    tiotropium  2 puff Inhalation Daily    sodium chloride flush  10 mL Intravenous 2 times per day    insulin lispro  0-12 Units Subcutaneous TID WC    insulin lispro  0-6 Units Subcutaneous Nightly    [Held by provider] bumetanide  1 mg Oral BID    midodrine  15 mg Oral TID WC      sodium chloride      sodium chloride 25 mL/hr at 04/01/21 1749    dextrose       Diet: DIET GENERAL; Low Sodium (2 GM); Daily Fluid Restriction: 1200 ml    A.  49 yo M admitted 03/29/21 for a fall at home x 2. Endorses hitting his head during one fall. CT head negative. CT cervical spine negative. CXR demonstrated stable right pleural effusion.  H/O hypotension, on Midodrine, noted to have worsening hypotension in the ED. H/O alcoholic cirrhosis with recurrent hepatic hydrothorax. Not a candidate for a TIPS procedure per [facility]. Last thoracentesis 03/16/21 with 2.1L removed. Last paracentesis 03/17/21 with 1.1L removed. US paracentesis with 4.6L removed 03/31/21, negative for SBP.     Fall x 2  Afib - on no OAC due to cirrhosis    Cirrhosis, acutely decompensated - improved   Alcoholism, abstinent    Cholestasis without Jaundice   Recurrent right hepatic hydrothorax - stable   Hepatic encephalopathy, improved   Ascites - tap neg for SBP, s/p paracentesis with 4.6L removed 03/31/21   Venous insufficiency   Hypoalbuminemia   Hyponatremia    Pancytopenia    Anemia, macro    thrombocytopenia   ^INR   Acute on chronic hypotension- on Midodrine   KARAN on CKD- possible hepatorenal syndrome vs ARF, improving     Infection? - no (wbc, U/A, paracentesis normal)   Tumor? - imaging without mass and AFP normal   GI bleeding? - no signs of hemorrhage   Thrombosis? - no, dopplers normal   Alcoholic hepatitis? - no signs   Med Noncompliance? - possible   Diet Noncompliance? - possible   Sedatives? - no    P.      Cont lactulose 30 g q6h - increased  Cont rifaximin  Continue Midodrine    Resume diuretics when OK per renal    Avoid narcotics & benzodiazepines as they can precipitate HE    2 g sodium diet    Hold on [facility] transfer at this time as HE and ARF improving      Danette Mc MD  11:48 AM 4/3/2021

## 2021-04-03 NOTE — PROGRESS NOTES
Hospitalist Progress Note    Patient:  Sera Walker      Unit/Bed:8A-20/020-A    YOB: 1965    MRN: [id number]       Acct: [de-identified]     PCP: Michael Ahmadi MD    Date of Admission: 3/29/2021    Active Hospital Problems    Diagnosis Date Noted    Permanent atrial fibrillation [facility]) [I48.21]      Priority: High    Sympathotonic orthostatic hypotension [I95.1] 03/30/2021    Orthostatic dizziness [R42] 03/30/2021    Fall [W19. XXXA] 03/30/2021    Degenerative cervical spinal stenosis [M48.02] 03/30/2021    Hypo-osmolality and hyponatremia [E87.1] 03/30/2021    Asymptomatic cholelithiasis [K80.20] 03/30/2021    Weakness generalized [R53.1] 03/30/2021    Type 2 diabetes mellitus without complication ([address] [X87.1] 12/04/2020    Chronic diastolic congestive heart failure ([address] [I50.32] 12/04/2020    Class 1 obesity without serious comorbidity with body mass index (BMI) of 30.0 to 30.9 in adult [E66.9, Z68.30] 11/23/2020    Thrombocytopenia ([address] [D69.6] 10/05/2020    Coagulopathy ([address] [D68.9] 10/05/2020    Acute renal failure with acute tubular necrosis superimposed on stage 3 chronic kidney disease ([address] [N17.0, N18.30] 29/87/5055    Alcoholic cirrhosis of liver with ascites ([address] [K70.31] 04/03/2020       Assessment/Plan:    1. Weakness with Fall and Hypotension: BP has been low (76/50), pt has been having poor fluid intake while taking Bumex and Aldactone. Will hold diuretics, start trial IVF. Increase Midodrine to 15 TID. PT/OT.     --3/31: BP improving with IVF and Midodrine. PT/OT.   --4/1: BP significantly better today. CCM. 2. Decompensated Alcoholic Liver Cirrhosis with Ascites, Hepatic Hydrothorax and HE: Therapeutic right thoracentesis 2.1 L removed on 03/16 and therapeutic paracentesis 03/17 with 1.1 L drained. Currently pt not sob or having any abdominal distension or pain. Hold diuretics. Follows up with GI and is on transplant list. Will consult GI today. --3/31: stable, GI following. Cont Lactulose and Rifaximin. Diuretics on hold 2/2 to #3. Avoid narcotics & benzodiazepines as they can precipitate HE. Daily weights, strict I/Os. Daily LFTs and INR.   --4/1: s/p paracentesis with 4.5L fluid removal. No SBP. CCM. GI following.   --4/2: pt more slow and foggy today. Ammonia up from 32 to 52 today. Pt has 2 BM's a day. Will increase Lactulose to every 6 hours. --4/3: pt not slow or confused at all today. Reports feeling much better. Will cont Lactulose to every 6 hours and Rifaximin. GI following. 3. KARAN on CKD Stage 3: Serum creatinine baseline is 1.5-1.6, now up to 3.6. Likely pre-renal due to hypotension and diuretics vs HRS. Pending urine lytes and renal U/S. Hold diuretics. Trial of NS IVF. Cont to trend daily Cr. Avoid nephrotoxic agents. Strict I/Os. Daily weights. Nephrology following.     --3/31: Cr continues to decline today, up from 3.6 to 4.0 despite holding diuretics and starting IVF. No intrinsic renal disease or GN. UA benign without hematuria or proteinuria. Cannot rule out HRS. Spoke with Nephrology, will start Albumin and Octreotide. May will need renal replacement therapy next 24 to 48 hours   --4/1: Cr down to 3.8 today. IVF reduced to 25 ml/hr. Cont Midodrine, Albumin and Octreotide. Nephrology following. Strict I/Os. Daily weights. Nephrology following.   --4/2: Cr down to 3.2 today. IVF reduced to 25 ml/hr. Pt declining Octreotide. Nephrology following. Strict I/Os. Daily weights. --4/3: Cr down to 2.9 today. Improving. Still declining Octreotide. Nephrology following. Strict I/Os. Daily weights. 4. Hyponatremia: likely 2/2 to intravascular volume depletion. Initiate trial of IV normal saline. Hold Bumex and Aldactone. Cont to trend Na levels daily     5. Acute on Chronic Hypotension: Midodrine increased to 15 mg TID. 6. Paroxysmal Atrial Fibrillation: uncontrolled HR, difficult to control given low BP.  No OAC given liver cirrhosis and coagulapathy    7. Thrombocytopenia and Coagulapathy: PLT 80K. 2/2 to liver cirrhosis. Avoid blood thinners. INR 1.94. Chief Complaint: weakness, falls     Hospital Course: 48 y. o. white male with history of chronic alcohol abuse, Hx cirrhosis w/ ascites, DM, HTN, gastroesophageal reflux disease, previous NSAID use, atrial fibrillation, chronic hypotension, Hx hepatic hydrothorax, chronic hypomagnesemia, CKD with baseline creatinine of 1.5 who presented to the emergency room after having couple of falls at home. Patient reports feeling weak and dizzy at times. Reports generalized weakness. Reports decreased appetite. Chest x-ray showed stable prominent right pleural effusion. CT head was negative for any acute intracranial abnormality. CT of the cervical spine was also negative for any acute changes    Subjective: no acute events overnight. Pt reports feeling much better today. Thinking clearly, no confusion or fogginess. No abdominal pain, tolerating PO intake. No fevers or chills. No chest pain. Will cancel transfer to [facility] for now.        Medications:  Reviewed    Infusion Medications    sodium chloride      sodium chloride 25 mL/hr at 04/01/21 1749    dextrose       Scheduled Medications    lactulose  30 g Oral 4 times per day    gabapentin  100 mg Oral BID    octreotide  200 mcg Subcutaneous Q8H    famotidine  20 mg Oral Daily    aspirin  81 mg Oral Daily    dilTIAZem  30 mg Oral TID    [Held by provider] pantoprazole  40 mg Oral Daily    rifaximin  550 mg Oral BID    [Held by provider] spironolactone  50 mg Oral BID    tiotropium  2 puff Inhalation Daily    sodium chloride flush  10 mL Intravenous 2 times per day    insulin lispro  0-12 Units Subcutaneous TID     insulin lispro  0-6 Units Subcutaneous Nightly    [Held by provider] bumetanide  1 mg Oral BID    midodrine  15 mg Oral TID      PRN Meds: simethicone, ipratropium-albuterol, sodium chloride flush, sodium chloride, promethazine **OR** ondansetron, polyethylene glycol, glucose, dextrose, glucagon (rDNA), dextrose      Intake/Output Summary (Last 24 hours) at 4/3/2021 1319  Last data filed at 4/3/2021 0920  Gross per 24 hour   Intake 306 ml   Output 400 ml   Net -94 ml       Diet:  DIET GENERAL; Low Sodium (2 GM); Daily Fluid Restriction: 1200 ml    Exam:  BP 99/68   Pulse 116   Temp 99.2 °F (37.3 °C) (Oral)   Resp 18   Ht 6' 2\" (1.88 m)   Wt 246 lb 1.6 oz (111.6 kg)   SpO2 94%   BMI 31.60 kg/m²     General appearance: chronically ill appearing   HEENT: Pupils equal, round, and reactive to light. Conjunctivae icteric   Neck: Supple, with full range of motion. No jugular venous distention. Trachea midline. Respiratory:  Normal respiratory effort. Clear to auscultation, bilaterally without Rales/Wheezes/Rhonchi. Cardiovascular: Regular rate and rhythm with normal S1/S2 without murmurs, rubs or gallops. Abdomen: Soft, distended abdomen  Musculoskeletal: passive and active ROM x 4 extremities. Skin: Skin color, texture, turgor normal.  No rashes or lesions. Neurologic:  Neurovascularly intact without any focal sensory/motor deficits. Cranial nerves: II-XII intact, grossly non-focal.  Psychiatric: Alert and oriented, thought content appropriate, normal insight  Capillary Refill: Brisk,< 3 seconds   Peripheral Pulses: +2 palpable, equal bilaterally       Labs:   Recent Labs     04/02/21  0558   WBC 5.7   HGB 10.9*   HCT 31.8*   PLT 62*     Recent Labs     04/01/21  0556 04/01/21  0556 04/03/21  0404   *   < > 128*   K 5.1   < > 4.5   CL 90*   < > 94*   CO2 20*   < > 22*   BUN 50*   < > 45*   CREATININE 3.8*   < > 2.9*   CALCIUM 9.6   < > 9.5   PHOS 4.2  --   --     < > = values in this interval not displayed.      Recent Labs     04/03/21  0404   AST 48*   ALT 22   BILITOT 7.5*   ALKPHOS 90     Recent Labs     04/03/21  0404   INR 1.94*     No results for input(s): Rosita Ellsworth in the last 72

## 2021-04-04 NOTE — PROGRESS NOTES
--3/31: stable, GI following. Cont Lactulose and Rifaximin. Diuretics on hold 2/2 to #3. Avoid narcotics & benzodiazepines as they can precipitate HE. Daily weights, strict I/Os. Daily LFTs and INR.   --4/1: s/p paracentesis with 4.5L fluid removal. No SBP. CCM. GI following.   --4/2: pt more slow and foggy today. Ammonia up from 32 to 52 today. Pt has 2 BM's a day. Will increase Lactulose to every 6 hours. --4/3: pt not slow or confused at all today. Reports feeling much better. Will cont Lactulose to every 6 hours and Rifaximin. GI following. --4/4: no changes, ammonia levels down to 21 today from 53. Will cont Lactulose to every 6 hours and Rifaximin. GI following. CCM. 3. KARAN on CKD Stage 3: Serum creatinine baseline is 1.5-1.6, now up to 3.6. Likely pre-renal due to hypotension and diuretics vs HRS. Pending urine lytes and renal U/S. Hold diuretics. Trial of NS IVF. Cont to trend daily Cr. Avoid nephrotoxic agents. Strict I/Os. Daily weights. Nephrology following.     --3/31: Cr continues to decline today, up from 3.6 to 4.0 despite holding diuretics and starting IVF. No intrinsic renal disease or GN. UA benign without hematuria or proteinuria. Cannot rule out HRS. Spoke with Nephrology, will start Albumin and Octreotide. May will need renal replacement therapy next 24 to 48 hours   --4/1: Cr down to 3.8 today. IVF reduced to 25 ml/hr. Cont Midodrine, Albumin and Octreotide. Nephrology following. Strict I/Os. Daily weights. Nephrology following.   --4/2: Cr down to 3.2 today. IVF reduced to 25 ml/hr. Pt declining Octreotide. Nephrology following. Strict I/Os. Daily weights. --4/3: Cr down to 2.9 today. Improving. Still declining Octreotide. Nephrology following. Strict I/Os. Daily weights. --4/4: Cr down to 2.6 today. Improving. Nephrology following. Strict I/Os. Daily weights. 4. Hyponatremia: likely 2/2 to intravascular volume depletion. Initiate trial of IV normal saline.  Hold Bumex and Aldactone. Cont to trend Na levels daily     5. Acute on Chronic Hypotension: Midodrine increased to 15 mg TID. 6. Paroxysmal Atrial Fibrillation with RVR: uncontrolled HR, difficult to control given low BP. No OAC given liver cirrhosis and coagulapathy    7. Thrombocytopenia and Coagulapathy: PLT 80K. 2/2 to liver cirrhosis. Avoid blood thinners. INR 1.94. Chief Complaint: weakness, falls     Hospital Course: 48 y. o. white male with history of chronic alcohol abuse, Hx cirrhosis w/ ascites, DM, HTN, gastroesophageal reflux disease, previous NSAID use, atrial fibrillation, chronic hypotension, Hx hepatic hydrothorax, chronic hypomagnesemia, CKD with baseline creatinine of 1.5 who presented to the emergency room after having couple of falls at home. Patient reports feeling weak and dizzy at times. Reports generalized weakness. Reports decreased appetite. Chest x-ray showed stable prominent right pleural effusion. CT head was negative for any acute intracranial abnormality. CT of the cervical spine was also negative for any acute changes    Subjective: no acute events overnight. Pt reports feeling good, denies any changes from yesterday. Not confused or slow at all. Ambulating with no difficulty. Tolerating PO intake. No fevers, chills, abdominal pain. Having good bowel movements.      Medications:  Reviewed    Infusion Medications    sodium chloride      sodium chloride 25 mL/hr at 04/01/21 9391    dextrose       Scheduled Medications    lactulose  30 g Oral 4 times per day    gabapentin  100 mg Oral BID    octreotide  200 mcg Subcutaneous Q8H    famotidine  20 mg Oral Daily    aspirin  81 mg Oral Daily    dilTIAZem  30 mg Oral TID    [Held by provider] pantoprazole  40 mg Oral Daily    rifaximin  550 mg Oral BID    [Held by provider] spironolactone  50 mg Oral BID    tiotropium  2 puff Inhalation Daily    sodium chloride flush  10 mL Intravenous 2 times per day    insulin lispro 0-12 Units Subcutaneous TID WC    insulin lispro  0-6 Units Subcutaneous Nightly    [Held by provider] bumetanide  1 mg Oral BID    midodrine  15 mg Oral TID WC     PRN Meds: metoprolol, simethicone, ipratropium-albuterol, sodium chloride flush, sodium chloride, promethazine **OR** ondansetron, polyethylene glycol, glucose, dextrose, glucagon (rDNA), dextrose      Intake/Output Summary (Last 24 hours) at 4/4/2021 0954  Last data filed at 4/4/2021 0327  Gross per 24 hour   Intake 769.97 ml   Output 0 ml   Net 769.97 ml       Diet:  DIET GENERAL; Low Sodium (2 GM); Daily Fluid Restriction: 1200 ml    Exam:  BP (!) 123/94   Pulse 130   Temp 98.6 °F (37 °C) (Oral)   Resp 18   Ht 6' 2\" (1.88 m)   Wt 241 lb 3.2 oz (109.4 kg)   SpO2 98%   BMI 30.97 kg/m²     General appearance: chronically ill appearing   HEENT: Pupils equal, round, and reactive to light. Conjunctivae icteric   Neck: Supple, with full range of motion. No jugular venous distention. Trachea midline. Respiratory:  Normal respiratory effort. Clear to auscultation, bilaterally without Rales/Wheezes/Rhonchi. Cardiovascular: Regular rate and rhythm with normal S1/S2 without murmurs, rubs or gallops. Abdomen: Soft, distended abdomen  Musculoskeletal: passive and active ROM x 4 extremities. Skin: Skin color, texture, turgor normal.  No rashes or lesions. Neurologic:  Neurovascularly intact without any focal sensory/motor deficits.  Cranial nerves: II-XII intact, grossly non-focal.  Psychiatric: Alert and oriented, thought content appropriate, normal insight  Capillary Refill: Brisk,< 3 seconds   Peripheral Pulses: +2 palpable, equal bilaterally       Labs:   Recent Labs     04/04/21 0457   WBC 5.7   HGB 10.6*   HCT 32.0*   PLT 61*     Recent Labs     04/04/21 0457   *   K 4.7   CL 96*   CO2 18*   BUN 42*   CREATININE 2.6*   CALCIUM 9.7   PHOS 3.5     Recent Labs     04/04/21 0457   AST 60*   ALT 26   BILITOT 7.7*   ALKPHOS 111     Recent Labs 04/04/21  0457   INR 1.94*     No results for input(s): CKTOTAL, TROPONINI in the last 72 hours. Urinalysis:      Lab Results   Component Value Date    NITRU NEGATIVE 03/30/2021    WBCUA NONE SEEN 03/16/2021    BACTERIA NONE SEEN 03/16/2021    RBCUA 0-2 03/16/2021    BLOODU NEGATIVE 03/30/2021    SPECGRAV 1.015 03/30/2021       Radiology: All imaging reviewed     Diet: DIET GENERAL; Low Sodium (2 GM);  Daily Fluid Restriction: 1200 ml      Code Status: Full Code            Electronically signed by Luz Lira MD on 4/4/2021 at 9:54 AM

## 2021-04-04 NOTE — PROGRESS NOTES
Pt Name: Aden Linares  MRN: [id number]  [id number]  YOB: 1965  Admit Date: 3/29/2021  7:00 PM  Date of evaluation: 4/4/2021  Primary Care Physician: Paula Mas MD   -[id number]-A     FRANCISCO    SAnthony No complaints. Feels better, thinking clearly   Transferred to  for tachycardia overnight   Itching worse after gabapentin lowered which he takes for diabetic neuropathy    O.     Vitals:    04/04/21 0817   BP:    Pulse:    Resp: 18   Temp:    SpO2: 98%       Body mass index is 30.97 kg/m². Awake and alert   clear to auscultation bilaterally   regular rate and rhythm   Soft and nondistended with normal BS, nontender   3+ edema    Current Meds    lactulose  30 g Oral 4 times per day    gabapentin  100 mg Oral BID    octreotide  200 mcg Subcutaneous Q8H    famotidine  20 mg Oral Daily    aspirin  81 mg Oral Daily    dilTIAZem  30 mg Oral TID    [Held by provider] pantoprazole  40 mg Oral Daily    rifaximin  550 mg Oral BID    [Held by provider] spironolactone  50 mg Oral BID    tiotropium  2 puff Inhalation Daily    sodium chloride flush  10 mL Intravenous 2 times per day    insulin lispro  0-12 Units Subcutaneous TID WC    insulin lispro  0-6 Units Subcutaneous Nightly    [Held by provider] bumetanide  1 mg Oral BID    midodrine  15 mg Oral TID WC      sodium chloride      sodium chloride 25 mL/hr at 04/01/21 1749    dextrose       Diet: DIET GENERAL; Low Sodium (2 GM); Daily Fluid Restriction: 1200 ml    A.  47 yo M admitted 03/29/21 for a fall at home x 2. Endorses hitting his head during one fall. CT head negative. CT cervical spine negative. CXR demonstrated stable right pleural effusion.  H/O hypotension, on Midodrine, noted to have worsening hypotension in the ED. H/O alcoholic cirrhosis with recurrent hepatic hydrothorax. Not a candidate for a TIPS procedure per [facility]. Last thoracentesis 03/16/21 with 2.1L removed.  Last paracentesis 03/17/21 with 1.1L removed. US paracentesis with 4.6L removed 03/31/21, negative for SBP. Fall x 2  Afib - on no OAC due to cirrhosis  ARF on CKD- possible hepatorenal syndrome vs ARF, improving - begun on octreotide  \"gas\" after octreotide injections - cont simethicone and miralax prn  Diabetic neuropathy on gabapentin which typically controls pruritis from cholestasis, decreased due to ARF    Cirrhosis, acutely decompensated - improved   Alcoholism, abstinent    Cholestasis without Jaundice   Recurrent right hepatic hydrothorax - stable   Hepatic encephalopathy, improved   Ascites - tap neg for SBP, s/p paracentesis with 4.6L removed 03/31/21   Venous insufficiency   Hypoalbuminemia   Hyponatremia    Pancytopenia    Anemia, macro    thrombocytopenia   ^INR   Acute on chronic hypotension- on Midodrine     Infection? - no (wbc, U/A, paracentesis normal)   Tumor? - imaging without mass and AFP normal   GI bleeding? - no signs of hemorrhage   Thrombosis? - no, dopplers normal   Alcoholic hepatitis? - no signs   Med Noncompliance? - possible   Diet Noncompliance? - possible   Sedatives? - no    P.      Cont lactulose 30 g q6h - increased  Cont rifaximin  Continue Midodrine  Continue Octreotide per renal    Resume diuretics when OK per renal    Avoid narcotics & benzodiazepines as they can precipitate HE    2 g sodium diet    Hold on [facility] transfer at this time as HE and ARF improving  Increase gabapentin to 400 qd    Danette Mc MD  10:40 AM 4/4/2021

## 2021-04-04 NOTE — PROGRESS NOTES
[facility]   Nephrology progress note  4/4/2021, 2:30 PM      Pt Name:    Lexi Mccarthy  MRN:     [id number]     [place]:    1965  Admit Date:    3/29/2021  7:00 PM  Primary Care Physician:  Aubrey Fernandez MD   Room number  8U-73/481-C    Chief Complaint: Nephrology following for KARAN/CKD    Subjective:  Patient seen and examined  Awake and alert  No new complaints  Denies any shortness of breath    Objective:  24HR INTAKE/OUTPUT:      Intake/Output Summary (Last 24 hours) at 4/4/2021 1430  Last data filed at 4/4/2021 1342  Gross per 24 hour   Intake 1021.96 ml   Output 0 ml   Net 1021.96 ml     I/O last 3 completed shifts: In: 80 [P.O.:320; I.V.:460]  Out: 0   I/O this shift:  In: 252 [I.V.:252]  Out: -   Admission weight: 229 lb (103.9 kg)  Wt Readings from Last 3 Encounters:   04/04/21 241 lb 3.2 oz (109.4 kg)   03/23/21 238 lb (108 kg)   03/18/21 238 lb 12.8 oz (108.3 kg)     Body mass index is 30.97 kg/m².     Physical examination  VITALS:     Vitals:    04/03/21 2342 04/04/21 0327 04/04/21 0817 04/04/21 1210   BP: 110/82 (!) 123/94 (!) 98/54 110/88   Pulse: 143 130 131 100   Resp: 18 18 18 18   Temp:   98.2 °F (36.8 °C) 98 °F (36.7 °C)   TempSrc:   Oral Oral   SpO2: 96% 100% 98% 97%   Weight:  241 lb 3.2 oz (109.4 kg)     Height:         General Appearance: appears comfortable, no distress  Eyes: mild icteric sclera  Mouth/Throat: Oral mucosa moist  Neck: No JVD  Lungs: Air entry B/L, no rales, no use of accessory muscles  Heart:  S1, S2 heard  GI: +distention noted, no guarding  Extremities: B/L 1-2+ edema/improved/stable      Lab Data  CBC:   Recent Labs     04/02/21  0558 04/04/21  0457   WBC 5.7 5.7   HGB 10.9* 10.6*   HCT 31.8* 32.0*   PLT 62* 61*     BMP:  Recent Labs     04/02/21  0558 04/03/21  0404 04/04/21  0457   * 128* 133*   K 4.3 4.5 4.7   CL 93* 94* 96*   CO2 19* 22* 18*   BUN 47* 45* 42*   CREATININE 3.2* 2.9* 2.6*   GLUCOSE 122* 125* 92   CALCIUM 9.6 9.5 diltiazem  8. Thrombocytopenia   9.   Low bicarb: Metabolic compensation for chronic respiratory alkalosis (liver dysfunction)    D/W patient     Ingris Baird MD  [facility]

## 2021-04-04 NOTE — PROGRESS NOTES
Walking rounds completed with Bryon Zendejas RN. Pt in bathroom at this time. Resp easy and regular. Call light with in reach. Pt denies any needs at this time.  at bedside.

## 2021-04-05 NOTE — PROGRESS NOTES
Hospitalist Progress Note    Patient:  Aden Linares      Unit/Bed:6K-19/019-A    YOB: 1965    MRN: [id number]       Acct: [de-identified]     PCP: Paula Mas MD    Date of Admission: 3/29/2021    Active Hospital Problems    Diagnosis Date Noted    Permanent atrial fibrillation [facility]) [I48.21]      Priority: High    Sympathotonic orthostatic hypotension [I95.1] 03/30/2021    Orthostatic dizziness [R42] 03/30/2021    Fall [W19. XXXA] 03/30/2021    Degenerative cervical spinal stenosis [M48.02] 03/30/2021    Hypo-osmolality and hyponatremia [E87.1] 03/30/2021    Asymptomatic cholelithiasis [K80.20] 03/30/2021    Weakness generalized [R53.1] 03/30/2021    Type 2 diabetes mellitus without complication ([address] [M88.2] 12/04/2020    Chronic diastolic congestive heart failure ([address] [I50.32] 12/04/2020    Class 1 obesity without serious comorbidity with body mass index (BMI) of 30.0 to 30.9 in adult [E66.9, Z68.30] 11/23/2020    Thrombocytopenia ([address] [D69.6] 10/05/2020    Coagulopathy ([address] [D68.9] 10/05/2020    Acute renal failure with acute tubular necrosis superimposed on stage 3 chronic kidney disease ([address] [N17.0, N18.30] 04/78/1717    Alcoholic cirrhosis of liver with ascites ([address] [K70.31] 04/03/2020       Assessment/Plan:    1. Weakness with Fall and Hypotension: BP has been low (76/50), pt has been having poor fluid intake while taking Bumex and Aldactone. Will hold diuretics, start trial IVF. Increase Midodrine to 15 TID. PT/OT.     --3/31: BP improving with IVF and Midodrine. PT/OT.   --4/1: BP significantly better today. CCM. 2. Decompensated Alcoholic Liver Cirrhosis with Ascites, Hepatic Hydrothorax and HE: Therapeutic right thoracentesis 2.1 L removed on 03/16 and therapeutic paracentesis 03/17 with 1.1 L drained. Currently pt not sob or having any abdominal distension or pain. Hold diuretics. Follows up with GI and is on transplant list. Will consult GI today. dilTIAZem  30 mg Oral TID    [Held by provider] pantoprazole  40 mg Oral Daily    rifaximin  550 mg Oral BID    [Held by provider] spironolactone  50 mg Oral BID    tiotropium  2 puff Inhalation Daily    sodium chloride flush  10 mL Intravenous 2 times per day    insulin lispro  0-12 Units Subcutaneous TID     insulin lispro  0-6 Units Subcutaneous Nightly    midodrine  15 mg Oral TID      PRN Meds: simethicone, ipratropium-albuterol, sodium chloride flush, sodium chloride, promethazine **OR** ondansetron, polyethylene glycol, glucose, dextrose, glucagon (rDNA), dextrose      Intake/Output Summary (Last 24 hours) at 4/5/2021 1444  Last data filed at 4/5/2021 1236  Gross per 24 hour   Intake 617.2 ml   Output 1150 ml   Net -532.8 ml       Diet:  DIET GENERAL; Low Sodium (2 GM); Daily Fluid Restriction: 1200 ml    Exam:  BP (!) 120/91   Pulse 112   Temp 98.9 °F (37.2 °C) (Oral)   Resp 18   Ht 6' 2\" (1.88 m)   Wt 244 lb 8 oz (110.9 kg)   SpO2 98%   BMI 31.39 kg/m²     General appearance: chronically ill appearing   HEENT: Pupils equal, round, and reactive to light. Conjunctivae icteric   Neck: Supple, with full range of motion. No jugular venous distention. Trachea midline. Respiratory:  Normal respiratory effort. Clear to auscultation, bilaterally without Rales/Wheezes/Rhonchi. Cardiovascular: Regular rate and rhythm with normal S1/S2 without murmurs, rubs or gallops. Abdomen: Soft, distended abdomen  Musculoskeletal: passive and active ROM x 4 extremities. Skin: Skin color, texture, turgor normal.  No rashes or lesions. Neurologic:  Neurovascularly intact without any focal sensory/motor deficits.  Cranial nerves: II-XII intact, grossly non-focal.  Psychiatric: Alert and oriented, thought content appropriate, normal insight  Capillary Refill: Brisk,< 3 seconds   Peripheral Pulses: +2 palpable, equal bilaterally       Labs:   Recent Labs     04/05/21  0351   WBC 6.0   HGB 10.5*   HCT 32.2*   PLT 70*     Recent Labs     04/04/21  0457 04/05/21 0351   * 128*   K 4.7 4.7   CL 96* 94*   CO2 18* 23   BUN 42* 36*   CREATININE 2.6* 2.4*   CALCIUM 9.7 9.1   PHOS 3.5  --      Recent Labs     04/05/21 0351   AST 50*   ALT 25   BILIDIR 2.2*   BILITOT 6.7*   ALKPHOS 109     Recent Labs     04/05/21 0351   INR 1.85*     No results for input(s): CKTOTAL, TROPONINI in the last 72 hours. Urinalysis:      Lab Results   Component Value Date    NITRU NEGATIVE 03/30/2021    WBCUA NONE SEEN 03/16/2021    BACTERIA NONE SEEN 03/16/2021    RBCUA 0-2 03/16/2021    BLOODU NEGATIVE 03/30/2021    SPECGRAV 1.015 03/30/2021       Radiology: All imaging reviewed     Diet: DIET GENERAL; Low Sodium (2 GM);  Daily Fluid Restriction: 1200 ml      Code Status: Full Code            Electronically signed by Anéglica Zavaleta MD on 4/5/2021 at 2:44 PM

## 2021-04-05 NOTE — PLAN OF CARE
Problem: Impaired respiratory status  Goal: Clear lung sounds  Outcome: Ongoing   Continue inhaled medications as ordered to improve breath sounds

## 2021-04-05 NOTE — PROGRESS NOTES
[facility]   Nephrology progress note  4/5/2021, 12:51 PM      Pt Name:    Gracie Yousif  MRN:     [id number]     [place]:    1965  Admit Date:    3/29/2021  7:00 PM  Primary Care Physician:  Danilo Staton MD   Room number  4O-34/666-D    Chief Complaint: Nephrology following for KARAN/CKD    Subjective:  Patient seen and examined  Feeling better  Reports improvement in lower extremity swelling    Objective:  24HR INTAKE/OUTPUT:      Intake/Output Summary (Last 24 hours) at 4/5/2021 1251  Last data filed at 4/5/2021 1236  Gross per 24 hour   Intake 869.19 ml   Output 1150 ml   Net -280.81 ml     I/O last 3 completed shifts: In: 774 [P.O.:300; I.V.:474]  Out: 600 [Urine:600]  I/O this shift: In: 95.2 [I.V.:95.2]  Out: 550 [Urine:550]  Admission weight: 229 lb (103.9 kg)  Wt Readings from Last 3 Encounters:   04/05/21 244 lb 8 oz (110.9 kg)   03/23/21 238 lb (108 kg)   03/18/21 238 lb 12.8 oz (108.3 kg)     Body mass index is 31.39 kg/m².     Physical examination  VITALS:     Vitals:    04/05/21 0651 04/05/21 0756 04/05/21 0800 04/05/21 1236   BP: 110/83  (!) 107/58 (!) 120/91   Pulse:   100 112   Resp:   16 18   Temp:   98.3 °F (36.8 °C) 98.9 °F (37.2 °C)   TempSrc:   Oral Oral   SpO2:  96% 98% 98%   Weight:       Height:         General Appearance: appears comfortable, no distress  Eyes: mild icteric sclera  Mouth/Throat: Oral mucosa moist  Neck: No JVD  Lungs: Air entry B/L, no rales, no use of accessory muscles  Heart:  S1, S2 heard  GI: +distention noted, no guarding  Extremities: B/L 1-2+ edema/improved/stable      Lab Data  CBC:   Recent Labs     04/04/21  0457 04/05/21  0351   WBC 5.7 6.0   HGB 10.6* 10.5*   HCT 32.0* 32.2*   PLT 61* 70*     BMP:  Recent Labs     04/03/21  0404 04/04/21  0457 04/05/21  0351   * 133* 128*   K 4.5 4.7 4.7   CL 94* 96* 94*   CO2 22* 18* 23   BUN 45* 42* 36*   CREATININE 2.9* 2.6* 2.4*   GLUCOSE 125* 92 129*   CALCIUM 9.5 9.7 9.1   MG  --  1.9 Continue midodrine  7. Hx A. Fib: On amiodarone, Lopressor  8.  Thrombocytopenia     D/W patient     Monica Ayala MD  [facility]

## 2021-04-05 NOTE — PROGRESS NOTES
Comprehensive Nutrition Assessment    Type and Reason for Visit:  Initial, Consult(poor appetite/intake)    Nutrition Recommendations/Plan:   ONS: proteinex daily. Continue current diet. Nutrition Assessment:    Pt. nutritionally compromised AEB pt report of mild decline in appetite when he had ascites. At risk for further nutrition compromise r/t increased nutrient needs due to wound healing, admit with syncope, orthostatic hypotension, KARAN on CKD, cirrhosis with ascites, afib with RVR,  and underlying medical condition (hx: afib, DM, CHF, alcohol abuse, cirrhosis with ascites, portal HTN, GERD, CKD, COPD). Nutrition recommendations/interventions as per above. Malnutrition Assessment:  Malnutrition Status: At risk for malnutrition (Comment)    Context:  Acute Illness     Findings of the 6 clinical characteristics of malnutrition:  Energy Intake:  Mild decrease in energy intake (Comment)  Weight Loss:  Unable to assess(fluctuates d/t edema, ascites)     Body Fat Loss:  No significant body fat loss     Muscle Mass Loss:  No significant muscle mass loss    Fluid Accumulation:  Unable to assess Ascites, Extremities   Strength:  Not Performed    Estimated Daily Nutrient Needs:  Energy (kcal):  0970-4290 kcals (15- 18 kcals/kg/day); Weight Used for Energy Requirements:  Current(111 kg 4/5/21)     Protein (g):  86 grams (1 gram/kg/day) pending renal/liver status; Weight Used for Protein Requirements:  Ideal(86 kg)        Fluid (ml/day):  1200 ml  fluid restriction;        Nutrition Related Findings:  Pt seen, was rather sleepy. States appetite was down a little due to ascites but improving now. Stated he requested to see dietitian to doctor to make sure he was on track at home with diet. Has see outpt diabetes dietitian in the past.  Reports he follows low sodium diet, diabetic diet.   Admits 1200 ml fluid restriction hard for him to stick to, has bought hard tack candy, uses chewing gum, etc to assist to determine     Electronically signed by Lily Ross RD, LD on 4/5/21 at 4:07 PM EDT    Contact: [phone]

## 2021-04-05 NOTE — CONSULTS
[address] [place] [address]                                  CONSULTATION    PATIENT NAME: Gerald Tate                      :        1965  MED REC NO:   [id number]                           ROOM:       0019  ACCOUNT NO:   [de-identified]                           ADMIT DATE: 2021  PROVIDER:     Alpesh Dave. Bruce Carter M.D.    Mynor Perez:  2021    REASON FOR CONSULTATION:  AFib with rapid ventricular response in a  23-year-old gentleman admitted with hypotension, status post multiple  falls on 2021. The patient follows up with his cardiac care and  also for liver transplant issue at [facility] and the patient is planned to see  Dr. Sveta Tripathi for [facility] placement evaluation. HISTORY OF PRESENT ILLNESS:  This is a 23-year-old  gentleman  with alcoholic liver cirrhosis who presented to the emergency room on  2021 after he fall at home twice. The patient felt dizzy and fell  twice backward and hit his head. The patient denied any loss of  consciousness during that time and no recent history of syncope. He  denied having any chest pain. He has been having low blood pressure. Chronically, he has been on midodrine and on admission, he had low blood  pressure that was noted on admission. Blood pressure of 83/53 with  pulse rate 89 has been recorded from the day of admission at least by  the emergency room physician and so, he was admitted for fall due to  hypotension and recurrent right-sided pleural effusion. The patient has  been on midodrine. His midodrine has been increased to 15 and his  medication has been held including the Lopressor, which he has been  taking significant amount. Blood pressure as low as 76/50 with pulse  rate 97 has been recorded at the emergency room per the recommendation.    So now the patient is off his metoprolol, which he used to take quite  significant amount and he is having now AFib with rapid ventricular  response. Restarted him on amiodarone and Cardiology evaluation was  sought in assistance for the rate control. Denied having any chest  pain. REVIEW OF SYSTEMS:  Negative except the above-mentioned ones. PAST MEDICAL HISTORY:  Includes the following:  Atrial fibrillation for  several years and alcoholic liver cirrhosis, congestive heart failure,  fluid overload from liver cirrhosis, COPD, chronic kidney disease,  diabetes, gastroesophageal reflux disease, hydrothorax, portal  hypertension and awaiting for liver transplant at [facility]. PAST SURGICAL HISTORY:  Appendectomy, hernia repair, lung surgery    FAMILY HISTORY:  Positive for heart disease, hypertension and diabetes. SOCIAL HISTORY:  He quit smoking in 2020. No alcohol. No drug use. ALLERGIES:  No known drug allergy. HOME MEDICATIONS:  Prior to admission include albuterol, aspirin, Bumex  1 mg twice a day, gabapentin, Cardizem 30 mg three times a day,  lactulose, Glucophage, Lopressor 100 mg twice a day, midodrine 12.5  three times a day, Protonix, Zanaflex, Aldactone, and Spiriva. PHYSICAL EXAMINATION:  GENERAL:  Looks sick, in no form of distress, but is a chronically  sick-looking gentleman. VITAL SIGNS:  Blood pressure 107/57, pulse rate 100 to 120, respiratory  rate 18, temperature 98. 3. HEENT:  Pink conjunctivae. Nonicteric sclerae. Pupils are equal and  reactive bilaterally to light. NECK:  No lymphadenopathy. No goiter. No JVD. CHEST:  Bilateral decreased air entry. CARDIOVASCULAR:  Arteries are felt; carotid, femoral.  S1, S2 well  heard. No murmur or gallop rhythm. ABDOMEN:  Obese, nontender. Distended but bowel sounds are positive. GENITOURINARY:  No CVA, flank, or suprapubic tenderness. LOCOMOTOR:  No cyanosis, clubbing, or muscle or joint tenderness. Bilateral pedal and pretibial edema of +2. SKIN:  No rashes, ulcers, or nodules.   CNS:  Alert, awake, and oriented to time, person, and place. Cranial  nerves are intact. Sensation is intact to touch and pain. Motor:   Normal muscle strength and tone. Appropriate mood and affect. WORKUP:  EKG on admission, atrial fibrillation, rate 92 beats per minute  with evidence of old anterior infarction and low QRS voltage. Today,  AFib going around 120s to 130s on the telemetry. He is not on any beta  blocker now. Blood chemistry:  Sodium 128, potassium 4.7, BUN 36, creatinine 2.6. GFR of 28. Hematology:  White blood cell 6, hemoglobin 10, and  platelets 70, and before that, his platelets 52 and 57, so he has a very  low platelet. INR 1.8. CT of the head with no acute abnormality noted. He had an echocardiogram done at [address] [place] [address] in 11/2020 and 12/2020.  12/2020,  ejection fraction 60% has been identified. He has cardiac  catheterization in 12/2020 at [address] [place] [address], basically no significant obstructive  lesion noted and pulmonary artery pressure was also with mean of 12 mmHg  and no significant coronary artery disease, normal pulmonary pressure. ASSESSMENT:  1. Atrial fibrillation with rapid ventricular response, needs better  control. Not on anticoagulation because of cirrhosis and  thrombocytopenia. The patient stated that he has been off  anticoagulation for a while and he used to live in [place], [place], and that is where they stopped his oral anticoagulation a  while ago. 2.  History of hypertension, now he is hypotensive basically. 3.  Hypotension. 4.  Recurrent fall due to hypotension. 5.  Alcoholic liver cirrhosis under GI followup and awaiting for liver  transplant. 6.  Chronic kidney disease, stage 4.  7.  Diabetes. 8.  History of COPD, on CPAP. 9.  Obstructive sleep apnea. 10.  Chronic anemia. 11.  Gastroesophageal reflux disease. 12.  Liver cirrhosis with fluid overload.     Overall, this is a 71-year-old gentleman with significantly complex  medical problems with liver cirrhosis and end-stage liver disease with  fluid overload and having recurrent fall with hypotension and Cardiology  consulted for AFib with rapid ventricular response. The patient has  been off anticoagulation for a long time over a year and the patient  does want to be on anticoagulation in view of the significant bleeding  bruises that he had and in view of that, he has been stopped. His  platelets are low in the 60s or 70s at this level, so he is not a  candidate for oral anticoagulation at all at this level. Basically, he has been off anticoagulation for a while already. PLAN and RECOMMENDATIONS:  1. We will try to control the rate and we will start him on low-dose  Lopressor 25 every six hours and adjust the dose accordingly for better  control. Continue with amiodarone drip and try to wean him off after 24  hours after putting him on oral amiodarone. Digoxin is considered, but  in view of amiodarone and significant stage 4 kidney disease, I am not  very optimistic about using long term digoxin on this gentleman. 2.  Advised to follow up with [facility]iology for possible  pulmonary vein isolation as he has been following. 3.  He is going to follow with Dr. Waldo Jimenez for Watchman device evaluation  and so, the patient is considering to change his cardiology followup to  [address] [place] [address]. So, that is something to be decided down the line. Nonetheless, at this level, I discussed with him to continue following  with [address] [place] [address] Cardiology with regarding to electrophysiological part for  possible pulmonary vein isolation, but the patient _____ considered, but  recommended against it at that time. So, in that case, we will continue  with the rate control and follow up with his Cardiology as outpatient. Based on the course, we will gauge further care. Thank you for allowing me to participate in the care of this patient. I  will follow up with you. Serafin Ohara.  Mayte Young M.D.    D: 04/05/2021 11:58:30       T: 04/05/2021 14:34:21 [id number]  Job#: [id number]     Doc#: [id number]    CC:

## 2021-04-05 NOTE — PLAN OF CARE
Problem: Falls - Risk of:  Goal: Will remain free from falls  Description: Will remain free from falls  4/5/2021 0227 by Marcus Polo RN  Outcome: Ongoing  Note: Falling star program. Bed alarm on. Call light within reach. Side rails up x2. Fall Band. Chair Alarm & Gait Belt in Room. Encouraged to use call system. Pathway clear. Belongings in reach. Problem: Pain:  Goal: Pain level will decrease  Description: Pain level will decrease  4/5/2021 0227 by Marcus Polo RN  Outcome: Ongoing  Note: Patient free from pain this shift. Pain rated on 0-10 pain rating scale. Will continue to reassess. Problem: Skin Integrity:  Goal: Risk for impaired skin integrity will decrease  Description: Risk for impaired skin integrity will decrease  4/5/2021 0227 by Marcus Polo RN  Outcome: Ongoing  Note: No new skin breakdown noted. See skin assessment. Repositioning patient q2h and prn      Problem: Cardiovascular  Goal: Hemodynamic stability  4/5/2021 0227 by Marcus Polo RN  Outcome: Ongoing  Note:   Vitals:    04/05/21 0213   BP:    Pulse: 118   Resp:    Temp:    SpO2:     Amio gtt started for Afib RVR. Care plan reviewed with patient.  Will review and discuss care plan with family when available

## 2021-04-05 NOTE — PROGRESS NOTES
Paged Dr Kev Delgadillo at [phone],  Florencio Vargas afib RVR. Not new. Patient has been tachy but hard to address because of hypotension. Tonight's BP was 107/83. Currently afib HR between 110-140. I gave scheduled PO Cardizem at 2030. There is PRN Lopressor but order says its for HTN not Tachycardia. Should I give it for his HR? if so, could you please change the order? Thanks! \"    2015 Dr Kev Delgadillo ordered Amio bolus and continuous gtt.      2350 patient is converting between ST and Afib -120

## 2021-04-05 NOTE — PROGRESS NOTES
[facility]  INPATIENT PHYSICAL THERAPY  DAILY NOTE  STRZ RENAL TELEMETRY 6K - 2E-76/381-L    Time In: 7053  Time Out: 1002  Timed Code Treatment Minutes: 34 Minutes  Minutes: 34          Date: 2021  Patient Name: Tarik Blackburn,  Gender:  male        MRN: [id number]  : 1965  (48 y.o.)     Referring Practitioner: Dr. Colonel Griffith  Diagnosis: sympathotonic orthostatic hypotension  Additional Pertinent Hx: admit with above diagnosis, s/p fall, ESLD, KARAN, hypotension     Prior Level of Function:  Lives With: Alone  Type of Home: Apartment  Home Layout: One level  Home Access: Stairs to enter without rails  Entrance Stairs - Number of Steps: 1  Home Equipment: 4 wheeled walker   Bathroom Shower/Tub: Tub/Shower unit  Bathroom Toilet: Handicap height  Bathroom Equipment: Shower chair    ADL Assistance: [address] [place] [address] Avenue: [address] [place] [address] Place: Independent  Transfer Assistance: Independent  Active : Yes  Additional Comments: Pt reports using a 4WW in the home and community. Per pt family has family that lives close by and he will call if needs assistance, per pt just ordered a lifealert, has a little brother that is around on weekends. Pt reports he does drive occasionally, however not as often as he will get a ride generally to appts if needed, and he as groceries delivered. Pt reports he completes cooking. Restrictions/Precautions:  Restrictions/Precautions: General Precautions, Fall Risk  Position Activity Restriction  Other position/activity restrictions: orthostatic hypotension     SUBJECTIVE: RN approved session. Pt in bed and wanting to rest upon arrival. Pt agreeable with minimal encouragement. Extra time taken for orthostatic readings.     PAIN: None Indicated    Vitals: Orthostatic Blood Pressure: Supine: 101/74 83 bpm, Sittin/95 102 bpm, Standin/64 75 bpm  Sitting after ambulation: 120/87 100 bpm    OBJECTIVE:  Bed Mobility:  Supine to Sit: Stand By Assistance, with head of bed raised, with verbal cues , with increased time for completion. Pt first requested help however completed action with encouragement  Sit to Supine: Stand By Assistance, with verbal cues , with increased time for completion   Scooting: Stand By Assistance, with verbal cues , with increased time for completion. Cues for LE muscle activation. Transfers:  Sit to Stand: Stand By Assistance, with increased time for completion, cues for hand placement, with verbal cues  Stand to Sit:Stand By Assistance, cues for hand placement  Cues to reach back with B UEs prior to sitting    Ambulation:  Stand By Assistance, [address], with verbal cues , with increased time for completion  Distance: 25'x1  Surface: Level Tile  Device:Standard Walker  Gait Deviations: Forward Flexed Posture, Decreased Step Length Bilaterally, Decreased Weight Shift Bilaterally and Decreased Heel Strike Bilaterally  Cues to slow down, proximity to walker, and sequencing  Distance limited due to pt declining ambulating in heredia  Balance:  Static Sitting Balance:  Stand By Assistance  Pt sat EOB with no UE support and no LOB. Cues for erect trunk. Pt demonstrated with forward posture. Exercise:  Patient was guided in 1 set(s) 10 reps of exercise to both lower extremities. Ankle pumps, Glut sets, Standing heel/toe raises and Standing marches. Exercises were completed for increased independence with functional mobility. Pt demonstrated difficulty with standing toe raises due to pt neuropathy. Functional Outcome Measures: Completed  AM-PAC Inpatient Mobility Raw Score : 16  AM-PAC Inpatient T-Scale Score : 40.78    ASSESSMENT:  Assessment: Patient progressing toward established goals. Activity Tolerance:  Patient tolerance of  treatment: good. Pt tolerated session well. Pt limited by fatigue. Decreased balance, endurance, and strength.  Pt demonstrated difficulty with standing therex due to

## 2021-04-05 NOTE — PROGRESS NOTES
[address] RENAL TELEMETRY 6K  Occupational Therapy  Daily Note  Time:   Time In: 147  Time Out: 4969  Timed Code Treatment Minutes: 25 Minutes  Minutes: 25          Date: 2021  Patient Name: Ruy Royal,   Gender: male      Room: [facility]/019-A  MRN: [id number]  : 1965  (48 y.o.)  Referring Practitioner: Evelyne Delgadillo MD  Diagnosis: Sympathotonic orthostatic hypotension  Additional Pertinent Hx: Per H&P 3/29/21: Pt presents with alcoholic liver cirrhosispresented to the ED today because of weakness and fall at home x2. Patient fell and hit the back of his head. Denies any history of loss of consciousness. Patient following up with [facility] for liver transplant evaluation. He is not yet on the liver transplant list. Low blood pressure on admission likely causing orthostatic dizziness and hypotension has home falls. Patient had therapeutic right thoracentesis 2.1 L removed on 2021 and therapeutic paracentesis 1 2021 with 1.1 L drained. Restrictions/Precautions:  Restrictions/Precautions: General Precautions, Fall Risk  Position Activity Restriction  Other position/activity restrictions: orthostatic hypotension     SUBJECTIVE: Pt. Nurse approved OT treatment. Pt. Lying in bed upon arrival and agreeable to OT treatment. Pt. In lengthy detail provided complete recent medical history prior to starting  Ther ex.s    PAIN: No pain reported    Vitals: Vitals not assessed per clinical judgement, see nursing flowsheet  Orthostatic Blood Pressure: Supine: 111/93, Sittin/86, Standin/63    COGNITION: WNL    ADL:   No ADL's completed this session. Kerri Andrew BALANCE:  Sitting Balance:  Stand By Assistance. sat EOB to complete ther ex. Standing Balance: Stand By Assistance. BED MOBILITY:  Rolling to Left: Stand By Assistance    Rolling to Right: Stand By Assistance    Supine to Sit: Stand By Assistance      TRANSFERS:  Sit to Stand:  Stand By Assistance.     Stand to Sit: Stand By Assistance. ADDITIONAL ACTIVITIES:  Pt completed B UE exs with moderate resistance band x 10 reps, x 3 exercises, with fair activity tolerance of exs, with brief RBs throughout, and verbal and visual cues for tech with resistance band to improve independence and safety with basic ADL components such as UB dressing and sit to stand with toilet transfers. Treatment stopped at this time Pt.'s doctor arrived will return later if time allows. ASSESSMENT:     Activity Tolerance:  Patient tolerance of  treatment: fair. Discharge Recommendations: Continue to assess pending progress, Patient would benefit from continued therapy after discharge   Equipment Recommendations: Equipment Needed: No  Plan: Times per week: 3-5x  Times per day: Daily  Current Treatment Recommendations: Functional Mobility Training, Endurance Training, Safety Education & Training, Patient/Caregiver Education & Training, Self-Care / ADL    Patient Education  Patient Education: Home Exercise Program and Importance of Increasing Activity       Goals  Short term goals  Time Frame for Short term goals: Until discharge  Short term goal 1: Complete standing 2 min SBA with 1 UE release, 0-1 seated RB, and RW to complete self-care. Short term goal 2: Complete functional mobility with RW and SBA to/from the bathroom to increase indep ADLs. Short term goal 3: Complete LE dressing with S 0-1 vc's and use of LHAE to increase independence and safety. Long term goals  Time Frame for Long term goals : None d/t ELOS    Following session, patient left in safe position with all fall risk precautions in place.

## 2021-04-05 NOTE — CONSULTS
AFIB RVR--not on [address] with cirrhosis              [id number]   Hx of hypotension and fall on admission  Hx of HTN-low normal BP now on med  Cirrhosis--  DM  COPD--on CPAP  MAX  Chronic anemia-IM following  GERD  S/p cath dec 2020- no obstructive lesion  Plan  Rate control  Cont amido drip  Lopressor with parameter      [id number]    Ayush Caputo MD    Echo dec 2020 at [address].  Rhythm appears to be afib. - Left ventricular chamber size is and systolic function are normal.  EF   estimated at 60 - 65%.    - Right ventricular chamber size is enlarged.  Systolic function is   normal.  - No shunt identified on agitated saline study. - Mild tricuspid valve regurgitation.  - RVSP is normal ~31 mmHg.

## 2021-04-05 NOTE — PROGRESS NOTES
Gastroenterology Progress Note:     Patient Name:  Bela Schuster   MRN: [id number]  [id number]  YOB: 1965  Admit Date: 3/29/2021  7:00 PM  Primary Care Physician: Kizzy Helm MD   6K-19/019-A     Patient seen and examined. 24 hours events and chart reviewed. Subjective: Patient sitting on the side of the bed. Denies abdominal pain, nausea, & vomiting. He went into Afib with RVR, started on an Amio gtt. States he does not want to have the Watchman procedure because he does \"not want to be on Coumadin. \"     Objective:  BP (!) 107/58   Pulse 100   Temp 98.3 °F (36.8 °C) (Oral)   Resp 16   Ht 6' 2\" (1.88 m)   Wt 244 lb 8 oz (110.9 kg)   SpO2 98%   BMI 31.39 kg/m²     Physical Exam:    General:  Chronically ill appearing male  HEENT: Atraumatic, normocephalic. Moist oral mucous membranes. Scleral icterus  Neck: Supple without adenopathy, JVD, thyromegaly or masses. Trachea midline. CV: Heart RRR, no murmurs, rubs, gallops. Resp: Even, easy without cough or accessory use. Lungs clear to ascultation bilaterally. Abd: Round, soft, nontender. No hepatosplenomegaly or mass present. Active bowel sounds heard. No distention noted. Ext:  Without cyanosis, clubbing. BLE edema. Skin: Pink, warm, dry  Neuro:  Alert, oriented x 3 with no obvious deficits.        Rectal: deferred    Labs:   CBC:   Lab Results   Component Value Date    WBC 6.0 04/05/2021    HGB 10.5 04/05/2021    HCT 32.2 04/05/2021    MCV 98.5 04/05/2021    PLT 70 04/05/2021     BMP:   Lab Results   Component Value Date     04/05/2021    K 4.7 04/05/2021    K 3.4 03/18/2021    CL 94 04/05/2021    CO2 23 04/05/2021    PHOS 3.5 04/04/2021    BUN 36 04/05/2021    CREATININE 2.4 04/05/2021    CALCIUM 9.1 04/05/2021     PT/INR:   Lab Results   Component Value Date    INR 1.85 04/05/2021     Lipids:   Lab Results   Component Value Date    ALKPHOS 109 04/05/2021    ALT 25 04/05/2021    AST 50 04/05/2021    BILITOT 6.7 04/05/2021 BILIDIR 2.2 04/05/2021    LABALBU 3.3 04/05/2021     Current Meds:  Scheduled Meds:   gabapentin  200 mg Oral BID    bumetanide  1 mg Oral BID    lactulose  30 g Oral 4 times per day    octreotide  200 mcg Subcutaneous Q8H    famotidine  20 mg Oral Daily    aspirin  81 mg Oral Daily    dilTIAZem  30 mg Oral TID    [Held by provider] pantoprazole  40 mg Oral Daily    rifaximin  550 mg Oral BID    [Held by provider] spironolactone  50 mg Oral BID    tiotropium  2 puff Inhalation Daily    sodium chloride flush  10 mL Intravenous 2 times per day    insulin lispro  0-12 Units Subcutaneous TID WC    insulin lispro  0-6 Units Subcutaneous Nightly    midodrine  15 mg Oral TID WC     Continuous Infusions:   amiodarone 0.5 mg/min (04/05/21 0453)    sodium chloride      dextrose         Assessment:  49 yo M admitted 03/29/21 for a fall at home x 2. Endorses hitting his head during one fall. CT head negative. CT cervical spine negative. CXR demonstrated stable right pleural effusion.  H/O hypotension, on Midodrine, noted to have worsening hypotension in the ED. H/O alcoholic cirrhosis with recurrent hepatic hydrothorax. Not a candidate for a TIPS procedure per [facility]. Last thoracentesis 03/16/21 with 2.1L removed. Last paracentesis 03/17/21 with 1.1L removed. US paracentesis with 4.6L removed 03/31/21, negative for SBP. 1. Fall x 2  2. Acute on chronic hypotension- on Midodrine  3. Recurrent right hepatic hydrothorax- stable  4. Decompensated alcoholic cirrhosis- MELD 29  5. KARAN on CKD- possible hepatorenal syndrome vs ARF  6. Afib- no OAC due to cirrhosis  7. Thrombocytopenia  8. Hypoantremia  9. Chronic anemia- no active GI bleeding  10. Coagulopathy  11.  Ascites s/p paracentesis with 4.6L removed 03/31/21, no SBP      Plan:    · Monitor H & H, transfuse prn  · Nursing to monitor stool output  · Continue PPI daily, home dose  · Continue Midodrine  · Continue Xifaxan BID, home dose  · Continue Lactulose 30g QID  · Continue Simethicone as needed for gas  · Bumex resumed  · Resume Aldactone okay with nephro  · Diet as tolerated, 2 g sodium  · Daily weights  · Strict I & O  · Monitor hepatic labs & INR daily  · Avoid narcotics & benzodiazepines as they can precipitate HE  · Electrolyte management per nephrology  · Amio gtt per primary, monitor for hepatic effects  · Cardiology consulted by primary  · Nephrology on board  · [facility] transfer held due to HE & ARF improving  · Supportive care per primary team  Will follow    Case reviewed and impression/plan reviewed in collaboration with Dr. Ruby Dickinson  Electronically signed by FELIX Bustos CNP on 4/5/2021 at 11:20 AM     [facility]

## 2021-04-05 NOTE — CARE COORDINATION
4/5/21, 11:59 AM EDT    DISCHARGE ON GOING [facility] GINKGOT[facility] 12 day: 6  Location: [facility]19/019 Reason for admit: Sympathotonic orthostatic hypotension [I95.1]   Procedure: 3/31 US guided paracentesis  Barriers to Discharge: Transferred from  for tachycardia. Na+ 128, creat 2.4, bili 6.7. Telemetry, ST/Afib. Amio gtt started, cardiology following. Nephro following, now need for RRT. PT/OT- amb 25 ft. PCP: Sonya Leblanc MD  Readmission Risk Score: 40%  Patient Goals/Plan/Treatment Preferences:Home alone. Has [facility]. SW following.

## 2021-04-06 NOTE — CARE COORDINATION
Shawn [facility] Transitions Follow Up Call    2021    Patient: Marivel Sorenson  Patient : 1965   MRN: <[id number]>  Reason for Admission: end stage renal disease  Discharge Date: 3/18/21 RARS: Readmission Risk Score: 39         Spoke with: Called to speak with patient for update with transition of care. Left HIPPA compliant voice message with contact information [phone] for a call  Back with an update. Patient returned call states he is doing okay nothing much has changes. He stats he has had 6 thorocentesis and now they are doing paracentesis. Sob is his baseline nothing more, has discomfort and distention of abdomen. States his dr is aware. Eating and drinking as tolerated. Has nausea without vomiting has chronic diarrhea d/t lactulose 3 times daily. Patient denies any concerns states has all dr appointments in line. Needs to be reviewed by the provider   Additional needs identified to be addressed with provider No  none           Method of communication with provider : none      Care Transition Nurse (CTN) contacted the patient by telephone to follow up after admission on 3/29/21. Verified name and  with patient as identifiers. Addressed changes since last contact: symptom management-SOB, distention , pain addressed  Discharged needs reviewed: none  Follow up appointment completed? Yes    Advance Care Planning:   Does patient have an Advance Directive:  not on file. CTN reviewed discharge instructions, medical action plan and red flags with patient and discussed any barriers to care and/or understanding of plan of care after discharge. Discussed appropriate site of care based on symptoms and resources available to patient including: PCP, Specialist and When to call 911. The patient agrees to contact the PCP office for questions related to their healthcare.      Patients top risk factors for readmission: level of motivation, medical condition and medication management  Interventions to

## 2021-04-06 NOTE — PROGRESS NOTES
returned to supine after BP drop, pt reassessed in supine at 115/85 95 bpm. Nursing notified    OBJECTIVE:  Bed Mobility:  Supine to Sit: Stand By Assistance, with verbal cues , with increased time for completion. Sit to Supine: Stand By Assistance, with verbal cues    Scooting: Stand By Assistance, with increased time for completion  Cues for sequencing throughout. Transfers:  Sit to Stand: Stand By Assistance, with increased time for completion, cues for hand placement  Stand to Sit:Stand By Assistance, with increased time for completion, cues for hand placement, with verbal cues  Pt blood pressure dropped upon standing with pt complaints of dizziness and a headache. Pt returned to supine. Ambulation:  Not Tested due to low blood pressure    Balance:  Static Sitting Balance:  Stand By Assistance  Pt stood with no UE ~1 minute with blood pressure assessment. Cues for erect trunk. Exercise:  Patient was guided in 1 set(s) 10 reps of exercise to both lower extremities. Ankle pumps, Glut sets and Heelslides. Exercises were completed for increased independence with functional mobility. Functional Outcome Measures: Completed  AM-PAC Inpatient Mobility Raw Score : 16  AM-PAC Inpatient T-Scale Score : 40.78    ASSESSMENT:  Assessment: Patient progressing toward established goals. Activity Tolerance:  Patient tolerance of  treatment: good. Pt tolerated session well. Session limited due to low blood pressure. Decreased balance, endurance, and strength. Pt has poor safety awareness at times when distracted. Pt would benefit from continued therapy this date to improve functional mobility.    Equipment Recommendations:Equipment Needed: No  Other: cont to assess needs  Discharge Recommendations:  Continue to assess pending progress, Patient would benefit from continued therapy after discharge, 24 hour supervision or assist, Home with Home health PT     Plan: Times per week: 5X GM  Times per day: Daily  Specific instructions for Next Treatment: therex and mobility    Patient Education  Patient Education: Plan of Care, Home Exercise Program, Bed Mobility, Transfers, Use of Gait Belt    Goals:  Patient goals : return home  Short term goals  Time Frame for Short term goals: by discharge  Short term goal 1: bed mobility with MOD I to get in/out of bed  Short term goal 2: transfer with MOD I to get in/out of chairs  Short term goal 3: amb 50'x1 with walker and MOD I to walk safely in apt  Short term goal 4: negotiate 1 step without HR and MOD I to enter apt safely  Long term goals  Time Frame for Long term goals : no LTGs set secondary to short ELOS    Following session, patient left in safe position with all fall risk precautions in place.

## 2021-04-06 NOTE — PLAN OF CARE
Problem: Falls - Risk of:  Goal: Will remain free from falls  Description: Will remain free from falls  4/6/2021 1240 by Marquita Ríos RN  Outcome: Ongoing  Note: Bed alarm on, Falling star program in place. Call light within reach. Problem: Falls - Risk of:  Goal: Absence of physical injury  Description: Absence of physical injury  4/6/2021 1240 by Marquita Ríos RN  Outcome: Met This Shift  Note: No injuries this shift. Problem: Pain:  Description: Pain management should include both nonpharmacologic and pharmacologic interventions. Goal: Pain level will decrease  Description: Pain level will decrease  4/6/2021 1240 by Marquita Ríos RN  Outcome: Met This Shift  Note: Patient denies any pain. Problem: Musculor/Skeletal Functional Status  Goal: Highest potential functional level  4/6/2021 1240 by Marquita Ríos RN  Note: Encourage patient out of bed with help. PT/OT seeing patient. Problem: Cardiovascular  Goal: Hemodynamic stability  4/6/2021 1240 by Marquita Ríos RN  Note: Vital signs stable. Will monitor. Problem: GI  Goal: No bowel complications  1/4/6802 5522 by Marquita Ríos RN  Outcome: Ongoing  Note: Patient on PO Lactulose. Patient complaining of gas pain. PRN medication given. Will monitor. Problem: Nutrition  Goal: Optimal nutrition therapy  4/6/2021 1240 by Marquita Ríos RN  Outcome: Ongoing  Note: Patient able to tolerate food. On 1200ml fluid restriction. Problem: Physical Regulation:  Goal: Prevent transmision of infection  Description: Prevent transmision of infection  4/6/2021 1240 by Marquita Ríos RN  Outcome: Ongoing  Note: Patient in contact isolation.       Problem: Skin Integrity:  Goal: Risk for impaired skin integrity will decrease  Description: Risk for impaired skin integrity will decrease  4/6/2021 1240 by Marquita Ríos RN  Outcome: Ongoing  Note: Encourage patient to turn and reposition. Problem: Skin Integrity:  Goal: Will show no infection signs and symptoms  Description: Will show no infection signs and symptoms  4/6/2021 1240 by Oscar Hurley RN  Outcome: Met This Shift     Problem: Skin Integrity:  Goal: Absence of new skin breakdown  Description: Absence of new skin breakdown  4/6/2021 1240 by Oscar Hurley RN  Note: Patient has open area on right buttocks. Dressing changed. Area cleaned with peroxide. Problem: DISCHARGE BARRIERS  Goal: Patient's continuum of care needs are met  4/6/2021 1240 by Oscar Hurley RN  Note: Patient plans to return home at discharge with home health. Refusing to go to rehab facility. Problem: Serum Glucose Level - Abnormal:  Goal: Ability to maintain appropriate glucose levels will improve  Description: Ability to maintain appropriate glucose levels will improve  4/6/2021 1240 by Oscar Hurley RN  Outcome: Met This Shift  Note: Blood sugar stable. Will monitor. Care plan reviewed with patient. Patient verbalize understanding of the plan of care and contribute to goal setting.

## 2021-04-06 NOTE — PROGRESS NOTES
--3/31: stable, GI following. Cont Lactulose and Rifaximin. Diuretics on hold 2/2 to #3. Avoid narcotics & benzodiazepines as they can precipitate HE. Daily weights, strict I/Os. Daily LFTs and INR.   --4/1: s/p paracentesis with 4.5L fluid removal. No SBP. CCM. GI following.   --4/2: pt more slow and foggy today. Ammonia up from 32 to 52 today. Pt has 2 BM's a day. Will increase Lactulose to every 6 hours. --4/3: pt not slow or confused at all today. Reports feeling much better. Will cont Lactulose to every 6 hours and Rifaximin. GI following. --4/4: no changes, ammonia levels down to 21 today from 53. Will cont Lactulose to every 6 hours and Rifaximin. GI following. CCM.  --4/5: no changes, CCM.   --4/6: abdomen more distended today, will order U/S guided paracentesis today. CCM. 3. KARAN on CKD Stage 3: Serum creatinine baseline is 1.5-1.6, now up to 3.6. Likely pre-renal due to hypotension and diuretics vs HRS. Pending urine lytes and renal U/S. Hold diuretics. Trial of NS IVF. Cont to trend daily Cr. Avoid nephrotoxic agents. Strict I/Os. Daily weights. Nephrology following.     --3/31: Cr continues to decline today, up from 3.6 to 4.0 despite holding diuretics and starting IVF. No intrinsic renal disease or GN. UA benign without hematuria or proteinuria. Cannot rule out HRS. Spoke with Nephrology, will start Albumin and Octreotide. May will need renal replacement therapy next 24 to 48 hours   --4/1: Cr down to 3.8 today. IVF reduced to 25 ml/hr. Cont Midodrine, Albumin and Octreotide. Nephrology following. Strict I/Os. Daily weights. Nephrology following.   --4/2: Cr down to 3.2 today. IVF reduced to 25 ml/hr. Pt declining Octreotide. Nephrology following. Strict I/Os. Daily weights. --4/3: Cr down to 2.9 today. Improving. Still declining Octreotide. Nephrology following. Strict I/Os. Daily weights. --4/4: Cr down to 2.6 today. Improving. Nephrology following. Strict I/Os. Daily weights. --4/5: Cr down to 2.4 today. Improving. Bumex and Aldactone resumed per Nephrology. --4/6: Cr down to 2.1 today, Na also decreased (likely due to Amio drip mixed with D5). Cont Bumex and Aldactone. Nephrology following. 4. Hyponatremia: likely 2/2 to intravascular volume depletion. Initiate trial of IV normal saline. Hold Bumex and Aldactone. Cont to trend Na levels daily     5. Acute on Chronic Hypotension: Midodrine increased to 15 mg TID. 6. Paroxysmal Atrial Fibrillation with RVR: uncontrolled HR, difficult to control given low BP. No OAC given liver cirrhosis and coagulapathy    --4/5: HR up to 150's at times, currently in A. Fib with RVR. Amiodarone drip, Cardiology consulted. Cont to monitor on tele. --4/6: transition to PO Amiodarone today, cont Lopressor and Cardizem with BP hold parameters. Cont to monitor on tele. 7. Thrombocytopenia and Coagulapathy: PLT 80K. 2/2 to liver cirrhosis. Avoid blood thinners. INR 1.94. Chief Complaint: weakness, falls     Hospital Course: 48 y. o. white male with history of chronic alcohol abuse, Hx cirrhosis w/ ascites, DM, HTN, gastroesophageal reflux disease, previous NSAID use, atrial fibrillation, chronic hypotension, Hx hepatic hydrothorax, chronic hypomagnesemia, CKD with baseline creatinine of 1.5 who presented to the emergency room after having couple of falls at home. Patient reports feeling weak and dizzy at times. Reports generalized weakness. Reports decreased appetite. Chest x-ray showed stable prominent right pleural effusion. CT head was negative for any acute intracranial abnormality. CT of the cervical spine was also negative for any acute changes    Subjective: no acute events overnight. Only complaint is his belly becoming more distended. Tolerating PO intake. No fevers or chills. No confusion. No chest pain or sob.          Medications:  Reviewed    Infusion Medications    amiodarone 0.5 mg/min (04/06/21 1333)    sensory/motor deficits. Cranial nerves: II-XII intact, grossly non-focal.  Psychiatric: Alert and oriented, thought content appropriate, normal insight  Capillary Refill: Brisk,< 3 seconds   Peripheral Pulses: +2 palpable, equal bilaterally       Labs:   Recent Labs     04/06/21  0536   WBC 6.3   HGB 10.4*   HCT 30.7*   PLT 74*     Recent Labs     04/04/21  0457 04/04/21  0457 04/06/21  0536   *   < > 125*   K 4.7   < > 3.8   CL 96*   < > 92*   CO2 18*   < > 21*   BUN 42*   < > 30*   CREATININE 2.6*   < > 2.1*   CALCIUM 9.7   < > 8.9   PHOS 3.5  --   --     < > = values in this interval not displayed. Recent Labs     04/05/21  0351 04/06/21  0536   AST 50* 48*   ALT 25 23   BILIDIR 2.2*  --    BILITOT 6.7* 4.6*   ALKPHOS 109 111     Recent Labs     04/06/21  0536   INR 1.92*     No results for input(s): Stanley Sol in the last 72 hours. Urinalysis:      Lab Results   Component Value Date    NITRU NEGATIVE 03/30/2021    WBCUA NONE SEEN 03/16/2021    BACTERIA NONE SEEN 03/16/2021    RBCUA 0-2 03/16/2021    BLOODU NEGATIVE 03/30/2021    SPECGRAV 1.015 03/30/2021       Radiology: All imaging reviewed     Diet: DIET GENERAL; Low Sodium (2 GM);  Daily Fluid Restriction: 1200 ml  Dietary Nutrition Supplements: Protein Modular      Code Status: Full Code            Electronically signed by Antonio Johnston MD on 4/6/2021 at 2:06 PM

## 2021-04-06 NOTE — PROGRESS NOTES
[address]  RENAL TELEMETRY 6K  Occupational Therapy  Daily Note  Time:   Time In: 6431  Time Out: 5807  Timed Code Treatment Minutes: 14 Minutes  Minutes: 19          Date: 2021  Patient Name: Mary Weathers,   Gender: male      Room: [facility]019-A  MRN: [id number]  : 1965  (48 y.o.)  Referring Practitioner: Mell Beckett MD  Diagnosis: Sympathotonic orthostatic hypotension  Additional Pertinent Hx: Per H&P 3/29/21: Pt presents with alcoholic liver cirrhosispresented to the ED today because of weakness and fall at home x2. Patient fell and hit the back of his head. Denies any history of loss of consciousness. Patient following up with [facility] for liver transplant evaluation. He is not yet on the liver transplant list. Low blood pressure on admission likely causing orthostatic dizziness and hypotension has home falls. Patient had therapeutic right thoracentesis 2.1 L removed on 2021 and therapeutic paracentesis 1 2021 with 1.1 L drained. Restrictions/Precautions:  Restrictions/Precautions: General Precautions, Fall Risk  Position Activity Restriction  Other position/activity restrictions: orthostatic hypotension     SUBJECTIVE: Pt. Nurse approved OT session. Pt. Voices need to toilet upon arrival.  Pt.'s student nurse present and reports Pt. With active nose bleed prior to arrival.     PAIN:No pain reported    Vitals: Vitals not assessed per clinical judgement, see nursing flowsheet    COGNITION: WNL    ADL:   Toileting: Contact Guard Assistance. Toilet Transfer: Contact Guard Assistance. Maria Guadalupe Ruanselmo BALANCE:  Standing Balance: Contact Guard Assistance. BED MOBILITY:  Supine to Sit: Stand By Assistance      TRANSFERS:  Sit to Stand:  Contact Guard Assistance. Stand to Sit: Contact Guard Assistance. FUNCTIONAL MOBILITY:  Assistive Device: Rolling Walker  Assist Level:  Contact Guard Assistance. Distance:  To and from bathroom  Slow and steady assistance provided to manage IV pole. Pt. Required lengthy time seated on toilet. ASSESSMENT:     Activity Tolerance:  Patient tolerance of  treatment: fair. Discharge Recommendations: Continue to assess pending progress, Patient would benefit from continued therapy after discharge   Equipment Recommendations: Equipment Needed: No  Plan: Times per week: 3-5x  Times per day: Daily  Current Treatment Recommendations: Functional Mobility Training, Endurance Training, Safety Education & Training, Patient/Caregiver Education & Training, Self-Care / ADL    Patient Education  Patient Education: ADL's, Importance of Increasing Activity and Assistive Device Safety and safety with functional mobility and transfers. Goals  Short term goals  Time Frame for Short term goals: Until discharge  Short term goal 1: Complete standing 2 min SBA with 1 UE release, 0-1 seated RB, and RW to complete self-care. Short term goal 2: Complete functional mobility with RW and SBA to/from the bathroom to increase indep ADLs. Short term goal 3: Complete LE dressing with S 0-1 vc's and use of LHAE to increase independence and safety. Long term goals  Time Frame for Long term goals : None d/t ELOS    Following session, patient left in safe position with all fall risk precautions in place.

## 2021-04-06 NOTE — CARE COORDINATION
4/6/21, 4:32 PM EDT    DISCHARGE ON GOING [address] day: 7  Location: [facility]19/019-A Reason for admit: Sympathotonic orthostatic hypotension [I95.1]   Barriers to Discharge: Na+ 125, creat 2.1, bili 4.6, plt 74. Telemetry, Afib 80-100s. Transition to po amiodarone. Paracentesis today. PCP: Fernie Lockett MD  Readmission Risk Score: 41%  Patient Goals/Plan/Treatment Preferences: Home alone with [facility].

## 2021-04-06 NOTE — PROGRESS NOTES
Cardiology Progress Note      Patient:  Tarik Blackburn  YOB: 1965  MRN: [id number]   Acct: [de-identified]  Admit Date:  3/29/2021  Primary Cardiologist: ?[facility]    Note per dr Rome Neumann:  AFib with rapid ventricular response in a  60-year-old gentleman admitted with hypotension, status post multiple  falls on 03/29/2021. The patient follows up with his cardiac care and  also for liver transplant issue at [facility] and the patient is planned to see  Dr. Palma Pat for Watchman placement evaluation.     HISTORY OF PRESENT ILLNESS:  This is a 60-year-old  gentleman  with alcoholic liver cirrhosis who presented to the emergency room on  03/29/2021 after he fall at home twice. The patient felt dizzy and fell  twice backward and hit his head. The patient denied any loss of  consciousness during that time and no recent history of syncope. He  denied having any chest pain. He has been having low blood pressure. Chronically, he has been on midodrine and on admission, he had low blood  pressure that was noted on admission. Blood pressure of 83/53 with  pulse rate 89 has been recorded from the day of admission at least by  the emergency room physician and so, he was admitted for fall due to  hypotension and recurrent right-sided pleural effusion. The patient has  been on midodrine. His midodrine has been increased to 15 and his  medication has been held including the Lopressor, which he has been  taking significant amount. Blood pressure as low as 76/50 with pulse  rate 97 has been recorded at the emergency room per the recommendation. So now the patient is off his metoprolol, which he used to take quite  significant amount and he is having now AFib with rapid ventricular  response. Restarted him on amiodarone and Cardiology evaluation was  sought in assistance for the rate control. Denied having any chest Pain. \"    Subjective (Events in last 24 hours): pt awake and alert. NAD.  No cp or sob.   On RA    Objective:   /75   Pulse 103   Temp 98.3 °F (36.8 °C) (Oral)   Resp 18   Ht 6' 2\" (1.88 m)   Wt 244 lb 8 oz (110.9 kg)   SpO2 95%   BMI 31.39 kg/m²        TELEMETRY: afib 114 with exertion, otherwise low 100s, pt states its been like this for 20 years    Physical Exam:  General Appearance: alert and oriented to person, place and time, in no acute distress  Cardiovascular: normal rate, regular rhythm, normal S1 and S2, no murmurs, rubs, clicks, or gallops, distal pulses intact, no carotid bruits, no JVD  Pulmonary/Chest: clear to auscultation bilaterally- no wheezes, rales or rhonchi, normal air movement, no respiratory distress  Abdomen: soft, non-tender, +distended, normal bowel sounds, no masses Extremities: no cyanosis, clubbing or edema, pulse   Skin: warm and dry, no rash or erythema  Head: normocephalic and atraumatic  Eyes: pupils equal, round, and reactive to light  Neck: supple and non-tender without mass, no thyromegaly   Musculoskeletal: normal range of motion, no joint swelling, deformity or tenderness  Neurological: alert, oriented, normal speech, no focal findings or movement disorder noted    Medications:    metoprolol tartrate  25 mg Oral Q6H    [Held by provider] spironolactone  25 mg Oral Daily    gabapentin  200 mg Oral BID    bumetanide  1 mg Oral BID    lactulose  30 g Oral 4 times per day    famotidine  20 mg Oral Daily    aspirin  81 mg Oral Daily    dilTIAZem  30 mg Oral TID    [Held by provider] pantoprazole  40 mg Oral Daily    rifaximin  550 mg Oral BID    tiotropium  2 puff Inhalation Daily    sodium chloride flush  10 mL Intravenous 2 times per day    insulin lispro  0-12 Units Subcutaneous TID WC    insulin lispro  0-6 Units Subcutaneous Nightly    midodrine  15 mg Oral TID WC      amiodarone 0.5 mg/min (04/06/21 0510)    sodium chloride      dextrose       simethicone, 80 mg, Q6H PRN  ipratropium-albuterol, 1 ampule, Q4H PRN  sodium chloride flush, 10 mL, PRN  sodium chloride, 25 mL, PRN  promethazine, 12.5 mg, Q6H PRN    Or  ondansetron, 4 mg, Q6H PRN  polyethylene glycol, 17 g, Daily PRN  glucose, 15 g, PRN  dextrose, 12.5 g, PRN  glucagon (rDNA), 1 mg, PRN  dextrose, 100 mL/hr, PRN          Lab Data:    Cardiac Enzymes:  No results for input(s): CKTOTAL, CKMB, CKMBINDEX, TROPONINI in the last 72 hours.     CBC:   Lab Results   Component Value Date    WBC 6.3 04/06/2021    RBC 3.17 04/06/2021    HGB 10.4 04/06/2021    HCT 30.7 04/06/2021    PLT 74 04/06/2021       CMP:    Lab Results   Component Value Date     04/06/2021    K 3.8 04/06/2021    K 3.4 03/18/2021    CL 92 04/06/2021    CO2 21 04/06/2021    BUN 30 04/06/2021    CREATININE 2.1 04/06/2021    LABGLOM 33 04/06/2021    GLUCOSE 119 04/06/2021    CALCIUM 8.9 04/06/2021       Hepatic Function Panel:    Lab Results   Component Value Date    ALKPHOS 111 04/06/2021    ALT 23 04/06/2021    AST 48 04/06/2021    PROT 5.7 04/06/2021    BILITOT 4.6 04/06/2021    BILIDIR 2.2 04/05/2021    LABALBU 3.3 04/06/2021       Magnesium:    Lab Results   Component Value Date    MG 1.8 04/05/2021       PT/INR:    Lab Results   Component Value Date    INR 1.92 04/06/2021       HgBA1c:    Lab Results   Component Value Date    LABA1C <4.2 03/29/2021       FLP:  No results found for: TRIG, HDL, LDLCALC, LDLDIRECT, LABVLDL    TSH:    Lab Results   Component Value Date    TSH 2.050 03/29/2021         Assessment:    Chronic afib rvr - improved with amio gtt   Not on [address] [place] [address] Road due to cirrhosis, thrombocytopenia, stopped awhile ago, and pt doesn't want it  Chronic Hypotension - on midodrine  Recurrent falls due to hypotension  Acute decompensated liver cirrhosis   Follows [facility], awaiting liver transplant  Ascites - s/p paracentesis  CKD  Ef 60-65 per TTE 12/21/20  DM  Hx COPD  MAX  Chronic anemia  Hx GERD      Plan:    Keep mag >2 and K >4  Normal TSH 3/29/21  Cont BB/cdz  Diuretics per renal  Change amio to po  ekg - call if qtc >500  Recommend f/up with [facility] cardiology for possible pulmonary vein isolation  Keep f/up appt with dr Patrizia Beard for [facility] evaluation, and dr Mark Sommers as scheduled         Electronically signed by Abiola Kenney PA-C on 4/6/2021 at 1:41 PM

## 2021-04-06 NOTE — PROGRESS NOTES
CALCIUM 9.7 9.1 8.9   MG 1.9 1.8  --    PHOS 3.5  --   --      Hepatic:   Recent Labs     04/04/21  0457 04/05/21  0351 04/06/21  0536   LABALBU 3.6 3.3* 3.3*   AST 60* 50* 48*   ALT 26 25 23   BILITOT 7.7* 6.7* 4.6*   ALKPHOS 111 109 111         Meds:  Infusion:    amiodarone 0.5 mg/min (04/06/21 0510)    sodium chloride      dextrose       Meds:    metoprolol tartrate  25 mg Oral Q6H    spironolactone  25 mg Oral Daily    gabapentin  200 mg Oral BID    bumetanide  1 mg Oral BID    lactulose  30 g Oral 4 times per day    octreotide  200 mcg Subcutaneous Q8H    famotidine  20 mg Oral Daily    aspirin  81 mg Oral Daily    dilTIAZem  30 mg Oral TID    [Held by provider] pantoprazole  40 mg Oral Daily    rifaximin  550 mg Oral BID    [Held by provider] spironolactone  50 mg Oral BID    tiotropium  2 puff Inhalation Daily    sodium chloride flush  10 mL Intravenous 2 times per day    insulin lispro  0-12 Units Subcutaneous TID WC    insulin lispro  0-6 Units Subcutaneous Nightly    midodrine  15 mg Oral TID WC     Meds prn: simethicone, ipratropium-albuterol, sodium chloride flush, sodium chloride, promethazine **OR** ondansetron, polyethylene glycol, glucose, dextrose, glucagon (rDNA), dextrose       Impression and Plan:  1. KARAN on CKD in setting of acute decompensated liver cirrhosis with significant ascites  No intrinsic renal disease, no evidence of GN  UA benign without hematuria or proteinuria, Urine sodium < 20  Treated with IV albumin,  Octreotide and midodrine  Creatinine slowly improving, nonoliguric  Creatinine down to 2.1 today, peak creatinine level was 4.0  Continue with bumex    2. Hyponatremia secondary to prerenal causes from acute decompensated liver cirrhosis/hypervolemic hyponatremia: Improving/stable, will monitor  Continue with free water restriction  Will hold Aldactone due to hyponatremia  Partly exacerbated due to D5W/hypotonic drip (amiodarone mixed in D5W)    3.  Acute decompensated liver cirrhosis: Clinically improved, continue with Bumex. 4. Ascites: Status post paracentesis  5. History of alcoholic cirrhosis  6. Chronic hypotension: Continue midodrine  7. Hx A. Fib: On intravenous amiodarone infusion, Lopressor  8.  Thrombocytopenia     D/W patient   Plan of care reviewed with Dr. Tori Rangel MD  [facility]

## 2021-04-06 NOTE — PROGRESS NOTES
Gastroenterology Progress Note:     Patient Name:  Leroy Solomon   MRN: [id number]  [id number]  YOB: 1965  Admit Date: 3/29/2021  7:00 PM  Primary Care Physician: Dominic Siemens, MD   6K-19/019-A     Patient seen and examined. 24 hours events and chart reviewed. Subjective: Patient resting in bed. Complains of gas distention. Denies nausea & vomiting. Objective:  BP 87/77   Pulse 82   Temp 97.7 °F (36.5 °C) (Oral)   Resp 16   Ht 6' 2\" (1.88 m)   Wt 244 lb 8 oz (110.9 kg)   SpO2 97%   BMI 31.39 kg/m²     Physical Exam:    General:  Chronically ill appearing male  HEENT: Atraumatic, normocephalic. Moist oral mucous membranes. Neck: Supple without adenopathy, JVD, thyromegaly or masses. Trachea midline. CV: Heart RRR, no murmurs, rubs, gallops. Resp: Even, easy without cough or accessory use. Lungs clear to ascultation bilaterally. Abd: Round, soft, nontender. No hepatosplenomegaly or mass present. Active bowel sounds heard. Mild distention noted. Ext:  Without cyanosis, clubbing. BLE edema. Skin: Pink, warm, dry  Neuro:  Alert, oriented x 3 with no obvious deficits.        Rectal: deferred    Labs:   CBC:   Lab Results   Component Value Date    WBC 6.3 04/06/2021    HGB 10.4 04/06/2021    HCT 30.7 04/06/2021    MCV 96.8 04/06/2021    PLT 74 04/06/2021     BMP:   Lab Results   Component Value Date     04/06/2021    K 3.8 04/06/2021    K 3.4 03/18/2021    CL 92 04/06/2021    CO2 21 04/06/2021    PHOS 3.5 04/04/2021    BUN 30 04/06/2021    CREATININE 2.1 04/06/2021    CALCIUM 8.9 04/06/2021     PT/INR:   Lab Results   Component Value Date    INR 1.92 04/06/2021     Lipids:   Lab Results   Component Value Date    ALKPHOS 111 04/06/2021    ALT 23 04/06/2021    AST 48 04/06/2021    BILITOT 4.6 04/06/2021    BILIDIR 2.2 04/05/2021    LABALBU 3.3 04/06/2021     Current Meds:  Scheduled Meds:   metoprolol tartrate  25 mg Oral Q6H    spironolactone  25 mg Oral Daily    gabapentin 200 mg Oral BID    bumetanide  1 mg Oral BID    lactulose  30 g Oral 4 times per day    famotidine  20 mg Oral Daily    aspirin  81 mg Oral Daily    dilTIAZem  30 mg Oral TID    [Held by provider] pantoprazole  40 mg Oral Daily    rifaximin  550 mg Oral BID    tiotropium  2 puff Inhalation Daily    sodium chloride flush  10 mL Intravenous 2 times per day    insulin lispro  0-12 Units Subcutaneous TID WC    insulin lispro  0-6 Units Subcutaneous Nightly    midodrine  15 mg Oral TID      Continuous Infusions:   amiodarone 0.5 mg/min (04/06/21 0510)    sodium chloride      dextrose         Assessment:  47 yo M admitted 03/29/21 for a fall at home x 2. Endorses hitting his head during one fall. CT head negative. CT cervical spine negative. CXR demonstrated stable right pleural effusion.  H/O hypotension, on Midodrine, noted to have worsening hypotension in the ED. H/O alcoholic cirrhosis with recurrent hepatic hydrothorax. Not a candidate for a TIPS procedure per [facility]. Last thoracentesis 03/16/21 with 2.1L removed. Last paracentesis 03/17/21 with 1.1L removed. US paracentesis with 4.6L removed 03/31/21, negative for SBP. 1. Fall x 2  2. Acute on chronic hypotension- on Midodrine  3. Recurrent right hepatic hydrothorax- stable  4. Decompensated alcoholic cirrhosis- MELD 27  5. KARAN on CKD- possible hepatorenal syndrome vs ARF  6. Afib- no OAC due to cirrhosis  7. Thrombocytopenia  8. Hypoantremia  9. Chronic anemia- no active GI bleeding  10. Coagulopathy  11.  Ascites s/p paracentesis with 4.6L removed 03/31/21, no SBP      Plan:    · Monitor H & H, transfuse prn  · Nursing to monitor stool output  · Resume PPI daily when okay with nephro  · Continue Midodrine  · Continue Xifaxan BID, home dose  · Continue Lactulose 30g QID  · Continue Simethicone as needed for gas  · Continue Bumex   · Aldactone resumed  · Diet as tolerated, 2 g sodium  · Daily weights  · Strict I & O  · Monitor hepatic labs & INR daily  · Avoid narcotics & benzodiazepines as they can precipitate HE  · Electrolyte management per nephrology  · Amio gtt per primary, monitor for hepatic effects  · Cardiology consulted by primary  · Nephrology on board  · [facility] transfer held due to HE & ARF improving  · Supportive care per primary team  · Okay to discharge from GI standpoint when cleared by primary    Case reviewed and impression/plan reviewed in collaboration with Dr. Ele Rodriguez  Electronically signed by FELIX Pimentel CNP on 4/6/2021 at 10:50 AM    [facility]

## 2021-04-06 NOTE — FLOWSHEET NOTE
Pt was alone and in bed at the time of the visit. He was dealing with permanent atrial fibrillation. He was not giving up but in good spirit. Prayer was appreciated. 04/06/21 7825   Encounter Summary   Services provided to: Patient   Referral/Consult From: Rounding   Continue Visiting Yes  (4/6 )   Complexity of Encounter Low   Length of Encounter 15 minutes   Routine   Type Initial   Assessment Approachable;Calm   Intervention Bartlett;Prayer;Nurtured hope   Outcome Acceptance;Expressed gratitude;Encouraged; Hopeful;Receptive

## 2021-04-07 NOTE — TELEPHONE ENCOUNTER
.Transition of Care visit scheduled.   4/13/2021  Patient is being discharged to Home  Date of discharge 4/7/21  Discharge from facility [facility]  Reason for admission fall accident

## 2021-04-07 NOTE — PROGRESS NOTES
[facility]   Nephrology progress note  4/7/2021, 8:25 AM      Pt Name:    Yo Maharaj  MRN:     [id number]     [place]:    1965  Admit Date:    3/29/2021  7:00 PM  Primary Care Physician:  Jennifer Kirby MD   Room number  3X-36/462-G    Chief Complaint: Nephrology following for KARAN/CKD    Subjective:  Patient seen and examined  Wants to go home  Does not want to stay any longer  Feels well    Objective:  24HR INTAKE/OUTPUT:      Intake/Output Summary (Last 24 hours) at 4/7/2021 0825  Last data filed at 4/7/2021 0550  Gross per 24 hour   Intake 1540.9 ml   Output 1525 ml   Net 15.9 ml     I/O last 3 completed shifts: In: 1540.9 [P.O.:1400; I.V.:140.9]  Out: 1525 [Urine:1525]  No intake/output data recorded. Admission weight: 229 lb (103.9 kg)  Wt Readings from Last 3 Encounters:   04/07/21 235 lb 8 oz (106.8 kg)   03/23/21 238 lb (108 kg)   03/18/21 238 lb 12.8 oz (108.3 kg)     Body mass index is 30.24 kg/m².     Physical examination  VITALS:     Vitals:    04/06/21 1950 04/07/21 0038 04/07/21 0418 04/07/21 0550   BP: 107/81 104/65 106/71 (!) 98/59   Pulse: 94 121 113 118   Resp: 16 16 16    Temp: 98 °F (36.7 °C) 98.1 °F (36.7 °C) 98.2 °F (36.8 °C)    TempSrc: Oral Oral Oral    SpO2: 98% 94% 93%    Weight:   246 lb (111.6 kg) 235 lb 8 oz (106.8 kg)   Height:         General Appearance: appears comfortable, no distress  Eyes: mild icteric sclera  Mouth/Throat: Oral mucosa moist  Neck: No JVD  Lungs: Air entry B/L, no rales, no use of accessory muscles  Heart:  S1, S2 heard  GI: +distention noted, no guarding  Extremities: Bilateral edema, improved      Lab Data  CBC:   Recent Labs     04/05/21  0351 04/06/21  0536 04/07/21  0532   WBC 6.0 6.3 6.3   HGB 10.5* 10.4* 10.6*   HCT 32.2* 30.7* 32.0*   PLT 70* 74* 83*     BMP:  Recent Labs     04/05/21  0351 04/06/21  0536 04/07/21  0532   * 125* 126*   K 4.7 3.8 3.8   CL 94* 92* 91*   CO2 23 21* 21*   BUN 36* 30* 31*   CREATININE Bumex.  4. Ascites: Status post paracentesis  5. History of alcoholic cirrhosis  6. Chronic hypotension: Continue midodrine  7. Hx A. Fib: On Lopressor and oral amiodarone  8.  Thrombocytopenia     D/W patient   Plan of care reviewed with Dr. Madie Franco  See me in office in 2 to 3 weeks    Dano Downs MD  [facility]

## 2021-04-07 NOTE — CARE COORDINATION
4/7/21, 9:59 AM EDT    Patient goals/plan/ treatment preferences discussed by  and . Patient goals/plan/ treatment preferences reviewed with patient/ family. Patient/ family verbalize understanding of discharge plan and are in agreement with goal/plan/treatment preferences. Understanding was demonstrated using the teach back method. AVS provided by RN at time of discharge, which includes all necessary medical information pertaining to the patients current course of illness, treatment, post-discharge goals of care, and treatment preferences. Services After Discharge  Services At/After Discharge: Nursing Services, OT, PT([facility])           Pt is being discharged home today. Pt is current with [facility] for RN only. Therapies did recommend [facility] PT/OT. Order obtained. SW left message with [facility] updating them on discharge today and order for PT/OT. FERN Avila is aware.

## 2021-04-07 NOTE — PROGRESS NOTES
CLINICAL PHARMACY: DISCHARGE MED RECONCILIATION/REVIEW    [facility] Select Patient?: No  Total # of Interventions Recommended: 1 -    -   Total # Interventions Accepted: 1  Intervention Severity:   - Level 1 Intervention Present?: No   - Level 2 #: 0   - Level 3 #: 1   Time Spent (min): 30    Additional Documentation:    Wes Saunders PharmD, BCPS  4/7/2021  11:40 AM

## 2021-04-07 NOTE — PROGRESS NOTES
Patient discharged home with brother. Picked up medications from hospital pharmacy before being discharged.

## 2021-04-07 NOTE — DISCHARGE SUMMARY
Hospital Medicine Discharge Summary      Patient Identification:   Leroy Solomon   : 1965  MRN: [id number]   Account: [de-identified]      Patient's PCP: Dominic Siemens, MD    Admit Date: 3/29/2021     Discharge Date:   2021      Admitting Physician: Shira Bravo MD     Discharge Physician: Mi Ferrell MD     Discharge Diagnoses: Active Hospital Problems    Diagnosis Date Noted    Permanent atrial fibrillation [facility]) [I48.21]      Priority: High    Sympathotonic orthostatic hypotension [I95.1] 2021    Orthostatic dizziness [R42] 2021    Fall [W19. XXXA] 2021    Degenerative cervical spinal stenosis [M48.02] 2021    Hypo-osmolality and hyponatremia [E87.1] 2021    Asymptomatic cholelithiasis [K80.20] 2021    Weakness generalized [R53.1] 2021    Type 2 diabetes mellitus without complication ([address] [R49.8] 2020    Chronic diastolic congestive heart failure ([address] [I50.32] 2020    Class 1 obesity without serious comorbidity with body mass index (BMI) of 30.0 to 30.9 in adult [E66.9, Z68.30] 2020    Thrombocytopenia ([address] [D69.6] 10/05/2020    Coagulopathy ([address] [D68.9] 10/05/2020    Acute renal failure with acute tubular necrosis superimposed on stage 3 chronic kidney disease ([address] [N17.0, N18.30]     Alcoholic cirrhosis of liver with ascites ([address] [K70.31] 2020       The patient was seen and examined on day of discharge and this discharge summary is in conjunction with any daily progress note from day of discharge. Hospital Course:   Leroy Solomon is a 48 y.o. male admitted to [address] [place] [address] on 3/29/2021 for evaluation of falls and management of  Hypotension. Pt was followed by multiple consulting services.  I took over care on 2021  Pt responded well to medical management, remained clinically stable and was discharged in stable conditions after cleared by consulting services. Assessment/Plan:    1. Weakness with Fall and Hypotension: BP improved; although pt still have +ve orthosis. OP cardio F/U arranged           2. 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 Back on bumetanide and off spirolactone d/t KARAN on CKD.              3. KARAN on CKD Stage 3: trending down; Cr at 2.2 today previously Serum creatinine baseline is 1.5-1.6, Likely multifactorial pre-renal due to hypotension and diuretics vs possible HRS. Avoid nephrotoxic agents. Strict I/Os. Daily weights. Nephrology following w/ planned OP F/U. Repeat BMP in 2 days ordered                4. Hyponatremia: Acute on chronic; stabilized now; likely multifactorial as discussed above; Nephrology following w/ planned OP F/U. Repeat BMP in 2 days ordered     5. Acute on Chronic Hypotension: Improved; Midodrine increased to 15 mg TID.      6. Paroxysmal Atrial Fibrillation with RVR:  Rate-controlled on PO Amio/BB/CC. Cardio following; consider removing BB given orthostatic hypotension. No OAC given liver cirrhosis and coagulapathy; for consideration of Watchman's as OP         7. Thrombocytopenia and Coagulapathy: Stable. 2/2 to liver cirrhosis.      Medical deconditioning: followed by PT/OT w/ recommendations for PT/OT via      Hx of EtOH use: reportedly sober x 3 months      Exam:     Vitals:  Vitals:    04/06/21 1950 04/07/21 0038 04/07/21 0418 04/07/21 0550   BP: 107/81 104/65 106/71 (!) 98/59   Pulse: 94 121 113 118   Resp: 16 16 16    Temp: 98 °F (36.7 °C) 98.1 °F (36.7 °C) 98.2 °F (36.8 °C)    TempSrc: Oral Oral Oral    SpO2: 98% 94% 93%    Weight:   246 lb (111.6 kg) 235 lb 8 oz (106.8 kg)   Height:         Weight: Weight: 235 lb 8 oz (106.8 kg)     24 hour intake/output:    Intake/Output Summary (Last 24 hours) at 4/7/2021 0866  Last data filed at 4/7/2021 0550  Gross follow-up is recommended. **This report has been created using voice recognition software. It may contain minor errors which are inherent in voice recognition technology. **      Final report electronically signed by Dr. Alirio Lu on 4/1/2021 10:22 AM      US [address]   Final Result      Successful ultrasound-guided paracentesis. 4.6 L of fluid drained. **This report has been created using voice recognition software. It may contain minor errors which are inherent in voice recognition technology. **            Final report electronically signed by Dr Fran Rosen on 4/1/2021 8:15 AM      US RENAL COMPLETE   Final Result   Ascites. No acute findings involving either kidney. **This report has been created using voice recognition software. It may contain minor errors which are inherent in voice recognition technology. **      Final report electronically signed by Dr. Reyes Officer on 3/30/2021 1:13 PM      XR CHEST 1 VIEW   Final Result   Stable prominent right pleural effusion. **This report has been created using voice recognition software. It may contain minor errors which are inherent in voice recognition technology. **      Final report electronically signed by Dr. Jack Vaca MD on 3/29/2021 8:32 PM      CT HEAD WO CONTRAST   Final Result    No evidence of acute intracranial abnormality. **This report has been created using voice recognition software. It may contain minor errors which are inherent in voice recognition technology. **      Final report electronically signed by Dr. Jack Vaca MD on 3/29/2021 8:27 PM      CT CERVICAL SPINE WO CONTRAST   Final Result       1. No evidence of acute osseous injury of the cervical spine. 2. Multilevel degenerative changes of the cervical spine most pronounced at C5-6.   3. Large right pleural effusion.                **This report has been created using voice recognition Gluc Transmit (DEXCOM G6 TRANSMITTER) MISC  Use to test 4 times daily and as needed DX: E11.9             dilTIAZem (CARDIZEM) 30 MG tablet  Take 1 tablet by mouth 3 times daily             gabapentin (NEURONTIN) 300 MG capsule  Take 1 capsule by mouth 3 times daily for 30 days. lactulose (CHRONULAC) 10 GM/15ML solution  45 ml by mouth TID             Lancets MISC  1 each by Does not apply route daily Use to test blood sugar 4 times daily and as needed DX:E11.9             metFORMIN (GLUCOPHAGE) 500 MG tablet  Take 500 mg by mouth 2 times daily (with meals)             metoprolol (LOPRESSOR) 100 MG tablet  Take 100 mg by mouth 2 times daily             midodrine (PROAMATINE) 10 MG tablet  Take 1 tablet by mouth 3 times daily PLUS 1 tablet of 2.5mg for total of 12.5mg 3 times daily. midodrine (PROAMATINE) 2.5 MG tablet  Take 1 tablet by mouth 3 times daily Along with 10mg dose for total 12.5mg 3 times daily. ondansetron (ZOFRAN) 4 MG tablet  Take 4 mg by mouth every 8 hours as needed for Nausea or Vomiting             pantoprazole (PROTONIX) 40 MG tablet  Take 1 tablet by mouth daily             rifaximin (XIFAXAN) 550 MG tablet  Take 1 tablet by mouth 2 times daily             spironolactone (ALDACTONE) 50 MG tablet  Take 1 tablet by mouth 2 times daily             tiotropium (SPIRIVA) 18 MCG inhalation capsule  Inhale 1 capsule into the lungs daily                 Time Spent on discharge is more than 45 minutes in the examination, evaluation, counseling and review of medications and discharge plan. With RN in room, patient was updated about the treatment plan, all the questions and concerns were addressed. Alarming signs and symptoms to return to ED were explained in length. Signed: Thank you Danilo Staton MD for the opportunity to be involved in this patient's care.     Electronically signed by Hemalatha Cai MD on 4/7/2021 at 8:28 AM

## 2021-04-07 NOTE — PROGRESS NOTES
Discharge paperwork discussed with patient. No concerns voiced. Order for labs given to patient. Assisted to get dressed, packed belongings.

## 2021-04-07 NOTE — PROGRESS NOTES
[address] [address]  INPATIENT PHYSICAL THERAPY  DAILY NOTE  STRZ RENAL TELEMETRY 6K - 6I-88/036-C    Time In: 0804  Time Out: 08  Timed Code Treatment Minutes: 26 Minutes  Minutes: 26          Date: 2021  Patient Name: Tammy Diaz,  Gender:  male        MRN: [id number]  : 1965  (48 y.o.)     Referring Practitioner: Dr. Jose Karimi  Diagnosis: sympathotonic orthostatic hypotension  Additional Pertinent Hx: admit with above diagnosis, s/p fall, ESLD, KARAN, hypotension     Prior Level of Function:  Lives With: Alone  Type of Home: Apartment  Home Layout: One level  Home Access: Stairs to enter without rails  Entrance Stairs - Number of Steps: 1  Home Equipment: 4 wheeled walker   Bathroom Shower/Tub: Tub/Shower unit  Bathroom Toilet: Handicap height  Bathroom Equipment: Shower chair    ADL Assistance: [address] [place] [address] Avenue: [address] [place] [address] Place: Independent  Transfer Assistance: Independent  Active : Yes  Additional Comments: Pt reports using a 4WW in the home and community. Per pt family has family that lives close by and he will call if needs assistance, per pt just ordered a lifealert, has a little brother that is around on weekends. Pt reports he does drive occasionally, however not as often as he will get a ride generally to appts if needed, and he as groceries delivered. Pt reports he completes cooking. Restrictions/Precautions:  Restrictions/Precautions: General Precautions, Fall Risk  Position Activity Restriction  Other position/activity restrictions: orthostatic hypotension     SUBJECTIVE: RN approved session. Pt in bed upon arrival. Pt required encouragement to participate in therapy, however agreeable. Pt states urgency to use restroom upon arrival. Once sitting pt complained of dizziness and had a drop in BP, Nursing notified and entered the room. Pt returned to supine and reassessed. Extra time taken for BP assessments.     PAIN: None indicated Vitals: Orthostatic Blood Pressure: Sittin/70 74 bpm, nursing notified    sitting after 5 minutes 78/59 67 bpm, Supine after sitting  93/75 83 bpm with nursing in room  Pt very frustrated with blood pressure and not being able to complete bathroom use. OBJECTIVE:  Bed Mobility:  Supine to Sit: Stand By Assistance, with head of bed raised, with verbal cues , with increased time for completion, cues for sequencing  Sit to Supine: Stand By Assistance, with head of bed raised, with verbal cues , with increased time for completion   Scooting: Stand By Assistance, with increased time for completion    Transfers:  Not Tested due to low blood pressure    Ambulation:  Not tested    Balance:  Static Sitting Balance:  Stand By Assistance, with verbal cues   Pt sat EOB ~8 minutes with no UE support during blood pressure assessments. Pt demonstrated with forward posture with cues to maintain an erect trunk. Exercise:  Patient was guided in 1 set(s) 10 reps of exercise to both lower extremities. Ankle pumps, Glut sets and Quad sets. Exercises were completed for increased independence with functional mobility. Functional Outcome Measures: Completed  AM-PAC Inpatient Mobility Raw Score : 16  AM-PAC Inpatient T-Scale Score : 40.78    ASSESSMENT:  Assessment: Patient progressing toward established goals. Activity Tolerance:  Patient tolerance of  treatment: fair. Pt tolerated session well. Limited due to low blood pressure. Decreased endurance, postural awareness, and strength. Pt very frustrated this date due to blood pressure. Pt would benefit from continued therapy to improve functional mobility and strength.      Equipment Recommendations:Equipment Needed: No  Other: cont to assess needs  Discharge Recommendations:  Continue to assess pending progress, Patient would benefit from continued therapy after discharge, 24 hour supervision or assist, Home with Home health PT    Plan: Times per week: 5X GM  Times per day: Daily  Specific instructions for Next Treatment: therex and mobility    Patient Education  Patient Education: Plan of Care, Bed Mobility, Verbal Exercise Instruction    Goals:  Patient goals : return home  Short term goals  Time Frame for Short term goals: by discharge  Short term goal 1: bed mobility with MOD I to get in/out of bed  Short term goal 2: transfer with MOD I to get in/out of chairs  Short term goal 3: amb 50'x1 with walker and MOD I to walk safely in apt  Short term goal 4: negotiate 1 step without HR and MOD I to enter apt safely  Long term goals  Time Frame for Long term goals : no LTGs set secondary to short ELOS    Following session, patient left in safe position with all fall risk precautions in place.

## 2021-04-08 NOTE — CARE COORDINATION
Sh[facility] Transitions Initial Follow Up Call    Call within 2 business days of discharge: Yes    Patient: Aden Linares Patient : 1965   MRN: [id number]  Reason for Admission: End stage liver disease / hypotension  Discharge Date: 21 RARS: Readmission Risk Score: 38      Last Discharge [facility]       Complaint Diagnosis Description Type Department Provider    3/29/21 Fall End stage liver disease ([facility] . .. ED to Hosp-Admission (Discharged) (ADMITTED) Matias Yeung MD; Jeremi Ray MD...        1st Attempt to contact patient for Initial 24 Hour Transition from hospital to home Call:   Patient not reached. Left HIPAA Compliant message on Voice Mail to call office (number given) with any questions and an update on patient's condition since discharge. Will continue to follow.       Elizabeth Gavin LPN    [phone]  Tyesha Lugo / Shawn Szymanski Coordinator           Care Transitions 24 Hour Call    Care Transitions Interventions         Follow Up  Future Appointments   Date Time Provider Francie Lopez   2021 11:00 AM FELIX José - CNP [facility] [address]   2021  1:30 PM BRAD Stevens N ENT [address]   2021  3:00 PM Ron Anthony MD [facility] Heart [address]   2021  9:00 AM Libertad Quezada MD [facility] Heart [address]   2021  9:40 AM Alex Crow MD [facility]   2021  9:15 AM Paula Mas MD [address] Gavin LPN

## 2021-04-09 NOTE — PROGRESS NOTES
CLINICAL PHARMACY NOTE: MEDS TO [address] Select Patient?: No  Total # of Prescriptions Filled: 1   The following medications were delivered to the patient:  Amiodarone 200mg  Total # of Interventions Completed: 2  Time Spent (min): 30    Additional Documentation:

## 2021-04-13 NOTE — PATIENT INSTRUCTIONS
Patient Education        Fatigue: Care Instructions  Your Care Instructions     Fatigue is a feeling of tiredness, exhaustion, or lack of energy. You may feel fatigue because of too much or not enough activity. It can also come from stress, lack of sleep, boredom, and poor diet. Many medical problems, such as viral infections, can cause fatigue. Emotional problems, especially depression, are often the cause of fatigue. Fatigue is most often a symptom of another problem. Treatment for fatigue depends on the cause. For example, if you have fatigue because you have a certain health problem, treating this problem also treats your fatigue. If depression or anxiety is the cause, treatment may help. Follow-up care is a key part of your treatment and safety. Be sure to make and go to all appointments, and call your doctor if you are having problems. It's also a good idea to know your test results and keep a list of the medicines you take. How can you care for yourself at home? · Get regular exercise. But don't overdo it. Go back and forth between rest and exercise. · Get plenty of rest.  · Eat a healthy diet. Do not skip meals, especially breakfast.  · Reduce your use of caffeine, tobacco, and alcohol. Caffeine is most often found in coffee, tea, cola drinks, and chocolate. · Limit medicines that can cause fatigue. This includes tranquilizers and cold and allergy medicines. When should you call for help? Watch closely for changes in your health, and be sure to contact your doctor if:    · You have new symptoms such as fever or a rash.     · Your fatigue gets worse.     · You have been feeling down, depressed, or hopeless. Or you may have lost interest in things that you usually enjoy.     · You are not getting better as expected. Where can you learn more? Go to https://[email or web address].Widgetbox. org and sign in to your [employer] account.  Enter I475 in the [employer] box to learn more about \"Fatigue: Care Instructions. \"     If you do not have an account, please click on the \"Sign Up Now\" link. Current as of: February 26, 2020               Content Version: 12.8  © 2006-2021 [employer]. Care instructions adapted under license by [facility]. If you have questions about a medical condition or this instruction, always ask your healthcare professional. [address] any warranty or liability for your use of this information.

## 2021-04-13 NOTE — PATIENT INSTRUCTIONS
Afrin (oxymetazoline), 2 sprays to the left nostril, 2 times a day for the next 3 days. After three days of Afrin begin using nasal saline to both nostrils. I would recommend applying it at least twice a day, but you can use it more often if your nose feels try. Lightly pinch your nostrils close after using the saline to promote the saline spreading out. If you are actively bleeding at home try the followin. Lightly blow your nose to dislodge clots  2. Spray 3 sprays of Afrin to the bleeding nostril  3.  Apply nasal clamps to the nose for at least 20 minutes

## 2021-04-13 NOTE — PROGRESS NOTES
1780   Medication Information                      albuterol sulfate HFA (VENTOLIN HFA) 108 (90 Base) MCG/ACT inhaler  Inhale 2 puffs into the lungs every 6 hours as needed for Wheezing             amiodarone (CORDARONE) 200 MG tablet  Take 1 tablet by mouth daily             ASPIRIN 81 PO  Take by mouth daily             blood glucose monitor strips  Test 4 times daily and as needed for irregular blood glucose. Dispense sufficient amount for indicated testing frequency plus additional to accommodate PRN testing needs. DX E11.9             Blood Glucose Monitoring Suppl (ONE TOUCH ULTRA 2) w/Device KIT  Use to test 4 times daily and as needed DX: E11.9             bumetanide (BUMEX) 1 MG tablet  Take 1 tablet by mouth 2 times daily             Continuous Blood Gluc  (DEXCOM G6 ) MENDY  Use to test 4 times daily and as needed DX: E11.9             Continuous Blood Gluc Sensor (DEXCOM G6 SENSOR) MISC  Use to test 4 times daily and as needed DX: E11.9             Continuous Blood Gluc Transmit (DEXCOM G6 TRANSMITTER) MISC  Use to test 4 times daily and as needed DX: E11.9             dilTIAZem (CARDIZEM) 30 MG tablet  Take 1 tablet by mouth 3 times daily             famotidine (PEPCID) 20 MG tablet  Take 1 tablet by mouth daily             gabapentin (NEURONTIN) 100 MG capsule  Take 2 capsules by mouth 2 times daily for 30 days.              lactulose (CHRONULAC) 10 GM/15ML solution  45 ml by mouth TID             Lancets MISC  1 each by Does not apply route daily Use to test blood sugar 4 times daily and as needed DX:E11.9             metoprolol (LOPRESSOR) 100 MG tablet  Take 100 mg by mouth 2 times daily             midodrine (PROAMATINE) 5 MG tablet  Take 3 tablets by mouth 3 times daily             ondansetron (ZOFRAN) 4 MG tablet  Take 4 mg by mouth every 8 hours as needed for Nausea or Vomiting             rifaximin (XIFAXAN) 550 MG tablet  Take 1 tablet by mouth 2 times daily tiotropium (SPIRIVA) 18 MCG inhalation capsule  Inhale 1 capsule into the lungs daily             varenicline (CHANTIX) 0.5 MG tablet  Take 1 tablet by mouth daily x 1 week then increase to 1 tablet by mouth twice daily                   Medications marked \"taking\" at this time  Outpatient Medications Marked as Taking for the 4/13/21 encounter (Office Visit) with FELIX Mac - CNP   Medication Sig Dispense Refill    gabapentin (NEURONTIN) 100 MG capsule Take 2 capsules by mouth 2 times daily for 30 days.  120 capsule 0    famotidine (PEPCID) 20 MG tablet Take 1 tablet by mouth daily 30 tablet 0    midodrine (PROAMATINE) 5 MG tablet Take 3 tablets by mouth 3 times daily 270 tablet 0    amiodarone (CORDARONE) 200 MG tablet Take 1 tablet by mouth daily 30 tablet 0    Continuous Blood Gluc  (DEXCOM G6 ) MENDY Use to test 4 times daily and as needed DX: E11.9 1 Device 1    Continuous Blood Gluc Transmit (DEXCOM G6 TRANSMITTER) MISC Use to test 4 times daily and as needed DX: E11.9 9 each 3    Continuous Blood Gluc Sensor (DEXCOM G6 SENSOR) MISC Use to test 4 times daily and as needed DX: E11.9 9 each 3    lactulose (CHRONULAC) 10 GM/15ML solution 45 ml by mouth TID      bumetanide (BUMEX) 1 MG tablet Take 1 tablet by mouth 2 times daily 30 tablet 2    albuterol sulfate HFA (VENTOLIN HFA) 108 (90 Base) MCG/ACT inhaler Inhale 2 puffs into the lungs every 6 hours as needed for Wheezing 1 Inhaler 5    tiotropium (SPIRIVA) 18 MCG inhalation capsule Inhale 1 capsule into the lungs daily 30 capsule 5    dilTIAZem (CARDIZEM) 30 MG tablet Take 1 tablet by mouth 3 times daily 90 tablet 0    ondansetron (ZOFRAN) 4 MG tablet Take 4 mg by mouth every 8 hours as needed for Nausea or Vomiting      rifaximin (XIFAXAN) 550 MG tablet Take 1 tablet by mouth 2 times daily 60 tablet 1    metoprolol (LOPRESSOR) 100 MG tablet Take 100 mg by mouth 2 times daily      Lancets MISC 1 each by Does not apply route daily Use to test blood sugar 4 times daily and as needed DX:E11.9 200 each 3    blood glucose monitor strips Test 4 times daily and as needed for irregular blood glucose. Dispense sufficient amount for indicated testing frequency plus additional to accommodate PRN testing needs. DX E11.9 200 strip 3    Blood Glucose Monitoring Suppl (ONE TOUCH ULTRA 2) w/Device KIT Use to test 4 times daily and as needed DX: E11.9 1 kit 0    ASPIRIN 81 PO Take by mouth daily          Medications patient taking as of now reconciled against medications ordered at time of hospital discharge: Yes    Chief Complaint   Patient presents with    Follow-Up from Hospital     Patient was discharged on 4/7/2021 after fall    Other     Patient states that he still is weaker    Other     Patient states that his skin has been very itchy    Swelling     Patient has been experiencing bilateral leg swelling    Other     Patient states he is only having bowel movements every 3 days. HPI    Inpatient course: Discharge summary reviewed- see chart. 1. Weakness with Fall and Hypotension: BP improved; although pt still have +ve orthosis. OP cardio F/U arranged     2. 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 Back on bumetanide and off spirolactone d/t KARAN on CKD.       3. KARAN on CKD Stage 3: trending down; Cr at 2.2 today previously Serum creatinine baseline is 1.5-1.6, Likely multifactorial pre-renal due to hypotension and diuretics vs possible HRS. Avoid nephrotoxic agents. Strict I/Os. Daily weights. Nephrology following w/ planned OP F/U. Repeat BMP in 2 days ordered     4. Hyponatremia: Acute on chronic; stabilized now; likely multifactorial as discussed above; Nephrology following w/ planned OP F/U. Repeat BMP in 2 days ordered     5.  Acute on Chronic Hypotension: Improved; Skin: Negative for color change and rash. Neurological: Negative for dizziness, weakness and headaches. Psychiatric/Behavioral: Negative for agitation and sleep disturbance. The patient is not nervous/anxious. Vitals:    04/13/21 1105   BP: 118/72   Site: Left Upper Arm   Position: Sitting   Cuff Size: Medium Adult   Pulse: 128   Resp: 18   Temp: 97.2 °F (36.2 °C)   TempSrc: Temporal   Weight: 240 lb 9.6 oz (109.1 kg)   Height: 6' 2\" (1.88 m)     Body mass index is 30.89 kg/m². Wt Readings from Last 3 Encounters:   04/13/21 237 lb 4.8 oz (107.6 kg)   04/13/21 240 lb 9.6 oz (109.1 kg)   04/07/21 235 lb 8 oz (106.8 kg)     BP Readings from Last 3 Encounters:   04/13/21 128/76   04/13/21 118/72   04/07/21 109/87       Physical Exam  Vitals signs reviewed. Constitutional:       General: He is not in acute distress. Appearance: He is well-developed. HENT:      Head: Normocephalic and atraumatic. Mouth/Throat:      Pharynx: Oropharynx is clear. Eyes:      General:         Right eye: No discharge. Left eye: No discharge. Conjunctiva/sclera: Conjunctivae normal.   Cardiovascular:      Rate and Rhythm: Normal rate and regular rhythm. Heart sounds: Normal heart sounds. Pulmonary:      Effort: Pulmonary effort is normal. No respiratory distress. Breath sounds: Normal breath sounds. Abdominal:      General: Bowel sounds are normal. There is distension. Tenderness: There is no abdominal tenderness. There is no right CVA tenderness, left CVA tenderness or guarding. Skin:     General: Skin is warm and dry. Neurological:      General: No focal deficit present. Mental Status: He is alert and oriented to person, place, and time. Coordination: Coordination normal.   Psychiatric:         Mood and Affect: Mood normal.         Behavior: Behavior normal.         Thought Content:  Thought content normal.         Judgment: Judgment normal.             Assessment/Plan: 1. Chronic diastolic heart failure ([facility] [address]  - ND DISCHARGE MEDS RECONCILED W/ CURRENT OUTPATIENT MED LIST    2. Weakness    3. Essential hypertension    4. Hyponatremia    5. Lower leg edema    6. Type 2 diabetes mellitus with diabetic polyneuropathy, without long-term current use of insulin ([facility] [address]    7. Alcoholic cirrhosis of liver with ascites ([facility] [address]    8. Poor circulation    - Reviewed, discussed and education given on a healthy diet and exercise plan based on pt condition.   - Follow up with OT, PT and nursing home health as planned  - Call [facility] about possible standing order for paracentesis. Our office can help if needed  - Follow up with specialities as planned. CMP being done today for nephrology follow up. - Call office with any questions or concerns, or if symptoms are getting worse or changing  - Patient given educational materials - see patient instructions. Discussed use, benefit, and side effects of prescribed medications. All patient questions answered. Pt voiced understanding. Reviewed Health Maintenance.        Medical Decision Making: moderate complexity      Electronically signed by FELIX Ng CNP on 4/22/2021 at 7:51 AM

## 2021-04-13 NOTE — PROGRESS NOTES
distressed. Neuro: Cranial nerves II-XII grossly intact. Extremities: No clubbing, edema, or cyanosis noted. Procedure: Areas concerning for sources of recent bleeding were noted on the floor of the left nare as well as the left inferior turbinate. First, I instilled 4% topical lidocaine and Afrin to the left nare. Once adequate topical anesthesia was achieved I lightly cauterized areas that appeared to have bled recently on the floor of the left nare and anterior portion of the left inferior turbinate. Excess silver nitrate was wiped away using a moist Q-tip. Overall, patient tolerated procedure well without evidence of complication. Data:    CBC with Differential:    Lab Results   Component Value Date    WBC 6.3 04/07/2021    RBC 3.33 04/07/2021    HGB 10.6 04/07/2021    HCT 32.0 04/07/2021    PLT 83 04/07/2021    MCV 96.1 04/07/2021    MCH 31.8 04/07/2021    MCHC 33.1 04/07/2021    NRBC 0 04/07/2021    SEGSPCT 64.7 04/07/2021    MONOPCT 13.2 04/07/2021    MONOSABS 0.8 04/07/2021    LYMPHSABS 0.9 04/07/2021    EOSABS 0.3 04/07/2021    BASOSABS 0.1 04/07/2021     CMP:    Lab Results   Component Value Date     04/13/2021    K 3.0 04/13/2021    K 3.4 03/18/2021    CL 91 04/13/2021    CO2 23 04/13/2021    BUN 39 04/13/2021    CREATININE 3.0 04/13/2021    LABGLOM 22 04/13/2021    GLUCOSE 126 04/13/2021    PROT 6.2 04/13/2021    LABALBU 3.5 04/13/2021    CALCIUM 9.0 04/13/2021    BILITOT 6.3 04/13/2021    ALKPHOS 198 04/13/2021    AST 69 04/13/2021    ALT 33 04/13/2021       Assessment/Plan:     Diagnosis Orders   1. Epistaxis     2. Thrombocytopenia ([address]     3. Nasal mucosa dry     4. Advanced cirrhosis of liver [facility])         The patient is a 48 y.o. male that presents for evaluation of recurrent epistaxis. Two areas concerning for sources of recent bleeding were noted and subsequently cauterized as described above.   I recommended patient use Afrin for the next 3 days to help limit chances of further bleeding while cauterized area heals. Recommended patient transition to nasal saline gel or spray and use it in both nostrils routinely since his recent epistaxis is likely related to dry mucosa and his thrombocytopenia. Patient will be scheduled to follow-up in approximately 3 weeks for repeat examination. Patient will contact the office sooner with new/worsening symptoms or other concerns. He expresses understanding of the plan and thanked me.     Electronically signed by BRAD Covarrubias on 4/14/2021 at 9:20 AM

## 2021-04-15 NOTE — PROGRESS NOTES
CLINICAL PHARMACY NOTE: MEDS TO [address] [place] [address] Drive Select Patient?: No  Total # of Prescriptions Filled: 3   The following medications were delivered to the patient:  Gabapentin 100 mg  Famotidine 20 mg  Midodrine 5 mg  Total # of Interventions Completed: 2  Time Spent (min): 30    Additional Documentation:

## 2021-04-20 NOTE — TELEPHONE ENCOUNTER
This medication refill is regarding a electronic request by Madelyn Garcia. Requested Prescriptions     Pending Prescriptions Disp Refills    gabapentin (NEURONTIN) 300 MG capsule [Pharmacy Med Name: GABAPENTIN 300MG CAPSULES] 90 capsule 0     Sig: TAKE 1 CAPSULE BY MOUTH THREE TIMES DAILY       Date of last visit: 3/23/2021  Date of next visit: 5/13/2021  Date of last refill: 4/7/2021  120/0      Rx verified, ordered and set to EP.

## 2021-04-20 NOTE — TELEPHONE ENCOUNTER
Discussion noted. Given GFR < 30, okay for Chantix 0.5 mg daily x1 week, then increase to 0.5 mg twice daily. #49/no refills. Please do not send in a prescription for Chantix starting month pack as this dose is not appropriate, given patient's CKD. Have patient call update in 3 to 4 weeks at which point we will consider continuing 0.5 mg twice daily for 2 more months.   ES

## 2021-04-20 NOTE — TELEPHONE ENCOUNTER
Please check with patient-patient was just given a prescription for gabapentin 100 mg - 2 pills twice daily for 1 month (#120/no refills) on April 7, 2021 per [facility] inpatient physician.   Patient should not need new Rx at this time- Please confirm ES

## 2021-04-20 NOTE — TELEPHONE ENCOUNTER
Pt called and states his GI doctor told him he needed to be seen sooner due to worsening kidney function. I moved his appt to 4/23/21. Do you want to do anything differently in the meantime?

## 2021-04-21 NOTE — TELEPHONE ENCOUNTER
Fax received from [facility] and [facility] for therapeutic/diabetic shoes. Pt was seen by WS on 4/13/21 and this office note needs an addendum written regarding his feet and need for the shoes. 1) The following has to be documented in the addendum: patient has a history of pre-ulcerative callus, peripheral neuropathy with evidence of callous formation and poor circulation. 2) Addendum must include diabetic counseling and/or education given on det and exercise; IF the pt can't exercise it must be stated (example of verbage given to [facility] for review).

## 2021-04-21 NOTE — TELEPHONE ENCOUNTER
YAQUELIN Dawson CNP, [facility] wanted you to know that they have a standing order for paracentesis bi-weekly. Also wanted to make sure you were aware that pt's creatinine and potassium is worse. I explained to them pt's appt. Was moved up.

## 2021-04-21 NOTE — TELEPHONE ENCOUNTER
Pt notified of ES response and recommendations and he verbalized understanding. Rx EP'd to the pharmacy.

## 2021-04-22 NOTE — TELEPHONE ENCOUNTER
Forms and noted on ES desk for signature. Will fax on Monday to [facility] and [facility].

## 2021-04-22 NOTE — TELEPHONE ENCOUNTER
Requested Prescriptions     Pending Prescriptions Disp Refills    rifaximin (XIFAXAN) 550 MG tablet 60 tablet 1     Sig: Take 1 tablet by mouth 2 times daily       Date of last visit: 4/13/2021  Date of next visit: 5/13/2021  Date of last refill: 1/29/21 for #60 with 1 refilll  Pharmacy Name: [facility]    Rx verified, ordered and set to EP. Pt will check with the pharmacy. Rx for wheelchair [facility] print[facility] tomorrow after the Rx has been faxed.

## 2021-04-22 NOTE — PROGRESS NOTES
2166 Patient arrived to [facility] in wheelchair for albumin infusion with. Oriented to room and call light  PT RIGHTS AND RESPONSIBILITIES OFFERED TO PT. Patient assisted to bed with 2 assist.  States his blood pressure has been dropping when he stands and he gets dizzy. He has seen physician for this. Blood pressure obtained, patient denies dizziness. Infusion started, patient denies complaints. Offered snack and drink. 1105  Infusion completed and he denies complaints. Iv removed. discharge instructions given to patient and he denies questions. Patient discharge per wheel chair.       __M__ Safety:       (Environmental)   Reeds to environment   Ensure ID band is correct and in place/ allergy band as needed   Assess for fall risk   Initiate fall precautions as applicable (fall band, side rails, etc.)   Call light within reach   Bed in low position/ wheels locked    __M__ Pain:        Assess pain level and characteristics   Administer analgesics as ordered   Assess effectiveness of pain management and report to MD as needed    __M__ Knowledge Deficit:   Assess baseline knowledge   Provide teaching at level of understanding   Provide teaching via preferred learning method   Evaluate teaching effectiveness    __M__ Hemodynamic/Respiratory Status:       (Pre and Post Procedure Monitoring)   Assess/Monitor vital signs and LOC   Assess Baseline SpO2 prior to any sedation   Obtain weight/height   Assess vital signs/ LOC until patient meets discharge criteria   Monitor procedure site and notify MD of any issues

## 2021-04-22 NOTE — TELEPHONE ENCOUNTER
Pt. Left a message that he needs a different date for his appt. Pt. Is scheduled for 3pm today, 4/22/21 with Dr. René Stewart.

## 2021-04-23 PROBLEM — N17.9 AKI (ACUTE KIDNEY INJURY) (HCC): Status: ACTIVE | Noted: 2021-01-01

## 2021-04-23 NOTE — TELEPHONE ENCOUNTER
Prescriptions signed. Follow up as planned. Call office with any questions or concerns, or if symptoms are getting worse or changing. -WS    Orders Placed This Encounter   Medications    rifaximin (XIFAXAN) 550 MG tablet     Sig: Take 1 tablet by mouth 2 times daily     Dispense:  60 tablet     Refill:  1    Misc.  Devices [facility] MISC     Sig: Standard lightweight wheelchair     Dispense:  1 each     Refill:  0

## 2021-04-23 NOTE — H&P
lab; patient has an extensive medical history including cirrhosis of the liver, alcoholic cirrhosis, atrial fibrillation, diabetes, portal hypertension; he states it has been 6 months today since he has had any alcohol or tobacco products; he presented to the ED today secondary to his nephrologist office contacting him for a creatinine of 5.5; he had a paracentesis done yesterday that drained 2.1 L; he states yesterday he fell 4 times; he relates to constant lightheadedness and weakness, he is on midodrine for chronic hypotension however he did take his midodrine today in addition to his Lopressor. Currently he is alert and talkative, he is jaundiced, he denies any pain, he does relate to shortness of breath, he does relate to his chronic lightheadedness and dizziness; states his legs are extremely swollen and that it is difficult for him to get his shoes on. In the emergency department, he had a sodium level at 128, potassium 3.1, CO2 of 17, BUN 54, creatinine 5.1, lactic acidosis at 4.0, troponin T of 0.100, he relates to elevated LFTs as well; CBC shows mild anemia and mild thrombocytopenia at 108; his chest x-ray showing a large right pleural effusion; he is being admitted to the hospital service for further care and evaluation.     Past Medical History:          Diagnosis Date    Advanced cirrhosis of liver (HCC)     Alcoholic cirrhosis (HCC)     Atrial fibrillation (HCC)     CHF (congestive heart failure) (HCC)     Chronic kidney disease     COPD (chronic obstructive pulmonary disease) (HCC)     Diabetes mellitus (HCC)     Gallstones     GERD (gastroesophageal reflux disease)     Headache     Hydrothorax     Portal hypertension (HCC)        Past Surgical History:          Procedure Laterality Date    APPENDECTOMY      HERNIA REPAIR      LUNG SURGERY Right 11-24-20, 11-30-20    [facility]       Medications Prior to Admission:      Prior to Admission medications    Medication Sig Start Date End Date Taking? Authorizing Provider   rifaximin (XIFAXAN) 550 MG tablet Take 1 tablet by mouth 2 times daily 4/23/21   FELIX Fernandez CNP   Misc. Devices [facility] Standard lightweight wheelchair 4/23/21   FELIX Fernandez CNP   varenicline (CHANTIX) 0.5 MG tablet Take 1 tablet by mouth daily x 1 week then increase to 1 tablet by mouth twice daily 4/21/21   Milly Cool MD   gabapentin (NEURONTIN) 100 MG capsule Take 2 capsules by mouth 2 times daily for 30 days.  4/7/21 5/7/21  Anjali Feng MD   famotidine (PEPCID) 20 MG tablet Take 1 tablet by mouth daily 4/8/21 5/8/21  Anjali Feng MD   midodrine (PROAMATINE) 5 MG tablet Take 3 tablets by mouth 3 times daily 4/7/21 5/7/21  Anjali Feng MD   amiodarone (CORDARONE) 200 MG tablet Take 1 tablet by mouth daily 4/7/21 5/7/21  Anjali Feng MD   Continuous Blood Gluc  (DEXCOM G6 ) [facility] Use to test 4 times daily and as needed DX: E11.9 3/24/21   FELIX Lechuga CNP   Continuous Blood Gluc Transmit (DEXCOM G6 TRANSMITTER) MISC Use to test 4 times daily and as needed DX: E11.9 3/24/21   FELIX Lechuga CNP   Continuous Blood Gluc Sensor (DEXCOM G6 SENSOR) MISC Use to test 4 times daily and as needed DX: E11.9 3/24/21   FELIX Lechuga CNP   lactulose (CHRONULAC) 10 GM/15ML solution 45 ml by mouth TID 3/18/21   Historical Provider, MD   bumetanide (BUMEX) 1 MG tablet Take 1 tablet by mouth 2 times daily 3/18/21   FELIX Lin CNP   albuterol sulfate HFA (VENTOLIN HFA) 108 (90 Base) MCG/ACT inhaler Inhale 2 puffs into the lungs every 6 hours as needed for Wheezing 3/5/21   Clinton Finley MD   tiotropium [facility]) 18 MCG inhalation capsule Inhale 1 capsule into the lungs daily 3/5/21   Clinton Finley MD   dilTIAZem (CARDIZEM) 30 MG tablet Take 1 tablet by mouth 3 times daily 2/27/21   Mesha Abbott DO   ondansetron (ZOFRAN) 4 MG tablet Take 4 mg by mouth every 8 hours as needed for Nausea or Vomiting    Historical Provider, MD   metoprolol (LOPRESSOR) 100 MG tablet Take 100 mg by mouth 2 times daily    Historical Provider, MD   Lancets MISC 1 each by Does not apply route daily Use to test blood sugar 4 times daily and as needed DX:E11.9 12/4/20   FELIX Vail CNP   blood glucose monitor strips Test 4 times daily and as needed for irregular blood glucose. Dispense sufficient amount for indicated testing frequency plus additional to accommodate PRN testing needs. DX E11.9 12/4/20   FELIX Vail CNP   Blood Glucose Monitoring Suppl (ONE TOUCH ULTRA 2) w/Device KIT Use to test 4 times daily and as needed DX: E11.9 12/4/20   FELIX Vail CNP   ASPIRIN 81 PO Take by mouth daily    Historical Provider, MD       Allergies:  Patient has no known allergies. Social History:   reports that he quit smoking about 5 months ago. His smoking use included cigarettes. He has a 29.00 pack-year smoking history. He has never used smokeless tobacco. He reports previous alcohol use. He reports that he does not use drugs.     Family History:      Positive as follows:        Problem Relation Age of Onset    Heart Disease Mother     High Blood Pressure Mother     Diabetes Mother     Cancer Mother     Cancer Father        REVIEW OF SYSTEMS:     Constitutional: ROS: positive for  -lightheadedness and dizziness  Head: no headache, no head injury, no migraine   Eyes ROS: denies blurred/double vision  Ears ROS: no hearing difficulty, no tinnitus  Mouth and Throat ROS: no ulceration, dysphagia, dental caries  Psychological ROS: no depression, no anxiety, no panic attacks, denies suicide/homicide ideation  Endocrine ROS: denies polyuria, polydypsia, no heat or cold intolerance  Respiratory ROS: positive for -chronic shortness of breath  Cardiovascular ROS: negative for - chest pain  Gastrointestinal ROS: no abdominal pain, change in bowel habits, or black or bloody stools  Genito-Urinary ROS: denies dysuria, frequency, urgency; denies hematuria  Musculoskeletal ROS: positive for - swelling in bilateral leg  Neurological ROS: no syncope, no seizures, no numbness or tingling of hands, no numbness or tingling of feet, no paresis  Dermatology: no skin rash, no eczema  Endocrine: no polyuria, polydypsia, no heat/cold intolerance  Hematology: denies bruising easily, denies bleeding problems, denies clotting disorders    PHYSICAL EXAM:    /71   Pulse 82   Temp 97.6 °F (36.4 °C) (Oral)   Resp 14   Ht 6' 2\" (1.88 m)   Wt 237 lb (107.5 kg)   SpO2 96%   BMI 30.43 kg/m²     General appearance:  No apparent distress, appears older than stated age and cooperative. Chronically ill-appearing  HEENT:  Normal cephalic, atraumatic without obvious deformity. Pupils equal, round, and reactive to light. Conjunctivae/corneas icteric. Neck: Supple, with full range of motion. No jugular venous distention. Trachea midline. Respiratory:  Normal respiratory effort. Diminished to auscultation, bilaterally without Rales/Wheezes/Rhonchi. Cardiovascular: irregular rate and rhythm   Abdomen: Soft, non-tender, non-distended with normal bowel sounds. Musculoskeletal: (+) edema bilateral legs approximately 4+ pitting. Skin: Skin color is jaundiced. Neurologic:  Neurovascularly intact without any focal sensory/motor deficits.  Cranial nerves: II-XII intact, grossly non-focal.  Psychiatric:  Alert and oriented x 4, thought content appropriate  Capillary Refill: Brisk,< 3 seconds   Peripheral Pulses: +2 palpable, equal bilaterally       Labs:     Recent Labs     04/22/21  1208 04/23/21  1045   WBC 6.6 6.7   HGB 9.9* 10.2*   HCT 29.7* 29.6*   PLT 95* 108*     Recent Labs     04/22/21  1208 04/23/21  1045   * 128*   K 3.6 3.1*   CL 90* 88*   CO2 19* 17*   BUN 52* 54*   CREATININE 5.5* 5.1*   CALCIUM 9.0 9.0     Recent Labs     04/22/21  1208 04/23/21  1045   AST 64* 76*   ALT 30 34 BILIDIR  --  2.3*   BILITOT 7.0* 6.4*   ALKPHOS 175* 177*     Recent Labs     04/23/21  1220   INR 1.97*     Recent Labs     04/23/21  1045   TROPONINT 0.100*     Lactic Acid: 4.0     Urinalysis:      Lab Results   Component Value Date    NITRU NEGATIVE 03/30/2021    WBCUA NONE SEEN 03/16/2021    BACTERIA NONE SEEN 03/16/2021    RBCUA 0-2 03/16/2021    BLOODU NEGATIVE 03/30/2021    SPECGRAV 1.015 03/30/2021       Radiology:     Ct Head Wo Contrast    Result Date: 4/23/2021  PROCEDURE: CT HEAD WO CONTRAST CLINICAL INFORMATION: head injury testerday . COMPARISON: 3/29/2021 TECHNIQUE: 2-D multiplanar noncontrast CT brain All CT scans at this facility use dose modulation, iterative reconstruction, and/or weight-based dosing when appropriate to reduce radiation dose to as low as reasonably achievable. FINDINGS: There is no infarct or hemorrhage. No extra-axial collection. Ventricles are normal. Gray-white differentiation is maintained. Calvarium is intact. Small air-fluid level right maxillary sinus, otherwise Paranasal sinuses and mastoid air cells are clear. Dental caries involving the left maxillary molars. 1. No acute intracranial pathology 2. Air-fluid level right maxillary sinus (sinusitis or blood is uncertain. 3. Dental caries. **This report has been created using voice recognition software. It may contain minor errors which are inherent in voice recognition technology. ** Final report electronically signed by Dr. Barber Bautista on 4/23/2021 11:51 AM    Xr Chest Portable    Result Date: 4/23/2021  PROCEDURE: XR CHEST PORTABLE CLINICAL INFORMATION: fluid retention . COMPARISON: 4/22/2021 TECHNIQUE: Portable upright FINDINGS: Essentially stable chest with large right pleural effusion. Underlying infiltrates or possible. Left lung remains clear. Heart size is normal. The pulmonary vessels are not congested.  EKG leads overlie the chest.     Stable chest large right pleural effusion and possible underlying

## 2021-04-23 NOTE — ED NOTES
Patient presents to ED for abnormal labs. States he has blood work and a paracentesis completed yesterday and was contacted by Dr. Reynaga House office and told him to come to ED for creatinine of 5.5. States they drained 2.1L off his abdomen yesterday. Denies any pain at this time. Shows no signs of distress. Skin warm and dry. Respirations even and unlabored.       Daisy Pak RN  04/23/21 [id number]

## 2021-04-23 NOTE — TELEPHONE ENCOUNTER
----- Message from Gustavo Cantu MD sent at 4/23/2021  7:22 AM EDT -----  Creatinine is 5.5, was 2.2 two week ago  Send patient to ED

## 2021-04-23 NOTE — CONSULTS
[facility]          [facility]        [address]        [place] [address]        [phone]           Inpatient Initial consult note         4/23/2021 4:41 PM    Patient Name:   Bela Schuster  YOB: 1965  Primary Care Physician:  Kizzy Helm MD     History Obtained From:  patient     Consultation requested by : FELIX Tamayo *    requested for  : Evaluation of  KARAN / electrolyte abnormalities . History of presentingillness   Bela Schuster is a 55 y.o.   male with Past Medical History as stated below presented with a chief complaint of Abnormal Lab   on 4/23/2021 . Presented with chief complaints of abnormal labs, mainly elevated creatinine , very severe, almost 5.5 associated with falls almost 4-5 times yesterday , associated with some lightheadedness and weakness and says his legs are giving way as well and so he has having falls. No mdf factors as well. He has not been eating or drinking well . Also has been having some leg swelling. Patient has a paracentesis done yesterday with 2.1 L removed as well. Patient is awake and alert. No other complaints. Patient does state that when he stands up in a few minutes he started to have the symptoms. On arrival to the ER patient was found to have a sodium level of 128, potassium of 3.1 CO2 17, elevated lactic acid at 4, BUN of 54 and a creatinine of 5.1. He also has some elevated bilirubin.      Past History      Past Medical History:   Diagnosis Date    Advanced cirrhosis of liver (HCC)     Alcoholic cirrhosis (HCC)     Atrial fibrillation (HCC)     CHF (congestive heart failure) (HCC)     Chronic kidney disease     COPD (chronic obstructive pulmonary disease) (HCC)     Diabetes mellitus (HCC)     Gallstones     GERD (gastroesophageal reflux disease)     Headache     Hydrothorax     Portal hypertension (HCC)      Past Surgical History:   Procedure Laterality Date    APPENDECTOMY      HERNIA REPAIR      [facility]G SURGERY Right 20, 20    [facility]     Social History     Socioeconomic History    Marital status: Single     Spouse name: Not on file    Number of children: Not on file    Years of education: Not on file    Highest education level: Not on file   Occupational History    Not on file   Social Needs    Financial resource strain: Not on file    Food insecurity     Worry: Not on file     Inability: Not on file    Transportation needs     Medical: Not on file     Non-medical: Not on file   Tobacco Use    Smoking status: Former Smoker     Packs/day: 1.00     Years: 29.00     Pack years: 29.00     Types: Cigarettes     Quit date: 2020     Years since quittin.4    Smokeless tobacco: Never Used   Substance and Sexual Activity    Alcohol use: Not Currently     Frequency: 4 or more times a week     Comment: No alcohol since 20    Drug use: Never    Sexual activity: Not on file   Lifestyle    Physical activity     Days per week: Not on file     Minutes per session: Not on file    Stress: Not on file   Relationships    Social connections     Talks on phone: Not on file     Gets together: Not on file     Attends Jehovah's witness service: Not on file     Active member of club or organization: Not on file     Attends meetings of clubs or organizations: Not on file     Relationship status: Not on file    Intimate partner violence     Fear of current or ex partner: Not on file     Emotionally abused: Not on file     Physically abused: Not on file     Forced sexual activity: Not on file   Other Topics Concern    Not on file   Social History Narrative    Not on file     Family History   Problem Relation Age of Onset    Heart Disease Mother     High Blood Pressure Mother     Diabetes Mother     Cancer Mother     Cancer Father      Medications & Allergies      Prior to Admission medications    Medication Sig Start Date End Date Taking?  Authorizing Provider rifaximin (XIFAXAN) 550 MG tablet Take 1 tablet by mouth 2 times daily 4/23/21  Yes Jason Babinski, APRN - CNP   ammonium lactate (AMLACTIN) 12 % cream Apply topically as needed   Yes Historical Provider, MD   gabapentin (NEURONTIN) 100 MG capsule Take 2 capsules by mouth 2 times daily for 30 days. 4/7/21 5/7/21 Yes Carlee Andrade MD   famotidine (PEPCID) 20 MG tablet Take 1 tablet by mouth daily 4/8/21 5/8/21 Yes Carlee Andrade MD   midodrine (PROAMATINE) 5 MG tablet Take 3 tablets by mouth 3 times daily 4/7/21 5/7/21 Yes Carlee Andrade MD   amiodarone (CORDARONE) 200 MG tablet Take 1 tablet by mouth daily 4/7/21 5/7/21 Yes Carlee Andrade MD   lactulose (CHRONULAC) 10 GM/15ML solution 45 ml by mouth TID 3/18/21  Yes Historical Provider, MD   bumetanide (BUMEX) 1 MG tablet Take 1 tablet by mouth 2 times daily 3/18/21  Yes FELIX Barlow CNP   albuterol sulfate HFA (VENTOLIN HFA) 108 (90 Base) MCG/ACT inhaler Inhale 2 puffs into the lungs every 6 hours as needed for Wheezing 3/5/21  Yes Jacque Jones MD   tiotropium [facility]) 18 MCG inhalation capsule Inhale 1 capsule into the lungs daily 3/5/21  Yes Jacque Jones MD   dilTIAZem (CARDIZEM) 30 MG tablet Take 1 tablet by mouth 3 times daily 2/27/21  Yes Misha Sorto DO   ondansetron (ZOFRAN) 4 MG tablet Take 4 mg by mouth every 8 hours as needed for Nausea or Vomiting   Yes Historical Provider, MD   metoprolol (LOPRESSOR) 100 MG tablet Take 100 mg by mouth 2 times daily   Yes Historical Provider, MD   ASPIRIN 81 PO Take by mouth daily   Yes Historical Provider, MD [place].  Devices [facility]) [address] [facility] Standard lightweight wheelchair 4/23/21   Jason Babinski, APRN - CNP   varenicline (CHANTIX) 0.5 MG tablet Take 1 tablet by mouth daily x 1 week then increase to 1 tablet by mouth twice daily 4/21/21   Melissa Donohue MD   Continuous Blood Gluc  (DEXCOM G6 ) [facility] Use to test 4 times daily and as needed DX: E11.9 3/24/21 FELIX Moreno CNP   Continuous Blood Gluc Transmit (DEXCOM G6 TRANSMITTER) MISC Use to test 4 times daily and as needed DX: E11.9 3/24/21   FELIX Moreno CNP   Continuous Blood Gluc Sensor (DEXCOM G6 SENSOR) MISC Use to test 4 times daily and as needed DX: E11.9 3/24/21   FELIX Moreno CNP   Lancets MISC 1 each by Does not apply route daily Use to test blood sugar 4 times daily and as needed DX:E11.9 12/4/20   FELIX Moreno CNP   blood glucose monitor strips Test 4 times daily and as needed for irregular blood glucose. Dispense sufficient amount for indicated testing frequency plus additional to accommodate PRN testing needs. DX E11.9 12/4/20   FELIX Moreno CNP   Blood Glucose Monitoring Suppl (ONE TOUCH ULTRA 2) w/Device KIT Use to test 4 times daily and as needed DX: E11.9 12/4/20   FELIX Moreno CNP     Allergies: Patient has no known allergies. IP meds : Scheduled Meds:   [START ON 4/24/2021] amiodarone  200 mg Oral Daily    [START ON 4/24/2021] famotidine  20 mg Oral Daily    gabapentin  200 mg Oral BID    lactulose  45 g Oral TID    midodrine  15 mg Oral TID    rifaximin  550 mg Oral BID    [START ON 4/24/2021] tiotropium  2 puff Inhalation Daily    insulin lispro  0-12 Units Subcutaneous TID WC    insulin lispro  0-6 Units Subcutaneous Nightly    sodium chloride flush  5-40 mL Intravenous 2 times per day    heparin (porcine)  5,000 Units Subcutaneous Q8H     Continuous Infusions:   dextrose      sodium chloride       Review of Systems Physical Exam   Review of Systems   Constitutional: Positive for activity change, appetite change and fatigue. Negative for chills and fever. HENT: Negative. Eyes: Negative. Respiratory: Negative for cough and shortness of breath. Cardiovascular: Positive for leg swelling. Negative for chest pain. Gastrointestinal: Positive for diarrhea. Negative for abdominal pain, nausea and vomiting. Genitourinary: Negative for dysuria, frequency and hematuria. Musculoskeletal: Negative for back pain and neck pain. Falls   Skin: Negative for rash and wound. Neurological: Positive for dizziness and light-headedness. Negative for headaches. Psychiatric/Behavioral: Negative for agitation and confusion. Physical Exam  Vitals signs reviewed. Constitutional:       General: He is not in acute distress. Appearance: Normal appearance. He is not diaphoretic. HENT:      Head: Normocephalic and atraumatic. Right Ear: External ear normal.      Left Ear: External ear normal.      Nose: Nose normal.      Mouth/Throat:      Mouth: Mucous membranes are moist.   Eyes:      General: No scleral icterus. Right eye: No discharge. Left eye: No discharge. Conjunctiva/sclera: Conjunctivae normal.   Neck:      Musculoskeletal: Normal range of motion and neck supple. Thyroid: No thyromegaly. Vascular: No JVD. Cardiovascular:      Rate and Rhythm: Normal rate and regular rhythm. Heart sounds: Normal heart sounds. No murmur. Pulmonary:      Effort: Pulmonary effort is normal. No respiratory distress. Breath sounds: Normal breath sounds. No stridor. No wheezing. Chest:      Chest wall: No tenderness. Abdominal:      General: Bowel sounds are normal. There is distension. Palpations: Abdomen is soft. Tenderness: There is no abdominal tenderness. Musculoskeletal:         General: Swelling present. No tenderness. Right lower leg: Edema present. Left lower leg: Edema present. Skin:     General: Skin is warm and dry. Findings: No erythema or rash. Neurological:      General: No focal deficit present. Mental Status: He is alert and oriented to person, place, and time.    Psychiatric:         Mood and Affect: Mood normal.         Behavior: Behavior normal.           Vitals:    04/23/21 1445   BP: 90/61   Pulse: 66   Resp: 18   Temp: 97.6 °F (36.4 °C)   SpO2: 91%     Labs, Radiology and Tests       Recent Labs     04/22/21  1208 04/23/21  1045   WBC 6.6 6.7   RBC 3.04* 3.15*   HGB 9.9* 10.2*   HCT 29.7* 29.6*   MCV 97.7* 94.0   MCH 32.6 32.4   MCHC 33.3 34.5   PLT 95* 108*     Recent Labs     04/22/21  1208 04/23/21  1045   * 128*   K 3.6 3.1*   CL 90* 88*   CO2 19* 17*   BUN 52* 54*   CREATININE 5.5* 5.1*   CALCIUM 9.0 9.0   PROT 5.9* 5.9*   LABALBU 3.3* 3.4*   BILITOT 7.0* 6.4*   ALKPHOS 175* 177*   AST 64* 76*   ALT 30 34       Radiology :CXR - reviewed by me shows large right sided pleural effusion    Other : old lab data have been reviewed and noted that the patients baseline creatinine is probably close to 2.0    Assessment    1 Renal - KARAN on CKD stage 3 most likely multifactorial due to hypotension and poor oral intake as well. May be some diarrhea as well.cannot R/O HRS  ? Will continue IV fluids for now . ? Urine electrolytes will be sent and may need a renal US scan   ? Follow BMP in AM    2 Electrolytes - hyponatremia - 2/2 KARAN avoid hypotonic fluids . 3 Hypokalemia - may be from diarrhea and diuretics   4 Metabolic acidosis with lactic acidosis. 5 hypotension on midodrine. Aggressive hydration should help  6 Liver cirrhosis with ascitis S/P paracentesis. 7 Thrombocytopenia   8 Falls/ dizziness - may be due to othostasis. Will check orthostatic vitals as well.  9 Lower extremety edema  10 Meds reviewed and discussed with patient . **This report has been created using voice recognition software. It maycontain minor  errors which are inherent in voice recognition technology. **    Gareth Mir M.D  [facility].

## 2021-04-23 NOTE — ED PROVIDER NOTES
[facility] EMERGENCY DEPT      CHIEF COMPLAINT       Chief Complaint   Patient presents with    Abnormal Lab       Nurses Notes reviewed and I agree except as noted in the HPI. HISTORY OF PRESENT ILLNESS    Francisco Chicas is a 55 y.o. male who presents for abnormal lab values and weakness yesterday. Patient states he was here yesterday for labs, albumin, paracentesis, and chest x-ray. He states he fell 3 times throughout the day, the last of which was at home and he believes it was a syncopal event and he hit his head. He states he is constantly lightheaded and weak with atrophied legs due to his recent admission to [facility]. Patient states his paracentesis went well yesterday but he is more jaundiced than is typical. He states he took his midodrine and metoprolol this morning but did not take his lactulose which is typically TID. He was supposed to see nephrology (Dr. Malvin Blas) this morning but they called once they saw his labs from yesterday and told him to come to the ED. Labs yesterday: creatinine 5.5, sodium 130, hemoglobin 9.9, eGFR 11.  2 weeks ago creatinine was 2.2    REVIEW OF SYSTEMS     Review of Systems   Constitutional: Negative for appetite change, chills and fever. HENT: Negative for congestion and rhinorrhea. Respiratory: Negative for cough and shortness of breath. Cardiovascular: Positive for leg swelling. Negative for chest pain. Gastrointestinal: Positive for abdominal distention. Negative for abdominal pain, constipation, diarrhea, nausea and vomiting. Genitourinary: Positive for decreased urine volume. Negative for dysuria. Musculoskeletal: Positive for gait problem. Negative for arthralgias, back pain, myalgias and neck pain. Skin: Positive for color change (jaundice). Diffuse pruritus   Neurological: Positive for dizziness, syncope, weakness and light-headedness. Hematological: Bruises/bleeds easily. Psychiatric/Behavioral: Negative for confusion.         PAST MEDICAL HISTORY    has a past medical history of Advanced cirrhosis of liver ([facility] Alcoholic cirrhosis ([facility] Atrial fibrillation ([facility] CHF (congestive heart failure) ([facility] Chronic kidney disease, COPD (chronic obstructive pulmonary disease) ([facility] Diabetes mellitus ([facility] Gallstones, GERD (gastroesophageal reflux disease), Headache, Hydrothorax, and Portal hypertension ([facility] SURGICAL HISTORY      has a past surgical history that includes Appendectomy; hernia repair; and Lung surgery (Right, 11-24-20, 11-30-20). CURRENT MEDICATIONS       Current Discharge Medication List      CONTINUE these medications which have NOT CHANGED    Details   rifaximin (XIFAXAN) 550 MG tablet Take 1 tablet by mouth 2 times daily  Qty: 60 tablet, Refills: 1      ammonium lactate (AMLACTIN) 12 % cream Apply topically as needed      gabapentin (NEURONTIN) 100 MG capsule Take 2 capsules by mouth 2 times daily for 30 days.   Qty: 120 capsule, Refills: 0      famotidine (PEPCID) 20 MG tablet Take 1 tablet by mouth daily  Qty: 30 tablet, Refills: 0      midodrine (PROAMATINE) 5 MG tablet Take 3 tablets by mouth 3 times daily  Qty: 270 tablet, Refills: 0      amiodarone (CORDARONE) 200 MG tablet Take 1 tablet by mouth daily  Qty: 30 tablet, Refills: 0      lactulose (CHRONULAC) 10 GM/15ML solution 45 ml by mouth TID      bumetanide (BUMEX) 1 MG tablet Take 1 tablet by mouth 2 times daily  Qty: 30 tablet, Refills: 2      albuterol sulfate HFA (VENTOLIN HFA) 108 (90 Base) MCG/ACT inhaler Inhale 2 puffs into the lungs every 6 hours as needed for Wheezing  Qty: 1 Inhaler, Refills: 5      tiotropium (SPIRIVA) 18 MCG inhalation capsule Inhale 1 capsule into the lungs daily  Qty: 30 capsule, Refills: 5      dilTIAZem (CARDIZEM) 30 MG tablet Take 1 tablet by mouth 3 times daily  Qty: 90 tablet, Refills: 0      ondansetron (ZOFRAN) 4 MG tablet Take 4 mg by mouth every 8 hours as needed for Nausea or Vomiting      metoprolol (LOPRESSOR) 100 MG tablet Take 100 mg by mouth 2 times daily      ASPIRIN 81 PO Take by mouth daily      Misc. Devices [facility]) MISC Standard lightweight wheelchair  Qty: 1 each, Refills: 0    Associated Diagnoses: Generalized weakness; Physical deconditioning; Acute on chronic diastolic congestive heart failure (HCC)      varenicline (CHANTIX) 0.5 MG tablet Take 1 tablet by mouth daily x 1 week then increase to 1 tablet by mouth twice daily  Qty: 49 tablet, Refills: 0      Continuous Blood Gluc  (DEXCOM G6 ) MENDY Use to test 4 times daily and as needed DX: E11.9  Qty: 1 Device, Refills: 1      Continuous Blood Gluc Transmit (DEXCOM G6 TRANSMITTER) MISC Use to test 4 times daily and as needed DX: E11.9  Qty: 9 each, Refills: 3      Continuous Blood Gluc Sensor (DEXCOM G6 SENSOR) MISC Use to test 4 times daily and as needed DX: E11.9  Qty: 9 each, Refills: 3      Lancets MISC 1 each by Does not apply route daily Use to test blood sugar 4 times daily and as needed DX:E11.9  Qty: 200 each, Refills: 3    Associated Diagnoses: Type 2 diabetes mellitus without complication, without long-term current use of insulin ([facility])      blood glucose monitor strips Test 4 times daily and as needed for irregular blood glucose. Dispense sufficient amount for indicated testing frequency plus additional to accommodate PRN testing needs. DX E11.9  Qty: 200 strip, Refills: 3    Comments: Brand per patient preference. May round up to next available package size. Associated Diagnoses: Type 2 diabetes mellitus without complication, without long-term current use of insulin ([facility])      Blood Glucose Monitoring Suppl (ONE TOUCH ULTRA 2) w/Device KIT Use to test 4 times daily and as needed DX: E11.9  Qty: 1 kit, Refills: 0    Associated Diagnoses: Type 2 diabetes mellitus without complication, without long-term current use of insulin ([facility])             ALLERGIES     has No Known Allergies.     FAMILY HISTORY     He indicated that his mother is . He indicated that his father is . family history includes Cancer in his father and mother; Diabetes in his mother; Heart Disease in his mother; High Blood Pressure in his mother. SOCIAL HISTORY    reports that he quit smoking about 5 months ago. His smoking use included cigarettes. He has a 29.00 pack-year smoking history. He has never used smokeless tobacco. He reports previous alcohol use. He reports that he does not use drugs. PHYSICAL EXAM     INITIAL VITALS:  height is 6' 2\" (1.88 m) and weight is 243 lb 9.6 oz (110.5 kg). His oral temperature is 97.6 °F (36.4 °C). His blood pressure is 93/50 (abnormal) and his pulse is 87. His respiration is 16 and oxygen saturation is 99%. Physical Exam  Vitals signs and nursing note reviewed. Constitutional:       Appearance: He is not diaphoretic. HENT:      Head: Normocephalic and atraumatic. Eyes:      General: Scleral icterus present. Neck:      Musculoskeletal: Normal range of motion and neck supple. Cardiovascular:      Rate and Rhythm: Normal rate and regular rhythm. Pulses: Normal pulses. Heart sounds: Normal heart sounds. Comments: Edema present with compression stockings  Pulmonary:      Effort: Pulmonary effort is normal.      Breath sounds: Examination of the right-lower field reveals decreased breath sounds. Decreased breath sounds present. Abdominal:      General: Abdomen is flat. There is distension. Palpations: Abdomen is soft. Tenderness: There is no abdominal tenderness. There is no guarding. Musculoskeletal:      Right lower leg: 3+ Pitting Edema present. Left lower leg: 3+ Pitting Edema present. Skin:     Coloration: Skin is jaundiced. Findings: Lesion (dry patches throughout from scratching) and rash present. Neurological:      General: No focal deficit present. Mental Status: He is alert and oriented to person, place, and time. Motor: Weakness present. Comments: Oriented to person, location, and date   Psychiatric:         Mood and Affect: Mood normal.         Behavior: Behavior normal.       DIFFERENTIAL DIAGNOSIS:   Including but not limited to: alcoholic cirrhosis, end-stage renal disease, hepatorenal syndrome, intracranial bleed    DIAGNOSTIC RESULTS     EKG: All EKG's are interpreted by [facility] who either signs or Co-signs this chart in the absence of a cardiologist.  Ventricular rate 74 bpm  QRS duration 96 ms  QTc interval 479 ms  P-R-T axes *, 15, -76  Atrial fibrillation. No STEMI    RADIOLOGY: non-plain film images(s) such as CT,Ultrasound and MRI are read by the radiologist.  Plain radiographic images are visualized and preliminarily interpreted by the emergency physician unless otherwise stated below. XR CHEST PORTABLE   Final Result   Stable chest large right pleural effusion and possible underlying infiltrates            **This report has been created using voice recognition software. It may contain minor errors which are inherent in voice recognition technology. **      Final report electronically signed by Dr. Barber Bautista on 4/23/2021 11:12 AM      CT HEAD WO CONTRAST    (Results Pending)       LABS:   Labs Reviewed   CBC WITH AUTO DIFFERENTIAL - Abnormal; Notable for the following components:       Result Value    RBC 3.15 (*)     Hemoglobin 10.2 (*)     Hematocrit 29.6 (*)     RDW-CV 17.2 (*)     RDW-SD 58.4 (*)     Platelets 562 (*)     Lymphocytes Absolute 0.9 (*)     All other components within normal limits   AMMONIA - Abnormal; Notable for the following components:    Ammonia 69 (*)     All other components within normal limits   BASIC METABOLIC PANEL   HEPATIC FUNCTION PANEL   TROPONIN   BRAIN NATRIURETIC PEPTIDE   PROTIME-INR   ETHANOL       EMERGENCY DEPARTMENT COURSE:   Vitals:    Vitals:    04/23/21 1037   BP: (!) 82/55   Pulse: 94   Resp: 18   Temp: 97.6 °F (36.4 °C)   TempSrc: Oral   SpO2: 97%   Weight: 237 lb (107.5 kg)   Height: 6' 2\" (1.88 m)     1025: I spoke with and examined the patient. 1343: 1212 Aleksandar Wyaconda NP in regards to admission    MDM:  I saw this patient in the emergency department for abnormal labs obtained yesterday after his paracentesis. Upon physical examination, the patient is jaundiced with some abdominal distension and decreased RLL breath sounds. Patient was alert and oriented. Vital signs reviewed and noted stable. Old records were reviewed. Appropriate laboratory and imaging studies were ordered and reviewed upon completion. Results show. Crt 5.1, latic acid 4, trop higher than normal at 0.1 but ekg normal; CXR questioned pneumonia    Small amount of IV fluid given. The patient remained stable in the ER with no distress apparent. Vital signs remained stable. Results discussed with the patient as well as desire for admission and he was amenable. Yves Boland NP for hospital service was consulted and graciously accepted admission. The patient was admitted to the hospital in fair condition. CRITICAL CARE:   None    CONSULTS:  Yves Boland NP (hospitalist)    PROCEDURES:  None    FINAL IMPRESSION      1. Acute kidney injury superimposed on CKD ([address]    2. Chronic liver failure without hepatic coma ([address]    3. Pleural effusion associated with hepatic disorder          DISPOSITION/PLAN     1. Acute kidney injury superimposed on CKD ([address]    2. Chronic liver failure without hepatic coma ([address]    3.  Pleural effusion associated with hepatic disorder    Admission        (Please note that portions of this note were completed with a voice recognition program.  Efforts were made to edit the dictations but occasionally words are mis-transcribed.)    Garrett Harding PA-C 04/24/21 7:40 PM    LYN Brush PA-C  04/24/21 Nnamdi Prabhakar PA-C  04/24/21 1941

## 2021-04-23 NOTE — PROGRESS NOTES
Pharmacy Medication History Note      List of current medications patient is taking is complete. Source of information: Patient and Surescripts    Changes made to medication list:    Medications added/doses adjusted: Added ammonium lactate cream topically QD PRN    Other notes (ex. Recent course of antibiotics, Coumadin dosing):  Patient's diltiazem 30 mg was most recently filled with the directions of 1/2 tab PO Q6H, but patient reports he takes 1 tab PO TID. Patient's midodrine was most recently filled for him to take 12.5 mg PO TID, but patient reports he takes 15 mg PO TID. Patient reports he has not taken his Chantix, but has it at home and plans to start in the future. Denies use of other OTC or herbal medications.       Electronically signed by Adriana Claire on 4/23/2021 at 3:38 PM

## 2021-04-24 NOTE — PROGRESS NOTES
Hospitalist      Patient:  Julianne Aas    Unit/Bed:3B-38/038-A  YOB: 1965  MRN: [id number]   Acct: [de-identified]     PCP: Ayush Cline MD  Date of Admission: 4/23/2021        Assessment and Plan:        1. KARAN on CKD stage V: Nephrology consulted will avoid nephrotoxic agents possibly due to volume depletion. 2. Alcoholic liver cirrhosis with ascites: Status post abdominal paracentesis 2.2 L were drained  3. Hypotension: We will continue with the midodrine, IV rehydration,  4. Weakness with falls x4 yesterday possibly secondary to #3, will monitor I's and O's  5. Diabetes mellitus on insulin sliding scale we will monitor, check hemoglobin A1c  6. Coagulopathy/LFTs chronic atrial fib/chronic thrombocytopenia are chronic and stable at this time.   7. Right pleural effusion we will ask interventional radiology to do thoracentesis      CC: Abnormal labs    HPI:     \"55 y.o. male who presented to [address] [place] [address] with abnormal lab; patient has an extensive medical history including cirrhosis of the liver, alcoholic cirrhosis, atrial fibrillation, diabetes, portal hypertension; he states it has been 6 months today since he has had any alcohol or tobacco products; he presented to the ED today secondary to his nephrologist office contacting him for a creatinine of 5.5; he had a paracentesis done yesterday that drained 2.1 L; he states yesterday he fell 4 times; he relates to constant lightheadedness and weakness, he is on midodrine for chronic hypotension however he did take his midodrine today in addition to his Lopressor.     Currently he is alert and talkative, he is jaundiced, he denies any pain, he does relate to shortness of breath, he does relate to his chronic lightheadedness and dizziness; states his legs are extremely swollen and that it is difficult for him to get his shoes on.     In the emergency department, he had a sodium level at 128, potassium 3.1, CO2 of 17, BUN 54, creatinine 5.1, lactic acidosis at 4.0, troponin T of 0.100, he relates to elevated LFTs as well; CBC shows mild anemia and mild thrombocytopenia at 108; his chest x-ray showing a large right pleural effusion; he is being admitted to the hospital service for further care and evaluation. \"-per h+p    ROS (14 point review of systems completed. Pertinent positives noted.  Otherwise ROS is negative) : Patient seen this a.m. only complaining of diarrhea secondary to lactulose    PMH:  Per HPI and       Diagnosis Date    Advanced cirrhosis of liver (HCC)     Alcoholic cirrhosis ([address]     Atrial fibrillation ([address]     CHF (congestive heart failure) (HCC)     Chronic kidney disease     [facility]ch[facility]     Diabetes mellitus [facility]     Gallstones     GERD (gastroesophageal reflux disease)     Headache     Hydrothorax     Portal hypertension ([facility]      SHX:        Procedure Laterality Date    APPENDECTOMY      HERNIA REPAIR      LUNG SURGERY Right 20, 20    [facility]     FHX:       Problem Relation Age of Onset    Heart Disease Mother     High Blood Pressure Mother     Diabetes Mother     Cancer Mother     Cancer Father      SOCHX:   Social History     Socioeconomic History    Marital status: Single     Spouse name: None    Number of children: None    Years of education: None    Highest education level: None   Occupational History    None   Social Needs    Financial resource strain: None    Food insecurity     Worry: None     Inability: None    Transportation needs     Medical: None     Non-medical: None   Tobacco Use    Smoking status: Former Smoker     Packs/day: 1.00     Years: 29.00     Pack years: 29.00     Types: Cigarettes     Quit date: 2020     Years since quittin.4    Smokeless tobacco: Never Used   Substance and Sexual Activity    Alcohol use: Not Currently     Frequency: 4 or more times a week     Comment: No alcohol since 21    Drug use: Never    Sexual activity: None   Lifestyle    Physical activity     Days per week: None     Minutes per session: None    Stress: None   Relationships    Social connections     Talks on phone: None     Gets together: None     Attends Worship service: None     Active member of club or organization: None     Attends meetings of clubs or organizations: None     Relationship status: None    Intimate partner violence     Fear of current or ex partner: None     Emotionally abused: None     Physically abused: None     Forced sexual activity: None   Other Topics Concern    None   Social History Narrative    None      Allergies: Patient has no known allergies. Medications:     0.9% NaCl with KCl 20 mEq 125 mL/hr at 04/24/21 1000    dextrose      sodium chloride        lactulose  45 g Oral Daily    amiodarone  200 mg Oral Daily    famotidine  20 mg Oral Daily    gabapentin  200 mg Oral BID    midodrine  15 mg Oral TID    rifaximin  550 mg Oral BID    tiotropium  2 puff Inhalation Daily    insulin lispro  0-12 Units Subcutaneous TID WC    insulin lispro  0-6 Units Subcutaneous Nightly    sodium chloride flush  5-40 mL Intravenous 2 times per day    heparin (porcine)  5,000 Units Subcutaneous Q8H     Prior to Admission medications    Medication Sig Start Date End Date Taking? Authorizing Provider   rifaximin (XIFAXAN) 550 MG tablet Take 1 tablet by mouth 2 times daily 4/23/21  Yes FELIX William CNP   ammonium lactate (AMLACTIN) 12 % cream Apply topically as needed   Yes Historical Provider, MD   gabapentin (NEURONTIN) 100 MG capsule Take 2 capsules by mouth 2 times daily for 30 days.  4/7/21 5/7/21 Yes Marichuy Pedraza MD   famotidine (PEPCID) 20 MG tablet Take 1 tablet by mouth daily 4/8/21 5/8/21 Yes Marichuy Pedraza MD   midodrine (PROAMATINE) 5 MG tablet Take 3 tablets by mouth 3 times daily 4/7/21 5/7/21 Yes Marichuy Pedraza MD   amiodarone (CORDARONE) 200 MG tablet Take 1 tablet by mouth daily 4/7/21 5/7/21 Yes Remington Leo MD   lactulose (CHRONULAC) 10 GM/15ML solution 45 ml by mouth TID 3/18/21  Yes Historical Provider, MD   bumetanide (BUMEX) 1 MG tablet Take 1 tablet by mouth 2 times daily 3/18/21  Yes FELIX Smyth CNP   albuterol sulfate HFA (VENTOLIN HFA) 108 (90 Base) MCG/ACT inhaler Inhale 2 puffs into the lungs every 6 hours as needed for Wheezing 3/5/21  Yes Sarah Ayala MD   tiotropium [facility]) 18 MCG inhalation capsule Inhale 1 capsule into the lungs daily 3/5/21  Yes Sarah Ayala MD   dilTIAZem (CARDIZEM) 30 MG tablet Take 1 tablet by mouth 3 times daily 2/27/21  Yes Tito Irizarry DO   ondansetron (ZOFRAN) 4 MG tablet Take 4 mg by mouth every 8 hours as needed for Nausea or Vomiting   Yes Historical Provider, MD   metoprolol (LOPRESSOR) 100 MG tablet Take 100 mg by mouth 2 times daily   Yes Historical Provider, MD   ASPIRIN 81 PO Take by mouth daily   Yes Historical Provider, MD   Misc. Devices [facility]) [address] [place] [address] Standard lightweight wheelchair 4/23/21   FELIX Neves CNP   varenicline (CHANTIX) 0.5 MG tablet Take 1 tablet by mouth daily x 1 week then increase to 1 tablet by mouth twice daily 4/21/21   Brinda Rao MD   Continuous Blood Gluc  (DEXCOM G6 ) MENDY Use to test 4 times daily and as needed DX: E11.9 3/24/21   FELIX Belle CNP   Continuous Blood Gluc Transmit (DEXCOM G6 TRANSMITTER) MISC Use to test 4 times daily and as needed DX: E11.9 3/24/21   FELIX Belle CNP   Continuous Blood Gluc Sensor (DEXCOM G6 SENSOR) MISC Use to test 4 times daily and as needed DX: E11.9 3/24/21   FELIX Belle CNP   Lancets MISC 1 each by Does not apply route daily Use to test blood sugar 4 times daily and as needed DX:E11.9 12/4/20   Nini Ally, APRN - CNP   blood glucose monitor strips Test 4 times daily and as needed for irregular blood glucose.  Dispense sufficient amount for indicated testing frequency plus additional to accommodate PRN testing needs. DX E11.9 12/4/20   FELIX Hernández CNP   Blood Glucose Monitoring Suppl (ONE TOUCH ULTRA 2) w/Device KIT Use to test 4 times daily and as needed DX: E11.9 12/4/20   FELIX Hernández CNP      PHYSICAL EXAM:    BP (!) 89/53   Pulse 84   Temp 97.7 °F (36.5 °C) (Oral)   Resp 16   Ht 6' 2\" (1.88 m)   Wt 243 lb 9.6 oz (110.5 kg)   SpO2 98%   BMI 31.28 kg/m²     General appearance:  No apparent distress, appears stated age and cooperative. HEENT:  Normal cephalic, atraumatic without obvious deformity. Pupils equal, round, and reactive to light. Extra ocular muscles intact. Conjunctivae/corneas clear. Neck: Supple, with full range of motion. no jugular venous distention. Trachea midline. no carotid bruits  Respiratory:  Normal respiratory effort. Decreased breath sounds right side approximately one half the way up posteriorly  Cardiovascular:  Regular rate and rhythm with normal S1/S2 without murmurs, rubs or gallops. PMI non displaced  Abdomen: Soft, non-tender, non-distended with normal bowel sounds. No guarding, rebound. Musculoskeletal:  No clubbing, cyanosis. +ve +3 edema bilaterally. Full range of motion without deformity. Skin: Skin color, texture, turgor normal.  No rashes or lesions, or suspicious lesions. Neurologic:  Neurovascularly intact without any focal sensory/motor deficits. Cranial nerves: II-XII intact, grossly non-focal.  Psychiatric:  Alert and oriented, thought content appropriate, normal insight  Capillary Refill: Brisk,< 2 seconds   Peripheral Pulses: +2 palpable, equal bilaterally upper and lower extremities  Lymphatics: no lymphadenopathy    Data: (All radiographs, tracings, PFTs, and imaging are personally viewed and interpreted unless otherwise noted).       Recent Labs     04/22/21  1208 04/23/21  1045 04/24/21  0417   WBC 6.6 6.7 5.9   HGB 9.9* 10.2* 9.0*   HCT 29.7* 29.6* 26.3*   PLT 95* 108* 75*    Recent Labs     04/22/21  1208 04/23/21  1045 04/24/21  0417   * 128* 129*   K 3.6 3.1* 3.0*   CL 90* 88* 92*   CO2 19* 17* 19*   BUN 52* 54* 55*   CREATININE 5.5* 5.1* 6.3*   CALCIUM 9.0 9.0 8.6      Recent Labs     04/22/21  1208 04/23/21  1045 04/24/21  0417   AST 64* 76* 51*   ALT 30 34 27   BILIDIR  --  2.3*  --    BILITOT 7.0* 6.4* 5.9*   ALKPHOS 175* 177* 143*      Recent Labs     04/23/21  1220   INR 1.97*       No results for input(s): CKTOTAL, TROPONINI in the last 72 hours. Radiology reports-   Xr Chest (2 Vw)    Result Date: 4/22/2021  PROCEDURE: XR CHEST (2 VW) CLINICAL INFORMATION: Shortness of breath TECHNIQUE: PA and lateral chest radiographs. COMPARISON: AP chest radiograph 3/29/2021 FINDINGS: Cardiomediastinal silhouette is within normal limits. The right costophrenic angle remains blunted. Hazy and reticular opacities are present at the right mid and lower lung. Degenerative changes in the thoracic spine are poorly visualized. Soft  tissues are unremarkable. Moderate right-sided pleural effusion with adjacent atelectasis/infiltrate. **This report has been created using voice recognition software. It may contain minor errors which are inherent in voice recognition technology. ** Final report electronically signed by Dr. Mary Mullen on 4/22/2021 9:18 AM    Xr Abdomen (kub) (single Ap View)    Result Date: 4/1/2021  PROCEDURE: XR ABDOMEN (KUB) (SINGLE AP VIEW) CLINICAL INFORMATION: abdominal distension . COMPARISON: No prior study. TECHNIQUE: 3 supine projections of the abdomen FINDINGS: Numerous dilated air-filled loops of small bowel throughout the abdomen. Gas in the right colon and rectum. Sigmoid colonic gas is present. Obscuration of the properitoneal fat lines. No pathologic calcifications are seen. Multiple dilated loops of small bowel gas remaining in the colon. This may represent an early small bowel obstruction ileus is a differential consideration.  Close follow-up is recommended. **This report has been created using voice recognition software. It may contain minor errors which are inherent in voice recognition technology. ** Final report electronically signed by Dr. Ronna Johnston on 4/1/2021 10:22 AM    Ct Head Wo Contrast    Result Date: 4/23/2021  PROCEDURE: CT HEAD WO CONTRAST CLINICAL INFORMATION: head injury testerday . COMPARISON: 3/29/2021 TECHNIQUE: 2-D multiplanar noncontrast CT brain All CT scans at this facility use dose modulation, iterative reconstruction, and/or weight-based dosing when appropriate to reduce radiation dose to as low as reasonably achievable. FINDINGS: There is no infarct or hemorrhage. No extra-axial collection. Ventricles are normal. Gray-white differentiation is maintained. Calvarium is intact. Small air-fluid level right maxillary sinus, otherwise Paranasal sinuses and mastoid air cells are clear. Dental caries involving the left maxillary molars. 1. No acute intracranial pathology 2. Air-fluid level right maxillary sinus (sinusitis or blood is uncertain. 3. Dental caries. **This report has been created using voice recognition software. It may contain minor errors which are inherent in voice recognition technology. ** Final report electronically signed by Dr. Ronna Johnston on 4/23/2021 11:51 AM    Ct Head Wo Contrast    Result Date: 3/29/2021  PROCEDURE: CT HEAD WO CONTRAST CLINICAL INFORMATION: fall. Headache and neck stiffness. COMPARISON: No prior study. TECHNIQUE: Noncontrast 5 mm axial images were obtained through the brain. Sagittal and coronal reconstructions were created. All CT scans at this facility use dose modulation, iterative reconstruction, and/or weight-based dosing when appropriate to reduce radiation dose to as low as reasonably achievable. FINDINGS: The ventricles, cisterns and sulci are symmetric and normal in size and configuration. Gray-white matter differentiation appears grossly preserved.  No intracranial hemorrhage, mass effect or midline shift is identified. The calvarium appears intact. Orbits are unremarkable. Visualized paranasal sinuses are clear. Mastoid air cells are clear. No evidence of acute intracranial abnormality. **This report has been created using voice recognition software. It may contain minor errors which are inherent in voice recognition technology. ** Final report electronically signed by Dr. Authur Lombard, MD on 3/29/2021 8:27 PM    Ct Cervical Spine Wo Contrast    Result Date: 3/29/2021  PROCEDURE: CT CERVICAL SPINE WO CONTRAST CLINICAL INFORMATION: fall, Trauma. Next in thickness after fall. COMPARISON: No prior study. TECHNIQUE: 3 mm noncontrast axial images were obtained through the cervical spine with sagittal and coronal reconstructions. All CT scans at this facility use dose modulation, iterative reconstruction, and/or weight-based dosing when appropriate to reduce radiation dose to as low as reasonably achievable. FINDINGS: There is minimal, grade 1, retrolisthesis of C5 relative C6 on the basis of degenerative change. There is mild straightening of the cervical lordosis which is nonspecific and may be positional. There is otherwise anatomic vertebral body height and alignment. No fracture of the cervical vertebral column is identified. The craniocervical junction appears intact. No paraspinal or epidural fluid collection is identified. There are multilevel degenerative changes of the cervical spine most pronounced at C5-6 where there is partial uncovering the disc with superimposed shallow disc bulge causing mild to moderate spinal canal stenosis and mild left and moderate right neural foraminal stenosis in association with uncovertebral joint or change. There is a large right pleural effusion which is partially imaged. 1. No evidence of acute osseous injury of the cervical spine.  2. Multilevel degenerative changes of the cervical spine most pronounced at C5-6. 3. Large right pleural effusion. **This report has been created using voice recognition software. It may contain minor errors which are inherent in voice recognition technology. ** Final report electronically signed by Dr. Ten Burt MD on 3/29/2021 8:30 PM    Us Renal Complete    Result Date: 3/30/2021  BILATERAL RENAL ULTRASOUND: CLINICAL INFORMATION: Acute renal failure. COMPARISON: No comparison available. TECHNIQUE: Multiple sonographic images of both kidneys were obtained. Images of the urinary bladder were also obtained. FINDINGS: RIGHT KIDNEY - 12.2 x 6.6 x 4.9 cm Resistive Index - 0.75 Cortical Thickness - 1.4 cm LEFT KIDNEY - 12.7 x 5.3 x 5.1 cm Resistive Index - 0.75 Cortical Thickness - 1.5 cm URINARY BLADDER Pre-Void - 153 mL Post-Void - declined  KIDNEYS:  Both kidneys are normal in size and contour. Elevated resistive indices bilaterally, consistent with medical renal disease. MASS/CYST: No solid or cystic lesion of either kidney is seen. HYDRONEPHROSIS:  No hydronephrosis. Large amount of ascites in the abdomen and pelvis. CALCULI:  No calculi are seen. BLADDER:  The urinary bladder is unremarkable. .     Ascites. No acute findings involving either kidney. **This report has been created using voice recognition software. It may contain minor errors which are inherent in voice recognition technology. ** Final report electronically signed by Dr. Zurdo Fowler on 3/30/2021 1:13 PM    Xr Chest Portable    Result Date: 4/23/2021  PROCEDURE: XR CHEST PORTABLE CLINICAL INFORMATION: fluid retention . COMPARISON: 4/22/2021 TECHNIQUE: Portable upright FINDINGS: Essentially stable chest with large right pleural effusion. Underlying infiltrates or possible. Left lung remains clear. Heart size is normal. The pulmonary vessels are not congested.  EKG leads overlie the chest.     Stable chest large right pleural effusion and possible underlying infiltrates **This report has been created using voice recognition software. It may contain minor errors which are inherent in voice recognition technology. ** Final report electronically signed by Dr. Viktor Hunt on 4/23/2021 11:12 AM    Xr Chest 1 View    Result Date: 3/29/2021  PROCEDURE: XR CHEST 1 VIEW CLINICAL INFORMATION: weakness, right side pleural effusion. COMPARISON: 3/16/2021. TECHNIQUE: AP upright view of the chest. FINDINGS: There is again noted to be a prominent pleural effusion on the right, similar to prior exam. The left lung appears clear. Pulmonary vasculature and cardiac mediastinal silhouette are within normal in its. Visualized osseous structures appear grossly intact. Stable prominent right pleural effusion. **This report has been created using voice recognition software. It may contain minor errors which are inherent in voice recognition technology. ** Final report electronically signed by Dr. Kari Pruitt MD on 3/29/2021 8:32 PM    Us Guided Paracentesis    Result Date: 4/22/2021  PROCEDURE: Ultrasound-guided paracentesis. CLINICAL INFORMATION: Ascites. COMPARISON: Previous procedure 04/06/2021. PROCEDURE: Physician performing procedure: Dr. Alex Roseland Informed consent signed: yes- patient Local Anesthetic: 2% lidocaine Specimen volume: 70 ml Catheter: 5 Bahraini Aspirated ascites volume: 2.2 liters Aspirated ascites color: clear yellow Site of Puncture:RLQ Complications: no The benefits and the risk of the procedure were explained to the patient. Signed consent was obtained. Limited ultrasound exam of the abdomen showed a moderate amount of ascites. The right side of the abdomen was prepped and draped using the usual sterile technique and the skin was anesthetized. A 5 Sinhala one-step catheter was introduced to the peritoneal ascites. 2.2 L of fluid drained. The catheter was then removed. The patient tolerated the procedure well with no complications reported and was discharged from the radiology department in a stable condition.  Specimen sent for laboratory studies as requested. Successful ultrasound-guided paracentesis. 2.2 L of fluid drained. **This report has been created using voice recognition software. It may contain minor errors which are inherent in voice recognition technology. ** Final report electronically signed by Dr Montoya on 4/22/2021 9:47 AM    Us Guided Paracentesis    Result Date: 4/7/2021  PROCEDURE: Ultrasound-guided paracentesis. CLINICAL INFORMATION: Ascites. COMPARISON: No prior study. PROCEDURE: Physician performing procedure: Dr. Yariel Mason Informed consent signed: yes Local Anesthetic: 2% lidocaine Specimen volume: no labs Catheter: 5 Luxembourgish Aspirated ascites volume: 4.4 liters Aspirated ascites color: yellow Site of Puncture:RLQ Complications: none The benefits and the risk of the procedure were explained to the patient. Signed consent was obtained. Limited ultrasound exam of the abdomen showed  large  amount of ascites. The right side of the abdomen was prepped and draped using the usual sterile technique and the skin was anesthetized. A 5 Moroccan one-step catheter was introduced to the peritoneal ascites. 4.4 L of fluid drained. The catheter was then removed. The patient tolerated the procedure well with no complications reported and was discharged from the radiology department in a stable condition. Successful ultrasound-guided paracentesis. 4.4 L of fluid drained. **This report has been created using voice recognition software. It may contain minor errors which are inherent in voice recognition technology. ** Final report electronically signed by Dr Montoya on 4/7/2021 8:11 AM    Us Guided Paracentesis    Result Date: 4/1/2021  PROCEDURE: Ultrasound-guided paracentesis. CLINICAL INFORMATION: Ascites. COMPARISON: Correlation made with previous procedure dated 3/17/2021.  PROCEDURE: Physician performing procedure: Dr Yariel Mason Informed consent signed: yes Local Anesthetic: 2% Lidocaine Specimen volume: 70 ml Catheter: 5 Reading Michelle Aspirated ascites volume: 4.6 liters Aspirated ascites color: yellow Site of Puncture:RLQ Complications: No immediate complications The benefits and the risk of the procedure were explained to the patient. Signed consent was obtained. Limited ultrasound exam of the abdomen showed  large  amount of ascites. The right side of the abdomen was prepped and draped using the usual sterile technique and the skin was anesthetized. A 5 Faroese one-step catheter was introduced to the peritoneal ascites. 4.6 L of fluid drained. The catheter was then removed. The patient tolerated the procedure well with no complications reported and was discharged from the radiology department in a stable condition. Specimen sent for laboratory studies as requested. Successful ultrasound-guided paracentesis. 4.6 L of fluid drained. **This report has been created using voice recognition software. It may contain minor errors which are inherent in voice recognition technology. ** Final report electronically signed by Dr Jonas Aguilar on 4/1/2021 8:15 AM      Electronically signed by Anil Uriostegui DO on 4/24/2021 at 10:44 AM

## 2021-04-24 NOTE — FLOWSHEET NOTE
04/24/21 1359   Provider Notification   Reason for Communication Critical Value (comment)   Provider Name Dr. Naa Pretty   Provider Notification Physician   Method of Communication Secure Message   Notification Time 1400     BP 83/50; MAP 62. symptomatic when up, but ok in bed. Any further recommendations? Dr. Naa Pretty ordered thigh high betty hose and cortisol level checked 4/25 in the morning. Dr Adina Bellamy also notified about BP to to obtain order for ocean mist. Verbal order given for saline nasal spray. IR cannot do thoracentesis unless urgent. Dr. Adina Bellamy OK to wait until tomorrow.

## 2021-04-24 NOTE — PROGRESS NOTES
pallor. Neck-normal range of motion supple no JVD. Lungs -- clear  Heart -- S1, S2 heard, JVD - no  Abdomen - soft, non-tender  Extremities -- edema --noted  CNS - awake and alert  Psychiatric-mood and memory appears normal         Last 3 CBC   Recent Labs     04/22/21  1208 04/23/21  1045 04/24/21  0417   WBC 6.6 6.7 5.9   RBC 3.04* 3.15* 2.82*   HGB 9.9* 10.2* 9.0*   HCT 29.7* 29.6* 26.3*   PLT 95* 108* 75*     Last 3 CMP  Recent Labs     04/22/21  1208 04/23/21  1045 04/24/21  0417   * 128* 129*   K 3.6 3.1* 3.0*   CL 90* 88* 92*   CO2 19* 17* 19*   BUN 52* 54* 55*   CREATININE 5.5* 5.1* 6.3*   CALCIUM 9.0 9.0 8.6   LABALBU 3.3* 3.4* 2.7*   BILITOT 7.0* 6.4* 5.9*             Assessment    1. Renal - KARAN on CKD stage 3 most likely multifactorial due to hypotension and poor oral intake as well. May be some diarrhea as well.cannot R/O HRS  ? Urine lites show prerenal etiology with urine sodium less than 20  ? Creatinine continues to get worse. ? Increase IV fluids to 125 mL/h, decrease lactulose to once a day     2. Electrolytes - hyponatremia - 2/2 KARAN avoid hypotonic fluids . Continue IV fluids  3. Hypokalemia - may be from diarrhea and diuretics will add some potassium in the IV fluids and also 40 mEq KCl, magnesium level is okay  4. Metabolic acidosis with lactic acidosis. 5. hypotension on midodrine. Aggressive hydration should help  6. Liver cirrhosis with ascitis S/P paracentesis. 7. Thrombocytopenia   8. Falls/ dizziness - may be due to othostasis. Will check orthostatic vitals as well. 9. Lower extremety edema  Meds reviewed and discussed with patient and nursing staff    PAULINA Morillo D.  [facility].

## 2021-04-25 NOTE — PROGRESS NOTES
Formulation and discussion of sedation / procedure plans, risks, benefits, side effects and alternatives with patient and/or responsible adult completed.     Electronically signed by Darrell Escudero MD on 4/25/2021 at 2:39 PM

## 2021-04-25 NOTE — PROGRESS NOTES
and that it is difficult for him to get his shoes on.     In the emergency department, he had a sodium level at 128, potassium 3.1, CO2 of 17, BUN 54, creatinine 5.1, lactic acidosis at 4.0, troponin T of 0.100, he relates to elevated LFTs as well; CBC shows mild anemia and mild thrombocytopenia at 108; his chest x-ray showing a large right pleural effusion; he is being admitted to the hospital service for further care and evaluation. \"-per h+p    ROS (14 point review of systems completed. Pertinent positives noted.  Otherwise ROS is negative) : Patient seen this a.m. only complaining of diarrhea secondary to lactulose    PMH:  Per HPI and       Diagnosis Date    Advanced cirrhosis of liver ([address]     Alcoholic cirrhosis ([address]     Atrial fibrillation ([address]     CHF (congestive heart failure) (HCC)     Chronic kidney disease     COPD (chronic obstructive pulmonary disease) ([address]     Diabetes mellitus ([address]     Gallstones     GERD (gastroesophageal reflux disease)     Headache     Hydrothorax     Portal hypertension ([address]      SHX:        Procedure Laterality Date    APPENDECTOMY      HERNIA REPAIR      LUNG SURGERY Right 20, 20    [facility]     FHX:       Problem Relation Age of Onset    Heart Disease Mother     High Blood Pressure Mother     Diabetes Mother     Cancer Mother     Cancer Father      SOCHX:   Social History     Socioeconomic History    Marital status: Single     Spouse name: None    Number of children: None    Years of education: None    Highest education level: None   Occupational History    None   Social Needs    Financial resource strain: None    Food insecurity     Worry: None     Inability: None    Transportation needs     Medical: None     Non-medical: None   Tobacco Use    Smoking status: Former Smoker     Packs/day: 1.00     Years: 29.00     Pack years: 29.00     Types: Cigarettes     Quit date: 2020     Years since quittin.4    Smokeless tobacco: Never (PROAMATINE) 5 MG tablet Take 3 tablets by mouth 3 times daily 4/7/21 5/7/21 Yes Marichuy Pedraza MD   amiodarone (CORDARONE) 200 MG tablet Take 1 tablet by mouth daily 4/7/21 5/7/21 Yes Marichuy Pedraza MD   lactulose (CHRONULAC) 10 GM/15ML solution 45 ml by mouth TID 3/18/21  Yes Historical Provider, MD   bumetanide (BUMEX) 1 MG tablet Take 1 tablet by mouth 2 times daily 3/18/21  Yes FELIX Carballo CNP   albuterol sulfate HFA (VENTOLIN HFA) 108 (90 Base) MCG/ACT inhaler Inhale 2 puffs into the lungs every 6 hours as needed for Wheezing 3/5/21  Yes Skinny Herrera MD   tiotropium [facility]) 18 MCG inhalation capsule Inhale 1 capsule into the lungs daily 3/5/21  Yes Skinny Herrera MD   dilTIAZem (CARDIZEM) 30 MG tablet Take 1 tablet by mouth 3 times daily 2/27/21  Yes Franco Barron DO   ondansetron (ZOFRAN) 4 MG tablet Take 4 mg by mouth every 8 hours as needed for Nausea or Vomiting   Yes Historical Provider, MD   metoprolol (LOPRESSOR) 100 MG tablet Take 100 mg by mouth 2 times daily   Yes Historical Provider, MD   ASPIRIN 81 PO Take by mouth daily   Yes Historical Provider, MD   Misc.  Devices [facility]) [address] [facility] Standard lightweight wheelchair 4/23/21   FELIX William CNP   varenicline (CHANTIX) 0.5 MG tablet Take 1 tablet by mouth daily x 1 week then increase to 1 tablet by mouth twice daily 4/21/21   Ayush Cline MD   Continuous Blood Gluc  (DEXCOM G6 ) MENDY Use to test 4 times daily and as needed DX: E11.9 3/24/21   FELIX Rock CNP   Continuous Blood Gluc Transmit (DEXCOM G6 TRANSMITTER) MISC Use to test 4 times daily and as needed DX: E11.9 3/24/21   FELIX Rock CNP   Continuous Blood Gluc Sensor (DEXCOM G6 SENSOR) MISC Use to test 4 times daily and as needed DX: E11.9 3/24/21   Aliza Belling, APRN - CNP   Lancets MISC 1 each by Does not apply route daily Use to test blood sugar 4 times daily and as needed DX:E11.9 12/4/20   Melissa Aranda Victorino, APRN - CNP   blood glucose monitor strips Test 4 times daily and as needed for irregular blood glucose. Dispense sufficient amount for indicated testing frequency plus additional to accommodate PRN testing needs. DX E11.9 12/4/20   FELIX Recinos CNP   Blood Glucose Monitoring Suppl (ONE TOUCH ULTRA 2) w/Device KIT Use to test 4 times daily and as needed DX: E11.9 12/4/20   FELIX Recinos CNP      PHYSICAL EXAM:    BP 86/60   Pulse 107   Temp 97.6 °F (36.4 °C) (Oral)   Resp 18   Ht 6' 2\" (1.88 m)   Wt 243 lb 9.6 oz (110.5 kg)   SpO2 98%   BMI 31.28 kg/m²     General appearance:  No apparent distress, appears stated age and cooperative. HEENT:  Normal cephalic, atraumatic without obvious deformity. Pupils equal, round, and reactive to light. Extra ocular muscles intact. Conjunctivae/corneas clear. Neck: Supple, with full range of motion. no jugular venous distention. Trachea midline. no carotid bruits  Respiratory:  Normal respiratory effort. Decreased breath sounds right side approximately one half the way up posteriorly  Cardiovascular:  Regular rate and rhythm with normal S1/S2 without murmurs, rubs or gallops. PMI non displaced  Abdomen: Soft, non-tender, non-distended with normal bowel sounds. No guarding, rebound. Musculoskeletal:  No clubbing, cyanosis. +ve +3 edema bilaterally. Full range of motion without deformity. Skin: Skin color, texture, turgor normal.  No rashes or lesions, or suspicious lesions. Neurologic:  Neurovascularly intact without any focal sensory/motor deficits. Cranial nerves: II-XII intact, grossly non-focal.  Psychiatric:  Alert and oriented, thought content appropriate, normal insight  Capillary Refill: Brisk,< 2 seconds   Peripheral Pulses: +2 palpable, equal bilaterally upper and lower extremities  Lymphatics: no lymphadenopathy    Data: (All radiographs, tracings, PFTs, and imaging are personally viewed and interpreted unless otherwise noted). cervical spine. 2. Multilevel degenerative changes of the cervical spine most pronounced at C5-6. 3. Large right pleural effusion. **This report has been created using voice recognition software. It may contain minor errors which are inherent in voice recognition technology. ** Final report electronically signed by Dr. Omega Mccallum MD on 3/29/2021 8:30 PM    Us Renal Complete    Result Date: 3/30/2021  BILATERAL RENAL ULTRASOUND: CLINICAL INFORMATION: Acute renal failure. COMPARISON: No comparison available. TECHNIQUE: Multiple sonographic images of both kidneys were obtained. Images of the urinary bladder were also obtained. FINDINGS: RIGHT KIDNEY - 12.2 x 6.6 x 4.9 cm Resistive Index - 0.75 Cortical Thickness - 1.4 cm LEFT KIDNEY - 12.7 x 5.3 x 5.1 cm Resistive Index - 0.75 Cortical Thickness - 1.5 cm URINARY BLADDER Pre-Void - 153 mL Post-Void - declined  KIDNEYS:  Both kidneys are normal in size and contour. Elevated resistive indices bilaterally, consistent with medical renal disease. MASS/CYST: No solid or cystic lesion of either kidney is seen. HYDRONEPHROSIS:  No hydronephrosis. Large amount of ascites in the abdomen and pelvis. CALCULI:  No calculi are seen. BLADDER:  The urinary bladder is unremarkable. .     Ascites. No acute findings involving either kidney. **This report has been created using voice recognition software. It may contain minor errors which are inherent in voice recognition technology. ** Final report electronically signed by Dr. Marivel Velez on 3/30/2021 1:13 PM    Xr Chest Portable    Result Date: 4/23/2021  PROCEDURE: XR CHEST PORTABLE CLINICAL INFORMATION: fluid retention . COMPARISON: 4/22/2021 TECHNIQUE: Portable upright FINDINGS: Essentially stable chest with large right pleural effusion. Underlying infiltrates or possible. Left lung remains clear. Heart size is normal. The pulmonary vessels are not congested.  EKG leads overlie the chest.     Stable chest large right complications reported and was discharged from the radiology department in a stable condition. Specimen sent for laboratory studies as requested. Successful ultrasound-guided paracentesis. 2.2 L of fluid drained. **This report has been created using voice recognition software. It may contain minor errors which are inherent in voice recognition technology. ** Final report electronically signed by Dr Maritza Velez on 4/22/2021 9:47 AM    Us Guided Paracentesis    Result Date: 4/7/2021  PROCEDURE: Ultrasound-guided paracentesis. CLINICAL INFORMATION: Ascites. COMPARISON: No prior study. PROCEDURE: Physician performing procedure: Dr. Jocy Kelley Informed consent signed: yes Local Anesthetic: 2% lidocaine Specimen volume: no labs Catheter: 5 [place] Aspirated ascites volume: 4.4 liters Aspirated ascites color: yellow Site of Puncture:RLQ Complications: none The benefits and the risk of the procedure were explained to the patient. Signed consent was obtained. Limited ultrasound exam of the abdomen showed  large  amount of ascites. The right side of the abdomen was prepped and draped using the usual sterile technique and the skin was anesthetized. A 5 Bangladeshi one-step catheter was introduced to the peritoneal ascites. 4.4 L of fluid drained. The catheter was then removed. The patient tolerated the procedure well with no complications reported and was discharged from the radiology department in a stable condition. Successful ultrasound-guided paracentesis. 4.4 L of fluid drained. **This report has been created using voice recognition software. It may contain minor errors which are inherent in voice recognition technology. ** Final report electronically signed by Dr Maritza Velez on 4/7/2021 8:11 AM    Us Guided Paracentesis    Result Date: 4/1/2021  PROCEDURE: Ultrasound-guided paracentesis. CLINICAL INFORMATION: Ascites. COMPARISON: Correlation made with previous procedure dated 3/17/2021.  PROCEDURE: Physician performing procedure: Dr Ninfa Adame Informed consent signed: yes Local Anesthetic: 2% Lidocaine Specimen volume: 70 ml Catheter: 5 [place] Aspirated ascites volume: 4.6 liters Aspirated ascites color: yellow Site of Puncture:RLQ Complications: No immediate complications The benefits and the risk of the procedure were explained to the patient. Signed consent was obtained. Limited ultrasound exam of the abdomen showed  large  amount of ascites. The right side of the abdomen was prepped and draped using the usual sterile technique and the skin was anesthetized. A 5 Occitan one-step catheter was introduced to the peritoneal ascites. 4.6 L of fluid drained. The catheter was then removed. The patient tolerated the procedure well with no complications reported and was discharged from the radiology department in a stable condition. Specimen sent for laboratory studies as requested. Successful ultrasound-guided paracentesis. 4.6 L of fluid drained. **This report has been created using voice recognition software. It may contain minor errors which are inherent in voice recognition technology. ** Final report electronically signed by Dr Anabela Gutiérrez on 4/1/2021 8:15 AM      Electronically signed by Virgilio Simon DO on 4/25/2021 at 1:48 PM

## 2021-04-25 NOTE — PLAN OF CARE
Problem: Falls - Risk of:  Goal: Will remain free from falls  Description: Will remain free from falls  4/25/2021 0933 by Minda Huitron RN  Outcome: Met This Shift  4/25/2021 0658 by Mode Goins RN  Outcome: Not Met This Shift

## 2021-04-25 NOTE — PLAN OF CARE
Problem: Falls - Risk of:  Goal: Will remain free from falls  Description: Will remain free from falls  4/25/2021 0933 by Pascual Harrington RN  Outcome: Met This Shift  4/25/2021 0658 by Zuleika Omalley RN  Outcome: Not Met This Shift

## 2021-04-25 NOTE — PROGRESS NOTES
[facility]         Renal Inpatient Follow-Up note         4/25/2021 2:36 PM    Pt Name:   Ron Jean Baptiste  YOB: 1965  Attending:   Kelly Tomlinson DO    Chief Complaint : Ron Jean Baptiste is a 55 y.o. male being followed by nephrology for acute kidney injury and electrolyte abnormalities    Interval History :   Patient seen and examined by me. No distress  Feels okay denies any significant chest pain or shortness of breath. Patient is having diarrhea, very severe multiple episodes almost every 20 minutes, no associated abdominal pain  This is due to lactulose, no other modifying factors. Patient is very symptomatic with orthostasis.   Patient is going for paracentesis today     Scheduled Medications :    albumin human  25 g Intravenous Once    lactulose  45 g Oral Daily    amiodarone  200 mg Oral Daily    famotidine  20 mg Oral Daily    gabapentin  200 mg Oral BID    midodrine  15 mg Oral TID    rifaximin  550 mg Oral BID    tiotropium  2 puff Inhalation Daily    insulin lispro  0-12 Units Subcutaneous TID WC    insulin lispro  0-6 Units Subcutaneous Nightly    sodium chloride flush  5-40 mL Intravenous 2 times per day    heparin (porcine)  5,000 Units Subcutaneous Q8H      0.9% NaCl with KCl 20 mEq 125 mL/hr at 04/25/21 0243    dextrose      sodium chloride         Vitals :  BP 86/60   Pulse 107   Temp 97.6 °F (36.4 °C) (Oral)   Resp 18   Ht 6' 2\" (1.88 m)   Wt 243 lb 9.6 oz (110.5 kg)   SpO2 98%   BMI 31.28 kg/m²     24HR INTAKE/OUTPUT:      Intake/Output Summary (Last 24 hours) at 4/25/2021 1436  Last data filed at 4/25/2021 1417  Gross per 24 hour   Intake 3725.03 ml   Output 0 ml   Net 3725.03 ml     Last 3 weights  Wt Readings from Last 3 Encounters:   04/23/21 243 lb 9.6 oz (110.5 kg)   04/13/21 237 lb 4.8 oz (107.6 kg)   04/13/21 240 lb 9.6 oz (109.1 kg)           Physical Exam :  General -- well developed and well nourishes  HEENT-normal external appearance of both ears and nose. Mouth/throat  - oropharynx appears to be dry slightly  Eyes-conjunctiva normal no icterus or pallor. Neck-normal range of motion supple no JVD. Lungs -- clear  Heart -- S1, S2 heard, JVD - no  Abdomen - soft, non-tender  Extremities -- edema --noted but appears better  CNS - awake and alert  Psychiatric-mood and memory appears normal         Last 3 CBC   Recent Labs     04/23/21  1045 04/24/21  0417   WBC 6.7 5.9   RBC 3.15* 2.82*   HGB 10.2* 9.0*   HCT 29.6* 26.3*   * 75*     Last 3 CMP  Recent Labs     04/23/21  1045 04/24/21  0417 04/25/21  0351   * 129* 131*   K 3.1* 3.0* 3.7   CL 88* 92* 97*   CO2 17* 19* 18*   BUN 54* 55* 55*   CREATININE 5.1* 6.3* 6.2*   CALCIUM 9.0 8.6 8.5   LABALBU 3.4* 2.7* 2.6*   BILITOT 6.4* 5.9* 5.1*             Assessment    1. Renal - KARAN on CKD stage 3 most likely multifactorial due to hypotension and poor oral intake as well. May be some diarrhea as well.cannot R/O HRS  ? Urine lites show prerenal etiology with urine sodium less than 20  ? Creatinine currently stable  ? Continue IV fluids at 125 mL/h. Decrease lactulose on 4/24/2021     2. Electrolytes - hyponatremia - 2/2 KARAN avoid hypotonic fluids . Continue IV fluids  3. Hypokalemia - may be from diarrhea and diuretics currently doing better  4. Metabolic acidosis with lactic acidosis. 5. hypotension on midodrine. Aggressive hydration should help. We will also give albumin 25 g every 8 hours for 3 doses and monitor  6. Liver cirrhosis with ascitis S/P paracentesis. 7. Thrombocytopenia   8. Falls/ dizziness -clear due to orthostasis  9. Lower extremety edema  Meds reviewed and discussed with patient and nursing staff    PAULINA Morales D.  [facility].

## 2021-04-26 NOTE — PROGRESS NOTES
Hospitalist      Patient:  Aden Linares    Unit/Bed:3B-38/038-A  YOB: 1965  MRN: [id number]   Acct: [de-identified]     PCP: Paula Mas MD  Date of Admission: 4/23/2021        Assessment and Plan:        1. KARAN on CKD stage V: Nephrology consulted will avoid nephrotoxic agents possibly due to volume depletion. 2. Alcoholic liver cirrhosis with ascites: Status post abdominal paracentesis 2.2 L were drained  3. Hypotension: We will continue with the midodrine, IV rehydration,  4. Weakness with falls x4 yesterday possibly secondary to #3, will monitor I's and O's  5. Diabetes mellitus on insulin sliding scale we will monitor, check hemoglobin A1c  6. Coagulopathy/LFTs/chronic atrial fib/chronic thrombocytopenia are chronic and stable at this time. 7. Right pleural effusion we will ask interventional radiology to do thoracentesis    4.25.2021 blood pressure still running low, patient asymptomatic. Worsening renal function, on midodrine and IV fluids will transfuse albumin if ok with renal    4.26.2021 patient underwent thoracentesis yesterday with 1.1 L removed, today abdomen is slightly distended last abdominal paracentesis was 4/22 will go ahead and have IR to do another paracentesis with post procedure of 25 g of albumin. Platelets gradually decreasing will DC heparin and use sequential compression devices.     CC: Abnormal labs    HPI:     \"55 y.o. male who presented to [address] [place] [address] with abnormal lab; patient has an extensive medical history including cirrhosis of the liver, alcoholic cirrhosis, atrial fibrillation, diabetes, portal hypertension; he states it has been 6 months today since he has had any alcohol or tobacco products; he presented to the ED today secondary to his nephrologist office contacting him for a creatinine of 5.5; he had a paracentesis done yesterday that drained 2.1 L; he states yesterday he fell 4 times; he relates to constant lightheadedness and weakness, Food insecurity     Worry: None     Inability: None    Transportation needs     Medical: None     Non-medical: None   Tobacco Use    Smoking status: Former Smoker     Packs/day: 1.00     Years: 29.00     Pack years: 29.00     Types: Cigarettes     Quit date: 2020     Years since quittin.4    Smokeless tobacco: Never Used   Substance and Sexual Activity    Alcohol use: Not Currently     Frequency: 4 or more times a week     Comment: No alcohol since 21    Drug use: Never    Sexual activity: None   Lifestyle    Physical activity     Days per week: None     Minutes per session: None    Stress: None   Relationships    Social connections     Talks on phone: None     Gets together: None     Attends Judaism service: None     Active member of club or organization: None     Attends meetings of clubs or organizations: None     Relationship status: None    Intimate partner violence     Fear of current or ex partner: None     Emotionally abused: None     Physically abused: None     Forced sexual activity: None   Other Topics Concern    None   Social History Narrative    None      Allergies: Patient has no known allergies. Medications:     0.9% NaCl with KCl 20 mEq 40 mL/hr at 21 1025    dextrose      sodium chloride        octreotide  100 mcg Subcutaneous Q8H    lactulose  45 g Oral Daily    amiodarone  200 mg Oral Daily    famotidine  20 mg Oral Daily    gabapentin  200 mg Oral BID    midodrine  15 mg Oral TID    rifaximin  550 mg Oral BID    tiotropium  2 puff Inhalation Daily    insulin lispro  0-12 Units Subcutaneous TID WC    insulin lispro  0-6 Units Subcutaneous Nightly    sodium chloride flush  5-40 mL Intravenous 2 times per day    heparin (porcine)  5,000 Units Subcutaneous Q8H     Prior to Admission medications    Medication Sig Start Date End Date Taking?  Authorizing Provider   rifaximin (XIFAXAN) 550 MG tablet Take 1 tablet by mouth 2 times daily 21  Yes Anastasia Barrera FELIX Pearl CNP   ammonium lactate (AMLACTIN) 12 % cream Apply topically as needed   Yes Historical Provider, MD   gabapentin (NEURONTIN) 100 MG capsule Take 2 capsules by mouth 2 times daily for 30 days. 4/7/21 5/7/21 Yes Armond Graves MD   famotidine (PEPCID) 20 MG tablet Take 1 tablet by mouth daily 4/8/21 5/8/21 Yes Armond Graves MD   midodrine (PROAMATINE) 5 MG tablet Take 3 tablets by mouth 3 times daily 4/7/21 5/7/21 Yes Armond Graves MD   amiodarone (CORDARONE) 200 MG tablet Take 1 tablet by mouth daily 4/7/21 5/7/21 Yes Armond Graves MD   lactulose (CHRONULAC) 10 GM/15ML solution 45 ml by mouth TID 3/18/21  Yes Historical Provider, MD   bumetanide (BUMEX) 1 MG tablet Take 1 tablet by mouth 2 times daily 3/18/21  Yes QamarFELIX Koroma CNP   albuterol sulfate HFA (VENTOLIN HFA) 108 (90 Base) MCG/ACT inhaler Inhale 2 puffs into the lungs every 6 hours as needed for Wheezing 3/5/21  Yes Margot Lozano MD   tiotropium [facility]) 18 MCG inhalation capsule Inhale 1 capsule into the lungs daily 3/5/21  Yes Margot Lozano MD   dilTIAZem (CARDIZEM) 30 MG tablet Take 1 tablet by mouth 3 times daily 2/27/21  Yes Ruby Byrnes DO   ondansetron (ZOFRAN) 4 MG tablet Take 4 mg by mouth every 8 hours as needed for Nausea or Vomiting   Yes Historical Provider, MD   metoprolol (LOPRESSOR) 100 MG tablet Take 100 mg by mouth 2 times daily   Yes Historical Provider, MD   ASPIRIN 81 PO Take by mouth daily   Yes Historical Provider, MD   Misc.  Devices [facility]") [id number] [facility] Standard lightweight wheelchair 4/23/21   FELIX Orellana CNP   varenicline (CHANTIX) 0.5 MG tablet Take 1 tablet by mouth daily x 1 week then increase to 1 tablet by mouth twice daily 4/21/21   Nick Carney MD   Continuous Blood Gluc  (DEXCOM G6 ) MENDY Use to test 4 times daily and as needed DX: E11.9 3/24/21   Rema Human, APRN - CNP   Continuous Blood Gluc Transmit (DEXCOM G6 TRANSMITTER) MISC Use to test 4 times daily and as needed DX: E11.9 3/24/21   FELIX Bishop CNP   Continuous Blood Gluc Sensor (DEXCOM G6 SENSOR) MISC Use to test 4 times daily and as needed DX: E11.9 3/24/21   FELIX Bishop CNP   Lancets MISC 1 each by Does not apply route daily Use to test blood sugar 4 times daily and as needed DX:E11.9 12/4/20   FELIX Bishop CNP   blood glucose monitor strips Test 4 times daily and as needed for irregular blood glucose. Dispense sufficient amount for indicated testing frequency plus additional to accommodate PRN testing needs. DX E11.9 12/4/20   FELIX Bishop CNP   Blood Glucose Monitoring Suppl (ONE TOUCH ULTRA 2) w/Device KIT Use to test 4 times daily and as needed DX: E11.9 12/4/20   FELIX Bishop CNP      PHYSICAL EXAM:    BP 95/67   Pulse 85   Temp 97.6 °F (36.4 °C) (Oral)   Resp 18   Ht 6' 2\" (1.88 m)   Wt 257 lb (116.6 kg)   SpO2 98%   BMI 33.00 kg/m²     General appearance:  No apparent distress, appears stated age and cooperative. HEENT:  Normal cephalic, atraumatic without obvious deformity. Pupils equal, round, and reactive to light. Extra ocular muscles intact. Conjunctivae/corneas clear. Neck: Supple, with full range of motion. no jugular venous distention. Trachea midline. no carotid bruits  Respiratory:  Normal respiratory effort. Decreased breath sounds right side approximately one half the way up posteriorly  Cardiovascular:  Regular rate and rhythm with normal S1/S2 without murmurs, rubs or gallops. PMI non displaced  Abdomen: Soft, non-tender, non-distended with normal bowel sounds. No guarding, rebound. Musculoskeletal:  No clubbing, cyanosis. +ve +3 edema bilaterally. Full range of motion without deformity. Skin: Skin color, texture, turgor normal.  No rashes or lesions, or suspicious lesions. Neurologic:  Neurovascularly intact without any focal sensory/motor deficits.  Cranial nerves: II-XII intact, grossly FINDINGS: Numerous dilated air-filled loops of small bowel throughout the abdomen. Gas in the right colon and rectum. Sigmoid colonic gas is present. Obscuration of the properitoneal fat lines. No pathologic calcifications are seen. Multiple dilated loops of small bowel gas remaining in the colon. This may represent an early small bowel obstruction ileus is a differential consideration. Close follow-up is recommended. **This report has been created using voice recognition software. It may contain minor errors which are inherent in voice recognition technology. ** Final report electronically signed by Dr. Duncan Ahmadi on 4/1/2021 10:22 AM    Ct Head Wo Contrast    Result Date: 4/23/2021  PROCEDURE: CT HEAD WO CONTRAST CLINICAL INFORMATION: head injury testerday . COMPARISON: 3/29/2021 TECHNIQUE: 2-D multiplanar noncontrast CT brain All CT scans at this facility use dose modulation, iterative reconstruction, and/or weight-based dosing when appropriate to reduce radiation dose to as low as reasonably achievable. FINDINGS: There is no infarct or hemorrhage. No extra-axial collection. Ventricles are normal. Gray-white differentiation is maintained. Calvarium is intact. Small air-fluid level right maxillary sinus, otherwise Paranasal sinuses and mastoid air cells are clear. Dental caries involving the left maxillary molars. 1. No acute intracranial pathology 2. Air-fluid level right maxillary sinus (sinusitis or blood is uncertain. 3. Dental caries. **This report has been created using voice recognition software. It may contain minor errors which are inherent in voice recognition technology. ** Final report electronically signed by Dr. Duncan Ahmadi on 4/23/2021 11:51 AM    Ct Head Wo Contrast    Result Date: 3/29/2021  PROCEDURE: CT HEAD WO CONTRAST CLINICAL INFORMATION: fall. Headache and neck stiffness. COMPARISON: No prior study. TECHNIQUE: Noncontrast 5 mm axial images were obtained through the brain.  Sagittal shallow disc bulge causing mild to moderate spinal canal stenosis and mild left and moderate right neural foraminal stenosis in association with uncovertebral joint or change. There is a large right pleural effusion which is partially imaged. 1. No evidence of acute osseous injury of the cervical spine. 2. Multilevel degenerative changes of the cervical spine most pronounced at C5-6. 3. Large right pleural effusion. **This report has been created using voice recognition software. It may contain minor errors which are inherent in voice recognition technology. ** Final report electronically signed by Dr. Samantha Montanez MD on 3/29/2021 8:30 PM    Us Renal Complete    Result Date: 3/30/2021  BILATERAL RENAL ULTRASOUND: CLINICAL INFORMATION: Acute renal failure. COMPARISON: No comparison available. TECHNIQUE: Multiple sonographic images of both kidneys were obtained. Images of the urinary bladder were also obtained. FINDINGS: RIGHT KIDNEY - 12.2 x 6.6 x 4.9 cm Resistive Index - 0.75 Cortical Thickness - 1.4 cm LEFT KIDNEY - 12.7 x 5.3 x 5.1 cm Resistive Index - 0.75 Cortical Thickness - 1.5 cm URINARY BLADDER Pre-Void - 153 mL Post-Void - declined  KIDNEYS:  Both kidneys are normal in size and contour. Elevated resistive indices bilaterally, consistent with medical renal disease. MASS/CYST: No solid or cystic lesion of either kidney is seen. HYDRONEPHROSIS:  No hydronephrosis. Large amount of ascites in the abdomen and pelvis. CALCULI:  No calculi are seen. BLADDER:  The urinary bladder is unremarkable. .     Ascites. No acute findings involving either kidney. **This report has been created using voice recognition software. It may contain minor errors which are inherent in voice recognition technology. ** Final report electronically signed by Dr. Luis Tavera on 3/30/2021 1:13 PM    Xr Chest Portable    Result Date: 4/23/2021  PROCEDURE: XR CHEST PORTABLE CLINICAL INFORMATION: fluid retention .  COMPARISON: 4/22/2021 TECHNIQUE: Portable upright FINDINGS: Essentially stable chest with large right pleural effusion. Underlying infiltrates or possible. Left lung remains clear. Heart size is normal. The pulmonary vessels are not congested. EKG leads overlie the chest.     Stable chest large right pleural effusion and possible underlying infiltrates **This report has been created using voice recognition software. It may contain minor errors which are inherent in voice recognition technology. ** Final report electronically signed by Dr. Shirley Monterroso on 4/23/2021 11:12 AM    Xr Chest 1 View    Result Date: 3/29/2021  PROCEDURE: XR CHEST 1 VIEW CLINICAL INFORMATION: weakness, right side pleural effusion. COMPARISON: 3/16/2021. TECHNIQUE: AP upright view of the chest. FINDINGS: There is again noted to be a prominent pleural effusion on the right, similar to prior exam. The left lung appears clear. Pulmonary vasculature and cardiac mediastinal silhouette are within normal in its. Visualized osseous structures appear grossly intact. Stable prominent right pleural effusion. **This report has been created using voice recognition software. It may contain minor errors which are inherent in voice recognition technology. ** Final report electronically signed by Dr. Samantha Montanez MD on 3/29/2021 8:32 PM    Us Guided Paracentesis    Result Date: 4/22/2021  PROCEDURE: Ultrasound-guided paracentesis. CLINICAL INFORMATION: Ascites. COMPARISON: Previous procedure 04/06/2021. PROCEDURE: Physician performing procedure: Dr. Jim Arellano Informed consent signed: yes- patient Local Anesthetic: 2% lidocaine Specimen volume: 70 ml Catheter: 5 Albanian Aspirated ascites volume: 2.2 liters Aspirated ascites color: clear yellow Site of Puncture:RLQ Complications: no The benefits and the risk of the procedure were explained to the patient. Signed consent was obtained. Limited ultrasound exam of the abdomen showed a moderate amount of ascites.  The right side of the abdomen was prepped and draped using the usual sterile technique and the skin was anesthetized. A 5 [place]uran one-step catheter was introduced to the peritoneal ascites. 2.2 L of fluid drained. The catheter was then removed. The patient tolerated the procedure well with no complications reported and was discharged from the radiology department in a stable condition. Specimen sent for laboratory studies as requested. Successful ultrasound-guided paracentesis. 2.2 L of fluid drained. **This report has been created using voice recognition software. It may contain minor errors which are inherent in voice recognition technology. ** Final report electronically signed by Dr Aubrey Esteves on 4/22/2021 9:47 AM    Us Guided Paracentesis    Result Date: 4/7/2021  PROCEDURE: Ultrasound-guided paracentesis. CLINICAL INFORMATION: Ascites. COMPARISON: No prior study. PROCEDURE: Physician performing procedure: Dr. Alex Lexington Informed consent signed: yes Local Anesthetic: 2% lidocaine Specimen volume: no labs Catheter: 5 Ukrainian Aspirated ascites volume: 4.4 liters Aspirated ascites color: yellow Site of Puncture:RLQ Complications: none The benefits and the risk of the procedure were explained to the patient. Signed consent was obtained. Limited ultrasound exam of the abdomen showed  large  amount of ascites. The right side of the abdomen was prepped and draped using the usual sterile technique and the skin was anesthetized. A 5 Honduran one-step catheter was introduced to the peritoneal ascites. 4.4 L of fluid drained. The catheter was then removed. The patient tolerated the procedure well with no complications reported and was discharged from the radiology department in a stable condition. Successful ultrasound-guided paracentesis. 4.4 L of fluid drained. **This report has been created using voice recognition software. It may contain minor errors which are inherent in voice recognition technology. ** Final report electronically signed by Dr Allie Escobar on 4/7/2021 8:11 AM    Us Guided Paracentesis    Result Date: 4/1/2021  PROCEDURE: Ultrasound-guided paracentesis. CLINICAL INFORMATION: Ascites. COMPARISON: Correlation made with previous procedure dated 3/17/2021. PROCEDURE: Physician performing procedure: Dr Pratima Sales Informed consent signed: yes Local Anesthetic: 2% Lidocaine Specimen volume: 70 ml Catheter: 5 [place] Aspirated ascites volume: 4.6 liters Aspirated ascites color: yellow Site of Puncture:RLQ Complications: No immediate complications The benefits and the risk of the procedure were explained to the patient. Signed consent was obtained. Limited ultrasound exam of the abdomen showed  large  amount of ascites. The right side of the abdomen was prepped and draped using the usual sterile technique and the skin was anesthetized. A 5 Serbian one-step catheter was introduced to the peritoneal ascites. 4.6 L of fluid drained. The catheter was then removed. The patient tolerated the procedure well with no complications reported and was discharged from the radiology department in a stable condition. Specimen sent for laboratory studies as requested. Successful ultrasound-guided paracentesis. 4.6 L of fluid drained. **This report has been created using voice recognition software. It may contain minor errors which are inherent in voice recognition technology. ** Final report electronically signed by Dr Allie Escobar on 4/1/2021 8:15 AM      Electronically signed by Loni Armstrong DO on 4/26/2021 at 11:30 AM

## 2021-04-26 NOTE — PLAN OF CARE
Problem: Falls - Risk of:  Goal: Will remain free from falls  Description: Will remain free from falls  Outcome: Ongoing  Note: Pt free from falls, safety maintained   Goal: Absence of physical injury  Description: Absence of physical injury  Outcome: Ongoing  Note: Pt free from injury, safety maintained      Problem: Skin Integrity:  Goal: Will show no infection signs and symptoms  Description: Will show no infection signs and symptoms  Outcome: Ongoing  Note: Afebrile, no s/s of infection, vss  Goal: Absence of new skin breakdown  Description: Absence of new skin breakdown  Outcome: Ongoing  Note: No new skin breakdown noted      Problem: OXYGENATION/RESPIRATORY FUNCTION  Goal: Patient will maintain patent airway  Outcome: Ongoing  Note: Pt maintains patent airway   Goal: Patient will achieve/maintain normal respiratory rate/effort  Description: Respiratory rate and effort will be within normal limits for the patient  Outcome: Ongoing  Note: Pt maintains adequate resp rate and effort     Problem: Pain:  Goal: Patient's pain/discomfort is manageable  Description: Patient's pain/discomfort is manageable  Outcome: Ongoing  Note: Pt currently denies any pain

## 2021-04-26 NOTE — CARE COORDINATION
04/26/21 1130   Readmission Assessment   Number of Days since last admission? 8-30 days   Previous Disposition Home with Home Health   Who is being Interviewed Patient   What was the patient's/caregiver's perception as to why they think they needed to return back to the hospital? Other (Comment)  (Paracentesis as OP, labs showed high creatinine)   Did you visit your Primary Care Physician after you left the hospital, before you returned this time? Yes   Did you see a specialist, such as Cardiac, Pulmonary, Orthopedic Physician, etc. after you left the hospital? Yes   Who advised the patient to return to the hospital? Physician's Nurse/Office staff;Physician   Does the patient report anything that got in the way of taking their medications? No   In our efforts to provide the best possible care to you and others like you, can you think of anything that we could have done to help you after you left the hospital the first time, so that you might not have needed to return so soon? Teaching during hospitalization regarding your illness; Other (Comment)  (Sometimes too many doctors on case)

## 2021-04-26 NOTE — FLOWSHEET NOTE
04/26/21 1122   Provider Notification   Reason for Communication Review case   Provider Name Dr. Kesha Lewis   Provider Notification Physician   Method of Communication Secure Message   Notification Time [phone]     Patient's abdomen is becoming quite distended and uncomfortable d/t ascites. Do you think we need a GI consult/paracentesis? His last was on 4/22 with 2.1L out. Also, his platelets are trending down - do you want me to continue giving SQ heparin? New orders for paracentesis, d/c SQ Heparin, place SCDs, 25 g albumin post-paracentesis.

## 2021-04-26 NOTE — PROGRESS NOTES
Comprehensive Nutrition Assessment    Type and Reason for Visit:  Initial, Positive Nutrition Screen, Wound    Nutrition Recommendations/Plan:   *Recommend a Multivitamin w/minerals daily. *Started Cofumrkex0DS daily. *Continue current diet. Nutrition Assessment: Pt. nutritionally compromised AEB pt with poor oral intake since admit and report of wound upon admit; however, no wounds documented per EMR. At risk for further nutrition compromise r/t admit d/t liver cirrhosis w/ascites and portal hypertension  and underlying medical condition (hx DM, CHF, CKD, alcohol abuse, portal HTN and GERD). Nutrition recommendations/interventions as per above. Malnutrition Assessment:  Malnutrition Status: At risk for malnutrition (Comment)    Context:  Acute Illness     Findings of the 6 clinical characteristics of malnutrition:  Energy Intake:  Unable to assess  Weight Loss:  No significant weight loss     Body Fat Loss:  Unable to assess     Muscle Mass Loss:  Unable to assess    Fluid Accumulation:  7 - Moderate to Severe Extremities, Generalized   Strength:  Not Performed    Estimated Daily Nutrient Needs:  Energy (kcal):  5817-2292 kcal/day (15-18 kcal/kg); Weight Used for Energy Requirements:  (117 kg on 4/26)     Protein (g):  103-130 g/day (1.2-1.5 g/kg); Weight Used for Protein Requirements:  (IBW 86.4 kg)          Nutrition Related Findings:  pt seen but is leaving for ultrasound upon visit; appears jaundice; pt has not touched lunch tray yet; variable po intake noted since admit; admit w/liver cirrhosis w/ascites and portal hypertension s/p thoracentesis on 4/26 and paracentesis on 4/22. Note- pt requested [id number] daily during previous admit on 4/5/21 - will send daily during LOS. Wound reported upon admit- no wounds documented per EMR. Last BM x2 on 4/25. Labs: BUN 57, Cr. 6.1, Na 129.  Rx includes: Humalog, IVF, Zofran PRN      Wounds:  (pt report of healing wound on buttocks; no wounds documented at present per EMR)       Current Nutrition Therapies:    DIET LOW SODIUM 2 GM;  Dietary Nutrition Supplements: Protein Modular    Anthropometric Measures:  · Height: 6' 2\" (188 cm)  · Current Body Weight: 257 lb (116.6 kg)(4/26; +2 BLE & generalized)   · Admission Body Weight: 243 lb 9.6 oz (110.5 kg)(4/23; +2 BLE & generalized)    · Usual Body Weight: (Per EMR: 235 lb 8 oz on 3/29/21; 235 lb 6.4 ozon 1/26/21; 279 lb 12.8 oz on 12/4/20)     · Ideal Body Weight: 190 lbs  · BMI: 33  · BMI Categories: Obese Class 1 (BMI 30.0-34. 9)       Nutrition Diagnosis:   · Inadequate oral intake related to inadequate protein-energy intake as evidenced by intake 0-25%, intake 26-50%, intake 51-75%, wounds(possible wound?)    Nutrition Interventions:   Food and/or Nutrient Delivery:  Continue Current Diet, Start Oral Nutrition Supplement, Vitamin Supplement(Will send [id number] daily.)  Nutrition Education/Counseling:  No recommendation at this time   Coordination of Nutrition Care:  Continue to monitor while inpatient    Goals:  Pt will consume 75% or more of meals during LOS       Nutrition Monitoring and Evaluation:   Behavioral-Environmental Outcomes:  None Identified   Food/Nutrient Intake Outcomes:  Supplement Intake, Vitamin/Mineral Intake, Food and Nutrient Intake, Diet Advancement/Tolerance  Physical Signs/Symptoms Outcomes:  Biochemical Data, Chewing or Swallowing, GI Status, Weight, Skin, Nutrition Focused Physical Findings, Fluid Status or Edema     Discharge Planning:     Too soon to determine     Electronically signed by Kady Rico RD, LD on 4/26/21 at 2:11 PM EDT    Contact: [phone]

## 2021-04-26 NOTE — PROGRESS NOTES
[facility]   Nephrology progress note  4/26/2021, 10:24 AM      Pt Name:    Derrick Casarez  MRN:     [id number]     [place]:    1965  Admit Date:    4/23/2021 10:30 AM  Primary Care Physician:  Eduardo Lloyd MD   Room number  3B-38/038-A    Chief Complaint: Nephrology following for KARAN/CKD    Subjective:  Patient seen and examined  Reports some pruritus  On IV fluids  Discussed with patient and RN at bedside  Poor urine output  Had paracentesis yesterday      Objective:  24HR INTAKE/OUTPUT:      Intake/Output Summary (Last 24 hours) at 4/26/2021 1024  Last data filed at 4/25/2021 2238  Gross per 24 hour   Intake 1145.74 ml   Output 0 ml   Net 1145.74 ml     I/O last 3 completed shifts: In: 1325.7 [P.O.:280; I.V.:1045.7]  Out: 0   No intake/output data recorded. Admission weight: 237 lb (107.5 kg)  Wt Readings from Last 3 Encounters:   04/26/21 257 lb (116.6 kg)   04/13/21 237 lb 4.8 oz (107.6 kg)   04/13/21 240 lb 9.6 oz (109.1 kg)     Body mass index is 33 kg/m².     Physical examination  VITALS:     Vitals:    04/25/21 2354 04/26/21 0007 04/26/21 0545 04/26/21 0956   BP: 99/66  97/76 93/67   Pulse: 84  84 87   Resp: 18  18 18   Temp: 97.9 °F (36.6 °C)  97 °F (36.1 °C) 97.8 °F (36.6 °C)   TempSrc: Oral  Oral Oral   SpO2: 97%  98% 97%   Weight:  257 lb (116.6 kg)     Height:         General Appearance: alert and cooperative with exam, appears comfortable, no distress  Mouth/Throat: Oral mucosa moist  Neck: No JVD  Lungs: Diminished  Heart:  S1, S2 heard  GI: Distended abdomen  Extremities: No significant pitting edema      Lab Data  CBC:   Recent Labs     04/23/21  1045 04/24/21  0417 04/26/21  0421   WBC 6.7 5.9 5.7   HGB 10.2* 9.0* 8.8*   HCT 29.6* 26.3* 25.8*   * 75* 71*     BMP:  Recent Labs     04/24/21  0417 04/25/21  0351 04/26/21 0421   * 131* 131*   K 3.0* 3.7 4.0   CL 92* 97* 97*   CO2 19* 18* 16*   BUN 55* 55* 56*   CREATININE 6.3* 6.2* 6.7*   GLUCOSE 95 121* 92   CALCIUM 8.6 8.5 8.7   MG 2.3  --   --      Hepatic:   Recent Labs     04/23/21  1045 04/24/21  0417 04/25/21  0351   LABALBU 3.4* 2.7* 2.6*   AST 76* 51* 53*   ALT 34 27 25   BILITOT 6.4* 5.9* 5.1*   ALKPHOS 177* 143* 144*         Meds:  Infusion:    0.9% NaCl with KCl 20 mEq 125 mL/hr at 04/25/21 0243    dextrose      sodium chloride       Meds:    diphenhydrAMINE  25 mg Oral Once    albumin human  25 g Intravenous Q8H    lactulose  45 g Oral Daily    amiodarone  200 mg Oral Daily    famotidine  20 mg Oral Daily    gabapentin  200 mg Oral BID    midodrine  15 mg Oral TID    rifaximin  550 mg Oral BID    tiotropium  2 puff Inhalation Daily    insulin lispro  0-12 Units Subcutaneous TID WC    insulin lispro  0-6 Units Subcutaneous Nightly    sodium chloride flush  5-40 mL Intravenous 2 times per day    heparin (porcine)  5,000 Units Subcutaneous Q8H     Meds prn: sodium chloride, calcium carbonate, albuterol, glucose, dextrose, glucagon (rDNA), dextrose, sodium chloride flush, sodium chloride, promethazine **OR** ondansetron, acetaminophen **OR** acetaminophen       Impression and Plan:  1. KARAN on CKD 3B in setting of acute decompensated liver cirrhosis with ascites  Cannot rule out hepatorenal versus prerenal  Patient is already on IV albumin as well as IV normal saline  No improvement in renal function, creatinine up to 6.7  We will add octreotide. Continue with midodrine  Will repeat BMP. Discussed with patient  Will likely need renal replacement therapy  We will repeat BMP and decide  Patient is in agreement  Obtain ultrasound kidneys rule out hydronephrosis    2. Pruritus. Likely secondary to underlying uremia  Patient is asking for Benadryl. Avoid IV Benadryl due to hypotension    3. Acute decompensated liver cirrhosis  4. Ascites status post paracentesis  5. History of alcoholic cirrhosis  6. Chronic hypotension  7. History of A. Fib  8. Chronic thrombocytopenia  9.

## 2021-04-26 NOTE — PLAN OF CARE
Problem: Nutrition  Goal: Optimal nutrition therapy  Outcome: Ongoing   Nutrition Problem #1: Inadequate oral intake  Intervention: Food and/or Nutrient Delivery: Continue Current Diet, Start Oral Nutrition Supplement, Vitamin Supplement(Will send [id number] daily.)  Nutritional Goals: Pt will consume 75% or more of meals during LOS

## 2021-04-27 NOTE — PROGRESS NOTES
[facility]   Nephrology progress note  4/27/2021, 9:29 AM      Pt Name:    Bryce Rodriguez  MRN:     [id number]     [place]:    1965  Admit Date:    4/23/2021 10:30 AM  Primary Care Physician:  Sonya Leblanc MD   Room number  3B-31/031-A    Chief Complaint: Nephrology following for KARAN/CKD    Subjective:  Patient seen and examined  Awake and alert  Denies any chest pain  Denies any shortness of breath  Urine output not available      Objective:  24HR INTAKE/OUTPUT:      Intake/Output Summary (Last 24 hours) at 4/27/2021 0929  Last data filed at 4/27/2021 0854  Gross per 24 hour   Intake 3422.6 ml   Output 0 ml   Net 3422.6 ml     I/O last 3 completed shifts: In: 3302.6 [P.O.:550; I.V.:2752.6]  Out: 0   I/O this shift:  In: 120 [P.O.:120]  Out: -   Admission weight: 237 lb (107.5 kg)  Wt Readings from Last 3 Encounters:   04/27/21 267 lb 6.4 oz (121.3 kg)   04/13/21 237 lb 4.8 oz (107.6 kg)   04/13/21 240 lb 9.6 oz (109.1 kg)     Body mass index is 34.33 kg/m².     Physical examination  VITALS:     Vitals:    04/26/21 2147 04/26/21 2317 04/27/21 0234 04/27/21 0852   BP: 97/63 91/72 97/73    Pulse: 79 78 83    Resp: 19 18 18    Temp: 97.7 °F (36.5 °C) 97.7 °F (36.5 °C) 97.3 °F (36.3 °C)    TempSrc: Oral Oral Oral    SpO2: 94% 93% 92% 92%   Weight:   267 lb 6.4 oz (121.3 kg)    Height:         General Appearance: alert and cooperative with exam, appears comfortable, no distress  Mouth/Throat: Oral mucosa moist  Neck: No JVD  Lungs: Diminished  Heart:  S1, S2 heard  GI: Distended abdomen  Extremities: No significant pitting edema      Lab Data  CBC:   Recent Labs     04/26/21  0421 04/27/21  0433   WBC 5.7 5.0   HGB 8.8* 8.5*   HCT 25.8* 26.8*   PLT 71* 62*     BMP:  Recent Labs     04/26/21  0421 04/26/21  1128 04/27/21  0435   * 129* 125*   K 4.0 4.0 4.8   CL 97* 95* 93*   CO2 16* 17* 14*   BUN 56* 57* 62*   CREATININE 6.7* 6.1* 5.7*   GLUCOSE 92 105 86   CALCIUM 8.7 8.5 8.9 Hepatic:   Recent Labs     04/25/21  0351   LABALBU 2.6*   AST 53*   ALT 25   BILITOT 5.1*   ALKPHOS 144*         Meds:  Infusion:    0.9% NaCl with KCl 20 mEq 40 mL/hr at 04/26/21 1025    dextrose      sodium chloride       Meds:    octreotide  100 mcg Subcutaneous Q8H    lactulose  45 g Oral Daily    amiodarone  200 mg Oral Daily    famotidine  20 mg Oral Daily    gabapentin  200 mg Oral BID    midodrine  15 mg Oral TID    rifaximin  550 mg Oral BID    tiotropium  2 puff Inhalation Daily    insulin lispro  0-12 Units Subcutaneous TID WC    insulin lispro  0-6 Units Subcutaneous Nightly    sodium chloride flush  5-40 mL Intravenous 2 times per day     Meds prn: sodium chloride, calcium carbonate, albuterol, glucose, dextrose, glucagon (rDNA), dextrose, sodium chloride flush, sodium chloride, promethazine **OR** ondansetron, acetaminophen **OR** acetaminophen       Impression and Plan:  1. KARAN on CKD 3B in setting of acute decompensated liver cirrhosis with ascites  Cannot rule out hepatorenal versus prerenal  S/p IV albumin and normal saline  No hydronephrosis on ultrasound  Creatinine continues to improve albeit slowly  We will continue with current management  Discussed with patient and family member in detail  No immediate need for renal replacement therapy but may still require in near future    2. Low bicarbonate/met acidosis. Check venous pH, add oral bicarbonate. Stop normal saline. Change fluids to D5 with 150 mEq of sodium bicarbonate. 3.  Acute decompensated liver cirrhosis with ascites. Status post paracentesis. 4.  History of alcoholic cirrhosis  5. Chronic hypotension: Continue with midodrine  6. History of A. Fib  7. Chronic thrombocytopenia  8. Hyperammonemia: On lactulose and rifaximin  9. Constipation  10. Anemia in CKD.   Will add iron studies and SAMMIE    Discussed with patient and family member in detail    Yvonne Mckeon MD  [facility]

## 2021-04-27 NOTE — PROGRESS NOTES
the ED today secondary to his nephrologist office contacting him for a creatinine of 5.5; he had a paracentesis done yesterday that drained 2.1 L; he states yesterday he fell 4 times; he relates to constant lightheadedness and weakness, he is on midodrine for chronic hypotension however he did take his midodrine today in addition to his Lopressor.     Currently he is alert and talkative, he is jaundiced, he denies any pain, he does relate to shortness of breath, he does relate to his chronic lightheadedness and dizziness; states his legs are extremely swollen and that it is difficult for him to get his shoes on.     In the emergency department, he had a sodium level at 128, potassium 3.1, CO2 of 17, BUN 54, creatinine 5.1, lactic acidosis at 4.0, troponin T of 0.100, he relates to elevated LFTs as well; CBC shows mild anemia and mild thrombocytopenia at 108; his chest x-ray showing a large right pleural effusion; he is being admitted to the hospital service for further care and evaluation. \"-per h+p    ROS (14 point review of systems completed. Pertinent positives noted.  Otherwise ROS is negative) : Patient seen this a.m. only complaining of diarrhea secondary to lactulose    PMH:  Per HPI and       Diagnosis Date    Advanced cirrhosis of liver ([address]     Alcoholic cirrhosis ([address]     Atrial fibrillation ([address]     CHF (congestive heart failure) (HCC)     Chronic kidney disease     COPD (chronic obstructive pulmonary disease) ([address]     Diabetes mellitus ([address]     Gallstones     GERD (gastroesophageal reflux disease)     Headache     Hydrothorax     Portal hypertension ([address]      SHX:        Procedure Laterality Date    APPENDECTOMY      HERNIA REPAIR      LUNG SURGERY Right 11-24-20, 11-30-20    [facility]     FHX:       Problem Relation Age of Onset    Heart Disease Mother     High Blood Pressure Mother     Diabetes Mother     Cancer Mother     Cancer Father      SOCHX:   Social History     Socioeconomic Nightly    sodium chloride flush  5-40 mL Intravenous 2 times per day     Prior to Admission medications    Medication Sig Start Date End Date Taking? Authorizing Provider   rifaximin (XIFAXAN) 550 MG tablet Take 1 tablet by mouth 2 times daily 4/23/21  Yes Jason Babinski, APRN - CNP   ammonium lactate (AMLACTIN) 12 % cream Apply topically as needed   Yes Historical Provider, MD   gabapentin (NEURONTIN) 100 MG capsule Take 2 capsules by mouth 2 times daily for 30 days. 4/7/21 5/7/21 Yes Carlee Andrade MD   famotidine (PEPCID) 20 MG tablet Take 1 tablet by mouth daily 4/8/21 5/8/21 Yes Carlee Andrade MD   midodrine (PROAMATINE) 5 MG tablet Take 3 tablets by mouth 3 times daily 4/7/21 5/7/21 Yes Carlee Andrade MD   amiodarone (CORDARONE) 200 MG tablet Take 1 tablet by mouth daily 4/7/21 5/7/21 Yes Carlee Andrade MD   lactulose (CHRONULAC) 10 GM/15ML solution 45 ml by mouth TID 3/18/21  Yes Historical Provider, MD   bumetanide (BUMEX) 1 MG tablet Take 1 tablet by mouth 2 times daily 3/18/21  Yes FELIX Barlow CNP   albuterol sulfate HFA (VENTOLIN HFA) 108 (90 Base) MCG/ACT inhaler Inhale 2 puffs into the lungs every 6 hours as needed for Wheezing 3/5/21  Yes Shine Fernández MD   tiotropium [facility]) 18 MCG inhalation capsule Inhale 1 capsule into the lungs daily 3/5/21  Yes Shine Fernández MD   dilTIAZem (CARDIZEM) 30 MG tablet Take 1 tablet by mouth 3 times daily 2/27/21  Yes Misha Sorto DO   ondansetron (ZOFRAN) 4 MG tablet Take 4 mg by mouth every 8 hours as needed for Nausea or Vomiting   Yes Historical Provider, MD   metoprolol (LOPRESSOR) 100 MG tablet Take 100 mg by mouth 2 times daily   Yes Historical Provider, MD   ASPIRIN 81 PO Take by mouth daily   Yes Historical Provider, MD [place]  [facility] Standard lightweight wheelchair 4/23/21   Mendez Babinski, APRN - CNP   varenicline (CHANTIX) 0.5 MG tablet Take 1 tablet by mouth daily x 1 week then increase to 1 tablet by mouth twice daily 4/21/21   Bee Gutiérrez MD   Continuous Blood Gluc  (DEXCOM G6 ) MENDY Use to test 4 times daily and as needed DX: E11.9 3/24/21   FELIX Temple CNP   Continuous Blood Gluc Transmit (DEXCOM G6 TRANSMITTER) MISC Use to test 4 times daily and as needed DX: E11.9 3/24/21   FELIX Temple CNP   Continuous Blood Gluc Sensor (DEXCOM G6 SENSOR) MISC Use to test 4 times daily and as needed DX: E11.9 3/24/21   FELIX Temple CNP   Lancets MISC 1 each by Does not apply route daily Use to test blood sugar 4 times daily and as needed DX:E11.9 12/4/20   FLEIX Temple CNP   blood glucose monitor strips Test 4 times daily and as needed for irregular blood glucose. Dispense sufficient amount for indicated testing frequency plus additional to accommodate PRN testing needs. DX E11.9 12/4/20   FELIX Temple CNP   Blood Glucose Monitoring Suppl (ONE TOUCH ULTRA 2) w/Device KIT Use to test 4 times daily and as needed DX: E11.9 12/4/20   FELIX Temple CNP      PHYSICAL EXAM:    BP 98/73   Pulse 84   Temp 97.7 °F (36.5 °C) (Oral)   Resp 20   Ht 6' 2\" (1.88 m)   Wt 267 lb 6.4 oz (121.3 kg)   SpO2 95%   BMI 34.33 kg/m²     General appearance:  No apparent distress, appears stated age and cooperative. HEENT:  Normal cephalic, atraumatic without obvious deformity. Pupils equal, round, and reactive to light. Extra ocular muscles intact. Conjunctivae/corneas clear. Neck: Supple, with full range of motion. no jugular venous distention. Trachea midline. no carotid bruits  Respiratory:  Normal respiratory effort. Decreased breath sounds right side approximately one half the way up posteriorly  Cardiovascular:  Regular rate and rhythm with normal S1/S2 without murmurs, rubs or gallops. PMI non displaced  Abdomen: Soft, non-tender, non-distended with normal bowel sounds. No guarding, rebound. Musculoskeletal:  No clubbing, cyanosis.   +ve +3 edema bilaterally. Full range of motion without deformity. Skin: Skin color, texture, turgor normal.  No rashes or lesions, or suspicious lesions. Neurologic:  Neurovascularly intact without any focal sensory/motor deficits. Cranial nerves: II-XII intact, grossly non-focal.  Psychiatric:  Alert and oriented, thought content appropriate, normal insight  Capillary Refill: Brisk,< 2 seconds   Peripheral Pulses: +2 palpable, equal bilaterally upper and lower extremities  Lymphatics: no lymphadenopathy    Data: (All radiographs, tracings, PFTs, and imaging are personally viewed and interpreted unless otherwise noted).    Recent Labs     04/26/21 0421 04/27/21  0433   WBC 5.7 5.0   HGB 8.8* 8.5*   HCT 25.8* 26.8*   PLT 71* 62*     Recent Labs     04/26/21  0421 04/26/21  1128 04/27/21  0435   * 129* 125*   K 4.0 4.0 4.8   CL 97* 95* 93*   CO2 16* 17* 14*   BUN 56* 57* 62*   CREATININE 6.7* 6.1* 5.7*   CALCIUM 8.7 8.5 8.9     Recent Labs     04/25/21  0351   AST 53*   ALT 25   BILITOT 5.1*   ALKPHOS 144*     No results for input(s): INR in the last 72 hours. No results for input(s): Dariela Castor in the last 72 hours. Radiology reports-   Xr Chest (2 Vw)    Result Date: 4/22/2021  PROCEDURE: XR CHEST (2 VW) CLINICAL INFORMATION: Shortness of breath TECHNIQUE: PA and lateral chest radiographs. COMPARISON: [facility] chest radiograph 3/29/2021 FINDINGS: Cardiomediastinal silhouette is within normal limits. The right costophrenic angle remains blunted. Hazy and reticular opacities are present at the right mid and lower lung. Degenerative changes in the thoracic spine are poorly visualized. Soft  tissues are unremarkable. Moderate right-sided pleural effusion with adjacent atelectasis/infiltrate. **This report has been created using voice recognition software. It may contain minor errors which are inherent in voice recognition technology. ** Final report electronically signed by Dr. Elsie Aldana on 4/22/2021 9:18 AM    Xr Abdomen (kub) (single Ap View)    Result Date: 4/1/2021  PROCEDURE: XR ABDOMEN (KUB) (SINGLE AP VIEW) CLINICAL INFORMATION: abdominal distension . COMPARISON: No prior study. TECHNIQUE: 3 supine projections of the abdomen FINDINGS: Numerous dilated air-filled loops of small bowel throughout the abdomen. Gas in the right colon and rectum. Sigmoid colonic gas is present. Obscuration of the properitoneal fat lines. No pathologic calcifications are seen. Multiple dilated loops of small bowel gas remaining in the colon. This may represent an early small bowel obstruction ileus is a differential consideration. Close follow-up is recommended. **This report has been created using voice recognition software. It may contain minor errors which are inherent in voice recognition technology. ** Final report electronically signed by Dr. Sebastien Singh on 4/1/2021 10:22 AM    Ct Head Wo Contrast    Result Date: 4/23/2021  PROCEDURE: CT HEAD WO CONTRAST CLINICAL INFORMATION: head injury testerday . COMPARISON: 3/29/2021 TECHNIQUE: 2-D multiplanar noncontrast CT brain All CT scans at this facility use dose modulation, iterative reconstruction, and/or weight-based dosing when appropriate to reduce radiation dose to as low as reasonably achievable. FINDINGS: There is no infarct or hemorrhage. No extra-axial collection. Ventricles are normal. Gray-white differentiation is maintained. Calvarium is intact. Small air-fluid level right maxillary sinus, otherwise Paranasal sinuses and mastoid air cells are clear. Dental caries involving the left maxillary molars. 1. No acute intracranial pathology 2. Air-fluid level right maxillary sinus (sinusitis or blood is uncertain. 3. Dental caries. **This report has been created using voice recognition software. It may contain minor errors which are inherent in voice recognition technology. ** Final report electronically signed by Dr. Sebastien Singh on 4/23/2021 11:51 AM    Ct Head Wo Contrast    Result Date: 3/29/2021  PROCEDURE: CT HEAD WO CONTRAST CLINICAL INFORMATION: fall. Headache and neck stiffness. COMPARISON: No prior study. TECHNIQUE: Noncontrast 5 mm axial images were obtained through the brain. Sagittal and coronal reconstructions were created. All CT scans at this facility use dose modulation, iterative reconstruction, and/or weight-based dosing when appropriate to reduce radiation dose to as low as reasonably achievable. FINDINGS: The ventricles, cisterns and sulci are symmetric and normal in size and configuration. Gray-white matter differentiation appears grossly preserved. No intracranial hemorrhage, mass effect or midline shift is identified. The calvarium appears intact. Orbits are unremarkable. Visualized paranasal sinuses are clear. Mastoid air cells are clear. No evidence of acute intracranial abnormality. **This report has been created using voice recognition software. It may contain minor errors which are inherent in voice recognition technology. ** Final report electronically signed by Dr. Shakira Castellanos MD on 3/29/2021 8:27 PM    Ct Cervical Spine Wo Contrast    Result Date: 3/29/2021  PROCEDURE: CT CERVICAL SPINE WO CONTRAST CLINICAL INFORMATION: fall, Trauma. Next in thickness after fall. COMPARISON: No prior study. TECHNIQUE: 3 mm noncontrast axial images were obtained through the cervical spine with sagittal and coronal reconstructions. All CT scans at this facility use dose modulation, iterative reconstruction, and/or weight-based dosing when appropriate to reduce radiation dose to as low as reasonably achievable. FINDINGS: There is minimal, grade 1, retrolisthesis of C5 relative C6 on the basis of degenerative change. There is mild straightening of the cervical lordosis which is nonspecific and may be positional. There is otherwise anatomic vertebral body height and alignment. No fracture of the cervical vertebral column is identified.  The craniocervical recognition technology. ** Final report electronically signed by Dr. Claudia Almaraz on 3/30/2021 1:13 PM    Xr Chest Portable    Result Date: 4/23/2021  PROCEDURE: XR CHEST PORTABLE CLINICAL INFORMATION: fluid retention . COMPARISON: 4/22/2021 TECHNIQUE: Portable upright FINDINGS: Essentially stable chest with large right pleural effusion. Underlying infiltrates or possible. Left lung remains clear. Heart size is normal. The pulmonary vessels are not congested. EKG leads overlie the chest.     Stable chest large right pleural effusion and possible underlying infiltrates **This report has been created using voice recognition software. It may contain minor errors which are inherent in voice recognition technology. ** Final report electronically signed by Dr. Luis Nolasco on 4/23/2021 11:12 AM    Xr Chest 1 View    Result Date: 3/29/2021  PROCEDURE: XR CHEST 1 VIEW CLINICAL INFORMATION: weakness, right side pleural effusion. COMPARISON: 3/16/2021. TECHNIQUE: AP upright view of the chest. FINDINGS: There is again noted to be a prominent pleural effusion on the right, similar to prior exam. The left lung appears clear. Pulmonary vasculature and cardiac mediastinal silhouette are within normal in its. Visualized osseous structures appear grossly intact. Stable prominent right pleural effusion. **This report has been created using voice recognition software. It may contain minor errors which are inherent in voice recognition technology. ** Final report electronically signed by Dr. Leeann Chowdary MD on 3/29/2021 8:32 PM    Us Guided Paracentesis    Result Date: 4/22/2021  PROCEDURE: Ultrasound-guided paracentesis. CLINICAL INFORMATION: Ascites. COMPARISON: Previous procedure 04/06/2021.  PROCEDURE: Physician performing procedure: Dr. Bentley Xie Informed consent signed: yes- patient Local Anesthetic: 2% lidocaine Specimen volume: 70 ml Catheter: 5 [place] Aspirated ascites volume: 2.2 liters Aspirated ascites color: clear Successful ultrasound-guided paracentesis. 4.4 L of fluid drained. **This report has been created using voice recognition software. It may contain minor errors which are inherent in voice recognition technology. ** Final report electronically signed by Dr Manjinder Werner on 4/7/2021 8:11 AM    Us Guided Paracentesis    Result Date: 4/1/2021  PROCEDURE: Ultrasound-guided paracentesis. CLINICAL INFORMATION: Ascites. COMPARISON: Correlation made with previous procedure dated 3/17/2021. PROCEDURE: Physician performing procedure: Dr Wan Kumar Informed consent signed: yes Local Anesthetic: 2% Lidocaine Specimen volume: 70 ml Catheter: 5 [place] Aspirated ascites volume: 4.6 liters Aspirated ascites color: yellow Site of Puncture:RLQ Complications: No immediate complications The benefits and the risk of the procedure were explained to the patient. Signed consent was obtained. Limited ultrasound exam of the abdomen showed  large  amount of ascites. The right side of the abdomen was prepped and draped using the usual sterile technique and the skin was anesthetized. A 5 Danish one-step catheter was introduced to the peritoneal ascites. 4.6 L of fluid drained. The catheter was then removed. The patient tolerated the procedure well with no complications reported and was discharged from the radiology department in a stable condition. Specimen sent for laboratory studies as requested. Successful ultrasound-guided paracentesis. 4.6 L of fluid drained. **This report has been created using voice recognition software. It may contain minor errors which are inherent in voice recognition technology. ** Final report electronically signed by Dr Manjinder Werner on 4/1/2021 8:15 AM      Electronically signed by Susie Henderson DO on 4/27/2021 at 12:28 PM

## 2021-04-27 NOTE — CARE COORDINATION
4/27/21, 11:41 AM EDT    DISCHARGE ON GOING EVALUATION    Tre Downser day: 4  Location: -31/031-A Reason for admit: KARAN (acute kidney injury) ([facility] [address] [N17.9]   Procedure:   4/23 CXR - Stable chest large right pleural effusion and possible underlying infiltrates. 4/23 CT head - No acute intracranial pathology. Air-fluid level right maxillary sinus (sinusitis or blood is uncertain. Dental caries. 4/25 US guided Thoracentesis - 1.1L drained. 4/26 US guided Paracentesis - 3L removed. Barriers to Discharge: Afebrile. Room air, sats 92-97%. Sodium 125, chloride 93, CO2 14, BUN 62, creatinine 5.7, Anion gap 18.0. Hgb 8.5. Platelets 62. Octreotide subq q8hr. Rifaximin bid. Sodium bicarb gtt started along with sodium bicarb tabs tid. Albumin given x3 yesterday. Nephrology following with Hospitalist.   PCP: Shelly Orlando MD  Readmission Risk Score: 40%  Patient Goals/Plan/Treatment Preferences: Plans home alone. Current with [facility] for nursing, PT/OT. SW consulted.

## 2021-04-27 NOTE — CARE COORDINATION
DISCHARGE/PLANNING EVALUATION  4/27/21, 3:05 PM EDT    Reason for Referral: current [facility]  Mental Status: alert and oriented to person, place and time. Decision Making: Independent with decision making. Family/Social/Home Environment: Lives at home alone. He has a brother Troy Flores who his very supportive. He also has some cousins that have been supportive. Current Services including food security, transportation and housekeeping:Spoke with Lisette Hanna at [facility] and she reports he is current for RN, PT and OT. He has been independent. Family can help if needed. Current Equipment:rollator and rolling walkers, high toilet with arms, shower bench  [address] [facility]   Concerns or Barriers to Discharge: NOne  Post acute provider list with quality measures, geographic area and applicable managed care information provided. Questions regarding selection process answered:Current with [facility]    Teach Back Method used with James Shear regarding care plan and discharge planning. Patient verbalized understanding of the plan of care and contribute to goal setting. Patient goals, treatment preferences and discharge plan: James Richar plans to return home at discharge with current [facility] for RN, PT and OT. Family are available to help as needed. Electronically signed by LIANE Maldonado on 4/27/2021 at 3:05 PM

## 2021-04-28 NOTE — OP NOTE
Department of Radiology  Post Procedure Progress Note      Pre-Procedure Diagnosis:  Renal Failure    Procedure Performed: Dialysis Catheter insertion     Anesthesia: local     Findings: successful    Immediate Complications:  None    Estimated Blood Loss: minimal    SEE DICTATED PROCEDURE NOTE FOR COMPLETE DETAILS.       Shandra Cisse MD   4/28/2021 2:32 PM

## 2021-04-28 NOTE — FLOWSHEET NOTE
04/28/21 1457 04/28/21 1746   Vital Signs   BP (!) 103/56 (!) 104/54   Temp 98 °F (36.7 °C) 97.6 °F (36.4 °C)   Pulse 98 98   Resp 19 18   Weight 264 lb 12.4 oz (120.1 kg) 262 lb 9.1 oz (119.1 kg)   Weight Method Bed scale Bed scale   Percent Weight Change 0.18 -0.83   Post-Hemodialysis Assessment   Post-Treatment Procedures  --  Blood returned;Catheter Capped, clamped with Saline x2 ports   Machine Disinfection Process  --  Acid/Vinegar Clean;Heat Disinfect; Exterior Machine Disinfection   Rinseback Volume (ml)  --  400 ml   Total Liters Processed (l/min)  --  37.2 l/min   Dialyzer Clearance  --  Lightly streaked   Duration of Treatment (minutes)  --  150 minutes   Heparin amount administered during treatment (units)  --  0 units   Hemodialysis Intake (ml)  --  400 ml   Hemodialysis Output (ml)  --  1400 ml   NET Removed (ml)  --  1000 ml   Tolerated Treatment  --  Good   Stable 2.5 hour tx. Removed 1.0 L of fluid. pt tolerated fluid removal well. Albumin 25 g given at start of tx as ordered. Bilateral cath ports flushed with normal saline and clamped. CVC drsg clean, dry, and  intact. Report called to primary RN. TX record printed for scanning into EMR.

## 2021-04-28 NOTE — CARE COORDINATION
4/28/21, 1:46 PM EDT    DISCHARGE ON GOING [employer] 12 day: 5  Location: -31/031-A Reason for admit: KARAN (acute kidney injury) [facility]) [N17.9]   Procedure:   4/23 CXR - Stable chest large right pleural effusion and possible underlying infiltrates. 4/23 CT head - No acute intracranial pathology. Air-fluid level right maxillary sinus (sinusitis or blood is uncertain. Dental caries. 4/25 US guided Thoracentesis - 1.1L drained. 4/26 US guided Paracentesis - 3L removed. 4/28 Temporary dialysis cath placement planned. Barriers to Discharge: Afebrile. Room air, sats 100%. Hospitalist and Nephrology following. Sodium 129, chloride 95, CO2 15, BUN 64, creatinine 6.1. Phosphorus 6.5. Hgb 8.6. Platelets 75. Ferritin 808. Dialysis cath to be placed today, and first run of dialysis today. Albumin iv x1 with dialysis. PCP: Jennifer Kirby MD  Readmission Risk Score: 44%  Patient Goals/Plan/Treatment Preferences: Plans home alone. Current with [facility] for nursing, PT/OT. SW consulted.

## 2021-04-28 NOTE — PROGRESS NOTES
5.0 6.3   HGB 8.8* 8.5* 8.6*   HCT 25.8* 26.8* 26.5*   PLT 71* 62* 75*     BMP:  Recent Labs     04/26/21  1128 04/27/21  0435 04/28/21  0449   * 125* 129*   K 4.0 4.8 4.5   CL 95* 93* 95*   CO2 17* 14* 15*   BUN 57* 62* 64*   CREATININE 6.1* 5.7* 6.1*   GLUCOSE 105 86 129*   CALCIUM 8.5 8.9 9.1   MG  --   --  2.1   PHOS  --   --  6.5*     Hepatic:   No results for input(s): LABALBU, AST, ALT, ALB, BILITOT, ALKPHOS in the last 72 hours. Meds:  Infusion:    sodium bicarbonate infusion 40 mL/hr at 04/27/21 1023    dextrose      sodium chloride       Meds:    albumin human  25 g Intravenous Once in dialysis    sodium bicarbonate  1,300 mg Oral TID    lactulose  45 g Oral BID    bisacodyl  10 mg Rectal Daily    epoetin sharonda-epbx  3,000 Units Subcutaneous Once per day on Mon Wed Fri    octreotide  100 mcg Subcutaneous Q8H    amiodarone  200 mg Oral Daily    famotidine  20 mg Oral Daily    gabapentin  200 mg Oral BID    midodrine  15 mg Oral TID    rifaximin  550 mg Oral BID    tiotropium  2 puff Inhalation Daily    insulin lispro  0-12 Units Subcutaneous TID WC    insulin lispro  0-6 Units Subcutaneous Nightly    sodium chloride flush  5-40 mL Intravenous 2 times per day     Meds prn: sodium chloride, calcium carbonate, albuterol, glucose, dextrose, glucagon (rDNA), dextrose, sodium chloride flush, sodium chloride, promethazine **OR** ondansetron, acetaminophen **OR** acetaminophen       Impression and Plan:  1.   KARAN on CKD 3B in setting of acute decompensated liver cirrhosis with ascites  Cannot rule out hepatorenal versus prerenal  No response with IV albumin, IV normal saline, midodrine, octreotide  Serum creatinine rising yet again  Bicarbonate remains low  Patient is in 10 L positive fluid balance  Discussed with patient and brother in detail re dialysis  Indications risks benefits all discussed  Family agrees to proceed  Will request IR to place temporary dialysis catheter  Hemodialysis orders placed with dialysis staff and discussed      2. Low bicarbonate/met acidosis. On IV bicarbonate drip  Will correct with hemodialysis    3. Acute decompensated liver cirrhosis with ascites. Status post paracentesis with worsening jaundice. Check total bilirubin level. Consider GI evaluation  Patient on IV albumin, midodrine, octreotide    4. History of alcoholic cirrhosis  5. Chronic hypotension: Continue with midodrine  6. History of A. Fib  7. Chronic thrombocytopenia  8. Hyperammonemia: On lactulose and rifaximin  9. Constipation  10. Anemia in CKD.   Continue with Retacrit    Discussed with patient and brother  Discussed with FERN Crenshaw MD  [facility]

## 2021-04-28 NOTE — H&P
Formulation and discussion of sedation / procedure plans, risks, benefits, side effects and alternatives with patient and/or responsible adult completed.     Electronically signed by Baldomero Jiménez MD on 4/28/2021 at 2:32 PM

## 2021-04-28 NOTE — PLAN OF CARE
Problem: Falls - Risk of:  Goal: Will remain free from falls  Description: Will remain free from falls  Outcome: Ongoing  Note: Patient uses call light when he needs assistance ambulating. Patient's bed in low position and call light within reach. Bed alarm on. Goal: Absence of physical injury  Description: Absence of physical injury  Outcome: Ongoing     Problem: Skin Integrity:  Goal: Will show no infection signs and symptoms  Description: Will show no infection signs and symptoms  Outcome: Ongoing  Goal: Absence of new skin breakdown  Description: Absence of new skin breakdown  Outcome: Ongoing     Problem: OXYGENATION/RESPIRATORY FUNCTION  Goal: Patient will maintain patent airway  Outcome: Ongoing  Goal: Patient will achieve/maintain normal respiratory rate/effort  Description: Respiratory rate and effort will be within normal limits for the patient  Outcome: Ongoing     Problem: Pain:  Goal: Patient's pain/discomfort is manageable  Description: Patient's pain/discomfort is manageable  Outcome: Ongoing  Note: Ongoing assessment & interventions provided throughout shift. Reminded patient to report any pain, pressure, or shortness of breath to the nurse. Pain medications provided per physician's orders.        Problem: DISCHARGE BARRIERS  Goal: Patient's continuum of care needs are met  4/28/2021 0416 by Everardo Osorio RN  Outcome: Ongoing  4/27/2021 1505 by LIANE Lind  Outcome: Ongoing

## 2021-04-28 NOTE — PROGRESS NOTES
1400 Pt in specials radiology for temporary dialysis catheter insertion. Explained procedure to pt and pt verbalizes understanding. Consent signed. 1418 Moved to table and attached to monitor. 1420 Right neck prepped and draped. [address] to speak to pt.  1428 14 fr x 20 cm Hemo-cath inserted in right IJ/neck per Dr Shazia Hurtado. Pt tolerated well. 1430 Catheter sutured to right neck. 5 Bio-patch placed at catheter site with op-site dressing. Site without redness, swelling or hematoma. 1436 Pt positioned on bed for comfort. 1438 Report called to IP dialysis. 1446 Transferred to IP dialysis per bed.

## 2021-04-28 NOTE — PROGRESS NOTES
Hospitalist Progress Note    [facility]      Unit/Bed:-31/031-A    YOB: 1965    MRN: [id number]       Acct: [de-identified]     PCP: Josue Vaca MD    Date of Admission: 4/23/2021    Assessment/Plan:    KARAN/CKD 3B / Hyperphosphatemia / AG Metabolic Acidosis (low bicarb)   - nephrology following  - Worsening creatinine.   - planning on dialysis cath today with subsequent dilaysis. - suspect many of his electrolyte abnmls are due to the need for dialysis but pt refused in the past and the fact that he has liver failure and is in need of a transplant.   - on bicarb drip, octreotide drip by nephro    Acute Decompensated Liver Cirrhosis with ascities   - known liver cirrhosis due to alcohol hx  - bili elevated, liver enzymes mildly elevated  - evaluated by GI in the past.   - s/p paracentesis 2 days ago. May need another one depending on clinical progression  - on liver transplant list  - consider GI consult if there is acute worsening. Unfortunately pt is chronically ill, jaundiced. - continue lactulose and xifaxan. Chronic Hypotension   - midodrine    History of Atrial Fibrillation  - on amiodarone    Chronic Thrombocytopenia  - low platelets    Hyperammonemia  - on lactulose, rifaximin    Anemia  - epo    Neuropathy  - on gabapentin    Expected discharge date:  2-3 days    Disposition:    [x] Home       [] TCU       [x] Rehab       [] Psych       [x] SNF       [] [place]       [] Other-    Chief Complaint: abnml labs    Hospital Course: see hpi    Subjective (past 24 hours):   Pt in bed. No acute events overnight. Planned for dialysis cath. Otherwise stable. chornically ill appearing. Will transfer out of  as there is a critical need for beds.        Medications:  Reviewed    Infusion Medications    sodium bicarbonate infusion 40 mL/hr at 04/27/21 1023    dextrose      sodium chloride       Scheduled Medications    albumin human  25 g Intravenous Once in dialysis    sodium bicarbonate  1,300 mg Oral TID    lactulose  45 g Oral BID    bisacodyl  10 mg Rectal Daily    epoetin sharonda-epbx  3,000 Units Subcutaneous Once per day on Mon Wed Fri    octreotide  100 mcg Subcutaneous Q8H    amiodarone  200 mg Oral Daily    famotidine  20 mg Oral Daily    gabapentin  200 mg Oral BID    midodrine  15 mg Oral TID    rifaximin  550 mg Oral BID    tiotropium  2 puff Inhalation Daily    insulin lispro  0-12 Units Subcutaneous TID WC    insulin lispro  0-6 Units Subcutaneous Nightly    sodium chloride flush  5-40 mL Intravenous 2 times per day     PRN Meds: sodium chloride, calcium carbonate, albuterol, glucose, dextrose, glucagon (rDNA), dextrose, sodium chloride flush, sodium chloride, promethazine **OR** ondansetron, acetaminophen **OR** acetaminophen      Intake/Output Summary (Last 24 hours) at 4/28/2021 1102  Last data filed at 4/28/2021 0933  Gross per 24 hour   Intake 1531.32 ml   Output 0 ml   Net 1531.32 ml       Diet:  DIET LOW SODIUM 2 GM;  Dietary Nutrition Supplements: Protein Modular    Exam:  /88   Pulse 80   Temp 97.8 °F (36.6 °C) (Oral)   Resp 18   Ht 6' 2\" (1.88 m)   Wt 264 lb 4.8 oz (119.9 kg)   SpO2 98%   BMI 33.93 kg/m²     General appearance: mild distress, acutely ill appearing  HEENT: Pupils equal, round, and reactive to light. Conjunctiva - yellow  Neck: Supple, with full range of motion. No jugular venous distention. Trachea midline. Respiratory:  Normal respiratory effort. Clear to auscultation  Cardiovascular: Regular rate and rhythm   Abdomen: Soft, non-tender, +ve distension  Musculoskeletal: passive and active ROM x 4 extremities.   Skin: Skin color, texture, turgor normal.    Neurologic:  grossly non-focal, astrexis on exam  Psychiatric: Alert and oriented, thought content appropriate  Capillary Refill: Brisk,< 3 seconds   Peripheral Pulses: +2 palpable, equal bilaterally       Labs:   Recent Labs     04/26/21  0267 04/27/21  0433 04/28/21  0449   WBC 5.7 5.0 6.3   HGB 8.8* 8.5* 8.6*   HCT 25.8* 26.8* 26.5*   PLT 71* 62* 75*     Recent Labs     04/26/21  1128 04/27/21  0435 04/28/21  0449   * 125* 129*   K 4.0 4.8 4.5   CL 95* 93* 95*   CO2 17* 14* 15*   BUN 57* 62* 64*   CREATININE 6.1* 5.7* 6.1*   CALCIUM 8.5 8.9 9.1   PHOS  --   --  6.5*     No results for input(s): AST, ALT, BILIDIR, BILITOT, ALKPHOS in the last 72 hours. No results for input(s): INR in the last 72 hours. No results for input(s): Maurice Killings in the last 72 hours. No results for input(s): PROCAL in the last 72 hours. Microbiology:      Urinalysis:      Lab Results   Component Value Date    NITRU NEGATIVE 03/30/2021    WBCUA NONE SEEN 03/16/2021    BACTERIA NONE SEEN 03/16/2021    RBCUA 0-2 03/16/2021    BLOODU NEGATIVE 03/30/2021    SPECGRAV 1.015 03/30/2021       Radiology:  US RENAL COMPLETE   Final Result   1. Normal bilateral renal ultrasound. 2. Unremarkable urinary bladder. Final report electronically signed by Dr. Saleem Delcid on 4/26/2021 3:26 PM      [address]   Final Result      Successful ultrasound-guided paracentesis. 3 L of fluid drained. **This report has been created using voice recognition software. It may contain minor errors which are inherent in voice recognition technology. **            Final report electronically signed by Dr Thaddeus Coleman on 4/26/2021 3:22 PM      XR CHEST 1 VIEW   Final Result   1. Patchy airspace opacities are present at the right lung base. This may represent atelectasis or pneumonia. 2. There is a small residual right-sided pleural effusion which appears improved from the prior examination following right-sided thoracentesis from earlier same day. No pneumothorax is seen. **This report has been created using voice recognition software. It may contain minor errors which are inherent in voice recognition technology. **      Final report electronically signed by Dr. Serjio Can on 4/25/2021 3:19 PM      US THORACENTESIS Which side should the procedure be performed? Right   Final Result   Status post thoracentesis            **This report has been created using voice recognition software. It may contain minor errors which are inherent in voice recognition technology. **      Final report electronically signed by Dr. Serjio Can on 4/25/2021 3:46 PM      CT HEAD WO CONTRAST   Final Result   1. No acute intracranial pathology   2. Air-fluid level right maxillary sinus (sinusitis or blood is uncertain. 3. Dental caries. **This report has been created using voice recognition software. It may contain minor errors which are inherent in voice recognition technology. **      Final report electronically signed by Dr. Brooke Leon on 4/23/2021 11:51 AM      XR CHEST PORTABLE   Final Result   Stable chest large right pleural effusion and possible underlying infiltrates            **This report has been created using voice recognition software. It may contain minor errors which are inherent in voice recognition technology. **      Final report electronically signed by Dr. Brooke Leon on 4/23/2021 11:12 AM      IR FLUORO GUIDED CVA DEVICE PLMT/REPLACE/REMOVAL    (Results Pending)       DVT prophylaxis: scd  Code Status: Full Code    Tele:   [x] yes             [] no    Active Hospital Problems    Diagnosis Date Noted    KARAN (acute kidney injury) ([facility] [address] [N17.9] 04/23/2021       Electronically signed by Sharri Jones MD on 4/28/2021 at 11:02 AM

## 2021-04-29 PROBLEM — K72.10 CHRONIC LIVER FAILURE WITHOUT HEPATIC COMA (HCC): Status: ACTIVE | Noted: 2020-01-01

## 2021-04-29 PROBLEM — N18.9 ACUTE KIDNEY INJURY SUPERIMPOSED ON CKD (HCC): Status: ACTIVE | Noted: 2020-01-01
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
## 2021-04-29 NOTE — CARE COORDINATION
4/29/21, 2:55 PM EDT    DISCHARGE ON GOING [employer] 12 day: 6  Location: -22/022-A Reason for admit: KARAN (acute kidney injury) [facility]) [N17.9]   Procedure: 4/25 US guided Thoracentesis - 1.1L drained.   4/26 US guided Paracentesis - 3L removed.   4/28 Temporary dialysis cath placement planned. Barriers to Discharge: Na+ 129, creat 5.3, Hgb 8.2, plt 42. HD yesterday and today. GI consult pending. PCP: Dougie Miller MD  Readmission Risk Score: 44%  Patient Goals/Plan/Treatment Preferences: From home alone. Has [facility]. Monitor for possible OP HD needs.

## 2021-04-29 NOTE — CONSULTS
Department of Otolaryngology  Consult Note    Reason for Consult:  Nosebleed  Requesting Physician:  FELIX Carreno     CHIEF COMPLAINT:  Nosebleed    History Obtained From:  patient, electronic medical record    HISTORY OF PRESENT ILLNESS:                The patient is a 55 y.o. male who was admitted to [address] [place] [address] 4/23/2021 for increase of creatinine to 5.5 (from 2.2 14 days prior). Patient with KARAN on CKD (stage IV) requiring hemodialysis and chronic alcoholic liver cirrhosis with ascites requiring paracentesis. ENT service consulted today for nosebleed. Patient was seen 2 weeks ago in ENT office at [facility] for nosebleeds as well; had areas along the left inferior turbinate that were addressed. Patient reports that he started having recurrence of bleeding shortly after office visit. Bleeds from the left side occur frequently, sometimes daily. He feels a crust that builds up just inside the left nostril that he removes. Today's nosebleed is no different. He is using tissues to put up his nose and apply pressure. Patient has chronic severe thrombocytopenia with downtrend; today's platelet count 38,888.     Past Medical History:        Diagnosis Date    Advanced cirrhosis of liver (HCC)     Alcoholic cirrhosis (HCC)     Atrial fibrillation (HCC)     CHF (congestive heart failure) (HCC)     Chronic kidney disease     COPD (chronic obstructive pulmonary disease) (HCC)     Diabetes mellitus (HCC)     Gallstones     GERD (gastroesophageal reflux disease)     Headache     Hydrothorax     Portal hypertension (HCC)        Past Surgical History:        Procedure Laterality Date    APPENDECTOMY      HERNIA REPAIR      LUNG SURGERY Right 11-24-20, 11-30-20    [facility]       Current Medications:   Current Facility-Administered Medications: lactulose (CHRONULAC) 10 GM/15ML solution 45 g, 45 g, Oral, TID  albumin human 25 % IV solution 25 g, 25 g, Intravenous, Once  sodium smoking history. He has never used smokeless tobacco.  ETOH:   reports previous alcohol use. DRUGS:   reports no history of drug use. Family History:       Problem Relation Age of Onset    Heart Disease Mother     High Blood Pressure Mother     Diabetes Mother     Cancer Mother     Cancer Father        REVIEW OF SYSTEMS:    A complete multi-organ review of systems was performed using a new patient questionnaire, and reviewed by me. ENT:  negative except as noted in HPI  CONSTITUTIONAL:  negative except as noted in HPI  EYES:  negative except as noted in HPI  RESPIRATORY:  negative except as noted in HPI  CARDIOVASCULAR:  negative except as noted in HPI  GASTROINTESTINAL:  negative except as noted in HPI  GENITOURINARY:  negative except as noted in HPI  MUSCULOSKELETAL:  negative except as noted in HPI  SKIN:  negative except as noted in HPI  ENDOCRINE/METABOLIC: negative except as noted in HPI  HEMATOLOGIC/LYMPHATIC:  negative except as noted in HPI  ALLERGY/IMMUN: negative except as noted in HPI  NEUROLOGICAL:  negative except as noted in HPI  BEHAVIOR/PSYCH:  negative except as noted in HPI    PHYSICAL EXAM:    VITALS:  BP 95/67   Pulse 110   Temp 97.7 °F (36.5 °C) (Oral)   Resp 18   Ht 6' 2\" (1.88 m)   Wt 255 lb 15.3 oz (116.1 kg)   SpO2 97%   BMI 32.86 kg/m²   Very pleasant, alert and cooperative. Very jaundice. Leftward deviated septum. Right anterior septum with small scab; no sign of recent bleeding. Left side with active bleeding from just inside the lateral ala. Scab on left inferior turbinate and left anterior septum. Cotton with oxymetazoline and topical lidocaine placed at bleeding point, then removed after appropriate amount of time. Silver nitrate stick used to cauterize bleeding point. Excess silver nitrate removed with moist cotton tip applicator. Left nasal mucosa coated with Delia powder. Nasopore pack trimmed to fit and soaked in oxymetazoline, then placed in left side. Cotton ball with bacitracin placed anterior to this and nostril taped. No further bleeding. Patient tolerated well. DATA:    CBC with Differential:    Lab Results   Component Value Date    WBC 6.6 04/29/2021    RBC 2.52 04/29/2021    HGB 8.2 04/29/2021    HCT 24.5 04/29/2021    PLT 42 04/29/2021    MCV 97.2 04/29/2021    MCH 32.5 04/29/2021    MCHC 33.5 04/29/2021    NRBC 0 04/29/2021    SEGSPCT 71.9 04/29/2021    MONOPCT 12.6 04/29/2021    MONOSABS 0.8 04/29/2021    LYMPHSABS 0.5 04/29/2021    EOSABS 0.4 04/29/2021    BASOSABS 0.1 04/29/2021    DIFFTYPE see below 04/29/2021       IMPRESSION/RECOMMENDATIONS:      Left anterior epistaxis; thrombocytopenia  - Bleeding point left side, just inside the lateral ala inferiorly, cauterized; stephanie powder applied throughout; left Nasopore (dissolvable) pack with vestibular pack placed. No further bleeding. Left nostril taped shut to \"rest\" the nose and keep patient from picking or frequently putting the tissues up the nose. Instructed to leave it alone. No nose blowing.  - Will see patient tomorrow and re-evaluate.    - Patient with severe thrombocytopenia at high risk for life-threatening epistaxis      Electronically signed by FELIX Craig CNP on 4/29/2021 at 7:24 PM

## 2021-04-29 NOTE — PROGRESS NOTES
[facility]   Nephrology progress note  4/29/2021, 3:30 PM      Pt Name:    Nick Parikh  MRN:     [id number]     [place]:    1965  Admit Date:    4/23/2021 10:30 AM  Primary Care Physician:  Aletha Schirmer, MD   Room number  4Y-84/621-C    Chief Complaint: Nephrology following for KARAN/CKD    Subjective:  Patient seen and examined  Discussed in detail  Clinically looking very jaundiced  Reports worsening abdominal distention  Awake, alert  But some subtle disorientation    Objective:  24HR INTAKE/OUTPUT:      Intake/Output Summary (Last 24 hours) at 4/29/2021 1530  Last data filed at 4/29/2021 1210  Gross per 24 hour   Intake 2632.96 ml   Output 2800 ml   Net -167.04 ml     I/O last 3 completed shifts: In: 2633 [P.O.:520; I.V.:1313]  Out: 2800   No intake/output data recorded. Admission weight: 237 lb (107.5 kg)  Wt Readings from Last 3 Encounters:   04/29/21 255 lb 15.3 oz (116.1 kg)   04/13/21 237 lb 4.8 oz (107.6 kg)   04/13/21 240 lb 9.6 oz (109.1 kg)     Body mass index is 32.86 kg/m².     Physical examination  VITALS:     Vitals:    04/29/21 0810 04/29/21 0833 04/29/21 1134 04/29/21 1151   BP: (!) 101/54  (!) 100/49 98/74   Pulse: 96  118 105   Resp: 18  21 20   Temp: 97.6 °F (36.4 °C)  97.5 °F (36.4 °C) 97.7 °F (36.5 °C)   TempSrc:    Oral   SpO2:  97%  98%   Weight: 258 lb 2.5 oz (117.1 kg)  255 lb 15.3 oz (116.1 kg)    Height:         General Appearance: alert and cooperative with exam, appears comfortable, no distress  Visibly jaundiced, icteric sclera  Mouth/Throat: Oral mucosa moist  Neck: No JVD  Lungs: Diminished  Heart:  S1, S2 heard  GI: Distended abdomen    Lab Data  CBC:   Recent Labs     04/27/21  0433 04/28/21  0449 04/29/21  0500   WBC 5.0 6.3 6.6   HGB 8.5* 8.6* 8.2*   HCT 26.8* 26.5* 24.5*   PLT 62* 75* 42*     BMP:  Recent Labs     04/27/21  0435 04/28/21  0449 04/29/21  0500   * 129* 129*   K 4.8 4.5 3.9   CL 93* 95* 90*   CO2 14* 15* 19*   BUN 62* 64* 50*   CREATININE 5.7* 6.1* 5.3*   GLUCOSE 86 129* 116*   CALCIUM 8.9 9.1 8.7   MG  --  2.1  --    PHOS  --  6.5*  --      Hepatic:   Recent Labs     04/29/21  0500   LABALBU 3.4*   AST 30   ALT 18   BILITOT 8.2*   ALKPHOS 99         Meds:  Infusion:    sodium bicarbonate infusion 40 mL/hr at 04/28/21 1552    dextrose      sodium chloride       Meds:    lactulose  45 g Oral TID    albumin human  25 g Intravenous Once    sodium bicarbonate  1,300 mg Oral TID    epoetin sharonda-epbx  3,000 Units Subcutaneous Once per day on Mon Wed Fri    octreotide  100 mcg Subcutaneous Q8H    amiodarone  200 mg Oral Daily    famotidine  20 mg Oral Daily    gabapentin  200 mg Oral BID    midodrine  15 mg Oral TID    rifaximin  550 mg Oral BID    tiotropium  2 puff Inhalation Daily    insulin lispro  0-12 Units Subcutaneous TID WC    insulin lispro  0-6 Units Subcutaneous Nightly    sodium chloride flush  5-40 mL Intravenous 2 times per day     Meds prn: sodium chloride, calcium carbonate, albuterol, glucose, dextrose, glucagon (rDNA), dextrose, sodium chloride flush, sodium chloride, promethazine **OR** ondansetron, acetaminophen **OR** acetaminophen       Impression and Plan:  1. KARAN on CKD 3B in setting of acute decompensated liver cirrhosis with ascites  Cannot rule out hepatorenal versus prerenal  No response with IV albumin, IV normal saline, midodrine, octreotide  Staff reports he is not making any urine  Continue with Hd for now  Had treatment earlier today # 2   If no improvement in next 1-2 days then plan tunneled catheter and outpatient HD placement    2. Epistaxis: ENT evaluation  3. Low bicarbonate/met acidosis. Stop Iv bicarbonate drip, improved with Hd  4. Acute decompensated liver cirrhosis with ascites. Status post paracentesis with worsening jaundice. Jaundice worsening  May need another paracentesis- abdomen is distended  Discussed with hospitalist to consider GI consult    5.   History of alcoholic cirrhosis: acute on chronic decompensation  6. Chronic hypotension: Continue with midodrine  7. History of A. Fib  8. Chronic thrombocytopenia  9. Hyperammonemia: On lactulose and rifaximin  10. Constipation  11. Anemia in CKD.   Continue with Retacrit    Discussed with patient and hospitalist  Discussed with FERN Mckeon MD  [facility]

## 2021-04-29 NOTE — CARE COORDINATION
4/29/21, 7:23 AM EDT    DISCHARGE BARRIERS        Patient transferred to [facility]. Report given to unit Gera Hall, regarding discharge plan for this patient.

## 2021-04-29 NOTE — PROGRESS NOTES
Patient transferred from  to [facility], 2 nurse skin assessment completed by Black Perry RN and Jeannette RN, call light within reach.

## 2021-04-29 NOTE — PROGRESS NOTES
PROGRESS NOTE      Patient:  Leroy Solomon      Unit/Bed:6K-22/022-A    YOB: 1965    MRN: [id number]       Acct: [de-identified]     PCP: Dominic Siemens, MD    Date of Admission: 4/23/2021      Assessment/Plan:    KARAN/CKD 3B / Hyperphosphatemia / AG Metabolic Acidosis (low bicarb)   - nephrology following  - Worsening creatinine.   - Day 2 of dialysis, patient not making urine, nephro planning for tunnel cath  - octreotide per nephro     Acute Decompensated Liver Cirrhosis with ascities   - known liver cirrhosis due to alcohol hx  - bili trending up  - s/p paracentesis 3 days ago. - On transplant list   - continue lactulose and xifaxan  - Avoid hepatotoxic meds  - Monitor HFP and INR daily  - GI consulted with plan for paracentesis, albumin afterwards     Chronic Hypotension   - midodrine     History of Atrial Fibrillation  - on amiodarone     Chronic Thrombocytopenia  - Platelets trending down. - Avoid heparin     Hyperammonemia  - on lactulose, rifaximin     Anemia  -  Due to liver failure and CKD  - epo     Neuropathy  - on gabapentin    Recurrent epistaxis  - ENT consulted     Chief Complaint: Abnormal labs    Hospital Course:   Per H&P  \"56 y. o. male who presented to [address] [place] [address] with abnormal lab; patient has an extensive medical history including cirrhosis of the liver, alcoholic cirrhosis, atrial fibrillation, diabetes, portal hypertension; he states it has been 6 months today since he has had any alcohol or tobacco products; he presented to the ED today secondary to his nephrologist office contacting him for a creatinine of 5.5; he had a paracentesis done 04/22/21 that drained 2.1 L; he states yesterday he fell 4 times; he relates to constant lightheadedness and weakness, he is on midodrine for chronic hypotension however he did take his midodrine today in addition to his Lopressor.     Currently he is alert and talkative, he is jaundiced, he denies any pain, he does relate to 03/30/2021    SPECGRAV 1.015 03/30/2021       Radiology:  IR FLUORO GUIDED CVA DEVICE PLMT/REPLACE/REMOVAL   Final Result   Status post successful nontunneled dialysis catheter insertion. **This report has been created using voice recognition software. It may contain minor errors which are inherent in voice recognition technology. **      Final report electronically signed by Dr. Elmer Rivera on 4/28/2021 3:12 PM      US RENAL COMPLETE   Final Result   1. Normal bilateral renal ultrasound. 2. Unremarkable urinary bladder. Final report electronically signed by Dr. Crystal Mason on 4/26/2021 3:26 PM      [address]   Final Result      Successful ultrasound-guided paracentesis. 3 L of fluid drained. **This report has been created using voice recognition software. It may contain minor errors which are inherent in voice recognition technology. **            Final report electronically signed by Dr Sukhjinder Mcclelland on 4/26/2021 3:22 PM      XR CHEST 1 VIEW   Final Result   1. Patchy airspace opacities are present at the right lung base. This may represent atelectasis or pneumonia. 2. There is a small residual right-sided pleural effusion which appears improved from the prior examination following right-sided thoracentesis from earlier same day. No pneumothorax is seen. **This report has been created using voice recognition software. It may contain minor errors which are inherent in voice recognition technology. **      Final report electronically signed by Dr. Kleber Nelson on 4/25/2021 3:19 PM      US THORACENTESIS Which side should the procedure be performed? Right   Final Result   Status post thoracentesis            **This report has been created using voice recognition software. It may contain minor errors which are inherent in voice recognition technology. **      Final report electronically signed by Dr. Kleber Nelson on 4/25/2021 3:46 PM      CT HEAD WO CONTRAST   Final Result   1. No acute intracranial pathology   2. Air-fluid level right maxillary sinus (sinusitis or blood is uncertain. 3. Dental caries. **This report has been created using voice recognition software. It may contain minor errors which are inherent in voice recognition technology. **      Final report electronically signed by Dr. Aurora Arevalo on 4/23/2021 11:51 AM      XR CHEST PORTABLE   Final Result   Stable chest large right pleural effusion and possible underlying infiltrates            **This report has been created using voice recognition software. It may contain minor errors which are inherent in voice recognition technology. **      Final report electronically signed by Dr. Aurora Arevalo on 4/23/2021 11:12 AM      [id number]    (Results Pending)       Diet: DIET LOW SODIUM 2 GM;  Dietary Nutrition Supplements: Protein Modular    DVT prophylaxis: [] Lovenox                                 [x] SCDs                                 [] SQ Heparin                                 [] Encourage ambulation           [] Already on Anticoagulation     Disposition:    [x] Home       [] TCU       [x] Rehab       [] Psych       [] SNF       [x] [place]       [] Other-    Code Status: Full Code    PT/OT Eval Status:  To evaluate      Electronically signed by Payam Tafoya MD on 4/29/2021 at 4:38 PM

## 2021-04-29 NOTE — CONSULTS
Medications:  Prior to Admission medications    Medication Sig Start Date End Date Taking? Authorizing Provider   rifaximin (XIFAXAN) 550 MG tablet Take 1 tablet by mouth 2 times daily 4/23/21  Yes FELIX Villegas CNP   ammonium lactate (AMLACTIN) 12 % cream Apply topically as needed   Yes Historical Provider, MD   gabapentin (NEURONTIN) 100 MG capsule Take 2 capsules by mouth 2 times daily for 30 days. 4/7/21 5/7/21 Yes Yann Palomo MD   famotidine (PEPCID) 20 MG tablet Take 1 tablet by mouth daily 4/8/21 5/8/21 Yes Yann Palomo MD   midodrine (PROAMATINE) 5 MG tablet Take 3 tablets by mouth 3 times daily 4/7/21 5/7/21 Yes Yann Palomo MD   amiodarone (CORDARONE) 200 MG tablet Take 1 tablet by mouth daily 4/7/21 5/7/21 Yes Yann Palomo MD   lactulose (CHRONULAC) 10 GM/15ML solution 45 ml by mouth TID 3/18/21  Yes Historical Provider, MD   bumetanide (BUMEX) 1 MG tablet Take 1 tablet by mouth 2 times daily 3/18/21  Yes FELIX Cespedes CNP   albuterol sulfate HFA (VENTOLIN HFA) 108 (90 Base) MCG/ACT inhaler Inhale 2 puffs into the lungs every 6 hours as needed for Wheezing 3/5/21  Yes Albania Harris MD   tiotropium [facility]) 18 MCG inhalation capsule Inhale 1 capsule into the lungs daily 3/5/21  Yes Albania Harris MD   dilTIAZem (CARDIZEM) 30 MG tablet Take 1 tablet by mouth 3 times daily 2/27/21  Yes Edith Andres DO   ondansetron (ZOFRAN) 4 MG tablet Take 4 mg by mouth every 8 hours as needed for Nausea or Vomiting   Yes Historical Provider, MD   metoprolol (LOPRESSOR) 100 MG tablet Take 100 mg by mouth 2 times daily   Yes Historical Provider, MD   ASPIRIN 81 PO Take by mouth daily   Yes Historical Provider, MD   Misc.  Devices [employer]) [facility] Standard lightweight wheelchair 4/23/21   FELIX Villegas CNP   varenicline (CHANTIX) 0.5 MG tablet Take 1 tablet by mouth daily x 1 week then increase to 1 tablet by mouth twice daily 4/21/21   Karie Horton MD Continuous Blood Gluc  (DEXCOM G6 ) MENDY Use to test 4 times daily and as needed DX: E11.9 3/24/21   FELIX Beavers CNP   Continuous Blood Gluc Transmit (DEXCOM G6 TRANSMITTER) MISC Use to test 4 times daily and as needed DX: E11.9 3/24/21   FELIX Beavers CNP   Continuous Blood Gluc Sensor (DEXCOM G6 SENSOR) MISC Use to test 4 times daily and as needed DX: E11.9 3/24/21   FELIX Beavers CNP   Lancets MISC 1 each by Does not apply route daily Use to test blood sugar 4 times daily and as needed DX:E11.9 12/4/20   FELIX Beavesr CNP   blood glucose monitor strips Test 4 times daily and as needed for irregular blood glucose. Dispense sufficient amount for indicated testing frequency plus additional to accommodate PRN testing needs. DX E11.9 12/4/20   FELIX Beavers CNP   Blood Glucose Monitoring Suppl (ONE TOUCH ULTRA 2) w/Device KIT Use to test 4 times daily and as needed DX: E11.9 12/4/20   FELIX Beavers CNP       Surgical History:  Past Surgical History:   Procedure Laterality Date    APPENDECTOMY      HERNIA REPAIR      LUNG SURGERY Right 11-24-20, 11-30-20    [facility]       Family History:  Family History   Problem Relation Age of Onset    Heart Disease Mother     High Blood Pressure Mother     Diabetes Mother     Cancer Mother     Cancer Father        Past GI History:  Alcoholic cirrhosis, esophageal varices, hepatic hydrothorax with recurrent right pleural effusion, colon polyps, GERD, hepatic encephalopathy, EGD, colonoscopy, paracentesis  Dr. Tiburcio Keane patient  Follows at [address] [place] [address]:  Patient has no known allergies. Social History:   TOBACCO:   reports that he quit smoking about 5 months ago. His smoking use included cigarettes. He has a 29.00 pack-year smoking history. He has never used smokeless tobacco.  ETOH:   reports previous alcohol use.     Review Of Systems  GENERAL: +generalized weakness  EYES:  No blurred vision, double vision   CARDIOVASCULAR: No chest pain or palpitations. RESPIRATORY:  No dyspnea or cough. GI:  See HPI  MUSCULOSKELETAL: No new painful or swollen joints or myalgias. :   No dysuria or hematuria. SKIN: +jaundice +rash  NEUROLOGIC:  No headaches or seizures, numbness or tingling of arms, or legs. PSYCH:  No anxiety or depression. ENDOCRINE:  No polyuria or polydipsia. BLOOD:  +anemia     PHYSICAL EXAM:  BP 98/74   Pulse 105   Temp 97.7 °F (36.5 °C) (Oral)   Resp 20   Ht 6' 2\" (1.88 m)   Wt 255 lb 15.3 oz (116.1 kg)   SpO2 98%   BMI 32.86 kg/m²     General appearance: Chronically ill appearing male  HEENT: Normal cephalic, atraumatic without obvious deformity. Pupils equal, round, and reactive to light. Scleral icterus. Neck: Supple, with full range of motion. No jugular venous distention. Trachea midline. Respiratory:  Normal respiratory effort. Clear, diminished to auscultation, bilaterally without Rales/Wheezes/Rhonchi. Cardiovascular: Regular rate and rhythm without murmurs, rubs or gallops. Abdomen: Soft, obese, non-tender, mildly distended with active bowel sounds. Musculoskeletal: No clubbing, cyanosis bilaterally. BLE edema. Skin: Jaundice, warm, dry. Pruritic rash noted to BUE. Psychiatric: Alert and oriented, thought content appropriate, normal insight    Labs:   Recent Labs     04/29/21  0500   WBC 6.6   HGB 8.2*   HCT 24.5*   PLT 42*     Recent Labs     04/28/21  0449 04/29/21  0500   * 129*   K 4.5 3.9   CL 95* 90*   CO2 15* 19*   BUN 64* 50*   CREATININE 6.1* 5.3*   CALCIUM 9.1 8.7   PHOS 6.5*  --      Recent Labs     04/29/21  0500   AST 30   ALT 18   BILIDIR 2.6*   BILITOT 8.2*   ALKPHOS 99     Radiology:   CT head WO contrast 04/23/21      Impression   1. No acute intracranial pathology   2. Air-fluid level right maxillary sinus (sinusitis or blood is uncertain. 3. Dental caries.      US thoracentesis 04/25/21  Aspirated pleural fluid volume: 1.1 liters   Aspirated pleural fluid color: dark emily   Complications: No immediate complications        Ascites is incidentally noted adjacent to the liver.           Impression   Status post thoracentesis     CXR 04/25/21  Impression   1. Patchy airspace opacities are present at the right lung base. This may represent atelectasis or pneumonia.       2. There is a small residual right-sided pleural effusion which appears improved from the prior examination following right-sided thoracentesis from earlier same day. No pneumothorax is seen. US paracentesis 04/26/21      Impression       Successful ultrasound-guided paracentesis. 3 L of fluid drained. US renal complete 04/26/21      Impression   1. Normal bilateral renal ultrasound. 2. Unremarkable urinary bladder. Code Status: Full Code    ASSESSMENT:  1. KARAN on CKD- started on dialysis  2. Acute decompensated alcoholic liver cirrhosis- s/p paracentesis 04/26/21 with no labs done  3. Recurrent right hepatic hydrothorax- s/p thoracentesis 04/25/21  4. Afib  5. Chronic hypotension with orthostatic hypotension- on Midodrine  6. Recurrent falls  7. Hyponatremia  8. Hypoalbuminemia  9. Hyperbilirubinemia  10. Chronic anemia  11. Thrombocytopenia  12.  Coagulopathy    PLAN:     Monitor H & H, transfuse prn   Nursing to monitor stool output & document   US paracentesis limit 5L with labs   Albumin once   PT & OT   Increase Lactulose to 30g TID, home dose   Continue Midodrine   Continue Xifaxan   Continue Pepcid   Monitor HFP & INR daily   Avoid hepatotoxic meds   Avoid narcotics & benzodiazepines as they can precipitate HE   Low sodium diet   Daily weights   Strict I  & O   Electrolyte management per nephrology   Nephrology on board  [facility] for recurrent epistaxis   Case discussed at length with primary Dr. Hinson Trav care per primary team  Will follow       Case reviewed and impression/plan reviewed in collaboration with Dr. Choe Hampton  Electronically signed by FELIX Carballo CNP on 4/29/2021 at 4:05 PM    [facility]  Thank you for the consultation.

## 2021-04-29 NOTE — FLOWSHEET NOTE
04/29/21 0810 04/29/21 1134   Vital Signs   BP (!) 101/54 (!) 100/49   Temp 97.6 °F (36.4 °C) 97.5 °F (36.4 °C)   Pulse 96 118   Resp 18 21   Weight 258 lb 2.5 oz (117.1 kg) 255 lb 15.3 oz (116.1 kg)   Weight Method Bed scale Bed scale   Percent Weight Change -1.5 -0.85   Post-Hemodialysis Assessment   Post-Treatment Procedures  --  Blood returned;Catheter Capped, clamped with Saline x2 ports   Machine Disinfection Process  --  Acid/Vinegar Clean;Heat Disinfect; Exterior Machine Disinfection   Rinseback Volume (ml)  --  400 ml   Total Liters Processed (l/min)  --  51.1 l/min   Dialyzer Clearance  --  Lightly streaked   Duration of Treatment (minutes)  --  150 minutes   Heparin amount administered during treatment (units)  --  0 units   Hemodialysis Intake (ml)  --  400 ml   Hemodialysis Output (ml)  --  1400 ml   NET Removed (ml)  --  1000 ml   Tolerated Treatment  --  Good   stable 3 hour treatment . 1 liter net uf. cath lines flushed with 0.9 ns, clamped and tegos in place. dressing changed. report called to primary nurse.

## 2021-04-30 NOTE — PROGRESS NOTES
[address] [place] [address]  INPATIENT PHYSICAL THERAPY  EVALUATION  [facility] RENAL TELEMETRY 6K - 6K-22/022-A    Time In: 0800 & 0834  Time Out: 0416 & 0916  Timed Code Treatment Minutes: 42 Minutes  Minutes: 21+ 42 = 63 total minutes among two sessions due to pt requesting to finish breakfast           Date: 2021  Patient Name: Sylvia Live,  Gender:  male        MRN: [id number]  : 1965  (55 y.o.)      Referring Practitioner: STANLEY Lyon  Diagnosis: KARAN  Additional Pertinent Hx: Per HPI: \"55 y.o. male who presented to [address] [place] [address] with abnormal lab; patient has an extensive medical history including cirrhosis of the liver, alcoholic cirrhosis, atrial fibrillation, diabetes, portal hypertension; he states it has been 6 months today since he has had any alcohol or tobacco products; he presented to the ED today secondary to his nephrologist office contacting him for a creatinine of 5.5; he had a paracentesis done yesterday that drained 2.1 L; he states yesterday he fell 4 times; he relates to constant lightheadedness and weakness, he is on midodrine for chronic hypotension however he did take his midodrine today in addition to his Lopressor. Currently he is alert and talkative, he is jaundiced, he denies any pain, he does relate to shortness of breath, he does relate to his chronic lightheadedness and dizziness; states his legs are extremely swollen and that it is difficult for him to get his shoes on. In the emergency department, he had a sodium level at 128, potassium 3.1, CO2 of 17, BUN 54, creatinine 5.1, lactic acidosis at 4.0, troponin T of 0.100, he relates to elevated LFTs as well; CBC shows mild anemia and mild thrombocytopenia at 108; his chest x-ray showing a large right pleural effusion; he is being admitted to the hospital service for further care and evaluation.  \"     Restrictions/Precautions:  Restrictions/Precautions: Fall Risk, Contact Precautions  Position Activity with SBA    Balance:  Static Sitting Balance:  Supervision  Dynamic Sitting Balance: Supervision  Static Standing Balance: Stand By Assistance  Dynamic Standing Balance: Stand By Assistance    Bed Mobility:  Supine to Sit: Minimal Assistance, X 1, with head of bed raised, with verbal cues , with increased time for completion  Sit to Supine: Stand By Assistance   Scooting: Stand By Assistance, with verbal cues     Transfers:  Sit to Stand: Stand By Assistance, X 1  Stand to Sit:Stand By Assistance   *bed height raised for knees to be ~100+ deg of flexion for ease of standing  *performed 5 reps encouraging pt to monitor for lightheadedness     Ambulation:  Not Tested due to very low blood pressure and elevated HR. Exercise:  Patient was guided in 1 set(s) 10 reps of exercise to both lower extremities. Straight leg raises and Bridges (15 reps). Exercises were completed for increased independence with functional mobility. Functional Outcome Measures: Completed  AM-PAC Inpatient Mobility Raw Score : 16  AM-PAC Inpatient T-Scale Score : 40.78    ASSESSMENT:  Activity Tolerance:  Patient tolerance of  treatment: fair. Limited by positive orthostatics and high HR. Treatment Initiated: Treatment and education initiated within context of evaluation. Evaluation time included review of current medical information, gathering information related to past medical, social and functional history, completion of standardized testing, formal and informal observation of tasks, assessment of data and development of plan of care and goals. Treatment time included skilled education and facilitation of tasks to increase safety and independence with functional mobility for improved independence and quality of life. Assessment:   Body structures, Functions, Activity limitations: Decreased functional mobility , Decreased strength, Decreased endurance, Decreased posture, Decreased safe awareness  Assessment: Pt is below PLOF by way of transfers and ambulation. He is primarly limited by orthostatic hypotension and generalized weakness and poor endurance. He will benefit from continued physical therapy to return to [facility]. Prognosis: Fair    REQUIRES PT FOLLOW UP: Yes    Discharge Recommendations:  Discharge Recommendations: Continue to assess pending progress, Patient would benefit from continued therapy after discharge(at this time pt is not safe to return home due to limited mobility and safety concerns associated with orthostatic hypotension)    Patient Education:  PT Education: Goals, PT Role, Transfer Training, Plan of Care, General Safety, Precautions, Functional Mobility Training    Equipment Recommendations:  Equipment Needed: Yes  Mobility Devices: Wheelchair  Wheelchair: (custom height)  Other: pt states his home health OT recommended a custom w/c for him. Pt will need to pursue this through outpatient PT. Plan:  Times per week: 5x GM  Times per day: Daily  Current Treatment Recommendations: Strengthening, Functional Mobility Training, Transfer Training, Endurance Training, Patient/Caregiver Education & Training, Safety Education & Training, Gait Training, Home Exercise Program, Equipment Evaluation, Education, & procurement    Goals:  Patient goals : improve ambulation, decrease falls, obtain w/c  Short term goals  Time Frame for Short term goals: by hospital discharge  Short term goal 1: Supine to/from sit with modified independence for ease of transfers at discharge site. Short term goal 2: Sit to/from stand with modified independence for ease of transfers at discharge site. Short term goal 3: PT to assess ambulation. Long term goals  Time Frame for Long term goals : N/A due to short ELOS. Following session, patient left in safe position with all fall risk precautions in place.     Andrea Mario, PT, DPT

## 2021-04-30 NOTE — PROGRESS NOTES
abnormal lab; patient has an extensive medical history including cirrhosis of the liver, alcoholic cirrhosis, atrial fibrillation, diabetes, portal hypertension; he states it has been 6 months today since he has had any alcohol or tobacco products; he presented to the ED today secondary to his nephrologist office contacting him for a creatinine of 5.5; he had a paracentesis done 04/22/21 that drained 2.1 L; he states yesterday he fell 4 times; he relates to constant lightheadedness and weakness, he is on midodrine for chronic hypotension however he did take his midodrine today in addition to his Lopressor.     Currently he is alert and talkative, he is jaundiced, he denies any pain, he does relate to shortness of breath, he does relate to his chronic lightheadedness and dizziness; states his legs are extremely swollen and that it is difficult for him to get his shoes on.     In the emergency department, he had a sodium level at 128, potassium 3.1, CO2 of 17, BUN 54, creatinine 5.1, lactic acidosis at 4.0, troponin T of 0.100, he relates to elevated LFTs as well; CBC shows mild anemia and mild thrombocytopenia at 108; his chest x-ray showing a large right pleural effusion; he is being admitted to the hospital service for further care and evaluation. \"     Subjective:   Patient is seen and evaluated at the bedside this morning. .  Patient is comfortable. Has nasal packing for epistaxis. Tachycardic with soft blood pressures. otherwise vitals within normal limits.      Medications:  Reviewed    Infusion Medications    dextrose      sodium chloride       Scheduled Medications    lactulose  45 g Oral TID    albumin human  25 g Intravenous Once    sodium bicarbonate  1,300 mg Oral TID    epoetin sharonda-epbx  3,000 Units Subcutaneous Once per day on Mon Wed Fri    octreotide  100 mcg Subcutaneous Q8H    amiodarone  200 mg Oral Daily    famotidine  20 mg Oral Daily    gabapentin  200 mg Oral BID    midodrine  15 mg Oral TID    rifaximin  550 mg Oral BID    tiotropium  2 puff Inhalation Daily    insulin lispro  0-12 Units Subcutaneous TID WC    insulin lispro  0-6 Units Subcutaneous Nightly    sodium chloride flush  5-40 mL Intravenous 2 times per day     PRN Meds: sodium chloride, calcium carbonate, albuterol, glucose, dextrose, glucagon (rDNA), dextrose, sodium chloride flush, sodium chloride, promethazine **OR** ondansetron, acetaminophen **OR** acetaminophen      Intake/Output Summary (Last 24 hours) at 4/30/2021 0747  Last data filed at 4/30/2021 9694  Gross per 24 hour   Intake 1781.36 ml   Output 2750 ml   Net -968.64 ml       Diet:  DIET LOW SODIUM 2 GM;  Dietary Nutrition Supplements: Protein Modular    Exam:  BP (!) 106/54   Pulse 99   Temp 98.7 °F (37.1 °C) (Oral)   Resp 18   Ht 6' 2\" (1.88 m)   Wt 266 lb 3.2 oz (120.7 kg)   SpO2 100%   BMI 34.18 kg/m²     General appearance: No apparent distress, appears stated age and cooperative. Jaundiced   HEENT: Pupils equal, round, and reactive to light. Conjunctival icterus  Neck: Supple, with full range of motion. No jugular venous distention. Trachea midline. Respiratory:  Normal respiratory effort. Clear to auscultation, bilaterally without Rales/Wheezes/Rhonchi. Cardiovascular: Regular rate and rhythm with normal S1/S2 without murmurs, rubs or gallops. Abdomen: Distended and tympanic. Soft, non-tender. Umbilical hernia  Musculoskeletal: B/L lower extremity edema. Skin: Jaundiced. Rash and dry flaky skin in the abdomen and lower extremities  Neurologic:  Neurovascularly intact without any focal sensory/motor deficits.  Cranial nerves: II-XII intact, grossly non-focal.  Psychiatric: Alert and oriented, thought content appropriate, normal insight  Capillary Refill: Brisk,< 3 seconds   Peripheral Pulses: +2 palpable, equal bilaterally       Labs:   Recent Labs     04/28/21  0449 04/29/21  0500 04/30/21  0600   WBC 6.3 6.6 6.9   HGB 8.6* 8.2* 8.2*   HCT 26.5* following right-sided thoracentesis from earlier same day. No pneumothorax is seen. **This report has been created using voice recognition software. It may contain minor errors which are inherent in voice recognition technology. **      Final report electronically signed by Dr. Demetrius Tellez on 4/25/2021 3:19 PM      US THORACENTESIS Which side should the procedure be performed? Right   Final Result   Status post thoracentesis            **This report has been created using voice recognition software. It may contain minor errors which are inherent in voice recognition technology. **      Final report electronically signed by Dr. Demetrius Tellez on 4/25/2021 3:46 PM      CT HEAD WO CONTRAST   Final Result   1. No acute intracranial pathology   2. Air-fluid level right maxillary sinus (sinusitis or blood is uncertain. 3. Dental caries. **This report has been created using voice recognition software. It may contain minor errors which are inherent in voice recognition technology. **      Final report electronically signed by Dr. Alirio Lu on 4/23/2021 11:51 AM      XR CHEST PORTABLE   Final Result   Stable chest large right pleural effusion and possible underlying infiltrates            **This report has been created using voice recognition software. It may contain minor errors which are inherent in voice recognition technology. **      Final report electronically signed by Dr. Alirio Lu on 4/23/2021 11:12 AM      [address]    (Results Pending)       Diet: DIET LOW SODIUM 2 GM;  Dietary Nutrition Supplements: Protein Modular    DVT prophylaxis: [] Lovenox                                 [x] SCDs                                 [] SQ Heparin                                 [] Encourage ambulation           [] Already on Anticoagulation     Disposition:    [x] Home, with increased home health       [] TCU       [x] Rehab       [] Psych       [] SNF       [x] [facility] [] Other-    Code Status: Full Code    PT/OT Eval Status:  To evaluate      Electronically signed by Maura Singleton MD on 4/30/2021 at 7:47 AM

## 2021-04-30 NOTE — PROGRESS NOTES
Comprehensive Nutrition Assessment    Type and Reason for Visit:  Reassess    Nutrition Recommendations/Plan:   Consider renal MVI, folbee plus. Continue current diet and proteinex 2 Go daily. Nutrition Assessment:    Pt.stable from a nutritional standpoint AEB reports of improved appetite/intake after paracentesis and consuming proteinex 2 Go. Remains at risk for further nutritional compromise r/t increased nutrient needs to support wound healing and known losses with dialysis, continued ascites with need for paracentesis, admit with KARAN on CKD with dialysis initiation,  and underlying medical condition (hx: liver cirrhosis with ascites and paracentesis, DM, CKD, alcohol abuse, portal HTN, GERD, COPD). Nutrition recommendations/interventions as per above. Malnutrition Assessment:  Malnutrition Status: At risk for malnutrition (Comment)    Context:  Acute Illness     Findings of the 6 clinical characteristics of malnutrition:  Energy Intake:  (variable pending ascites/paracentesis)  Weight Loss:  No significant weight loss(fluid overload/ascites, s/p dialysis initiation and paracentesis)     Body Fat Loss:  No significant body fat loss     Muscle Mass Loss:  No significant muscle mass loss    Fluid Accumulation:  7 - Moderate to Severe(liver cirrhosis with ascites/paracentesis, dialysis initiation) Extremities, Generalized, Ascites   Strength:  Not Performed    Estimated Daily Nutrient Needs:  Energy (kcal):  4441-4324 kcal/day (15-18 kcal/kg); Weight Used for Energy Requirements:  (117 kg on 4/26)     Protein (g):  103-130 g/day (1.2-1.5 g/kg); Weight Used for Protein Requirements:  (IBW 86.4 kg)          Nutrition Related Findings:  Pt seen, known to writer from admit earlier this month. States his appetite fluctuates depending on his ascites, after his paracentesis eats much better. s/p paracentesis today, ~ 2.75 liters removed per pt. Note hemodialysis started this admit.   Most meal intake %. Reports drinking proteinex 2 go.  4/28/21: Phos 6.5. 4/29/21: Ammonia 56.  4/30/21: Bili 6.4, Potassium 3.9, Sodium 129, BUN 36, Creatinine 4.6, Glucose 111. Rx: humalog, lactulose, octreotide, sodium bicarbonate tablet, rifaximin. 2 BM and 150 mls. Wounds:  (4/29/21: coccyx DTI and sacrum stage II)       Current Nutrition Therapies:    DIET LOW SODIUM 2 GM;  Dietary Nutrition Supplements: Protein Modular    Anthropometric Measures:  · Height: 6' 2\" (188 cm)  · Current Body Weight: 266 lb 3.2 oz (120.7 kg)(4/30/21 +2-3 edema, weight prior to paracentesis)   · Admission Body Weight: 243 lb 9.6 oz (110.5 kg)(4/23; +2 BLE & generalized)    · Usual Body Weight: (Per EMR: 235 lb 8 oz on 3/29/21; 235 lb 6.4 ozon 1/26/21; 279 lb 12.8 oz on 12/4/20)     · Ideal Body Weight: 190 lbs;   · BMI: 34.2  · BMI Categories: Obese Class 1 (BMI 30.0-34. 9)       Nutrition Diagnosis:   · Increased nutrient needs related to increase demand for energy/nutrients, renal dysfunction as evidenced by wounds, dialysis      Nutrition Interventions:   Food and/or Nutrient Delivery:  Continue Current Diet, Continue Oral Nutrition Supplement  Nutrition Education/Counseling:  Education completed(Renal, liver cirrhosis diet education provided, handouts given as well 4/30/21)   Coordination of Nutrition Care:  Continue to monitor while inpatient    Goals:  Pt will consume 75% or more of meals during LOS       Nutrition Monitoring and Evaluation:   Behavioral-Environmental Outcomes:  None Identified   Food/Nutrient Intake Outcomes:  Food and Nutrient Intake, Supplement Intake  Physical Signs/Symptoms Outcomes:  Biochemical Data, GI Status, Fluid Status or Edema, Nutrition Focused Physical Findings, Skin, Weight     Discharge Planning:     Too soon to determine     Electronically signed by Fitz Ng RD, LD on 4/30/21 at 1:49 PM EDT    Contact: [phone]

## 2021-04-30 NOTE — PROGRESS NOTES
Vitals:    04/30/21 0300   BP: (!) 106/54   Pulse: 99   Resp: 18   Temp: 98.7 °F (37.1 °C)   SpO2: 100%     Lab Results   Component Value Date    WBC 6.9 04/30/2021    HGB 8.2 (L) 04/30/2021    HCT 24.7 (L) 04/30/2021    MCV 97.6 (H) 04/30/2021    PLT 38 (L) 04/30/2021     Patient seen yesterday afternoon for left anterior epistaxis, actively bleeding at that time. Intervention with cautery and dissolvable plus vestibular pack. No bleeding overnight. Sitting up on side of bed eating lunch. Bilateral nares dry. Tape and cotton ball from left nostril removed. Plan:  Patient instructed to leave the nose alone - no picking, blowing or placing tissue in the nose. Start Afrin for the next 3 days. Will re-evaluate Monday.     Electronically signed by FELIX Moreno CNP on 4/30/2021 at 7:56 AM

## 2021-04-30 NOTE — PLAN OF CARE
Problem: Falls - Risk of:  Goal: Will remain free from falls  Description: Will remain free from falls  4/30/2021 1023 by Lei Mcintosh RN  Outcome: Ongoing  Note: No falls this shift. Patient educated on not getting up without help. Falling star protocol in place. Call light in reach. Bed in lowest position. Side rails up x2. Nonskid footwear on when patient out of bed. Bed alarm set. Patient visually checked on hourly rounds. Problem: Falls - Risk of:  Goal: Absence of physical injury  Description: Absence of physical injury  4/30/2021 1023 by Lei Mcintosh RN  Outcome: Ongoing     Problem: Skin Integrity:  Goal: Will show no infection signs and symptoms  Description: Will show no infection signs and symptoms  4/30/2021 1023 by Lei Mcintosh RN  Outcome: Ongoing  Note: No new skin breakdown noted this shift. Pt being repositioned q2h and PRN with pillow support. Pt educated on not scratching skin to decrease abrasions. Problem: Skin Integrity:  Goal: Absence of new skin breakdown  Description: Absence of new skin breakdown  4/30/2021 1023 by Lei Mcintosh RN  Outcome: Ongoing     Problem: OXYGENATION/RESPIRATORY FUNCTION  Goal: Patient will maintain patent airway  4/30/2021 1023 by Lei Mcintosh RN  Outcome: Ongoing  Note: Pt able to maintain patent airway independently. Problem: OXYGENATION/RESPIRATORY FUNCTION  Goal: Patient will achieve/maintain normal respiratory rate/effort  Description: Respiratory rate and effort will be within normal limits for the patient  4/30/2021 1023 by Lei Mcintosh RN  Outcome: Ongoing  Note: Monitoring RR q4h and PRN. Problem: Pain:  Goal: Patient's pain/discomfort is manageable  Description: Patient's pain/discomfort is manageable  4/30/2021 1023 by Lei Mcintosh RN  Outcome: Ongoing  Note: Pt denies pain so far this shift. Will continue to monitor.       Problem: Nutrition  Goal: Optimal nutrition therapy  4/30/2021 1023 by Rubio Shipman, RN  Outcome: Ongoing  Note: Pt tolerating LOW SODIUM 2 GM diet. Problem: DISCHARGE BARRIERS  Goal: Patient's continuum of care needs are met  4/30/2021 1023 by Rubio Shipman RN  Outcome: Ongoing  Note: Discharge planning continues. Pt from home alone with HH. Problem: Tissue Perfusion - Renal, Altered:  Goal: Serum creatinine will be within specified parameters  Description: Serum creatinine will be within specified parameters  Outcome: Ongoing  Note: Results for Karishma Mitchell (MRN [id number]) as of 4/30/2021 10:23   Ref. Range 4/30/2021 06:00   Creatinine Latest Ref Range: 0.4 - 1.2 mg/dL 4.6 [facility] TERM ACUTE [facility])    Nephrology following. Awaiting plans for hemodialysis. Care plan reviewed with patient. Patient verbalizes understanding of the plan of care and contribute to goal setting.

## 2021-04-30 NOTE — PROCEDURES
A Bladder scan was performed at 0311 . The patient's last void was at unable to void . The residual amount was measured to be >999 ML. Report of results was given to [email or web address].

## 2021-04-30 NOTE — PROGRESS NOTES
Patient called out stating he would like to speak to someone about his stay. This RN into the room. Pt verbalizes how he was on 3B in a room without windows and he is upset that the facility allows patient's to stay in that room. That shouldn't be allowed. Goes on to state that his legs are looking worse than prior in his opinion and he is not getting answers to why. States \"One doctor says it's this, then one says it's the liver, then another something else. \" States he would like to speak to someone about his overall stay. This RN called [employer], [employer] and updated him on the situation.

## 2021-04-30 NOTE — CONSULTS
[address]  [address] [facility] [address]  Dept: [phone]  Loc: [phone]  Visit Date: 4/23/2021    Urology Consult Note    Reason for Consult:  Urinary retention  Requesting Physician:  Sai Ventura    History Obtained From:  Patient, EMR, nurse    Chief Complaint: abnormal labs    HISTORY OF PRESENT ILLNESS:                The patient is a 55 y.o. male with significant past medical history of see below who presented to ER with abnormal labs, hx of cirrhosis of the liver, acholic cirrhosis, afib, diabetes, portal hypertension, he was sent over from nephrology for CRT of 5.5, paracentesis done 4/22/21 that drained 2/1L, complained of lightheadedness and wkness, is on medication for chronic hypotension. He required HD X2 and was reported with no urine output. It was reported he was not voiding, bladder scan was obtained and he was found to have >999ml. Straight cath was ordered and 1200ml was drained. Juan Mario was consulted for urinary retention. He reports intermittent problems voiding, decreased output, nocturia and hesitancy. He did not feel his bladder distended    Past Medical History:        Diagnosis Date    Advanced cirrhosis of liver (HCC)     Alcoholic cirrhosis (HCC)     Atrial fibrillation (HCC)     CHF (congestive heart failure) (HCC)     Chronic kidney disease     COPD (chronic obstructive pulmonary disease) ([address]     Diabetes mellitus ([address]     Gallstones     GERD (gastroesophageal reflux disease)     Headache     Hydrothorax     Portal hypertension ([address]      Past Surgical History:        Procedure Laterality Date    APPENDECTOMY      HERNIA REPAIR      LUNG SURGERY Right 11-24-20, 11-30-20    [facility]     Allergies:  Patient has no known allergies.   Social History:  Social History     Socioeconomic History    Marital status: Single     Spouse name: Not on file    Number of children: Not on file    Years of education: Not on file    Highest education level: Not on file   Occupational History    Not on file   Social Needs    Financial resource strain: Not on file    Food insecurity     Worry: Not on file     Inability: Not on file    Transportation needs     Medical: Not on file     Non-medical: Not on file   Tobacco Use    Smoking status: Former Smoker     Packs/day: 1.00     Years: 29.00     Pack years: 29.00     Types: Cigarettes     Quit date: 2020     Years since quittin.4    Smokeless tobacco: Never Used   Substance and Sexual Activity    Alcohol use: Not Currently     Frequency: 4 or more times a week     Comment: No alcohol since 21    Drug use: Never    Sexual activity: Not on file   Lifestyle    Physical activity     Days per week: Not on file     Minutes per session: Not on file    Stress: Not on file   Relationships    Social connections     Talks on phone: Not on file     Gets together: Not on file     Attends Latter-day service: Not on file     Active member of club or organization: Not on file     Attends meetings of clubs or organizations: Not on file     Relationship status: Not on file    Intimate partner violence     Fear of current or ex partner: Not on file     Emotionally abused: Not on file     Physically abused: Not on file     Forced sexual activity: Not on file   Other Topics Concern    Not on file   Social History Narrative    Not on file     Family History:       Problem Relation Age of Onset    Heart Disease Mother     High Blood Pressure Mother     Diabetes Mother     Cancer Mother     Cancer Father        ROS:  Constitutional: Negative for chills, fatigue, fever, or weight loss. Eyes: Denies reported visual changes. ENT: Denies headache, difficulty swallowing, earache, and nosebleeds. Cardiovascular: Negative for chest pain, palpitations, tachycardia or edema. Respiratory: Denies cough or SOB.   GI:The patient denies abdominal or flank pain, anorexia, nausea or vomiting. : see HPI. Musculoskeletal: Patient denies low back pain or painful or reduced range of ROM. Neurological: The patient denies any symptoms of neurological impairment or TIA. Psychiatric: Denies anxiety or depression. Skin: Denies rash or lesions. All remaining ROS negative. PHYSICAL EXAM:  VITALS:  /86   Pulse 120   Temp 97.3 °F (36.3 °C) (Oral)   Resp 18   Ht 6' 2\" (1.88 m)   Wt 266 lb 3.2 oz (120.7 kg)   SpO2 100%   BMI 34.18 kg/m² . Nursing note and vitals reviewed. Constitutional: Alert and oriented times x3, no acute distress, and cooperative to examination with appropriate mood and affect. Ill appearing male. ++jaundiced. Skin with bruising  HEENT:   Head:         Normocephalic and atraumatic. Mouth/Throat:          Mucous membranes are normal.   Eyes:         EOM are normal. ++scleral icterus. Nose:    The external appearance of the nose is normal.  Left nare packed due to previous epistaxis  Ears: The ears appear normal to external inspection. Cardiovascular:       Normal rate, regular rhythm. Pulmonary/Chest:  Normal respiratory rate and rhthym. No use of accessory muscles. Lungs diminshed bilaterally. Abdominal:          Distended, non tender             Musculoskeletal:    Normal range of motion. He exhibits no edema or tenderness of lower extremities. Extremities:    No cyanosis, clubbing, or edema present. Neurological:    Alert and oriented.      DATA:  CBC:   Lab Results   Component Value Date    WBC 6.9 04/30/2021    RBC 2.53 04/30/2021    HGB 8.2 04/30/2021    HCT 24.7 04/30/2021    MCV 97.6 04/30/2021    MCH 32.4 04/30/2021    MCHC 33.2 04/30/2021    PLT 38 04/30/2021    MPV 11.7 04/30/2021     BMP:    Lab Results   Component Value Date     04/30/2021    K 3.9 04/30/2021    K 3.0 04/24/2021    CL 93 04/30/2021    CO2 20 04/30/2021    BUN 36 04/30/2021    CREATININE 4.6 04/30/2021    CALCIUM 8.7 04/30/2021    LABGLOM 13 04/30/2021 GLUCOSE 111 04/30/2021     BUN/Creatinine:    Lab Results   Component Value Date    BUN 36 04/30/2021    CREATININE 4.6 04/30/2021     Magnesium:    Lab Results   Component Value Date    MG 2.1 04/28/2021     Phosphorus:    Lab Results   Component Value Date    PHOS 6.5 04/28/2021     PT/INR:    Lab Results   Component Value Date    INR 2.31 04/30/2021     U/A:    Lab Results   Component Value Date    COLORU YELLOW 04/30/2021    COLORU DK YELLOW 03/30/2021    PHUR 5.0 03/30/2021    LABCAST NONE SEEN 03/16/2021    LABCAST NONE SEEN 03/16/2021    WBCUA NONE SEEN 03/16/2021    RBCUA 0-2 03/16/2021    YEAST NONE SEEN 03/16/2021    BACTERIA NONE SEEN 03/16/2021    SPECGRAV 1.015 03/30/2021    LEUKOCYTESUR NEGATIVE 03/30/2021    LEUKOCYTESUR NEGATIVE 03/16/2021    UROBILINOGEN 0.2 03/30/2021    BILIRUBINUR NEGATIVE 03/30/2021    BLOODU NEGATIVE 03/30/2021       Imaging: The patient has had a Renal Ultrasound which I have independently reviewed along with its accompanying report. The study demonstrates   Impression   1. Normal bilateral renal ultrasound. 2. Unremarkable urinary bladder.          IMPRESSION:   Urinary retention  KARAN on CKD - CRT 4.6, managed by nephrology  Metabolic Acidosis  Liver cirrhosis with ascites  Chronic hypotension    Plan:   Repeat bladder scan >400, will insert reese catheter  Continue reese catheter for strict I&O's and to maximize decompression of collecting system  Likely some aspect of neurogenic bladder from chronic alcoholism and DM  If BP will support, could start Flomax - recommended to hold at this time  Renal US shows no hydronephrosis, distended bladder    Most likely will be discharged with reese catheter and follow up as outpatient for possible void trial or cystoscopy in 1 week    Thank you for including us in the care of Abhijeet PinaFELIX  04/30/21 1:34 PM  Urology

## 2021-04-30 NOTE — PROGRESS NOTES
04/30/2021    ALT 18 04/30/2021    AST 32 04/30/2021    BILITOT 6.4 04/30/2021    BILIDIR 2.6 04/29/2021    LABALBU 3.2 04/30/2021     Current Meds:  Scheduled Meds:   lactulose  45 g Oral TID    albumin human  25 g Intravenous Once    sodium bicarbonate  1,300 mg Oral TID    epoetin sahronda-epbx  3,000 Units Subcutaneous Once per day on Mon Wed Fri    octreotide  100 mcg Subcutaneous Q8H    amiodarone  200 mg Oral Daily    famotidine  20 mg Oral Daily    gabapentin  200 mg Oral BID    midodrine  15 mg Oral TID    rifaximin  550 mg Oral BID    tiotropium  2 puff Inhalation Daily    insulin lispro  0-12 Units Subcutaneous TID WC    insulin lispro  0-6 Units Subcutaneous Nightly    sodium chloride flush  5-40 mL Intravenous 2 times per day     Continuous Infusions:   dextrose      sodium chloride         Assessment:  56 yo M admitted 04/23/21 for falls secondary to dizziness secondary to hypotension. He is on Midodrine. Noted to have worsening creatinine on OP labs. Started on dialysis 04/28/21. H/O alcoholic cirrhosis with ascites & recurrent right hepatic hydrothorax. He also follows at [facility]. Last thoracentesis 04/25/21 with 1.1 L removed. Last paracentesis 04/26/21 with 3L removed, no labs done. He is not a candidate for TIPS procedure per [facility]. Noted to have recurrent epistaxis which has required cauterization in the past. Last EGD 11/30/2020, last colonoscopy 12/30/2020. ENT consulted for recurrent epistaxis. Left nare cauterized, packed, & taped shut by ENT. 1. KARAN on CKD- started on dialysis  2. Acute decompensated alcoholic liver cirrhosis- s/p paracentesis 04/26/21 with no labs done- MELD 36  3. Recurrent right hepatic hydrothorax- s/p thoracentesis 04/25/21  4. Afib  5. Chronic hypotension with orthostatic hypotension- on Midodrine  6. Recurrent falls  7. Hyponatremia  8. Hypoalbuminemia  9. Hyperbilirubinemia  10. Chronic anemia  11. Thrombocytopenia- worsening  12. Coagulopathy- worsening  13. Pruritic rash to BUE & BLE    Plan:    · Monitor H & H, transfuse prn  · Nursing to monitor stool output & document  · Paracentesis done today, labs pending  · PT & OT on board  · Continue Lactulose to 30g TID, home dose  · Continue Midodrine  · Continue Xifaxan  · Continue Pepcid  · Octreotide per nephrology  · Monitor HFP & INR daily  · Hydrocortisone cream BID to affected areas  · Avoid hepatotoxic meds  · Avoid narcotics & benzodiazepines as they can precipitate HE  · Low sodium diet  · Daily weights  · Strict I  & O  · Electrolyte management per nephrology  · Consult  for increased home health needs  · Nephrology on board  · ENT on board  · Patient remains at high risk for bleeding given worsening thrombocytopenia & coagulopathy  · Supportive care per primary team  Will follow    Case reviewed and impression/plan reviewed in collaboration with Dr. Cheryle Pintos  Electronically signed by FELIX Matthew CNP on 2021 at 9:37 AM    [facility]

## 2021-04-30 NOTE — PROCEDURES
A Bladder scan was performed at 1440 . The residual amount was measured to be 437 ML. Report of results was given to Kb Guardado [facility].

## 2021-04-30 NOTE — PROGRESS NOTES
Called Dr. Bonnie Yeung for orders due to patient not urinating. Patient is to be bladder scanned, but no cath is to be done.

## 2021-04-30 NOTE — PROGRESS NOTES
[facility]   Nephrology progress note  4/30/2021, 1:18 PM      Pt Name:    Kimmie Stone  MRN:     [id number]     [place]:    1965  Admit Date:    4/23/2021 10:30 AM  Primary Care Physician:  Jeramy King MD   Room number  2H-14/299-P    Chief Complaint: Nephrology following for KARAN/CKD    Subjective:  Patient seen and examined  Severely jaundiced  Status post packing by ENT yesterday for epistaxis  Had paracentesis earlier today  Staff reports 1200 mL urine output yesterday with straight catheterization  Reports multiple bruises all over the body  Platelet counts are very low  Seen by GI for acute decompensated cirrhosis      Objective:  24HR INTAKE/OUTPUT:      Intake/Output Summary (Last 24 hours) at 4/30/2021 1318  Last data filed at 4/30/2021 1228  Gross per 24 hour   Intake 648.05 ml   Output 1350 ml   Net -701.95 ml     I/O last 3 completed shifts: In: 1781.4 [P.O.:520; I.V.:861.4]  Out: 2750 [Urine:1200; Stool:150]  I/O this shift:  In: 139 [P.O.:120; I.V.:19]  Out: -   Admission weight: 237 lb (107.5 kg)  Wt Readings from Last 3 Encounters:   04/30/21 266 lb 3.2 oz (120.7 kg)   04/13/21 237 lb 4.8 oz (107.6 kg)   04/13/21 240 lb 9.6 oz (109.1 kg)     Body mass index is 34.18 kg/m².     Physical examination  VITALS:     Vitals:    04/30/21 0300 04/30/21 0747 04/30/21 0923 04/30/21 1228   BP: (!) 106/54  100/82 104/86   Pulse: 99  103 120   Resp: 18  18 18   Temp: 98.7 °F (37.1 °C)  97.8 °F (36.6 °C) 97.3 °F (36.3 °C)   TempSrc: Oral  Oral Oral   SpO2: 100% 94% 94% 100%   Weight: 266 lb 3.2 oz (120.7 kg)      Height:         General Appearance: alert and cooperative with exam, appears comfortable, no distress  Visibly jaundiced, icteric sclera  Mouth/Throat: Oral mucosa moist  Neck: No JVD  Lungs: Diminished  Heart:  S1, S2 heard  GI: Distended abdomen, improved today (s/p paracentesis)  Skin: Multiple bruises    Lab Data  CBC:   Recent Labs     04/28/21  0449 04/29/21  0500 04/30/21  0600   WBC 6.3 6.6 6.9   HGB 8.6* 8.2* 8.2*   HCT 26.5* 24.5* 24.7*   PLT 75* 42* 38*     BMP:  Recent Labs     04/28/21  0449 04/29/21  0500 04/30/21  0600   * 129* 129*   K 4.5 3.9 3.9   CL 95* 90* 93*   CO2 15* 19* 20*   BUN 64* 50* 36*   CREATININE 6.1* 5.3* 4.6*   GLUCOSE 129* 116* 111*   CALCIUM 9.1 8.7 8.7   MG 2.1  --   --    PHOS 6.5*  --   --      Hepatic:   Recent Labs     04/29/21  0500 04/30/21  0600   LABALBU 3.4* 3.2*   AST 30 32   ALT 18 18   BILITOT 8.2* 6.4*   ALKPHOS 99 110         Meds:  Infusion:    dextrose      sodium chloride       Meds:    hydrocortisone   Topical BID    lactulose  45 g Oral TID    sodium bicarbonate  1,300 mg Oral TID    epoetin sharonda-epbx  3,000 Units Subcutaneous Once per day on Mon Wed Fri    octreotide  100 mcg Subcutaneous Q8H    amiodarone  200 mg Oral Daily    famotidine  20 mg Oral Daily    gabapentin  200 mg Oral BID    midodrine  15 mg Oral TID    rifaximin  550 mg Oral BID    tiotropium  2 puff Inhalation Daily    insulin lispro  0-12 Units Subcutaneous TID WC    insulin lispro  0-6 Units Subcutaneous Nightly    sodium chloride flush  5-40 mL Intravenous 2 times per day     Meds prn: sodium chloride, calcium carbonate, albuterol, glucose, dextrose, glucagon (rDNA), dextrose, sodium chloride flush, sodium chloride, promethazine **OR** ondansetron, acetaminophen **OR** acetaminophen       Impression and Plan:  1. KAARN on CKD 3B in setting of acute decompensated liver cirrhosis with ascites  Cannot rule out hepatorenal versus prerenal  No response with IV albumin, IV normal saline, midodrine, octreotide  Patient was started on dialysis  Had # 2 treatment yesterday  We will hold dialysis today in light of 1200 ml urine output overnight  Reevaluate in a.m.   If continues to need dialysis then will plan tunneled dialysis catheter on Monday morning  -Patient has severe thrombocytopenia, platelet count dropping to 36K today,  very high risk of bleeding    2. Epistaxis: s/p packing, at high risk for rebleeding per ENT  3. Metabolic acidosis improved  4. Acute decompensated liver cirrhosis with ascites. Status post paracentesis with worsening jaundice. Jaundice worsening, GI following    5. History of alcoholic cirrhosis: acute on chronic decompensation  6. Chronic hypotension: Continue with midodrine  7. History of A. Fib  8. Chronic thrombocytopenia  9. Hyperammonemia: On lactulose and rifaximin  10. Constipation  11. Anemia in CKD. Continue with Retacrit  12. Mild hyponatremia secondary to cirrhosis. Will monitor  13. Urinary retention: 1200 ml urine output with straight cath last night.   Will consult urology    Discussed with patient and hospitalist resident  Discussed with FERN Ayala MD  [facility]

## 2021-04-30 NOTE — PLAN OF CARE
2204 by Omega Teixeira RN  Note: Patient's airway is being maintained. Goal: Patient will achieve/maintain normal respiratory rate/effort  Description: Respiratory rate and effort will be within normal limits for the patient  4/29/2021 2207 by Omega Teixeira RN  Outcome: Ongoing  Note: Patient is breathing unlabored and regularly. 4/29/2021 2204 by Omega Teixeira RN  Note: Patient is breathing evenly without labor. Problem: Pain:  Goal: Patient's pain/discomfort is manageable  Description: Patient's pain/discomfort is manageable  4/29/2021 2207 by Omega Teixeira RN  Outcome: Ongoing  Note: No complaint of pain voiced at this time. Continue to monitor. PRN medications available if needed. 4/29/2021 2204 by Omega Teixeira RN  Note: Patient is in no pain currently will continue to monitor. Problem: Nutrition  Goal: Optimal nutrition therapy  4/29/2021 2207 by Omega Teixeira RN  Outcome: Ongoing  Note: Patient is eating all of their meals. 4/29/2021 2204 by Omega Teixeira RN  Note: Patient is eating 100 percent of the meals provided. Problem: Impaired respiratory status  Goal: Clear lung sounds  Description: Clear lung sounds  4/29/2021 0837 by Efra Barnes RCP  Outcome: Ongoing     Problem: DISCHARGE BARRIERS  Goal: Patient's continuum of care needs are met  4/29/2021 2207 by Omega Teixeira RN  Outcome: Ongoing  Note: Patient needs are being met, and will be continued to be evaluated before discharge. 4/29/2021 2204 by Omega Teixeira RN  Note: Patient needs are being met and will be evaluated when they leave for discharge.

## 2021-04-30 NOTE — PLAN OF CARE
Problem: Nutrition  Goal: Optimal nutrition therapy  4/30/2021 1348 by Tenzin Knight RD, LD  Outcome: Ongoing   Nutrition Problem #1: Increased nutrient needs  Intervention: Food and/or Nutrient Delivery: Continue Current Diet, Continue Oral Nutrition Supplement  Nutritional Goals: Pt will consume 75% or more of meals during LOS

## 2021-04-30 NOTE — PROGRESS NOTES
Patient had a bladder scan ordered that showed over 999ml in the bladder. Nurse was ordered to do a straight cath by Dr. Alisia Buckner at 3:09. Patient received the cath and output 1200ml of urine that was brown and emily colored and clear.

## 2021-05-01 NOTE — PROGRESS NOTES
Gastroenterology Progress Note:     Patient Name:  Susanna Mccloud   MRN: [id number]  [id number]  YOB: 1965  Admit Date: 4/23/2021 10:30 AM  Primary Care Physician: Robbi Reed MD   [id number]     Patient seen and examined. 24 hours events and chart reviewed. Subjective: Patient resting in bed. Denies abdominal pain, nausea, & vomiting. Hgb 8.6 Platelet count increased a little bit to 42. Objective:  /75   Pulse 113   Temp 97.7 °F (36.5 °C) (Oral)   Resp 18   Ht 6' 2\" (1.88 m)   Wt 253 lb 8.5 oz (115 kg)   SpO2 98%   BMI 32.55 kg/m²     Physical Exam:    General:  Chronically ill appearing male  HEENT: Atraumatic, normocephalic. Moist oral mucous membranes. Scleral icterus. Neck: Supple without adenopathy, JVD, thyromegaly or masses. Trachea midline. CV: Heart RRR, no murmurs, rubs, gallops. Resp: Even, easy without cough or accessory use. Lungs clear to ascultation bilaterally. Abd: Round, soft, nontender. No hepatosplenomegaly or mass present. Active bowel sounds heard. Mild distention noted. Ext:  Without cyanosis, clubbing. BLE edema. Skin: Jaundice, warm, dry. Pruritic rash noted to BUE & BLE. Bruising noted to left knee. Neuro:  Alert, oriented x 3 with no obvious deficits.        Rectal: deferred    Labs:   CBC:   Lab Results   Component Value Date    WBC 7.0 05/01/2021    HGB 8.6 05/01/2021    HCT 25.9 05/01/2021    .0 05/01/2021    PLT 42 05/01/2021     BMP:   Lab Results   Component Value Date     05/01/2021    K 4.1 05/01/2021    K 3.0 04/24/2021    CL 91 05/01/2021    CO2 22 05/01/2021    PHOS 6.5 04/28/2021    BUN 38 05/01/2021    CREATININE 5.1 05/01/2021    CALCIUM 8.9 05/01/2021     PT/INR:   Lab Results   Component Value Date    INR 2.36 05/01/2021     Lipids:   Lab Results   Component Value Date    ALKPHOS 112 05/01/2021    ALT 17 05/01/2021    AST 32 05/01/2021    BILITOT 6.6 05/01/2021    BILIDIR 2.6 04/29/2021    LABALBU 3.4 05/01/2021 hypotension- on Midodrine  6. Recurrent falls  7. Hyponatremia  8. Hypoalbuminemia  9. Hyperbilirubinemia  10. Chronic anemia  11. Thrombocytopenia- worsening  12. Coagulopathy- worsening  13.  Pruritic rash to BUE & BLE     Plan:    · Monitor H & H, transfuse prn  · Nursing to monitor stool output & document  · PT & OT on board  · Continue Lactulose to 30g TID, home dose  · Continue Midodrine  · Continue Xifaxan  · Continue Pepcid  · Octreotide per nephrology  · Monitor HFP & INR daily  · Continue Hydrocortisone cream BID to affected areas  · Avoid hepatotoxic meds  · Avoid narcotics & benzodiazepines as they can precipitate HE  · Low sodium diet  · Daily weights  · Strict I  & O  · Electrolyte management per nephrology  · Consult  for increased home health needs  · Nephrology on board  · ENT on board  · Patient remains at high risk for bleeding given worsening thrombocytopenia & coagulopathy  · Supportive care per primary team  Will follow    Case reviewed and impression/plan reviewed in collaboration with Dr. Isai Moreno  Electronically signed by FELXI Hollingsworth CNP on 5/1/2021 at 10:00 AM    [facility]

## 2021-05-01 NOTE — PLAN OF CARE
Impaired respiratory status  Goal: Clear lung sounds  Description: Clear lung sounds  4/29/2021 0837 by Joshua Jolley RCP  Outcome: Ongoing     Problem: Falls - Risk of:  Goal: Will remain free from falls  Description: Will remain free from falls  4/29/2021 2207 by Maurizio Arvizu RN  Outcome: Ongoing  Note: No falls noted this shift. Continue falling star program. Bed alarm on, bed in low position. Call light and personal belongings in reach. Patient uses call light appropriately. 4/29/2021 2204 by Maurizio Arvizu RN  Note: No falls noted this shift. Continue falling star program. Bed alarm on, bed in low position. Call light and personal belongings in reach. Patient uses call light appropriately. Goal: Absence of physical injury  Description: Absence of physical injury  4/29/2021 2207 by Maurizio Arvizu RN  Outcome: Ongoing  Note: No injury noted this shift. 4/29/2021 2204 by Maurizio Arvizu RN  Note: No injury noted this shift will continue to monitor. Problem: Skin Integrity:  Goal: Will show no infection signs and symptoms  Description: Will show no infection signs and symptoms  4/29/2021 2207 by Maurizio Arvizu RN  Outcome: Ongoing  Note: No signs of infection noted this shift. 4/29/2021 2204 by Maurizio Arvizu RN  Note: No signs of infection noted this shift will continue to monitor. Goal: Absence of new skin breakdown  Description: Absence of new skin breakdown  4/29/2021 2207 by Maurizio Arvizu RN  Outcome: Ongoing  Note: No new skin break down noted at this time. Encouraged patient to reposition self in bed.   4/29/2021 2204 by Maurizio Arvizu RN  Note: No new skin break down noted at this time. Encouraged patient to reposition self in bed. Problem: OXYGENATION/RESPIRATORY FUNCTION  Goal: Patient will maintain patent airway  4/29/2021 2207 by Maurizio Arvizu RN  Outcome: Ongoing  Note: Patient is maintaining a patent airway.   4/29/2021 2204 by

## 2021-05-01 NOTE — PROGRESS NOTES
PROGRESS NOTE      Patient:  Sara Contreras      Unit/Bed:6K-22/022-A    YOB: 1965    MRN: [id number]       Acct: [de-identified]     PCP: Brinda Rao MD    Date of Admission: 4/23/2021      Assessment/Plan:    KARAN/CKD 3B / Hyperphosphatemia / AG Metabolic Acidosis (low bicarb)   - nephrology following  -Day 3 dialysis of dialysis,   - octreotide per nephro  04/30-patient may 1200 cc of urine last night. Creatinine improved to 4.6. Nephrology holding off on hemodialysis for today. We will see how patient does over the weekend. 05/1-patient with only 375 cc urine output through Har[place], and increasing creatinine to 5.1. Received dialysis today. Nephrology to discuss recommendations for permanent versus temporary dialysis outpatient    Acute Decompensated Liver Cirrhosis with ascities   - known liver cirrhosis due to alcohol hx  - bili trending up  - s/p paracentesis 3 days ago. - On transplant list   - continue lactulose and xifaxan  - Avoid hepatotoxic meds  - Monitor HFP and INR daily, worsening platelet count  -GI following  -04/30- s/p paracentesis, fluid negative for acute infection    Urinary retention  -Likely secondary to physiologic causes such as neurogenic bladder  -Patient had bladder ultrasound and straight cath producing 1200 cc of urine overnight. -Repeat bladder scan today showing retention of 400 cc. Urinary catheter placed  -Ultrasound 04/26 showed no evidence of hydronephrosis or obstruction  -Urology following      Chronic Hypotension   - midodrine     History of Atrial Fibrillation  - on amiodarone  -Beta-blocker with holding parameters     Chronic Thrombocytopenia  - Platelets trending down. - Avoid heparin     Hyperammonemia  - on lactulose, rifaximin     Anemia  -  Due to liver failure and CKD  - epo     Neuropathy  - on gabapentin    Recurrent epistaxis  - ENT following, status post nasal packing  -Stable for now.   However patient at risk for life-threatening bleed given low platelet count    Chief Complaint: Abnormal labs    Hospital Course:   Per H&P  \"56 y. o. male who presented to [address] [place] [address] with abnormal lab; patient has an extensive medical history including cirrhosis of the liver, alcoholic cirrhosis, atrial fibrillation, diabetes, portal hypertension; he states it has been 6 months today since he has had any alcohol or tobacco products; he presented to the ED today secondary to his nephrologist office contacting him for a creatinine of 5.5; he had a paracentesis done 04/22/21 that drained 2.1 L; he states yesterday he fell 4 times; he relates to constant lightheadedness and weakness, he is on midodrine for chronic hypotension however he did take his midodrine today in addition to his Lopressor.     Currently he is alert and talkative, he is jaundiced, he denies any pain, he does relate to shortness of breath, he does relate to his chronic lightheadedness and dizziness; states his legs are extremely swollen and that it is difficult for him to get his shoes on.     In the emergency department, he had a sodium level at 128, potassium 3.1, CO2 of 17, BUN 54, creatinine 5.1, lactic acidosis at 4.0, troponin T of 0.100, he relates to elevated LFTs as well; CBC shows mild anemia and mild thrombocytopenia at 108; his chest x-ray showing a large right pleural effusion; he is being admitted to the hospital service for further care and evaluation. \"     Subjective:   Patient is seen and evaluated at the bedside this morning. .  Patient is comfortable. No active nasal bleeding. Tachycardic with soft blood pressures. otherwise vitals within normal limits.      Medications:  Reviewed    Infusion Medications    dextrose      sodium chloride       Scheduled Medications    hydrocortisone   Topical BID    oxymetazoline  2 spray Each Nostril TID    metoprolol tartrate  50 mg Oral BID    lactulose  45 g Oral TID    sodium bicarbonate  1,300 mg Oral TID    epoetin sharonda-epbx  3,000 Units Subcutaneous Once per day on Mon Wed Fri    octreotide  100 mcg Subcutaneous Q8H    amiodarone  200 mg Oral Daily    famotidine  20 mg Oral Daily    gabapentin  200 mg Oral BID    midodrine  15 mg Oral TID    rifaximin  550 mg Oral BID    tiotropium  2 puff Inhalation Daily    insulin lispro  0-12 Units Subcutaneous TID WC    insulin lispro  0-6 Units Subcutaneous Nightly    sodium chloride flush  5-40 mL Intravenous 2 times per day     PRN Meds: sodium chloride, calcium carbonate, albuterol, glucose, dextrose, glucagon (rDNA), dextrose, sodium chloride flush, sodium chloride, promethazine **OR** ondansetron, acetaminophen **OR** acetaminophen      Intake/Output Summary (Last 24 hours) at 5/1/2021 0817  Last data filed at 5/1/2021 0305  Gross per 24 hour   Intake 139 ml   Output 575 ml   Net -436 ml       Diet:  DIET LOW SODIUM 2 GM;  Dietary Nutrition Supplements: Protein Modular    Exam:  /73   Pulse 105   Temp 98.7 °F (37.1 °C) (Oral)   Resp 18   Ht 6' 2\" (1.88 m)   Wt 253 lb 8.5 oz (115 kg)   SpO2 98%   BMI 32.55 kg/m²     General appearance: No apparent distress, appears stated age and cooperative. Jaundiced   HEENT: Pupils equal, round, and reactive to light. Conjunctival icterus  Neck: Supple, with full range of motion. No jugular venous distention. Trachea midline. Respiratory:  Normal respiratory effort. Clear to auscultation, bilaterally without Rales/Wheezes/Rhonchi. Cardiovascular: Regular rate and rhythm with normal S1/S2 without murmurs, rubs or gallops. Abdomen: Distended and tympanic. Soft, non-tender. Umbilical hernia  Musculoskeletal: B/L lower extremity edema. Skin: Jaundiced. Rash and dry flaky skin in the abdomen and lower extremities  Neurologic:  Neurovascularly intact without any focal sensory/motor deficits.  Cranial nerves: II-XII intact, grossly non-focal.  Psychiatric: Alert and oriented, thought content appropriate, normal insight  Capillary Refill: Brisk,< 3 seconds   Peripheral Pulses: +2 palpable, equal bilaterally       Labs:   Recent Labs     04/29/21  0500 04/30/21  0600 05/01/21  0524   WBC 6.6 6.9 7.0   HGB 8.2* 8.2* 8.6*   HCT 24.5* 24.7* 25.9*   PLT 42* 38* 42*     Recent Labs     04/29/21  0500 04/30/21  0600 04/30/21  2348 05/01/21  0524   * 129*  --  128*   K 3.9 3.9 3.6 4.1   CL 90* 93*  --  91*   CO2 19* 20*  --  22*   BUN 50* 36*  --  38*   CREATININE 5.3* 4.6*  --  5.1*   CALCIUM 8.7 8.7  --  8.9     Recent Labs     04/29/21  0500 04/30/21  0600 05/01/21  0524   AST 30 32 32   ALT 18 18 17   BILIDIR 2.6*  --   --    BILITOT 8.2* 6.4* 6.6*   ALKPHOS 99 110 112     Recent Labs     04/30/21  0600 05/01/21  0524   INR 2.31* 2.36*     No results for input(s): CKTOTAL, TROPONINI in the last 72 hours. Urinalysis:      Lab Results   Component Value Date    NITRU NEGATIVE 04/30/2021    WBCUA 5-9 04/30/2021    BACTERIA MANY 04/30/2021    RBCUA 0-2 04/30/2021    BLOODU SMALL 04/30/2021    SPECGRAV 1.016 04/30/2021       Radiology:  US GUIDED PARACENTESIS   Final Result      Successful ultrasound-guided paracentesis. 2.75 L of fluid drained. **This report has been created using voice recognition software. It may contain minor errors which are inherent in voice recognition technology. **            Final report electronically signed by Dr Percy Umana on 4/30/2021 1:15 PM      IR FLUORO GUIDED CVA DEVICE PLMT/REPLACE/REMOVAL   Final Result   Status post successful nontunneled dialysis catheter insertion. **This report has been created using voice recognition software. It may contain minor errors which are inherent in voice recognition technology. **      Final report electronically signed by Dr. Zurdo Fowler on 4/28/2021 3:12 PM      US RENAL COMPLETE   Final Result   1. Normal bilateral renal ultrasound. 2. Unremarkable urinary bladder.       Final report electronically signed by  Chelsea Ferreira on 4/26/2021 3:26 PM      US GUIDED PARACENTESIS   Final Result      Successful ultrasound-guided paracentesis. 3 L of fluid drained. **This report has been created using voice recognition software. It may contain minor errors which are inherent in voice recognition technology. **            Final report electronically signed by Dr Galina Jones on 4/26/2021 3:22 PM      XR CHEST 1 VIEW   Final Result   1. Patchy airspace opacities are present at the right lung base. This may represent atelectasis or pneumonia. 2. There is a small residual right-sided pleural effusion which appears improved from the prior examination following right-sided thoracentesis from earlier same day. No pneumothorax is seen. **This report has been created using voice recognition software. It may contain minor errors which are inherent in voice recognition technology. **      Final report electronically signed by Dr. Raiza Stokes on 4/25/2021 3:19 PM      US THORACENTESIS Which side should the procedure be performed? Right   Final Result   Status post thoracentesis            **This report has been created using voice recognition software. It may contain minor errors which are inherent in voice recognition technology. **      Final report electronically signed by Dr. Raiza Stokes on 4/25/2021 3:46 PM      CT HEAD WO CONTRAST   Final Result   1. No acute intracranial pathology   2. Air-fluid level right maxillary sinus (sinusitis or blood is uncertain. 3. Dental caries. **This report has been created using voice recognition software. It may contain minor errors which are inherent in voice recognition technology. **      Final report electronically signed by Dr. Francisca Reaves on 4/23/2021 11:51 AM      XR CHEST PORTABLE   Final Result   Stable chest large right pleural effusion and possible underlying infiltrates            **This report has been created using voice recognition software. It may contain minor errors which are inherent in voice recognition technology. **      Final report electronically signed by Dr. Ronna Johnston on 4/23/2021 11:12 AM          Diet: DIET LOW SODIUM 2 GM;  Dietary Nutrition Supplements: Protein Modular    DVT prophylaxis: [] Lovenox                                 [x] SCDs                                 [] SQ Heparin                                 [] Encourage ambulation           [] Already on Anticoagulation     Disposition:    [x] Home, with increased home health       [] TCU       [x] Rehab       [] Psych       [] SNF       [x] [facility]       [] Other-    Code Status: Full Code    PT/OT Eval Status:  To evaluate      Electronically signed by Maureen Cuello MD on 5/1/2021 at 8:17 AM

## 2021-05-01 NOTE — PROGRESS NOTES
[address] [facility] [address]  [address] [facility] [address]  Dept: [phone]  Loc: [phone]  Visit Date: 2021  Urology Progress Note    Chief Complaint: Reason for Consult:  Urinary retention  Requesting Physician:  Karla De León     History Obtained From:  Patient, EMR, nurse     Chief Complaint: abnormal labs    Subjective: \"  Patient is resting in bed, voiding yellow urine, +flatus, +BM, ambulating with assistance, tolerating regular diet, denies any nausea or vomiting. There are complaints of controlled pain at this time.         Vitals:  /75   Pulse 115   Temp 98 °F (36.7 °C)   Resp 20   Ht 6' 2\" (1.88 m)   Wt 256 lb 2.8 oz (116.2 kg)   SpO2 94%   BMI 32.89 kg/m²   Temp  Av.1 °F (36.7 °C)  Min: 97.5 °F (36.4 °C)  Max: 98.7 °F (37.1 °C)    Intake/Output Summary (Last 24 hours) at 2021 1307  Last data filed at 2021 0305  Gross per 24 hour   Intake --   Output 575 ml   Net -575 ml       Social History     Socioeconomic History    Marital status: Single     Spouse name: Not on file    Number of children: Not on file    Years of education: Not on file    Highest education level: Not on file   Occupational History    Not on file   Social Needs    Financial resource strain: Not on file    Food insecurity     Worry: Not on file     Inability: Not on file   [employer] needs     Medical: Not on file     Non-medical: Not on file   Tobacco Use    Smoking status: Former Smoker     Packs/day: 1.00     Years: 29.00     Pack years: 29.00     Types: Cigarettes     Quit date: 2020     Years since quittin.4    Smokeless tobacco: Never Used   Substance and Sexual Activity    Alcohol use: Not Currently     Frequency: 4 or more times a week     Comment: No alcohol since 21    Drug use: Never    Sexual activity: Not on file   Lifestyle    Physical activity     Days per week: Not on file     Minutes per session: Not on file  Stress: Not on file   Relationships    Social connections     Talks on phone: Not on file     Gets together: Not on file     Attends Sikhism service: Not on file     Active member of club or organization: Not on file     Attends meetings of clubs or organizations: Not on file     Relationship status: Not on file    Intimate partner violence     Fear of current or ex partner: Not on file     Emotionally abused: Not on file     Physically abused: Not on file     Forced sexual activity: Not on file   Other Topics Concern    Not on file   Social History Narrative    Not on file     Family History   Problem Relation Age of Onset    Heart Disease Mother     High Blood Pressure Mother     Diabetes Mother     Cancer Mother     Cancer Father      No Known Allergies    Objective:    Constitutional: Alert and oriented times x3, no acute distress, and cooperative to examination with appropriate mood and affect. Ill appearing male. ++jaundiced. Skin with bruising  HEENT:   Head:               Normocephalic and atraumatic. Mouth/Throat:                Mucous membranes are normal.   Eyes:               EOM are normal. ++scleral icterus. Nose:               The external appearance of the nose is normal.  Left nare packed due to previous epistaxis  Ears: The ears appear normal to external inspection. Cardiovascular:       Normal rate, regular rhythm. Pulmonary/Chest:  Normal respiratory rate and rhthym. No use of accessory muscles. Lungs diminshed bilaterally. Abdominal:               Distended, non tender  :   Harrell draining yellow urine, tolerating well             Musculoskeletal:               Normal range of motion. He exhibits no edema or tenderness of lower extremities. Extremities:               No cyanosis, clubbing, or edema present.   Neurological:               Alert and oriented.      Labs:  WBC:    Lab Results   Component Value Date    WBC 7.0 05/01/2021 Hemoglobin/Hematocrit:    Lab Results   Component Value Date    HGB 8.6 05/01/2021    HCT 25.9 05/01/2021     BMP:    Lab Results   Component Value Date     05/01/2021    K 4.1 05/01/2021    K 3.0 04/24/2021    CL 91 05/01/2021    CO2 22 05/01/2021    BUN 38 05/01/2021    LABALBU 3.4 05/01/2021    CREATININE 5.1 05/01/2021    CALCIUM 8.9 05/01/2021    LABGLOM 12 05/01/2021       Imaging: The patient has had a Renal Ultrasound which I have independently reviewed along with its accompanying report. The study demonstrates   Impression   1. Normal bilateral renal ultrasound. 2. Unremarkable urinary bladder.            IMPRESSION:   Urinary retention  KARAN on CKD - CRT 4.6, managed by nephrology  Metabolic Acidosis  Liver cirrhosis with ascites  Chronic hypotension          Plan:   Tolerating reese catheter well  Continue reese catheter for strict I&O's and to maximize decompression of collecting system  Likely some aspect of neurogenic bladder from chronic alcoholism and DM  If BP will support, could start Flomax - recommended to hold at this time  Renal US shows no hydronephrosis, distended bladder     Most likely will be discharged with reese catheter and follow up as outpatient for possible void trial or cystoscopy in 1 week       FELIX Blank  05/01/21 1:07 PM  Urology

## 2021-05-01 NOTE — PROGRESS NOTES
Patient was presenting with a consistent heart rate of 125 BPM. The patient was asymptomatic. Per the orders Dr. Sumi Alvarado was called. The provider order mag and potassium labs be draw. After the results came back Dr. Sumi Alvarado ordered replacements for each. Will continue to monitor the situation and carry out the orders.

## 2021-05-01 NOTE — PROGRESS NOTES
[facility]   Nephrology progress note  5/1/2021, 3:29 PM      Pt Name:    Nehemias Ludwig  MRN:     [id number]     [place]:    1965  Admit Date:    4/23/2021 10:30 AM  Primary Care Physician:  Rafael Swift MD   Room number  8R-56/054-M    Chief Complaint: Nephrology following for KARAN/CKD    Subjective:  Patient seen and examined  Late entry--seen earlier today during rounds  Only put out 375 mL of urine output  Has Harrell catheter in place now  Appears very jaundiced      Objective:  24HR INTAKE/OUTPUT:      Intake/Output Summary (Last 24 hours) at 5/1/2021 1529  Last data filed at 5/1/2021 1515  Gross per 24 hour   Intake 600 ml   Output 607 ml   Net -7 ml     I/O last 3 completed shifts:  In: -   Out: 575 [Urine:375; Stool:200]  I/O this shift:  In: 600   Out: 32   Admission weight: 237 lb (107.5 kg)  Wt Readings from Last 3 Encounters:   05/01/21 256 lb 2.8 oz (116.2 kg)   04/13/21 237 lb 4.8 oz (107.6 kg)   04/13/21 240 lb 9.6 oz (109.1 kg)     Body mass index is 32.89 kg/m².     Physical examination  VITALS:     Vitals:    05/01/21 0734 05/01/21 0830 05/01/21 1149 05/01/21 1515   BP:  107/75 105/75 (!) 92/58   Pulse:  113 115 112   Resp:  18 20 18   Temp:  97.7 °F (36.5 °C) 98 °F (36.7 °C) 97.8 °F (36.6 °C)   TempSrc:  Oral     SpO2: 98% 98% 94%    Weight:   256 lb 2.8 oz (116.2 kg) 256 lb 2.8 oz (116.2 kg)   Height:         General Appearance: alert and cooperative with exam, appears comfortable, no distress  Visibly jaundiced, icteric sclera  Mouth/Throat: Oral mucosa moist  Neck: No JVD  Lungs: Diminished  Heart:  S1, S2 heard  GI: Distended abdomen, improved today (s/p paracentesis)  Lab Data  CBC:   Recent Labs     04/29/21  0500 04/30/21  0600 05/01/21  0524   WBC 6.6 6.9 7.0   HGB 8.2* 8.2* 8.6*   HCT 24.5* 24.7* 25.9*   PLT 42* 38* 42*     BMP:  Recent Labs     04/29/21  0500 04/30/21  0600 04/30/21  2348 05/01/21  0524   * 129*  --  128*   K 3.9 3.9 3.6 4.1   CL 90* 93*  --  91*   CO2 19* 20*  --  22*   BUN 50* 36*  --  38*   CREATININE 5.3* 4.6*  --  5.1*   GLUCOSE 116* 111*  --  148*   CALCIUM 8.7 8.7  --  8.9   MG  --   --  1.9  --      Hepatic:   Recent Labs     04/29/21  0500 04/30/21  0600 05/01/21  0524   LABALBU 3.4* 3.2* 3.4*   AST 30 32 32   ALT 18 18 17   BILITOT 8.2* 6.4* 6.6*   ALKPHOS 99 110 112         Meds:  Infusion:    dextrose      sodium chloride       Meds:    hydrocortisone   Topical BID    oxymetazoline  2 spray Each Nostril TID    metoprolol tartrate  50 mg Oral BID    lactulose  45 g Oral TID    sodium bicarbonate  1,300 mg Oral TID    epoetin sharonda-epbx  3,000 Units Subcutaneous Once per day on Mon Wed Fri    octreotide  100 mcg Subcutaneous Q8H    amiodarone  200 mg Oral Daily    famotidine  20 mg Oral Daily    gabapentin  200 mg Oral BID    midodrine  15 mg Oral TID    rifaximin  550 mg Oral BID    tiotropium  2 puff Inhalation Daily    insulin lispro  0-12 Units Subcutaneous TID WC    insulin lispro  0-6 Units Subcutaneous Nightly    sodium chloride flush  5-40 mL Intravenous 2 times per day     Meds prn: sodium chloride, calcium carbonate, albuterol, glucose, dextrose, glucagon (rDNA), dextrose, sodium chloride flush, sodium chloride, promethazine **OR** ondansetron, acetaminophen **OR** acetaminophen       Impression and Plan:  1. KARAN on CKD 3B in setting of acute decompensated liver cirrhosis with ascites  Cannot rule out hepatorenal versus prerenal  No response with IV albumin, IV normal saline, midodrine, octreotide  Patient was started on dialysis  No suggestion of renal recovery at this time  Urine output is poor  interdialytic creatinine is rising  Plan hemodialysis treatment today    2. Epistaxis: s/p packing, at high risk for rebleeding per ENT  3. Metabolic acidosis improved. Stop oral sodium bicarbonate  4. Acute decompensated liver cirrhosis with ascites. Status post paracentesis with worsening jaundice.   Jaundice worsening, GI following    5. History of alcoholic cirrhosis: acute on chronic decompensation  6. Chronic hypotension: Continue with midodrine  7. History of A. Fib  8. Chronic thrombocytopenia  9. Hyperammonemia: On lactulose and rifaximin  10. Constipation  11. Anemia in CKD. Continue with Retacrit  12. Mild hyponatremia secondary to cirrhosis. Will monitor  13. Uncertain about bladder scan findings given that he has ascites. Bladder scan is usually not very accurate when patients have underlying ascites.    Will follow urology    Discussed with patient     Rell Barrios MD  [facility]

## 2021-05-01 NOTE — PROGRESS NOTES
[facility]  OCCUPATIONAL THERAPY MISSED TREATMENT NOTE  [address] [facility]  6K-22/022-A      Date: 2021  Patient Name: Kimmie Stone        CSN: [id number]   : 1965  (55 y.o.)  Gender: male                REASON FOR MISSED TREATMENT: Patient Off Floor for Dialysis.  Will check back as able

## 2021-05-02 NOTE — PROGRESS NOTES
[facility]   Nephrology progress note  5/2/2021, 2:50 PM      Pt Name:    Yoselin Mcgowan  MRN:     [id number]     [place]:    1965  Admit Date:    4/23/2021 10:30 AM  Primary Care Physician:  Nidhi Jackson MD   Room number  3N-90/867-M    Chief Complaint: Nephrology following for KARAN/CKD    Subjective:  Patient seen and examined  Not much urine output--only 50 mL urine output documented overnight  Remains very jaundiced  Not fully oriented  Has rash all over his body  Ill-appearing    Objective:  24HR INTAKE/OUTPUT:      Intake/Output Summary (Last 24 hours) at 5/2/2021 1450  Last data filed at 5/2/2021 1240  Gross per 24 hour   Intake 960 ml   Output 82 ml   Net 878 ml     I/O last 3 completed shifts: In: 600   Out: 82 [Urine:50]  I/O this shift:  In: 360 [P.O.:360]  Out: -   Admission weight: 237 lb (107.5 kg)  Wt Readings from Last 3 Encounters:   05/02/21 256 lb 2.8 oz (116.2 kg)   04/13/21 237 lb 4.8 oz (107.6 kg)   04/13/21 240 lb 9.6 oz (109.1 kg)     Body mass index is 32.89 kg/m².     Physical examination  VITALS:     Vitals:    05/02/21 0818 05/02/21 0851 05/02/21 1126 05/02/21 1431   BP:  (!) 80/59 97/66 92/64   Pulse:  106 110 97   Resp: 16 16 16 16   Temp:  97.5 °F (36.4 °C) 98 °F (36.7 °C) 98.1 °F (36.7 °C)   TempSrc:  Axillary Oral Oral   SpO2: 98% 99% 97% 93%   Weight:       Height:         General Appearance: alert and cooperative with exam, appears comfortable, no distress  Visibly jaundiced, icteric sclera  Mouth/Throat: Oral mucosa moist  Neck: No JVD  Lungs: Diminished  Heart:  S1, S2 heard  GI: Distended abdomen  Skin: erythematous rash    Lab Data  CBC:   Recent Labs     04/30/21  0600 05/01/21  0524 05/02/21  0353   WBC 6.9 7.0 8.1   HGB 8.2* 8.6* 7.1*   HCT 24.7* 25.9* 21.8*   PLT 38* 42* 30*     BMP:  Recent Labs     04/30/21  0600 04/30/21  2348 05/01/21  0524 05/02/21  0353   *  --  128* 134*   K 3.9 3.6 4.1 3.6   CL 93*  --  91* 98   CO2 20*  --  22* 22*   BUN 36*  --  38* 25*   CREATININE 4.6*  --  5.1* 4.0*   GLUCOSE 111*  --  148* 110*   CALCIUM 8.7  --  8.9 9.0   MG  --  1.9  --   --      Hepatic:   Recent Labs     04/30/21  0600 05/01/21  0524 05/02/21  0353   LABALBU 3.2* 3.4* 3.3*   AST 32 32 29   ALT 18 17 15   BILITOT 6.4* 6.6* 6.4*   ALKPHOS 110 112 92         Meds:  Infusion:    sodium chloride      sodium chloride      sodium chloride      dextrose      sodium chloride       Meds:    hydrocortisone   Topical BID    oxymetazoline  2 spray Each Nostril TID    metoprolol tartrate  50 mg Oral BID    lactulose  45 g Oral TID    sodium bicarbonate  1,300 mg Oral TID    epoetin sharonda-epbx  3,000 Units Subcutaneous Once per day on Mon Wed Fri    octreotide  100 mcg Subcutaneous Q8H    amiodarone  200 mg Oral Daily    famotidine  20 mg Oral Daily    gabapentin  200 mg Oral BID    midodrine  15 mg Oral TID    rifaximin  550 mg Oral BID    tiotropium  2 puff Inhalation Daily    insulin lispro  0-12 Units Subcutaneous TID WC    insulin lispro  0-6 Units Subcutaneous Nightly    sodium chloride flush  5-40 mL Intravenous 2 times per day     Meds prn: sodium chloride, sodium chloride, sodium chloride, sodium chloride, calcium carbonate, albuterol, glucose, dextrose, glucagon (rDNA), dextrose, sodium chloride flush, sodium chloride, promethazine **OR** ondansetron, acetaminophen **OR** acetaminophen       Impression and Plan:  1. KARAN on CKD 3B in setting of acute decompensated liver cirrhosis with ascites  Cannot rule out hepatorenal versus prerenal  Managed with IV albumin, IV normal saline, midodrine, octreotide--but no response  Patient was started on dialysis  No suggestion of renal recovery at this time  Had dialysis yesterday. No need for dialysis today  Will plan tunneled dialysis catheter tomorrow    2. Epistaxis: s/p packing, at high risk for rebleeding per ENT  3. Metabolic acidosis improved.    4.  Acute decompensated liver cirrhosis with ascites. Status post paracentesis with worsening jaundice. Jaundice worsening, GI following    5. History of alcoholic cirrhosis: acute on chronic decompensation  6. Chronic hypotension: Continue with midodrine  7. History of A. Fib  8. Chronic thrombocytopenia  9. Hyperammonemia: On lactulose and rifaximin  10. Constipation  11. Anemia in CKD. Continue with Retacrit  12. Mild hyponatremia secondary to cirrhosis. Will monitor  13. Thrombocytopenia worsening  14. Skin rash: ?  Trial of steroids.  D/W hospitalist    Discussed with patient and hospitalist    Sb Crenshaw MD  [facility]

## 2021-05-02 NOTE — PROGRESS NOTES
Hospitalist Progress Note    Patient:  Sara Contreras      Unit/Bed:6K-22/022-A    YOB: 1965    MRN: [id number]       Acct: [de-identified]     PCP: Brinda Rao MD    Date of Admission: 4/23/2021    Assessment/Plan:    Anticipated Discharge in :     RONN/Betito Schmidt [address] Problems    Diagnosis Date Noted    Urinary retention [R33.9]     Epistaxis [R04.0]     KARAN (acute kidney injury) ([facility] [address] [N17.9] 04/23/2021    Chronic liver failure without hepatic coma ([facility] [address] [K72.10] 12/12/2020    Acute kidney injury superimposed on CKD ([facility] [address] [N17.9, N18.9] 10/03/2020     Acute Decompensated Liver Cirrhosis with ascites   - hx of alcoholic cirrhosis, follows up in [address] [place] [address], on the transplant list and has follow up in May  - has ascites s/p paracentesis  - GI consulted, continue lactulose and xifaxan  - MELD-na 38, considered high mortality in the next 3 months  - Bilirubin and INR trending up. - worsening thrombocytopenia  - explained poor prognosis to patient.  Agreed to Palliative consult    Acute Hepatic Encephalopathy  - secondary to above  - ammonia improved    KARAN on CKD III, t/c hepatorenal syndrome  - nephrology following  - octreotide started per nephro  - patient started on hemodialysis, plan for tunneled catheter tomorrow, 5/3    Acute on Chronic Thrombocytopenia  - likely due to hepatic failure  - Platelets continue to trend down with recurrent epistaxis and a drop in hgb on 5/2  - 1U PRBC and 1U platelet transfusion ordered to keep platelets >96,246   - plan for tunneled cath tomorrow    Acute on Chronic macrocytic Anemia, severe  - Hgb trending down from 8.6 to 7.1 today   - INR elevated  - has recurrent epistaxis, denies melena or hematochezia  - will transfuse with 1U PRBC today    Coagulopathy  - Secondary to liver disease rule out DIC/TTP  - noted to have bleeding per tessio  - will obtain Dimer, fibrinogen, PT/PTT, LDH and peripheral smear  - plan for tunneled cath insertion tomorrow  - give 1U FFP and platelet  - consider hematologic consult     Acute Urinary retention  - Urology consulted  - Renal Ultrasound 04/26 showed no evidence of hydronephrosis or obstruction  - reese cath inserted, likely to go home with reese and follow up as outpatient      Chronic Hypotension   - on midodrine     History of Atrial Fibrillation  - on amiodarone  - metoprolol with holding parameters     Hyperammonemia, resolved  - on lactulose, rifaximin     Neuropathy  - on gabapentin     Recurrent epistaxis  - ENT following, status post nasal packing  - patient at risk for life-threatening bleed given low platelet count  - platelet transfusion ordered    Chief Complaint:  Renal failure    Hospital Course:  Per H&P    \"56 y. o. male who presented to [facility] with abnormal lab; patient has an extensive medical history including cirrhosis of the liver, alcoholic cirrhosis, atrial fibrillation, diabetes, portal hypertension; he states it has been 6 months today since he has had any alcohol or tobacco products; he presented to the ED today secondary to his nephrologist office contacting him for a creatinine of 5.5; he had a paracentesis done 04/22/21 that drained 2.1 L; he states yesterday he fell 4 times; he relates to constant lightheadedness and weakness, he is on midodrine for chronic hypotension however he did take his midodrine today in addition to his Lopressor.     Currently he is alert and talkative, he is jaundiced, he denies any pain, he does relate to shortness of breath, he does relate to his chronic lightheadedness and dizziness; states his legs are extremely swollen and that it is difficult for him to get his shoes on.     In the emergency department, he had a sodium level at 128, potassium 3.1, CO2 of 17, BUN 54, creatinine 5.1, lactic acidosis at 4.0, troponin T of 0.100, he relates to elevated LFTs as well; CBC shows mild anemia and mild thrombocytopenia at 108; his chest x-ray showing a large right pleural effusion; he is being admitted to the hospital service for further care and evaluation. \"     Subjective:   Patient seen and examined, appears comfortable in bed, without any signs of cardiorespiratory distress. BP low this morning 80/59, afebrile, saturating well on room air. Hgb dropped from 8.6 to 7.1 from yesterday. He denies any hematochezia or melena. Transfusing platelet and PRBC. Patient acknowledges poor prognosis, but he wants to go home tomorrow if possible and just follow up at [facility]. Explained that he is not medically stable at this time.        Medications:  Reviewed    Infusion Medications    sodium chloride      sodium chloride      dextrose      sodium chloride       Scheduled Medications    hydrocortisone   Topical BID    oxymetazoline  2 spray Each Nostril TID    metoprolol tartrate  50 mg Oral BID    lactulose  45 g Oral TID    sodium bicarbonate  1,300 mg Oral TID    epoetin sharonda-epbx  3,000 Units Subcutaneous Once per day on Mon Wed Fri    octreotide  100 mcg Subcutaneous Q8H    amiodarone  200 mg Oral Daily    famotidine  20 mg Oral Daily    gabapentin  200 mg Oral BID    midodrine  15 mg Oral TID    rifaximin  550 mg Oral BID    tiotropium  2 puff Inhalation Daily    insulin lispro  0-12 Units Subcutaneous TID WC    insulin lispro  0-6 Units Subcutaneous Nightly    sodium chloride flush  5-40 mL Intravenous 2 times per day     PRN Meds: sodium chloride, sodium chloride, sodium chloride, calcium carbonate, albuterol, glucose, dextrose, glucagon (rDNA), dextrose, sodium chloride flush, sodium chloride, promethazine **OR** ondansetron, acetaminophen **OR** acetaminophen      Intake/Output Summary (Last 24 hours) at 5/2/2021 0948  Last data filed at 5/1/2021 2029  Gross per 24 hour   Intake 600 ml   Output 82 ml   Net 518 ml       Diet:  DIET LOW SODIUM 2 GM;  Dietary Nutrition Supplements: Protein Modular    Exam:  BP (!) 80/59   Pulse 106   Temp 97.5 °F (36.4 °C) (Axillary)   Resp 16   Ht 6' 2\" (1.88 m)   Wt 256 lb 2.8 oz (116.2 kg)   SpO2 99%   BMI 32.89 kg/m²     General appearance: No apparent distress, appears stated age and cooperative. jaundiced  HEENT: Pupils equal, round, and reactive to light. Icteric sclerae  Neck: Supple, with full range of motion. No jugular venous distention. Trachea midline. Respiratory:  Normal respiratory effort. Clear to auscultation, bilaterally without Rales/Wheezes/Rhonchi. Cardiovascular: Regular rate and rhythm with normal S1/S2 without murmurs, rubs or gallops. Abdomen: Soft, non-tender, non-distended with normal bowel sounds. Musculoskeletal: bipedal edema  Skin: jaundiced, with diffused erythematous rashes and petechiae  Neurologic:  Neurovascularly intact without any focal sensory/motor deficits. Cranial nerves: II-XII intact, grossly non-focal.  Psychiatric: Alert and oriented, thought content appropriate, normal insight  Capillary Refill: Brisk,< 3 seconds   Peripheral Pulses: +2 palpable, equal bilaterally       Labs:   Recent Labs     04/30/21 0600 05/01/21 0524 05/02/21  0353   WBC 6.9 7.0 8.1   HGB 8.2* 8.6* 7.1*   HCT 24.7* 25.9* 21.8*   PLT 38* 42* 30*     Recent Labs     04/30/21 0600 04/30/21  2348 05/01/21 0524 05/02/21  0353   *  --  128* 134*   K 3.9 3.6 4.1 3.6   CL 93*  --  91* 98   CO2 20*  --  22* 22*   BUN 36*  --  38* 25*   CREATININE 4.6*  --  5.1* 4.0*   CALCIUM 8.7  --  8.9 9.0     Recent Labs     04/30/21 0600 05/01/21  0524 05/02/21  0353   AST 32 32 29   ALT 18 17 15   BILITOT 6.4* 6.6* 6.4*   ALKPHOS 110 112 92     Recent Labs     04/30/21 0600 05/01/21 0524 05/02/21  0353   INR 2.31* 2.36* 2.75*     No results for input(s): Caridad Macias in the last 72 hours.     Urinalysis:      Lab Results   Component Value Date    NITRU NEGATIVE 04/30/2021    WBCUA 5-9 04/30/2021    BACTERIA MANY 04/30/2021    RBCUA 0-2 04/30/2021    BLOODU SMALL 04/30/2021    SPECGRAV 1.016 04/30/2021 Radiology:  US GUIDED PARACENTESIS   Final Result      Successful ultrasound-guided paracentesis. 2.75 L of fluid drained. **This report has been created using voice recognition software. It may contain minor errors which are inherent in voice recognition technology. **            Final report electronically signed by Dr Evens Hartman on 4/30/2021 1:15 PM      IR FLUORO GUIDED CVA DEVICE PLMT/REPLACE/REMOVAL   Final Result   Status post successful nontunneled dialysis catheter insertion. **This report has been created using voice recognition software. It may contain minor errors which are inherent in voice recognition technology. **      Final report electronically signed by Dr. Janis Rothman on 4/28/2021 3:12 PM      US RENAL COMPLETE   Final Result   1. Normal bilateral renal ultrasound. 2. Unremarkable urinary bladder. Final report electronically signed by Dr. Agata Buckner on 4/26/2021 3:26 PM      [id number] [employer]   Final Result      Successful ultrasound-guided paracentesis. 3 L of fluid drained. **This report has been created using voice recognition software. It may contain minor errors which are inherent in voice recognition technology. **            Final report electronically signed by Dr Evens Hartman on 4/26/2021 3:22 PM      XR CHEST 1 VIEW   Final Result   1. Patchy airspace opacities are present at the right lung base. This may represent atelectasis or pneumonia. 2. There is a small residual right-sided pleural effusion which appears improved from the prior examination following right-sided thoracentesis from earlier same day. No pneumothorax is seen. **This report has been created using voice recognition software. It may contain minor errors which are inherent in voice recognition technology. **      Final report electronically signed by Dr. Vale Ambrose on 4/25/2021 3:19 PM      US THORACENTESIS Which side should the procedure be performed? Right   Final Result   Status post thoracentesis            **This report has been created using voice recognition software. It may contain minor errors which are inherent in voice recognition technology. **      Final report electronically signed by Dr. Sheryl Hicks on 4/25/2021 3:46 PM      CT HEAD WO CONTRAST   Final Result   1. No acute intracranial pathology   2. Air-fluid level right maxillary sinus (sinusitis or blood is uncertain. 3. Dental caries. **This report has been created using voice recognition software. It may contain minor errors which are inherent in voice recognition technology. **      Final report electronically signed by Dr. Yaz Bush on 4/23/2021 11:51 AM      XR CHEST PORTABLE   Final Result   Stable chest large right pleural effusion and possible underlying infiltrates            **This report has been created using voice recognition software. It may contain minor errors which are inherent in voice recognition technology. **      Final report electronically signed by Dr. Yaz Bush on 4/23/2021 11:12 AM          Diet: DIET LOW SODIUM 2 GM;  Dietary Nutrition Supplements: Protein Modular    DVT prophylaxis: [] Lovenox                                 [] SCDs                                 [] SQ Heparin                                 [] Encourage ambulation           [] Already on Anticoagulation     Disposition:    [] Home       [] TCU       [] Rehab       [] Psych       [] SNF       [] [place]       [] Other-    Code Status: Full Code    PT/OT Eval Status:         Electronically signed by Yun Rizzo MD on 5/2/2021 at 9:48 AM

## 2021-05-02 NOTE — PROGRESS NOTES
[facility]  OCCUPATIONAL THERAPY MISSED TREATMENT NOTE  [facility] RENAL TELEMETRY 6K  6K-22/022-A      Date: 2021  Patient Name: Jasbir Birch        CSN: [id number]   : 1965  (55 y.o.)  Gender: male                REASON FOR MISSED TREATMENT: OT attempted at this time although pt declined due to report of receiving blood soon and wants to rest. Will check back as able

## 2021-05-02 NOTE — PROGRESS NOTES
Patient refuses to turn as recommended. Complaining of sore on bottom that hurts. Educated importance of turning every 2 hours with pillow support to prevent further breakdown. Education provided regarding the benefits of repositioning and the risk to skin integrity associated with not repositioning as recommended. Also educated importance of elevating patient heels off of bed so his heels do not break down like his sacral wound. Patient states Jeannie Rodriguez I'm going to be dead soon anyways. \"

## 2021-05-02 NOTE — PROGRESS NOTES
Hospital Follow Up Note  FRANCISCO ROUSE    Feeling better  Current Facility-Administered Medications   Medication Dose Route Frequency Provider Last Rate Last Admin    0.9 % sodium chloride infusion   Intravenous PRN Yun Rizzo MD        hydrocortisone 1 % cream   Topical BID FELIX Martinez CNP   Given at 05/02/21 0855    oxymetazoline (AFRIN) 0.05 % nasal spray 2 spray  2 spray Each Nostril TID Ileana ELAYNE FELIX Santana CNP   2 spray at 05/02/21 0855    metoprolol tartrate (LOPRESSOR) tablet 50 mg  50 mg Oral BID Yoanna Tom MD   50 mg at 05/01/21 0836    lactulose (CHRONULAC) 10 GM/15ML solution 45 g  45 g Oral TID FELIX Martinez CNP   45 g at 05/02/21 0855    sodium bicarbonate tablet 1,300 mg  1,300 mg Oral TID Demarcus Nolasco MD   1,300 mg at 05/02/21 0855    epoetin sharonda-epbx (RETACRIT) injection 3,000 Units  3,000 Units Subcutaneous Once per day on Mon Wed Fri Demarcus Nolasco MD   3,000 Units at 04/30/21 2700    octreotide (SANDOSTATIN) injection 100 mcg  100 mcg Subcutaneous Q8H Demarcus Nolasco MD   100 mcg at 05/02/21 0537    sodium chloride (OCEAN, BABY AYR) 0.65 % nasal spray 1 spray  1 spray Each Nostril Q2H PRN Leeann Hoyt DO        calcium carbonate (TUMS) chewable tablet 500 mg  500 mg Oral 4x Daily PRN Leeann Hoyt DO   500 mg at 04/24/21 2214    albuterol (PROVENTIL) nebulizer solution 2.5 mg  2.5 mg Nebulization Q6H PRN Jolaine Roller, APRN - CNP        amiodarone (CORDARONE) tablet 200 mg  200 mg Oral Daily Jolaine Roller, APRN - CNP   200 mg at 05/02/21 0855    famotidine (PEPCID) tablet 20 mg  20 mg Oral Daily Jolaine Roller, APRN - CNP   20 mg at 05/02/21 0855    gabapentin (NEURONTIN) capsule 200 mg  200 mg Oral BID Jolaine Roller, APRN - CNP   200 mg at 05/02/21 0855    midodrine (PROAMATINE) tablet 15 mg  15 mg Oral TID FELIX Huertas CNP   15 mg at 05/02/21 0855    rifaximin (XIFAXAN) tablet 550 mg  550 mg Oral BID Nel WHAROTN Retcinthya, APRN - CNP   550 mg at 05/02/21 0855    tiotropium (SPIRIVA RESPIMAT) 2.5 MCG/ACT inhaler 2 puff  2 puff Inhalation Daily Simeon Reyes, APRN - CNP   2 puff at 05/01/21 0733    insulin lispro (HUMALOG) injection vial 0-12 Units  0-12 Units Subcutaneous TID WC Clement Eric, APRN - CNP   6 Units at 05/01/21 1616    insulin lispro (HUMALOG) injection vial 0-6 Units  0-6 Units Subcutaneous Nightly Simeon Reyes, APRN - CNP   1 Units at 04/28/21 2056    glucose (GLUTOSE) 40 % oral gel 15 g  15 g Oral PRN Nel A Rochellean, APRN - CNP        dextrose 50 % IV solution  12.5 g Intravenous PRN Nel A Kayleighhman, APRN - CNP        glucagon (rDNA) injection 1 mg  1 mg Intramuscular PRN Nel A Rochellean, APRN - CNP        dextrose 5 % solution  100 mL/hr Intravenous PRN Mitchellment Eric, APRN - CNP        sodium chloride flush 0.9 % injection 5-40 mL  5-40 mL Intravenous 2 times per day Simeon Reyes, APRN - CNP   10 mL at 05/02/21 0855    sodium chloride flush 0.9 % injection 5-40 mL  5-40 mL Intravenous PRN Mitchellment Eric, APRN - CNP        0.9 % sodium chloride infusion  25 mL Intravenous PRN Mitchellment Eric, APRN - CNP        promethazine (PHENERGAN) tablet 12.5 mg  12.5 mg Oral Q6H PRN Clement Eric, APRN - CNP        Or    ondansetron (ZOFRAN) injection 4 mg  4 mg Intravenous Q6H PRN Clement Eric, APRN - CNP   4 mg at 04/27/21 0549    acetaminophen (TYLENOL) tablet 650 mg  650 mg Oral Q6H PRN Clement Eric, APRN - CNP        Or    acetaminophen (TYLENOL) suppository 650 mg  650 mg Rectal Q6H PRN Nel A Rethman, APRN - CNP               O.  Afebrile, VSS  height is 6' 2\" (1.88 m) and weight is 256 lb 2.8 oz (116.2 kg). His axillary temperature is 97.5 °F (36.4 °C). His blood pressure is 80/59 (abnormal) and his pulse is 106. His respiration is 16 and oxygen saturation is 99%.           A&O         Heart  RRR         Lungs CTAB          ABD S/NT/ND/+BS Ext No LLE     CBC:   Recent Labs     04/30/21  0600 05/01/21  0524 05/02/21  0353   WBC 6.9 7.0 8.1   HGB 8.2* 8.6* 7.1*   PLT 38* 42* 30*     BMP:    Recent Labs     04/30/21  0600 04/30/21  2348 05/01/21  0524 05/02/21  0353   *  --  128* 134*   K 3.9 3.6 4.1 3.6   CL 93*  --  91* 98   CO2 20*  --  22* 22*   BUN 36*  --  38* 25*   CREATININE 4.6*  --  5.1* 4.0*   GLUCOSE 111*  --  148* 110*     Calcium:  Recent Labs     05/02/21 0353   CALCIUM 9.0     Ionized Calcium:No results for input(s): IONCA in the last 72 hours. Magnesium:  Recent Labs     04/30/21  2348   MG 1.9     Phosphorus:No results for input(s): PHOS in the last 72 hours. BNP:No results for input(s): BNP in the last 72 hours. Glucose:  Recent Labs     05/01/21  1612 05/01/21  2109 05/02/21  0037   POCGLU 230* 110* 130*     HgbA1C: No results for input(s): LABA1C in the last 72 hours. INR:   Recent Labs     05/02/21 0353   INR 2.75*     Hepatic:   Recent Labs     05/02/21 0353   ALKPHOS 92   ALT 15   AST 29   PROT 5.0*   BILITOT 6.4*   LABALBU 3.3*     Amylase and Lipase:No results for input(s): LIPASE, AMYLASE in the last 72 hours. Lactic Acid: No results for input(s): LACTA in the last 72 hours. Troponin: No results for input(s): CKTOTAL, CKMB, TROPONINI in the last 72 hours. Lipids: No results for input(s): CHOL, TRIG, HDL, LDLCALC in the last 72 hours. Invalid input(s): LDL  PT/INR:     Recent Labs     05/01/21  0524 05/02/21  0353   INR 2.36* 2.75*     PTT:   No results for input(s): APTT, PTT in the last 72 hours. A. jane  Cirrhosis  No sbp  Hydrothorax and ascitis  Low na  Low plat  Nose bleed        P.  Cont lactulose,xifaxin,pepcid  Fu nephrology, ent

## 2021-05-02 NOTE — PROGRESS NOTES
[address]  [address] [facility] [address]  Dept: [phone]  Loc: [phone]  Visit Date: 2021  Urology Progress Note    Chief Complaint: Reason for Consult:  Urinary retention  Requesting Physician:  Nnamdi Fernandez     History Obtained From:  Patient, EMR, nurse     Chief Complaint: abnormal labs    Subjective: \"  Patient is resting in bed, voiding pink tinged urine, +flatus, +BM, ambulating with assistance, tolerating regular diet, denies any nausea or vomiting. There are complaints of controlled pain at this time.   Low UOP documented overnight, 50ml  Had HD yesterday        Vitals:  BP 97/66   Pulse 110   Temp 98 °F (36.7 °C) (Oral)   Resp 16   Ht 6' 2\" (1.88 m)   Wt 256 lb 2.8 oz (116.2 kg)   SpO2 97%   BMI 32.89 kg/m²   Temp  Av °F (36.7 °C)  Min: 97.5 °F (36.4 °C)  Max: 98.9 °F (37.2 °C)    Intake/Output Summary (Last 24 hours) at 2021 1236  Last data filed at 2021 1126  Gross per 24 hour   Intake 960 ml   Output 82 ml   Net 878 ml       Social History     Socioeconomic History    Marital status: Single     Spouse name: Not on file    Number of children: Not on file    Years of education: Not on file    Highest education level: Not on file   Occupational History    Not on file   Social Needs    Financial resource strain: Not on file    Food insecurity     Worry: Not on file     Inability: Not on file   [employer] needs     Medical: Not on file     Non-medical: Not on file   Tobacco Use    Smoking status: Former Smoker     Packs/day: 1.00     Years: 29.00     Pack years: 29.00     Types: Cigarettes     Quit date: 2020     Years since quittin.4    Smokeless tobacco: Never Used   Substance and Sexual Activity    Alcohol use: Not Currently     Frequency: 4 or more times a week     Comment: No alcohol since 21    Drug use: Never    Sexual activity: Not on file   Lifestyle    Physical activity

## 2021-05-02 NOTE — PROGRESS NOTES
Spoke to blood bank, scanned in blood and spiked bag when flushing line to hook up blood IV access lost. Spoke to Dr. Malvin Blas and got approval to use tessio access, then this nurse and FERN Blackburn was not able to get the blood to run through the tessio site. Tessio site flushing without difficulty, but with switching new IV lines and new IV pump still unable to get blood to run. Notified blood bank we were coming up on 1 hour of receiving blood and we are assuming blood was clotted somewhere. Blood returned to blood back and received a new unit of blood to give to patient.

## 2021-05-02 NOTE — PROGRESS NOTES
Per Dr. Denis Ojeda ok to leave Tessio accessed with fluids running Dennis  as patient is refusing peripheral IV. Ok to access through Dennis  tubing.

## 2021-05-03 PROBLEM — D65 DIC (DISSEMINATED INTRAVASCULAR COAGULATION) (HCC): Status: ACTIVE | Noted: 2020-01-01

## 2021-05-03 PROBLEM — K76.7 HEPATORENAL SYNDROME (HCC): Status: ACTIVE | Noted: 2020-01-01

## 2021-05-03 NOTE — CARE COORDINATION
5/3/21, 3:08 PM EDT    DISCHARGE ON GOING [facility] 12 day: 10  Location: -22/022-A Reason for admit: KARAN (acute kidney injury) [facility]) [N17.9]   Procedure: Tunneled HD catheter planned for today  HD today with UF of 1.4kg  Barriers to Discharge: palliative care saw, B/P dropped to 88 systolic given IV albumin,  GI follows, Hgb 8.1, platelets 44,  On Pepcid, stool counts, PT and OT, Lactulose, Midodrine, ENT follows for nosebleeds, nephrology, urology sees, has reese catheter, low urine output 50 ml this am, Na 130,  Creat 4.2, skin jaundiced. PCP: Maria Elena Aguirre MD  Readmission Risk Score: 51%  Patient Goals/Plan/Treatment Preferences: from home alone; lives in one-level apartment at [facility]. Met with Balta's brother Juli Olson since patient slept during my visit post HD. He and Claude Spencer agree to start HD application with [facility]. The brother shares he can assist with transportation to HD 2 days a week. 5104 pm- application for OP HD [facility] started via computer. Notified Estefany Chandra at [facility] re: new HD patient.

## 2021-05-03 NOTE — PLAN OF CARE
Problem: Falls - Risk of:  Goal: Will remain free from falls  Description: Will remain free from falls  Note: No falls noted this shift. Continue falling star program. Bed alarm on, bed in low position. Call light and personal belongings in reach. Patient uses call light appropriately. Goal: Absence of physical injury  Description: Absence of physical injury  Note: No physical injury noted this shift. Problem: Skin Integrity:  Goal: Will show no infection signs and symptoms  Description: Will show no infection signs and symptoms  Note: VSS. Will continue to monitor. Goal: Absence of new skin breakdown  Description: Absence of new skin breakdown  Note: No new skin break down noted at this time. Encouraged patient to reposition self in bed. Problem: OXYGENATION/RESPIRATORY FUNCTION  Goal: Patient will maintain patent airway  Note: Patient able to maintain patent airway on own. Problem: Pain:  Goal: Patient's pain/discomfort is manageable  Description: Patient's pain/discomfort is manageable  Note: No complaint of pain voiced at this time. Continue to monitor. PRN medications available if needed. Problem: Nutrition  Goal: Optimal nutrition therapy  Note: Patient tolerating a LOW SODIUM 2 GM diet. Problem: DISCHARGE BARRIERS  Goal: Patient's continuum of care needs are met  Note: Patient plans to return home at discharge with [facility]. Problem: Tissue Perfusion - Renal, Altered:  Goal: Serum creatinine will be within specified parameters  Description: Serum creatinine will be within specified parameters  Note: Results for Saw Luna (MRN [id number]) as of 5/3/2021 02:30   Ref. Range 5/2/2021 03:53   Creatinine Latest Ref Range: 0.4 - 1.2 mg/dL 4.0 ()     Will continue to monitor lab values. Care plan reviewed with patient. Patient verbalizes understanding of plan of care and contributes to goal setting.

## 2021-05-03 NOTE — PROGRESS NOTES
Gastroenterology Progress Note:     Patient Name:  Nisha Singh   MRN: [id number]  [id number]  YOB: 1965  Admit Date: 4/23/2021 10:30 AM  Primary Care Physician: Bridgett Collins MD   [id number]     Patient seen and examined. 24 hours events and chart reviewed. Subjective: Patient seen in dialysis. Denies abdominal pain, nausea, & vomiting. Hgb 8.1, platelet count 44. Given 1 unit platelets yesterday & today. He is to have a tunneled dialysis catheter placed when platelet count 50 or higher. Lengthy discussion had regarding prognosis and plan of care, patient verbalized understanding. All questions answered. Objective:  /60   Pulse 114   Temp 97.9 °F (36.6 °C) (Oral)   Resp 15   Ht 6' 2\" (1.88 m)   Wt 198 lb 6.6 oz (90 kg)   SpO2 95%   BMI 25.47 kg/m²     Physical Exam:    General:  Chronically ill appearing male  HEENT: Atraumatic, normocephalic. Moist oral mucous membranes. Scleral icterus. Neck: Supple without adenopathy, JVD, thyromegaly or masses. Trachea midline. Tessio line in place, dressing bloody. CV: Heart RRR, no murmurs, rubs, gallops. Resp: Even, easy without cough or accessory use. Lungs clear to ascultation bilaterally. Abd: Round, soft, nontender. No hepatosplenomegaly or mass present. Active bowel sounds heard. Mild distention noted. Ext:  Without cyanosis, clubbing. BLE edema. Skin: Jaundice, warm, dry. Pruritic rash noted to BUE & BLE. Scattered bruising noted throughout body. Neuro:  Alert, oriented x 3 with no obvious deficits.        Rectal: deferred    Labs:   CBC:   Lab Results   Component Value Date    WBC 6.5 05/03/2021    HGB 8.1 05/03/2021    HCT 24.9 05/03/2021    MCV 99.6 05/03/2021    PLT 44 05/03/2021     BMP:   Lab Results   Component Value Date     05/03/2021    K 3.6 05/03/2021    K 3.0 04/24/2021    CL 94 05/03/2021    CO2 22 05/03/2021    PHOS 6.5 04/28/2021    BUN 28 05/03/2021    CREATININE 4.2 05/03/2021    CALCIUM 8.8 05/03/2021     PT/INR:   Lab Results   Component Value Date    INR 2.21 05/03/2021     Lipids:   Lab Results   Component Value Date    ALKPHOS 96 05/03/2021    ALT 18 05/03/2021    AST 38 05/03/2021    BILITOT 7.4 05/03/2021    BILIDIR 2.6 04/29/2021    LABALBU 3.2 05/03/2021     Current Meds:  Scheduled Meds:   hydrocortisone   Topical BID    oxymetazoline  2 spray Each Nostril TID    metoprolol tartrate  50 mg Oral BID    lactulose  45 g Oral TID    sodium bicarbonate  1,300 mg Oral TID    epoetin sharonda-epbx  3,000 Units Subcutaneous Once per day on Mon Wed Fri    octreotide  100 mcg Subcutaneous Q8H    amiodarone  200 mg Oral Daily    famotidine  20 mg Oral Daily    gabapentin  200 mg Oral BID    midodrine  15 mg Oral TID    rifaximin  550 mg Oral BID    tiotropium  2 puff Inhalation Daily    insulin lispro  0-12 Units Subcutaneous TID WC    insulin lispro  0-6 Units Subcutaneous Nightly    sodium chloride flush  5-40 mL Intravenous 2 times per day     Continuous Infusions:   sodium chloride      sodium chloride      sodium chloride      sodium chloride      sodium chloride      dextrose      sodium chloride         Assessment:  56 yo M admitted 04/23/21 for falls secondary to dizziness secondary to hypotension. He is on Midodrine. Noted to have worsening creatinine on OP labs. Started on dialysis 04/28/21. H/O alcoholic cirrhosis with ascites & recurrent right hepatic hydrothorax. He also follows at [facility]. Last thoracentesis 04/25/21 with 1.1 L removed. Last paracentesis 04/26/21 with 3L removed, no labs done. He is not a candidate for TIPS procedure per [facility]. Noted to have recurrent epistaxis which has required cauterization in the past. Last EGD 11/30/2020, last colonoscopy 12/30/2020. ENT consulted for recurrent epistaxis. Left nare cauterized, packed, & taped shut by ENT. Repeat paracentesis 04/30/2 with 2.75L removed, negative for SBP  1. KARAN on CKD- started on dialysis  2.  Acute decompensated alcoholic liver cirrhosis- s/p paracentesis 04/26/21 with no labs done & 04/30/20 negative for SBP- MELD 36  3. Recurrent right hepatic hydrothorax- s/p thoracentesis 04/25/21  4. Afib  5. Chronic hypotension with orthostatic hypotension- on Midodrine  6. Recurrent falls  7. Hyponatremia  8. Hypoalbuminemia  9. Hyperbilirubinemia  10. Chronic anemia  11. Thrombocytopenia- worsening, s/p 2 units platelets   12. Coagulopathy- worsening  13. Pruritic rash to BUE & BLE      Plan:    · Monitor H & H, transfuse prn  · Nursing to monitor stool output & document  · PT & OT on board  · Continue Lactulose to 30g TID, home dose  · Continue Midodrine  · Continue Xifaxan  · Continue Pepcid  · Octreotide per nephrology  · Monitor HFP & INR daily  · Continue Hydrocortisone cream BID to affected areas  · Avoid hepatotoxic meds  · Avoid narcotics & benzodiazepines as they can precipitate HE  · Low sodium diet  · Daily weights  · Strict I  & O  · Electrolyte management per nephrology  · Tunneled dialysis catheter to be placed when platelet count 50 or higher per nephrology  · Nephrology on board  · ENT on board  · Patient remains at high risk for bleeding given worsening thrombocytopenia & coagulopathy  · Lengthy discussion had regarding prognosis and plan of care, patient verbalized understanding. All questions answered. Patient has follow-up appointment with hepatology 5/10/21, that is important to keep. Unfortunately he has a very poor prognosis and will not survive without a liver transplant. · Supportive care per primary team  Will follow    Case reviewed and impression/plan reviewed in collaboration with Dr. Akbar Messenger  Electronically signed by FELIX Villanueva CNP on 5/3/2021 at 11:15 AM    [facility]     Late Entry:  Updated by primary resident physician, hematology consulted for suspected DIC.  Patient is being transferred to [facility] for concern for DIC, need for tunneled dialysis catheter, ESLD, and acute renal failure. [facility].

## 2021-05-03 NOTE — PROGRESS NOTES
Department of Otolaryngology  Progress Note    Chief Complaint:  Epistaxis    SUBJECTIVE:  Patient reports that he has been doing fairly well in regards to nosebleeds. He states that he still occasionally will notice a scant amount of blood on tissue when wiping his nose. He denies any other symptoms or concerns at this time. REVIEW OF SYSTEMS:    A complete multi-organ review of systems was performed and reviewed by me. ENT:  negative except as noted in HPI  CONSTITUTIONAL:  negative except as noted in HPI  EYES:  negative except as noted in HPI  RESPIRATORY:  negative except as noted in HPI  CARDIOVASCULAR:  negative except as noted in HPI  GASTROINTESTINAL:  negative except as noted in HPI  GENITOURINARY:  negative except as noted in HPI  MUSCULOSKELETAL:  negative except as noted in HPI  SKIN:  negative except as noted in HPI  ENDOCRINE/METABOLIC: negative except as noted in HPI  HEMATOLOGIC/LYMPHATIC:  negative except as noted in HPI  ALLERGY/IMMUN: negative except as noted in HPI  NEUROLOGICAL:  negative except as noted in HPI  BEHAVIOR/PSYCH:  negative except as noted in HPI    OBJECTIVE      Physical  VITALS:  BP (!) 90/57   Pulse 97   Temp 98.7 °F (37.1 °C) (Oral)   Resp 16   Ht 6' 2\" (1.88 m)   Wt 198 lb (89.8 kg)   SpO2 98%   BMI 25.42 kg/m²     This is a 55 y.o. male. Patient is alert and oriented to person, place and time. Patient is chronically ill appearing. Mood is generally happy with normal affect. Not obviously hearing impaired. Head:   Normocephalic, atraumatic. No obvious masses or lesions noted. Nose:    External nose: Appears midline. No obvious deformity or masses. Septum:   Relatively midline. No septal hematoma. No perforation. Mucosa:   Area of scabbing with very small amount of bright red blood at the area where the scab and the mucosa meet within the left nare. Not actively oozing at this time, but has the appearance that it could bleed in the near future.   No other areas concerning for bleeding visualized  Turbinates: normal and pink            Discharge:  none    Mouth/Throat:  Lips, tongue and oral cavity: Normal. No masses or lesions noted   Dentition: fair, no malocclusion  Oral mucosa: moist  Oropharynx: normal-appearing mucosa. No bloody postnasal drainage noted  Hard and soft palates: symmetrical and intact. Salivary glands: not enlarged and no tenderness to palpation. Uvula: midline, no obvious lesions   Gag reflex is present. Neck: Trachea midline. Thyroid not enlarged, no palpable masses or tenderness. Lymphatic: No cervical lymphadenopathy noted. Eyes: EDDY, EOM intact. Conjunctiva moist without discharge. Lungs: Normal effort of breathing, not obviously distressed. Neuro: Cranial nerves II-XII grossly intact. Extremities: No clubbing, edema, or cyanosis noted. Procedure: I was concerned that this scabbed area within the left nare could bleed again which is especially concerning given his thrombocytopenia . Patient was agreeable to nasal pore packing once again. I trimmed a nasal pore and impregnated with Afrin. I then placed it within the anterior nare over the scabbed area. A rolled cotton ball was then taped into the left nare to prevent further manual trauma or injury to the area.   Patient tolerated well without evidence of complication    Data  CBC with Differential:    Lab Results   Component Value Date    WBC 6.5 05/03/2021    RBC 2.50 05/03/2021    HGB 8.0 05/03/2021    HCT 24.3 05/03/2021    PLT 42 05/03/2021    MCV 99.6 05/03/2021    MCH 32.4 05/03/2021    MCHC 32.5 05/03/2021    NRBC 0 05/02/2021    SEGSPCT 73.7 05/02/2021    MONOPCT 11.5 05/02/2021    MONOSABS 0.9 05/02/2021    LYMPHSABS 0.7 05/02/2021    EOSABS 0.5 05/02/2021    BASOSABS 0.0 05/02/2021    DIFFTYPE see below 05/02/2021     CMP:    Lab Results   Component Value Date     05/03/2021    K 3.6 05/03/2021    K 3.0 04/24/2021    CL 94 05/03/2021    CO2 22 05/03/2021 Oral, BID  tiotropium (SPIRIVA RESPIMAT) 2.5 MCG/ACT inhaler 2 puff, 2 puff, Inhalation, Daily  insulin lispro (HUMALOG) injection vial 0-12 Units, 0-12 Units, Subcutaneous, TID WC  insulin lispro (HUMALOG) injection vial 0-6 Units, 0-6 Units, Subcutaneous, Nightly  glucose (GLUTOSE) 40 % oral gel 15 g, 15 g, Oral, PRN  dextrose 50 % IV solution, 12.5 g, Intravenous, PRN  glucagon (rDNA) injection 1 mg, 1 mg, Intramuscular, PRN  dextrose 5 % solution, 100 mL/hr, Intravenous, PRN  sodium chloride flush 0.9 % injection 5-40 mL, 5-40 mL, Intravenous, 2 times per day  sodium chloride flush 0.9 % injection 5-40 mL, 5-40 mL, Intravenous, PRN  0.9 % sodium chloride infusion, 25 mL, Intravenous, PRN  promethazine (PHENERGAN) tablet 12.5 mg, 12.5 mg, Oral, Q6H PRN **OR** ondansetron (ZOFRAN) injection 4 mg, 4 mg, Intravenous, Q6H PRN  acetaminophen (TYLENOL) tablet 650 mg, 650 mg, Oral, Q6H PRN **OR** acetaminophen (TYLENOL) suppository 650 mg, 650 mg, Rectal, Q6H PRN    ASSESSMENT AND PLAN    Left anterior epistaxis; thrombocytopenia    -Replaced nasal pore packing today along with anterior cottonball to prevent digital trauma  -The anterior cottonball in the left nare secured with tape should remain in place for 24 to 48 hours if possible. It is okay if it falls off sooner  -Nasal saline can be started in the left nare once cottonball has been removed. This helps gradually breakdown the nasopore as well  -Patient will need a 1 week follow-up to remove any residual packing. -ENT will follow PRN.  If patient is still in hospital in a week, we will follow up for examination and removal of packing  -Contact ENT sooner with further bleeding    Electronically signed by BRAD Mcdaniels on 5/3/2021 at 3:45 PM

## 2021-05-03 NOTE — PROGRESS NOTES
of the conversation thus far. Updated Kirk Foots to the conversation. Kirk Shelbys states that he will bring a copy of the patient's advance directives. Encouraged patient/family to further discuss code status and to inform staff if a change is desired. Much emotional support provided. Plan/Follow-Up:  Updated primary RN, Judith Lopez. Updated Lexis Greenwood that patient wishes to pursue dialysis and continued aggressive measures at this time. Palliative care will remain available if further needs arise. Please call prn.     Leyla Glass RN  5/3/2021,  2:11 PM

## 2021-05-03 NOTE — PROGRESS NOTES
[address] 61  PHYSICAL THERAPY MISSED TREATMENT NOTE  [facility]    Date: 5/3/2021  Patient Name: Nick Parikh        MRN: [id number]   : 1965  (55 y.o.)  Gender: male   Referring Practitioner: STANLEY Robertson  Diagnosis: KARAN         REASON FOR MISSED TREATMENT:  Patient refused treatment. Pt states he isn't doing anything today. He states that the doctor just told him he was bleeding and doesn't want him doing anything to aggravate it. Will try back tomorrow 5/3.      Adela Kaur PT, DPT [id number]

## 2021-05-03 NOTE — PROGRESS NOTES
PROGRESS NOTE      Patient:  Nick Parikh      Unit/Bed:6K-22/022-A    YOB: 1965    MRN: [id number]       Acct: [de-identified]     PCP: Aletha Schirmer, MD    Date of Admission: 4/23/2021      Assessment/Plan:    Acute Decompensated Liver Cirrhosis with ascites   - hx of alcoholic cirrhosis, follows up in [facility], on the transplant list and has follow up in May  - has ascites s/p paracentesis  - GI consulted, continue lactulose and xifaxan  - MELD-na 38, considered high mortality in the next 3 months  - Bilirubin and INR trending up. - worsening thrombocytopenia  - explained poor prognosis to patient. Agreed to Palliative consult  - Has [facility] f/u appointment with hepatology on 05/10/21    KARAN on CKD III, t/c hepatorenal syndrome  - nephrology following  - octreotide started per nephro  - patient started on hemodialysis  - Plan for tunneled catheter may need to be post poned due to worsening coagulopathy     Coagulopathy 2/2 Acute DIC  - Plates 029 on admission. Down trend since. Now 44  - Bleeding around tessio. - Did receive 1 unit of FPP and platelet on 11/29. Received another during dialysis 05/04  - Fibrinogen low at 64  - INR prolonged at 2.21, aPTT prolonged at 52. 3. Of note PT/aPTT were prolonged in the past at 1.80 and 58.7 12/12/20. D-dimer elevated at 5566, was 4341 11/21/20.   -Repeat fibrinogen post dialysis today, may need cryo if continues to be low  - Hem/onc consulted. - May need to hold off on tunneled catheter     Acute on Chronic Thrombocytopenia  - likely due to hepatic failure  - Platelets continue to trend down with recurrent epistaxis and a drop in hgb on 5/2  - 1U PRBC and 1U platelet transfusion ordered to keep platelets >82,120   - Another 1 U of platelets with dialysis. Repeat Hgb, platelets, and fibrinogen post dialysis, consider cryo if levels remain low.    - Hem/onc consulted      Acute on Chronic macrocytic Anemia, severe  - Secondary to DIC, ESLD, KARAN  - Hgb stable at 8.1 s/p 1 U PRBC 05/03 for a Hgb of 7.1  - INR elevated  - has recurrent epistaxis, denies melena or hematochezia  - Continue to monitor, transfuse PRN    Acute Hepatic Encephalopathy, resolved  - secondary to above  - ammonia level is stable    Acute Urinary retention  - Urology consulted  - Renal Ultrasound 04/26 showed no evidence of hydronephrosis or obstruction  - reese cath inserted, likely to go home with reese and follow up as outpatient      Chronic Hypotension   - on midodrine     History of Atrial Fibrillation  - on amiodarone  - metoprolol with holding parameters     Hyperammonemia, resolved  - on lactulose, rifaximin     Neuropathy  - on gabapentin     Recurrent epistaxis  - ENT following, status post nasal packing  - patient at risk for life-threatening bleed given low platelet count  - platelet transfusion given 05/03 and 05/04     Chief Complaint: Abnormal labs    Hospital Course:    Per H&P     \"55 y. o. male who presented to [facility] with abnormal lab; patient has an extensive medical history including cirrhosis of the liver, alcoholic cirrhosis, atrial fibrillation, diabetes, portal hypertension; he states it has been 6 months today since he has had any alcohol or tobacco products; he presented to the ED today secondary to his nephrologist office contacting him for a creatinine of 5.5; he had a paracentesis done 04/22/21 that drained 2.1 L; he states yesterday he fell 4 times; he relates to constant lightheadedness and weakness, he is on midodrine for chronic hypotension however he did take his midodrine today in addition to his Lopressor.     Currently he is alert and talkative, he is jaundiced, he denies any pain, he does relate to shortness of breath, he does relate to his chronic lightheadedness and dizziness; states his legs are extremely swollen and that it is difficult for him to get his shoes on.     In the emergency department, he had a sodium level at 128, potassium 3.1, CO2 of 17, BUN 54, creatinine 5.1, lactic acidosis at 4.0, troponin T of 0.100, he relates to elevated LFTs as well; CBC shows mild anemia and mild thrombocytopenia at 108; his chest x-ray showing a large right pleural effusion; he is being admitted to the hospital service for further care and evaluation. \"     Subjective: The patient was seen and examined at the bedside after he completed dialysis. He has no signs of acute cardiopulmonary distress. His blood pressure is soft around 99/65, afebrile, rest of vitals within normal limits. He wants to go home today or tomorrow. Originally it was plan for him to have a tunneled catheter placed today. However his platelet count was very low. Did receive a unit of platelets and FFP yesterday and a unit of platelets today during dialysis. Did discuss with the patient that at this time it is unsafe to place a permanent dialysis catheter given his worsening coagulopathy, and that he would be unable to take home the temporary Tesio catheter. Explained to the patient that he is not yet stable enough to be discharged and that if he wants to go home he would have to sign out AMA. Offered transfer to [address] [place] [address] given that his specialist is there. Patient is in agreement and would like to be transferred for [address] [place] [address].     Medications:  Reviewed    Infusion Medications    sodium chloride      sodium chloride      sodium chloride      sodium chloride      dextrose      sodium chloride       Scheduled Medications    hydrocortisone   Topical BID    oxymetazoline  2 spray Each Nostril TID    metoprolol tartrate  50 mg Oral BID    lactulose  45 g Oral TID    sodium bicarbonate  1,300 mg Oral TID    epoetin sharonda-epbx  3,000 Units Subcutaneous Once per day on Mon Wed Fri    octreotide  100 mcg Subcutaneous Q8H    amiodarone  200 mg Oral Daily    famotidine  20 mg Oral Daily    gabapentin  200 mg Oral BID    midodrine  15 mg Oral TID    rifaximin  550 mg Oral BID    7. 1* 8.2* 8.1*   HCT 25.9* 21.8* 24.2* 24.9*   PLT 42* 30* 45* 44*     Recent Labs     05/01/21 0524 05/02/21 0353 05/03/21  0338   * 134* 130*   K 4.1 3.6 3.6   CL 91* 98 94*   CO2 22* 22* 22*   BUN 38* 25* 28*   CREATININE 5.1* 4.0* 4.2*   CALCIUM 8.9 9.0 8.8     Recent Labs     05/01/21 0524 05/02/21  0353 05/03/21  0338   AST 32 29 38   ALT 17 15 18   BILITOT 6.6* 6.4* 7.4*   ALKPHOS 112 92 96     Recent Labs     05/01/21 0524 05/02/21 0353 05/03/21  0338   INR 2.36* 2.75* 2.21*     No results for input(s): CKTOTAL, TROPONINI in the last 72 hours. Urinalysis:      Lab Results   Component Value Date    NITRU NEGATIVE 04/30/2021    WBCUA 5-9 04/30/2021    BACTERIA MANY 04/30/2021    RBCUA 0-2 04/30/2021    BLOODU SMALL 04/30/2021    SPECGRAV 1.016 04/30/2021       Radiology:  US GUIDED PARACENTESIS   Final Result      Successful ultrasound-guided paracentesis. 2.75 L of fluid drained. **This report has been created using voice recognition software. It may contain minor errors which are inherent in voice recognition technology. **            Final report electronically signed by Dr Asa Montague on 4/30/2021 1:15 PM      IR FLUORO GUIDED CVA DEVICE PLMT/REPLACE/REMOVAL   Final Result   Status post successful nontunneled dialysis catheter insertion. **This report has been created using voice recognition software. It may contain minor errors which are inherent in voice recognition technology. **      Final report electronically signed by Dr. Maddie Andre on 4/28/2021 3:12 PM      US RENAL COMPLETE   Final Result   1. Normal bilateral renal ultrasound. 2. Unremarkable urinary bladder. Final report electronically signed by Dr. Laure Pfeiffer on 4/26/2021 3:26 PM      [address]   Final Result      Successful ultrasound-guided paracentesis. 3 L of fluid drained. **This report has been created using voice recognition software.  It may contain minor errors which are inherent in voice recognition technology. **            Final report electronically signed by Dr Harika Briones on 4/26/2021 3:22 PM      XR CHEST 1 VIEW   Final Result   1. Patchy airspace opacities are present at the right lung base. This may represent atelectasis or pneumonia. 2. There is a small residual right-sided pleural effusion which appears improved from the prior examination following right-sided thoracentesis from earlier same day. No pneumothorax is seen. **This report has been created using voice recognition software. It may contain minor errors which are inherent in voice recognition technology. **      Final report electronically signed by Dr. Reji Eldridge on 4/25/2021 3:19 PM      US THORACENTESIS Which side should the procedure be performed? Right   Final Result   Status post thoracentesis            **This report has been created using voice recognition software. It may contain minor errors which are inherent in voice recognition technology. **      Final report electronically signed by Dr. Reji Eldridge on 4/25/2021 3:46 PM      CT HEAD WO CONTRAST   Final Result   1. No acute intracranial pathology   2. Air-fluid level right maxillary sinus (sinusitis or blood is uncertain. 3. Dental caries. **This report has been created using voice recognition software. It may contain minor errors which are inherent in voice recognition technology. **      Final report electronically signed by Dr. Donnie Narvaez on 4/23/2021 11:51 AM      XR CHEST PORTABLE   Final Result   Stable chest large right pleural effusion and possible underlying infiltrates            **This report has been created using voice recognition software. It may contain minor errors which are inherent in voice recognition technology. **      Final report electronically signed by Dr. Donnie Narvaez on 4/23/2021 11:12 AM          Diet: DIET LOW SODIUM 2 GM;  Dietary Nutrition Supplements: Protein Modular    DVT prophylaxis: [] Lovenox                                 [x] SCDs                                 [] SQ Heparin                                 [] Encourage ambulation           [] Already on Anticoagulation     Disposition:    [] Home       [] TCU       [] Rehab       [] Psych       [] SNF       [] [place]       [x] Other- [facility]    Code Status: Full Code    PT/OT Eval Status: Evaluating and treating      Electronically signed by Madi Iverson MD on 5/3/2021 at 7:46 AM

## 2021-05-03 NOTE — PROGRESS NOTES
Pharmacy Note  Vancomycin Consult    Ron Jean Baptiste is a 55 y.o. male started on Vancomycin for empiric coverage; consult received from Dr. Gail Worley to manage therapy. Also receiving the following antibiotics: Zosyn. Patient Active Problem List   Diagnosis    Pleural effusion associated with hepatic disorder    Acute on chronic diastolic congestive heart failure (HCC)    Alcoholic cirrhosis of liver with ascites (HCC)    Essential hypertension    Paroxysmal atrial fibrillation (HCC)    Thrombocytopenia (HCC)    Type 2 diabetes mellitus without complication ([facility]    Acute kidney injury ([facility]    Acute kidney injury superimposed on CKD (HCC)    Coagulopathy (HCC)    Chronic diastolic congestive heart failure (HCC)    Lower leg edema    Class 1 obesity without serious comorbidity with body mass index (BMI) of 30.0 to 30.9 in adult    Chronic liver failure without hepatic coma (HCC)    Hydrothorax    Advanced cirrhosis of liver (HCC)    Permanent atrial fibrillation (HCC)    Sympathotonic orthostatic hypotension    Orthostatic dizziness    Degenerative cervical spinal stenosis    Hypo-osmolality and hyponatremia    Asymptomatic cholelithiasis    Weakness generalized    KARAN (acute kidney injury) ([facility]    Epistaxis    Urinary retention     Allergies:  Patient has no known allergies. Temp max: 98.7    Recent Labs     05/02/21  0353 05/03/21  0338   BUN 25* 28*   CREATININE 4.0* 4.2*   WBC 8.1 6.5       Intake/Output Summary (Last 24 hours) at 5/3/2021 1947  Last data filed at 5/3/2021 1007  Gross per 24 hour   Intake 2950.55 ml   Output 200 ml   Net 2750.55 ml     Culture Date Source Results   5/3/21 BCx2 --   4/30/21 Ascites fluid neg   4/23/21 BCx2 ngtd         Ht Readings from Last 1 Encounters:   04/23/21 6' 2\" (1.88 m)        Wt Readings from Last 1 Encounters:   05/03/21 198 lb (89.8 kg)       Body mass index is 25.42 kg/m².     Estimated Creatinine Clearance: 23 mL/min (A) (based on SCr of 4.2 mg/dL [facility] TERM ACUTE [facility] AT [facility])).    Goal Trough Level: 10-20 mcg/mL    Assessment/Plan:  Will initiate Vancomycin with a one time loading dose of 1250 mg x1. Timing of random level will be determined based on dialysis schedule. Thank you for the consult. Will continue to follow.     Dinora Macedo RPh  5/3/2021  7:52 PM

## 2021-05-03 NOTE — PROGRESS NOTES
[facility]   Nephrology progress note  5/3/2021, 9:24 AM      Pt Name:    Aden Linares  MRN:     [id number]     [place]:    1965  Admit Date:    4/23/2021 10:30 AM  Primary Care Physician:  Paula Mas MD   Room number  3V-97/735-O    Chief Complaint: Nephrology following for KARAN/CKD    Subjective:  Patient seen and examined  Discussed with patient this morning in dialysis unit  Tolerating dialysis  Blood pressure dropped to 88 but improved with IV albumin  Awake but not fully oriented  Ultrafiltration 1.4 kg  Blood flow rate of 300    Objective:  24HR INTAKE/OUTPUT:      Intake/Output Summary (Last 24 hours) at 5/3/2021 0924  Last data filed at 5/3/2021 0846  Gross per 24 hour   Intake 3980.72 ml   Output 200 ml   Net 3780.72 ml     I/O last 3 completed shifts: In: 3380.7 [P.O.:1224; I.V.:1246.9; Blood:909.8]  Out: 200 [Urine:200]  I/O this shift:  In: 600 [P.O.:600]  Out: -   Admission weight: 237 lb (107.5 kg)  Wt Readings from Last 3 Encounters:   05/03/21 198 lb 6.6 oz (90 kg)   04/13/21 237 lb 4.8 oz (107.6 kg)   04/13/21 240 lb 9.6 oz (109.1 kg)     Body mass index is 25.47 kg/m².     Physical examination  VITALS:     Vitals:    05/03/21 0829 05/03/21 0900 05/03/21 0914 05/03/21 0918   BP: (!) 165/70 (!) 99/56  121/65   Pulse: 88   105   Resp: 14   14   Temp: 97.9 °F (36.6 °C) 97.9 °F (36.6 °C)  97.9 °F (36.6 °C)   TempSrc:    Oral   SpO2:   95%    Weight: 198 lb 6.6 oz (90 kg)      Height:         General Appearance: alert and cooperative with exam, appears comfortable, no distress  Visibly jaundiced, icteric sclera  Mouth/Throat: Oral mucosa moist  Neck: No JVD  Lungs: Diminished  Heart:  S1, S2 heard  GI: Distended abdomen  Skin: erythematous rash    Lab Data  CBC:   Recent Labs     05/01/21  0524 05/02/21  0353 05/02/21  2219 05/03/21  0338   WBC 7.0 8.1  --  6.5   HGB 8.6* 7.1* 8.2* 8.1*   HCT 25.9* 21.8* 24.2* 24.9*   PLT 42* 30* 45* 44*     BMP:  Recent Labs 04/30/21  2348 05/01/21 0524 05/02/21 0353 05/03/21  0338   NA  --  128* 134* 130*   K 3.6 4.1 3.6 3.6   CL  --  91* 98 94*   CO2  --  22* 22* 22*   BUN  --  38* 25* 28*   CREATININE  --  5.1* 4.0* 4.2*   GLUCOSE  --  148* 110* 126*   CALCIUM  --  8.9 9.0 8.8   MG 1.9  --   --   --      Hepatic:   Recent Labs     05/01/21 0524 05/02/21 0353 05/03/21 0338   LABALBU 3.4* 3.3* 3.2*   AST 32 29 38   ALT 17 15 18   BILITOT 6.6* 6.4* 7.4*   ALKPHOS 112 92 96         Meds:  Infusion:    sodium chloride      sodium chloride      sodium chloride      sodium chloride      sodium chloride      dextrose      sodium chloride       Meds:    albumin human  25 g Intravenous Once    albumin human  25 g Intravenous Once    hydrocortisone   Topical BID    oxymetazoline  2 spray Each Nostril TID    metoprolol tartrate  50 mg Oral BID    lactulose  45 g Oral TID    sodium bicarbonate  1,300 mg Oral TID    epoetin sharonda-epbx  3,000 Units Subcutaneous Once per day on Mon Wed Fri    octreotide  100 mcg Subcutaneous Q8H    amiodarone  200 mg Oral Daily    famotidine  20 mg Oral Daily    gabapentin  200 mg Oral BID    midodrine  15 mg Oral TID    rifaximin  550 mg Oral BID    tiotropium  2 puff Inhalation Daily    insulin lispro  0-12 Units Subcutaneous TID WC    insulin lispro  0-6 Units Subcutaneous Nightly    sodium chloride flush  5-40 mL Intravenous 2 times per day     Meds prn: sodium chloride, sodium chloride, sodium chloride, sodium chloride, sodium chloride, sodium chloride, calcium carbonate, albuterol, glucose, dextrose, glucagon (rDNA), dextrose, sodium chloride flush, sodium chloride, promethazine **OR** ondansetron, acetaminophen **OR** acetaminophen       Impression and Plan:  1.   KARAN on CKD 3B in setting of acute decompensated liver cirrhosis with ascites  Cannot rule out hepatorenal syndrome  But no improvement with midodrine/octreotide/IV albumin  Discussed overall prognosis given liver cirrhosis  We will attempt tunneled dialysis catheter when platelets improve  Discussed with patient and thought process reviewed  Patient is in agreement    2. Hypotension. Continue with midodrine  3. Acute decompensated liver cirrhosis with ascites. Appreciate GI input. They have reviewed with patient his prognosis given his underlying liver issues  Bilirubin and INR trending up  MELD score noted  4. Liver cirrhosis with ascites  5.. Epistaxis: s/p packing, at high risk for rebleeding per ENT  6. Chronic hypotension: Continue with midodrine  7. History of A. Fib  8. Chronic thrombocytopenia  9. Hyperammonemia: On lactulose and rifaximin  10. Constipation  11. Anemia in CKD. Continue with Retacrit  12. Mild hyponatremia secondary to cirrhosis. Will monitor  13. Thrombocytopenia worsening  14. Skin rash: ?  Trial of steroids. D/W hospitalist yesterday.   I will start patient on quick tapering course of oral prednisone        Yvonne Mckeon MD  [facility]

## 2021-05-03 NOTE — FLOWSHEET NOTE
05/03/21 1127   Vital Signs   /62   Temp 97.9 °F (36.6 °C)   Pulse 96   Resp 18   Weight 198 lb (89.8 kg)   Weight Method Bed scale   Percent Weight Change -0.21   Post-Hemodialysis Assessment   Post-Treatment Procedures Blood returned;Catheter Capped, clamped with Saline x2 ports   Machine Disinfection Process Exterior Machine Disinfection   Rinseback Volume (ml) 400 ml   Total Liters Processed (l/min) 52.2 l/min   Dialyzer Clearance Lightly streaked

## 2021-05-03 NOTE — CONSULTS
[facility]    Patient - Ambrosio Mullen   MRN -  [id number]   [facility] - [de-identified]   - 1965      Date of Admission -  2021 10:30 AM  Date of evaluation -  5/3/2021  Room - [facility] Day - 10  Referred by- Graham Beck MD Primary Care Physician - Fernie Lockett MD       Reason for Consult    [address] Problems    Diagnosis Date Noted    Urinary retention [R33.9]     Epistaxis [R04.0]     KARAN (acute kidney injury) ([address] [N17.9] 2021    Chronic liver failure without hepatic coma ([address] [K72.10] 2020    Acute kidney injury superimposed on CKD ([address] [N17.9, N18.9] 10/03/2020     HPI   Ambrosio Mullen is a 55 y.o. male admitted for KARAN. The patient presented to the ED on 21 with KARAN, weakness and falls. The patient was scheduled to see Nephrology on 21 but lab work completed on 21 showed creatinine 5.5 compared to 2.2 previously. The patient was admitted under the Hospitalist Service and Nephrology was consulted. KARAN on CKD, stage III was felt to be multifactorial from possible hepatorenal syndrome, diarrhea, poor oral intake and hypotension. The patient required dialysis on 21 due to worsening renal function. ENT was consulted on 21 due to persistent recurrent left sided epistaxis and was treated with dissolvable packing. GI was consulted as the patient has history alcoholic cirrhosis with history of recurrent right hepatic hydrothorax. The patient had been recently paracentesis prior to admission. Urology was consulted as the patient had bladder scan and greater than 999 mL with 1200 mL drained via straight cath. Harrell catheter was inserted with suspicious of neurogenic bladder. On 21 the patient was noted to have worsening thrombocytopenia, elevated INR and bilirubin. He was given 1 unit of FFP and platelet transfusion.    Hematology/Oncology consult was requested today on 5/3/21 due to DIC. The patient is currently resting in bed, his brother is at the bedside. The patient reports since initiating dialysis he has been feeling better. He affirms chronic fatigue. He denies fever, chills, congestion, cough, chest pain, SOB, abdominal pain, nausea or vomiting. He affirms abdominal fullness/bloating which is chronic and unchanged. He affirms chronic diarrhea related to lactulose. He is noted to have erythematous, plaquing rash to bilateral lower extremities and abdomen. He reports the rash began several days ago. He denies epistaxis over the last few days. Denies hemoptysis, hematemesis, melena, hematochezia or hematuria.         Meds    Current Medications    predniSONE  60 mg Oral Daily    Followed by   Stephanie Aldana ON 5/5/2021] predniSONE  40 mg Oral Daily    Followed by   Stephanie Aldana ON 5/7/2021] predniSONE  20 mg Oral Daily    Followed by   Stephanie Aldana ON 5/9/2021] predniSONE  10 mg Oral Daily    Followed by   Stephanie Aldana ON 5/11/2021] predniSONE  5 mg Oral Daily    hydrocortisone   Topical BID    metoprolol tartrate  50 mg Oral BID    lactulose  45 g Oral TID    sodium bicarbonate  1,300 mg Oral TID    epoetin sharonda-epbx  3,000 Units Subcutaneous Once per day on Mon Wed Fri    octreotide  100 mcg Subcutaneous Q8H    amiodarone  200 mg Oral Daily    famotidine  20 mg Oral Daily    gabapentin  200 mg Oral BID    midodrine  15 mg Oral TID    rifaximin  550 mg Oral BID    tiotropium  2 puff Inhalation Daily    insulin lispro  0-12 Units Subcutaneous TID WC    insulin lispro  0-6 Units Subcutaneous Nightly    sodium chloride flush  5-40 mL Intravenous 2 times per day     sodium chloride, sodium chloride, sodium chloride, sodium chloride, sodium chloride, sodium chloride, calcium carbonate, albuterol, glucose, dextrose, glucagon (rDNA), dextrose, sodium chloride flush, sodium chloride, promethazine **OR** ondansetron, acetaminophen **OR** acetaminophen  IV Drips/Infusions   sodium chloride      sodium chloride      sodium chloride      sodium chloride      sodium chloride      dextrose      sodium chloride       Past Medical History         Diagnosis Date    Advanced cirrhosis of liver (HCC)     Alcoholic cirrhosis (HCC)     Atrial fibrillation (HCC)     CHF (congestive heart failure) (HCC)     Chronic kidney disease     COPD (chronic obstructive pulmonary disease) (HCC)     Diabetes mellitus [facility]     Gallstones     GERD (gastroesophageal reflux disease)     Headache     Hydrothorax     Portal hypertension ([facility]       Past Surgical History           Procedure Laterality Date    APPENDECTOMY      HERNIA REPAIR      LUNG SURGERY Right 20, 20    [facility]     Diet    DIET LOW SODIUM 2 GM;  Dietary Nutrition Supplements: Protein Modular  Allergies    Patient has no known allergies.   Social History     Social History     Socioeconomic History    Marital status: Single     Spouse name: Not on file    Number of children: Not on file    Years of education: Not on file    Highest education level: Not on file   Occupational History    Not on file   Social Needs    Financial resource strain: Not on file    Food insecurity     Worry: Not on file     Inability: Not on file    Transportation needs     Medical: Not on file     Non-medical: Not on file   Tobacco Use    Smoking status: Former Smoker     Packs/day: 1.00     Years: 29.00     Pack years: 29.00     Types: Cigarettes     Quit date: 2020     Years since quittin.4    Smokeless tobacco: Never Used   Substance and Sexual Activity    Alcohol use: Not Currently     Frequency: 4 or more times a week     Comment: No alcohol since 20    Drug use: Never    Sexual activity: Not on file   Lifestyle    Physical activity     Days per week: Not on file     Minutes per session: Not on file    Stress: Not on file   Relationships    Social connections     Talks on phone: Not on file     Gets together: Not on 05/03/21  0338 05/03/21  1408   WBC 7.0 8.1  --  6.5  --    RBC 2.59* 2.22*  --  2.50*  --    HGB 8.6* 7.1* 8.2* 8.1* 8.0*   HCT 25.9* 21.8* 24.2* 24.9* 24.3*   .0* 98.2*  --  99.6*  --    MCH 33.2* 32.0  --  32.4  --    MCHC 33.2 32.6  --  32.5  --    PLT 42* 30* 45* 44* 42*   MPV 12.0 11.1  --  11.1  --       BMP  Recent Labs     04/30/21 2348 05/01/21 0524 05/02/21 0353 05/03/21 0338   NA  --  128* 134* 130*   K 3.6 4.1 3.6 3.6   CL  --  91* 98 94*   CO2  --  22* 22* 22*   BUN  --  38* 25* 28*   CREATININE  --  5.1* 4.0* 4.2*   GLUCOSE  --  148* 110* 126*   MG 1.9  --   --   --    CALCIUM  --  8.9 9.0 8.8     LFT  Recent Labs     05/01/21 0524 05/02/21 0353 05/03/21  0338   AST 32 29 38   ALT 17 15 18   BILITOT 6.6* 6.4* 7.4*   ALKPHOS 112 92 96     INR  Recent Labs     05/01/21 0524 05/02/21 0353 05/03/21  0338   INR 2.36* 2.75* 2.21*     PTT  Recent Labs     05/02/21 2219 05/03/21  1256   APTT 52.3* 48.7*       Radiology        Ir Tracy Zia Cva Device Plmt/replace/removal    Result Date: 4/28/2021  NON-TUNNELED DIALYSIS CATHETER INSERTION: CLINICAL INFORMATION: Renal failure PERFORMED BY: Baldomero Jiménez M.D. APPROACH : Right Internal Jugular Vein, ultrasound guidance, micropuncture technique. A single permanent sonographic image was obtained for documentation. DIALYSIS CATHETER:  14 Albanian Hemocath, 20 cm in length. DIALYSIS CATHETER TIP:  Right Atrium FLUOROSCOPY TIME: 1.1 minutes FLUOROSCOPIC IMAGES: 1 ESTIMATED BLOOD LOSS: Minimal PROCEDURE:  Signed informed consent was obtained prior to performing this procedure. The patient was not sedated during this procedure but was monitored with EKG and pulse-ox monitoring devices by a registered nurse. Following local anesthesia and utilizing MAXIMAL STERILE BARRIER TECHNIQUE, the vein listed above was punctured with a micropuncture needle, utilizing ultrasound guidance, an image was obtained confirming needle position and vessel patency. .  A .018 wire  was passed through this needle, followed by insertion of a 4 Western Michelle dilator. Following guide wire and catheter exchange, a percutaneous tract was dilated to a 14 Western Michelle size. Then, the temporary dialysis catheter was advanced over an .035 guide wire, with fluoroscopic guidance, with the tip at the location as specified above. This was all performed with fluoroscopic guidance. The hub of the catheter was then sutured to the skin with 2-0 silk suture. An antibacterial Biopatch was applied to the catheter exit site along with a sterile OpSite dressing. Status post successful nontunneled dialysis catheter insertion. **This report has been created using voice recognition software. It may contain minor errors which are inherent in voice recognition technology. ** Final report electronically signed by Dr. Bandar Simmons on 4/28/2021 3:12 PM    Us Guided Paracentesis    Result Date: 4/30/2021  PROCEDURE: Ultrasound-guided paracentesis. CLINICAL INFORMATION: Ascites. COMPARISON: Procedure dated 4/26/2021. PROCEDURE: Physician performing procedure: Dr Ramiro Zhong Informed consent signed: Yes Local Anesthetic: 2 % Lidocaine Specimen volume: 70 ml Catheter: 19 ga 5 Cook Islander Aspirated ascites volume: 2.75 liters Aspirated ascites color: Clear yellow Site of Puncture:RLQ Complications: None observed. The benefits and the risk of the procedure were explained to the patient. Signed consent was obtained. Limited ultrasound exam of the abdomen showed a moderate amount of ascites. The right side of the abdomen was prepped and draped using the usual sterile technique and the skin was anesthetized. A 5 Estonian one-step catheter was introduced to the peritoneal ascites. 2.75 L of fluid drained. The catheter was then removed. The patient tolerated the procedure well with no complications reported and was discharged from the radiology department in a stable condition. Specimen sent for laboratory studies as requested. Successful ultrasound-guided paracentesis. 2.75 L of fluid drained. **This report has been created using voice recognition software. It may contain minor errors which are inherent in voice recognition technology. ** Final report electronically signed by Dr Galina Jones on 4/30/2021 1:15 PM       Assessment/Recommendations    1. DIC   Patient with thrombocytopenia (platelet count 21,389), elevated apTT, elevated INR, decreased fibrinogen and elevated d-dimer. He has been given platelet transfusion and FFP. Dr. Rajesh Foss discussed with IM Resident and recommended cryo. 1 unit of cryo has been ordered.    -Will obtain blood cultures x2, urine with reflex to culture. Discussed with Dr. Arminda Riggins, unclear if bacterial infection contributing.    -Will obtain apTT, INR, platelet and fibrinogen in am   -Discussed with the patient and his brother at the bedside recommendation for transfer to [facility] due to coagulopathy, liver cirrhosis on transplant list with new KARAN requiring dialysis. Discussed with Dr. Nehal Rivera has been called to initiate transfer. 2. KARAN on CKD, stage III - requiring dialysis this admission. Nephrology following. Trinidad to be multifactorial from hepatorenal.  3. Acute Decompensated Alcohol Liver Cirrhosis - GI following. Has required paracentesis with most recent on 4/30/21 with 2.75L removed. Transfer to [facility] initiated per Attending team.     Case discussed with nurse and patient/family/Attending Dr. Arminda Riggins. Questions and concerns addressed.   Plan made in collaboration with Dr. Rajesh Foss    Electronically signed by   Mo Chung PA-C on 5/3/2021 at 4:14 PM

## 2021-05-03 NOTE — PROGRESS NOTES
[address]  OCCUPATIONAL THERAPY MISSED TREATMENT NOTE  [facility] RENAL TELEMETRY 6K  6K-22/022-A      Date: 5/3/2021  Patient Name: Tammy Diaz        CSN: [id number]   : 1965  (55 y.o.)  Gender: male                REASON FOR MISSED TREATMENT: Pt off floor for dialysis upon 1st attempt, pt in meeting with palliative care upon 2nd attempt. Will check back as time allows.

## 2021-05-03 NOTE — FLOWSHEET NOTE
05/03/21 0051   Provider Notification   Reason for Communication Critical Value (comment)   Provider Name Sofi Korey Mayfield   Provider Notification Physician   Method of Communication Secure Message   Response No new orders   Notification Time 0031     Message Dr. Sofi Mayfield regarding Platelets of 45, fibrinogen of 64, aPTT 52.3, d-dimer 5566, hgb 8.2, and hct 24.2. No new orders at this time.

## 2021-05-04 NOTE — PROGRESS NOTES
[address]  PHYSICAL THERAPY MISSED TREATMENT NOTE  [facility] CCU-STEPDOWN 3B    Date: 2021  Patient Name: Sylvia Live        MRN: [id number]   : 1965  (55 y.o.)  Gender: male   Referring Practitioner: STANLEY Lyon  Diagnosis: KARAN         REASON FOR MISSED TREATMENT:  Patient refused treatment. Per Pt, he is \"supposed to be getting an antibiotic soon and then a coagulant and it is time sensitive so he can leave for [facility]\". Pt refusing PT despite maximal encouragement and education on benefits of PT.      Dannie Cooper PT, DPT [id number]

## 2021-05-04 NOTE — FLOWSHEET NOTE
Tsering from [facility] called for update; updated on patient status and vital signs. Will have discharges today and either call with bed or will call for update.

## 2021-05-04 NOTE — PROGRESS NOTES
[facility]   Nephrology progress note  5/4/2021, 8:21 AM      Pt Name:    Mary Weathers  MRN:     [id number]     [place]:    1965  Admit Date:    4/23/2021 10:30 AM  Primary Care Physician:  Jr Parker MD   Room number  3B-32/032-A    Chief Complaint: Nephrology following for KARAN/CKD    Subjective:  Patient seen and examined  Ill-appearing  Not fully oriented  No acute distress  Remains severely jaundiced  Poor urine output  Primary team is planning to transfer patient to [facility]    Objective:  24HR INTAKE/OUTPUT:      Intake/Output Summary (Last 24 hours) at 5/4/2021 0821  Last data filed at 5/4/2021 0358  Gross per 24 hour   Intake 1306.48 ml   Output 85 ml   Net 1221.48 ml     I/O last 3 completed shifts: In: 1306.5 [P.O.:850; I.V.:211.5; Blood:245]  Out: 85 [Urine:85]  No intake/output data recorded. Admission weight: 237 lb (107.5 kg)  Wt Readings from Last 3 Encounters:   05/04/21 266 lb 3.2 oz (120.7 kg)   04/13/21 237 lb 4.8 oz (107.6 kg)   04/13/21 240 lb 9.6 oz (109.1 kg)     Body mass index is 34.18 kg/m².     Physical examination  VITALS:     Vitals:    05/03/21 1915 05/03/21 2001 05/03/21 2328 05/04/21 0358   BP: 99/70 107/71 108/86 107/73   Pulse: 109 104 111 93   Resp: 20 20 20 20   Temp: 98.2 °F (36.8 °C) 98.3 °F (36.8 °C) 98.5 °F (36.9 °C) 98 °F (36.7 °C)   TempSrc: Oral Oral Oral Oral   SpO2:  96% 96% 96%   Weight:    266 lb 3.2 oz (120.7 kg)   Height:         General Appearance: alert and cooperative with exam, appears comfortable, no distress  Visibly jaundiced, icteric sclera  Mouth/Throat: Oral mucosa moist  Neck: No JVD  Lungs: Diminished  Heart:  S1, S2 heard  GI: Distended abdomen  Skin: erythematous rash, somewhat improved    Lab Data  CBC:   Recent Labs     05/02/21  0353 05/02/21  0353 05/03/21  0338 05/03/21  1408 05/04/21  0354   WBC 8.1  --  6.5  --  7.0   HGB 7.1*   < > 8.1* 8.0* 7.5*   HCT 21.8*   < > 24.9* 24.3* 23.6*   PLT 30*   < > 44* 42* 39*    < > = values in this interval not displayed.      BMP:  Recent Labs     05/02/21  0353 05/03/21  0338 05/04/21  0354   * 130* 130*   K 3.6 3.6 3.8   CL 98 94* 93*   CO2 22* 22* 21*   BUN 25* 28* 21   CREATININE 4.0* 4.2* 3.9*   GLUCOSE 110* 126* 154*   CALCIUM 9.0 8.8 9.2   MG  --   --  1.8   PHOS  --   --  3.8     Hepatic:   Recent Labs     05/02/21  0353 05/03/21  0338 05/04/21 0354   LABALBU 3.3* 3.2* 3.4*   AST 29 38 30   ALT 15 18 16   BILITOT 6.4* 7.4* 7.5*   ALKPHOS 92 96 99         Meds:  Infusion:    sodium chloride      sodium chloride      sodium chloride Stopped (05/04/21 0821)    sodium chloride      sodium chloride      sodium chloride      sodium chloride      dextrose      sodium chloride       Meds:    predniSONE  60 mg Oral Daily    Followed by   Elsie Herring ON 5/5/2021] predniSONE  40 mg Oral Daily    Followed by   Elsie Herring ON 5/7/2021] predniSONE  20 mg Oral Daily    Followed by   Elsie Herring ON 5/9/2021] predniSONE  10 mg Oral Daily    Followed by   Elsie Herring ON 5/11/2021] predniSONE  5 mg Oral Daily    piperacillin-tazobactam  2,250 mg Intravenous Q8H    vancomycin (VANCOCIN) intermittent dosing (placeholder)   Other RX Placeholder    hydrocortisone   Topical BID    metoprolol tartrate  50 mg Oral BID    lactulose  45 g Oral TID    sodium bicarbonate  1,300 mg Oral TID    epoetin sharonda-epbx  3,000 Units Subcutaneous Once per day on Mon Wed Fri    octreotide  100 mcg Subcutaneous Q8H    amiodarone  200 mg Oral Daily    famotidine  20 mg Oral Daily    gabapentin  200 mg Oral BID    midodrine  15 mg Oral TID    rifaximin  550 mg Oral BID    tiotropium  2 puff Inhalation Daily    insulin lispro  0-12 Units Subcutaneous TID WC    insulin lispro  0-6 Units Subcutaneous Nightly    sodium chloride flush  5-40 mL Intravenous 2 times per day     Meds prn: sodium chloride, sodium chloride, sodium chloride, sodium chloride, sodium chloride, sodium chloride, sodium chloride, sodium chloride, calcium carbonate, albuterol, glucose, dextrose, glucagon (rDNA), dextrose, sodium chloride flush, sodium chloride, promethazine **OR** ondansetron, acetaminophen **OR** acetaminophen       Impression and Plan:  1. KARAN on CKD 3B in setting of acute decompensated liver cirrhosis with ascites  Cannot rule out hepatorenal syndrome  But no improvement with midodrine/octreotide/IV albumin  Initiated on hemodialysis via temporary catheter  No suggestion of renal recovery at this time  Tunneled dialysis catheter placement on hold until active issues have stabilized  Remains severely thrombocytopenic  Informed by medical resident yesterday that hematology recommends holding off dialysis catheter. 2.  Hypotension. Continue with midodrine  3. Acute decompensated liver cirrhosis with ascites. Appreciate GI input. They have reviewed with patient his prognosis given his underlying liver issues, Bilirubin and INR trending up, MELD score noted  4. Liver cirrhosis with ascites  5.. Epistaxis: s/p packing, at high risk for rebleeding per ENT  6. Chronic hypotension: Continue with midodrine  7. History of A. Fib  8. Anemia and thrombocytopenia. Hematology following  9. Hyperammonemia: On lactulose and rifaximin  10. Anemia in CKD. Continue with Retacrit  11. Mild hyponatremia secondary to cirrhosis.   Will monitor        Gustavo Cantu MD  [facility]

## 2021-05-04 NOTE — CARE COORDINATION
5/4/21, 1:47 PM EDT    DISCHARGE ON GOING [employer] 12 day: 11  Location: -32/032-A Reason for admit: KARAN (acute kidney injury) [facility]) [N17.9]   Procedure:   4/25 US guided Thoracentesis - 1.1L drained.   4/26 US guided Paracentesis - 3L removed.   4/28 Temporary dialysis cath placement planned. 4/29 left nasal passage cauterization with packing  4/30 US guided Paracentesis - 2.75L removed.   Barriers to Discharge: Transferred from [address] [place] [address] to  due to possible DIC and needing to be transferred to [facility]. Transfer in process by physician, awaiting bed. PT/OT. Palliative Care following. Nephrology following with Hospitalist. Sodium 130, chloride 93, CO2 21, BUN 21, creatinine 3.9, Total protein 5.2. Hgb 7.5, platelets 39. UA sent this am, large amount of blood, trace ketones, moderate leukocytes, WBC >200 with clumps, few bacteria. Plan to transfuse a unit of cryoprecipitate. Lactulose. Diabetes management. Midodrine. Prednisone tapering dose. Rifaximin. Sodium Bicarb tabs. PCP: Gregorio Levine MD  Readmission Risk Score: 52%  Patient Goals/Plan/Treatment Preferences: Pt lives at home alone in apartment at [facility]. Dialysis application started for [facility], but awaiting transfer to [facility].

## 2021-05-04 NOTE — PROGRESS NOTES
Gastroenterology Progress Note:     Patient Name:  Otilio Carrasquillo   MRN: [id number]  [id number]  YOB: 1965  Admit Date: 4/23/2021 10:30 AM  Primary Care Physician: Maddison Huston MD   [id number]     Patient seen and examined. 24 hours events and chart reviewed. Subjective: Patient resting in bed, he is lethargic. Denies abdominal pain, but feels like \"he is filling up. \" Denies nausea and vomiting. He is waiting for a bed at [facility]. Objective:  /89   Pulse 117   Temp 97.6 °F (36.4 °C) (Oral)   Resp 20   Ht 6' 2\" (1.88 m)   Wt 266 lb 3.2 oz (120.7 kg)   SpO2 98%   BMI 34.18 kg/m²     Physical Exam:    General:  Very acute on chronically ill appearing male  HEENT: Atraumatic, normocephalic. Moist oral mucous membranes. Scleral icterus. Neck: Supple without adenopathy, JVD, thyromegaly or masses. Trachea midline. CV: Heart RRR, no murmurs, rubs, gallops. Resp: Even, easy without cough or accessory use. Lungs clear to ascultation bilaterally. Abd: Round, soft, nontender. No hepatosplenomegaly or mass present. Active bowel sounds heard. Mild distention noted. Ext:  Without cyanosis, clubbing, edema. Skin: Jaundice, warm, dry  Neuro:  Alert, oriented x 3 with no obvious deficits.        Rectal: deferred    Labs:   CBC:   Lab Results   Component Value Date    WBC 7.0 05/04/2021    HGB 7.5 05/04/2021    HCT 23.6 05/04/2021    MCV 99.2 05/04/2021    PLT 39 05/04/2021     BMP:   Lab Results   Component Value Date     05/04/2021    K 3.8 05/04/2021    K 3.0 04/24/2021    CL 93 05/04/2021    CO2 21 05/04/2021    PHOS 3.8 05/04/2021    BUN 21 05/04/2021    CREATININE 3.9 05/04/2021    CALCIUM 9.2 05/04/2021     PT/INR:   Lab Results   Component Value Date    INR 2.37 05/04/2021     Lipids:   Lab Results   Component Value Date    ALKPHOS 99 05/04/2021    ALT 16 05/04/2021    AST 30 05/04/2021    BILITOT 7.5 05/04/2021    BILIDIR 2.6 04/29/2021    LABALBU 3.4 05/04/2021 Significant Diagnostic Studies:   CXR 05/03/21      Impression   Moderate right pleural effusion with adjacent atelectasis. Current Meds:  Scheduled Meds:   lactulose  30 g Oral Once    [START ON 5/5/2021] predniSONE  40 mg Oral Daily    Followed by   Omer Vaca ON 5/7/2021] predniSONE  20 mg Oral Daily    Followed by   Omer Vaca ON 5/9/2021] predniSONE  10 mg Oral Daily    Followed by   Omer Vaca ON 5/11/2021] predniSONE  5 mg Oral Daily    piperacillin-tazobactam  2,250 mg Intravenous Q8H    vancomycin (VANCOCIN) intermittent dosing (placeholder)   Other RX Placeholder    hydrocortisone   Topical BID    metoprolol tartrate  50 mg Oral BID    lactulose  45 g Oral TID    sodium bicarbonate  1,300 mg Oral TID    epoetin sharonda-epbx  3,000 Units Subcutaneous Once per day on Mon Wed Fri    octreotide  100 mcg Subcutaneous Q8H    amiodarone  200 mg Oral Daily    famotidine  20 mg Oral Daily    gabapentin  200 mg Oral BID    midodrine  15 mg Oral TID    rifaximin  550 mg Oral BID    tiotropium  2 puff Inhalation Daily    insulin lispro  0-12 Units Subcutaneous TID WC    insulin lispro  0-6 Units Subcutaneous Nightly    sodium chloride flush  5-40 mL Intravenous 2 times per day     Continuous Infusions:   sodium chloride      sodium chloride      sodium chloride Stopped (05/04/21 0821)    sodium chloride      sodium chloride      sodium chloride      sodium chloride      dextrose      sodium chloride         Assessment:  56 yo M admitted 04/23/21 for falls secondary to dizziness secondary to hypotension. He is on Midodrine. Noted to have worsening creatinine on OP labs. Started on dialysis 04/28/21. H/O alcoholic cirrhosis with ascites & recurrent right hepatic hydrothorax. He also follows at [facility]. Last thoracentesis 04/25/21 with 1.1 L removed. Last paracentesis 04/26/21 with 3L removed, no labs done.  He is not a candidate for TIPS procedure per [facility]. Noted to have recurrent epistaxis which has required cauterization in the past. Last EGD 11/30/2020, last colonoscopy 12/30/2020. ENT consulted for recurrent epistaxis. Left nare cauterized, packed, & taped shut by ENT. Repeat paracentesis 04/30/2 with 2.75L removed, negative for SBP  1. KARAN on CKD- started on dialysis  2. Acute decompensated alcoholic liver cirrhosis- s/p paracentesis 04/26/21 with no labs done & 04/30/20 negative for SBP- MELD 37  3. Recurrent right hepatic hydrothorax- s/p thoracentesis 04/25/21  4. Afib  5. Chronic hypotension with orthostatic hypotension- on Midodrine  6. Recurrent falls  7. Hyponatremia  8. Hypoalbuminemia  9. Hyperbilirubinemia  10. Chronic anemia  11. Thrombocytopenia- worsening, s/p 2 units platelets   12. Coagulopathy- worsening  13. Pruritic rash to BUE & BLE       Plan:    · Monitor H & H, transfuse prn  · Nursing to monitor stool output & document  · PT & OT on board  · Continue Lactulose to 30g TID, home dose  · Give an additional dose of Lactulose for lethargy  · Continue Midodrine  · Continue Xifaxan  · Continue Pepcid  · Octreotide per nephrology  · Monitor HFP & INR daily  · Continue Hydrocortisone cream BID to affected areas  · Avoid hepatotoxic meds  · Avoid narcotics & benzodiazepines as they can precipitate HE  · Low sodium diet  · Daily weights  · Strict I  & O  · Electrolyte management per nephrology  · Tunneled dialysis catheter to be placed when platelet count 50 or higher per nephrology  · Nephrology, ENT, & hematology on board  · Patient remains at high risk for bleeding given worsening thrombocytopenia & coagulopathy  · Waiting for bed at [address] [place] [address] for DIC, ESLD, & ARF. Unfortunately he has a very poor prognosis and will not survive without a liver transplant.    · Supportive care per primary team  Will follow    Case reviewed and impression/plan reviewed in collaboration with Dr. Angel Luis Lacey  Electronically signed by FELIX Flores CNP on 5/4/2021 at 2:14 PM    [facility]

## 2021-05-04 NOTE — PROGRESS NOTES
[facility] 61  INPATIENT OCCUPATIONAL THERAPY  [facility]-STEPDOWN 3B  EVALUATION    Time:   Time In: 1567  Time Out: 1108  Timed Code Treatment Minutes: 38 Minutes  Minutes: 49          Date: 2021  Patient Name: Sylvia Live,   Gender: male      MRN: [id number]  : 1965  (55 y.o.)  Referring Practitioner: STANLEY Lyon  Diagnosis: KARAN  Additional Pertinent Hx: Sylvia Live is a 55 y.o. male who presents for abnormal lab values and weakness yesterday. Patient states he was here yesterday for labs, albumin, paracentesis, and chest x-ray. He states he fell 3 times throughout the day, the last of which was at home and he believes it was a syncopal event and he hit his head. He states he is constantly lightheaded and weak with atrophied legs due to his recent admission to [facility]. Patient states his paracentesis went well yesterday but he is more jaundiced than is typical. He states he took his midodrine and metoprolol this morning but did not take his lactulose which is typically TID. He was supposed to see nephrology (Dr. Satish Bethea) this morning but they called once they saw his labs from yesterday and told him to come to the ED. Restrictions/Precautions:  Restrictions/Precautions: Fall Risk, Contact Precautions  Position Activity Restriction  Other position/activity restrictions: monitor orthostatics (+ on 21), monitor HR (in 120s-130s at rest on 21), hemodialysis pt    Subjective  Patient assessed for rehabilitation services?: Yes    Subjective: Pleasant and cooperative, highly talkative    Pain:  Pain Assessment  Patient Currently in Pain: Denies      Social/Functional History:  Lives With: Alone  Type of Home: Apartment  Home Layout: One level  Home Access: Level entry  Home Equipment: 4 wheeled walker, Rolling walker   Bathroom Equipment: Toilet raiser, Tub transfer bench  IADL Comments: brother lives nearby and is able to help at times.     Receives Help From: Home health  ADL Assistance: Independent  Homemaking Assistance: Independent  Homemaking Responsibilities: Yes  Ambulation Assistance: Independent  Transfer Assistance: Independent    Active : No  Occupation: On disability  Additional Comments: pt states he is supposed to get fitted for a w/c while he is here; pt states he has been getting lightheaded and can feel a fall coming on, but if a chair is not available he falls; pt was hospitalized at [place] for 30+ days in January 2021 and states he atrophied significantly during that time    VISION:WFL    HEARING:  WFL    COGNITION: Slow Processing    RANGE OF MOTION:  Bilateral Upper Extremity:  WFL    STRENGTH:  Bilateral Upper Extremity:  deconditioning noted    SENSATION:   WFL    ADL:   Lower Extremity Dressing: Maximum Assistance. slippers. BALANCE:  Sitting Balance:  Supervision. Standing Balance: Contact Guard Assistance. standing at EOB x 5 min     BED MOBILITY:  Supine to Sit: Minimal Assistance    Sit to Supine: Minimal Assistance    Scooting: Dependent to scoot higher in bed with hercules mattress    TRANSFERS:  Sit to Stand:  Minimal Assistance. Stand to Sit: Contact Guard Assistance. FUNCTIONAL MOBILITY:  Assistive Device: handheld assist (no walker available)  Assist Level:  Contact Guard Assistance. Distance: 4 feet forward and backwards. unsteady    Exercise:  Pt. completed BUE strengthening exercises x10 reps x 1 set/s using min resistance band in all joints/planes. and All exercises completed to increase strength and endurance required for ADLs. Provided pt with UB and LB HEP per family request. Education provided on HEP. Activity Tolerance:  Patient tolerance of  treatment: good. Pt is highly talkative and also emotional throughout session. OTR providing support. Assessment:    Pt s/p admission for KARAN. Pt exhibiting deficits detailed below, requiring additional OT intervention to restore independent PLOF.  Pt now requiring assist for ADLs, IADLs, mobility, t/fs, and standing balance. Pt with significant increase in burden of care. Without skilled OT intervention pt at risk for functional decline, increased falls, and readmission to hospital.    Performance deficits / Impairments: Decreased functional mobility , Decreased safe awareness, Decreased balance, Decreased ADL status, Decreased endurance, Decreased strength  Prognosis: Good  REQUIRES OT FOLLOW UP: Yes    Treatment Initiated: Treatment and education initiated within context of evaluation. Evaluation time included review of current medical information, gathering information related to past medical, social and functional history, completion of standardized testing, formal and informal observation of tasks, assessment of data and development of plan of care and goals. Treatment time included skilled education and facilitation of tasks to increase safety and independence with ADL's for improved functional independence and quality of life. Discharge Recommendations:  Continue to assess pending progress    Patient Education:  OT Education: OT Role, Plan of Care, ADL Adaptive Strategies, Transfer Training    Equipment Recommendations:  Equipment Needed: Yes  Other: Darylelorena Lopes? Plan:  Times per week: 3-5x  Current Treatment Recommendations: Strengthening, Balance Training, Functional Mobility Training, Endurance Training, Self-Care / ADL, Patient/Caregiver Education & Training, Safety Education & Training. See long-term goal time frame for expected duration of plan of care. If no long-term goals established, a short length of stay is anticipated. Goals:     Short term goals  Time Frame for Short term goals: By discharge  Short term goal 1: Pt will complete functional mobility to/from BR with AD and S to increase indep with accessing environment for ADLs.   Short term goal 2: Pt will complete dynamic standing task x 5 min with B hand release and S to increase balance required for grooming. Short term goal 3: Pt will complete LB ADL with [place] to increase indep with self care. Following session, patient left in safe position with all fall risk precautions in place.

## 2021-05-04 NOTE — FLOWSHEET NOTE
05/04/21 5068   Provider Notification   Reason for Communication Critical Value (comment)   Provider Name Monica Garcia   Provider Notification Advance Practice Clinician (CNS, NP, CNM, CRNA, PA)   Method of Communication Secure Message   Response Waiting for response   Notification Time 5380   here from 4/23/21 with liver failure. Working on transfer to [facility] hepatic unit as possibly has DIC now. Had bloody nose yesterday which was cauterized & packed. No further bleeding noted. I gave Cryoprecipitate last night and fibrinogen level this morning is 79.

## 2021-05-04 NOTE — CARE COORDINATION
5/4/21, 7:51 AM EDT    DISCHARGE BARRIERS        Patient transferred to 3B. Report given to unit SW, Joanie, regarding discharge plan for this patient.

## 2021-05-04 NOTE — PROGRESS NOTES
PROGRESS NOTE      Patient:  Kimmie Stone      Unit/Bed:3B-32/032-A    YOB: 1965    MRN: [id number]       Acct: [de-identified]     PCP: Jeramy King MD    Date of Admission: 4/23/2021      Assessment/Plan:    Acute Decompensated Liver Cirrhosis with ascites   - hx of alcoholic cirrhosis, follows up in [address] [place] [address], on the transplant list and has follow up in May  - has ascites s/p paracentesis  - GI consulted, continue lactulose and xifaxan  - MELD-na 38, considered high mortality in the next 3 months  - Bilirubin and INR trending up. - worsening thrombocytopenia  - explained poor prognosis to patient. Agreed to Palliative consult  - Accepted to [address] [place] [address], waiting on a bed     KARAN on CKD III, t/c hepatorenal syndrome  - nephrology following  - octreotide started per nephro  - patient started on hemodialysis  - Tunneled dialysis catheter placement on hold until improvement with coagulopathy    Coagulopathy 2/2 Acute DIC  - Underlying cause unclear  - Plates 462 on admission. Down trend since. Now 44. Blood smear showing schistocytes   - Bleeding around tessio, bleeding from nose, some blood in reese  - Did receive 1 unit of FPP and platelet on 15/67. Received another during dialysis 05/04  - Fibrinogen low at 64  - INR prolonged at 2.21, aPTT prolonged at 52. 3. Of note PT/aPTT were prolonged in the past at 1.80 and 58.7 12/12/20. D-dimer elevated at 5566, was 4341 11/21/20.   -Repeat fibrinogen post dialysis 05/03 may need cryo if continues to be low  - Hem/onc consulted. - Rule out underlying infectious cause. UA, CXR negative for infection, Blood cx pending     05/04- Did receive a unit of cryo 05/03. Repeat fibrinogen remains low, PT, aPTT prolonged, platelets 39. Another unit of cryo ordered. Continue to monitor coags and platelets.  Hem/onc following     Acute on Chronic Thrombocytopenia  - Due to hepatic failure and DIC  - Platelets continue to trend down with recurrent epistaxis and a drop in hgb on 5/2  - s/p 2 units of platelets. Remains low at 44   - Hem/onc following     Recurrent right sided pleural effusion  - Demonstrated CXR 05/03  - Likely hydrothorax from ESLD  - S/P drainage in the past  - Patient currently without acute respiratory distress     Acute on Chronic macrocytic Anemia, severe  - Secondary to DIC, ESLD, KARAN  - Hgb stable at 8.1 s/p 1 U PRBC 05/03 for a Hgb of 7.1  - INR elevated  - has recurrent epistaxis, denies melena or hematochezia  - Continue to monitor, transfuse PRN    Acute Hepatic Encephalopathy, resolved  - secondary to above  - ammonia level is stable    Acute Urinary retention  - Urology consulted  - Renal Ultrasound 04/26 showed no evidence of hydronephrosis or obstruction  - reese cath inserted, likely to go home with reese and follow up as outpatient      Chronic Hypotension   - on midodrine     History of Atrial Fibrillation  - on amiodarone  - metoprolol with holding parameters     Hyperammonemia, resolved  - on lactulose, rifaximin     Neuropathy  - on gabapentin     Recurrent epistaxis  - ENT following, status post nasal packing  - patient at risk for life-threatening bleed given low platelet count  - platelet transfusion given 05/03 and 05/04     Chief Complaint: Abnormal labs    Hospital Course:    Per H&P     \"55 y. o. male who presented to [facility] with abnormal lab; patient has an extensive medical history including cirrhosis of the liver, alcoholic cirrhosis, atrial fibrillation, diabetes, portal hypertension; he states it has been 6 months today since he has had any alcohol or tobacco products; he presented to the ED today secondary to his nephrologist office contacting him for a creatinine of 5.5; he had a paracentesis done 04/22/21 that drained 2.1 L; he states yesterday he fell 4 times; he relates to constant lightheadedness and weakness, he is on midodrine for chronic hypotension however he did take his midodrine today in addition to his Lopressor.     Currently he is alert and talkative, he is jaundiced, he denies any pain, he does relate to shortness of breath, he does relate to his chronic lightheadedness and dizziness; states his legs are extremely swollen and that it is difficult for him to get his shoes on.     In the emergency department, he had a sodium level at 128, potassium 3.1, CO2 of 17, BUN 54, creatinine 5.1, lactic acidosis at 4.0, troponin T of 0.100, he relates to elevated LFTs as well; CBC shows mild anemia and mild thrombocytopenia at 108; his chest x-ray showing a large right pleural effusion; he is being admitted to the hospital service for further care and evaluation. \"     Subjective: The patient was seen and examined at the bedside after he completed dialysis. He has no signs of acute cardiopulmonary distress. His blood pressure is soft around 110/89, heart rate 117, afebrile, rest of vitals within normal limits. He is still agreeing with transfer to [address] [place] [address] when a bed becomes available. Has no other complaints at the moment.     Medications:  Reviewed    Infusion Medications    sodium chloride      sodium chloride      sodium chloride Stopped (05/04/21 0821)    sodium chloride      sodium chloride      sodium chloride      sodium chloride      dextrose      sodium chloride       Scheduled Medications    [START ON 5/5/2021] predniSONE  40 mg Oral Daily    Followed by   Wylene Socks ON 5/7/2021] predniSONE  20 mg Oral Daily    Followed by   Wylene Socks ON 5/9/2021] predniSONE  10 mg Oral Daily    Followed by   Wylene Socks ON 5/11/2021] predniSONE  5 mg Oral Daily    piperacillin-tazobactam  2,250 mg Intravenous Q8H    vancomycin (VANCOCIN) intermittent dosing (placeholder)   Other RX Placeholder    hydrocortisone   Topical BID    metoprolol tartrate  50 mg Oral BID    lactulose  45 g Oral TID    sodium bicarbonate  1,300 mg Oral TID    epoetin sharonda-epbx  3,000 Units Subcutaneous Once per day on Mon Wed Fri    octreotide 100 mcg Subcutaneous Q8H    amiodarone  200 mg Oral Daily    famotidine  20 mg Oral Daily    gabapentin  200 mg Oral BID    midodrine  15 mg Oral TID    rifaximin  550 mg Oral BID    tiotropium  2 puff Inhalation Daily    insulin lispro  0-12 Units Subcutaneous TID WC    insulin lispro  0-6 Units Subcutaneous Nightly    sodium chloride flush  5-40 mL Intravenous 2 times per day     PRN Meds: sodium chloride, sodium chloride, sodium chloride, sodium chloride, sodium chloride, sodium chloride, sodium chloride, sodium chloride, calcium carbonate, albuterol, glucose, dextrose, glucagon (rDNA), dextrose, sodium chloride flush, sodium chloride, promethazine **OR** ondansetron, acetaminophen **OR** acetaminophen      Intake/Output Summary (Last 24 hours) at 5/4/2021 1306  Last data filed at 5/4/2021 0358  Gross per 24 hour   Intake 461.48 ml   Output 85 ml   Net 376.48 ml       Diet:  DIET LOW SODIUM 2 GM;  Dietary Nutrition Supplements: Protein Modular    Exam:  /89   Pulse 117   Temp 97.6 °F (36.4 °C) (Oral)   Resp 20   Ht 6' 2\" (1.88 m)   Wt 266 lb 3.2 oz (120.7 kg)   SpO2 98%   BMI 34.18 kg/m²     General appearance: No apparent distress, appears stated age and cooperative. jaundiced  HEENT: Pupils equal, round, and reactive to light. Icteric sclerae  Neck: Supple, with full range of motion. No jugular venous distention. Trachea midline. Respiratory:  Normal respiratory effort. Clear to auscultation, bilaterally without Rales/Wheezes/Rhonchi. Cardiovascular: Regular rate and rhythm with normal S1/S2 without murmurs, rubs or gallops. Abdomen: Soft, non-tender, non-distended with normal bowel sounds. Musculoskeletal: B/L lower extremity edema   Skin: jaundiced, with diffused erythematous rashes and petechiae. Ecchymosis right knee. Neurologic:  Neurovascularly intact without any focal sensory/motor deficits.  Cranial nerves: II-XII intact, grossly non-focal.  Psychiatric: Alert and oriented, thought content appropriate, normal insight  Capillary Refill: Brisk,< 3 seconds   Peripheral Pulses: +2 palpable, equal bilaterally       Labs:   Recent Labs     05/02/21  0353 05/02/21  0353 05/03/21  0338 05/03/21  1408 05/04/21  0354   WBC 8.1  --  6.5  --  7.0   HGB 7.1*   < > 8.1* 8.0* 7.5*   HCT 21.8*   < > 24.9* 24.3* 23.6*   PLT 30*   < > 44* 42* 39*    < > = values in this interval not displayed. Recent Labs     05/02/21  0353 05/03/21  0338 05/04/21  0354   * 130* 130*   K 3.6 3.6 3.8   CL 98 94* 93*   CO2 22* 22* 21*   BUN 25* 28* 21   CREATININE 4.0* 4.2* 3.9*   CALCIUM 9.0 8.8 9.2   PHOS  --   --  3.8     Recent Labs     05/02/21  0353 05/03/21  0338 05/04/21  0354   AST 29 38 30   ALT 15 18 16   BILITOT 6.4* 7.4* 7.5*   ALKPHOS 92 96 99     Recent Labs     05/02/21  0353 05/03/21  0338 05/04/21  0354   INR 2.75* 2.21* 2.37*     No results for input(s): CKTOTAL, TROPONINI in the last 72 hours. Urinalysis:      Lab Results   Component Value Date    NITRU NEGATIVE 05/04/2021    WBCUA >200 W/CLUMPS 05/04/2021    BACTERIA FEW 05/04/2021    RBCUA > 200 05/04/2021    BLOODU LARGE 05/04/2021    SPECGRAV 1.016 04/30/2021    GLUCOSEU NEGATIVE 05/04/2021       Radiology:  XR CHEST PORTABLE   Final Result   Moderate right pleural effusion with adjacent atelectasis. **This report has been created using voice recognition software. It may contain minor errors which are inherent in voice recognition technology. **      Final report electronically signed by Dr. Blanca Steven MD on 5/3/2021 5:00 PM      [address]   Final Result      Successful ultrasound-guided paracentesis. 2.75 L of fluid drained. **This report has been created using voice recognition software. It may contain minor errors which are inherent in voice recognition technology. **            Final report electronically signed by Dr Orquidea Grant on 4/30/2021 1:15 PM      IR FLUORO GUIDED CVA DEVICE PLMT/REPLACE/REMOVAL   Final Result   Status post successful nontunneled dialysis catheter insertion. **This report has been created using voice recognition software. It may contain minor errors which are inherent in voice recognition technology. **      Final report electronically signed by Dr. Óscar Merritt on 4/28/2021 3:12 PM      US RENAL COMPLETE   Final Result   1. Normal bilateral renal ultrasound. 2. Unremarkable urinary bladder. Final report electronically signed by Dr. Sandra Garber on 4/26/2021 3:26 PM      [address]   Final Result      Successful ultrasound-guided paracentesis. 3 L of fluid drained. **This report has been created using voice recognition software. It may contain minor errors which are inherent in voice recognition technology. **            Final report electronically signed by Dr Bhaskar Dee on 4/26/2021 3:22 PM      XR CHEST 1 VIEW   Final Result   1. Patchy airspace opacities are present at the right lung base. This may represent atelectasis or pneumonia. 2. There is a small residual right-sided pleural effusion which appears improved from the prior examination following right-sided thoracentesis from earlier same day. No pneumothorax is seen. **This report has been created using voice recognition software. It may contain minor errors which are inherent in voice recognition technology. **      Final report electronically signed by Dr. Radha Hart on 4/25/2021 3:19 PM      US THORACENTESIS Which side should the procedure be performed? Right   Final Result   Status post thoracentesis            **This report has been created using voice recognition software. It may contain minor errors which are inherent in voice recognition technology. **      Final report electronically signed by Dr. Radha Hart on 4/25/2021 3:46 PM      CT HEAD WO CONTRAST   Final Result   1. No acute intracranial pathology   2.  Air-fluid level right maxillary sinus (sinusitis or blood is uncertain. 3. Dental caries. **This report has been created using voice recognition software. It may contain minor errors which are inherent in voice recognition technology. **      Final report electronically signed by Dr. Kp Frost on 4/23/2021 11:51 AM      XR CHEST PORTABLE   Final Result   Stable chest large right pleural effusion and possible underlying infiltrates            **This report has been created using voice recognition software. It may contain minor errors which are inherent in voice recognition technology. **      Final report electronically signed by Dr. Kp Frost on 4/23/2021 11:12 AM          Diet: DIET LOW SODIUM 2 GM;  Dietary Nutrition Supplements: Protein Modular    DVT prophylaxis: [] Lovenox                                 [x] SCDs                                 [] SQ Heparin                                 [] Encourage ambulation           [] Already on Anticoagulation     Disposition:    [] Home       [] TCU       [] Rehab       [] Psych       [] SNF       [] [place]       [x] Other- Transfer to [facility]    Code Status: Full Code    PT/OT Eval Status: Evaluating and treating      Electronically signed by Jessica Banks MD on 5/4/2021 at 1:06 PM

## 2021-05-05 NOTE — PROGRESS NOTES
[facility]   Nephrology progress note  5/5/2021, 10:50 AM      Pt Name:    Derrick Casarez  MRN:     [id number]     [place]:    1965  Admit Date:    4/23/2021 10:30 AM  Primary Care Physician:  Eduardo Lloyd MD   Room number  3B-32/032-A    Chief Complaint: Nephrology following for KARAN/CKD    Subjective:  Patient seen and examined  Awaiting transfer to [address] [place] [address]  Awake and alert  Appearing ill clinically  Urine output only 55 mL documented per chart  Had hemodialysis treatment on Monday    Objective:  24HR INTAKE/OUTPUT:      Intake/Output Summary (Last 24 hours) at 5/5/2021 1050  Last data filed at 5/5/2021 0343  Gross per 24 hour   Intake 1455.77 ml   Output 55 ml   Net 1400.77 ml     I/O last 3 completed shifts: In: 1695.8 [P.O.:1430; I.V.:182.2; Blood:83.6]  Out: 55 [Urine:55]  No intake/output data recorded. Admission weight: 237 lb (107.5 kg)  Wt Readings from Last 3 Encounters:   05/05/21 261 lb 6.4 oz (118.6 kg)   04/13/21 237 lb 4.8 oz (107.6 kg)   04/13/21 240 lb 9.6 oz (109.1 kg)     Body mass index is 33.56 kg/m².     Physical examination  VITALS:     Vitals:    05/04/21 1746 05/04/21 2002 05/05/21 0343 05/05/21 0841   BP: 98/72 111/82 101/76 (!) 102/91   Pulse: 102 106 101 103   Resp: 18 18  18   Temp: 97.9 °F (36.6 °C) 97.8 °F (36.6 °C) 97.9 °F (36.6 °C) 98.4 °F (36.9 °C)   TempSrc: Oral Oral Oral Oral   SpO2: 98% 98% 96% 94%   Weight:   261 lb 6.4 oz (118.6 kg)    Height:         General Appearance: alert and cooperative with exam, appears comfortable, no distress  Visibly jaundiced, icteric sclera  Mouth/Throat: Oral mucosa moist  Neck: No JVD  Lungs: Diminished  Heart:  S1, S2 heard  GI: Distended abdomen  Skin: erythematous rash, somewhat improved    Lab Data  CBC:   Recent Labs     05/03/21  0338 05/03/21  1408 05/04/21  0354 05/05/21  0414   WBC 6.5  --  7.0 6.5   HGB 8.1* 8.0* 7.5* 7.7*   HCT 24.9* 24.3* 23.6* 23.6*   PLT 44* 42* 39* 50*     BMP:  Recent Labs chloride flush, sodium chloride, promethazine **OR** ondansetron, acetaminophen **OR** acetaminophen       Impression and Plan:  1. KARAN on CKD 3B in setting of acute decompensated liver cirrhosis with ascites  Cannot rule out hepatorenal syndrome  But no improvement with midodrine/octreotide/IV albumin  Initiated on hemodialysis via temporary catheter  No suggestion of renal recovery at this time based on urine output  However his last dialysis treatment was on Monday. Creatinine yesterday was 3.9, today it is 3.8  We will hold off dialysis treatment today  Will monitor renal function  No acute need for renal replacement therapy today  Tunneled dialysis catheter placement when more stable    2. Hypotension. Continue with midodrine  3. Acute decompensated liver cirrhosis with ascites. Appreciate GI input. They have reviewed with patient his prognosis given his underlying liver issues, Bilirubin and INR trending up, MELD score noted  4. Liver cirrhosis with ascites  5.. Epistaxis: s/p packing, at high risk for rebleeding per ENT  6. Chronic hypotension: Continue with midodrine  7. History of A. Fib  8. Anemia and thrombocytopenia. Hematology following  9. Hyperammonemia: On lactulose and rifaximin  10. Anemia in CKD. Continue with Retacrit  11. Mild hyponatremia secondary to cirrhosis.   Will monitor      Joi Fitzgerald MD  [facility]

## 2021-05-05 NOTE — PROGRESS NOTES
Gastroenterology Progress Note:     Patient Name:  Nehemias Ludwig   MRN: [id number]  [id number]  YOB: 1965  Admit Date: 4/23/2021 10:30 AM  Primary Care Physician: Rafael Swift MD   3B-32/032-A     Patient seen and examined. 24 hours events and chart reviewed. Subjective: Patient resting in bed. Denies abdominal pain, nausea, & vomiting. Still waiting on a bed at [facility]. Objective:  BP (!) 102/91   Pulse 103   Temp 98.4 °F (36.9 °C) (Oral)   Resp 18   Ht 6' 2\" (1.88 m)   Wt 261 lb 6.4 oz (118.6 kg)   SpO2 94%   BMI 33.56 kg/m²     Physical Exam:    General:  Very acute on chronically ill appearing male  HEENT: Atraumatic, normocephalic. Moist oral mucous membranes. Scleral icterus. Neck: Supple without adenopathy, JVD, thyromegaly or masses. Trachea midline. CV: Heart RRR, no murmurs, rubs, gallops. Resp: Even, easy without cough or accessory use. Lungs clear to ascultation bilaterally. Abd: Round, soft, nontender. No hepatosplenomegaly or mass present. Active bowel sounds heard. Mild distention noted. Ext:  Without cyanosis, clubbing, edema. Skin: Jaundice, warm, dry. Pruritic rash to BUE & BLE. Neuro:  Alert, oriented x 3 with no obvious deficits.        Rectal: deferred    Labs:   CBC:   Lab Results   Component Value Date    WBC 6.5 05/05/2021    HGB 7.7 05/05/2021    HCT 23.6 05/05/2021    .3 05/05/2021    PLT 50 05/05/2021     BMP:   Lab Results   Component Value Date     05/05/2021    K 3.5 05/05/2021    K 3.0 04/24/2021    CL 93 05/05/2021    CO2 22 05/05/2021    PHOS 3.8 05/04/2021    BUN 27 05/05/2021    CREATININE 3.8 05/05/2021    CALCIUM 9.8 05/05/2021     PT/INR:   Lab Results   Component Value Date    INR 2.20 05/05/2021     Lipids:   Lab Results   Component Value Date    ALKPHOS 113 05/05/2021    ALT 19 05/05/2021    AST 38 05/05/2021    BILITOT 6.9 05/05/2021    BILIDIR 2.6 04/29/2021    LABALBU 3.7 05/05/2021     Current Meds:  Scheduled Meds:   albumin human  25 g Intravenous Once    predniSONE  40 mg Oral Daily    Followed by   Wylene Socks ON 5/7/2021] predniSONE  20 mg Oral Daily    Followed by   Wylene Socks ON 5/9/2021] predniSONE  10 mg Oral Daily    Followed by   Wylene Socks ON 5/11/2021] predniSONE  5 mg Oral Daily    piperacillin-tazobactam  2,250 mg Intravenous Q8H    vancomycin (VANCOCIN) intermittent dosing (placeholder)   Other RX Placeholder    hydrocortisone   Topical BID    metoprolol tartrate  50 mg Oral BID    lactulose  45 g Oral TID    sodium bicarbonate  1,300 mg Oral TID    epoetin sharonda-epbx  3,000 Units Subcutaneous Once per day on Mon Wed Fri    octreotide  100 mcg Subcutaneous Q8H    amiodarone  200 mg Oral Daily    famotidine  20 mg Oral Daily    gabapentin  200 mg Oral BID    midodrine  15 mg Oral TID    rifaximin  550 mg Oral BID    tiotropium  2 puff Inhalation Daily    insulin lispro  0-12 Units Subcutaneous TID WC    insulin lispro  0-6 Units Subcutaneous Nightly    sodium chloride flush  5-40 mL Intravenous 2 times per day     Continuous Infusions:   sodium chloride      sodium chloride      sodium chloride Stopped (05/04/21 0821)    sodium chloride      sodium chloride      sodium chloride      sodium chloride      dextrose      sodium chloride         Assessment:  54 yo M admitted 04/23/21 for falls secondary to dizziness secondary to hypotension. He is on Midodrine. Noted to have worsening creatinine on OP labs. Started on dialysis 04/28/21. H/O alcoholic cirrhosis with ascites & recurrent right hepatic hydrothorax. He also follows at [facility]. Last thoracentesis 04/25/21 with 1.1 L removed. Last paracentesis 04/26/21 with 3L removed, no labs done. He is not a candidate for TIPS procedure per [facility]. Noted to have recurrent epistaxis which has required cauterization in the past. Last EGD 11/30/2020, last colonoscopy 12/30/2020. ENT consulted for recurrent epistaxis.  Left nare cauterized, packed, & taped shut by ENT. Repeat paracentesis 04/30/2 with 2.75L removed, negative for SBP  1. KARAN on CKD- started on dialysis  2. Acute decompensated alcoholic liver cirrhosis- s/p paracentesis 04/26/21 with no labs done & 04/30/20 negative for SBP- MELD 35  3. Recurrent right hepatic hydrothorax- s/p thoracentesis 04/25/21  4. Afib  5. Chronic hypotension with orthostatic hypotension- on Midodrine  6. Recurrent falls  7. Hyponatremia  8. Hypoalbuminemia  9. Hyperbilirubinemia  10. Chronic anemia  11. Thrombocytopenia- worsening, s/p 2 units platelets   12. Coagulopathy- worsening  13. Pruritic rash to BUE & BLE        Plan:    · Monitor H & H, transfuse prn  · Nursing to monitor stool output & document  · PT & OT on board  · Continue Lactulose to 30g TID, home dose  · Give an additional dose of Lactulose for lethargy  · Continue Midodrine  · Continue Xifaxan  · Continue Pepcid  · Octreotide per nephrology  · Monitor HFP & INR daily  · Continue Hydrocortisone cream BID to affected areas  · Avoid hepatotoxic meds  · Avoid narcotics & benzodiazepines as they can precipitate HE  · US paracentesis with labs  · Albumin once with paracentesis  · Low sodium diet  · Daily weights  · Strict I  & O  · Electrolyte management per nephrology  · Tunneled dialysis catheter to be placed when platelet count 50 or higher per nephrology  · Nephrology, ENT, & hematology on board  · Patient remains at high risk for bleeding given worsening thrombocytopenia & coagulopathy  · Waiting [facility] bed at [facility] for DIC, ESLD, & ARF.   Unfortunately he has a very poor prognosis and will not survive without a liver transplant.   · Supportive care per primary team  Will follow    Case reviewed and impression/plan reviewed in collaboration with Dr. Marino Martines  Electronically signed by FELIX Pimentel CNP on 5/5/2021 at 10:11 AM     [facility]

## 2021-05-05 NOTE — FLOWSHEET NOTE
Returned phone call to [facility] transfer center, spoke to [facility] with updated vital signs and still waiting for bed availability.

## 2021-05-05 NOTE — PROGRESS NOTES
Hematology/Oncology consult was requested on 5/3/21 due to DIC. Over the last 24 hours: The patient is sitting up in bed eating lunch. He reports feeling about the same. He denies fever, chills. He affirms shortness of breath with exertion. GI is following and has ordered patient to have paracentesis today. The patient is awaiting bed to go to [facility]. The patient had tessio dressing changed today and had minimal oozing per RN. Patient denies epistaxis, hemoptysis, hematemesis, melena, hematochezia or hematuria.      Meds    Current Medications    albumin human  25 g Intravenous Once    metoprolol tartrate  75 mg Oral BID    cefTRIAXone (ROCEPHIN) IV  1,000 mg Intravenous Q24H    predniSONE  40 mg Oral Daily    Followed by   Bassam Mari ON 5/7/2021] predniSONE  20 mg Oral Daily    Followed by   Bassam Mari ON 5/9/2021] predniSONE  10 mg Oral Daily    Followed by   Bassam Mari ON 5/11/2021] predniSONE  5 mg Oral Daily    hydrocortisone   Topical BID    lactulose  45 g Oral TID    sodium bicarbonate  1,300 mg Oral TID    epoetin sharonda-epbx  3,000 Units Subcutaneous Once per day on Mon Wed Fri    octreotide  100 mcg Subcutaneous Q8H    amiodarone  200 mg Oral Daily    famotidine  20 mg Oral Daily    gabapentin  200 mg Oral BID    midodrine  15 mg Oral TID    rifaximin  550 mg Oral BID    tiotropium  2 puff Inhalation Daily    insulin lispro  0-12 Units Subcutaneous TID WC    insulin lispro  0-6 Units Subcutaneous Nightly    sodium chloride flush  5-40 mL Intravenous 2 times per day     sodium chloride, sodium chloride, sodium chloride, sodium chloride, sodium chloride, sodium chloride, sodium chloride, sodium chloride, calcium carbonate, albuterol, glucose, dextrose, glucagon (rDNA), dextrose, sodium chloride flush, sodium chloride, promethazine **OR** ondansetron, acetaminophen **OR** acetaminophen  IV Drips/Infusions   sodium chloride      sodium chloride      sodium chloride Stopped (05/04/21 0821)  sodium chloride      sodium chloride      sodium chloride      sodium chloride      dextrose      sodium chloride       Past Medical History         Diagnosis Date    Advanced cirrhosis of liver (HCC)     Alcoholic cirrhosis (HCC)     Atrial fibrillation (HCC)     CHF (congestive heart failure) (HCC)     Chronic kidney disease     COPD (chronic obstructive pulmonary disease) (HCC)     Diabetes mellitus ([facility]     Gallstones     GERD (gastroesophageal reflux disease)     Headache     Hydrothorax     Portal hypertension ([facility]       Past Surgical History           Procedure Laterality Date    APPENDECTOMY      HERNIA REPAIR      LUNG SURGERY Right 20, 20    [facility]     Diet    DIET LOW SODIUM 2 GM;  Dietary Nutrition Supplements: Protein Modular  Allergies    Patient has no known allergies.   Social History     Social History     Socioeconomic History    Marital status: Single     Spouse name: Not on file    Number of children: Not on file    Years of education: Not on file    Highest education level: Not on file   Occupational History    Not on file   Social Needs    Financial resource strain: Not on file    Food insecurity     Worry: Not on file     Inability: Not on file    Transportation needs     Medical: Not on file     Non-medical: Not on file   Tobacco Use    Smoking status: Former Smoker     Packs/day: 1.00     Years: 29.00     Pack years: 29.00     Types: Cigarettes     Quit date: 2020     Years since quittin.4    Smokeless tobacco: Never Used   Substance and Sexual Activity    Alcohol use: Not Currently     Frequency: 4 or more times a week     Comment: No alcohol since 21    Drug use: Never    Sexual activity: Not on file   Lifestyle    Physical activity     Days per week: Not on file     Minutes per session: Not on file    Stress: Not on file   Relationships    Social connections     Talks on phone: Not on file     Gets together: Not on file Attends [employer] service: Not on file     Active member of club or organization: Not on file     Attends meetings of clubs or organizations: Not on file     Relationship status: Not on file    Intimate partner violence     Fear of current or ex partner: Not on file     Emotionally abused: Not on file     Physically abused: Not on file     Forced sexual activity: Not on file   Other Topics Concern    Not on file   Social History Narrative    Not on file     Family History          Problem Relation Age of Onset    Heart Disease Mother     High Blood Pressure Mother     Diabetes Mother     Cancer Mother     Cancer Father      ROS     Review of Systems   Pertinent review of systems noted in HPI, all other ROS negative. Vitals     height is 6' 2\" (1.88 m) and weight is 261 lb 6.4 oz (118.6 kg). His oral temperature is 97.9 °F (36.6 °C). His blood pressure is 96/88 and his pulse is 102. His respiration is 18 and oxygen saturation is 96%. Exam   Physical Exam   General appearance: No apparent distress, ill appearing, obese and cooperative. HEENT: Pupils equal, round, and reactive to light. Scleral icterus. Neck: Supple, with full range of motion. Trachea midline. Respiratory:  Normal respiratory effort. Diminished breath sounds otherwise clear to auscultation. Cardiovascular: Regular rate and rhythm with normal S1/S2  Chest: temporary dialysis catheter in place to right chest with no oozing or blood noted on dressing. Abdomen: distended abdomen. Active bowel sounds. Musculoskeletal: bilateral lower extremity edema with scaling and flaky red rash. Skin: Skin color - jaundiced  Neurologic:  Neurovascularly intact without any focal sensory/motor deficits.    Psychiatric: Alert and oriented    Labs   CBC  Recent Labs     05/03/21  0338 05/03/21  1408 05/04/21  0354 05/05/21  0414   WBC 6.5  --  7.0 6.5   RBC 2.50*  --  2.38* 2.33*   HGB 8.1* 8.0* 7.5* 7.7*   HCT 24.9* 24.3* 23.6* 23.6*   MCV 99.6*  --  99.2* 101.3*   MCH 32.4  --  31.5 33.0   MCHC 32.5  --  31.8* 32.6   PLT 44* 42* 39* 50*   MPV 11.1  --  11.2 11.2      BMP  Recent Labs     05/03/21 0338 05/04/21  0354 05/05/21  0414   * 130* 130*   K 3.6 3.8 3.5   CL 94* 93* 93*   CO2 22* 21* 22*   BUN 28* 21 27*   CREATININE 4.2* 3.9* 3.8*   GLUCOSE 126* 154* 132*   MG  --  1.8  --    PHOS  --  3.8  --    CALCIUM 8.8 9.2 9.8     LFT  Recent Labs     05/03/21 0338 05/04/21 0354 05/05/21 0414   AST 38 30 38   ALT 18 16 19   BILITOT 7.4* 7.5* 6.9*   ALKPHOS 96 99 113     INR  Recent Labs     05/03/21 0338 05/04/21  0354 05/05/21  0414   INR 2.21* 2.37* 2.20*     PTT  Recent Labs     05/02/21  2219 05/03/21  1256 05/04/21  0354   APTT 52.3* 48.7* 51.7*       Radiology      Ir Milagro Gongora Device Plmt/replace/removal    Result Date: 4/28/2021  NON-TUNNELED DIALYSIS CATHETER INSERTION: CLINICAL INFORMATION: Renal failure PERFORMED BY: Maya Coulter M.D. APPROACH : Right Internal Jugular Vein, ultrasound guidance, micropuncture technique. A single permanent sonographic image was obtained for documentation. DIALYSIS CATHETER:  14 Syriac Hemocath, 20 cm in length. DIALYSIS CATHETER TIP:  Right Atrium FLUOROSCOPY TIME: 1.1 minutes FLUOROSCOPIC IMAGES: 1 ESTIMATED BLOOD LOSS: Minimal PROCEDURE:  Signed informed consent was obtained prior to performing this procedure. The patient was not sedated during this procedure but was monitored with EKG and pulse-ox monitoring devices by a registered nurse. Following local anesthesia and utilizing MAXIMAL STERILE BARRIER TECHNIQUE, the vein listed above was punctured with a micropuncture needle, utilizing ultrasound guidance, an image was obtained confirming needle position and vessel patency. .  A .018 wire  was passed through this needle, followed by insertion of a 4 Western Michelle dilator. Following guide wire and catheter exchange, a percutaneous tract was dilated to a 14 Western Michelle size.   Then, the temporary dialysis catheter was advanced over an .035 guide wire, with fluoroscopic guidance, with the tip at the location as specified above. This was all performed with fluoroscopic guidance. The hub of the catheter was then sutured to the skin with 2-0 silk suture. An antibacterial Biopatch was applied to the catheter exit site along with a sterile OpSite dressing. Status post successful nontunneled dialysis catheter insertion. **This report has been created using voice recognition software. It may contain minor errors which are inherent in voice recognition technology. ** Final report electronically signed by Dr. Zoila Donovan on 4/28/2021 3:12 PM    Xr Chest Portable    Result Date: 5/3/2021  PROCEDURE: XR CHEST PORTABLE CLINICAL INFORMATION: concern for fluid overload. COMPARISON: Chest regressed dated 4/25/2021. TECHNIQUE: AP upright view of the chest. FINDINGS: There is a right internal jugular dual-lumen catheter with catheter tip at the cavoatrial junction. There is a moderate-sized pleural effusion on the right, similar to prior exam. There is adjacent linear opacities as evidence for atelectasis. The left lung appears clear. The cardiac mediastinal silhouette is within normal limits. Moderate right pleural effusion with adjacent atelectasis. **This report has been created using voice recognition software. It may contain minor errors which are inherent in voice recognition technology. ** Final report electronically signed by Dr. Malik May MD on 5/3/2021 5:00 PM    Us Guided Paracentesis    Result Date: 4/30/2021  PROCEDURE: Ultrasound-guided paracentesis. CLINICAL INFORMATION: Ascites. COMPARISON: Procedure dated 4/26/2021.  PROCEDURE: Physician performing procedure: Dr Donald Villareal Informed consent signed: Yes Local Anesthetic: 2 % Lidocaine Specimen volume: 70 ml Catheter: 19 ga 5 Upper sorbian Aspirated ascites volume: 2.75 liters Aspirated ascites color: Clear yellow Site of Puncture:RLQ Complications: None observed. The benefits and the risk of the procedure were explained to the patient. Signed consent was obtained. Limited ultrasound exam of the abdomen showed a moderate amount of ascites. The right side of the abdomen was prepped and draped using the usual sterile technique and the skin was anesthetized. A 5 Macanese one-step catheter was introduced to the peritoneal ascites. 2.75 L of fluid drained. The catheter was then removed. The patient tolerated the procedure well with no complications reported and was discharged from the radiology department in a stable condition. Specimen sent for laboratory studies as requested. Successful ultrasound-guided paracentesis. 2.75 L of fluid drained. **This report has been created using voice recognition software. It may contain minor errors which are inherent in voice recognition technology. ** Final report electronically signed by Dr Evens Hartman on 4/30/2021 1:15 PM      Assessment/Recommendations    1. DIC   Patient with thrombocytopenia, elevated apTT, elevated INR, decreased fibrinogen and elevated d-dimer. He has been given platelet transfusion and FFP. He was given cryo on 5/3/21. Blood cultures no growth x2. Urine culture positive for enteric gram negative bacilli. Currently on IV Rocephin. On 5/5/21 counts mildly improved. Platelet count 54,566, INR 2.20              -Patient awaiting bed to transfer to [address] [place] [address]. 2. KARAN on CKD, stage III - requiring dialysis this admission. Nephrology following. [place] to be multifactorial from hepatorenal.  3. Acute Decompensated Alcohol Liver Cirrhosis - GI following. Has required paracentesis with most recent on 4/30/21 with 2.75L removed.        Patient to go to [address] [place] [address] when bed available. Case discussed with nurse and patient. Questions and concerns addressed.   Plan made in collaboration with Dr. Wenceslao Smith    Electronically signed by   Gladis Crow PA-C on 5/5/2021 at 12:33 PM

## 2021-05-05 NOTE — DISCHARGE SUMMARY
Later today, I was notified the [facility] bed became available. Pt remained clinically stable and was discharged in stable condition. Attached below is the progress note from today:      PROGRESS NOTE      Patient:  Wendy Chahal      Unit/Bed:3B-32/032-A    YOB: 1965    MRN: [id number]       Acct: [de-identified]     PCP: Javed Mobley MD    Date of Admission: 4/23/2021      Assessment/Plan:    Acute Decompensated Liver Cirrhosis with ascites   - hx of alcoholic cirrhosis, follows up in [facility], on the transplant list and has follow up in May  - has ascites s/p paracentesis  - GI consulted, continue lactulose and xifaxan  - MELD-na 38, considered high mortality in the next 3 months  - Bilirubin and INR trending up. - worsening thrombocytopenia  - explained poor prognosis to patient. Agreed to Palliative consult  - Accepted to [facility], waiting on a bed     Planned repeat paracentesis today per GI; will sent panel to assess for SBP    KARAN on CKD III, t/c hepatorenal syndrome  - nephrology following  - octreotide started per nephro  - patient started on hemodialysis  - s/p Tunneled dialysis catheter placement    Coagulopathy 2/2 Acute DIC likely 2/2 FHF  - Underlying cause unclear  - Plates 260 on admission. Down trend since. Now 44. Blood smear showing schistocytes   - Bleeding around tessio, bleeding from nose, some blood in reese  - Did receive 1 unit of FPP and platelet on 70/31. Received another during dialysis 05/04  - Fibrinogen low at 64  - INR prolonged at 2.21, aPTT prolonged at 52. 3. Of note PT/aPTT were prolonged in the past at 1.80 and 58.7 12/12/20. D-dimer elevated at 5566, was 4341 11/21/20.   -Repeat fibrinogen post dialysis 05/03 may need cryo if continues to be low  - Hem/onc consulted. - Rule out underlying infectious cause. UA growing GNB switched to Ceftriaxone from Zosyn and d/c'ed Vanc. CXR negative for infection, Blood cx pending     05/04- Did receive a unit of cryo 05/03.  Repeat has had any alcohol or tobacco products; he presented to the ED today secondary to his nephrologist office contacting him for a creatinine of 5. 5. \"   I took over care on 5/5/2021      Subjective:   No new issues overnight; denies N/V/hematemesis/fever/chills; Jesús any new complaints.     Medications:  Reviewed    Infusion Medications    sodium chloride      sodium chloride      sodium chloride Stopped (05/04/21 0821)    sodium chloride      sodium chloride      sodium chloride      sodium chloride      dextrose      sodium chloride       Scheduled Medications    metoprolol tartrate  75 mg Oral BID    cefTRIAXone (ROCEPHIN) IV  1,000 mg Intravenous Q24H    predniSONE  40 mg Oral Daily    Followed by   Kit Saltness ON 5/7/2021] predniSONE  20 mg Oral Daily    Followed by   Kit Saltness ON 5/9/2021] predniSONE  10 mg Oral Daily    Followed by   Kit Saltness ON 5/11/2021] predniSONE  5 mg Oral Daily    hydrocortisone   Topical BID    lactulose  45 g Oral TID    sodium bicarbonate  1,300 mg Oral TID    epoetin sharonda-epbx  3,000 Units Subcutaneous Once per day on Mon Wed Fri    octreotide  100 mcg Subcutaneous Q8H    amiodarone  200 mg Oral Daily    famotidine  20 mg Oral Daily    gabapentin  200 mg Oral BID    midodrine  15 mg Oral TID    rifaximin  550 mg Oral BID    tiotropium  2 puff Inhalation Daily    insulin lispro  0-12 Units Subcutaneous TID WC    insulin lispro  0-6 Units Subcutaneous Nightly    sodium chloride flush  5-40 mL Intravenous 2 times per day     PRN Meds: sodium chloride, sodium chloride, sodium chloride, sodium chloride, sodium chloride, sodium chloride, sodium chloride, sodium chloride, calcium carbonate, albuterol, glucose, dextrose, glucagon (rDNA), dextrose, sodium chloride flush, sodium chloride, promethazine **OR** ondansetron, acetaminophen **OR** acetaminophen      Intake/Output Summary (Last 24 hours) at 5/5/2021 1639  Last data filed at 5/5/2021 1307  Gross per 24 hour Intake 1710.45 ml   Output 55 ml   Net 1655.45 ml       Diet:  DIET LOW SODIUM 2 GM;  Dietary Nutrition Supplements: Protein Modular    Exam:  BP 97/74   Pulse 100   Temp 98.2 °F (36.8 °C) (Oral)   Resp 18   Ht 6' 2\" (1.88 m)   Wt 261 lb 6.4 oz (118.6 kg)   SpO2 96%   BMI 33.56 kg/m²     General appearance: chronically ill; jaundiced, No apparent distress, appears stated age and cooperative. jaundiced  HEENT: Pupils equal, round, and reactive to light. Icteric sclerae  Neck: Supple, with full range of motion. No jugular venous distention. Trachea midline. Respiratory:  Normal respiratory effort. Clear to auscultation, bilaterally without Rales/Wheezes/Rhonchi. Cardiovascular: Regular rate and rhythm with normal S1/S2 without murmurs, rubs or gallops. Abdomen: Soft, round, non-tender, distended with shifting dullness  Musculoskeletal: ++ B/L lower extremity edema   Skin: jaundiced, with diffused erythematous rashes and petechiae. Ecchymosis right knee. Neurologic:  Neurovascularly intact without any focal sensory/motor deficits.  Cranial nerves: II-XII intact, grossly non-focal.  Psychiatric: Alert and oriented, thought content appropriate, normal insight  Capillary Refill: Brisk,< 3 seconds   Peripheral Pulses: +2 palpable, equal bilaterally       Labs:   Recent Labs     05/03/21 0338 05/03/21  1408 05/04/21 0354 05/05/21 0414   WBC 6.5  --  7.0 6.5   HGB 8.1* 8.0* 7.5* 7.7*   HCT 24.9* 24.3* 23.6* 23.6*   PLT 44* 42* 39* 50*     Recent Labs     05/03/21 0338 05/04/21  0354 05/05/21 0414   * 130* 130*   K 3.6 3.8 3.5   CL 94* 93* 93*   CO2 22* 21* 22*   BUN 28* 21 27*   CREATININE 4.2* 3.9* 3.8*   CALCIUM 8.8 9.2 9.8   PHOS  --  3.8  --      Recent Labs     05/03/21  0338 05/04/21  0354 05/05/21  0414   AST 38 30 38   ALT 18 16 19   BILITOT 7.4* 7.5* 6.9*   ALKPHOS 96 99 113     Recent Labs     05/03/21  0338 05/04/21  0354 05/05/21 0414   INR 2.21* 2.37* 2.20*     No results for input(s): Geovanni Cantu in the last 72 hours. Urinalysis:      Lab Results   Component Value Date    NITRU NEGATIVE 05/04/2021    WBCUA >200 W/CLUMPS 05/04/2021    BACTERIA FEW 05/04/2021    RBCUA > 200 05/04/2021    BLOODU LARGE 05/04/2021    SPECGRAV 1.016 04/30/2021    GLUCOSEU NEGATIVE 05/04/2021       Radiology:  XR CHEST PORTABLE   Final Result   Moderate right pleural effusion with adjacent atelectasis. **This report has been created using voice recognition software. It may contain minor errors which are inherent in voice recognition technology. **      Final report electronically signed by Dr. Kevin Fields MD on 5/3/2021 5:00 PM      [address]   Final Result      Successful ultrasound-guided paracentesis. 2.75 L of fluid drained. **This report has been created using voice recognition software. It may contain minor errors which are inherent in voice recognition technology. **            Final report electronically signed by Dr Jolly Pulido on 4/30/2021 1:15 PM      IR FLUORO GUIDED CVA DEVICE PLMT/REPLACE/REMOVAL   Final Result   Status post successful nontunneled dialysis catheter insertion. **This report has been created using voice recognition software. It may contain minor errors which are inherent in voice recognition technology. **      Final report electronically signed by Dr. Sourav España on 4/28/2021 3:12 PM      US RENAL COMPLETE   Final Result   1. Normal bilateral renal ultrasound. 2. Unremarkable urinary bladder. Final report electronically signed by Dr. Luci Silva on 4/26/2021 3:26 PM      [address]   Final Result      Successful ultrasound-guided paracentesis. 3 L of fluid drained. **This report has been created using voice recognition software. It may contain minor errors which are inherent in voice recognition technology. **            Final report electronically signed by Dr Reina Quiles Maritza on 4/26/2021 3:22 PM      XR CHEST 1 VIEW   Final Result   1. Patchy airspace opacities are present at the right lung base. This may represent atelectasis or pneumonia. 2. There is a small residual right-sided pleural effusion which appears improved from the prior examination following right-sided thoracentesis from earlier same day. No pneumothorax is seen. **This report has been created using voice recognition software. It may contain minor errors which are inherent in voice recognition technology. **      Final report electronically signed by Dr. Marzette Boast on 4/25/2021 3:19 PM      US THORACENTESIS Which side should the procedure be performed? Right   Final Result   Status post thoracentesis            **This report has been created using voice recognition software. It may contain minor errors which are inherent in voice recognition technology. **      Final report electronically signed by Dr. Marzette Boast on 4/25/2021 3:46 PM      CT HEAD WO CONTRAST   Final Result   1. No acute intracranial pathology   2. Air-fluid level right maxillary sinus (sinusitis or blood is uncertain. 3. Dental caries. **This report has been created using voice recognition software. It may contain minor errors which are inherent in voice recognition technology. **      Final report electronically signed by Dr. Gamaliel Silveira on 4/23/2021 11:51 AM      XR CHEST PORTABLE   Final Result   Stable chest large right pleural effusion and possible underlying infiltrates            **This report has been created using voice recognition software. It may contain minor errors which are inherent in voice recognition technology. **      Final report electronically signed by Dr. Gamaliel Silveira on 4/23/2021 11:12 AM      [address] [employer]    (Results Pending)       Diet: DIET LOW SODIUM 2 GM;  Dietary Nutrition Supplements: Protein Modular    DVT prophylaxis: [] Lovenox                                 [x] SCDs [] SQ Heparin                                 [] Encourage ambulation           [] Already on Anticoagulation     Disposition:    [] Home       [] TCU       [] Rehab       [] Psych       [] SNF       [] [place]       [x] Other- Transfer to [facility]    Code Status: Full Code    PT/OT Eval Status: Evaluating and treating  With RN in room, patient was updated about and agreed upon the treatment plan, all the questions and concerns were addressed.       Electronically signed by Armond Graves MD on 5/5/2021 at 4:39 PM

## 2021-05-05 NOTE — CARE COORDINATION
5/5/21, 1:57 PM EDT    DISCHARGE ON GOING [facility] day: 12  Location: -32/032-A Reason for admit: KARAN (acute kidney injury) [facility]) [N17.9]   Procedure:   4/25 US guided Thoracentesis - 1.1L drained.   4/26 US guided Paracentesis - 3L removed.   4/28 Temporary dialysis cath placement planned. 4/29 left nasal passage cauterization with packing  4/30 US guided Paracentesis - 2.75L removed.   5/5 US guided Paracentesis - to be done. Barriers to Discharge: Planning transfer to [address] [place] [address], awaiting bed at [address] [place] [address]. Lactulose. Diabetes management. Midodrine. Prednisone tapering dose. Rifaximin. Sodium Bicarb tabs. Albumin iv x1 today. Rocephin iv daily started today. Octreotide subq q8hr. Afebrile. Room air. Sodium 130. Chloride 93, potassium 3.5, BUN 27, creatinine 3.8. Total protein 5.6. Bilirubin 6.9. Hgb 7.7. Platelets 50. INR 2.20. PT/OT. PCP: Ayush Cline MD  Readmission Risk Score: 56%  Patient Goals/Plan/Treatment Preferences: Pt lives at home alone in apartment at [facility]. Dialysis application started for [facility], but awaiting transfer to [address] [place] [address].

## 2021-05-05 NOTE — PROGRESS NOTES
[facility]  OCCUPATIONAL THERAPY MISSED TREATMENT NOTE  [facility] CCU-STEPDOWN 3B  3B-32/032-A      Date: 2021  Patient Name: Ruy Royal        CSN: [id number]   : 1965  (55 y.o.)  Gender: male   Referring Practitioner: STANLEY Quiñonez  Diagnosis: KARAN         REASON FOR MISSED TREATMENT: Per RN, pt in prep for paracentesis. States pt will likely transfer to outside facility this date, will check back as available.

## 2021-05-05 NOTE — PROGRESS NOTES
PROGRESS NOTE      Patient:  Ruy Royal      Unit/Bed:3B-32/032-A    YOB: 1965    MRN: [id number]       Acct: [de-identified]     PCP: Julio Cesar Hill MD    Date of Admission: 4/23/2021      Assessment/Plan:    Acute Decompensated Liver Cirrhosis with ascites   - hx of alcoholic cirrhosis, follows up in [facility], on the transplant list and has follow up in May  - has ascites s/p paracentesis  - GI consulted, continue lactulose and xifaxan  - MELD-na 38, considered high mortality in the next 3 months  - Bilirubin and INR trending up. - worsening thrombocytopenia  - explained poor prognosis to patient. Agreed to Palliative consult  - Accepted to [facility], waiting on a bed     Planned repeat paracentesis today per GI; will sent panel to assess for SBP    KARAN on CKD III, t/c hepatorenal syndrome  - nephrology following  - octreotide started per nephro  - patient started on hemodialysis  - s/p Tunneled dialysis catheter placement    Coagulopathy 2/2 Acute DIC likely 2/2 FHF  - Underlying cause unclear  - Plates 822 on admission. Down trend since. Now 44. Blood smear showing schistocytes   - Bleeding around tessio, bleeding from nose, some blood in reese  - Did receive 1 unit of FPP and platelet on 73/97. Received another during dialysis 05/04  - Fibrinogen low at 64  - INR prolonged at 2.21, aPTT prolonged at 52. 3. Of note PT/aPTT were prolonged in the past at 1.80 and 58.7 12/12/20. D-dimer elevated at 5566, was 4341 11/21/20.   -Repeat fibrinogen post dialysis 05/03 may need cryo if continues to be low  - Hem/onc consulted. - Rule out underlying infectious cause. UA growing GNB switched to Ceftriaxone from Zosyn and d/c'ed Vanc. CXR negative for infection, Blood cx pending     05/04- Did receive a unit of cryo 05/03. Repeat fibrinogen remains low, PT, aPTT prolonged, platelets 39. Another unit of cryo ordered. Continue to monitor coags and platelets.  Hem/onc following     Acute on Chronic Thrombocytopenia  - Due to hepatic failure and DIC  - Platelets continue to trend down with recurrent epistaxis and a drop in hgb on 5/2  - s/p 2 units of platelets. Remains low at 44   - Hem/onc following     Recurrent right sided pleural effusion  - Demonstrated CXR 05/03  - Likely hydrothorax from ESLD  - S/P drainage in the past  - Patient currently without acute respiratory distress     Acute on Chronic macrocytic Anemia, severe  - Secondary to DIC, ESLD, KARAN  - Hgb stable at 8.1 s/p 1 U PRBC 05/03 for a Hgb of 7.1  - INR elevated  - has recurrent epistaxis, denies melena or hematochezia  - Continue to monitor, transfuse PRN    Acute Hepatic Encephalopathy, resolved  - secondary to above  - ammonia level is stable    Acute Urinary retention  - Urology consulted  - Renal Ultrasound 04/26 showed no evidence of hydronephrosis or obstruction  - reese cath inserted, likely to go home with reese and follow up as outpatient      Chronic Hypotension   - on midodrine     History of Atrial Fibrillation  - VR in low 100s w/o hypotension; on amiodarone and metoprolol dose of which increased to 75 from 50 BID with holding parameters. Will reassess response     Hyperammonemia, resolved  - on lactulose, rifaximin     Neuropathy  - on gabapentin     Recurrent epistaxis  - ENT following, status post nasal packing  - patient at risk for life-threatening bleed given low platelet count  - platelet transfusion given 05/03 and 05/04     Chief Complaint: Abnormal labs    Hospital Course:    Per H&P     \"55 y. o. male who presented to [address] [place] [address] with abnormal lab; patient has an extensive medical history including cirrhosis of the liver, alcoholic cirrhosis, atrial fibrillation, diabetes, portal hypertension; he states it has been 6 months today since he has had any alcohol or tobacco products; he presented to the ED today secondary to his nephrologist office contacting him for a creatinine of 5. 5. \"   I took over care on Nutrition Supplements: Protein Modular    Exam:  /76   Pulse 101   Temp 97.9 °F (36.6 °C) (Oral)   Resp 18   Ht 6' 2\" (1.88 m)   Wt 261 lb 6.4 oz (118.6 kg)   SpO2 96%   BMI 33.56 kg/m²     General appearance: chronically ill; jaundiced, No apparent distress, appears stated age and cooperative. jaundiced  HEENT: Pupils equal, round, and reactive to light. Icteric sclerae  Neck: Supple, with full range of motion. No jugular venous distention. Trachea midline. Respiratory:  Normal respiratory effort. Clear to auscultation, bilaterally without Rales/Wheezes/Rhonchi. Cardiovascular: Regular rate and rhythm with normal S1/S2 without murmurs, rubs or gallops. Abdomen: Soft, round, non-tender, distended with shifting dullness  Musculoskeletal: ++ B/L lower extremity edema   Skin: jaundiced, with diffused erythematous rashes and petechiae. Ecchymosis right knee. Neurologic:  Neurovascularly intact without any focal sensory/motor deficits. Cranial nerves: II-XII intact, grossly non-focal.  Psychiatric: Alert and oriented, thought content appropriate, normal insight  Capillary Refill: Brisk,< 3 seconds   Peripheral Pulses: +2 palpable, equal bilaterally       Labs:   Recent Labs     05/03/21 0338 05/03/21  1408 05/04/21 0354 05/05/21 0414   WBC 6.5  --  7.0 6.5   HGB 8.1* 8.0* 7.5* 7.7*   HCT 24.9* 24.3* 23.6* 23.6*   PLT 44* 42* 39* 50*     Recent Labs     05/03/21 0338 05/04/21  0354 05/05/21  0414   * 130* 130*   K 3.6 3.8 3.5   CL 94* 93* 93*   CO2 22* 21* 22*   BUN 28* 21 27*   CREATININE 4.2* 3.9* 3.8*   CALCIUM 8.8 9.2 9.8   PHOS  --  3.8  --      Recent Labs     05/03/21  0338 05/04/21  0354 05/05/21  0414   AST 38 30 38   ALT 18 16 19   BILITOT 7.4* 7.5* 6.9*   ALKPHOS 96 99 113     Recent Labs     05/03/21  0338 05/04/21  0354 05/05/21  0414   INR 2.21* 2.37* 2.20*     No results for input(s): CKTOTAL, TROPONINI in the last 72 hours.     Urinalysis:      Lab Results   Component Value Date present at the right lung base. This may represent atelectasis or pneumonia. 2. There is a small residual right-sided pleural effusion which appears improved from the prior examination following right-sided thoracentesis from earlier same day. No pneumothorax is seen. **This report has been created using voice recognition software. It may contain minor errors which are inherent in voice recognition technology. **      Final report electronically signed by Dr. Chanda Ervni on 4/25/2021 3:19 PM      US THORACENTESIS Which side should the procedure be performed? Right   Final Result   Status post thoracentesis            **This report has been created using voice recognition software. It may contain minor errors which are inherent in voice recognition technology. **      Final report electronically signed by Dr. Chanda Ervin on 4/25/2021 3:46 PM      CT HEAD WO CONTRAST   Final Result   1. No acute intracranial pathology   2. Air-fluid level right maxillary sinus (sinusitis or blood is uncertain. 3. Dental caries. **This report has been created using voice recognition software. It may contain minor errors which are inherent in voice recognition technology. **      Final report electronically signed by Dr. Kp Frost on 4/23/2021 11:51 AM      XR CHEST PORTABLE   Final Result   Stable chest large right pleural effusion and possible underlying infiltrates            **This report has been created using voice recognition software. It may contain minor errors which are inherent in voice recognition technology. **      Final report electronically signed by Dr. Kp Frost on 4/23/2021 11:12 AM          Diet: DIET LOW SODIUM 2 GM;  Dietary Nutrition Supplements: Protein Modular    DVT prophylaxis: [] Lovenox                                 [x] SCDs                                 [] SQ Heparin                                 [] Encourage ambulation           [] Already on Anticoagulation

## 2021-05-05 NOTE — PROGRESS NOTES
[facility]  INPATIENT PHYSICAL THERAPY  DAILY NOTE  [facility] CCU-STEPDOWN 3B - 3B-32/032-A  Time In: 4781  Time Out: 1251  Timed Code Treatment Minutes: 41 Minutes  Minutes: 41          Date: 2021  Patient Name: Hemalatha Abbasi,  Gender:  male        MRN: [id number]  : 1965  (55 y.o.)     Referring Practitioner: STANLEY Turner  Diagnosis: KARAN  Additional Pertinent Hx: Per HPI: \"55 y.o. male who presented to [facility] with abnormal lab; patient has an extensive medical history including cirrhosis of the liver, alcoholic cirrhosis, atrial fibrillation, diabetes, portal hypertension; he states it has been 6 months today since he has had any alcohol or tobacco products; he presented to the ED today secondary to his nephrologist office contacting him for a creatinine of 5.5; he had a paracentesis done yesterday that drained 2.1 L; he states yesterday he fell 4 times; he relates to constant lightheadedness and weakness, he is on midodrine for chronic hypotension however he did take his midodrine today in addition to his Lopressor. Currently he is alert and talkative, he is jaundiced, he denies any pain, he does relate to shortness of breath, he does relate to his chronic lightheadedness and dizziness; states his legs are extremely swollen and that it is difficult for him to get his shoes on. In the emergency department, he had a sodium level at 128, potassium 3.1, CO2 of 17, BUN 54, creatinine 5.1, lactic acidosis at 4.0, troponin T of 0.100, he relates to elevated LFTs as well; CBC shows mild anemia and mild thrombocytopenia at 108; his chest x-ray showing a large right pleural effusion; he is being admitted to the hospital service for further care and evaluation.  \"     Prior Level of Function:  Lives With: Alone  Type of Home: Apartment  Home Layout: One level  Home Access: Level entry  Home Equipment: 4 wheeled walker, Rolling walker   Bathroom Equipment: Toilet raiser, Tub transfer and Decreased Gait Speed  *cues for rest breaks as needed    Balance:  Static Sitting Balance:  Supervision  Dynamic Sitting Balance: Supervision  Static Standing Balance: Stand By Assistance  Dynamic Standing Balance: Contact Guard Assistance     Exercise:  Patient was guided in 1 set(s) 10 reps of exercise to both lower extremities. Bridges, Seated hamstring curls (with orange TB--R LE only, then pt requested to do a hamstring exercise he could perform on his own later), and Isometric HSC in supine. Exercises were completed for increased independence with functional mobility. Functional Outcome Measures: Completed  -PAC Inpatient Mobility Raw Score : 16  -PAC Inpatient T-Scale Score : 40.78    ASSESSMENT:  Assessment: Patient progressing toward established goals. Pt performed ambulation today with CGA--goals updated to reflect pt progress. Activity Tolerance:  Patient tolerance of  treatment: fair. Required increased rest breaks.       Equipment Recommendations:Equipment Needed: No  Mobility Devices: Wheelchair  Wheelchair: (custom height)  Other: w/c has been ordered for pt through DME--DME will need contacted upon pt discharge so pt can receive w/c  Discharge Recommendations:  Continue to assess pending progress, Patient would benefit from continued therapy after discharge(at this time pt is not safe to return home due to limited mobility and safety concerns associated with orthostatic hypotension)    Plan: Times per week: 5x GM  Times per day: Daily  Current Treatment Recommendations: Strengthening, Functional Mobility Training, Transfer Training, Endurance Training, Patient/Caregiver Education & Training, Safety Education & Training, Gait Training, Home Exercise Program, Equipment Evaluation, Education, & procurement    Patient Education  Patient Education: Plan of Care, Home Exercise Program, [employer] Mobility, Transfers, Reviewed Prior Education, Gait, Verbal Exercise Instruction,  - Patient Verbalized Understanding, - Patient Requires Continued Education    Goals:  Patient goals : improve ambulation, decrease falls, obtain w/c  Short term goals  Time Frame for Short term goals: by hospital discharge  Short term goal 1: Supine to/from sit with modified independence for ease of transfers at discharge site. Short term goal 2: Sit to/from stand with modified independence for ease of transfers at discharge site. Short term goal 3: Ambulate 27' with RW and supervision for ambulation in home environment. Long term goals  Time Frame for Long term goals : N/A due to short ELOS. Following session, patient left in safe position with all fall risk precautions in place.     Domingo Moore, PT, DPT

## 2021-05-06 NOTE — CARE COORDINATION
5/6/21, 7:07 AM EDT    Patient goals/plan/ treatment preferences discussed by  and . Patient goals/plan/ treatment preferences reviewed with patient/ family. Patient/ family verbalize understanding of discharge plan and are in agreement with goal/plan/treatment preferences. Understanding was demonstrated using the teach back method. AVS provided by RN at time of discharge, which includes all necessary medical information pertaining to the patients current course of illness, treatment, post-discharge goals of care, and treatment preferences.                 Planning transfer to [facility] today at 10:30am.     Electronically signed by Vick Macias RN on 5/6/2021 at 7:08 AM

## 2021-05-06 NOTE — PLAN OF CARE
Problem: Falls - Risk of:  Goal: Will remain free from falls  Description: Will remain free from falls  Outcome: Ongoing  Note: Pt free from falls, safety maintained   Goal: Absence of physical injury  Description: Absence of physical injury  Outcome: Ongoing  Note: Pt free from injury, safety maintained      Problem: Skin Integrity:  Goal: Will show no infection signs and symptoms  Description: Will show no infection signs and symptoms  Outcome: Ongoing  Note: Afebrile, on JOSEPH, vss  Goal: Absence of new skin breakdown  Description: Absence of new skin breakdown  Outcome: Ongoing  Note: No new skin breakdown noted      Problem: OXYGENATION/RESPIRATORY FUNCTION  Goal: Patient will maintain patent airway  Outcome: Ongoing  Note: Pt maintains a patent airway   Goal: Patient will achieve/maintain normal respiratory rate/effort  Description: Respiratory rate and effort will be within normal limits for the patient  Outcome: Ongoing  Note: Pt maintains adequate resp rate and effort      Problem: Pain:  Goal: Patient's pain/discomfort is manageable  Description: Patient's pain/discomfort is manageable  Outcome: Ongoing  Note: Pt currently denies any pain     Problem: Nutrition  Goal: Optimal nutrition therapy  Outcome: Ongoing  Note: Pt PO intake adequate      Problem: DISCHARGE BARRIERS  Goal: Patient's continuum of care needs are met  Outcome: Ongoing  Note: Discharge planning cont     Problem: Tissue Perfusion - Renal, Altered:  Goal: Serum creatinine will be within specified parameters  Description: Serum creatinine will be within specified parameters  Outcome: Ongoing  Note: Pt creatinine conts to fluctuate, cont HD as needed per neph      Problem: Physical Regulation:  Goal: Prevent transmision of infection  Description: Prevent transmision of infection  Outcome: Ongoing  Note: Cont proper handwashing, PPE

## 2021-05-06 NOTE — PROGRESS NOTES
Gastroenterology Progress Note:     Patient Name:  Bela Schuster   MRN: [id number]  [id number]  YOB: 1965  Admit Date: 4/23/2021 10:30 AM  Primary Care Physician: Kizzy Helm MD   3B-32/032-A     Patient seen and examined. 24 hours events and chart reviewed. Subjective: Patient sleeping in bed, arouses easily. Denies abdominal pain, nausea, & vomiting. Per RN, he is being transferred to [facility] today at 1130. Objective:  /75   Pulse 108   Temp 97.9 °F (36.6 °C) (Oral)   Resp 18   Ht 6' 2\" (1.88 m)   Wt 264 lb 6.4 oz (119.9 kg)   SpO2 98%   BMI 33.95 kg/m²     Physical Exam:    General:  Acute on chronically ill appearing male  HEENT: Atraumatic, normocephalic. Moist oral mucous membranes. Scleral icterus. Neck: Supple without adenopathy, JVD, thyromegaly or masses. Trachea midline. CV: Heart RRR, no murmurs, rubs, gallops. Resp: Even, easy without cough or accessory use. Lungs clear to ascultation bilaterally. Abd: Round, soft, nontender. No hepatosplenomegaly or mass present. Active bowel sounds heard. Mild distention noted. Ext:  Without cyanosis, clubbing, edema. Skin: Jaundice, warm, dry. Pruritic rash noted to BUE & BLE. Scattered ecchymosis throughout the body. Neuro:  Alert, oriented x 3 with no obvious deficits.        Rectal: deferred    Labs:   CBC:   Lab Results   Component Value Date    WBC 6.7 05/06/2021    HGB 7.7 05/06/2021    HCT 23.3 05/06/2021    MCV 98.3 05/06/2021    PLT 52 05/06/2021     BMP:   Lab Results   Component Value Date     05/06/2021    K 3.7 05/06/2021    K 3.0 04/24/2021    CL 92 05/06/2021    CO2 23 05/06/2021    PHOS 3.8 05/04/2021    BUN 32 05/06/2021    CREATININE 4.8 05/06/2021    CALCIUM 9.7 05/06/2021     PT/INR:   Lab Results   Component Value Date    INR 2.47 05/06/2021     Lipids:   Lab Results   Component Value Date    ALKPHOS 99 05/06/2021    ALT 23 05/06/2021    AST 44 05/06/2021    BILITOT 6.2 05/06/2021    BILIDIR 2.6 04/29/2021    LABALBU 3.8 05/06/2021      Ref. Range 5/5/2021 16:15   Character, Body Fluid Unknown CLEAR   Protein, Fluid Latest Units: gm/dl 1.0   Amylase, Fluid Latest Units: U/L 8   Albumin, Fluid Latest Units: gm/dl 0.6   Body Fluid RBC Latest Units: /cumm < 2000   Mononuclear Cells Body Fluid Latest Units: % 69.8   Polymorphonuclear Cells Body Fluid Latest Units: % 30.2   Total Nucleated cells Body Fluid Latest Ref Range: 0 - 500 /cumm 55   Total Volume Received Body Fluid Latest Units: ml 70.0     Significant Diagnostic Studies:   US paracentesis 05/05/21  Aspirated ascites volume: 4 liters   Aspirated ascites color: clear yellow   Site of Puncture:rt mid abd   Complications: none           Impression   Successful ultrasound-guided paracentesis.      Current Meds:  Scheduled Meds:   metoprolol tartrate  75 mg Oral BID    cefTRIAXone (ROCEPHIN) IV  1,000 mg Intravenous Q24H    [START ON 5/7/2021] predniSONE  20 mg Oral Daily    Followed by   Omer Vaca ON 5/9/2021] predniSONE  10 mg Oral Daily    Followed by   Omer Vaca ON 5/11/2021] predniSONE  5 mg Oral Daily    hydrocortisone   Topical BID    lactulose  45 g Oral TID    sodium bicarbonate  1,300 mg Oral TID    epoetin sharonda-epbx  3,000 Units Subcutaneous Once per day on Mon Wed Fri    octreotide  100 mcg Subcutaneous Q8H    amiodarone  200 mg Oral Daily    famotidine  20 mg Oral Daily    gabapentin  200 mg Oral BID    midodrine  15 mg Oral TID    rifaximin  550 mg Oral BID    tiotropium  2 puff Inhalation Daily    insulin lispro  0-12 Units Subcutaneous TID WC    insulin lispro  0-6 Units Subcutaneous Nightly    sodium chloride flush  5-40 mL Intravenous 2 times per day     Continuous Infusions:   sodium chloride      sodium chloride      sodium chloride Stopped (05/04/21 0821)    sodium chloride      sodium chloride      sodium chloride      sodium chloride      dextrose      sodium chloride         Assessment:  54 yo M admitted 04/23/21 for falls secondary to dizziness secondary to hypotension. He is on Midodrine. Noted to have worsening creatinine on OP labs. Started on dialysis 04/28/21. H/O alcoholic cirrhosis with ascites & recurrent right hepatic hydrothorax. He also follows at [facility]. Last thoracentesis 04/25/21 with 1.1 L removed. Last paracentesis 04/26/21 with 3L removed, no labs done. He is not a candidate for TIPS procedure per [facility]. Noted to have recurrent epistaxis which has required cauterization in the past. Last EGD 11/30/2020, last colonoscopy 12/30/2020. ENT consulted for recurrent epistaxis. Left nare cauterized, packed, & taped shut by ENT. Repeat paracentesis 04/30/2 with 2.75L removed, negative for SBP  1. KARAN on CKD- started on dialysis  2. Acute decompensated alcoholic liver cirrhosis- s/p paracentesis 04/26/21 with no labs done & 04/30/20 negative for SBP, paracentesis 05/05/21 negative for SBP- MELD 37  3. Recurrent right hepatic hydrothorax- s/p thoracentesis 04/25/21  4. Afib  5. Chronic hypotension with orthostatic hypotension- on Midodrine  6. Recurrent falls  7. Hyponatremia  8. Hypoalbuminemia  9. Hyperbilirubinemia  10. Chronic anemia  11. Thrombocytopenia- worsening, s/p 2 units platelets   12. Coagulopathy- worsening  13.  Pruritic rash to BUE & BLE         Plan:    · Monitor H & H, transfuse prn  · Nursing to monitor stool output & document  · PT & OT on board  · Continue Lactulose to 30g TID, home dose  · Give an additional dose of Lactulose for lethargy  · Continue Midodrine  · Continue Xifaxan  · Continue Pepcid  · Octreotide per nephrology  · Monitor HFP & INR daily  · Continue Hydrocortisone cream BID to affected areas  · Avoid hepatotoxic meds  · Avoid narcotics & benzodiazepines as they can precipitate HE  · Low sodium diet  · Daily weights  · Strict I  & O  · Electrolyte management per nephrology  · Tunneled dialysis catheter to be placed when platelet count 50 or higher per nephrology  · Nephrology, ENT, & hematology on board  · Patient remains at high risk for bleeding given worsening thrombocytopenia & coagulopathy  · Waiting for bed at [facility] for DIC, ESLD, & ARF.  Unfortunately he has a very poor prognosis and will not survive without a liver transplant.   · Supportive care per primary team    Case reviewed and impression/plan reviewed in collaboration with Dr. Elsie Farnsworth  Electronically signed by FELIX Cespedes CNP on 5/6/2021 at 10:45 AM    [facility]

## 2021-05-06 NOTE — DISCHARGE SUMMARY
Discharge summary      Patient:  Tammy Diaz      Unit/Bed:3B-32/032-A    YOB: 1965    MRN: [id number]       Acct: [de-identified]     PCP: Nick Carney MD    Date of Admission: 4/23/2021      Assessment/Plan:    Acute Decompensated Liver Cirrhosis with ascites   - hx of alcoholic cirrhosis, follows up in [facility],   -S/P paracentesis with 4 L removed 05/05/2, SBP negative  - Continue lactulose and xifaxan  - MELD-na 37, considered high mortality in the next 3 months  - Thrombocytopenia  -Seen by GI and palliative  - Accepted to [facility], transport scheduled for today    KARAN on CKD III, t/c hepatorenal syndrome  -Seen by nephrology   - octreotide started per nephro  -No improvement with midodrine/octreotide/IV albumin  -Received hemodialysis, continue at [facility]  - s/p temporary dialysis catheter placement    Coagulopathy 2/2 Acute DIC likely 2/2 FHF  - Underlying cause unclear  - Plates 287 on admission. Down trend since. Blood smear showing schistocytes   - Bleeding around tessio, bleeding from nose, some blood in reese  - S/P platelet infusion x2, 1 unit of FFP, 1 unit of PRBCs, 2 units of cryoprecipitate. -UA grew Klebsiella currently on ceftriaxone, Blood cx pending   -Seen by heme-onc    Acute on Chronic Thrombocytopenia  - Due to hepatic failure and DIC  - Platelets continue to trend down with recurrent epistaxis and a drop in hgb on 5/2  - s/p 2 units of platelets.  Remains low  - Hem/onc     Recurrent right sided pleural effusion  - Demonstrated CXR 05/03  - Likely hydrothorax from ESLD  - S/P drainage in the past  - Patient currently without acute respiratory distress     Acute on Chronic macrocytic Anemia, severe  - Secondary to DIC, ESLD, KARAN  - Hgb stable at 8.1 s/p 1 U PRBC 05/03 for a Hgb of 7.1  - INR elevated  - has recurrent epistaxis, denies melena or hematochezia  - Continue to monitor, transfuse PRN    Acute Hepatic Encephalopathy, resolved  - secondary to above  - ammonia level is stable    Acute Urinary retention  - Urology consulted  - Renal Ultrasound 04/26 showed no evidence of hydronephrosis or obstruction  - reese cath inserted, likely to go home with reese and follow up as outpatient      Chronic Hypotension   - on midodrine     History of Atrial Fibrillation  - VR in low 100s w/o hypotension; on amiodarone and metoprolol dose of which increased to 75 from 50 BID with holding parameters.      Hyperammonemia, resolved  - on lactulose, rifaximin     Neuropathy  - on gabapentin     Recurrent epistaxis  - ENT saw, status post nasal packing  - patient at risk for life-threatening bleed given low platelet count  - platelets, FFP, cryoprecipitate    Chief Complaint: Abnormal labs    Hospital Course:    Per H&P     \"55 y. o. male who presented to [address] [place] [address] with abnormal lab; patient has an extensive medical history including cirrhosis of the liver, alcoholic cirrhosis, atrial fibrillation, diabetes, portal hypertension; he states it has been 6 months today since he has had any alcohol or tobacco products; he presented to the ED today secondary to his nephrologist office contacting him for a creatinine of 5. 5. \"     Subjective:   Patient seen and examined during bedside rounds. No evidence of acute cardiopulmonary distress. Afebrile vitals stable. Awaiting transport for transfer to [facility].     Medications:  Reviewed    Infusion Medications    sodium chloride      sodium chloride      sodium chloride Stopped (05/04/21 0821)    sodium chloride      sodium chloride      sodium chloride      sodium chloride      dextrose      sodium chloride       Scheduled Medications    metoprolol tartrate  75 mg Oral BID    cefTRIAXone (ROCEPHIN) IV  1,000 mg Intravenous Q24H    predniSONE  40 mg Oral Daily    Followed by   Luz Seat ON 5/7/2021] predniSONE  20 mg Oral Daily    Followed by   Luz Seat ON 5/9/2021] predniSONE  10 mg Oral Daily    Followed by   Luz Seat ON 5/11/2021] predniSONE  5 mg Oral Daily    hydrocortisone   Topical BID    lactulose  45 g Oral TID    sodium bicarbonate  1,300 mg Oral TID    epoetin sharonda-epbx  3,000 Units Subcutaneous Once per day on Mon Wed Fri    octreotide  100 mcg Subcutaneous Q8H    amiodarone  200 mg Oral Daily    famotidine  20 mg Oral Daily    gabapentin  200 mg Oral BID    midodrine  15 mg Oral TID    rifaximin  550 mg Oral BID    tiotropium  2 puff Inhalation Daily    insulin lispro  0-12 Units Subcutaneous TID WC    insulin lispro  0-6 Units Subcutaneous Nightly    sodium chloride flush  5-40 mL Intravenous 2 times per day     PRN Meds: sodium chloride, sodium chloride, sodium chloride, sodium chloride, sodium chloride, sodium chloride, sodium chloride, sodium chloride, calcium carbonate, albuterol, glucose, dextrose, glucagon (rDNA), dextrose, sodium chloride flush, sodium chloride, promethazine **OR** ondansetron, acetaminophen **OR** acetaminophen      Intake/Output Summary (Last 24 hours) at 5/6/2021 4232  Last data filed at 5/5/2021 2010  Gross per 24 hour   Intake 1094.68 ml   Output 40 ml   Net 1054.68 ml       Diet:  DIET LOW SODIUM 2 GM;  Dietary Nutrition Supplements: Protein Modular    Exam:  /82   Pulse 106   Temp 97.6 °F (36.4 °C) (Oral)   Resp 18   Ht 6' 2\" (1.88 m)   Wt 264 lb 6.4 oz (119.9 kg)   SpO2 97%   BMI 33.95 kg/m²     General appearance: chronically ill; jaundiced, No apparent distress, appears stated age and cooperative. jaundiced  HEENT: Pupils equal, round, and reactive to light. Icteric sclerae  Neck: Supple, with full range of motion. No jugular venous distention. Trachea midline. Respiratory:  Normal respiratory effort. Clear to auscultation, bilaterally without Rales/Wheezes/Rhonchi. Cardiovascular: Regular rate and rhythm with normal S1/S2 without murmurs, rubs or gallops.   Abdomen: Soft, round, non-tender, distended with shifting dullness  Musculoskeletal: ++ B/L lower extremity edema Skin: jaundiced, with diffused erythematous rashes and petechiae. Ecchymosis right knee. Neurologic:  Neurovascularly intact without any focal sensory/motor deficits. Cranial nerves: II-XII intact, grossly non-focal.  Psychiatric: Alert and oriented, thought content appropriate, normal insight  Capillary Refill: Brisk,< 3 seconds   Peripheral Pulses: +2 palpable, equal bilaterally       Labs:   Recent Labs     05/04/21 0354 05/05/21 0414 05/06/21 0437   WBC 7.0 6.5 6.7   HGB 7.5* 7.7* 7.7*   HCT 23.6* 23.6* 23.3*   PLT 39* 50* 52*     Recent Labs     05/04/21 0354 05/05/21 0414 05/06/21 0437   * 130* 129*   K 3.8 3.5 3.7   CL 93* 93* 92*   CO2 21* 22* 23   BUN 21 27* 32*   CREATININE 3.9* 3.8* 4.8*   CALCIUM 9.2 9.8 9.7   PHOS 3.8  --   --      Recent Labs     05/04/21 0354 05/05/21 0414 05/06/21 0437   AST 30 38 44*   ALT 16 19 23   BILITOT 7.5* 6.9* 6.2*   ALKPHOS 99 113 99     Recent Labs     05/04/21 0354 05/05/21 0414 05/06/21 0437   INR 2.37* 2.20* 2.47*     No results for input(s): CKTOTAL, TROPONINI in the last 72 hours. Urinalysis:      Lab Results   Component Value Date    NITRU NEGATIVE 05/04/2021    WBCUA >200 W/CLUMPS 05/04/2021    BACTERIA FEW 05/04/2021    RBCUA > 200 05/04/2021    BLOODU LARGE 05/04/2021    SPECGRAV 1.016 04/30/2021    GLUCOSEU NEGATIVE 05/04/2021       Radiology:  US GUIDED PARACENTESIS   Final Result   Successful ultrasound-guided paracentesis. **This report has been created using voice recognition software. It may contain minor errors which are inherent in voice recognition technology. **      Final report electronically signed by Dr. Heidi Peña on 5/6/2021 8:02 AM      XR CHEST PORTABLE   Final Result   Moderate right pleural effusion with adjacent atelectasis. **This report has been created using voice recognition software. It may contain minor errors which are inherent in voice recognition technology. **      Final report electronically signed by Dr. Malik May MD on 5/3/2021 5:00 PM      [facility]   Final Result      Successful ultrasound-guided paracentesis. 2.75 L of fluid drained. **This report has been created using voice recognition software. It may contain minor errors which are inherent in voice recognition technology. **            Final report electronically signed by Dr Trupti Bazan on 4/30/2021 1:15 PM      IR FLUORO GUIDED CVA DEVICE PLMT/REPLACE/REMOVAL   Final Result   Status post successful nontunneled dialysis catheter insertion. **This report has been created using voice recognition software. It may contain minor errors which are inherent in voice recognition technology. **      Final report electronically signed by Dr. Zoila Donovan on 4/28/2021 3:12 PM      US RENAL COMPLETE   Final Result   1. Normal bilateral renal ultrasound. 2. Unremarkable urinary bladder. Final report electronically signed by Dr. Milo Pruitt on 4/26/2021 3:26 PM      [facility]   Final Result      Successful ultrasound-guided paracentesis. 3 L of fluid drained. **This report has been created using voice recognition software. It may contain minor errors which are inherent in voice recognition technology. **            Final report electronically signed by Dr Trupti Bazan on 4/26/2021 3:22 PM      XR CHEST 1 VIEW   Final Result   1. Patchy airspace opacities are present at the right lung base. This may represent atelectasis or pneumonia. 2. There is a small residual right-sided pleural effusion which appears improved from the prior examination following right-sided thoracentesis from earlier same day. No pneumothorax is seen. **This report has been created using voice recognition software. It may contain minor errors which are inherent in voice recognition technology. **      Final report electronically signed by Dr. Carl Jerez on

## 2021-05-06 NOTE — PROGRESS NOTES
Report called to [address] [place] [address]. Patient going to [address] [place] [address], [employer] [address]. Unit phone number: [phone].

## 2021-05-06 NOTE — PROGRESS NOTES
Patient to be discharged to [facility]. Please call if palliative care needs arise.

## 2021-05-06 NOTE — PLAN OF CARE
Problem: Impaired respiratory status  Goal: Clear lung sounds  Description: Clear lung sounds  Outcome: Ongoing  Spiriva qday  Room air

## 2021-05-06 NOTE — PROGRESS NOTES
CREATININE 3.9* 3.8* 4.8*   GLUCOSE 154* 132* 132*   CALCIUM 9.2 9.8 9.7   MG 1.8  --   --    PHOS 3.8  --   --      Hepatic:   Recent Labs     05/04/21  0354 05/05/21  0414 05/06/21  0437   LABALBU 3.4* 3.7 3.8   AST 30 38 44*   ALT 16 19 23   BILITOT 7.5* 6.9* 6.2*   ALKPHOS 99 113 99         Meds:  Infusion:    sodium chloride      sodium chloride      sodium chloride Stopped (05/04/21 0821)    sodium chloride      sodium chloride      sodium chloride      sodium chloride      dextrose      sodium chloride       Meds:    metoprolol tartrate  75 mg Oral BID    cefTRIAXone (ROCEPHIN) IV  1,000 mg Intravenous Q24H    predniSONE  40 mg Oral Daily    Followed by   Daxa Yu ON 5/7/2021] predniSONE  20 mg Oral Daily    Followed by   Daxa Yu ON 5/9/2021] predniSONE  10 mg Oral Daily    Followed by   Dxaa Yu ON 5/11/2021] predniSONE  5 mg Oral Daily    hydrocortisone   Topical BID    lactulose  45 g Oral TID    sodium bicarbonate  1,300 mg Oral TID    epoetin sharonda-epbx  3,000 Units Subcutaneous Once per day on Mon Wed Fri    octreotide  100 mcg Subcutaneous Q8H    amiodarone  200 mg Oral Daily    famotidine  20 mg Oral Daily    gabapentin  200 mg Oral BID    midodrine  15 mg Oral TID    rifaximin  550 mg Oral BID    tiotropium  2 puff Inhalation Daily    insulin lispro  0-12 Units Subcutaneous TID WC    insulin lispro  0-6 Units Subcutaneous Nightly    sodium chloride flush  5-40 mL Intravenous 2 times per day     Meds prn: sodium chloride, sodium chloride, sodium chloride, sodium chloride, sodium chloride, sodium chloride, sodium chloride, sodium chloride, calcium carbonate, albuterol, glucose, dextrose, glucagon (rDNA), dextrose, sodium chloride flush, sodium chloride, promethazine **OR** ondansetron, acetaminophen **OR** acetaminophen       Impression and Plan:  1.   KARAN on CKD 3B in setting of acute decompensated liver cirrhosis with ascites  Cannot rule out hepatorenal syndrome no

## 2021-05-07 NOTE — TELEPHONE ENCOUNTER
[facility] Transitions Initial Follow Up Call    Call within 2 business days of discharge: Yes     Patient: Yoselin Mcgowan Patient : 1965 MRN: [id number]    [unfilled]    RARS: Readmission Risk Score: 64       Spoke with: patient    Discharge department/facility: [facility]    Non-face-to-face services provided:  Scheduled appointment with PCP-5-15-21     Follow Up  Future Appointments   Date Time Provider Francie Lopez   2021 12:00 PM STR MINOR ROOM 9 STRZ OP NURS [place] HOD   2021  9:15 AM Nidhi Jackson MD [address] Kyle   2021 11:00 AM STR ULTRASOUND RM 1 STRZ US STR Radiolog   2021 12:00 PM STR MINOR ROOM 8 STRZ OP NURS [place] HOD   2021 11:00 AM STR ULTRASOUND RM 1 STRZ US STR Radiolog   2021 12:00 PM STR MINOR ROOM 9 STRZ OP NURS [place] HOD   2021  8:00  [facility] [place] C[facility] ([address] [facility])

## 2021-05-17 PROBLEM — I48.0 PAROXYSMAL ATRIAL FIBRILLATION (HCC): Status: RESOLVED | Noted: 2020-01-01 | Resolved: 2021-01-01

## 2021-05-17 NOTE — CARE COORDINATION
Spoke with Leola Hayes at [facility]. She states Arvind Plunkett and his family were in the facility touring and states they are looking at several other SNF's as well. Leola Hayes is asking for the following information to be faxed over to [facility]. - todays progress notes (or most recent H&P)  -Med list  -demographics sheet    Could office staff please assist with  Faxing this information to \"Admissions\" at [address] [facility] at fax number [phone]. Thank you.

## 2021-05-17 NOTE — PROGRESS NOTES
any new medical issues? Yes recently DC from [facility] ER or hospital visits? No  Any new or changes in medicines? No  Using inhalers? Yes Spiriva and Albuterol PRN  Are they helpful? Yes   Past Medical hx   PMH:  Past Medical History:   Diagnosis Date    Advanced cirrhosis of liver (HCC)     Alcoholic cirrhosis (HCC)     Atrial fibrillation (HCC)     CHF (congestive heart failure) (HCC)     Chronic kidney disease     COPD (chronic obstructive pulmonary disease) (HCC)     Diabetes mellitus (HCC)     Gallstones     GERD (gastroesophageal reflux disease)     Headache     Hydrothorax     Portal hypertension (HCC)      SURGICAL HISTORY:  Past Surgical History:   Procedure Laterality Date    APPENDECTOMY      HERNIA REPAIR      LUNG SURGERY Right 20, 20    [facility]     SOCIAL HISTORY:  Social History     Tobacco Use    Smoking status: Former Smoker     Packs/day: 1.00     Years: 29.00     Pack years: 29.00     Types: Cigarettes     Quit date: 2020     Years since quittin.5    Smokeless tobacco: Never Used   Vaping Use    Vaping Use: Never assessed   Substance Use Topics    Alcohol use: Not Currently     Comment: No alcohol since 21    Drug use: Never     ALLERGIES:No Known Allergies  FAMILY HISTORY:  Family History   Problem Relation Age of Onset    Heart Disease Mother     High Blood Pressure Mother     Diabetes Mother     Cancer Mother     Cancer Father      CURRENT MEDICATIONS:  Current Outpatient Medications   Medication Sig Dispense Refill    rifaximin (XIFAXAN) 550 MG tablet Take 1 tablet by mouth 2 times daily 60 tablet 1    Misc.  Devices [facility] Standard lightweight wheelchair 1 each 0    ammonium lactate (AMLACTIN) 12 % cream Apply topically as needed      varenicline (CHANTIX) 0.5 MG tablet Take 1 tablet by mouth daily x 1 week then increase to 1 tablet by mouth twice daily 49 tablet 0    gabapentin (NEURONTIN) 100 MG capsule Take 2 capsules by mouth 2 Negative. Respiratory: Positive for shortness of breath. Negative for chest tightness, wheezing and stridor. Cardiovascular: Negative for chest pain and leg swelling. Gastrointestinal: Negative. Negative for diarrhea, nausea and vomiting. Endocrine: Negative. Genitourinary: Negative. Negative for dysuria. Musculoskeletal: Negative. Skin: Negative. Allergic/Immunologic: Negative. Neurological: Negative. Hematological: Negative. Psychiatric/Behavioral: Negative. Physical exam   BP 92/72   Pulse 130   Ht 6' 2\" (1.88 m)   Wt 246 lb (111.6 kg)   SpO2 (!) 88%   BMI 31.58 kg/m²    Wt Readings from Last 3 Encounters:   05/17/21 246 lb (111.6 kg)   05/06/21 264 lb 6.4 oz (119.9 kg)   04/13/21 237 lb 4.8 oz (107.6 kg)       Physical Exam  Vitals and nursing note reviewed. Constitutional:       General: He is not in acute distress. Appearance: He is well-developed. Comments: Patient [facility] in [facility] on room air    HENT:      Head: Normocephalic and atraumatic. Eyes:      Comments: (+) jaundice bilateral eyes   Neck:      Trachea: No tracheal deviation. Cardiovascular:      Rate and Rhythm: Rhythm irregular. Heart sounds: Normal heart sounds. No murmur heard. Pulmonary:      Effort: Pulmonary effort is normal. No respiratory distress. Breath sounds: No stridor. Examination of the right-lower field reveals decreased breath sounds. Decreased breath sounds present. No wheezing or rales. Chest:      Chest wall: No tenderness. Abdominal:      General: Bowel sounds are normal. There is no distension. Palpations: Abdomen is soft. Musculoskeletal:      Right lower leg: 3+ Edema present. Left lower leg: 3+ Edema present. Skin:     General: Skin is warm and dry. Capillary Refill: Capillary refill takes less than 2 seconds. Coloration: Skin is jaundiced.       Comments: Redness BLE   Neurological:      Mental Status: He is alert and oriented to person, place, and time. Psychiatric:         Behavior: Behavior normal.         Thought Content: Thought content normal.         Judgment: Judgment normal.          results   Lung Nodule Screening     [x] Qualifies    [] Does not qualify   [] Declined    [] Completed  The USPSTF recommends annual screening for lung cancer with low-dose computed tomography (LDCT) in adults aged 48 to [de-identified] years who have a 30 pack-year smoking history and currently smoke or have quit within the past 20 years. Screening should be discontinued once a person has not smoked for 20 years or develops a health problem that substantially limits life expectancy or the ability or willingness to have curative lung surgery. 11/21/2020   CT CHEST WO CONTRAST   FINDINGS: There is a small caliber pigtail drainage catheter in the upper right chest. Pigtails coiled and is along the medial pleura just posterior to the trachea. There is large fluid collection there is loss of volume of most of the right lung. Minimal residual aeration of the anterior right upper lobe is present. There is also an anterior small pneumothorax seen at the right lung base. Left lung is clear. Multiple nonenlarged mediastinal lymph nodes are present. No axillary lymphadenopathy is seen. Upper abdomen there is ascites. Liver is cirrhotic in appearance. Gallbladder is distended with multiple gallstones. Spleen is unremarkable. Bones No suspicious bone lesions   1. Right hydropneumothorax. Very small basilar pneumothorax. Collapse of most of the right lung with minimal aerated right upper lobe. 2. Ascites. Cholelithiasis. Cirrhotic liver. 5/3/2021   XR CHEST PORTABLE   FINDINGS: There is a right internal jugular dual-lumen catheter with catheter tip at the cavoatrial junction. There is a moderate-sized pleural effusion on the right, similar to prior exam. There is adjacent linear opacities as evidence for atelectasis. The left lung appears clear.  The cardiac

## 2021-05-17 NOTE — CARE COORDINATION
Nasra Locke from [facility] informed me that Yonatan Simpson is not in network to be admitted to [facility]. Will reach out to Yonatan Simpson and his family to see if there are any other options for SNF placement. Yonatan Simpson states he would also like to look into the [facility]. I called the [place] to see about availability. Left message at [facility] with admissions to call me back to discuss availability.

## 2021-05-17 NOTE — PROGRESS NOTES
[address] [address]  Dept: [phone]  Dept Fax: [phone]  Loc: [phone]      [address] [address]  Dept: [phone]  Dept Fax: [phone]  Loc: [phone]      Post-Discharge Transitional Care Management Services or Hospital Follow Up      Ambrosio Mullen   YOB: 1965    Date of Office Visit:  5/17/2021  Date of Hospital Admission: 5/6/2021- [facility]  Date of Hospital Discharge: 5/16/2021- [facility] management risk score Rising risk (score 2-5) and Complex Care (Scores >=6): 2     Non face to face  following discharge, date last encounter closed (first attempt may have been earlier): 5/7/2021 11:57 AM     Call initiated 2 business days of discharge: Yes    Patient Active Problem List   Diagnosis    Pleural effusion associated with hepatic disorder    Acute on chronic diastolic congestive heart failure ([address]    Alcoholic cirrhosis of liver with ascites ([address]    Essential hypertension    Thrombocytopenia ([address]    Type 2 diabetes mellitus without complication ([address]    Acute kidney injury ([address]    Acute kidney injury superimposed on CKD ([address]    Coagulopathy ([address]    Chronic diastolic congestive heart failure (HCC)    Lower leg edema    Class 1 obesity without serious comorbidity with body mass index (BMI) of 30.0 to 30.9 in adult    Chronic liver failure without hepatic coma (HCC)    Hydrothorax    Advanced cirrhosis of liver (HCC)    Permanent atrial fibrillation (HCC)    Sympathotonic orthostatic hypotension    Orthostatic dizziness    Degenerative cervical spinal stenosis    Hypo-osmolality and hyponatremia    Asymptomatic cholelithiasis    Weakness generalized    KARAN (acute kidney injury) ([address]    Epistaxis    Urinary retention       No Known Allergies    Medications listed as ordered at the time of discharge from hospital  Yes, reviewed from care everywhere. Medications marked \"taking\" at this time  Outpatient Medications Marked as Taking for the 5/17/21 encounter (Office Visit) with FELIX Mora CNP   Medication Sig Dispense Refill    rifaximin (XIFAXAN) 550 MG tablet Take 1 tablet by mouth 2 times daily 60 tablet 1    [facility]. Devices [address] [facility]) [facility] Standard lightweight wheelchair 1 each 0    ammonium lactate (AMLACTIN) 12 % cream Apply topically as needed      varenicline (CHANTIX) 0.5 MG tablet Take 1 tablet by mouth daily x 1 week then increase to 1 tablet by mouth twice daily 49 tablet 0    gabapentin (NEURONTIN) 100 MG capsule Take 2 capsules by mouth 2 times daily for 30 days.  120 capsule 0    famotidine (PEPCID) 20 MG tablet Take 1 tablet by mouth daily 30 tablet 0    midodrine (PROAMATINE) 5 MG tablet Take 3 tablets by mouth 3 times daily 270 tablet 0    amiodarone (CORDARONE) 200 MG tablet Take 1 tablet by mouth daily 30 tablet 0    Continuous Blood Gluc  (DEXCOM G6 ) MENDY Use to test 4 times daily and as needed DX: E11.9 1 Device 1    Continuous Blood Gluc Transmit (DEXCOM G6 TRANSMITTER) MISC Use to test 4 times daily and as needed DX: E11.9 9 each 3    Continuous Blood Gluc Sensor (DEXCOM G6 SENSOR) MISC Use to test 4 times daily and as needed DX: E11.9 9 each 3    lactulose (CHRONULAC) 10 GM/15ML solution 45 ml by mouth TID      bumetanide (BUMEX) 1 MG tablet Take 1 tablet by mouth 2 times daily 30 tablet 2    albuterol sulfate HFA (VENTOLIN HFA) 108 (90 Base) MCG/ACT inhaler Inhale 2 puffs into the lungs every 6 hours as needed for Wheezing 1 Inhaler 5    tiotropium (SPIRIVA) 18 MCG inhalation capsule Inhale 1 capsule into the lungs daily 30 capsule 5    dilTIAZem (CARDIZEM) 30 MG tablet Take 1 tablet by mouth 3 times daily 90 tablet 0    [DISCONTINUED] ondansetron (ZOFRAN) 4 MG tablet Take 4 mg by mouth every 8 hours as needed for Nausea or Vomiting      metoprolol (LOPRESSOR) 100 MG tablet Take 100 mg by mouth 2 times daily      Lancets MISC 1 each by Does not apply route daily Use to test blood sugar 4 times daily and as needed DX:E11.9 200 each 3    blood glucose monitor strips Test 4 times daily and as needed for irregular blood glucose. Dispense sufficient amount for indicated testing frequency plus additional to accommodate PRN testing needs. DX E11.9 200 strip 3    Blood Glucose Monitoring Suppl (ONE TOUCH ULTRA 2) w/Device KIT Use to test 4 times daily and as needed DX: E11.9 1 kit 0    ASPIRIN 81 PO Take by mouth daily          Medications patient taking as of now reconciled against medications ordered at time of hospital discharge: Yes    Chief Complaint   Patient presents with    Follow-Up from Hospital     pt states is feeling better but they didnt show him how to use his chair. Pt wants to talk about somethings       HPI    Inpatient course: Discharge summary reviewed- see chart. Reviewed care everywhere from [employer]. Pt is on the liver Transplant list for [facility] and workup is being done. IMPRESSION / PLAN   Nehemias Ludwig, is a 51 y. o. male with a past medical history significant for EtOH cirrhosis c/b ascites, EV, hepatic hydrothorax, Afib (prev on systemic Lovenox), DM2, HFpEF, COPD, former smoker who presented to O[facility] due to KARAN. He was started on dialysis due to elevated creatinine, and reduced urine output. He was transferred here for further management as he is known to hepatology here and is in the process of starting liver transplant work up.     KARAN now on dialysis, with anuria   -appears to be due to hepatorenal syndrome    -tunneled line placed 5/13  - plan for HD today 5/15  -consulted nephro for further management of HD   -monitoring renal labs daily   -nephrocaps  -continue to monitor for any urine output.  Had a small amount on presentation  -having bleeding at the HD line, improved s/p FFP and cryo   - will remove suggest being due to liver dysfunction   -given fibrinogen <150 gave cryo and FFP 5/14 for bleeding around HD line      History of hypotension- stable   -continue midodrine TID   -does not appear to have any symptoms currently. Will keep on midodrine right now and hold off on weaning due to concern that he may not be able to get enough fluid off with HD      Rash; in b/l LE, back, chest- improving   -ongoing for 4 weeks, not improving, seems has fungal infection  -started triamcinolone, ammonium lactate, itraconazole x14 days per derm consult which appear to be helping      Paroxysmal A fib with RVR: CHADSVASC- 3 (HTN, DM, HF). - Diagnosed 2017.  Given that patient had hemothorax s/p thoracentesis, discussed with patient that bleeding risk outweighed increase risk of stroke.  -resume amiodarone 200mg/d. Patient said he was put on it about 3months ago   -switched to metoprolol BID, monitoring BP closely. If BP drops, will consider using diltiazem again.     T2DM:  - was on metformin at home.   -would stop metformin due to liver failure.   -put on SSI without carb coverage.      DVT prophylaxis: SCDs. Cannot do heparin due to thrombocytopenia and recurrent bleeding at HD line   Dispo- med/surg for now. Can discharge to home for HD next Tuesday when bleeding is stabilized. Code status- FULL CODE      INTERVAL HISTORY / SUBJECTIVE   Yesterday, required pRBCs, 4 units FFP, and cryo for bleeding from tunneled line. Bleeding has slowed down but will need to pull temp HD line today. Patient very eager to be discharged, but had extensive discussion regarding bleeding risks. Will continue to observe following HD and line removal today. No other new complaints. Denies pain, fevers, chills, lightheadedness, shortness of breath, chest pain, abdominal pain, and other sources of blood loss.           Interval history/Current status: Signed himself out of [employer] and thought he could do things at home, but he has been unable to move wheelchair around and will be having issues getting to dialysis. Pulmonary follow up scheduled for today    New port and dialysis- pt will be going [place], Saturday. Paracentesis are scheduled weekly. Bleeding is the biggest risk now. Pt following closely with [facility]. Vitals:    05/17/21 0943   BP: 118/70   Site: Right Upper Arm   Pulse: 133   SpO2: 99%     There is no height or weight on file to calculate BMI. Wt Readings from Last 3 Encounters:   05/17/21 246 lb (111.6 kg)   05/06/21 264 lb 6.4 oz (119.9 kg)   04/13/21 237 lb 4.8 oz (107.6 kg)     BP Readings from Last 3 Encounters:   05/17/21 92/72   05/17/21 118/70   05/06/21 106/65       Review of Systems   Constitutional: Positive for fatigue. Negative for chills and fever. Respiratory: Positive for shortness of breath. Negative for cough and wheezing. Cardiovascular: Negative for chest pain, palpitations and leg swelling. Gastrointestinal: Positive for abdominal distention. Negative for nausea and vomiting. Musculoskeletal: Positive for arthralgias and back pain. Skin: Positive for color change. Negative for rash. Neurological: Positive for weakness. Negative for seizures and facial asymmetry. Physical Exam  Vitals reviewed. Constitutional:       General: He is not in acute distress. Appearance: Normal appearance. He is well-developed. HENT:      Head: Normocephalic and atraumatic. Right Ear: Hearing normal.      Left Ear: Hearing normal.      Nose: Nose normal. No nasal tenderness. Mouth/Throat:      Lips: Pink. Mouth: Mucous membranes are moist. No oral lesions. Pharynx: Oropharynx is clear. Uvula midline. Eyes:      General:         Right eye: No discharge. Left eye: No discharge. Conjunctiva/sclera: Conjunctivae normal.   Neck:      Trachea: No tracheal deviation. Cardiovascular:      Rate and Rhythm: Normal rate and regular rhythm.       Heart sounds: Normal heart sounds. No murmur heard. Pulmonary:      Effort: Pulmonary effort is normal. No respiratory distress. Breath sounds: Normal breath sounds. Abdominal:      General: Bowel sounds are normal. There is distension. Palpations: Abdomen is soft. Tenderness: There is no abdominal tenderness. Musculoskeletal:      Cervical back: Full passive range of motion without pain. Lymphadenopathy:      Head:      Right side of head: No submental, submandibular, tonsillar, preauricular, posterior auricular or occipital adenopathy. Left side of head: No submental, submandibular, tonsillar, preauricular, posterior auricular or occipital adenopathy. Skin:     General: Skin is warm and dry. Coloration: Skin is jaundiced. Findings: No rash. Comments: Multiple areas of busing in different phases of healing. Neurological:      General: No focal deficit present. Mental Status: He is alert and oriented to person, place, and time. Coordination: Coordination normal.   Psychiatric:         Mood and Affect: Mood normal.         Behavior: Behavior normal.         Thought Content: Thought content normal.         Judgment: Judgment normal.               Assessment/Plan:  1. Alcoholic cirrhosis of liver with ascites ([address]  - ND DISCHARGE MEDS RECONCILED W/ CURRENT OUTPATIENT MED LIST    2. Acute on chronic diastolic congestive heart failure ([address]    3. Essential hypertension    4. Advanced cirrhosis of liver ([address]    5. Acute kidney injury superimposed on CKD ([address]*    6. Coagulopathy ([address]    7. Thrombocytopenia ([address]    8. Weakness generalized      - Pt and brother to call around to [facility] and see about placement. Our office will get the care coordinator involved to help also. Pt and brother were agreeable. - Continue to follow up with [facility] as planned. - Patient given educational materials - see patient instructions.   Discussed use, benefit, and side

## 2021-05-17 NOTE — CARE COORDINATION
Ambulatory Care Coordination Note  5/17/2021  CM Risk Score: 2  Charlson 10 Year Mortality Risk Score: 100%     ACC: Shireen Olsen RN    Summary Note: PCP referral to assist with SNF placement. [facility] from office had Leslie Gigi in office and gave him my contact information. Asked that I reach out to him this afternoon as he has another appt. Recently discharged from [facility] and is looking for SNF placement at [facility]. I spoke with Dee Garcia from [facility] who is familiar with Leslie Yu. States Leslie Yu has called him twice this morning. Dee Garcia states they are running insurance numbers but they are pretty sure he's out of network for their facility. Dee Garcia is going to call me back to confirm whether they can admit him or not, if not, I will contact Leslie Yu and see if there is another SNF he would like to go to. Prior to Admission medications    Medication Sig Start Date End Date Taking? Authorizing Provider   rifaximin (XIFAXAN) 550 MG tablet Take 1 tablet by mouth 2 times daily 4/23/21   Walter Ortiz APRN - CNP   [facility]. Devices [facility]) [facility] Standard lightweight wheelchair 4/23/21   FELIX Rossi - CNP   ammonium lactate (AMLACTIN) 12 % cream Apply topically as needed    Historical Provider, MD   varenicline (CHANTIX) 0.5 MG tablet Take 1 tablet by mouth daily x 1 week then increase to 1 tablet by mouth twice daily 4/21/21   Frances Bunn MD   gabapentin (NEURONTIN) 100 MG capsule Take 2 capsules by mouth 2 times daily for 30 days.  4/7/21 5/17/21  Ethan Vasquez MD   famotidine (PEPCID) 20 MG tablet Take 1 tablet by mouth daily 4/8/21 5/17/21  Ethan Vasquez MD   midodrine (PROAMATINE) 5 MG tablet Take 3 tablets by mouth 3 times daily 4/7/21 5/17/21  Ethan Vasquez MD   amiodarone (CORDARONE) 200 MG tablet Take 1 tablet by mouth daily 4/7/21 5/17/21  Ethan Vasquez MD   Continuous Blood Gluc  (DEXCOM G6 ) MENDY Use to test 4 times daily and as needed DX: E11.9 3/24/21   Elise Young, APRN - CNP   Continuous Blood Gluc Transmit (DEXCOM G6 TRANSMITTER) MISC Use to test 4 times daily and as needed DX: E11.9 3/24/21   FELIX Fernandez CNP   Continuous Blood Gluc Sensor (DEXCOM G6 SENSOR) MISC Use to test 4 times daily and as needed DX: E11.9 3/24/21   FELIX Fernandez CNP   lactulose (CHRONULAC) 10 GM/15ML solution 45 ml by mouth TID 3/18/21   Historical Provider, MD   bumetanide (BUMEX) 1 MG tablet Take 1 tablet by mouth 2 times daily 3/18/21   FELIX Carr CNP   albuterol sulfate HFA (VENTOLIN HFA) 108 (90 Base) MCG/ACT inhaler Inhale 2 puffs into the lungs every 6 hours as needed for Wheezing 3/5/21   Manuel Rizzo MD   tiotropium [facility]) 18 MCG inhalation capsule Inhale 1 capsule into the lungs daily 3/5/21   Manuel Rizzo MD   dilTIAZem (CARDIZEM) 30 MG tablet Take 1 tablet by mouth 3 times daily 2/27/21   Syed Fang DO   metoprolol (LOPRESSOR) 100 MG tablet Take 100 mg by mouth 2 times daily    Historical Provider, MD   Lancets MISC 1 each by Does not apply route daily Use to test blood sugar 4 times daily and as needed DX:E11.9 12/4/20   FELIX Fernandez CNP   blood glucose monitor strips Test 4 times daily and as needed for irregular blood glucose. Dispense sufficient amount for indicated testing frequency plus additional to accommodate PRN testing needs.  DX E11.9 12/4/20   FELIX Fernandez CNP   Blood Glucose Monitoring Suppl (ONE TOUCH ULTRA 2) w/Device KIT Use to test 4 times daily and as needed DX: E11.9 12/4/20   FELIX Fernandez CNP   ASPIRIN 81 PO Take by mouth daily    Historical Provider, MD       Future Appointments   Date Time Provider Francie Lopez   5/17/2021 11:30 AM FELIX Sing[address]   5/19/2021  1:00 PM Rabia Mike RD, LD SRPX Phys[facility] [address] [facility]   5/21/2021 11:00 AM STR ULTRASOUND RM 1 STRZ US STR Radiolog   5/21/2021 12:00 PM STR MINOR ROOM [address] 6/4/2021 11:00 AM STR ULTRASOUND RM 1 STRZ US STR Radiolog   6/4/2021 12:00 PM STR MINOR ROOM 9 STRZ OP NURS [place] [facility]   6/16/2021  8:00 AM Damaso Lance MD N SRPX [facility]   6/18/2021 11:00 AM STR ULTRASOUND RM 1 STRZ US STR Radiolog   6/18/2021 12:00 PM STR MINOR ROOM 6 STRZ OP NURS [place] [facility]   8/5/2021 10:45 AM FELIX Amador - CNP [facility] Medicine [facility]   1/10/2022  1:45 PM Ally Coyne MD [address] [facility] [facility]

## 2021-05-18 NOTE — TELEPHONE ENCOUNTER
We can see what we can pull off of Care Everywhere for notes, but pt may need to call [facility] and have them fax their notes to [facility]. I only have access to limited notes.   Thanks -WS

## 2021-05-18 NOTE — CARE COORDINATION
Received a call from [facility] from [facility] [address]. She had some questions regard transportation to and from [facility] and transportation to paracentesis. I merged a called with Clayton Amin, brother who was able to answer those questions for [facility]. She states they should have an answer today to give to [facility] about admission.

## 2021-05-18 NOTE — CARE COORDINATION
Received a call from brother Evon Eisenberg asking about status of [facility] of [address]. I informed him that I haven't heard from [facility] but I will call them this morning. I called and spoke with Angela Conti from the [facility]. She states that they are still waiting for the clinical team to approve his admission. I asked her to please give me a call back as soon as she knew the answer as we would then need to continue looking for a facility. She took down my contact information and states she will be in touch when she knows the answer.

## 2021-05-19 NOTE — CARE COORDINATION
Mark Wood from [facility] called me. States it appears that everything has gone through and they will be able to accept Sue Barajas for admission. They will need to confirm with Sue Barajas that he will have transportation to and from [facility]. I spoke with brother Yasbel Brenner and asked him to call Mark Wood at the [facility] to confirm final details. Encouraged Lizzy to call me with any new barriers.

## 2021-05-20 PROBLEM — M54.50 ACUTE EXACERBATION OF CHRONIC LOW BACK PAIN: Status: ACTIVE | Noted: 2021-01-01
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
## 2021-05-20 PROBLEM — G89.29 ACUTE EXACERBATION OF CHRONIC LOW BACK PAIN: Status: ACTIVE | Noted: 2021-01-01

## 2021-05-20 NOTE — CARE COORDINATION
Received a call from [address] [place] [address]. States he has not been admitted yet into [address] [place] [address] and asked me to call and check on his status. I  Called and spoke with Gerber Mccallum who is currently covering for Dipti Abdalla and she was familiar with [address] [place] [address]. States they are waiting on prior auth from insurance company before they can admit him . States that can take 24-72 hours to get approved. Gerber Mccallum informed me that they will reach out to [address] [place] [address] today to discuss this with him. I informed [address] [place] [address] of all of this information.

## 2021-05-20 NOTE — ED PROVIDER NOTES
[facility] ENCOUNTER        Pt Name: Derrick Casarez  MRN: [id number]  [place] 1965  Date of evaluation: 5/20/2021  Treating Resident Physician: Jensen David DO  Supervising Physician: Fara       Chief Complaint   Patient presents with    Fall    Back Pain     History obtained from the patient. HISTORY OF PRESENT ILLNESS    HPI  Derrick Casarez is a 55 y.o. male who presents to the emergency department for evaluation of fall and upper thoracic pain. Patient states that he was using his walker at home when he felt himself lean to the right and was unable to catch himself because his legs are too heavy. Patient has been having difficulty with his activities of daily living since being discharged from the hospital recently. Feels like he is having worsening of his lower extremity swelling that is making any ambulation very difficult. Because he lives alone, he is concerned that he will not be able to safely care for himself anymore. For his fall today, he did not lose consciousness or strike his head. No other reported injury or pain at this time. Patient did have dialysis this morning as scheduled. The patient has no other acute complaints at this time. REVIEW OF SYSTEMS   Review of Systems   Constitutional: Positive for activity change and fatigue. Negative for chills and fever. HENT: Negative for rhinorrhea, sinus pressure and sinus pain. Eyes: Negative for visual disturbance. Respiratory: Positive for shortness of breath (baseline). Negative for cough and wheezing. Cardiovascular: Negative for chest pain and palpitations. Gastrointestinal: Negative for abdominal pain, constipation, diarrhea and vomiting. Genitourinary: Negative for decreased urine volume, difficulty urinating, dysuria, flank pain and urgency. Musculoskeletal: Positive for arthralgias, back pain and gait problem.  Negative for neck pain and neck stiffness. Skin: Positive for color change (bruising after dialysis catheter placement). Negative for rash. Neurological: Positive for weakness. Negative for dizziness, syncope, light-headedness, numbness and headaches.          PAST MEDICAL AND SURGICAL HISTORY     Past Medical History:   Diagnosis Date    Advanced cirrhosis of liver ([address]     Alcoholic cirrhosis (HCC)     Atrial fibrillation (HCC)     CHF (congestive heart failure) (HCC)     Chronic kidney disease     COPD (chronic obstructive pulmonary disease) (HCC)     Diabetes mellitus (HCC)     Gallstones     GERD (gastroesophageal reflux disease)     Headache     Hydrothorax     Portal hypertension (HCC)      Past Surgical History:   Procedure Laterality Date    APPENDECTOMY      HERNIA REPAIR      LUNG SURGERY Right 11-24-20, 11-30-20    [facility]         MEDICATIONS     Current Facility-Administered Medications:     ipratropium-albuterol (DUONEB) nebulizer solution 1 ampule, 1 ampule, Inhalation, Q4H PRN, Krista Ordoñez MD    amiodarone (CORDARONE) tablet 200 mg, 200 mg, Oral, Daily, Krista Ordoñez MD    bumetanide (BUMEX) tablet 1 mg, 1 mg, Oral, BID, Krista Ordoñez MD    famotidine (PEPCID) tablet 20 mg, 20 mg, Oral, Daily, Krista Ordoñez MD    gabapentin (NEURONTIN) capsule 200 mg, 200 mg, Oral, BID, Krista Ordoñez MD    lactulose (CHRONULAC) 10 GM/15ML solution 45 g, 45 g, Oral, TID, Krista Ordoñez MD    metoprolol tartrate (LOPRESSOR) tablet 100 mg, 100 mg, Oral, BID, Krista Ordoñez MD    midodrine (PROAMATINE) tablet 15 mg, 15 mg, Oral, TID, Krista Ordoñez MD    rifaximin (XIFAXAN) tablet 550 mg, 550 mg, Oral, BID, Krista Ordoñez MD    tiotropium (SPIRIVA RESPIMAT) 2.5 MCG/ACT inhaler 2 puff, 2 puff, Inhalation, Daily, Krista Ordoñez MD    sodium chloride flush 0.9 % injection 10 mL, 10 mL, Intravenous, 2 times per day, Krista Ordoñez MD    sodium chloride flush 0.9 % injection 10 mL, 10 mL, Intravenous, PRN, Jayne Barrera MD    0.9 % sodium chloride infusion, 25 mL, Intravenous, PRN, Jayne Barrera MD    promethazine (PHENERGAN) tablet 12.5 mg, 12.5 mg, Oral, Q6H PRN **OR** ondansetron (ZOFRAN) injection 4 mg, 4 mg, Intravenous, Q6H PRN, Jayne Barrera MD    traMADol (ULTRAM) tablet 25 mg, 25 mg, Oral, Q6H PRN, Jayne Barrera MD    lidocaine 4 % external patch 1 patch, 1 patch, Transdermal, Daily, Jayne Barrera MD    Current Outpatient Medications:     rifaximin (XIFAXAN) 550 MG tablet, Take 1 tablet by mouth 2 times daily, Disp: 60 tablet, Rfl: 1    Misc. Devices [facility]) MISC, Standard lightweight wheelchair, Disp: 1 each, Rfl: 0    ammonium lactate (AMLACTIN) 12 % cream, Apply topically as needed, Disp: , Rfl:     varenicline (CHANTIX) 0.5 MG tablet, Take 1 tablet by mouth daily x 1 week then increase to 1 tablet by mouth twice daily, Disp: 49 tablet, Rfl: 0    gabapentin (NEURONTIN) 100 MG capsule, Take 2 capsules by mouth 2 times daily for 30 days. , Disp: 120 capsule, Rfl: 0    famotidine (PEPCID) 20 MG tablet, Take 1 tablet by mouth daily, Disp: 30 tablet, Rfl: 0    midodrine (PROAMATINE) 5 MG tablet, Take 3 tablets by mouth 3 times daily, Disp: 270 tablet, Rfl: 0    amiodarone (CORDARONE) 200 MG tablet, Take 1 tablet by mouth daily, Disp: 30 tablet, Rfl: 0    Continuous Blood Gluc  (DEXCOM G6 ) MENDY, Use to test 4 times daily and as needed DX: E11.9, Disp: 1 Device, Rfl: 1    Continuous Blood Gluc Transmit (DEXCOM G6 TRANSMITTER) MISC, Use to test 4 times daily and as needed DX: E11.9, Disp: 9 each, Rfl: 3    Continuous Blood Gluc Sensor (DEXCOM G6 SENSOR) MISC, Use to test 4 times daily and as needed DX: E11.9, Disp: 9 each, Rfl: 3    lactulose (CHRONULAC) 10 GM/15ML solution, 45 ml by mouth TID, Disp: , Rfl:     bumetanide (BUMEX) 1 MG tablet, Take 1 tablet by mouth 2 times daily, Disp: 30 tablet, Rfl: 2    albuterol sulfate HFA (VENTOLIN HFA) 108 (90 Base) MCG/ACT inhaler, Inhale 2 puffs into the lungs every 6 hours as needed for Wheezing, Disp: 1 Inhaler, Rfl: 5    tiotropium (SPIRIVA) 18 MCG inhalation capsule, Inhale 1 capsule into the lungs daily, Disp: 30 capsule, Rfl: 5    dilTIAZem (CARDIZEM) 30 MG tablet, Take 1 tablet by mouth 3 times daily, Disp: 90 tablet, Rfl: 0    metoprolol (LOPRESSOR) 100 MG tablet, Take 100 mg by mouth 2 times daily, Disp: , Rfl:     Lancets MISC, 1 each by Does not apply route daily Use to test blood sugar 4 times daily and as needed DX:E11.9, Disp: 200 each, Rfl: 3    blood glucose monitor strips, Test 4 times daily and as needed for irregular blood glucose. Dispense sufficient amount for indicated testing frequency plus additional to accommodate PRN testing needs. DX E11.9, Disp: 200 strip, Rfl: 3    Blood Glucose Monitoring Suppl (ONE TOUCH ULTRA 2) w/Device KIT, Use to test 4 times daily and as needed DX: E11.9, Disp: 1 kit, Rfl: 0    ASPIRIN 81 PO, Take by mouth daily, Disp: , Rfl:       SOCIAL HISTORY     Social History     Social History Narrative    Not on file     Social History     Tobacco Use    Smoking status: Former Smoker     Packs/day: 1.00     Years: 29.00     Pack years: 29.00     Types: Cigarettes     Quit date: 2020     Years since quittin.5    Smokeless tobacco: Never Used   Vaping Use    Vaping Use: Never assessed   Substance Use Topics    Alcohol use: Not Currently     Comment: No alcohol since 21    Drug use: Never         ALLERGIES   No Known Allergies      FAMILY HISTORY     Family History   Problem Relation Age of Onset    Heart Disease Mother     High Blood Pressure Mother     Diabetes Mother     Cancer Mother     Cancer Father          PREVIOUS RECORDS   Previous records reviewed: Recently discharged from this facility for acute on chronic kidney disease. Boyd Couch         PHYSICAL EXAM     ED Triage Vitals [21 1331]   BP Temp Temp Source Pulse Resp SpO2 Height Weight   91/71 97.5 °F (36.4 °C) Oral 116 16 99 % -- 266 lb (120.7 kg)     Initial vital signs and nursing assessment reviewed and Normal except for mild hypotension and tachycardia that is at patient's baseline after chart review. . Body mass index is 34.15 kg/m². Pulsoximetry is normal per my interpretation. Additional Vital Signs:  Vitals:    05/20/21 1500   BP: 93/72   Pulse: 111   Resp: 14   Temp:    SpO2: 97%       Physical Exam  Constitutional:       General: He is not in acute distress. Appearance: Normal appearance. He is obese. He is ill-appearing. He is not diaphoretic. HENT:      Head: Normocephalic and atraumatic. Nose: Nose normal. No congestion or rhinorrhea. Mouth/Throat:      Mouth: Mucous membranes are moist.      Pharynx: Oropharynx is clear. No oropharyngeal exudate or posterior oropharyngeal erythema. Eyes:      General: Scleral icterus present. Extraocular Movements: Extraocular movements intact. Pupils: Pupils are equal, round, and reactive to light. Cardiovascular:      Rate and Rhythm: Normal rate and regular rhythm. Pulses: Normal pulses. Heart sounds: Normal heart sounds. No murmur heard. No friction rub. No gallop. Pulmonary:      Effort: Pulmonary effort is normal.      Breath sounds: Normal breath sounds. No wheezing, rhonchi or rales. Abdominal:      Palpations: Abdomen is soft. Tenderness: There is no abdominal tenderness. There is no guarding or rebound. Musculoskeletal:         General: No tenderness. Cervical back: Normal range of motion. No rigidity. No muscular tenderness. Right lower leg: Edema present. Left lower leg: Edema present. Comments: Significant bilateral lower extremity 4+ pitting edema up to the knees. Skin:     General: Skin is warm and dry. Capillary Refill: Capillary refill takes less than 2 seconds.       Findings: Bruising (Ecchymosis overlying the right anterior Notable for the following components:    aPTT 46.5 (*)     All other components within normal limits   OSMOLALITY - Abnormal; Notable for the following components:    Osmolality Calc 268.0 (*)     All other components within normal limits   GLOMERULAR FILTRATION RATE, ESTIMATED - Abnormal; Notable for the following components:    Est, Glom Filt Rate 20 (*)     All other components within normal limits   LIPASE   ANION GAP   MAGNESIUM   SCAN OF BLOOD SMEAR       Radiologic studies results:  CT HEAD WO CONTRAST   Final Result   No acute intracranial hemorrhage, mass effect or midline shift. **This report has been created using voice recognition software. It may contain minor errors which are inherent in voice recognition technology. **      Final report electronically signed by Dr Bhaskar Dee on 5/20/2021 3:01 PM      XR CHEST PORTABLE   Final Result   Moderate right-sided and small left-sided pleural effusions with adjacent opacities which may represent atelectasis or infiltrates. **This report has been created using voice recognition software. It may contain minor errors which are inherent in voice recognition technology. **      Final report electronically signed by Dr Bhaskar Dee on 5/20/2021 2:20 PM      Simin[employer]    (Results Pending)       ED Medications administered this visit:   Medications   ipratropium-albuterol (DUONEB) nebulizer solution 1 ampule (has no administration in time range)   amiodarone (CORDARONE) tablet 200 mg (has no administration in time range)   bumetanide (BUMEX) tablet 1 mg (has no administration in time range)   famotidine (PEPCID) tablet 20 mg (has no administration in time range)   gabapentin (NEURONTIN) capsule 200 mg (has no administration in time range)   lactulose (CHRONULAC) 10 GM/15ML solution 45 g (has no administration in time range)   metoprolol tartrate (LOPRESSOR) tablet 100 mg (has no administration in time range)   midodrine

## 2021-05-20 NOTE — ED PROVIDER NOTES
[id number]  EMERGENCY MEDICINE ATTENDING ATTESTATION      Evaluation of Sara Contreras. Case discussed and care plan developed with resident physician. I agree with the resident physician documentation and plan as documented by him, except if my documentation differs. Patient seen, interviewed and examined by me. I reviewed the medical, surgical, family and social history, medications and allergies. I have reviewed the nursing documentation. I have reviewed the patient's vital signs and are abnormal from Tachycardia that is currently at baseline for this patient per my interpretation. Body mass index is 34.15 kg/m². Pulsoxymetry is normal per my interpretation. Brief H&P   Patient c/o worsening bilateral leg edema despite dialysis, difficulty him to move. Patient states he lost his balance because of the weight of his legs, fell and hit his back in his head. He has history of liver failure on the transplant list and renal failure on dialysis. Patient received dialysis today and although he says his legs reviewed significantly in size they are still significantly heavy. Denies LOC. Physical exam is notable for chronically ill appearing, significant bruising surrounding his dialysis catheter on the right upper chest.  Significant bilateral leg edema up to the proximal thighs. Medical Decision Making   MDM:   1. Fall   2. Significant leg edema making him be unable to move at home and unable to do activities of daily living. Plan:   Ettie Poor line, labs   EKG   Imaging   Observation. Please see the resident physician completed note for final disposition except as documented on this attestation. I have reviewed and interpreted all available lab, radiology and ekg results available at the moment. Diagnosis, treatment and disposition plans were discussed and agreed upon by patient. This transcription was electronically signed.  It was dictated by use of

## 2021-05-20 NOTE — ED NOTES
Bedside report received from YANICK ERINN [facility]. This nurse assuming care.       Gracie Yang RN  05/20/21 1936

## 2021-05-20 NOTE — H&P
Patient: Nehemias Ludwig    Unit/Bed: 05/005A    YOB: 1965    MRN: [id number]    Acct: [de-identified]     Admitting Diagnosis: Fall (on)(from) incline, initial encounter [W10. 2XXA]    Admit Date:  5/20/2021    CC: Fall at home with acute on chronic back pain x1 day    HPI :  52-year-old male patient with complicated medical history of alcoholic liver cirrhosis with decompensation currently on liver transplant list at [facility]. Patient today from at home today leading to acute on chronic 10/10 back pain with lower extremity weakness. Patient does not feel safe at home and wants to be placed in acute rehab. He has end-stage renal disease on hemodialysis-status post 4 hours of hemodialysis today. Other complaints include bilateral lower extremity swelling, shortness of breath, and abdominal distention from ascites. Discharged from [facility] 2 days ago. Admission vital signs temperature 36.4 °C, respiratory 16, heart rate 116 bpm, blood pressure 91/71, oxygen saturation 99% on room air. Admission labs sodium 133, potassium 3.3, BUN 18, serum creatinine 3.3, magnesium 2.2, blood sugar 94, calcium 9.2, total protein 6.1, serum osmolality 268.0 and proBNP 16,155. Albumin 3.3, alkaline phosphatase 184, ALT 25, AST 44, total bilirubin 6.6, direct bili being 2.7, lipase 27.5, total protein 6.1. WBC 5.7, hemoglobin 8.5, .4, platelets 35 and INR 2.05. Chest x-ray which reviewed shows bilateral pulmonary congestion with right hydronephrosis more than left. Twelve-lead EKG which reviewed shows atrial flutter at 118 bpm.  No ST segment elevation or depression. QTC of 501 ms. Review of Systems   Constitutional: Positive for activity change. Negative for appetite change, chills, diaphoresis, fatigue, fever and unexpected weight change.    HENT: Negative for congestion, dental problem, drooling, ear discharge, ear pain, facial swelling, hearing loss, mouth sores, nosebleeds, postnasal drip, rhinorrhea, sinus pressure, sinus pain, sneezing, sore throat, tinnitus, trouble swallowing and voice change. Eyes: Negative for photophobia, pain, discharge, redness, itching and visual disturbance. Respiratory: Positive for shortness of breath. Negative for apnea, cough, choking, chest tightness, wheezing and stridor. Cardiovascular: Positive for leg swelling. Negative for chest pain and palpitations. Gastrointestinal: Positive for abdominal distention. Negative for abdominal pain, blood in stool, constipation, diarrhea, nausea, rectal pain and vomiting. Endocrine: Negative for cold intolerance, heat intolerance, polydipsia, polyphagia and polyuria. Genitourinary: Negative for decreased urine volume, difficulty urinating, discharge, dysuria, enuresis, flank pain, frequency, genital sores, hematuria, penile pain, penile swelling, scrotal swelling, testicular pain and urgency. Musculoskeletal: Positive for back pain. Negative for arthralgias, gait problem, joint swelling, myalgias, neck pain and neck stiffness. Skin: Negative for color change, pallor, rash and wound. Allergic/Immunologic: Negative for environmental allergies, food allergies and immunocompromised state. Neurological: Positive for weakness. Negative for dizziness, tremors, seizures, syncope, facial asymmetry, speech difficulty, light-headedness, numbness and headaches. Hematological: Negative for adenopathy. Does not bruise/bleed easily. Psychiatric/Behavioral: Negative for agitation, behavioral problems, confusion, decreased concentration, dysphoric mood, hallucinations, self-injury, sleep disturbance and suicidal ideas. The patient is not nervous/anxious and is not hyperactive.         Past Medical History:        Diagnosis Date    Advanced cirrhosis of liver (HCC)     Alcoholic cirrhosis (HCC)     Atrial fibrillation (HCC)     CHF (congestive heart failure) (HCC)     Chronic kidney disease  COPD (chronic obstructive pulmonary disease) (HCC)     Diabetes mellitus ([facility]     Gallstones     GERD (gastroesophageal reflux disease)     Headache     Hydrothorax     Portal hypertension (HCC)        Past Surgical History:        Procedure Laterality Date    APPENDECTOMY      HERNIA REPAIR      LUNG SURGERY Right 11-24-20, 11-30-20    [facility]       Medications Prior to Admission:    Prior to Admission medications    Medication Sig Start Date End Date Taking? Authorizing Provider   rifaximin (XIFAXAN) 550 MG tablet Take 1 tablet by mouth 2 times daily 4/23/21   FELIX Vega CNP   [facility]. Devices [facility]) [facility] Standard lightweight wheelchair 4/23/21   FELIX Vega CNP   ammonium lactate (AMLACTIN) 12 % cream Apply topically as needed    Historical Provider, MD   varenicline (CHANTIX) 0.5 MG tablet Take 1 tablet by mouth daily x 1 week then increase to 1 tablet by mouth twice daily 4/21/21   Michael Ahmadi MD   gabapentin (NEURONTIN) 100 MG capsule Take 2 capsules by mouth 2 times daily for 30 days.  4/7/21 5/17/21  Jihan Cole MD   famotidine (PEPCID) 20 MG tablet Take 1 tablet by mouth daily 4/8/21 5/17/21  Jihan Cole MD   midodrine (PROAMATINE) 5 MG tablet Take 3 tablets by mouth 3 times daily 4/7/21 5/17/21  Jihan Cole MD   amiodarone (CORDARONE) 200 MG tablet Take 1 tablet by mouth daily 4/7/21 5/17/21  Jihan Cole MD   Continuous Blood Gluc  (DEXCOM G6 ) MENDY Use to test 4 times daily and as needed DX: E11.9 3/24/21   AnuradhaFELIX Alexander CNP   Continuous Blood Gluc Transmit (DEXCOM G6 TRANSMITTER) MISC Use to test 4 times daily and as needed DX: E11.9 3/24/21   AnuradhaFELIX Alexander - CNP   Continuous Blood Gluc Sensor (DEXCOM G6 SENSOR) MISC Use to test 4 times daily and as needed DX: E11.9 3/24/21   AnuradhaFELIX Alexander CNP   lactulose (CHRONULAC) 10 GM/15ML solution 45 ml by mouth TID 3/18/21   Historical Provider, MD   bumetanide (BUMEX) 1 MG tablet Take 1 tablet by mouth 2 times daily 3/18/21   FELIX Snow CNP   albuterol sulfate HFA (VENTOLIN HFA) 108 (90 Base) MCG/ACT inhaler Inhale 2 puffs into the lungs every 6 hours as needed for Wheezing 3/5/21   Kitty Coker MD   tiotropium [facility]) 18 MCG inhalation capsule Inhale 1 capsule into the lungs daily 3/5/21   Kitty Coker MD   dilTIAZem (CARDIZEM) 30 MG tablet Take 1 tablet by mouth 3 times daily 2/27/21   Phyllistine Rona, DO   metoprolol (LOPRESSOR) 100 MG tablet Take 100 mg by mouth 2 times daily    Historical Provider, MD   Lancets MISC 1 each by Does not apply route daily Use to test blood sugar 4 times daily and as needed DX:E11.9 12/4/20   FELIX Walton CNP   blood glucose monitor strips Test 4 times daily and as needed for irregular blood glucose. Dispense sufficient amount for indicated testing frequency plus additional to accommodate PRN testing needs. DX E11.9 12/4/20   FELIX Walton CNP   Blood Glucose Monitoring Suppl (ONE TOUCH ULTRA 2) w/Device KIT Use to test 4 times daily and as needed DX: E11.9 12/4/20   FELIX Walton CNP   ASPIRIN 81 PO Take by mouth daily    Historical Provider, MD       Allergies:    Patient has no known allergies. Social History:    TOBACCO:   reports that he quit smoking about 6 months ago. His smoking use included cigarettes. He has a 29.00 pack-year smoking history. He has never used smokeless tobacco.    ETOH:   reports previous alcohol use. Family History:        Problem Relation Age of Onset    Heart Disease Mother     High Blood Pressure Mother     Diabetes Mother     Cancer Mother     Cancer Father        Objective:   BP 93/72   Pulse 111   Temp 97.5 °F (36.4 °C) (Oral)   Resp 14   Wt 266 lb (120.7 kg)   SpO2 97%   BMI 34.15 kg/m²   No intake or output data in the 24 hours ending 05/20/21 1730    Physical Exam  Vitals and nursing note reviewed.    Constitutional: General: He is not in acute distress. Appearance: Normal appearance. He is obese. He is not ill-appearing, toxic-appearing or diaphoretic. HENT:      Head: Normocephalic and atraumatic. Right Ear: External ear normal.      Left Ear: External ear normal.      Nose: Nose normal. No congestion or rhinorrhea. Mouth/Throat:      Mouth: Mucous membranes are dry. Pharynx: Oropharynx is clear. No oropharyngeal exudate or posterior oropharyngeal erythema. Eyes:      General: Scleral icterus present. Right eye: No discharge. Left eye: No discharge. Extraocular Movements: Extraocular movements intact. Conjunctiva/sclera: Conjunctivae normal.      Pupils: Pupils are equal, round, and reactive to light. Neck:      Vascular: No carotid bruit. Cardiovascular:      Rate and Rhythm: Tachycardia present. Rhythm irregular. Pulses: Normal pulses. Heart sounds: Normal heart sounds. No murmur heard. No friction rub. No gallop. Comments: Reduced breath sounds. Pulmonary:      Effort: Respiratory distress present. Breath sounds: No stridor. No wheezing, rhonchi or rales. Comments: Reduced bilateral air entry. Chest:      Chest wall: No tenderness. Abdominal:      General: Abdomen is flat. Bowel sounds are normal. There is distension. Palpations: There is no mass. Tenderness: There is no abdominal tenderness. There is no right CVA tenderness, left CVA tenderness, guarding or rebound. Hernia: No hernia is present. Musculoskeletal:         General: No swelling, tenderness, deformity or signs of injury. Normal range of motion. Cervical back: Neck supple. No rigidity or tenderness. Right lower leg: Edema present. Left lower leg: Edema present. Lymphadenopathy:      Cervical: No cervical adenopathy. Skin:     General: Skin is warm and dry. Coloration: Skin is not jaundiced or pale.       Findings: No bruising, erythema, lesion or rash. Neurological:      General: No focal deficit present. Mental Status: He is alert and oriented to person, place, and time. Mental status is at baseline. Cranial Nerves: No cranial nerve deficit. Sensory: No sensory deficit. Motor: No weakness. Coordination: Coordination normal.      Gait: Gait normal.      Deep Tendon Reflexes: Reflexes normal.   Psychiatric:         Mood and Affect: Mood normal.         Behavior: Behavior normal.         Thought Content: Thought content normal.         Judgment: Judgment normal.     :    Medications:       Continuous Infusions:    PRN Meds:    Data:    CBC:   Recent Labs     05/20/21  1341   WBC 5.7   RBC 2.61*   HGB 8.5*   HCT 26.2*   .4*   PLT 35*       BMP:   Recent Labs     05/20/21  1341   *   K 3.3*   CL 94*   CO2 28   BUN 18   CREATININE 3.3*       PT/INR:   Recent Labs     05/20/21  1341   INR 2.05*       LIVER PROFILE:   Recent Labs     05/20/21  1341   AST 44*   ALT 25   BILIDIR 2.7*   BILITOT 6.6*   ALKPHOS 184*        ABG. None. URINALYSIS. None. SEROLOGY  None. TUMOR MARKERS. None. MICROBIOLOGY   None. HISTOPATHOLOGY. None. TOXICOLOGY. None. ENDOSCOPE STUDIES. None. PROCEDURES. None. RADIOLOGY. Chest x-ray-05/20/2021. FINDINGS:  There is a dual-lumen dialysis catheter on the right side.     There is a moderate right-sided and small to moderate left-sided pleural effusion. There are bibasilar opacities which may represent atelectasis or infiltrates.     There is no pneumothorax.     Visualized portions of the upper abdomen are within normal limits.     The osseous structures are intact. No acute fractures or suspicious osseous lesions.     IMPRESSION:  Moderate right-sided and small left-sided pleural effusions with adjacent opacities which may represent atelectasis or infiltrates. ASSESSMENT AND  PLAN. 1.PULMONARY.      Acute respiratory insufficiency d/t pulmonary congestion -O2 supplement wean as tolerated. Hepatic right hydrothorax . 2. CARDIOVASCULAR. Atrial flutter with a rapid-continue amiodarone, metoprolol and Cardizem. Acute on chronic CHF exacerbation w/ diastolic dysfunction-patient oliguric. Chronic hypotension-midodrine    3.GI. Decompensated alcoholic liver cirrhosis with ascites -paracentesis. Asymptomatic cholelithiasis. On the liver transplant list via state. 4.RENAL AND ELECTROLYTES. ESRD on HD w/ fluid overload-s/p hemodialysis today. Acute hypokalemia-replete. Hyperosmolality and hyponatremia. 5.ENDO. Type II DM-sliding scale insulin. 6.MUSCULOSKELETAL. Fall at home-PT OT evaluation for acute rehab placement. Acute on chronic back pain exacerbation. 7. LIFE STYLE.     H/o chronic alcohol abuse. 8.HEM-ONC. Coagulopathy d/t end-stage liver disease-INR 2.05.    9.NUTRITION. Obesity with a BMI 34.15-needs regular exercise diet control. 10.DISPO. Planned d/c to home vs rehab soon.       Electronically signed by Chucky Sorensen MD on 5/20/2021 at 5:30 PM

## 2021-05-20 NOTE — CONSULTS
Nephrology Consult Note  Patient's Name: Debbie Stockton  6:50 PM  5/20/2021    Nephrologist: Bill Phoenix    Reason for Consult: End stage kidney disease. Volume overload. Requesting Physician:  Gustavo Mcintyre MD  PCP: Frances Bunn MD    Chief Complaint: Fall  Assessment  1. End-stage kidney disease on hemodialysis. 2. Bilateral lower extremity edema. 3. Status post fall with no apparent injury. 4. Chronic deconditioning. 5. Hepatic cirrhosis. 6. Diabetes mellitus type 2 with renal manifestations. 7. COPD  8. Hyponatremia multifactorial  9. Hypokalemia  10. Anemia of chronic disease with macrocytosis  11. Elevated liver enzymes due to hepatic cirrhosis  12. Bilateral pleural effusion with right greater than the  Left. 13. Chronic ascites from hepatic cirrhosis    Plan    1. I discussed my thoughts at length with the patient. 2. He understood. 3. I addressed his questions. 4. CT of the head report reviewed. 5. Chest x-ray report reviewed. 6. Potassium replacement for hypokalemia. 7. Likely will need right thoracentesis. 8. We will also need repeat hemodialysis tomorrow with ultrafiltration. 9. This will however be at the discretion of his primary nephrologist  10. Dr. Paulina Richard. will resume care tomorrow. History Obtained From: Patient, electronic medical record. History of Present Ilness:    Debbie Stockton is a 55 y.o. male with history of end-stage renal disease on hemodialysis, hepatic cirrhosis, chronic ascites, congestive heart failure among other multiple medical entities admitted to the emergency department after the patient presented there with fall. According to the patient, he had fallen at home office a scooter. This was in his kitchen. He could not identify any specific reason why he fell. This could take just moved away according to him. No loss of consciousness. No dizziness. No headache. No nausea vomiting. No blurry vision. He has had difficulties moving around at home. Ophelia Lawman) tablet 550 mg, BID  tiotropium (SPIRIVA RESPIMAT) 2.5 MCG/ACT inhaler 2 puff, Daily  sodium chloride flush 0.9 % injection 10 mL, 2 times per day  sodium chloride flush 0.9 % injection 10 mL, PRN  0.9 % sodium chloride infusion, PRN  promethazine (PHENERGAN) tablet 12.5 mg, Q6H PRN   Or  ondansetron (ZOFRAN) injection 4 mg, Q6H PRN  traMADol (ULTRAM) tablet 25 mg, Q6H PRN  lidocaine 4 % external patch 1 patch, Daily  insulin lispro (HUMALOG) injection vial 0-12 Units, TID WC  insulin lispro (HUMALOG) injection vial 0-6 Units, Nightly        Review of Systems:   Pertinent positives stated above in HPI. All other systems were reviewed and were negative. ROS: As in HPI. Physical exam:   Constitutional: Well-developed middle-age gentleman in no distress. Vitals:   Vitals:    05/20/21 1816   BP:    Pulse: 108   Resp:    Temp:    SpO2: 94%       Skin: no rash, turgor is normal  Heent: Pupils are reactive to light. Throat is clear. Oral mucosa is moist.  Neck: no bruits or jvd noted   Cardiovascular:  S1, S2 without murmur   Respiratory: Bilateral rhonchi  Abdomen: soft,non tender,good bowel sound and no palpable mass. Distended. Ext: 3+ bilateral lower extremity edema  Psychiatric: mood and affect appropriate  Musculoskeletal:  Rom, muscular strength intact   CNS: Very awake and very alert. Well-oriented. Normal speech. Good motor strength. No obvious focal deficit.     Data:   Labs:  Lab Results   Component Value Date     (L) 05/20/2021     (L) 05/06/2021     (L) 05/05/2021    K 3.3 (L) 05/20/2021    K 3.7 05/06/2021    K 3.5 05/05/2021    CL 94 (L) 05/20/2021    CO2 28 05/20/2021    CO2 23 05/06/2021    CO2 22 (L) 05/05/2021    CREATININE 3.3 (HH) 05/20/2021    CREATININE 4.8 (HH) 05/06/2021    CREATININE 3.8 (HH) 05/05/2021    BUN 18 05/20/2021    BUN 32 (H) 05/06/2021    BUN 27 (H) 05/05/2021    GLUCOSE 94 05/20/2021    GLUCOSE 132 (H) 05/06/2021    GLUCOSE 132 (H) 05/05/2021 PHOS 3.8 05/04/2021    PHOS 6.5 (H) 04/28/2021    PHOS 3.5 04/04/2021    WBC 5.7 05/20/2021    WBC 6.7 05/06/2021    WBC 6.5 05/05/2021    HGB 8.5 (L) 05/20/2021    HGB 7.7 (L) 05/06/2021    HGB 7.7 (L) 05/05/2021    HCT 26.2 (L) 05/20/2021    HCT 23.3 (L) 05/06/2021    HCT 23.6 (L) 05/05/2021    .4 (H) 05/20/2021    PLT 35 (L) 05/20/2021     {Labs reviewed    Imaging:  CXR results: Diagnostic images reports reviewed        Thank you Dr. Nick Carney MD for allowing us to participate in care of Tammy Diaz   **This report has been created using voice recognition software. It maycontain minor  errors which are inherent in voice recognition technology. **    Electronically signed by Chivo Bernard MD on 5/20/2021 at 6:50 PM

## 2021-05-20 NOTE — ED NOTES
Pt called RN to room, pt experiencing epistaxis. Pt instructed to hold pressure. Pt states he gets these often.       Clearance FERN Bullock  05/20/21 [id number]

## 2021-05-20 NOTE — ED NOTES
Reassessment of the patients Fall and Back Pain   is unchanged, the patients pain reassessment is a 8/10, Side rails up times 2, call light in reach, will continue to monitor.        Luh Ross RN  05/20/21 9857

## 2021-05-20 NOTE — ED TRIAGE NOTES
Pt presents to ER via squad from home alone after having a fall today. Pt also has increased swelling in his bilateral legs. Pt is currently trying to get into [facility] as he is unable to care for himself at home. Pt receives hemodialysis and received a treatment today. VSS. Pt denies any chest pain or concerning shortness of breath but states his back pain is rated 8 out of 10. EKG completed.

## 2021-05-21 PROBLEM — K74.60 DECOMPENSATION OF CIRRHOSIS OF LIVER (HCC): Status: ACTIVE | Noted: 2021-01-01

## 2021-05-21 PROBLEM — K72.90 DECOMPENSATION OF CIRRHOSIS OF LIVER (HCC): Status: ACTIVE | Noted: 2021-01-01

## 2021-05-21 NOTE — CARE COORDINATION
5/21/21, 7:40 AM EDT  DISCHARGE PLANNING EVALUATION:    Lexi Mccarthy       Admitted: 5/20/2021/ [address] [facility] day: 0   Location: -12/012-A Reason for admit: Fall (on)(from) incline, initial encounter [W10. 2XXA]   PMH:  has a past medical history of Advanced cirrhosis of liver ([address] Alcoholic cirrhosis ([address] Atrial fibrillation ([address] CHF (congestive heart failure) ([address] Chronic kidney disease, COPD (chronic obstructive pulmonary disease) ([address] Diabetes mellitus ([address] Gallstones, GERD (gastroesophageal reflux disease), Headache, Hydrothorax, and Portal hypertension ([address] Procedure: Hemodialysis Tues. , Thurs. , and Sat. Barriers to Discharge:  Patient presents due to fall, weakness, and bilateral leg edema. Patient has a history of liver cirrhosis and is on the transplant list. ESRD on hemodialysis. Nephrology consult, DM management, Xifaxan, Lactulose, prn medications, daily BMP, CBC, Ferritin, and Iron levels in a.m., PT/OT, SS, up with assistance. PCP: Aubrey Fernandez MD   %    Patient Goals/Plan/Treatment Preferences: Verito Hanks is from home alone. He has been working on SNF placement from home. He has insurance and a PCP. He is able to afford his medications and denies a need for DME. SS following for placement at [facility] [address] [place] [address]. Transportation/Food Security/Housekeeping Addressed:  No issues identified.

## 2021-05-21 NOTE — CARE COORDINATION
5/21/21, 2:03 PM EDT    DISCHARGE PLANNING EVALUATION    Spoke with Vish Shoulders at [facility] and they will accept patient and have started pre-cert. Information placed on the chart for if pre-cert comes back, patient needs a COVID test and is aware and in agreement with plan. Patient is pleased that pre-cert is started.

## 2021-05-21 NOTE — PROGRESS NOTES
Hospitalist Progress Note  [facility] Onc Med 5K       Patient: Stella De Los Santos    Unit/Bed: 6T-71/590-S    YOB: 1965    MRN: [id number]    Acct: [de-identified]     Admitting Diagnosis:Fall (on)(from) incline, initial encounter [W10. 2XXA]  Decompensation of cirrhosis of liver ([facility] [address] [K72.90, K74.60]    Admit Date:  5/20/2021    Hospital Day: 0    Subjective:    Patient admitted yesterday following a fall at home leading to acute on chronic back pain exacerbation with failure to ambulate. Decompensated liver cirrhosis with ascites s/p therapeutic paracentesis today. Patient Seen, Chart, Labs, Radiology studies, and Consults reviewed. Objective:   BP (!) 91/51   Pulse 99   Temp 97.8 °F (36.6 °C) (Oral)   Resp 16   Wt 264 lb 14.4 oz (120.2 kg)   SpO2 96%   BMI 34.01 kg/m²       Intake/Output Summary (Last 24 hours) at 5/21/2021 2153  Last data filed at 5/21/2021 1726  Gross per 24 hour   Intake 1340 ml   Output --   Net 1340 ml       Review of Systems   Constitutional: Positive for activity change. Negative for appetite change, chills, diaphoresis, fatigue, fever and unexpected weight change. HENT: Negative for congestion, dental problem, drooling, ear discharge, ear pain, facial swelling, hearing loss, mouth sores, nosebleeds, postnasal drip, rhinorrhea, sinus pressure, sinus pain, sneezing, sore throat, tinnitus, trouble swallowing and voice change. Eyes: Negative for photophobia, pain, discharge, redness, itching and visual disturbance. Respiratory: Positive for shortness of breath. Negative for apnea, cough, choking, chest tightness, wheezing and stridor. Cardiovascular: Positive for leg swelling. Negative for chest pain and palpitations. Gastrointestinal: Positive for abdominal distention. Negative for abdominal pain, anal bleeding, blood in stool, constipation, diarrhea, nausea, rectal pain and vomiting.    Endocrine: Negative for cold intolerance, heat intolerance, polydipsia, drained. RADIOLOGY. Chest x-ray-05/20/2021. FINDINGS:  There is a dual-lumen dialysis catheter on the right side.     There is a moderate right-sided and small to moderate left-sided pleural effusion.      There are bibasilar opacities which may represent atelectasis or infiltrates.     There is no pneumothorax.     Visualized portions of the upper abdomen are within normal limits.     The osseous structures are intact.      No acute fractures or suspicious osseous lesions.     IMPRESSION:  Moderate right-sided and small left-sided pleural effusions with adjacent opacities which may represent atelectasis or infiltrates.     ASSESSMENT AND  PLAN.     1. PULMONARY. Acute respiratory insufficiency d/t pulmonary congestion -O2 supplement wean as tolerated. Hepatic right hydrothorax . 2. CARDIOVASCULAR. Atrial flutter with a rapid-continue amiodarone, metoprolol and Cardizem. Acute on chronic CHF exacerbation w/ diastolic dysfunction-patient oliguric. Chronic hypotension-midodrine     3.GI. Decompensated alcoholic liver cirrhosis w/ ascites -s/p paracentesis. 6.2 L removed     Asymptomatic cholelithiasis. On the liver transplant list via state.     4.RENAL AND ELECTROLYTES. ESRD on HD w/ fluid overload-s/p hemodialysis today. Hyperosmolality and hyponatremia. Nephrology following.     5. ENDO. Type II DM-sliding scale insulin.     6.MUSCULOSKELETAL. Fall at home-PT OT evaluation for acute rehab placement. Acute on chronic back pain exacerbation-lumbar spine MRI.     7. LIFE STYLE.     H/o chronic alcohol abuse.     8.HEM-ONC. Coagulopathy d/t end-stage liver disease-INR 2.05. Chronic thrombocytopenia-platelets 35.     9.NUTRITION. Obesity with a BMI 34.15-needs regular exercise diet control. 10.DISPO. Planned d/c to home vs rehab soon.       Electronically signed Nia Burgess MD on 5/21/2021 at 9:53 PM    Rounding Hospitalist

## 2021-05-21 NOTE — PROGRESS NOTES
[facility]   Nephrology progress note  5/21/2021, 7:59 AM      Pt Name:    Debbie Stockton  MRN:     [id number]     [place]:    1965  Admit Date:    5/20/2021  1:21 PM  Primary Care Physician:  Frances Bunn MD   Room number  5K-12/012-A    Chief Complaint: Nephrology following for KARAN/CKD (not ESRD)    Subjective:  Patient seen and examined  No chest pain or shortness of breath  Feels okay  Awake but does not appear to be fully oriented at times  Severely jaundiced  Reports back pain    Objective:  24HR INTAKE/OUTPUT:      Intake/Output Summary (Last 24 hours) at 5/21/2021 0759  Last data filed at 5/21/2021 0435  Gross per 24 hour   Intake 500 ml   Output --   Net 500 ml     I/O last 3 completed shifts: In: 500 [P.O.:500]  Out: -   No intake/output data recorded. Admission weight: 266 lb (120.7 kg)  Wt Readings from Last 3 Encounters:   05/20/21 264 lb 14.4 oz (120.2 kg)   05/17/21 246 lb (111.6 kg)   05/06/21 264 lb 6.4 oz (119.9 kg)     Body mass index is 34.01 kg/m².     Physical examination  VITALS:     Vitals:    05/20/21 2115 05/20/21 2145 05/21/21 0051 05/21/21 0415   BP: 109/72  (!) 89/58 (!) 96/55   Pulse: 118  115 112   Resp: 18  16 16   Temp: 97.6 °F (36.4 °C)  97.6 °F (36.4 °C) 97.8 °F (36.6 °C)   TempSrc: Oral  Oral Oral   SpO2: 97%  95% 96%   Weight:  264 lb 14.4 oz (120.2 kg)       General Appearance: alert and cooperative with exam, appears comfortable, no distress, + visibly jaundiced  Mouth/Throat: Oral mucosa moist  Eyes: ++ Icteric sclera  Neck: No JVD  Lungs: Air entry B/L, no rales, no use of accessory muscles, diminished both bases  Heart:  S1, S2 heard  GI: soft, distention  Extremities: Bilateral lower extremity edema      Lab Data  CBC:   Recent Labs     05/20/21  1341 05/21/21  0541   WBC 5.7 5.3   HGB 8.5* 8.0*   HCT 26.2* 25.6*   PLT 35* 35*     BMP:  Recent Labs     05/20/21  1341 05/21/21  0541   *  --    K 3.3* 4.4   CL 94*  --    CO2 28  -- BUN 18  --    CREATININE 3.3*  --    GLUCOSE 94  --    CALCIUM 9.2  --    MG 2.2  --      Hepatic:   Recent Labs     05/20/21  1341   LABALBU 3.5   AST 44*   ALT 25   BILITOT 6.6*   ALKPHOS 184*         Meds:  Infusion:    sodium chloride       Meds:    midodrine  10 mg Oral BID WC    metoprolol  50 mg Oral BID    amiodarone  200 mg Oral Daily    famotidine  20 mg Oral Daily    gabapentin  200 mg Oral BID    lactulose  45 g Oral TID    rifaximin  550 mg Oral BID    tiotropium  2 puff Inhalation Daily    sodium chloride flush  10 mL Intravenous 2 times per day    lidocaine  1 patch Transdermal Daily    insulin lispro  0-12 Units Subcutaneous TID WC    insulin lispro  0-6 Units Subcutaneous Nightly     Meds prn: ipratropium-albuterol, sodium chloride flush, sodium chloride, promethazine **OR** ondansetron, traMADol       Impression and Plan:  1. Acute kidney injury on CKD 4  Secondary to HRS  Patient is not ESRD yet  Currently on intermittent hemodialysis 3 times a week  Had hemodialysis treatment yesterday  Check labs today  No emergent need for renal replacement therapy today    2. Chronic decompensated liver cirrhosis with ascites requiring weekly paracentesis  3. Hypokalemia. Replaced  4. Cirrhosis with ascites  5. Chronic hypotension. C/W midodrine  6. Anemia of multifactorial etiology including renal insufficiency. On SAMMIE as outpatient. Monitor. Check iron studies  7. History of hepatic encephalopathy. On lactulose and rifaximin as outpatient  8. Back pain/fall. Hospitalist managing  9.   Pleural effusion    D/W patient    Gatito Funes MD  [facility]

## 2021-05-21 NOTE — PROGRESS NOTES
Pt admitted to  [phone] via in a wheelchair from ED. Complaints: pain/swelling. INT in the upper arm left, condition patent and no redness. IV site free of s/s of infection or infiltration. Vital signs obtained. Assessment and data collection initiated. Two nurse skin assessment performed by Misty Pritchett RN and Danii RN. Oriented to room. Policies and procedures for 5K explained. All questions answered with no further questions at this time. Fall prevention and safety brochure discussed with patient. Bed alarm on. Call light in reach. Oriented to room. Patrick Chacon, RN, RN 5/20/2021 9:54 PM     Explained patients right to have family, representative or physician notified of their admission. Patient has Declined for physician to be notified. Patient has Declined for family/representative to be notified.

## 2021-05-21 NOTE — CARE COORDINATION
5/21/21, 1:17 PM EDT    DISCHARGE PLANNING EVALUATION    Spoke with Rachel Nieves and she stated that they are waiting for Pt note to review. Updated that PT is in and can they please review patient.

## 2021-05-21 NOTE — PROGRESS NOTES
Debra Vela 61  INPATIENT OCCUPATIONAL THERAPY  [facility] ONC MED 5K  EVALUATION    Time:   Time In: 1026  Time Out: 1053  Timed Code Treatment Minutes: 10 Minutes  Minutes: 27          Date: 2021  Patient Name: Leroy Solomon,   Gender: male      MRN: [id number]  : 1965  (55 y.o.)  Referring Practitioner: Shira Bravo MD  Diagnosis: Fall on Incline  Additional Pertinent Hx: Patient c/o worsening bilateral leg edema despite dialysis, difficulty him to move. Patient states he lost his balance because of the weight of his legs, fell and hit his back in his head. He has history of liver failure on the transplant list and renal failure on dialysis. Patient received dialysis today and although he says his legs reviewed significantly in size they are still significantly heavy. Moderate right-sided and small left-sided pleural effusions with adjacent opacities which may represent atelectasis or infiltrates.     Restrictions/Precautions:  Restrictions/Precautions: General Precautions, Fall Risk    Subjective  Patient assessed for rehabilitation services?: Yes  Family / Caregiver Present: No    Subjective: Pleasant and cooperative    Pain:  Pain Assessment  Patient Currently in Pain: Yes      Social/Functional History:  Lives With: Other (comment) (sister and brother on and off)  Type of Home: Apartment  Home Layout: One level  Home Equipment: 4 wheeled walker, Rolling walker   Bathroom Toilet: Handicap height       ADL Assistance: [facility]: Needs assistance  Homemaking Responsibilities: Yes  Ambulation Assistance: Independent  Transfer Assistance: Independent          Additional Comments: Pt uses walker for mobility at home since return from [facility] (home for 3 days prior to this hospitalization)    VISION:WFL    HEARING:  WFL    COGNITION: WFL    RANGE OF MOTION:  Bilateral Upper Extremity:  WFL    STRENGTH:  Bilateral Upper Extremity:  GUERA d/t painful in R UE from dialysis port    SENSATION:   WFL    ADL:   Lower Extremity Dressing: Dependent. shoes. BALANCE:  Sitting Balance:  Stand By Assistance. EOB  Standing Balance: Contact Guard Assistance. BED MOBILITY:  Supine to Sit: Moderate Assistance    Scooting: Minimal Assistance to EOB, dependent to scoot higher with hercules mattress    TRANSFERS:  Sit to Stand:  Contact Guard Assistance. from elevated EOB  Stand to Sit: Contact Guard Assistance. FUNCTIONAL MOBILITY:  Assistive Device: Rolling Walker  Assist Level:  Contact Guard Assistance. Distance: short distances within room--pt reports being unable to tolerate longer distance for additional ADL tasks. Activity Tolerance:  Patient tolerance of  treatment: fair. Requires extended RBs, highly talkative and requiring redirection to task. Assessment:    Pt s/p fall. Pt exhibiting deficits detailed below, requiring additional OT intervention to restore PLOF. Pt now requiring assist for ADLs, IADLs, mobility, t/fs, and standing balance. Pt with significant increase in burden of care. Without skilled OT intervention pt at risk for functional decline, increased falls, and readmission to hospital.    Performance deficits / Impairments: Decreased functional mobility , Decreased safe awareness, Decreased balance, Decreased ADL status, Decreased endurance, Decreased strength  Prognosis: Good  REQUIRES OT FOLLOW UP: Yes    Treatment Initiated: Treatment and education initiated within context of evaluation. Evaluation time included review of current medical information, gathering information related to past medical, social and functional history, completion of standardized testing, formal and informal observation of tasks, assessment of data and development of plan of care and goals. Treatment time included skilled education and facilitation of tasks to increase safety and independence with ADL's for improved functional independence and quality of life.     Discharge Recommendations:  Subacute/Skilled Nursing Facility, Continue to assess pending progress, Patient would benefit from continued therapy after discharge    Patient Education:  OT Education: OT Role, Precautions, ADL Adaptive Strategies, Transfer Training, Energy Conservation, Plan of Care    Equipment Recommendations:  Equipment Needed: No    Plan:  Times per week: 5x  Current Treatment Recommendations: Strengthening, Balance Training, Functional Mobility Training, Endurance Training, Self-Care / ADL, Patient/Caregiver Education & Training, Safety Education & Training. See long-term goal time frame for expected duration of plan of care. If no long-term goals established, a short length of stay is anticipated. Goals:     Short term goals  Time Frame for Short term goals: By Discharge  Short term goal 1: [facility] complete [facility] distances with SBA and AD to increase indep with accessing environment for ADLs. Short term goal 2: Pt will complete t/fs to/from various height surfaces with SBA to increase indep with toileting routine. Short term goal 3: Pt will complete dynamic standing task with B hand release and SBA to increase balance required for ADLs. Short term goal 4: Pt will complete LB ADL with [place] to increase indep with self care. Following session, patient left in safe position with all fall risk precautions in place.

## 2021-05-21 NOTE — CARE COORDINATION
5/21/21, 11:10 AM EDT    DISCHARGE PLANNING EVALUATION    Spoke with patient and he stated that he was in the process of going to [facility] [address] [place] [address] from the community. He prefers to go there at discharge. Called and spoke with Patrick Castillo at [facility] and they wre working on trying to get him admitted. She stated that a pre-cert would need to be completed. Notified PT/OT of notes needed.

## 2021-05-21 NOTE — PROGRESS NOTES
[facility]  INPATIENT PHYSICAL THERAPY  EVALUATION  [facility] ONC MED 5K - 5K-12/012-A    Time In: 6963  Time Out: 1250  Timed Code Treatment Minutes: 0 Minutes  Minutes: 20          Date: 2021  Patient Name: René Van,  Gender:  male        MRN: [id number]  : 1965  (55 y.o.)  Referral Date : 21   Referring Practitioner: Carie Manning MD  Diagnosis: fall on/from incline  Additional Pertinent Hx: Per EMR:Patient c/o worsening bilateral leg edema despite dialysis, difficulty him to move. Patient states he lost his balance because of the weight of his legs, fell and hit his back in his head. He has history of liver failure on the transplant list and renal failure on dialysis. Patient received dialysis today and although he says his legs reviewed significantly in size they are still significantly heavy. Pt s/p paracentesis      Restrictions/Precautions:  Restrictions/Precautions: General Precautions, Fall Risk    Subjective:  Chart Reviewed: Yes  Patient assessed for rehabilitation services?: Yes  Family / Caregiver Present: No  Subjective: Patient in room in bed, agreeable to PT. RN okay with PT session. Pt cooperative and pleasant.     General:  Overall Orientation Status: Within Functional Limits    Vision: Impaired  Vision Exceptions:  (reports has trifocals)    Hearing: Within functional limits         Pain: back, 5/10 at rest, increased with activity--pt reports chronic    Vitals: Vitals not assessed per clinical judgement, see nursing flowsheet    Social/Functional History:    Lives With: Other (comment) (pt reports brother staying with him 5 days per week and sister at other times)  Type of Home: [address] [place] [address] Drive: One level  Home Access: Level entry  Home Equipment: Rolling walker, Wheelchair-manual, 4 wheeled walker     Bathroom Toilet: Handicap height       ADL Assistance: [address]: Needs assistance  Homemaking Responsibilities: Yes  Ambulation Assistance: Independent  Transfer Assistance: Independent    Active : No     Additional Comments: Pt uses walker for mobility at home since return from [facility] (home for 3 days prior to this hospitalization)    OBJECTIVE:  Range of Motion:  Right Lower Extremity: WFL  Left Lower Extremity: [facility]   Significant LE edema    Strength:  Right Lower Extremity: Impaired - knee 4-/5, hip not tested due to back pain  Left Lower Extremity: Impaired - knee 4-/5, hip not tested due to back pain    Balance:  Static Sitting Balance:  Independent  Static Standing Balance: Contact Guard Assistance  Dynamic Standing Balance: Contact Guard Assistance, Minimal Assistance    Bed Mobility:  Rolling to Left: Moderate Assistance --assist at trunk  Supine to Sit: Maximum Assistance--assist with trunk  Sit to Supine: Moderate Assistance- assist LE's and trunk  Verbal cues for log roll technique to reduce strain on back    Transfers:  Sit to Stand: Contact Guard Assistance  Stand to Sit:Contact Guard Assistance   **Bed elevated significant per pt request due to increased back pain    Ambulation:  Contact Guard Assistance, Minimal Assistance  Distance: 12'  Surface: Level Tile  Device:Rolling Walker  Gait Deviations: Forward Flexed Posture, Slow Tonya, Decreased Step Length Bilaterally, Decreased Gait Speed, Decreased Heel Strike Bilaterally, Mild Path Deviations, Unsteady Gait and Decreased Terminal Knee Extension  Pt with LE unsteadiness, increasing with distance. Pt noting some dizziness after completing ambulation    Exercise:  Pt's lunch arrived, therefore no exercises completed    Functional Outcome Measures: Completed  AM-PAC Inpatient Mobility Raw Score : 11  AM-PAC Inpatient T-Scale Score : 33.86    ASSESSMENT:  Activity Tolerance:  Patient tolerance of  treatment: fair. Limited by back pain and weakness      Treatment Initiated: No treatment initiated    Assessment:   Body structures, Functions, Activity limitations: Decreased functional mobility , Decreased endurance, Decreased strength, Decreased balance, Increased pain  Assessment: The evaluation of Mr Kelsey Bliss indicates a decline in functional mobility compared to baseline. Patient able to ambulate and transfer with walker at [facility] [address], requiring CGA to moderate assistance at this time to complete mobility. Gait limited to ~12' this date with CGA/min assist and presents with LE unsteadiness. Patient would benefit from skilled PT services to improve his ability to complete functional mobility, reduce his risk for falls and allow patient to return to [facility] [address]. Prognosis: Good   Co-morbidities: DM, CHF, COPD, CKD  Pt with dizziness--hx of orthostatic hypotension, increased weakness, low back pain    REQUIRES PT FOLLOW UP: Yes    Discharge Recommendations:  Discharge Recommendations: Continue to assess pending progress, Subacute/Skilled Nursing Facility, Patient would benefit from continued therapy after discharge    Patient Education:  PT Education: PT Role, Plan of Care, Goals, General Safety    Equipment Recommendations:  Equipment Needed: No    Plan:  Times per week: 4-5xGM  Times per day: Daily  Current Treatment Recommendations: Strengthening, Transfer Training, Endurance Training, Neuromuscular Re-education, Patient/Caregiver Education & Training, Balance Training, Gait Training, Home Exercise Program, Functional Mobility Training, Stair training, Safety Education & Training    Goals:  Patient goals : get stronger  Short term goals  Time Frame for Short term goals: at discharge  Short term goal 1: Patient will complete sit < > stand with CGA from standard height chair with a RW to stand to ambulate with decreased difficulty. Short term goal 2: Patient will ambulate 36' with a RW and CGA to progress towards ambulating household distances safely.   Short term goal 3: Patient will complete supine < > sit with SBA to transfer in/out of bed with decreased difficulty. Short term goal 4: Patient will tolerate 10 reps of seated/supine exercises to increase strength and mobility. Long term goals  Time Frame for Long term goals : N/A due to short estimated length of stay    Following session, patient left in safe position with all fall risk precautions in place.

## 2021-05-21 NOTE — DISCHARGE INSTR - COC
Continuity of Care Form    Patient Name: Ron Jean Baptiste   :  1965  MRN:  [id number]    Admit date:  2021  Discharge date:  ***    Code Status Order: Full Code   Advance Directives:   Advance Care Flowsheet Documentation     Date/Time Healthcare Directive Type of Healthcare Directive Copy in [address] Agent's Name Healthcare Agent's Phone Number    21 [phone]  Yes, patient has an advance directive for healthcare treatment  Living will;Durable power of  for health care  No, copy requested from family  Healthcare power of   Brother  --          Admitting Physician:  Vanesa Chahal MD  PCP: Dom Preston MD    Discharging Nurse: [facility] Unit/Room#: 4K-09/009-A  Discharging Unit Phone Number: ***    Emergency Contact:   Extended Emergency Contact Information  Primary Emergency Contact: [id number] [place] [address] Phone: [phone]  Mobile Phone: [phone]  Relation: Brother/Sister  Preferred language: English   needed? No  Secondary Emergency Contact: Yudelka White  Address: [address] Phone: [phone]  Relation: Brother/Sister   needed?  No    Past Surgical History:  Past Surgical History:   Procedure Laterality Date    APPENDECTOMY      HERNIA REPAIR      LUNG SURGERY Right 20, 20    [facility]       Immunization History:   Immunization History   Administered Date(s) Administered    COVID-19, Pfizer, PF, 30mcg/0.3mL 2021, 2021    Influenza, Annabella Rile, Recombinant, IM PF (Flublok 18 yrs and older) 12/15/2020    Pneumococcal Polysaccharide ([id number]) 12/15/2020       Active Problems:  Patient Active Problem List   Diagnosis Code    Pleural effusion associated with hepatic disorder K76.9, J91.8    Acute on chronic diastolic congestive heart failure (HCC) W94.99    Alcoholic cirrhosis of liver with ascites (HCC) K70.31    Essential hypertension I10    Thrombocytopenia ([facility] [address] D69.6  Type 2 diabetes mellitus without complication (HCC) J02.7    Acute kidney injury ([facility] [address] N17.9    Acute kidney injury superimposed on CKD (HCC) N17.9, N18.9    Coagulopathy (HCC) D68.9    Chronic diastolic congestive heart failure (HCC) I50.32    Lower leg edema R60.0    Class 1 obesity without serious comorbidity with body mass index (BMI) of 30.0 to 30.9 in adult E66.9, Z68.30    Chronic liver failure without hepatic coma (HCC) K72.10    Hydrothorax J94.8    Decompensated hepatic cirrhosis (HCC) K72.90, K74.60    Advanced cirrhosis of liver (HCC) K74.60    Permanent atrial fibrillation (HCC) I48.21    Sympathotonic orthostatic hypotension I95.1    Orthostatic dizziness R42    Fall W19. Briana Marking    Degenerative cervical spinal stenosis M48.02    Hypo-osmolality and hyponatremia E87.1    Asymptomatic cholelithiasis K80.20    Weakness generalized R53.1    KARAN (acute kidney injury) ([facility] [address] N17.9    Epistaxis R04.0    Urinary retention R33.9    Fall (on)(from) incline, initial encounter W10. 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    Resolved    COVID-19 Rule Out 11/21/20 11/21/20 11/21/20 COVID-19 (Ordered)   11/21/20 Rule-Out Test Resulted          Nurse Assessment:  Last Vital Signs: BP 93/67   Pulse 109   Temp 97.7 °F (36.5 °C)   Resp 20   Wt 254 lb 10.1 oz (115.5 kg)   SpO2 94%   BMI 32.69 kg/m²     Last documented pain score (0-10 scale): Pain Level: 0  Last Weight:   Wt Readings from Last 1 Encounters:   05/26/21 254 lb 10.1 oz (115.5 kg)     Mental Status:  {IP PT MENTAL STATUS:20030}    IV Access:  { ABDIAS IV ACCESS:846813767}    Nursing Mobility/ADLs:  Walking   {CHP DME FFIP:574695063}  Transfer  {CHP DME XBXH:005002566}  Bathing  {CHP DME XQAU:077426440}  Dressing  {CHP DME KIEF:387002609}  Toileting  {CHP DME GUBR:638272765}  Feeding  {CHP DME QQKY:611545156}  Med Admin  {P DME CAVZ:974804807}  Med Delivery   { ABDIAS MED Delivery:895568320}    Wound Care Documentation and Therapy:  Wound 12/12/20 Toe (Comment  which one) Left;Lateral (Active)   Number of days: 164       Wound 04/29/21 Coccyx (Active)   Wound Etiology Pressure Stage  2 05/26/21 0745   Dressing/Treatment Open to air;Protective barrier 05/26/21 0745   Wound Assessment Pink/red 05/26/21 0745   Drainage Amount None 05/26/21 0745   Odor None 05/26/21 0745   Ayde-wound Assessment Blanchable erythema 05/26/21 0745   Number of days: 27       Wound 04/29/21 Sacrum (Active)   Wound Etiology Pressure Stage  2 05/26/21 0745   Dressing/Treatment Open to air 05/26/21 0745   Wound Assessment Pink/red 05/26/21 0745   Drainage Amount None 05/26/21 0745   Odor None 05/26/21 0745   Ayde-wound Assessment Blanchable erythema 05/26/21 0745   Number of days: 27        Elimination:  Continence:   · Bowel: {YES / JU:14238}  · Bladder: {YES / RQ:76095}  Urinary Catheter: {Urinary Catheter:304484786}   Colostomy/Ileostomy/Ileal Conduit: {YES / BB:35934}       Date of Last BM: ***    Intake/Output Summary (Last 24 hours) at 5/26/2021 0935  Last data filed at 5/25/2021 2315  Gross per 24 hour   Intake 1644.66 ml   Output 3020 ml   Net -1375.34 ml     I/O last 3 completed shifts:   In: 1644.7 [I.V.:1644.7]  Out: 4809 [Emesis/NG output:3020]    Safety Concerns:     [address] [employer] Safety Concerns:999243325}    Impairments/Disabilities:      [address] CALERO Impairments/Disabilities:220437901}    Nutrition Therapy:  Current Nutrition Therapy:   [address] ABDIAS Diet List:420088871}    Routes of Feeding: {P DME Other Feedings:850973688}  Liquids: {Slp liquid thickness:08850}  Daily Fluid Restriction: {CHP DME Yes amt example:876776297}  Last Modified Barium Swallow with Video (Video Swallowing Test): {Done Not Done DLXT:845452205}    Treatments at the Time of Hospital Discharge:   Respiratory Treatments: ***  Oxygen Therapy:  {Therapy; copd oxygen:10237}  Ventilator:    { CC Vent XJH}    Rehab Therapies: {THERAPEUTIC INTERVENTION:3652825682}  Weight Bearing Status/Restrictions: 50Real Poon CC Weight Bearin}  Other Medical Equipment (for information only, NOT a DME order):  {EQUIPMENT:097289933}  Other Treatments: ***    Patient's personal belongings (please select all that are sent with patient):  {CHP DME Belongings:094949188}    RN SIGNATURE:  {Esignature:868304438}    CASE MANAGEMENT/SOCIAL WORK SECTION    Inpatient Status Date: Observation Status    Readmission Risk Assessment Score:  Readmission Risk              Risk of Unplanned Readmission:  39           Discharging to Facility/ Agency   · Name: [facility]  · Address: [address] [place] [address]  · Phone: [phone]  · Fax: [phone]    Dialysis Facility (if applicable)   · Name:  · Address:  · Dialysis Schedule:  · Phone:  · Fax:    / signature: Electronically signed by LIANE Haddad on 21 at 1:24 PM EDT    PHYSICIAN SECTION    Prognosis: {Prognosis:8837574986}    Condition at Discharge: [address] Patient Condition:940973449}    Rehab Potential (if transferring to Rehab): {Prognosis:2794420331}    Recommended Labs or Other Treatments After Discharge: ***    Physician Certification: I certify the above information and transfer of Jasbir Birch  is necessary for the continuing treatment of the diagnosis listed and that he requires {Admit to Appropriate Level of Care:70921} for {GREATER/LESS:372745824} 30 days.      Update Admission H&P: {CHP DME Changes in QBDLH:467666454}    PHYSICIAN SIGNATURE:  {[facility]:666055561}

## 2021-05-22 PROBLEM — M48.061 NEURAL FORAMINAL STENOSIS OF LUMBAR SPINE: Status: ACTIVE | Noted: 2021-01-01

## 2021-05-22 NOTE — PROGRESS NOTES
Renal Progress Note  [facility]    Patient :  Sera Walker; 55 y.o. MRN# [id number]  Location:  512/012-A  Attending:  Raghu Escudero MD  Admit Date:  5/20/2021   Hospital Day: 1      Subjective:     Nephrology is following the patient for KARAN/CKD. Patient seen on HD. HR went up into high 120s, UF goal decreased to 500 mL. He is below dry weight. Has some edema he states it is improved. Outpatient Medications:     Medications Prior to Admission: rifaximin (XIFAXAN) 550 MG tablet, Take 1 tablet by mouth 2 times daily  ammonium lactate (AMLACTIN) 12 % cream, Apply topically as needed  lactulose (CHRONULAC) 10 GM/15ML solution, 45 ml by mouth TID  albuterol sulfate HFA (VENTOLIN HFA) 108 (90 Base) MCG/ACT inhaler, Inhale 2 puffs into the lungs every 6 hours as needed for Wheezing  tiotropium (SPIRIVA) 18 MCG inhalation capsule, Inhale 1 capsule into the lungs daily  dilTIAZem (CARDIZEM) 30 MG tablet, Take 1 tablet by mouth 3 times daily  Misc. Devices (WHEELCHAIR) MISC, Standard lightweight wheelchair  varenicline (CHANTIX) 0.5 MG tablet, Take 1 tablet by mouth daily x 1 week then increase to 1 tablet by mouth twice daily  gabapentin (NEURONTIN) 100 MG capsule, Take 2 capsules by mouth 2 times daily for 30 days.   famotidine (PEPCID) 20 MG tablet, Take 1 tablet by mouth daily  midodrine (PROAMATINE) 5 MG tablet, Take 3 tablets by mouth 3 times daily  amiodarone (CORDARONE) 200 MG tablet, Take 1 tablet by mouth daily  Continuous Blood Gluc  (DEXCOM G6 ) MENDY, Use to test 4 times daily and as needed DX: E11.9  Continuous Blood Gluc Transmit (DEXCOM G6 TRANSMITTER) MISC, Use to test 4 times daily and as needed DX: E11.9  Continuous Blood Gluc Sensor (DEXCOM G6 SENSOR) MISC, Use to test 4 times daily and as needed DX: E11.9  bumetanide (BUMEX) 1 MG tablet, Take 1 tablet by mouth 2 times daily  metoprolol (LOPRESSOR) 100 MG tablet, Take 100 mg by mouth 2 times daily  Lancets MISC, 1 each by Does not apply route daily Use to test blood sugar 4 times daily and as needed DX:E11.9  blood glucose monitor strips, Test 4 times daily and as needed for irregular blood glucose. Dispense sufficient amount for indicated testing frequency plus additional to accommodate PRN testing needs. DX E11.9  Blood Glucose Monitoring Suppl (ONE TOUCH ULTRA 2) w/Device KIT, Use to test 4 times daily and as needed DX: E11.9  ASPIRIN 81 PO, Take by mouth daily    Current Medications:     Scheduled Meds:    midodrine  10 mg Oral BID WC    metoprolol  50 mg Oral BID    amiodarone  200 mg Oral Daily    famotidine  20 mg Oral Daily    gabapentin  200 mg Oral BID    lactulose  45 g Oral TID    rifaximin  550 mg Oral BID    tiotropium  2 puff Inhalation Daily    sodium chloride flush  10 mL Intravenous 2 times per day    lidocaine  1 patch Transdermal Daily    insulin lispro  0-12 Units Subcutaneous TID WC    insulin lispro  0-6 Units Subcutaneous Nightly     Continuous Infusions:    sodium chloride       PRN Meds:  ipratropium-albuterol, sodium chloride flush, sodium chloride, promethazine **OR** ondansetron, traMADol    Input/Output:       I/O last 3 completed shifts:   In: 1440 [P.O.:1380; I.V.:60]  Out: - .      Patient Vitals for the past 96 hrs (Last 3 readings):   Weight   21 0735 256 lb 2.8 oz (116.2 kg)   21 2145 264 lb 14.4 oz (120.2 kg)   21 1331 266 lb (120.7 kg)       Vital Signs:   Temperature:  Temp: 97.6 °F (36.4 °C)  TMax:   Temp (24hrs), Av.8 °F (36.6 °C), Min:97.6 °F (36.4 °C), Max:98 °F (36.7 °C)    Respirations:  Resp: 14  Pulse:   Pulse: 119  BP:    BP: 129/60  BP Range: Systolic (18WXR), LOWE:44 , Min:86 , EHC:646       Diastolic (67LKS), ALEYDA:79, Min:51, Max:72      Physical Examination:     General:  Awake, alert, no acute distress  HEENT: NC/AT/ MMM +scleral ictera  Chest:               diminished  Cardiac:  Irregularly irregular  Abdomen: Soft, non-tender,  Neuro:  No facial droop, No Asterixis  SKIN:  No rashes, good skin turgor. Extremities:  2+ LE edema B/L    Labs:       Recent Labs     05/20/21  1341 05/21/21  0541 05/22/21  0509   WBC 5.7 5.3 5.3   RBC 2.61* 2.44* 2.43*   HGB 8.5* 8.0* 7.9*   HCT 26.2* 25.6* 25.2*   .4* 104.9* 103.7*   MCH 32.6 32.8 32.5   MCHC 32.4 31.3* 31.3*   PLT 35* 35* 33*   MPV 12.2 11.9 12.7*      BMP:   Recent Labs     05/20/21  1341 05/21/21  0541 05/22/21  0509   * 132* 128*   K 3.3* 4.3  4.4 4.8   CL 94* 94* 91*   CO2 28 25 23   BUN 18 22 28*   CREATININE 3.3* 4.3* 5.0*   GLUCOSE 94 137* 113*   CALCIUM 9.2 9.7 8.9      Phosphorus:   No results for input(s): PHOS in the last 72 hours.   Magnesium:    Recent Labs     05/20/21  1341   MG 2.2     Albumin:    Recent Labs     05/20/21  1341   LABALBU 3.5     BNP:    No results found for: BNP  GERALDO:    No results found for: GERALDO  SPEP:  Lab Results   Component Value Date    PROT 6.1 05/20/2021     UPEP:   No results found for: LABPE  C3:   No results found for: C3  C4:   No results found for: C4  MPO ANCA:   No results found for: MPO  PR3 ANCA:   No results found for: PR3  Anti-GBM:   No results found for: GBMABIGG  Hep BsAg:         Lab Results   Component Value Date    HEPBSAG Negative 04/28/2021     Hep C AB:          Lab Results   Component Value Date    HEPCAB Negative 11/23/2020       Urinalysis/Chemistries:      Lab Results   Component Value Date    NITRU NEGATIVE 05/04/2021    COLORU YELLOW 05/05/2021    COLORU BROWN 05/04/2021    PHUR 5.5 05/04/2021    LABCAST NONE SEEN 04/30/2021    LABCAST NONE SEEN 04/30/2021    WBCUA >200 W/CLUMPS 05/04/2021    RBCUA > 200 05/04/2021    MUCUS NONE SEEN 05/04/2021    YEAST NONE SEEN 05/04/2021    BACTERIA FEW 05/04/2021    SPECGRAV 1.016 04/30/2021    LEUKOCYTESUR MODERATE 05/04/2021    UROBILINOGEN 0.2 05/04/2021    BILIRUBINUR SMALL 05/04/2021    BLOODU LARGE 05/04/2021    GLUCOSEU NEGATIVE 05/04/2021    KETUA TRACE 05/04/2021    AMORPHOUS NONE SEEN 05/04/2021     Urine Sodium:   No results found for: SAMANTHA  Urine Potassium:  No results found for: KUR  Urine Chloride:  No results found for: CLUR  Urine Osmolarity:   Lab Results   Component Value Date    KAVEHOU 288 03/30/2021     Urine Protein:   No components found for: TOTALPROTEIN, URINE   Urine Creatinine:   No results found for: LABCREA  Urine Eosinophils:  No components found for: UEOS        Impression and Plan:  1. KARAN due to hepatorenal syndrome, requiring HD  -HD today, UF goal reduced to 500 mL due to tachycardia    2. Hyponatremia: dialysis bath adjusted  3. Cirrhosis with ascites  4. bicytopenia from cirrhosis  5. Hypotension on Midodrine  6. Aflutter with RVR  7. DM        Please don't hesitate to call with any questions.   Electronically signed by Atilio Charles DO on 5/22/2021 at 10:20 AM

## 2021-05-22 NOTE — PLAN OF CARE
Problem: Falls - Risk of:  Goal: Will remain free from falls  Description: Will remain free from falls  Outcome: Ongoing  Note: Patient alert and oriented x4. Bed alarmed armed. Bed wheels locked. Bedside table in reachPatient verbalizes and demonstrates the use of the call light. Hourly rounding being completed. Goal: Absence of physical injury  Description: Absence of physical injury  Outcome: Ongoing  Note: Patient free of accidental injury. Patient alert and oriented x4. Bed alarmed armed. Bed wheels locked. Bedside table in reach. Patient verbalizes and demonstrates the use of the call light. Hourly rounding being completed. Problem: Pain:  Goal: Pain level will decrease  Description: Pain level will decrease  Outcome: Ongoing  Note: Patient complains of pain in lower back. Patients current pain level is a 10/10. Patients pain goal is a 3/10. Patient is receiving Tramadol and Lidocaine patch for pain at this time. Goal: Control of acute pain  Description: Control of acute pain  Outcome: Ongoing  Note: Patient complains of pain in lower back. Patients current pain level is a 10/10. Patients pain goal is a 3/10. Patient is receiving Tramadol and Lidocaine patch for pain at this time. Goal: Control of chronic pain  Description: Control of chronic pain  Outcome: Ongoing  Note: Patient complains of pain in lower back. Patients current pain level is a 10/10. Patients pain goal is a 3/10. Patient is receiving Tramadol and Lidocaine patch for pain at this time. Problem: Skin Integrity:  Goal: Will show no infection signs and symptoms  Description: Will show no infection signs and symptoms  Outcome: Ongoing  Note: Monitoring for signs and symptoms of infection. Goal: Absence of new skin breakdown  Description: Absence of new skin breakdown  Outcome: Ongoing  Note: No new skin breakdown noted, educated patient on turning in bed to prevent further skin redness and breakdown.      Problem: Discharge Planning:  Goal: Discharged to appropriate level of care  Description: Discharged to appropriate level of care  Outcome: Ongoing  Note: Patient plans to be discharged to [facility], case management on case. Care plan reviewed with patient. Patient verbalizes understanding of the plan of care and contribute to goal setting.

## 2021-05-22 NOTE — PLAN OF CARE
Problem: Impaired respiratory status  Goal: Clear lung sounds  Outcome: Ongoing  Note: Pt continues on MDI to clear lung sounds/improve aeration and pt does MDI's at home.

## 2021-05-22 NOTE — PROGRESS NOTES
Hospitalist Progress Note  [facility] Onc Med 5K       Patient: Radha Virgen    Unit/Bed: 1D-14/415-F    YOB: 1965    MRN: [id number]    Acct: [de-identified]     Admitting Diagnosis:Fall (on)(from) incline, initial encounter [W10. 2XXA]  Decompensation of cirrhosis of liver ([facility] [address] [K72.90, K74.60]    Admit Date:  5/20/2021    Hospital Day: 1    Subjective:    Patient is feeling fine. He has no new complaints except for back pain. Patient Seen, Chart, Labs, Radiology studies, and Consults reviewed. Objective:   BP 88/61   Pulse 101   Temp 98.9 °F (37.2 °C) (Oral)   Resp 18   Wt 254 lb 6.6 oz (115.4 kg)   SpO2 95%   BMI 32.66 kg/m²       Intake/Output Summary (Last 24 hours) at 5/22/2021 1837  Last data filed at 5/22/2021 1319  Gross per 24 hour   Intake 1480 ml   Output 800 ml   Net 680 ml       Review of Systems   Constitutional: Positive for activity change. Negative for appetite change, chills, diaphoresis, fatigue, fever and unexpected weight change. HENT: Negative for congestion, dental problem, drooling, ear discharge, ear pain, facial swelling, hearing loss, mouth sores, nosebleeds, postnasal drip, rhinorrhea, sinus pressure, sinus pain, sneezing, sore throat, tinnitus, trouble swallowing and voice change. Eyes: Negative for photophobia, pain, discharge, redness, itching and visual disturbance. Respiratory: Positive for shortness of breath. Negative for apnea, cough, choking, chest tightness, wheezing and stridor. Cardiovascular: Positive for leg swelling. Negative for chest pain and palpitations. Gastrointestinal: Positive for abdominal distention. Negative for abdominal pain, anal bleeding, blood in stool, constipation, diarrhea, nausea, rectal pain and vomiting. Endocrine: Negative for cold intolerance, heat intolerance, polydipsia, polyphagia and polyuria.    Genitourinary: Negative for decreased urine volume, difficulty urinating, discharge, dysuria, enuresis, flank pain, frequency, genital sores, hematuria, penile pain, penile swelling, scrotal swelling, testicular pain and urgency. Musculoskeletal: Positive for back pain. Negative for arthralgias, gait problem, joint swelling, myalgias, neck pain and neck stiffness. Skin: Negative for color change, pallor, rash and wound. Allergic/Immunologic: Negative for food allergies. Neurological: Negative for dizziness, tremors, seizures, syncope, facial asymmetry, speech difficulty, weakness, light-headedness, numbness and headaches. Hematological: Negative for adenopathy. Does not bruise/bleed easily. Psychiatric/Behavioral: Negative for agitation, behavioral problems, confusion, decreased concentration, dysphoric mood, hallucinations, self-injury, sleep disturbance and suicidal ideas. The patient is not nervous/anxious and is not hyperactive. Physical Exam  Vitals and nursing note reviewed. Constitutional:       General: He is not in acute distress. Appearance: Normal appearance. He is obese. He is not ill-appearing, toxic-appearing or diaphoretic. HENT:      Head: Normocephalic and atraumatic. Right Ear: External ear normal.      Left Ear: External ear normal.      Nose: Nose normal. No congestion or rhinorrhea. Mouth/Throat:      Mouth: Mucous membranes are moist.      Pharynx: Oropharynx is clear. No oropharyngeal exudate or posterior oropharyngeal erythema. Eyes:      General: Scleral icterus present. Right eye: No discharge. Left eye: No discharge. Extraocular Movements: Extraocular movements intact. Conjunctiva/sclera: Conjunctivae normal.      Pupils: Pupils are equal, round, and reactive to light. Neck:      Vascular: No carotid bruit. Cardiovascular:      Rate and Rhythm: Normal rate and regular rhythm. Pulses: Normal pulses. Heart sounds: Normal heart sounds. No murmur heard. No friction rub. No gallop.     Pulmonary:      Effort: Pulmonary effort is normal. No respiratory distress. Breath sounds: Normal breath sounds. No stridor. No wheezing, rhonchi or rales. Chest:      Chest wall: No tenderness. Abdominal:      General: Abdomen is flat. Bowel sounds are normal. There is distension. Palpations: There is no mass. Tenderness: There is no abdominal tenderness. There is no right CVA tenderness, left CVA tenderness, guarding or rebound. Hernia: No hernia is present. Musculoskeletal:         General: Tenderness present. No swelling, deformity or signs of injury. Normal range of motion. Cervical back: Normal range of motion and neck supple. No rigidity or tenderness. Right lower leg: Edema present. Left lower leg: Edema present. Comments: Lower lumbar spine tenderness. Lymphadenopathy:      Cervical: No cervical adenopathy. Skin:     General: Skin is warm and dry. Coloration: Skin is jaundiced. Skin is not pale. Findings: No bruising, erythema, lesion or rash. Neurological:      General: No focal deficit present. Mental Status: He is alert and oriented to person, place, and time. Mental status is at baseline. Cranial Nerves: No cranial nerve deficit. Sensory: No sensory deficit. Motor: No weakness. Coordination: Coordination normal.      Gait: Gait normal.      Deep Tendon Reflexes: Reflexes normal.   Psychiatric:         Mood and Affect: Mood normal.         Behavior: Behavior normal.         Thought Content:  Thought content normal.         Judgment: Judgment normal.       Medications:    midodrine  10 mg Oral BID WC    metoprolol  50 mg Oral BID    amiodarone  200 mg Oral Daily    famotidine  20 mg Oral Daily    gabapentin  200 mg Oral BID    lactulose  45 g Oral TID    rifaximin  550 mg Oral BID    tiotropium  2 puff Inhalation Daily    sodium chloride flush  10 mL Intravenous 2 times per day    lidocaine  1 patch Transdermal Daily    insulin lispro  0-12 Units Subcutaneous TID WC    insulin lispro  0-6 Units Subcutaneous Nightly       Continuous Infusions:   sodium chloride         PRN Meds:albuterol, sodium chloride flush, sodium chloride, promethazine **OR** ondansetron, traMADol    Data:    CBC:   Recent Labs     05/20/21  1341 05/21/21  0541 05/22/21  0509   WBC 5.7 5.3 5.3   RBC 2.61* 2.44* 2.43*   HGB 8.5* 8.0* 7.9*   HCT 26.2* 25.6* 25.2*   .4* 104.9* 103.7*   PLT 35* 35* 33*       BMP:   Recent Labs     05/20/21  1341 05/21/21  0541 05/22/21  0509   * 132* 128*   K 3.3* 4.3  4.4 4.8   CL 94* 94* 91*   CO2 28 25 23   BUN 18 22 28*   CREATININE 3.3* 4.3* 5.0*       BNP: No results for input(s): BNP in the last 72 hours. PT/INR:  Recent Labs     05/20/21  1341   INR 2.05*       APTT:   Recent Labs     05/20/21  1341   APTT 46.5*       CARDIAC ENZYMES: No results for input(s): CKMB, CKMBINDEX, TROPONINT in the last 72 hours. Invalid input(s): CKTOTAL;3    FASTING LIPID PANEL:No results found for: CHOL, HDL, TRIG    LIVER PROFILE:   Recent Labs     05/20/21  1341   AST 44*   ALT 25   BILIDIR 2.7*   BILITOT 6.6*   ALKPHOS 184*        ABGs:   Lab Results   Component Value Date    PH 7.44 05/02/2021    PCO2 30 05/02/2021    PO2 74 05/02/2021    HCO3 21 05/02/2021    O2SAT 96 05/02/2021     Results for Gearld Tate (MRN [id number]) as of 5/22/2021 18:30   Ref. Range 5/22/2021 05:09   Ferritin Latest Ref Range: 22 - 322 ng/mL 548 (H)   Iron Latest Ref Range: 65 - 195 ug/dL 88   Iron Saturation Latest Ref Range: 20 - 50 % 84 (H)   TIBC Latest Ref Range: 171 - 450 ug/dL < 105 (L)     URINALYSIS. None. SEROLOGY  Results for Gerald Tate (MRN [id number]) as of 5/21/2021 15:19   Ref. Range 4/28/2021 10:56   Hep B S Ab Unknown Negative   Hepatitis B Surface Ag Unknown Negative   Hep B Core Ab, IgM Unknown Negative       TUMOR MARKERS. Results for Gerald Tate (MRN [id number]) as of 5/21/2021 15:19   Ref.  Range 3/17/2021 05:18 4/22/2021 12:08 AFP-Tumor Marker Latest Ref Range: <8.4 ug/L 4.3 2.7     MICROBIOLOGY   None. HISTOPATHOLOGY. None. TOXICOLOGY. None. ENDOSCOPE STUDIES. None. PROCEDURES. Successful ultrasound-guided paracentesis-6.2 L drained. RADIOLOGY. MRI lumbar spine w/o contrast 05/22/2021. FINDINGS:  Some of the images are motion degraded. There is grade 1 retrolisthesis of L5 relative to S1 on the basis of degenerative change,   stable compared to prior CT. There is mild scalloping of the superior endplate of I57 with approximately 10% central   height loss with Schmorl's node. There is otherwise anatomic vertebral body height and alignment. There is hyperintense T1 and T2 signal at the opposing endplates of W8-G0 as evidence for Modic 2 degenerative change. Marrow signal is otherwise within normal limits. The conus terminates in a normal fashion at the L1 level. The cauda equina is normal in appearance without nerve is thickening or clumping identified. Paraspinal soft tissues are unremarkable. At T12-L1 through L4-5 intervertebral discs are normal in appearance. There is no significant spinal canal or neural foraminal stenosis. At L5-S1 there is partial uncovering the disc with superimposed shallow disc bulge  which mildly indents thecal sac and may contact traversing S1 nerve roots bilaterally. There is moderate right and moderate to severe left neural foraminal stenosis in association with facet hypertrophy and ligament flavum thickening.     IMPRESSION:  Facet hypertrophy and ligament flavum thickening at L5-S1 causing grade 1 retrolisthesis of L5 relative to S1 with superimposed shallow disc bulge which indents thecal sac and may  contact traversing S1 nerve roots bilaterally and contributes to moderate right and moderate to severe left neural foraminal stenosis at this level.     Chest x-ray-05/20/2021.   FINDINGS:  There is a dual-lumen dialysis catheter on the

## 2021-05-22 NOTE — PLAN OF CARE
Problem: Falls - Risk of:  Goal: Will remain free from falls  Description: Will remain free from falls  Outcome: Met This Shift  Note: No falls this shift. Safety interventions maintained. Goal: Absence of physical injury  Description: Absence of physical injury  Outcome: Met This Shift  Note: No falls reported this shift, call light in reach for pt, side rails up x 2, appropriate bed in room. Problem: Pain:  Goal: Pain level will decrease  Description: Pain level will decrease  Outcome: Ongoing  Note: Patient complains of pain of a 1 while lying still,  a 10 when he stretches his leg out   Goal: Control of acute pain  Description: Control of acute pain  Outcome: Ongoing  Note: Patient complains of pain of a 1 while lying still,  a 10 when he stretches his leg out  patient is taking ultram as needed for pain  Goal: Control of chronic pain  Description: Control of chronic pain  Outcome: Ongoing  Note: Patient complains of pain of a 1 while lying still,  a 10 when he stretches his leg out  patient is taking ultram as needed for pain     Problem: Skin Integrity:  Goal: Will show no infection signs and symptoms  Description: Will show no infection signs and symptoms  Outcome: Ongoing  Note: Patient has redness on his buttocks, blanchable   Goal: Absence of new skin breakdown  Description: Absence of new skin breakdown  Outcome: Met This Shift  Note: Patient does not have any new skin breakdown     Problem: Discharge Planning:  Goal: Discharged to appropriate level of care  Description: Discharged to appropriate level of care  Outcome: Ongoing  Note: No discharge plans yet, will continue to monitor for discharge needs      Problem: Impaired respiratory status  Goal: Clear lung sounds  5/22/2021 1521 by Willis Whitley RCP  Outcome: Ongoing  Note: Pt continues on MDI to clear lung sounds/improve aeration and pt does MDI's at home. Care plan reviewed with patient.   Patient  verbalize understanding of the plan of care and contribute to goal setting.

## 2021-05-22 NOTE — FLOWSHEET NOTE
05/22/21 0735 05/22/21 1130   Vital Signs   /72 114/63   Temp 97.6 °F (36.4 °C) 98.2 °F (36.8 °C)   Pulse 102 97   Resp 14 18   SpO2 96 % 99 %   Weight 256 lb 2.8 oz (116.2 kg) 254 lb 6.6 oz (115.4 kg)   Weight Method Bed scale Bed scale   Percent Weight Change -3.29 -0.69   Post-Hemodialysis Assessment   Post-Treatment Procedures  --  Blood returned;Catheter Capped, clamped with Saline x2 ports   Machine Disinfection Process  --  Acid/Vinegar Clean;Heat Disinfect   Total Liters Processed (l/min)  --  73.8 l/min   Dialyzer Clearance  --  Lightly streaked   Duration of Treatment (minutes)  --  210 minutes   Hemodialysis Intake (ml)  --  400 ml   Hemodialysis Output (ml)  --  800 ml   NET Removed (ml)  --  400 ml   Tolerated Treatment  --  Fair   3.5 hour treatment completed. Only 400 ml of fluid removed. Patient HR up into high 120 -130's. Asymptomatic throughout treatment. Dr. Marquez Redd aware. Cath dressing changed. Lines flushed with 10 ml of 0.9 NS, clamped and tego secured. Report called to primary floor nurse.

## 2021-05-23 PROBLEM — R11.2 NAUSEA AND VOMITING IN ADULT: Status: ACTIVE | Noted: 2021-01-01

## 2021-05-23 PROBLEM — K56.0 PARALYTIC ILEUS (HCC): Status: ACTIVE | Noted: 2021-01-01

## 2021-05-23 NOTE — PROGRESS NOTES
Renal Progress Note  [facility]    Patient :  Aden Linares; 55 y.o. MRN# [id number]  Location:  [facility]12/012-A  Attending:  Chucky Sorensen MD  Admit Date:  5/20/2021   Hospital Day: 2      Subjective:     Nephrology is following the patient for KARAN/CKD. Patient was seen and examined. He is having some nausea and vomiting today. + heartburn. Also has a nosebleed. + abdominal distention. Outpatient Medications:     Medications Prior to Admission: rifaximin (XIFAXAN) 550 MG tablet, Take 1 tablet by mouth 2 times daily  ammonium lactate (AMLACTIN) 12 % cream, Apply topically as needed  lactulose (CHRONULAC) 10 GM/15ML solution, 45 ml by mouth TID  albuterol sulfate HFA (VENTOLIN HFA) 108 (90 Base) MCG/ACT inhaler, Inhale 2 puffs into the lungs every 6 hours as needed for Wheezing  tiotropium (SPIRIVA) 18 MCG inhalation capsule, Inhale 1 capsule into the lungs daily  dilTIAZem (CARDIZEM) 30 MG tablet, Take 1 tablet by mouth 3 times daily  Misc. Devices (WHEELCHAIR) MISC, Standard lightweight wheelchair  varenicline (CHANTIX) 0.5 MG tablet, Take 1 tablet by mouth daily x 1 week then increase to 1 tablet by mouth twice daily  gabapentin (NEURONTIN) 100 MG capsule, Take 2 capsules by mouth 2 times daily for 30 days.   famotidine (PEPCID) 20 MG tablet, Take 1 tablet by mouth daily  midodrine (PROAMATINE) 5 MG tablet, Take 3 tablets by mouth 3 times daily  amiodarone (CORDARONE) 200 MG tablet, Take 1 tablet by mouth daily  Continuous Blood Gluc  (DEXCOM G6 ) MENDY, Use to test 4 times daily and as needed DX: E11.9  Continuous Blood Gluc Transmit (DEXCOM G6 TRANSMITTER) MISC, Use to test 4 times daily and as needed DX: E11.9  Continuous Blood Gluc Sensor (DEXCOM G6 SENSOR) MISC, Use to test 4 times daily and as needed DX: E11.9  bumetanide (BUMEX) 1 MG tablet, Take 1 tablet by mouth 2 times daily  metoprolol (LOPRESSOR) 100 MG tablet, Take 100 mg by mouth 2 times daily  Lancets MISC, 1 each by Does not apply route daily Use to test blood sugar 4 times daily and as needed DX:E11.9  blood glucose monitor strips, Test 4 times daily and as needed for irregular blood glucose. Dispense sufficient amount for indicated testing frequency plus additional to accommodate PRN testing needs. DX E11.9  Blood Glucose Monitoring Suppl (ONE TOUCH ULTRA 2) w/Device KIT, Use to test 4 times daily and as needed DX: E11.9  ASPIRIN 81 PO, Take by mouth daily    Current Medications:     Scheduled Meds:    midodrine  10 mg Oral TID WC    metoprolol  50 mg Oral BID    amiodarone  200 mg Oral Daily    famotidine  20 mg Oral Daily    gabapentin  200 mg Oral BID    lactulose  45 g Oral TID    rifaximin  550 mg Oral BID    tiotropium  2 puff Inhalation Daily    sodium chloride flush  10 mL Intravenous 2 times per day    lidocaine  1 patch Transdermal Daily    insulin lispro  0-12 Units Subcutaneous TID WC    insulin lispro  0-6 Units Subcutaneous Nightly     Continuous Infusions:    sodium chloride       PRN Meds:  albuterol, sodium chloride flush, sodium chloride, promethazine **OR** ondansetron, traMADol    Input/Output:       I/O last 3 completed shifts: In: 1330 [P.O.:930]  Out: 800 .       Patient Vitals for the past 96 hrs (Last 3 readings):   Weight   21 1130 254 lb 6.6 oz (115.4 kg)   21 0735 256 lb 2.8 oz (116.2 kg)   21 2145 264 lb 14.4 oz (120.2 kg)       Vital Signs:   Temperature:  Temp: 97.7 °F (36.5 °C)  TMax:   Temp (24hrs), Av.8 °F (36.6 °C), Min:96.4 °F (35.8 °C), Max:98.9 °F (37.2 °C)    Respirations:  Resp: 16  Pulse:   Pulse: 111  BP:    BP: 91/65  BP Range: Systolic (81LCX), NKF:30 , Min:84 , CVD:690       Diastolic (56VXP), PNN:46, Min:60, Max:66      Physical Examination:     General:  Awake, alert, no acute distress  HEENT: NC/AT/ MMM +scleral ictera  Chest:               diminished  Cardiac:  Irregularly irregular  Abdomen: Soft, abdominal distention noted  Neuro: No facial droop, No Asterixis  SKIN:  No rashes, good skin turgor. Extremities:  Edema improved 1+    Labs:       Recent Labs     05/20/21  1341 05/21/21  0541 05/22/21  0509   WBC 5.7 5.3 5.3   RBC 2.61* 2.44* 2.43*   HGB 8.5* 8.0* 7.9*   HCT 26.2* 25.6* 25.2*   .4* 104.9* 103.7*   MCH 32.6 32.8 32.5   MCHC 32.4 31.3* 31.3*   PLT 35* 35* 33*   MPV 12.2 11.9 12.7*      BMP:   Recent Labs     05/21/21  0541 05/22/21  0509 05/23/21  0454   * 128* 129*   K 4.3  4.4 4.8 4.5   CL 94* 91* 93*   CO2 25 23 28   BUN 22 28* 20   CREATININE 4.3* 5.0* 3.8*   GLUCOSE 137* 113* 111*   CALCIUM 9.7 8.9 8.8      Phosphorus:   No results for input(s): PHOS in the last 72 hours.   Magnesium:    Recent Labs     05/20/21  1341   MG 2.2     Albumin:    Recent Labs     05/20/21  1341   LABALBU 3.5     BNP:    No results found for: BNP  GERALDO:    No results found for: GERALDO  SPEP:  Lab Results   Component Value Date    PROT 6.1 05/20/2021     UPEP:   No results found for: LABPE  C3:   No results found for: C3  C4:   No results found for: C4  MPO ANCA:   No results found for: MPO  PR3 ANCA:   No results found for: PR3  Anti-GBM:   No results found for: GBMABIGG  Hep BsAg:         Lab Results   Component Value Date    HEPBSAG Negative 04/28/2021     Hep C AB:          Lab Results   Component Value Date    HEPCAB Negative 11/23/2020       Urinalysis/Chemistries:      Lab Results   Component Value Date    NITRU NEGATIVE 05/04/2021    COLORU YELLOW 05/05/2021    COLORU BROWN 05/04/2021    PHUR 5.5 05/04/2021    LABCAST NONE SEEN 04/30/2021    LABCAST NONE SEEN 04/30/2021    WBCUA >200 W/CLUMPS 05/04/2021    RBCUA > 200 05/04/2021    MUCUS NONE SEEN 05/04/2021    YEAST NONE SEEN 05/04/2021    BACTERIA FEW 05/04/2021    SPECGRAV 1.016 04/30/2021    LEUKOCYTESUR MODERATE 05/04/2021    UROBILINOGEN 0.2 05/04/2021    BILIRUBINUR SMALL 05/04/2021    BLOODU LARGE 05/04/2021    GLUCOSEU NEGATIVE 05/04/2021    Edmunds Spaniel TRACE 05/04/2021

## 2021-05-23 NOTE — PLAN OF CARE
Problem: Falls - Risk of:  Goal: Will remain free from falls  Description: Will remain free from falls  5/23/2021 0145 by Marilia Acosta RN  Outcome: Ongoing    Problem: Falls - Risk of:  Goal: Absence of physical injury  Description: Absence of physical injury  5/23/2021 0145 by Marilia Acosta RN  Outcome: Ongoing     Problem: Pain:  Goal: Pain level will decrease  Description: Pain level will decrease  5/23/2021 0145 by Marilia Acosta RN  Outcome: Ongoing     Problem: Pain:  Goal: Control of acute pain  Description: Control of acute pain  5/23/2021 0145 by Marilia Acosta RN  Outcome: Ongoing     Problem: Pain:  Goal: Control of chronic pain  Description: Control of chronic pain  5/23/2021 0145 by Marilia Acosta RN  Outcome: Ongoing     Problem: Skin Integrity:  Goal: Will show no infection signs and symptoms  Description: Will show no infection signs and symptoms  5/23/2021 0145 by Marilia Acosta RN  Outcome: Ongoing     Problem: Skin Integrity:  Goal: Absence of new skin breakdown  Description: Absence of new skin breakdown  5/23/2021 0145 by Marilia Acosta RN  Outcome: Ongoing     Problem: Discharge Planning:  Goal: Discharged to appropriate level of care  Description: Discharged to appropriate level of care  5/23/2021 0145 by Marilia Acosta RN  Outcome: Ongoing     Problem: Impaired respiratory status  Goal: Clear lung sounds  5/23/2021 0145 by Marilia Acosta RN  Outcome: Ongoing     Problem: Serum Glucose Level - Abnormal:  Goal: Ability to maintain appropriate glucose levels has stabilized  Description: Ability to maintain appropriate glucose levels has stabilized  Outcome: Ongoing   Care plan reviewed with patient. Patient verbalizes understanding of care plan and treatment goals.

## 2021-05-23 NOTE — PLAN OF CARE
Problem: GI  Goal: No bowel complications  Outcome: Ongoing  Note: Pt nauseated this shift  Zofran, phenergan, reglan this shift  Kub showed ileus  Pt lhas refused N/G at this time  Abd rounded and firm       Problem:   Goal: Adequate urinary output  Outcome: Ongoing  Note: Pt has no urine output  Pt  on dialysis Tues. Thurs. Sat.    Tessio right upper chest in tact

## 2021-05-23 NOTE — PLAN OF CARE
Problem: Impaired respiratory status  Goal: Clear lung sounds  5/23/2021 0859 by Kush Stoddard RCP  Outcome: Ongoing  Note: Pt continues on daily MDI and PRN aerosols to clear lung sounds/improve aeration and pt does MDI's at home.

## 2021-05-23 NOTE — PROGRESS NOTES
Hospitalist Progress Note  [facility] Onc Med 5K       Patient: Otilio Carrasquillo    Unit/Bed: 9Q-20/675-Z    YOB: 1965    MRN: [id number]    Acct: [de-identified]     Admitting Diagnosis:Fall (on)(from) incline, initial encounter [W10. 2XXA]  Decompensation of cirrhosis of liver ([facility] [address] [K72.90, K74.60]    Admit Date:  5/20/2021    Hospital Day: 2    Subjective:    Patient is not feeling well today-Has N/V and constipation. Patient Seen, Chart, Labs, Radiology studies, and Consults reviewed. Objective:   BP (!) 103/52   Pulse 112   Temp 97.4 °F (36.3 °C) (Oral)   Resp 18   Wt 254 lb 6.6 oz (115.4 kg)   SpO2 94%   BMI 32.66 kg/m²       Intake/Output Summary (Last 24 hours) at 5/23/2021 1554  Last data filed at 5/23/2021 1326  Gross per 24 hour   Intake 670 ml   Output --   Net 670 ml       Review of Systems   Constitutional: Positive for activity change. Negative for appetite change, chills, diaphoresis, fatigue, fever and unexpected weight change. HENT: Negative for congestion, dental problem, drooling, ear discharge, ear pain, facial swelling, hearing loss, mouth sores, nosebleeds, postnasal drip, rhinorrhea, sinus pressure, sinus pain, sneezing, sore throat, tinnitus, trouble swallowing and voice change. Eyes: Negative for photophobia, pain, discharge, redness, itching and visual disturbance. Respiratory: Positive for shortness of breath. Negative for apnea, cough, choking, chest tightness, wheezing and stridor. Cardiovascular: Positive for leg swelling. Negative for chest pain and palpitations. Gastrointestinal: Positive for abdominal distention, nausea and vomiting. Negative for abdominal pain, anal bleeding, blood in stool, constipation, diarrhea and rectal pain. Endocrine: Negative for cold intolerance, heat intolerance, polydipsia, polyphagia and polyuria.    Genitourinary: Negative for decreased urine volume, difficulty urinating, discharge, dysuria, enuresis, flank pain, frequency, genital sores, hematuria, penile pain, penile swelling, scrotal swelling, testicular pain and urgency. Musculoskeletal: Positive for back pain. Negative for arthralgias, gait problem, joint swelling, myalgias, neck pain and neck stiffness. Skin: Negative for color change, pallor, rash and wound. Allergic/Immunologic: Negative for food allergies. Neurological: Negative for dizziness, tremors, seizures, syncope, facial asymmetry, speech difficulty, weakness, light-headedness, numbness and headaches. Hematological: Negative for adenopathy. Does not bruise/bleed easily. Psychiatric/Behavioral: Negative for agitation, behavioral problems, confusion, decreased concentration, dysphoric mood, hallucinations, self-injury, sleep disturbance and suicidal ideas. The patient is not nervous/anxious and is not hyperactive. Physical Exam  Vitals and nursing note reviewed. Constitutional:       General: He is not in acute distress. Appearance: Normal appearance. He is obese. He is not ill-appearing, toxic-appearing or diaphoretic. HENT:      Head: Normocephalic and atraumatic. Right Ear: External ear normal.      Left Ear: External ear normal.      Nose: Nose normal. No congestion or rhinorrhea. Mouth/Throat:      Mouth: Mucous membranes are moist.      Pharynx: Oropharynx is clear. No oropharyngeal exudate or posterior oropharyngeal erythema. Eyes:      General: Scleral icterus present. Right eye: No discharge. Left eye: No discharge. Extraocular Movements: Extraocular movements intact. Conjunctiva/sclera: Conjunctivae normal.      Pupils: Pupils are equal, round, and reactive to light. Neck:      Vascular: No carotid bruit. Cardiovascular:      Rate and Rhythm: Normal rate and regular rhythm. Pulses: Normal pulses. Heart sounds: Normal heart sounds. No murmur heard. No friction rub. No gallop.     Pulmonary:      Effort: Pulmonary effort is normal. No respiratory distress. Breath sounds: Normal breath sounds. No stridor. No wheezing, rhonchi or rales. Chest:      Chest wall: No tenderness. Abdominal:      General: Abdomen is flat. There is distension. Palpations: There is no mass. Tenderness: There is no abdominal tenderness. There is no right CVA tenderness, left CVA tenderness, guarding or rebound. Hernia: No hernia is present. Comments: Reduced bowel sounds. Musculoskeletal:         General: Tenderness present. No swelling, deformity or signs of injury. Normal range of motion. Cervical back: Normal range of motion and neck supple. No rigidity or tenderness. Right lower leg: Edema present. Left lower leg: Edema present. Comments: Lower lumbar spine tenderness. Lymphadenopathy:      Cervical: No cervical adenopathy. Skin:     General: Skin is warm and dry. Coloration: Skin is jaundiced. Skin is not pale. Findings: No bruising, erythema, lesion or rash. Neurological:      General: No focal deficit present. Mental Status: He is alert and oriented to person, place, and time. Mental status is at baseline. Cranial Nerves: No cranial nerve deficit. Sensory: No sensory deficit. Motor: No weakness. Coordination: Coordination normal.      Gait: Gait normal.      Deep Tendon Reflexes: Reflexes normal.   Psychiatric:         Mood and Affect: Mood normal.         Behavior: Behavior normal.         Thought Content:  Thought content normal.         Judgment: Judgment normal.       Medications:    midodrine  10 mg Oral TID WC    metoclopramide  5 mg Intravenous Q6H    metoprolol  50 mg Oral BID    amiodarone  200 mg Oral Daily    famotidine  20 mg Oral Daily    gabapentin  200 mg Oral BID    lactulose  45 g Oral TID    rifaximin  550 mg Oral BID    tiotropium  2 puff Inhalation Daily    sodium chloride flush  10 mL Intravenous 2 times per day    lidocaine  1 patch Transdermal Daily    insulin lispro  0-12 Units Subcutaneous TID WC    insulin lispro  0-6 Units Subcutaneous Nightly       Continuous Infusions:   sodium chloride         PRN Meds:albuterol, sodium chloride flush, sodium chloride, promethazine **OR** ondansetron, traMADol    Data:    CBC:   Recent Labs     05/21/21  0541 05/22/21  0509   WBC 5.3 5.3   RBC 2.44* 2.43*   HGB 8.0* 7.9*   HCT 25.6* 25.2*   .9* 103.7*   PLT 35* 33*       BMP:   Recent Labs     05/21/21  0541 05/22/21  0509 05/23/21  0454   * 128* 129*   K 4.3  4.4 4.8 4.5   CL 94* 91* 93*   CO2 25 23 28   BUN 22 28* 20   CREATININE 4.3* 5.0* 3.8*       BNP: No results for input(s): BNP in the last 72 hours. PT/INR:  No results for input(s): PROTIME, INR in the last 72 hours. APTT:   No results for input(s): APTT in the last 72 hours. CARDIAC ENZYMES: No results for input(s): CKMB, CKMBINDEX, TROPONINT in the last 72 hours. Invalid input(s): CKTOTAL;3    FASTING LIPID PANEL:No results found for: CHOL, HDL, TRIG    LIVER PROFILE:   No results for input(s): AST, ALT, ALB, BILIDIR, BILITOT, ALKPHOS in the last 72 hours. ABGs:   Lab Results   Component Value Date    PH 7.44 05/02/2021    PCO2 30 05/02/2021    PO2 74 05/02/2021    HCO3 21 05/02/2021    O2SAT 96 05/02/2021     Results for Shaneka Rudolph (MRN [id number]) as of 5/22/2021 18:30   Ref. Range 5/22/2021 05:09   Ferritin Latest Ref Range: 22 - 322 ng/mL 548 (H)   Iron Latest Ref Range: 65 - 195 ug/dL 88   Iron Saturation Latest Ref Range: 20 - 50 % 84 (H)   TIBC Latest Ref Range: 171 - 450 ug/dL < 105 (L)     URINALYSIS. None. SEROLOGY  Results for Shaneka Rudolph (MRN [id number]) as of 5/21/2021 15:19   Ref. Range 4/28/2021 10:56   Hep B S Ab Unknown Negative   Hepatitis B Surface Ag Unknown Negative   Hep B Core Ab, IgM Unknown Negative       TUMOR MARKERS. Results for Shaneka Rudolph (MRN [id number]) as of 5/21/2021 15:19   Ref.  Range 3/17/2021 05:18 4/22/2021 bilaterally. There is moderate right and moderate to severe left neural foraminal stenosis in association with facet hypertrophy and ligament flavum thickening.     IMPRESSION:  Facet hypertrophy and ligament flavum thickening at L5-S1 causing grade 1 retrolisthesis of L5 relative to S1 with superimposed shallow disc bulge which indents thecal sac and may  contact traversing S1 nerve roots bilaterally and contributes to moderate right and moderate to severe left neural foraminal stenosis at this level.     Chest x-ray-05/20/2021. FINDINGS:  There is a dual-lumen dialysis catheter on the right side.     There is a moderate right-sided and small to moderate left-sided pleural effusion.      There are bibasilar opacities which may represent atelectasis or infiltrates.     There is no pneumothorax.     Visualized portions of the upper abdomen are within normal limits.     The osseous structures are intact.      No acute fractures or suspicious osseous lesions.     IMPRESSION:  Moderate right-sided and small left-sided pleural effusions with adjacent opacities which may represent atelectasis or infiltrates.     ASSESSMENT AND  PLAN.     1. PULMONARY. Acute respiratory insufficiency d/t pulmonary congestion -O2 ,wean as tolerated. Hepatic right hydrothorax . 2. CARDIOVASCULAR. Atrial flutter w/ CVR-continue amiodarone, metoprolol and Cardizem. Acute on chronic CHF exacerbation w/ diastolic dysfunction-patient oliguric. Chronic hypotension-midodrine     3.GI. New onset N/V due to paralytic ileus versus SBO on KUB-NGT placement. Decompensated alcoholic liver cirrhosis w/ ascites -s/p paracentesis ,6.2 L      drainage on 05/21/2020     Asymptomatic cholelithiasis-HIDA scan to r/o chronic cholecystitis     On the liver transplant list via state.     4.RENAL AND ELECTROLYTES. ESRD on HD . Hyperosmolality and hyponatremia. Nephrology following.     5. ENDO.      Type II DM-sliding scale insulin.     6.MUSCULOSKELETAL. Fall at home-PT OT following. Acute on chronic back pain exacerbation d/t L5-S1 mild disc bulge and neural     foraminal stenosis on lumbar spine MRI.     7. LIFE STYLE.     H/o chronic alcohol abuse.     8.HEM-ONC. Coagulopathy d/t end-stage liver disease. Chronic thrombocytopenia due to ESLD.     9.NUTRITION. Obesity with a BMI 34.15-needs regular exercise diet control. 10.DISPO. Planned d/c to home vs rehab soon.       Electronically signed Lolita Del Valle MD on 5/23/2021 at 3:54 PM    Rounding Hospitalist

## 2021-05-23 NOTE — PROGRESS NOTES
pt has refused N/G at this time, educated pt on why n/g needed placed, pt stated \" maybe later\" pt also concerned with having  bloody nose again

## 2021-05-23 NOTE — PLAN OF CARE
Problem: Falls - Risk of:  Goal: Will remain free from falls  5/23/2021 1527 by Morris Cottrell RN  Outcome: Ongoing  Note: No falls this shift call light in reach  Bed alarm on  Pt up 1 assist with walker     Problem: Skin Integrity:  Goal: Will show no infection signs and symptoms  5/23/2021 1527 by Morris Cottrell RN  Outcome: Ongoing  Note: No new skin breakdown this shift  Pt has refused to get up this shift  Educated pt on turning and repostioning     Problem: Discharge Planning:  Goal: Discharged to appropriate level of care  5/23/2021 1527 by Morris Cottrell RN  Outcome: Ongoing  Note: Pt plans to retlurln home at discharge

## 2021-05-23 NOTE — PROGRESS NOTES
Pharmacy Renal Adjustment    Francisco Chicas is a 55 y.o. male. Pharmacy renally adjust the following medications per P&T approved policy: metoclopramide    Recent Labs     05/22/21  0509 05/23/21  0454   BUN 28* 20   CREATININE 5.0* 3.8*     Estimated Creatinine Clearance: 30 mL/min (A) (based on SCr of 3.8 mg/dL [facility])).   Calculated CrCl:    Height:   Ht Readings from Last 1 Encounters:   05/17/21 6' 2\" (1.88 m)     Weight:  Wt Readings from Last 1 Encounters:   05/22/21 254 lb 6.6 oz (115.4 kg)     CKD stage: ESRD on HD            Plan: Adjustments based on renal function:          Decrease metoclopramide 10 mg Q6H to metoclopramide 5 mg Q6H

## 2021-05-24 NOTE — PROGRESS NOTES
[facility]   Nephrology progress note  5/24/2021, 11:21 AM      Pt Name:    Derrick Casarez  MRN:     [id number]     [place]:    1965  Admit Date:    5/20/2021  1:21 PM  Primary Care Physician:  Eduardo Lloyd MD   Room number  5K-12/012-A    Chief Complaint: Nephrology following for KARAN/CKD (not ESRD)    Subjective:  Patient seen and examined  No acute distress  Discussed with nursing staff  Patient denies any chest pain or any shortness of breath      Objective:  24HR INTAKE/OUTPUT:      Intake/Output Summary (Last 24 hours) at 5/24/2021 1121  Last data filed at 5/24/2021 1104  Gross per 24 hour   Intake 230 ml   Output --   Net 230 ml     I/O last 3 completed shifts: In: 220 [P.O.:220]  Out: -   I/O this shift:  In: 10 [I.V.:10]  Out: -   Admission weight: 266 lb (120.7 kg)  Wt Readings from Last 3 Encounters:   05/22/21 254 lb 6.6 oz (115.4 kg)   05/17/21 246 lb (111.6 kg)   05/06/21 264 lb 6.4 oz (119.9 kg)     Body mass index is 32.66 kg/m².     Physical examination  VITALS:     Vitals:    05/23/21 1510 05/23/21 1954 05/24/21 0417 05/24/21 1035   BP: (!) 103/52 95/77 (!) 84/53 95/64   Pulse: 112 105 105 112   Resp: 18 16 16 16   Temp: 97.4 °F (36.3 °C) 98.1 °F (36.7 °C) 97.2 °F (36.2 °C) 97.5 °F (36.4 °C)   TempSrc: Oral Oral Oral Oral   SpO2: 94% 94% 92% 91%   Weight:         General Appearance: alert and cooperative with exam, appears comfortable, no distress, + visibly jaundiced  Mouth/Throat: Oral mucosa moist  Eyes: ++ Icteric sclera  Neck: No JVD  Lungs: Air entry B/L, no rales, no use of accessory muscles, diminished both bases  Heart:  S1, S2 heard  GI: soft, distention      Lab Data  CBC:   Recent Labs     05/22/21  0509 05/24/21  0723   WBC 5.3 7.6   HGB 7.9* 9.0*   HCT 25.2* 27.8*   PLT 33* 55*     BMP:  Recent Labs     05/22/21  0509 05/23/21 0453 05/23/21  0454 05/24/21 0723   *  --  129* 127*   K 4.8  --  4.5 5.7*   CL 91*  --  93* 89*   CO2 23  --  28 26 BUN 28*  --  20 34*   CREATININE 5.0*  --  3.8* 4.6*   GLUCOSE 113*  --  111* 114*   CALCIUM 8.9  --  8.8 9.6   MG  --  1.9  --   --    PHOS  --  4.8*  --   --      Hepatic:   No results for input(s): LABALBU, AST, ALT, ALB, BILITOT, ALKPHOS in the last 72 hours. Meds:  Infusion:    sodium chloride       Meds:    traMADol        midodrine  10 mg Oral TID WC    metoclopramide  5 mg Intravenous Q6H    metoprolol  50 mg Oral BID    amiodarone  200 mg Oral Daily    famotidine  20 mg Oral Daily    gabapentin  200 mg Oral BID    lactulose  45 g Oral TID    rifaximin  550 mg Oral BID    tiotropium  2 puff Inhalation Daily    sodium chloride flush  10 mL Intravenous 2 times per day    lidocaine  1 patch Transdermal Daily    insulin lispro  0-12 Units Subcutaneous TID WC    insulin lispro  0-6 Units Subcutaneous Nightly     Meds prn: albuterol, sodium chloride flush, sodium chloride, promethazine **OR** ondansetron, traMADol       Impression and Plan:  1. Acute kidney injury on CKD 4  Secondary to HRS  Patient is not ESRD yet  Currently on intermittent hemodialysis 3 times a week  Had hemodialysis treatment on Saturday  Potassium 5.7 today  We will plan hemodialysis treatment today    2. Chronic decompensated liver cirrhosis with ascites requiring weekly paracentesis  3. Cirrhosis with ascites  4. Chronic hypotension. C/W midodrine  5. Anemia of multifactorial etiology including renal insufficiency. On SAMMIE as outpatient. Will resume  6. History of hepatic encephalopathy. On lactulose and rifaximin as outpatient  7. Back pain/fall. Hospitalist managing  8.   Pleural effusion    D/W patient and RN    Josh Diaz MD  [facility]

## 2021-05-24 NOTE — FLOWSHEET NOTE
05/24/21 1335   Encounter Summary   Services provided to: Patient   Referral/Consult From: Rounding   Continue Visiting Yes  (5/24)   Complexity of Encounter Moderate   Length of Encounter 15 minutes   Routine   Type Initial   Assessment Approachable   Intervention Nurtured hope   Outcome Comfort   Spiritual/Quaker   Type Spiritual support   Assessment: In my encounter with the 55 yr old patient, while rounding  the unit 5K,  I provided spiritual care to patient through conversation, I also came to assess the patients spiritual needs present. The pt was admitted due to a acute exacerbation of chronic lower back pain. Interventions:  I provided prayer, emotional support and words of comfort.  provided a listening presence and encouraged pt to share their beliefs and how they support him during their hospitalization. Outcomes: The patient was encouraged and didnt share any further spiritual needs at this time. The pt remains optimistic and hopeful. The pt shared that they were appreciative for the support. Plan:  Chaplains will follow-up at a later time for assessment of any spiritual care needs present.

## 2021-05-24 NOTE — PROGRESS NOTES
[address] [facility] [address]  OCCUPATIONAL THERAPY MISSED TREATMENT NOTE  [facility] 5K  5K-12/012-A      Date: 2021  Patient Name: Sylvia Live        CSN: [id number]   : 1965  (55 y.o.)  Gender: male   Referring Practitioner: Breana Taylor MD  Diagnosis: Fall on [address] TREATMENT: Patient Off Floor for Testing Per nursing patient has been off floor and unsure of arrival to return. Will attempt later today if time allows.

## 2021-05-24 NOTE — PROGRESS NOTES
[address]  PHYSICAL THERAPY MISSED TREATMENT NOTE  [facility] ONC MED 5K    Date: 2021  Patient Name: Aden Linares        MRN: [id number]   : 1965  (55 y.o.)  Gender: male   Referring Practitioner: Chucky Sorensen MD  Diagnosis: fall on/from incline         REASON FOR MISSED TREATMENT:  Missed treat. Pt off of floor at time of PTA arrival. Will try back as time allows. Second attempt pt leaving floor for dialysis. Will try back on next available date.

## 2021-05-24 NOTE — FLOWSHEET NOTE
05/24/21 1306 05/24/21 1653   Vital Signs   BP 95/62 (!) 101/59   Temp 97 °F (36.1 °C) 98.5 °F (36.9 °C)   Pulse 103 121   Resp 18 18   SpO2 95 %  --    Weight 256 lb 2.8 oz (116.2 kg) 253 lb 15.5 oz (115.2 kg)   Weight Method Bed scale Bed scale   Percent Weight Change 0.69 -0.86   Post-Hemodialysis Assessment   Post-Treatment Procedures  --  Blood returned;Catheter Capped, clamped with Saline x2 ports   Machine Disinfection Process  --  Acid/Vinegar Clean;Heat Disinfect; Exterior Machine Disinfection   Rinseback Volume (ml)  --  400 ml   Total Liters Processed (l/min)  --  67 l/min   Dialyzer Clearance  --  Lightly streaked   Duration of Treatment (minutes)  --  210 minutes   Hemodialysis Intake (ml)  --  500 ml   Hemodialysis Output (ml)  --  1500 ml   NET Removed (ml)  --  1000 ml   Tolerated Treatment  --  Fair   Stable 3.5 hour tx. removed 1.0 L of fluid. Gave Albumin 25g to help with fluid removal. Bilateral cath ports flushed with normal saline and clamped. CVC drsg changed and is clean, dry, and intact. Report called to primary RN. TX record printed for scanning into EMR.

## 2021-05-24 NOTE — CARE COORDINATION
5/24/21, 11:35 AM EDT    DISCHARGE PLANNING EVALUATION    Spoke with patient re: selection of ECF. He stated that list was very long and did not include any facility from the [facility] area. SW explained that list was for [facility] and the surrounding area. He stated his understanding. He is waiting to hear from his brother re: facilities near him [facility], [facility]). SW obtained list of facilities from insurance website within 20 miles of [facility] and provided it to patient. He stated that he had a name of a facility that brother had sent to him however, he was having difficulty finding it on his phone. SW will follow up with him after lunch. 2:38 PM SW to patient's room (3x)-off unit for a procedure    3:03 PM Spoke with brother Siria Adhikarijodieon ([phone]). He suggested [address] [facility] in [facility] ([address] [facility] [address], [address] [facility]). There is a [email or web address] approximately 6 miles away. Facility is not on list of contracted facilities but, will call and if on list, will make referral. Ele Perez will follow up with him. Call to [phone] [facility] [address] left for Hanna Bhatt in admissions to contact [facility].

## 2021-05-24 NOTE — PROGRESS NOTES
Hospitalist Progress Note  [facility] Onc [facility] 5K       Patient: Anna Marie Tuttle    Unit/Bed: 4K-15/288-Q    YOB: 1965    MRN: [id number]    Acct: [de-identified]     Admitting Diagnosis:Fall (on)(from) incline, initial encounter [W10. 2XXA]  Decompensation of cirrhosis of liver ([facility] [address] [K72.90, K74.60]    Admit Date:  5/20/2021    Hospital Day: 3    Subjective:    Patient is continued to have nausea and vomiting, constipation with increased abdominal distention. HIDA scan negative for cholecystitis. Patient Seen, Chart, Labs, Radiology studies, and Consults reviewed. Objective:   BP (!) 84/53   Pulse 105   Temp 97.2 °F (36.2 °C) (Oral)   Resp 16   Wt 254 lb 6.6 oz (115.4 kg)   SpO2 92%   BMI 32.66 kg/m²       Intake/Output Summary (Last 24 hours) at 5/24/2021 7530  Last data filed at 5/23/2021 1326  Gross per 24 hour   Intake 220 ml   Output --   Net 220 ml       Review of Systems   Constitutional: Positive for activity change. Negative for appetite change, chills, diaphoresis, fatigue, fever and unexpected weight change. HENT: Negative for congestion, dental problem, drooling, ear discharge, ear pain, facial swelling, hearing loss, mouth sores, nosebleeds, postnasal drip, rhinorrhea, sinus pressure, sinus pain, sneezing, sore throat, tinnitus, trouble swallowing and voice change. Eyes: Negative for photophobia, pain, discharge, redness, itching and visual disturbance. Respiratory: Negative for apnea, cough, choking, chest tightness, shortness of breath, wheezing and stridor. Cardiovascular: Negative for chest pain, palpitations and leg swelling. Gastrointestinal: Positive for abdominal distention, constipation, nausea and vomiting. Negative for abdominal pain, anal bleeding, blood in stool, diarrhea and rectal pain. Endocrine: Negative for cold intolerance, heat intolerance, polydipsia, polyphagia and polyuria.    Genitourinary: Negative for decreased urine volume, difficulty urinating, discharge, dysuria, enuresis, flank pain, frequency, genital sores, hematuria, penile pain, penile swelling, scrotal swelling, testicular pain and urgency. Musculoskeletal: Positive for back pain. Negative for arthralgias, gait problem, joint swelling, myalgias, neck pain and neck stiffness. Skin: Negative for color change, pallor, rash and wound. Allergic/Immunologic: Negative for food allergies. Neurological: Negative for dizziness, tremors, seizures, syncope, facial asymmetry, speech difficulty, weakness, light-headedness, numbness and headaches. Hematological: Negative for adenopathy. Does not bruise/bleed easily. Psychiatric/Behavioral: Negative for agitation, behavioral problems, confusion, decreased concentration, dysphoric mood, hallucinations, self-injury, sleep disturbance and suicidal ideas. The patient is not nervous/anxious and is not hyperactive. Physical Exam  Vitals and nursing note reviewed. Constitutional:       General: He is not in acute distress. Appearance: Normal appearance. He is obese. He is not ill-appearing, toxic-appearing or diaphoretic. HENT:      Head: Normocephalic and atraumatic. Right Ear: External ear normal.      Left Ear: External ear normal.      Nose: Nose normal. No congestion or rhinorrhea. Mouth/Throat:      Mouth: Mucous membranes are moist.      Pharynx: Oropharynx is clear. No oropharyngeal exudate or posterior oropharyngeal erythema. Eyes:      General: Scleral icterus present. Right eye: No discharge. Left eye: No discharge. Extraocular Movements: Extraocular movements intact. Conjunctiva/sclera: Conjunctivae normal.      Pupils: Pupils are equal, round, and reactive to light. Neck:      Vascular: No carotid bruit. Cardiovascular:      Rate and Rhythm: Normal rate and regular rhythm. Pulses: Normal pulses. Heart sounds: Normal heart sounds. No murmur heard. No friction rub.  No Inhalation Daily    sodium chloride flush  10 mL Intravenous 2 times per day    lidocaine  1 patch Transdermal Daily    insulin lispro  0-12 Units Subcutaneous TID WC    insulin lispro  0-6 Units Subcutaneous Nightly       Continuous Infusions:   sodium chloride         PRN Meds:albuterol, sodium chloride flush, sodium chloride, promethazine **OR** ondansetron, traMADol    Data:    CBC:   Recent Labs     05/22/21  0509 05/24/21  0723   WBC 5.3 7.6   RBC 2.43* 2.74*   HGB 7.9* 9.0*   HCT 25.2* 27.8*   .7* 101.5*   PLT 33* 55*       BMP:   Recent Labs     05/22/21  0509 05/23/21  0453 05/23/21  0454 05/24/21  0723   *  --  129* 127*   K 4.8  --  4.5 5.7*   CL 91*  --  93* 89*   CO2 23  --  28 26   PHOS  --  4.8*  --   --    BUN 28*  --  20 34*   CREATININE 5.0*  --  3.8* 4.6*       BNP: No results for input(s): BNP in the last 72 hours. PT/INR:  No results for input(s): PROTIME, INR in the last 72 hours. APTT:   No results for input(s): APTT in the last 72 hours. CARDIAC ENZYMES: No results for input(s): CKMB, CKMBINDEX, TROPONINT in the last 72 hours. Invalid input(s): CKTOTAL;3    FASTING LIPID PANEL:No results found for: CHOL, HDL, TRIG    LIVER PROFILE:   No results for input(s): AST, ALT, ALB, BILIDIR, BILITOT, ALKPHOS in the last 72 hours. ABGs:   Lab Results   Component Value Date    PH 7.44 05/02/2021    PCO2 30 05/02/2021    PO2 74 05/02/2021    HCO3 21 05/02/2021    O2SAT 96 05/02/2021     Results for Tio Banda (MRN [id number]) as of 5/22/2021 18:30   Ref. Range 5/22/2021 05:09   Ferritin Latest Ref Range: 22 - 322 ng/mL 548 (H)   Iron Latest Ref Range: 65 - 195 ug/dL 88   Iron Saturation Latest Ref Range: 20 - 50 % 84 (H)   TIBC Latest Ref Range: 171 - 450 ug/dL < 105 (L)     URINALYSIS. None. SEROLOGY  Results for Tio Banda (MRN [id number]) as of 5/21/2021 15:19   Ref.  Range 4/28/2021 10:56   Hep B S Ab Unknown Negative   Hepatitis B Surface Ag Unknown Negative Hep B Core Ab, IgM Unknown Negative       TUMOR MARKERS. Results for Diego Severs (MRN [id number]) as of 5/21/2021 15:19   Ref. Range 3/17/2021 05:18 4/22/2021 12:08   AFP-Tumor Marker Latest Ref Range: <8.4 ug/L 4.3 2.7     MICROBIOLOGY   None. HISTOPATHOLOGY. None. TOXICOLOGY. None. ENDOSCOPE STUDIES. None. PROCEDURES. Successful ultrasound-guided paracentesis-6.2 L drained. RADIOLOGY. HIDA Scan-5/24/2021. FINDINGS:  Delayed hepatic uptake of radiotracer may be secondary to hepatocellular dysfunction. Activity is present in the common bile duct and small bowel by 33 minutes. The gallbladder is not visualized prior to morphine administration but is seen following morphine administration.     IMPRESSION:  Patent cystic and common bile ducts without evidence of acute cholecystitis.     Final report electronically signed by Dr. Mary Mullen on 5/24/2021 9:47 AM    KUB-05/23/2021. FINDINGS:  There is gaseous distention of the stomach. There are multiple dilated small bowel loops throughout the abdomen measuring   up to 3.9 cm in diameter. Distal bowel gas is present. No abnormal masses or calcifications are seen. Osseous structures are unremarkable.     IMPRESSION:  Mildly dilated gas-filled small bowel loops with distal bowel gas, likely secondary to   paralytic ileus. Partial obstruction cannot be excluded and progress imaging is recommended.     Final report electronically signed by Dr. Mary Mullen on 5/23/2021 11:56 AM    MRI lumbar spine w/o contrast 05/22/2021. FINDINGS:  Some of the images are motion degraded. There is grade 1 retrolisthesis of L5 relative to S1 on the basis of degenerative change,   stable compared to prior CT. There is mild scalloping of the superior endplate of O49 with approximately 10% central   height loss with Schmorl's node. There is otherwise anatomic vertebral body height and alignment.      There is hyperintense

## 2021-05-24 NOTE — PLAN OF CARE
Problem: Falls - Risk of:  Goal: Will remain free from falls  Description: Will remain free from falls  5/24/2021 0309 by Charlotta Severe, RN  Outcome: Met This Shift  5/23/2021 1527 by Jessica Garg RN  Outcome: Ongoing  Note: No falls this shift call light in reach  Bed alarm on  Pt up 1 assist with walker  Goal: Absence of physical injury  Description: Absence of physical injury  Outcome: Met This Shift

## 2021-05-24 NOTE — CARE COORDINATION
5/24/21, 3:51 PM EDT    DISCHARGE PLANNING EVALUATION    Received call from Hveer More with [address] was able to verify that they are not in network and there are no out of network benefits.     4:01 PM Call to brother Immanuel Horan and notified him of the above. SW to email list of contracted facilities for him to review and will then get back with SW (done).

## 2021-05-25 NOTE — CARE COORDINATION
5/25/21, 3:03 PM EDT    DISCHARGE ON GOING [facility] 12 day: 4  Location: [facility]12/012-A Reason for admit: Fall (on)(from) incline, initial encounter [W10. 2XXA]  [facility] [K72.90, K74.60]   Procedure: 5/24/2021 hemodialysis (usual schedule is Tues. , Thurs. , Sat.). Barriers to Discharge: Nephrology and General Surgery following, PT/OT, Palliative Care, SS, DM management, Lactulose, Xifaxan, Reglan scheduled q 6 hr., prn Proventil, Zofran, Phenergan and Ultram, daily BMP, CBC, LFT, Phosphorus and Magnesium level in a.m., NPO, N/G , up with assistance. PCP: Fernie Lockett MD  Readmission Risk Score: 47%  Patient Goals/Plan/Treatment Preferences: Tommy Fox is from home alone. SNF placement needed.

## 2021-05-25 NOTE — PROGRESS NOTES
[address]  PHYSICAL THERAPY MISSED TREATMENT NOTE  STR ONC MED 5K    Date: 2021  Patient Name: Susanna Mccloud        MRN: [id number]   : 1965  (55 y.o.)  Gender: male   Referring Practitioner: Val Morgan MD  Diagnosis: fall on/from incline         REASON FOR MISSED TREATMENT:  Missed treat. RN ok'd session but upon entering pts room BP machine on pts arm with reading of 82/58 (RN aware). Pt politely declined therapy stating he is not feeling well and wants to rest for today.

## 2021-05-25 NOTE — PROGRESS NOTES
[facility]   Nephrology progress note  5/25/2021, 9:28 AM      Pt Name:    Radah Virgen  MRN:     [id number]     [place]:    1965  Admit Date:    5/20/2021  1:21 PM  Primary Care Physician:  Maria Elena Aguirre MD   Room number  5K-12/012-A    Chief Complaint: Nephrology following for KARAN/CKD (not ESRD)    Subjective:  Patient seen and examined  Clinically appearing more jaundiced  No acute distress  Blood pressure in systolic 98B  Awake and alert      Objective:  24HR INTAKE/OUTPUT:      Intake/Output Summary (Last 24 hours) at 5/25/2021 0928  Last data filed at 5/25/2021 9271  Gross per 24 hour   Intake 1520 ml   Output 1700 ml   Net -180 ml     I/O last 3 completed shifts: In: 1520 [I.V.:1020]  Out: 1700 [Emesis/NG output:200]  No intake/output data recorded. Admission weight: 266 lb (120.7 kg)  Wt Readings from Last 3 Encounters:   05/24/21 253 lb 15.5 oz (115.2 kg)   05/17/21 246 lb (111.6 kg)   05/06/21 264 lb 6.4 oz (119.9 kg)     Body mass index is 32.61 kg/m².     Physical examination  VITALS:     Vitals:    05/24/21 2235 05/25/21 0137 05/25/21 0400 05/25/21 0900   BP: 82/62 88/60 (!) 84/52 (!) 84/53   Pulse:   92 87   Resp:   16 14   Temp:   98.6 °F (37 °C) 98.5 °F (36.9 °C)   TempSrc:   Axillary Oral   SpO2:   93% 92%   Weight:         General Appearance: alert and cooperative with exam, appears comfortable, no distress, + visibly jaundiced  Mouth/Throat: Oral mucosa moist  Eyes: ++ Icteric sclera  Neck: No JVD  Lungs: Air entry B/L, no rales, no use of accessory muscles, diminished both bases  Heart:  S1, S2 heard  GI: soft, distention      Lab Data  CBC:   Recent Labs     05/24/21  0723   WBC 7.6   HGB 9.0*   HCT 27.8*   PLT 55*     BMP:  Recent Labs     05/23/21  0453 05/23/21  0454 05/24/21  0723 05/25/21  0549   NA  --  129* 127* 133*   K  --  4.5 5.7* 4.9   CL  --  93* 89* 91*   CO2  --  28 26 25   BUN  --  20 34* 31*   CREATININE  --  3.8* 4.6* 3.5*   GLUCOSE  --  111* 114* 130*   CALCIUM  --  8.8 9.6 9.7   MG 1.9  --   --   --    PHOS 4.8*  --   --   --      Hepatic:   No results for input(s): LABALBU, AST, ALT, ALB, BILITOT, ALKPHOS in the last 72 hours. Meds:  Infusion:    sodium chloride       Meds:    albumin human  25 g Intravenous Once    midodrine  10 mg Oral TID WC    metoclopramide  5 mg Intravenous Q6H    metoprolol  50 mg Oral BID    amiodarone  200 mg Oral Daily    famotidine  20 mg Oral Daily    gabapentin  200 mg Oral BID    lactulose  45 g Oral TID    rifaximin  550 mg Oral BID    tiotropium  2 puff Inhalation Daily    sodium chloride flush  10 mL Intravenous 2 times per day    lidocaine  1 patch Transdermal Daily    insulin lispro  0-12 Units Subcutaneous TID WC    insulin lispro  0-6 Units Subcutaneous Nightly     Meds prn: albuterol, sodium chloride flush, sodium chloride, promethazine **OR** ondansetron, traMADol       Impression and Plan:  1. Acute kidney injury on CKD 4  Secondary to HRS  Patient is not ESRD yet  Currently on intermittent hemodialysis 3 times a week  At dialysis treatment yesterday  No acute need for renal replacement therapy today    2. Hyperkalemia: resolved. Monitor for now  3. Hypotension: BP in 80s, continue with midodrine, IV albumin ordered  4. Chronic decompensated liver cirrhosis with ascites requiring weekly paracentesis  5. Anemia of multifactorial etiology including renal insufficiency. SAMMIE if H/H drops further  6. History of hepatic encephalopathy. On lactulose and rifaximin as outpatient  7. Back pain/fall  8. Pleural effusion  9.  Ileus    D/W patient and RN  D/w hospitalist services    Tello Pinon MD  [facility]

## 2021-05-25 NOTE — PROGRESS NOTES
This RN answered patients call light. Found patient to have ripped out second NG. When asking why pt removed, patient states \"I couldn't swallow, I want water. I'm fine\". Educated again on importance of NG and NPO. Patient states this RN needs to [facility] your doctor. Hey I need something to drink. That was horrible. \" Message sent to Tiffany Holland NP with update and patient request. Waiting for response.

## 2021-05-25 NOTE — PROGRESS NOTES
[address]  OCCUPATIONAL THERAPY MISSED TREATMENT NOTE  [facility] 5K  5K-12/012-A      Date: 2021  Patient Name: Lexi Mccarthy        CSN: [id number]   : 1965  (55 y.o.)  Gender: male   Referring Practitioner: Devin Grant MD  Diagnosis: Fall on 1101 S TREATMENT: Nurse ok'd session. Patient lying in bed upon arrival and politely refusing stating \"I've had a rough night and I'm just not up to it right now. \" Will check back as time allows

## 2021-05-25 NOTE — PROGRESS NOTES
BP post bolus is 78/52. Resource RN assessing patient. Order placed for ICU Transfer by Dr. Cem Dee via verbal with read back. HUB notified at this time.

## 2021-05-25 NOTE — FLOWSHEET NOTE
05/25/21 1720   Provider Notification   Reason for Communication New orders; Evaluate   Provider Name [facility]   Provider Notification Physician   Method of Communication Secure Message   Response At bedside   Notification Time [address]     Notified patient's manual BP in Trendelenburgs is 74/48 MAP 56. . O2 90%. RR WDL and unlabored. Patient increasingly lethargic and now disoriented to time and situation. Dr. Adry Muhammad on unit to assess unit. Order for 500 mL bolus of LR. STAT ammonia. Decision to transfer if BP does not increase after bolus.

## 2021-05-25 NOTE — FLOWSHEET NOTE
05/25/21 1925   Provider Notification   Reason for Communication Review case   Provider Name [facility]   Provider Notification Physician   Method of Communication Secure Message   Response Waiting for response   Notification Time 1923     Asked for update on whether or not patient was being transferred. Dr. Eliel Husain stated patient was not candidate for ICU due to BP returning to baseline. Updated that BP had dropped again to 78/48. Patient lethargic and disoriented to time and situation. Dr. Eliel Husain asked I update night shift doctor. Dr. Sang Pepper updated on patient situation, he asked I make sure Dr. Eliel Husain is up to date on patient condition. Dr. Eliel Husain updated again.

## 2021-05-25 NOTE — PLAN OF CARE
Problem: Falls - Risk of:  Goal: Will remain free from falls  Description: Will remain free from falls  Outcome: Ongoing  Note: No falls this shift     Problem: Falls - Risk of:  Goal: Absence of physical injury  Description: Absence of physical injury  Outcome: Ongoing  Note: No physical injury this shifst     Problem: Pain:  Goal: Pain level will decrease  Description: Pain level will decrease  Outcome: Ongoing  Note: Pain level can be high at times     Problem: Pain:  Goal: Control of acute pain  Description: Control of acute pain  Outcome: Ongoing  Note: Acute pain is not controlled     Problem: Pain:  Goal: Control of chronic pain  Description: Control of chronic pain  Outcome: Ongoing  Note: Chronic pain is not controlled     Problem: Skin Integrity:  Goal: Will show no infection signs and symptoms  Description: Will show no infection signs and symptoms  Outcome: Ongoing  Note: No symptoms of infection     Problem: Skin Integrity:  Goal: Absence of new skin breakdown  Description: Absence of new skin breakdown  Outcome: Ongoing  Note: Monitoring skin to help prevent skin breakdown     Problem: GI  Goal: No bowel complications  Outcome: Ongoing  Note: Is having bowel complications he started having emesis after dialysis today dr ordered an NG tube to be inserted     Problem: GI  Goal: Bowel movement at least every other day  Outcome: Ongoing  Note: Not having normal bowel movements     Problem:   Goal: Adequate urinary output  Outcome: Ongoing  Note: Patient does not void very much is on dialysis     Problem:   Goal: No urinary complication  Outcome: Ongoing  Note: No urinary complications     Problem: Discharge Planning:  Goal: Discharged to appropriate level of care  Description: Discharged to appropriate level of care  Outcome: Ongoing  Note: Will be discharged to appropriate level of care     Problem: Serum Glucose Level - Abnormal:  Goal: Ability to maintain appropriate glucose levels has stabilized  Description: Ability to maintain appropriate glucose levels has stabilized  Outcome: Ongoing  Note: His blood sugars have been in normal range     Problem: Impaired respiratory status  Goal: Clear lung sounds  5/24/2021 2049 by Elizabeth Noriega RN  Outcome: Ongoing  Note: Clear lung sounds  5/24/2021 1059 by Marlo Mccarty RCP  Outcome: Ongoing  Note: Mdi to maintain open airways

## 2021-05-25 NOTE — FLOWSHEET NOTE
05/25/21 1400   Provider Notification   Reason for Communication Review case;New orders   Provider Name [facility]   Provider Notification Physician   Method of Communication Secure Message   Response See orders   Notification Time      Updated that patient's emesis has became much more frequent and unresolved with Zofran or reglan. Patient has also become more confused and lethargic throughout shift. Alert and oriented x4 but poor memory and impulsive. Billirubin has increased to 10.6 (was 6.6 on 5/20.) Patient unable to take lactulose at this time due to nausea and emesis. BP is 86/54 with MAP 64 - given 25g albumin and x2 doses of midodrine this shift. Additionally, patient had mentioned wanting to die to this RN due to feeling so miserable. Patient denies any suicidal ideation. Palliative care met with patient earlier this month after doctor told him he has 3 months to live without transplant. Asked if Palliative care consult could be placed again.  Order placed per protocol with Dr. Zuleika Viveros approval.

## 2021-05-25 NOTE — PLAN OF CARE
Problem: Falls - Risk of:  Goal: Absence of physical injury  Description: Absence of physical injury  5/25/2021 1506 by Sharon Franz RN  Note: Patient bed in lowest position, wheels locked, 2/4 side rails up and alarm on. Call light and belongings within reach. Pathway clear. Nonskid footwear on. Patient rounded on hourly. Fall risk assessment complete. Patient fall risk due to history of falls and hypotension. Patient remains in bed and turned every 2 hours. Patient remains free from falls this shift, will continue to monitor. Problem: Pain:  Goal: Pain level will decrease  Description: Pain level will decrease  5/25/2021 1421 by Sharon Franz RN  Outcome: Ongoing  Note: Patient pain goal is 4/10. Patient reporting pain up to 10/10. PRN Tramadol. Patient utilizes rest and repositioning for comfort. Pain assessment ongoing. Problem: Skin Integrity:  Goal: Will show no infection signs and symptoms  Description: Will show no infection signs and symptoms  5/25/2021 1506 by Sharon Franz RN  Outcome: Ongoing  Note: Patient repositions every 2 hours. Heels elevated off of bed. Skin kept clean and dry. Skin assessed with every head to toe. Ronald scale complete. Will continue to monitor. Patient given CHG bath. Multiple scattered scabs and bruises. Redness on buttocks - EPC applied. Problem: Skin Integrity:  Goal: Absence of new skin breakdown  Description: Absence of new skin breakdown  5/25/2021 0347 by Karma Hennessy RN  Outcome: Ongoing  Note: No new skin breakdown. Pt turned and repositioned. Barrier cream applied after CHG bath. Will continue on going skin assessment. Problem: GI  Goal: No bowel complications  4/42/4330 1506 by Sharon Franz RN  Outcome: Ongoing  Note: Patient has illeus present. NG tube placed. Patient experiencing nausea and vomiting. 800 mL of emesis this shift. Zofran, phernegan and reglan PRN. Patient bowel sounds distant and hypoactive. No BM.

## 2021-05-25 NOTE — CONSULTS
[address] [place] [address]  General Surgery Consult Note  Dipika Estrella MD MD FACS    Pt Name: Nehemias Ludwig  MRN: [id number]  YOB: 1965  Date of evaluation: 5/25/2021  Primary Care Physician: Rafael Swift MD  Patient evaluated at the request of Hospitalist    Reason for evaluation: Abdominal distention  IMPRESSIONS:   1. Adynamic ileus  2. Ascites  3. Alcoholic liver cirrhosis  4. Splenomegaly  5. Chronic kidney disease  6. Cirrhosis  7. Recurrent falls  8. Chronic anemia  9. Anasarca/lower extremity edema  10. Chronic deconditioning  11. Diabetes mellitus  12. COPD  15. Bilateral pleural effusions  14. Coagulopathy  15. Cholelithiasis  16. Umbilical hernia  17. does not have any pertinent problems on file. PLANS:   1. No acute surgical intervention needed at this time. Bowel distention/dilation most likely related to ascites causing chronic irritation to mesentery and small bowel. This is resulting in his ileus. NG tube decompression as needed. Okay to try and clamp NG tube and start clear liquids from a surgical standpoint. This may become somewhat of a chronic problem for the patient but nothing really added from a surgical standpoint. Best treatment is medical management for his ascites. 2. GI consultation/evaluation for further medical management of ascites and cirrhosis  3. Hemodialysis yesterday. Further care per nephrology who is already on board. 4. Amiodarone, metoprolol and Cardizem for atrial flutter/fibrillation  5. Paracentesis as needed  6. No acute disease from a gallbladder standpoint. Observe only. HIDA scan negative. 7. Follow-up with [facility] as needed for liver transplantation  8. Sliding scale insulin for diabetes  9. Fall precautions  10. Pulmonary toileting  11. PT/OT as needed. Rehab/ECF/home with home health  12. Observe umbilical hernia only. Easily reducible. No urgent issue.   High complication rate given current situation with ascites and cirrhosis if surgery undertaken. SUBJECTIVE:   Chief complaint: Abdominal bloating/pain    History of present illness:  Sue Barajas is a 56-year-old male who was asked to see early this morning secondary to abdominal bloating and distention. Has known intra-abdominal ascites secondary to alcohol induced cirrhosis. Currently on transplant list at [place]. Has had multiple recurrent falls. Currently on dialysis. He states he underwent hemodialysis yesterday. Has been having paracenteses performed due to the ascites. Most recent 1 removed around 6.2 L. Recent CT imaging demonstrating the ascites along with cirrhotic changes. Anasarca. Generalized bowel dilation and mesenteric edema most likely secondary to the ascites. No obvious high-grade mechanical bowel obstruction. Patient with generalized abdominal bloating. NG tube currently in place. Feels moderately distended. Mild generalized abdominal pain per patient. Currently has NG tube in place. Denies nausea or vomiting. Major complaint is that he is thirsty. Denies current chest pain. Chronic shortness of breath but denies any acute changes from a lung standpoint. Known umbilical hernia that is reducible. No fever, chills or sweats. Known coagulopathy. Thrombocytopenia. No active bleeding overnight. Past Medical History:      Diagnosis Date    Advanced cirrhosis of liver (HCC)     Alcoholic cirrhosis (HCC)     Atrial fibrillation (HCC)     CHF (congestive heart failure) (HCC)     Chronic kidney disease     COPD (chronic obstructive pulmonary disease) (HCC)     Diabetes mellitus (HCC)     Gallstones     GERD (gastroesophageal reflux disease)     Headache     Hydrothorax     Portal hypertension ([facility] [address]      Past Surgical History:      Procedure Laterality Date    APPENDECTOMY      HERNIA REPAIR      LUNG SURGERY Right 11-24-20, 11-30-20    [facility]     Medications:  Prior to Admission medications    Medication Sig Start Date End Date Taking? Authorizing Provider   rifaximin (XIFAXAN) 550 MG tablet Take 1 tablet by mouth 2 times daily 4/23/21  Yes FELIX Forman CNP   ammonium lactate (AMLACTIN) 12 % cream Apply topically as needed   Yes Historical Provider, MD   lactulose (CHRONULAC) 10 GM/15ML solution 45 ml by mouth TID 3/18/21  Yes Historical Provider, MD   albuterol sulfate HFA (VENTOLIN HFA) 108 (90 Base) MCG/ACT inhaler Inhale 2 puffs into the lungs every 6 hours as needed for Wheezing 3/5/21  Yes Ubaldo Mart MD   tiotropium [facility]) 18 MCG inhalation capsule Inhale 1 capsule into the lungs daily 3/5/21  Yes Ubaldo Mart MD   dilTIAZem (CARDIZEM) 30 MG tablet Take 1 tablet by mouth 3 times daily 2/27/21  Yes Erin Heart,    Misc. Devices [facility]) [facility] Standard lightweight wheelchair 4/23/21   FELIX Forman CNP   varenicline (CHANTIX) 0.5 MG tablet Take 1 tablet by mouth daily x 1 week then increase to 1 tablet by mouth twice daily 4/21/21   Ally Coyne MD   gabapentin (NEURONTIN) 100 MG capsule Take 2 capsules by mouth 2 times daily for 30 days.  4/7/21 5/17/21  Cinthya Alfaro MD   famotidine (PEPCID) 20 MG tablet Take 1 tablet by mouth daily 4/8/21 5/17/21  Cinthya Alfaro MD   midodrine (PROAMATINE) 5 MG tablet Take 3 tablets by mouth 3 times daily 4/7/21 5/17/21  Cinthya Alfaro MD   amiodarone (CORDARONE) 200 MG tablet Take 1 tablet by mouth daily 4/7/21 5/17/21  Cinthya Alfaro MD   Continuous Blood Gluc  (DEXCOM G6 ) MENDY Use to test 4 times daily and as needed DX: E11.9 3/24/21   FELIX Amador CNP   Continuous Blood Gluc Transmit (DEXCOM G6 TRANSMITTER) MISC Use to test 4 times daily and as needed DX: E11.9 3/24/21   FELIX Amador CNP   Continuous Blood Gluc Sensor (DEXCOM G6 SENSOR) MISC Use to test 4 times daily and as needed DX: E11.9 3/24/21   FELIX Amador - CNP   bumetanide (BUMEX) 1 MG tablet Take 1 tablet by mouth 2 times daily significant unexpected weight change. Chronically deconditioned. Recurrent falls. HEENT Denies any diplopia, tinnitus or vertigo. No chronic headaches. Resp Denies any shortness of breath, cough or wheezing. COPD. Cardiac Denies any chest pain, palpitations, claudication or edema. Atrial fibrillation. GI Denies any melena, hematochezia, hematemesis or pyrosis. Ascites and cirrhosis. Scheduled paracenteses. Splenomegaly. GERD. Gallstones.  Denies any frequency, urgency, hesitancy or incontinence. Hemodialysis. Heme easily bruises and bleeds. Coagulopathy from liver disease. Endocrine diabetes mellitus. No thyroid disease. Neuro Denies any focal motor or sensory deficits  Musculoskeletal  Denies osteoarthritis. No gout. Generalized weakness. Psychiatric  Denies any severe depression or agitation. No panic attacks. No suicidal ideation. OBJECTIVE:   CURRENT VITALS:  weight is 253 lb 15.5 oz (115.2 kg). His axillary temperature is 98.6 °F (37 °C). His blood pressure is 84/52 (abnormal) and his pulse is 92. His respiration is 16 and oxygen saturation is 93%. Temperature Range (24h):Temp: 98.6 °F (37 °C) Temp  Av.9 °F (36.6 °C)  Min: 97 °F (36.1 °C)  Max: 98.6 °F (37 °C)  BP Range (22B): Systolic (18RIS), EUB:91 , Min:82 , NK     Diastolic (88YPW), XWH:85, Min:52, Max:64    Pulse Range (24h): Pulse  Av.7  Min: 92  Max: 121  Respiration Range (24h): Resp  Av  Min: 16  Max: 18  Current Pulse Ox (24h):  SpO2: 93 %  Pulse Ox Range (24h):  SpO2  Av.5 %  Min: 91 %  Max: 95 %  Oxygen Amount and Delivery:    CONSTITUTIONAL: Alert and oriented times 3, no acute distress and cooperative to examination with proper mood and affect. SKIN: Skin color, texture, turgor normal. No rashes or lesions. LYMPH: no cervical nodes, no inguinal nodes  HEENT: Head is normocephalic, atraumatic. EOMI, PERRLA.   NECK: Supple, symmetrical, trachea midline, no adenopathy, thyroid symmetric, not enlarged and no tenderness, skin normal.  CHEST/LUNGS: chest symmetric with normal A/P diameter, normal respiratory rate and rhythm, lungs clear to auscultation without wheezes, rales or rhonchi. No accessory muscle use. Scars None   CARDIOVASCULAR: Heart sounds are normal.  Regular rate and rhythm. Normal S1 and S2.  ABDOMEN: Soft and distended with hypoactive bowel sounds. Reducible umbilical hernia that is nontender. Percussion: Splenomegaly. Fluid wave. Tenderness: Mild generalized. No peritoneal signs. RECTAL: deferred, not clinically indicated  NEUROLOGIC: There are no acute focalizing motor or sensory deficits. CN II-XII are grossly intact. EXTREMITIES: no cyanosis, no clubbing and no edema.   LABS:     Recent Labs     05/23/21  0453 05/23/21  0454 05/24/21  0723 05/25/21  0549   WBC  --   --  7.6  --    HGB  --   --  9.0*  --    HCT  --   --  27.8*  --    PLT  --   --  55*  --    NA  --  129* 127* 133*   K  --  4.5 5.7* 4.9   CL  --  93* 89* 91*   CO2  --  28 26 25   BUN  --  20 34* 31*   CREATININE  --  3.8* 4.6* 3.5*   MG 1.9  --   --   --    PHOS 4.8*  --   --   --    CALCIUM  --  8.8 9.6 9.7     RADIOLOGY:   I have personally reviewed the following films:    Narrative   CT ABDOMEN AND PELVIS WITHOUT CONTRAST       Comparison:  CT,[facility]  - CT ABDOMEN PELVIS WO CONTRAST  - 02/25/2021 11:53    AM EST       Findings:       Distal tip of the feeding tube is in the proximal stomach.  There is    contrast opacifying the stomach, duodenum and short segment of proximal    ilium.  There is air, stool and fluid distention of remaining small bowel    loops with no definite transition point identified.  There is moderate to    large stool burden in the ascending colon.  The remaining colon is    incompletely distended limiting assessment.  There is no pneumatosis or    pneumoperitoneum.       There is large volume ascites in the abdomen and pelvis with diffuse    mesenteric edema.  The liver is cirrhotic. Ney Floyd seen is a recanalized    paraumbilical vein extending into 8 periumbilical hernia and associated    with prominent abdominal wall varices.  Splenomegaly.  Cholelithiasis.     Unenhanced pancreas,  kidneys and abdominal aorta are unremarkable.     Nonspecific right adrenal nodularity.  1 mm right intrarenal calculus.       Partial visualization of small to moderate bilateral pleural effusions,    left greater than right.  There are coalescent airspace opacities in the    bilateral lower lobes.  There is diffuse mesenteric edema.  No acute    osseous abnormalities.           Impression   Impression:   1.  Diffusely dilated small bowel loops with no definite transition point    suggests ileus.  Partial/early distal small bowel obstruction considered    less likely but not excluded.  Recommend serial abdominal radiographs to    assess progression of enteric contrast.   2.  Large volume abdominal and pelvic ascites with diffuse mesenteric    edema.  Liver cirrhosis, splenomegaly and evidence for portosystemic    collaterals. 3.  Small to moderate bilateral pleural effusions with adjacent    compressive atelectasis and/or consolidation. 4.  Cholelithiasis. 5.  Anasarca. 6.  No acute obstructive uropathy.  Nonobstructing right nephrolithiasis.       This document has been electronically signed by: Katja De Guzman. DO Valdez on    05/25/2021 12:51 AM       All CTs at this facility use dose modulation techniques and iterative    reconstructions, and/or weight-based dosing   when appropriate to reduce radiation to a low as reasonably achievable. Narrative   EXAM: 1 VIEW ABDOMEN.       Comparison:  CR,[facility]  - XR ABDOMEN (KUB) (SINGLE AP VIEW)  - 05/23/2021    11:33 AM EDT       FINDINGS:   NG tube tip over the gastric body. Stable gastric and small bowel distention.    No acute osseous abnormalities are appreciated.           Impression   NG tube satisfactory.       This document has been electronically signed by: Wilda Arias MD on    05/25/2021 05:39 AM     Narrative   PROCEDURE: MRI LUMBAR SPINE WO CONTRAST       CLINICAL INFORMATION: Acute on chronic back pain with lower extremity weakness status post fall.       COMPARISON: CT abdomen pelvis dated 2/25/2021.       TECHNIQUE: Sagittal and axial T1 and T2-weighted images were obtained through the lumbar spine.       FINDINGS:   Some of the images are motion degraded. There is grade 1 retrolisthesis of L5 relative to S1 on the basis of degenerative change, stable compared to prior CT. There is mild scalloping of the superior endplate of U88 with approximately 10% central height    loss with Schmorl's node. There is otherwise anatomic vertebral body height and alignment. There is hyperintense T1 and T2 signal at the opposing endplates of Z9-L8 as evidence for Modic 2 degenerative change. Marrow signal is otherwise within normal    limits. The conus terminates in a normal fashion at the L1 level. The cauda equina is normal in appearance without nerve is thickening or clumping identified. Paraspinal soft tissues are unremarkable. At T12-L1 through L4-5 intervertebral discs are normal in appearance. There is no significant spinal canal or neural foraminal stenosis. At L5-S1 there is partial uncovering the disc with superimposed shallow disc bulge which mildly indents thecal sac and may contact traversing S1 nerve roots bilaterally.  There is moderate right and moderate to severe left neural foraminal stenosis in    association with facet hypertrophy and ligament flavum thickening.           Impression    Facet hypertrophy and ligament flavum thickening at L5-S1 causing grade 1 retrolisthesis of L5 relative to S1 with superimposed shallow disc bulge which indents thecal sac and may contact traversing S1 nerve roots bilaterally and contributes to moderate    right and moderate to severe left neural foraminal stenosis at this level.                   **This report has been created using voice recognition software. It may contain minor errors which are inherent in voice recognition technology. **       Final report electronically signed by Dr. Rita Styles MD on 5/22/2021 1:32 PM     Narrative   PROCEDURE: US GUIDED PARACENTESIS       CLINICAL INFORMATION: Ascites       COMPARISON: 5/5/2021       TECHNIQUE: Ultrasound-guided paracentesis       FINDINGS: The procedure was explained the patient. All risks were discussed. Written informed consent was obtained.       Limited ultrasound was performed revealing the ascites. An acceptable location was identified. The skin was marked. The overlying area was sterilely prepped and draped. 2 percent lidocaine was given for local anesthesia. Next a 5 Occitan one-step catheter    was introduced into the ascites. Clear yellow fluid was removed. The catheter was removed. The patient tolerated the procedure. No immediate complications.       Physician performing procedure: Dr Heidi Phillips   Informed consent signed: Yes   Local Anesthetic: 2 % Lidocaine   Specimen volume: / ml   Catheter: 19 ga 5 Maltese   Aspirated ascites volume: 6.2 liters   Aspirated ascites color: Yellow   Site of Puncture:RLQ   Complications: None observed            Impression   Successful ultrasound-guided paracentesis.             **This report has been created using voice recognition software.  It may contain minor errors which are inherent in voice recognition technology. **       Final report electronically signed by Dr. Radha Hart on 5/21/2021 4:03 PM     Narrative   PROCEDURE: NM HEPATOBILIARY       CLINICAL INFORMATION: Nausea, vomiting, cholelithiasis       Radiopharmaceutical: 9.0 mCi technetium 99m mebrofenin, intravenously.       TECHNIQUE: Anterior images of the abdomen were obtained for 60 minutes following radiopharmaceutical menstruation.  4 mg morphine sulfate was administered and additional images were obtained in multiple projections.       COMPARISON: None     FINDINGS: Delayed hepatic uptake of radiotracer may be secondary to hepatocellular dysfunction. Activity is present in the common bile duct and small bowel by 33 minutes. The gallbladder is not visualized prior to morphine administration but is seen    following morphine administration.           Impression       Patent cystic and common bile ducts without evidence of acute cholecystitis.       Final report electronically signed by Dr. Maria Deluca on 5/24/2021 9:47 AM       Thank you for the interesting evaluation. Further recommendations to follow.     Electronically signed by Amiel Curling, MD on 5/25/2021 at 7:09 AM

## 2021-05-25 NOTE — PROGRESS NOTES
Verbal order from Dr. Jace Hess for patient not to attend dialysis today, as patient had unexpected dialysis yesterday.

## 2021-05-25 NOTE — PLAN OF CARE
Problem: Falls - Risk of:  Goal: Will remain free from falls  Description: Will remain free from falls  5/25/2021 0347 by Dino Gavin RN  Outcome: Ongoing  Note: Patient free from falls this shift. Patient uses call light appropriately, bed in lowest position, bed rails up x2, fall sign posted and call light in reach. Patient at risk due to weakness. Goal: Absence of physical injury  Description: Absence of physical injury  5/25/2021 0347 by Dino Gavin RN  Outcome: Ongoing  Note: No new injuries this shift. Problem: Pain:  Goal: Pain level will decrease  Description: Pain level will decrease  5/25/2021 0347 by Dino Gavin RN  Outcome: Ongoing  Note: Patient reporting pain 0/10 with a goal of 3/10. Patient satisfied with current interventions including rest and repositioning. Pain assessment ongoing. Problem: Skin Integrity:  Goal: Will show no infection signs and symptoms  Description: Will show no infection signs and symptoms  5/25/2021 0347 by Dino Gavin RN  Outcome: Ongoing  Note: No new S/S of infection this shift. Pt. Receive CHG bath. Goal: Absence of new skin breakdown  Description: Absence of new skin breakdown  5/25/2021 0347 by Dino Gavin RN  Outcome: Ongoing  Note: No new skin breakdown. Pt turned and repositioned. Barrier cream applied after CHG bath. Will continue on going skin assessment. Problem: Discharge Planning:  Goal: Discharged to appropriate level of care  Description: Discharged to appropriate level of care  5/25/2021 0347 by Dino Gavin RN  Outcome: Ongoing  Note: Pt. Plans to discharge to a ECF. Problem: Impaired respiratory status  Goal: Clear lung sounds  5/25/2021 0347 by Dino Gavin RN  Outcome: Ongoing  Note: Lung sounds clear/diminished.       Problem: Serum Glucose Level - Abnormal:  Goal: Ability to maintain appropriate glucose levels has stabilized  Description: Ability to maintain appropriate glucose levels has stabilized  5/25/2021 2339 by Joni Cervantes RN  Outcome: Ongoing  Note: No insulin given this shift. [id number]. Pt. Made NPO. Problem: GI  Goal: No bowel complications  2/16/1128 0347 by Joni Cervantes RN  Outcome: Ongoing  Note: Bowel sounds hypoactive. NG placed this shift and hooked to low intermittent suction. Green bile drainage. Made NPO. Goal: Bowel movement at least every other day  5/25/2021 0347 by Joni Cervantes RN  Outcome: Ongoing  Note: Pt. Did not have bowel movement this shift. Problem:   Goal: Adequate urinary output  5/25/2021 0347 by Joni Cervantes RN  Outcome: Ongoing  Note: Pt. Has not voided this shift. Pt. States minimal urine output related to dialysis.

## 2021-05-25 NOTE — CARE COORDINATION
5/25/21, 8:08 AM EDT    DISCHARGE PLANNING EVALUATION    Received message from brother Army Baig at 4:35 pm-he received ECF list that SW emailed last evening. He has selected [facility] and [facility] as both appear to be close to dialysis and other medical facilities. 9:50 AM Call to [phone]  [employer] message for Angel Aranda in admissions to contact SW re: potential referral. Call to [facility] and message left for admissions staff to contact SW. Patient updated on the above. Call to brother Olvin Jansen and message left re: the above.     1:22 PM Call to [phone] message left for Angel Aranda with admissions to contact SW.     1:24 PM Call to [facility] [address] message left for admissions staff to contact SW.

## 2021-05-25 NOTE — FLOWSHEET NOTE
Dr Lilly Easton updated per telephone of most current BP and case discussed with Susie Finley, RN. Conversation continued regarding possible transfer to a stepdown unit and possibility of increasing midodrine. Dr Lilly Easton stated he plans to discuss case with oncoming physician.

## 2021-05-25 NOTE — PROGRESS NOTES
Hospitalist Progress Note  [facility] Onc Med 5K       Patient: Ruy Royal  Unit/Bed: 4U-01/624-D  YOB: 1965  MRN: [id number]  Acct: [de-identified]   AdmittingDiagnosis: Fall (on)(from) incline, initial encounter [W10. 2XXA]  Decompensation of cirrhosis of liver ([address] [K72.90, K74.60]  Admit Date: 5/20/2021  Hospital Day: 4    Subjective:    Patient feels less nauseous today and apparently his jaundice appears to have improved as well however he does have yellow conjunctiva noted  He denies any fever no chills does complain of abdominal distention    Objective:   BP (!) 100/57   Pulse 99   Temp 98.2 °F (36.8 °C) (Oral)   Resp 14   Wt 253 lb 15.5 oz (115.2 kg)   SpO2 93%   BMI 32.61 kg/m²       Intake/Output Summary (Last 24 hours) at 5/25/2021 1302  Last data filed at 5/25/2021 1300  Gross per 24 hour   Intake 1515 ml   Output 2020 ml   Net -505 ml     Physical Exam  Constitutional:       Appearance: He is obese. HENT:      Head: Normocephalic and atraumatic. Right Ear: External ear normal.      Left Ear: External ear normal.      Nose: Nose normal.      Mouth/Throat:      Mouth: Mucous membranes are moist.      Pharynx: Oropharynx is clear. Eyes:      General: Scleral icterus present. Pupils: Pupils are equal, round, and reactive to light. Comments: Jaundice noted   Cardiovascular:      Rate and Rhythm: Normal rate. Pulses: Normal pulses. Pulmonary:      Effort: Pulmonary effort is normal.   Abdominal:      Comments: Distended with fluid thrill no tenderness   Musculoskeletal:         General: Normal range of motion. Skin:     General: Skin is warm. Neurological:      General: No focal deficit present. Mental Status: He is alert.    Psychiatric:         Mood and Affect: Mood normal.       DVT Prophylaxis: SCDs with early ambulation, low platelet counts noted    Data:  CBC:   Lab Results   Component Value Date    WBC 7.6 05/24/2021    HGB 9.0 05/24/2021    HCT 27.8 05/24/2021    .5 05/24/2021    PLT 55 05/24/2021     BMP:   Lab Results   Component Value Date     05/25/2021    K 4.9 05/25/2021    K 3.3 05/20/2021    CL 91 05/25/2021    CO2 25 05/25/2021    PHOS 4.8 05/23/2021    BUN 31 05/25/2021    CREATININE 3.5 05/25/2021    CALCIUM 9.7 05/25/2021     ABGs:   Lab Results   Component Value Date    PH 7.44 05/02/2021    PCO2 30 05/02/2021    PO2 74 05/02/2021    HCO3 21 05/02/2021    O2SAT 96 05/02/2021     Troponin: No results found for: TROPONINI   LFTs   Lab Results   Component Value Date    AST 44 05/20/2021    ALT 25 05/20/2021    BILITOT 10.6 05/25/2021    ALKPHOS 184 05/20/2021          Imaging   CT ABDOMEN PELVIS WO CONTRAST Additional Contrast? Oral    Result Date: 5/25/2021  CT ABDOMEN AND PELVIS WITHOUT CONTRAST Comparison:  CT,KOSR  - CT ABDOMEN PELVIS WO CONTRAST  - 02/25/2021 11:53 AM EST Findings: Distal tip of the feeding tube is in the proximal stomach. There is contrast opacifying the stomach, duodenum and short segment of proximal ilium. There is air, stool and fluid distention of remaining small bowel loops with no definite transition point identified. There is moderate to large stool burden in the ascending colon. The remaining colon is incompletely distended limiting assessment. There is no pneumatosis or pneumoperitoneum. There is large volume ascites in the abdomen and pelvis with diffuse mesenteric edema. The liver is cirrhotic. Again seen is a recanalized paraumbilical vein extending into 8 periumbilical hernia and associated with prominent abdominal wall varices. Splenomegaly. Cholelithiasis. Unenhanced pancreas,  kidneys and abdominal aorta are unremarkable. Nonspecific right adrenal nodularity. 1 mm right intrarenal calculus. Partial visualization of small to moderate bilateral pleural effusions, left greater than right. There are coalescent airspace opacities in the bilateral lower lobes.   There is diffuse mesenteric edema. No acute osseous abnormalities. Impression: 1. Diffusely dilated small bowel loops with no definite transition point suggests ileus. Partial/early distal small bowel obstruction considered less likely but not excluded. Recommend serial abdominal radiographs to assess progression of enteric contrast. 2.  Large volume abdominal and pelvic ascites with diffuse mesenteric edema. Liver cirrhosis, splenomegaly and evidence for portosystemic collaterals. 3.  Small to moderate bilateral pleural effusions with adjacent compressive atelectasis and/or consolidation. 4.  Cholelithiasis. 5.  Anasarca. 6.  No acute obstructive uropathy. Nonobstructing right nephrolithiasis. This document has been electronically signed by: Lesli Lamar. DO Valdez on 05/25/2021 12:51 AM All CTs at this facility use dose modulation techniques and iterative reconstructions, and/or weight-based dosing when appropriate to reduce radiation to a low as reasonably achievable. XR CHEST (2 VW)    Result Date: 5/18/2021  PROCEDURE: XR CHEST (2 VW) CLINICAL INFORMATION: SOB (shortness of breath) COMPARISON: May 3, 2021 TECHNIQUE: PA and lateral views of the chest performed. FINDINGS: POSTSURGICAL CHANGES: None. LINES/TUBES/MECHANICAL DEVICES: Right IJ hemodialysis catheter is unchanged TRACHEA/HEART/MEDIASTINUM/HILUM: Stable heart size. Central vascular congestion persists. LUNG FIELDS: Right basilar opacity has increased in density and distribution. Left lung is stable with platelike atelectasis about the left hilum. OTHER: None. PNEUMOTHORAX: None. OSSEOUS STRUCTURES: 1. No acute osseous abnormality. 1. Right IJ hemodialysis catheter is stable in position. 2. Central vascular congestion with increasing right opacity likely a effusion with compressive atelectasis however underlying consolidation cannot be excluded **This report has been created using voice recognition software.   It may contain minor errors which are inherent in voice recognition technology. ** Final report electronically signed by Dr Randall Trujillo on 5/18/2021 11:05 AM    XR ABDOMEN (KUB) (SINGLE AP VIEW)    Result Date: 5/23/2021  PROCEDURE: XR ABDOMEN (KUB) (SINGLE AP VIEW) CLINICAL INFORMATION: Nausea, vomiting, abdominal distention TECHNIQUE: AP supine abdominal radiographs COMPARISON: Abdomen 4/1/2021 FINDINGS: There is gaseous distention of the stomach. There are multiple dilated small bowel loops throughout the abdomen measuring up to 3.9 cm in diameter. Distal bowel gas is present. No abnormal masses or calcifications are seen. Osseous structures are unremarkable. Mildly dilated gas-filled small bowel loops with distal bowel gas, likely secondary to paralytic ileus. Partial obstruction cannot be excluded and progress imaging is recommended. Final report electronically signed by Dr. Corby Laurent on 5/23/2021 11:56 AM    CT HEAD WO CONTRAST    Result Date: 5/20/2021  PROCEDURE: CT HEAD WO CONTRAST CLINICAL INFORMATION: 27-year-old male who fell. COMPARISON: Head CT 4/23/2021. TECHNIQUE: Noncontrast 5 mm axial images were obtained through the brain. All CT scans at this facility use dose modulation, iterative reconstruction, and/or weight-based dosing when appropriate to reduce radiation dose to as low as reasonably achievable. FINDINGS: There is no hemorrhage. There are no intra-or extra-axial collections. There is no hydrocephalus, midline shift or mass effect. The gray-white matter differentiation is preserved. The paranasal sinuses and mastoid air cells are normally aerated. There is no suspicious calvarial abnormality. No acute intracranial hemorrhage, mass effect or midline shift. **This report has been created using voice recognition software. It may contain minor errors which are inherent in voice recognition technology. ** Final report electronically signed by Dr Fran Rosen on 5/20/2021 3:01 PM    MRI LUMBAR SPINE WO CONTRAST    Result Date: 5/22/2021  PROCEDURE: MRI LUMBAR SPINE WO CONTRAST CLINICAL INFORMATION: Acute on chronic back pain with lower extremity weakness status post fall. COMPARISON: CT abdomen pelvis dated 2/25/2021. TECHNIQUE: Sagittal and axial T1 and T2-weighted images were obtained through the lumbar spine. FINDINGS: Some of the images are motion degraded. There is grade 1 retrolisthesis of L5 relative to S1 on the basis of degenerative change, stable compared to prior CT. There is mild scalloping of the superior endplate of T72 with approximately 10% central height loss with Schmorl's node. There is otherwise anatomic vertebral body height and alignment. There is hyperintense T1 and T2 signal at the opposing endplates of I9-E9 as evidence for Modic 2 degenerative change. Marrow signal is otherwise within normal limits. The conus terminates in a normal fashion at the L1 level. The cauda equina is normal in appearance without nerve is thickening or clumping identified. Paraspinal soft tissues are unremarkable. At T12-L1 through L4-5 intervertebral discs are normal in appearance. There is no significant spinal canal or neural foraminal stenosis. At L5-S1 there is partial uncovering the disc with superimposed shallow disc bulge which mildly indents thecal sac and may contact traversing S1 nerve roots bilaterally. There is moderate right and moderate to severe left neural foraminal stenosis in association with facet hypertrophy and ligament flavum thickening. Facet hypertrophy and ligament flavum thickening at L5-S1 causing grade 1 retrolisthesis of L5 relative to S1 with superimposed shallow disc bulge which indents thecal sac and may contact traversing S1 nerve roots bilaterally and contributes to moderate  right and moderate to severe left neural foraminal stenosis at this level. **This report has been created using voice recognition software. It may contain minor errors which are inherent in voice recognition technology. ** Final report electronically signed by Dr. Katya Medina MD on 5/22/2021 1:32 PM    NM HEPATOBILIARY    Result Date: 5/24/2021  PROCEDURE: NM HEPATOBILIARY CLINICAL INFORMATION: Nausea, vomiting, cholelithiasis Radiopharmaceutical: 9.0 mCi technetium 99m mebrofenin, intravenously. TECHNIQUE: Anterior images of the abdomen were obtained for 60 minutes following radiopharmaceutical menstruation. 4 mg morphine sulfate was administered and additional images were obtained in multiple projections. COMPARISON: None FINDINGS: Delayed hepatic uptake of radiotracer may be secondary to hepatocellular dysfunction. Activity is present in the common bile duct and small bowel by 33 minutes. The gallbladder is not visualized prior to morphine administration but is seen following morphine administration. Patent cystic and common bile ducts without evidence of acute cholecystitis. Final report electronically signed by Dr. Ortega Gallardo on 5/24/2021 9:47 AM    US RENAL COMPLETE    Result Date: 4/26/2021  PROCEDURE: US RENAL COMPLETE CLINICAL INFORMATION: Worsening kidney function TECHNIQUE: Ultrasound of the kidneys and urinary bladder was performed. Grayscale and color images were obtained. COMPARISON: Renal ultrasound 3/30/2021 FINDINGS: The right kidney measures 13.6 x 5.0 x 4.9 cm and the left kidney measures 14.2 x 5.4 x 6.0 cm. Renal cortical thickness and echogenicity are normal bilaterally. There is no hydronephrosis, calculi or intrarenal mass on either side. Color Doppler demonstrates expected wave forms in the left renal artery. The left arcuate resistive index is at the upper limits of normal. The sonographer was unable to obtain the right arcuate resistive index. The distended urinary bladder is unremarkable. The patient did not void at the conclusion of the study. There is free fluid in the abdomen. 1. Normal bilateral renal ultrasound. 2. Unremarkable urinary bladder.  Final report electronically signed by  Yashira Half on 4/26/2021 3:26 PM    IR FLUORO GUIDED CVA DEVICE PLMT/REPLACE/REMOVAL    Result Date: 4/28/2021  NON-TUNNELED DIALYSIS CATHETER INSERTION: CLINICAL INFORMATION: Renal failure PERFORMED BY: Bree Lala M.D. APPROACH : Right Internal Jugular Vein, ultrasound guidance, micropuncture technique. A single permanent sonographic image was obtained for documentation. DIALYSIS CATHETER:  14 Icelandic Hemocath, 20 cm in length. DIALYSIS CATHETER TIP:  Right Atrium FLUOROSCOPY TIME: 1.1 minutes FLUOROSCOPIC IMAGES: 1 ESTIMATED BLOOD LOSS: Minimal PROCEDURE:  Signed informed consent was obtained prior to performing this procedure. The patient was not sedated during this procedure but was monitored with EKG and pulse-ox monitoring devices by a registered nurse. Following local anesthesia and utilizing MAXIMAL STERILE BARRIER TECHNIQUE, the vein listed above was punctured with a micropuncture needle, utilizing ultrasound guidance, an image was obtained confirming needle position and vessel patency. .  A .018 wire  was passed through this needle, followed by insertion of a 4 Western Michelle dilator. Following guide wire and catheter exchange, a percutaneous tract was dilated to a 14 Western Michelle size. Then, the temporary dialysis catheter was advanced over an .035 guide wire, with fluoroscopic guidance, with the tip at the location as specified above. This was all performed with fluoroscopic guidance. The hub of the catheter was then sutured to the skin with 2-0 silk suture. An antibacterial Biopatch was applied to the catheter exit site along with a sterile OpSite dressing. Status post successful nontunneled dialysis catheter insertion. **This report has been created using voice recognition software. It may contain minor errors which are inherent in voice recognition technology. ** Final report electronically signed by Dr. Ana Ramirez on 4/28/2021 3:12 PM    XR CHEST PORTABLE    Result Date: 5/20/2021  PROCEDURE: XR CHEST PORTABLE CLINICAL INFORMATION: 56-year-old male who fell. Shortness of breath. COMPARISON: Chest x-ray 5/18/2021. TECHNIQUE: AP upright view of the chest was obtained. FINDINGS: There is a dual-lumen dialysis catheter on the right side. There is a moderate right-sided and small to moderate left-sided pleural effusion. There are bibasilar opacities which may represent atelectasis or infiltrates. There is no pneumothorax. Visualized portions of the upper abdomen are within normal limits. The osseous structures are intact. No acute fractures or suspicious osseous lesions. Moderate right-sided and small left-sided pleural effusions with adjacent opacities which may represent atelectasis or infiltrates. **This report has been created using voice recognition software. It may contain minor errors which are inherent in voice recognition technology. ** Final report electronically signed by Dr Irasema Perdomo on 5/20/2021 2:20 PM    XR CHEST PORTABLE    Result Date: 5/3/2021  PROCEDURE: XR CHEST PORTABLE CLINICAL INFORMATION: concern for fluid overload. COMPARISON: Chest regressed dated 4/25/2021. TECHNIQUE: AP upright view of the chest. FINDINGS: There is a right internal jugular dual-lumen catheter with catheter tip at the cavoatrial junction. There is a moderate-sized pleural effusion on the right, similar to prior exam. There is adjacent linear opacities as evidence for atelectasis. The left lung appears clear. The cardiac mediastinal silhouette is within normal limits. Moderate right pleural effusion with adjacent atelectasis. **This report has been created using voice recognition software. It may contain minor errors which are inherent in voice recognition technology. ** Final report electronically signed by Dr. David Dunham MD on 5/3/2021 5:00 PM    XR CHEST 1 VIEW    Result Date: 4/25/2021  PROCEDURE: XR CHEST 1 VIEW CLINICAL INFORMATION: s/p right thora COMPARISON: 4/23/2021 TECHNIQUE:  AP dark emily Complications: No immediate complications Ascites is incidentally noted adjacent to the liver. Status post thoracentesis **This report has been created using voice recognition software. It may contain minor errors which are inherent in voice recognition technology. ** Final report electronically signed by Dr. Sita Yi on 4/25/2021 3:46 PM    XR ABDOMEN FOR NG/OG/NE TUBE PLACEMENT    Result Date: 5/25/2021  EXAM: 1 VIEW ABDOMEN. Comparison:  CR,KOSR  - XR ABDOMEN (KUB) (SINGLE AP VIEW)  - 05/23/2021 11:33 AM EDT FINDINGS: NG tube tip over the gastric body. Stable gastric and small bowel distention. No acute osseous abnormalities are appreciated. NG tube satisfactory. This document has been electronically signed by: Era Hickey MD on 05/25/2021 05:39 AM    US GUIDED PARACENTESIS    Result Date: 5/21/2021  PROCEDURE: US GUIDED PARACENTESIS CLINICAL INFORMATION: Ascites COMPARISON: 5/5/2021 TECHNIQUE: Ultrasound-guided paracentesis FINDINGS: The procedure was explained the patient. All risks were discussed. Written informed consent was obtained. Limited ultrasound was performed revealing the ascites. An acceptable location was identified. The skin was marked. The overlying area was sterilely prepped and draped. 2 percent lidocaine was given for local anesthesia. Next a 5 Azerbaijani one-step catheter  was introduced into the ascites. Clear yellow fluid was removed. The catheter was removed. The patient tolerated the procedure. No immediate complications. Physician performing procedure: Dr Eloise Farias Informed consent signed: Yes Local Anesthetic: 2 % Lidocaine Specimen volume: / ml Catheter: 19 ga 5 Croatian Aspirated ascites volume: 6.2 liters Aspirated ascites color: Yellow Site of Puncture:RLQ Complications: None observed     Successful ultrasound-guided paracentesis. **This report has been created using voice recognition software.   It may contain minor errors which are inherent in voice recognition showed a moderate amount of ascites. The right side of the abdomen was prepped and draped using the usual sterile technique and the skin was anesthetized. A 5 South Korean one-step catheter was introduced to the peritoneal ascites. 2.75 L of fluid drained. The catheter was then removed. The patient tolerated the procedure well with no complications reported and was discharged from the radiology department in a stable condition. Specimen sent for laboratory studies as requested. Successful ultrasound-guided paracentesis. 2.75 L of fluid drained. **This report has been created using voice recognition software. It may contain minor errors which are inherent in voice recognition technology. ** Final report electronically signed by Dr Angelita Tompkins on 4/30/2021 1:15 PM    US GUIDED PARACENTESIS    Result Date: 4/26/2021  PROCEDURE: Ultrasound-guided paracentesis. CLINICAL INFORMATION: Ascites. COMPARISON: Previous procedure 4/22/2021. PROCEDURE: Physician performing procedure: Dr. Anderson Gonzalez Informed consent signed: yes Local Anesthetic: 2% lidocaine locally Specimen volume: no labs requested Catheter: 5 Eritrean Aspirated ascites volume: 3 liters Aspirated ascites color: clear yellow Site of Puncture:RLQ Complications: none The benefits and the risk of the procedure were explained to the patient. Signed consent was obtained. Limited ultrasound exam of the abdomen showed a moderate amount of ascites. The right side of the abdomen was prepped and draped using the usual sterile technique and the skin was anesthetized. A 5 South Korean one-step catheter was introduced to the peritoneal ascites. 3 L of fluid drained. The catheter was then removed. The patient tolerated the procedure well with no complications reported and was discharged from the radiology department in a stable condition. Specimen sent for laboratory studies as requested. Successful ultrasound-guided paracentesis. 3 L of fluid drained.  **This report has been created using voice recognition software. It may contain minor errors which are inherent in voice recognition technology. ** Final report electronically signed by Dr Dorothy Steven on 4/26/2021 3:22 PM      Assessment/Plan:  1. 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 Hepatorenal syndrome with acute kidney injury, nephrology consulted will continue intermittent dialysis  3. Acute respiratory failure on admission much improved with current management continue the same  4. Atrial flutter currently rate controlled on amiodarone metoprolol and Cardizem  5.  Type 2 diabetes continue insulin to sliding scale      Electronically signed by Abram Taylor MD on 5/25/2021 at 1:02 PM    [facility] Hospitalist

## 2021-05-25 NOTE — CARE COORDINATION
5/25/21, 2:48 PM EDT    DISCHARGE PLANNING EVALUATION    SW reviewed list of facilities that do onsite dialysis-[facility] in [facility] area is on [employer] of contracted facilities. Call to brother Miles Hardy to advise him of this-he asked about facilities in the [address] [place] [address]-SW advised him that [facility] and [address] [facility] do on site dialysis. [facility] is actually in the [address] [place] [address] area and will be stopping up shortly. SW will speak with patient about staying in this area. SW spoke with patient-he stated that it didn't matter to him where he went and if a facility in this area would be able to accept him, he would be agreeable to this. SW obtained list of local facilities that are contracted with [facility]. Update: spoke with patient and brother Lizzy-went over list of facilities in the [address] [place] [address]-[facility] has also contacted some on list to see if they could assist with transportation for dialysis-[facility] can not, [address] possibly. [facility] would like referral to [facility] since they are able to do dialysis on site. [facility]

## 2021-05-25 NOTE — PROGRESS NOTES
This RN entered room for hourly rounding. Found patient had removed NG tube in right nare. This patient states \"you told me I could take it out\". This RN did not tell patient to remove NG. Educated on reason for NG placement and importance of leaving it in. This RN inserted new 14 Azeri NG tube in right nare. Confirmed placement with gastric content. Securement device to nose, and secured to gown with safety pin. Hooked to low-intermittent wall suction.

## 2021-05-25 NOTE — PROGRESS NOTES
Re-inserted 14 Armenian NG tube to right nare. Confirmed placement via gastric content. Hooked to YOLY.

## 2021-05-26 NOTE — PROGRESS NOTES
2104 Report called to [facility] on 4k regarding patients transfer for low BP  2125  Pt transferred to [id number] via bed/stretcher with 2 RN at bedside. All belongings transported with patient.

## 2021-05-26 NOTE — CARE COORDINATION
5/26/21, 7:32 AM EDT    DISCHARGE BARRIERS        Patient transferred to #9. Report given to unit SW, Lois, regarding discharge plan for this patient.

## 2021-05-26 NOTE — FLOWSHEET NOTE
[address] [place] [address]  PHYSICAL THERAPY MISSED TREATMENT NOTE  ACUTE CARE  [facility] ICU STEPDOWN TELEMETRY 4K              Missed Treatment  Per OT note pt on hold this date due to low BP, at dialysis and very confused.  Will try back per POC

## 2021-05-26 NOTE — FLOWSHEET NOTE
05/26/21 0251   Provider Notification   Reason for Communication Review case   Provider Name Dr. Unique Huff   Provider Notification Physician   Method of Communication Secure Message   Response No new orders   Notification Time [phone]   Patient confused and pulled out his NG tube again. He also pulled off dressing for his hemodialysis IJ. Telesitter in room. I did get 1200ml out before he pulled it. Don't think it is a good idea to replace until morning.

## 2021-05-26 NOTE — CARE COORDINATION
5/26/21, 9:33 AM EDT    DISCHARGE PLANNING EVALUATION      SW made referral to [facility]. Brandon Swanson asked  to fax information over to her today.  CARLOS faxed information over for referral.

## 2021-05-26 NOTE — PROGRESS NOTES
[facility] - Dr. Christobal Nyhan [facility]  Daily Progress Note    Pt Name: Naa Ann  Medical Record Number: [id number]  Date of Birth 1965   Today's Date: 5/26/2021    Hospital day # 5     ASSESSMENT   1. Adynamic ileus  2. Ascites  3. Alcoholic liver cirrhosis  4. Splenomegaly  5. Chronic kidney disease  6. Cirrhosis  7. Atrial flutter  8. Recurrent falls  9. Chronic anemia  10. Anasarca/lower extremity edema  11. Chronic deconditioning  12. Diabetes mellitus  13. COPD  15. Bilateral pleural effusions  15. Coagulopathy  16. Cholelithiasis  17. Umbilical hernia   has a past medical history of Advanced cirrhosis of liver ([address] Alcoholic cirrhosis ([address] Atrial fibrillation ([address] CHF (congestive heart failure) ([address] Chronic kidney disease, COPD (chronic obstructive pulmonary disease) ([address] Diabetes mellitus ([address] Gallstones, GERD (gastroesophageal reflux disease), Headache, Hydrothorax, and Portal hypertension ([address] PLAN   1. NG fell out overnight. Clear liquids as tolerated. 2. Pain & nausea control  3. Await bowel function. Lactulose. 4. Nephrology following. Dialysis MWF.  5. Paracentesis as needed  6. Call GI as needed. They are very familiar with this patient. 7. Palliative care consultation   8. PT/OT as tolerated  9. Medical management  10. No acute surgical intervention. Patient too high risk for any operation unless emergent. Monitor cholelithiasis and umbilical hernia. Bowel distention/dilation most likely related to ascites causing chronic irritation to mesentery and small bowel. This is resulting in his ileus. This may become somewhat of a chronic problem for the patient. Best treatment is medical management for his ascites. Signing off. Call as needed. SUBJECTIVE   Chief complaint: Nausea    Patient just back from dialysis. No confusion this morning. Telesitter still present in room. No fevers. States NG came out overnight but abdomen feels less distended. Some nausea but no vomiting. Chart reviewed. Updated by nursing staff. Denies chest discomfort or dyspnea. Generalized abdominal discomfort but no real pain. No bowel function yet. Tolerating DIET CLEAR LIQUID; No Added Salt (3-4 GM) diet. Up with help. CURRENT MEDICATIONS   Scheduled Meds:   metoprolol  25 mg Oral BID    midodrine  10 mg Oral TID WC    metoclopramide  5 mg Intravenous Q6H    amiodarone  200 mg Oral Daily    famotidine  20 mg Oral Daily    gabapentin  200 mg Oral BID    lactulose  45 g Oral TID    rifaximin  550 mg Oral BID    tiotropium  2 puff Inhalation Daily    sodium chloride flush  10 mL Intravenous 2 times per day    lidocaine  1 patch Transdermal Daily    insulin lispro  0-12 Units Subcutaneous TID WC    insulin lispro  0-6 Units Subcutaneous Nightly     Continuous Infusions:   sodium chloride       PRN Meds:.albuterol, sodium chloride flush, sodium chloride, promethazine **OR** ondansetron, traMADol  OBJECTIVE   CURRENT VITALS:  weight is 254 lb 10.1 oz (115.5 kg). His oral temperature is 97.7 °F (36.5 °C). His blood pressure is 96/68 and his pulse is 100. His respiration is 20 and oxygen saturation is 94%. Temperature Range (24h):Temp: 97.7 °F (36.5 °C) Temp  Av.2 °F (36.8 °C)  Min: 97.7 °F (36.5 °C)  Max: 99.3 °F (37.4 °C)  BP Range (35K): Systolic (12CVW), RSI:94 , Min:70 , JFQ:545     Diastolic (59FZG), HYD:63, Min:40, Max:89    Pulse Range (24h): Pulse  Av.5  Min: 99  Max: 121  Respiration Range (24h): Resp  Av.6  Min: 12  Max: 21  Current Pulse Ox (24h):  SpO2: 94 %  Pulse Ox Range (24h):  SpO2  Av.8 %  Min: 91 %  Max: 94 %  Oxygen Amount and Delivery:    Incentive Spirometry Tx:            GENERAL: alert, cooperative, no distress, chronically ill appearing  SKIN: Skin jaundice. No rashes or lesions. Scattered bruising. HEENT: Head is normocephalic, atraumatic. Sclera icterus.    NECK: Supple, symmetrical, trachea midline  LUNGS: clear to ausculation, without wheezes, rales or rhonci  HEART: tachycardia with regular rhythm  ABDOMEN: soft, distended, minimal generalized tenderness, bowel sounds hypoactive  NEUROLOGIC: There are no focalizing motor or sensory deficits. EXTREMITIES: no cyanosis, no clubbing  In: 2039.7 [I.V.:1639.7]  Out: 3100   Date 05/26/21 0000 - 05/26/21 2359   Shift 4068-8070 4625-8204 2834-9144 24 Hour Total   INTAKE   Dialysis  400  400   Shift Total(mL/kg)  400(3.5)  400(3.5)   OUTPUT   Dialysis  400  400   Shift Total(mL/kg)  400(3.5)  400(3.5)   Weight (kg) 115.5 115.5 115.5 115.5     LABS     Recent Labs     05/24/21  0723 05/25/21  0549 05/26/21 0411   WBC 7.6  --  2.9*   HGB 9.0*  --  7.5*   HCT 27.8*  --  23.7*   PLT 55*  --  59*   * 133* 132*   K 5.7* 4.9 4.5   CL 89* 91* 92*   CO2 26 25 24   BUN 34* 31* 39*   CREATININE 4.6* 3.5* 4.5*   MG  --   --  2.1   PHOS  --   --  6.1*   CALCIUM 9.6 9.7 10.0      No results for input(s): PTT, INR in the last 72 hours. Invalid input(s): PT  Recent Labs     05/25/21  0904 05/26/21 0411   AST  --  33   ALT  --  15   BILITOT 10.6* 8.3*   BILIDIR  --  3.7*     RADIOLOGY   No new imaging    Electronically signed by FELIX Yeboah CNP on 5/26/2021 at 12:57 PM     Patient seen and examined independently by me early this AM. Above discussed and I agree with Jayleen Saavedra CNP. See my additional comments below for updated orders and plan. Labs, cultures, and radiographs where available were reviewed. I discussed patient concerns with the patient's nurse and instructions were given. Please see our orders for the updated patient care plan. -NG tube fell out overnight. Okay for clear liquids from a surgical standpoint. No surgical intervention needed. GI services as needed secondary to cirrhosis. Bowel issues likely adynamic ileus due to ascites. Paracentesis as needed. Palliative care consultation. Follow-up with [facility] transplantation team.  PT/OT.   Surgical service is signing off. Cough need to reevaluate while patient still in house.     Electronically signed by Juan Teixeira MD on 5/26/21 at 2:54 PM EDT

## 2021-05-26 NOTE — PROGRESS NOTES
Patient has become very confused throughout the night. He pulled out NG tube and also pulled his hemodialysis IJ in the right neck. Was bleeding and dressing was completely off. I alcohol swabbed and put dressing over until Alejandro Chow from [facility] was able to come and redress properly. Tele sitter in room. He attempted to pull on his NG several times and was able to redirect. He did end up pulling NG out but says he doesn't remember.

## 2021-05-26 NOTE — PROGRESS NOTES
Hospitalist Progress Note      Patient:  Debbie Stockton    Unit/Bed:4K-09/009-A  YOB: 1965  MRN: [id number]   Acct: [de-identified]   PCP: Frances Bunn MD  Date of Admission: 5/20/2021    Assessment/Plan:  1. Hypotension, unspecified. Drops in blood pressure last night but stability throughout the day. Probably secondary to intravascular volume depletion related to cirrhosis. Probable cause or contributing factor to dizziness and falls. Was given albumin this morning given a small normal saline bolus. Maintain on midodrine 10 mg 3 times daily. On Lopressor 25 mg twice daily and amiodarone 200 mg daily for rate control as below. Hold parameters on Lopressor. 2.  Decompensated cirrhosis. Jaundice noted along with scleral icterus. Estimated MELD-NA score of 37 points. Therefore, estimated 65 to 66% 90-day mortality. Ammonia level in the normal range. Maintain on lactulose 45 g daily and rifaximin 5 to 50 mg twice daily to control ammonia levels. Biweekly paracentesis for control of symptoms. Holding diuretics secondary to hypotension as above. Long-term prognosis is poor. Follow-up with [facility] as an outpatient. 3.  End-stage renal disease. Secondary to previous hepatorenal syndrome. Treated previous KARAN on stage III CKD with octreotide, midodrine, and IV albumin without success. Poor prospects for kidney recovery. High serum phosphorus likely related to end-stage renal disease. Follow daily phosphorus level. Follow-up with nephrology to discuss phosphate binders if no improvement. Nephrology to follow to manage dialysis. 4.  Small bowel ileus. Seen on abdominal imaging. Also noted with nausea and vomiting with clear liquid intake. Reglan added by surgery. Per patient report, patient probably consumed too much clear liquid and vomited.   Keep patient n.p.o except for ice chips and sips of water with course:  Came from home for acute on chronic Back Pain with Lower Extremity Weakness. Does not feel safe at home and wants to go to rehab. Had bilateral lower extremity swelling, shortness of breath, and abdominal distention from ascites. Recently discharged from [place] [id number]. Diagnosed with acute respiratory insufficiency secondary to pulmonary edema, atrial flutter, decompensated alcoholic cirrhosis, end-stage renal disease, type 2 diabetes, fall, obesity, and end-stage renal disease. Transferred to [facility] on 5/26/2021 for hypertension. Was confused overnight and pulled out his nasogastric tube and pulled his right dialysis IJ. Stable vitals with a temperature 98.1 degrees, respiratory of 20, pulse of 99, blood pressure 98/73, and SPO2 of 95% on room air. Currently on rifaximin. Otherwise, no antibiotics or steroids. Net removal of approximately 1.375 L of fluid yesterday. No blood transfusions. Currently on Retacrit 5000 units Monday Wednesday Fridays, Lopressor 25 mg twice daily, Reglan 5 mg every 6 hours, midodrine 10 mg 3 times daily, medium dose corrective algorithm, amiodarone 200 mg daily, Pepcid 20 mg daily, gabapentin 200 mg twice daily, lactulose 45 g 3 times daily, rifaximin 550 mg twice daily, tiotropium twice daily, and lidocaine patch. Subjective (past 24 hours): When asked what brought him here, stated that he was his liver failure. When asked about about the fall, states that happened because he was \"being stupid. \"  States that he was extremely dizzy previously but the dizziness is since improved. He has been having worsening shortness of breath. States that he has had some nausea and abdominal discomfort from consuming too much on his clear liquid diet. Patient still produces minimal urine. Otherwise, no new specific complaints or concerns. Past medical history, family history, social history and allergies reviewed again and is unchanged since admission.     ROS:  Review BMI 32.69 kg/m²   Physical Exam  Constitutional:       Appearance: Normal appearance. HENT:      Head: Normocephalic and atraumatic. Right Ear: External ear normal.      Left Ear: External ear normal.      Mouth/Throat:      Mouth: Mucous membranes are dry. Pharynx: Oropharynx is clear. Eyes:      General: Scleral icterus present. Cardiovascular:      Rate and Rhythm: Normal rate. Rhythm irregular. Pulses: Normal pulses. Heart sounds: Normal heart sounds. Comments: Trace bilateral edema significantly improved from before. Pulmonary:      Breath sounds: Decreased breath sounds present. Abdominal:      General: There is distension. Tenderness: There is no abdominal tenderness. There is no right CVA tenderness or left CVA tenderness. Musculoskeletal:      Right lower leg: Edema present. Left lower leg: Edema present. Skin:     Coloration: Skin is jaundiced. Neurological:      Mental Status: He is alert. Psychiatric:         Mood and Affect: Mood normal.         Behavior: Behavior normal.         Labs:   1. Electrolyte panel notable for low sodium, low chloride, BUN of 39, creatinine 4.5, GFR 14, slightly low pocket glucose of 62, calcium 10, and phosphorus of 8.1.  2.  Liver panel notable for normal albumin, highly elevated bilirubin of 8.3 with an elevated direct bilirubin 3.7.  3.  CBC notable for leukopenia, along with a macrocytic anemia with a hemoglobin of 7.5. Platelet count normal at 59.  4.  Iron studies notable for an elevated ferritin of 548, an iron of 88, and iron saturation 84%, and a TIBC of less than 105. This indicates anemia chronic disease or more likely anemia of alcoholism. 5.  CT abdomen pelvis from 5/24/2021 shows possible ileus, large abdominal pelvic ascites along with liver cirrhosis, small to moderate bilateral pleural effusions, cholelithiasis, anasarca, and no obstructive uropathy.   6.  NG tube appropriate places of x-ray on 5/25/2021. 7.  MRI lumbar spine shows pathology causing moderate right and left neural foraminal stenosis at level of L5-S1.  8.  Hepatobiliary scan shows no signs of acute cholecystitis. 9.  9.  Previous echo showed normal ejection fraction at [facility] in December 2020 and catheterization at the same time showed essentially no obstruction. Support Devices (date placed):  [] ETT []Oral / [] Nasal  [] Gastric Tube [] OG / [] NG    [] Central Venous Line (Specify Site):  [] Urinary Catheter  [] Arterial Line (Specify Site)  [x] Peripheral IV access  [] Other:    DVT prophylaxis: [] Lovenox                                 [] SCDs                                 [] SQ Heparin                                 [] Encourage ambulation           [] Already on Anticoagulation  Elevated INR, anticoagulation would be inappropriate.       Code Status: Full Code    Tele:   [x] yes             [] no    Electronically signed by Aris Cheney MD, MPH, [facility] on 5/26/2021 at 8:21 PM   Supervised by Dr. Merced Baldwin

## 2021-05-26 NOTE — PROGRESS NOTES
Inpatient dialysis called. Blood sugar recheck was 131. Physician made aware of low blood sugar and recheck. Will continue to monitor.

## 2021-05-26 NOTE — PLAN OF CARE
Problem: Falls - Risk of:  Goal: Will remain free from falls  Description: Will remain free from falls  Outcome: Ongoing  Note: Pt remains free from falls this shift. Pt compliant with fall precautions. Pt remains in bed d/t low blood pressures. Will continue to monitor. Problem: Pain:  Goal: Pain level will decrease  Description: Pain level will decrease  Outcome: Ongoing  Note: Pt has chronic back pain that spasms intermittently. Pt denies the need for pain medications. PRNs available per [facility]. Will continue to monitor. Problem: Skin Integrity:  Goal: Absence of new skin breakdown  Description: Absence of new skin breakdown  Outcome: Ongoing  Note: No new skin break down present at this time. Pt offered q2h turns with pillow support. EPC applied. Will continue to monitor. Problem: Discharge Planning:  Goal: Discharged to appropriate level of care  Description: Discharged to appropriate level of care  Outcome: Ongoing  Note: Pt plans discharge to a rehab facility. Pt working with social work and case management on discharge planning. Problem: Impaired respiratory status  Goal: Clear lung sounds  Outcome: Ongoing     Problem: Serum Glucose Level - Abnormal:  Goal: Ability to maintain appropriate glucose levels has stabilized  Description: Ability to maintain appropriate glucose levels has stabilized  Outcome: Ongoing  Note: Pt received an amp of dextrose this AM d/t a BS of 61. Recheck 131. BS WNL since then. Chem stick ACHS. Insulin per MAR. Will continue to monitor. Problem: GI  Goal: No bowel complications  Outcome: Ongoing  Note: Pt receiving Lactulose to assist with mentation and bowel movements. Bowel sounds active. Will continue to monitor. Problem:   Goal: No urinary complication  Outcome: Ongoing  Note: Pt is a dialysis pt. Pt received dialysis today. No fluid removed. Will continue to monitor.       Problem: Coping:  Goal: Ability to participate in care decisions during the

## 2021-05-26 NOTE — PROGRESS NOTES
[facility]   Nephrology progress note  5/26/2021, 8:31 AM      Pt Name:    Sara Contreras  MRN:     [id number]     [place]:    1965  Admit Date:    5/20/2021  1:21 PM  Primary Care Physician:  Brinda Rao MD   Room number  [id number]    Chief Complaint: Nephrology following for KARAN/CKD (not ESRD)    Subjective:  Patient seen and examined  Seen earlier today during dialysis  Tolerating dialysis reasonably well  Chronically low blood pressure  In systolic 70K and 59F  Asymptomatic  Heart rate in low 100s      Objective:  24HR INTAKE/OUTPUT:      Intake/Output Summary (Last 24 hours) at 5/26/2021 0831  Last data filed at 5/25/2021 2315  Gross per 24 hour   Intake 1644.66 ml   Output 3020 ml   Net -1375.34 ml     I/O last 3 completed shifts: In: 1644.7 [I.V.:1644.7]  Out: 8614 [Emesis/NG output:3020]  No intake/output data recorded. Admission weight: 266 lb (120.7 kg)  Wt Readings from Last 3 Encounters:   05/24/21 253 lb 15.5 oz (115.2 kg)   05/17/21 246 lb (111.6 kg)   05/06/21 264 lb 6.4 oz (119.9 kg)     Body mass index is 32.61 kg/m².     Physical examination  VITALS:     Vitals:    05/26/21 0330 05/26/21 0422 05/26/21 0600 05/26/21 0745   BP: 89/71 (!) 86/51 84/64 87/69   Pulse: 109   110   Resp: 18   18   Temp: 97.8 °F (36.6 °C)   98 °F (36.7 °C)   TempSrc: Oral   Oral   SpO2: 93%   94%   Weight:         General Appearance: alert and cooperative with exam, appears comfortable, no distress, + visibly jaundiced  Mouth/Throat: Oral mucosa moist  Eyes: ++ Icteric sclera  Neck: No JVD  Lungs: Air entry B/L, no rales, no use of accessory muscles, diminished both bases  Heart:  S1, S2 heard  GI: soft, distention      Lab Data  CBC:   Recent Labs     05/24/21  0723 05/26/21  0411   WBC 7.6 2.9*   HGB 9.0* 7.5*   HCT 27.8* 23.7*   PLT 55* 59*     BMP:  Recent Labs     05/24/21  0723 05/25/21  0549 05/26/21  0411   * 133* 132*   K 5.7* 4.9 4.5   CL 89* 91* 92*   CO2 26 25 24   BUN 34* 31* 39*   CREATININE 4.6* 3.5* 4.5*   GLUCOSE 114* 130* 103   CALCIUM 9.6 9.7 10.0   MG  --   --  2.1   PHOS  --   --  6.1*     Hepatic:   Recent Labs     05/25/21  0904 05/26/21  0411   LABALBU  --  3.7   AST  --  33   ALT  --  15   BILITOT 10.6* 8.3*   ALKPHOS  --  96         Meds:  Infusion:    sodium chloride       Meds:    albumin human  25 g Intravenous Once    albumin human  25 g Intravenous Once    metoprolol  25 mg Oral BID    midodrine  10 mg Oral TID WC    metoclopramide  5 mg Intravenous Q6H    amiodarone  200 mg Oral Daily    famotidine  20 mg Oral Daily    gabapentin  200 mg Oral BID    lactulose  45 g Oral TID    rifaximin  550 mg Oral BID    tiotropium  2 puff Inhalation Daily    sodium chloride flush  10 mL Intravenous 2 times per day    lidocaine  1 patch Transdermal Daily    insulin lispro  0-12 Units Subcutaneous TID WC    insulin lispro  0-6 Units Subcutaneous Nightly     Meds prn: albuterol, sodium chloride flush, sodium chloride, promethazine **OR** ondansetron, traMADol       Impression and Plan:  1. Acute kidney injury on CKD 4  Secondary to HRS  Patient is not ESRD yet  Currently on intermittent hemodialysis 3 times a week  No suggestion of renal recovery at this time  Continue with hemodialysis    2. Hyperkalemia: resolved. Monitor for now  3. Hypotension: BP in 80s, continue with midodrine, IV albumin prn on dialysis, asymptomatic  4. Chronic decompensated liver cirrhosis with ascites requiring weekly paracentesis  5. Anemia of multifactorial etiology including renal insufficiency. Check iron studies and add SAMMIE  6. History of hepatic encephalopathy. On lactulose and rifaximin as outpatient  7. Back pain/fall  8. Pleural effusion  9.   Ileus    D/W patient and HD RN      Jarad Benavidez MD  [facility]

## 2021-05-26 NOTE — PROGRESS NOTES
Inpatient dialysis called and updated of patient's low blood sugar. Instructed to take patient's BS and call with an update once obtained. Will continue to monitor.

## 2021-05-26 NOTE — FLOWSHEET NOTE
05/26/21 0750 05/26/21 1145   Vital Signs   BP 93/67 116/89   Temp 97.7 °F (36.5 °C) 98.3 °F (36.8 °C)   Pulse 109 102   Resp 20 20   SpO2  --  93 %   Weight 254 lb 10.1 oz (115.5 kg) 254 lb 10.1 oz (115.5 kg)   Weight Method Bed scale Bed scale   Post-Hemodialysis Assessment   Post-Treatment Procedures  --  Blood returned;Catheter Capped, clamped with Saline x2 ports   Machine Disinfection Process  --  Acid/Vinegar Clean;Heat Disinfect; Exterior Machine Disinfection   Total Liters Processed (l/min)  --  71.1 l/min   Dialyzer Clearance  --  Lightly streaked   Duration of Treatment (minutes)  --  210 minutes   Heparin amount administered during treatment (units)  --  0 units   Hemodialysis Intake (ml)  --  400 ml   Hemodialysis Output (ml)  --  400 ml   NET Removed (ml)  --  0 ml   Tolerated Treatment  --  Good   Stable 3.5 hour TX. Removed no fluid as ordered. Gave 50 mg of Albumin with dialysis. pt tolerated well. Bilateral cath ports flushed with normal saline and clamped. CVC drsg changed and is clean, dry, and intact. Report called to primary RN. TX record printed for scanning into EMR.

## 2021-05-26 NOTE — PROGRESS NOTES
This RN in to see patient to discuss goals/code status. Patient stated he is feeling nauseated and would like to sleep. This RN stated she would try to speak with him at another time, patient agreed.

## 2021-05-26 NOTE — PLAN OF CARE
Problem: Falls - Risk of:  Goal: Will remain free from falls  Description: Will remain free from falls  Outcome: Ongoing  Note: Patient absent of falls this shift. Problem: Skin Integrity:  Goal: Will show no infection signs and symptoms  Description: Will show no infection signs and symptoms  Outcome: Ongoing  Note: No signs of infection this shift. Problem: Skin Integrity:  Goal: Absence of new skin breakdown  Description: Absence of new skin breakdown  Outcome: Ongoing  Note: Absent of new skin breakdown this shift. Problem: Confusion - Acute:  Goal: Absence of continued neurological deterioration signs and symptoms  Description: Absence of continued neurological deterioration signs and symptoms  Outcome: Ongoing  Note: Patient was confused and pulled his NG tube out for the third time today. Problem: Discharge Planning:  Goal: Ability to perform activities of daily living will improve  Description: Ability to perform activities of daily living will improve  Outcome: Ongoing  Note: No discharge plans this shift. Goal: Participates in care planning  Description: Participates in care planning  Outcome: Ongoing  Note: Patient is able to participate in care planning. Problem: Injury - Risk of, Physical Injury:  Goal: Will remain free from falls  Description: Will remain free from falls  Outcome: Ongoing  Note: Patient absent of falls this shift. Goal: Absence of physical injury  Description: Absence of physical injury  Outcome: Completed     Problem: Mood - Altered:  Goal: Mood stable  Description: Mood stable  Outcome: Ongoing  Note: Patient is all over the place regarding mood. He is appropriate and then confused. Goal: Absence of abusive behavior  Description: Absence of abusive behavior  Outcome: Ongoing  Note: Patient used inappropriate language several times this shift.   Goal: Verbalizations of feeling emotionally comfortable while being cared for will increase  Description:

## 2021-05-27 NOTE — PROGRESS NOTES
Upon assessment patient is alert and oriented. His blood pressure running on low side and heart rate is elevated per Day shift primary nurse  Is aware and no new orders at this time staff will continue to monitor close.  Bed lock low and call button within reach

## 2021-05-27 NOTE — PROGRESS NOTES
[facility]           Progress Note    Patient Name:  Nehemias Ludwig  Medical Record Number:  [id number]  [place]:  1965    Date:  5/27/2021  Time:  9:21 AM  Completed By:  Manuel Kurtz RN, RN    Reason for Palliative Care Evaluation:  Goals of care/Code status    Current Issues:  [x]  Pain  [x]  Fatigue  [x]  Nausea  []  Anxiety  []  Depression  []  Shortness of Breath  []  Constipation  []  Appetite  []  Other:    Advance Directives  [] [facility] DNR Form  [x] Living Will  [x] Medical POA    Current Code Status  [x] Full Resuscitation  [] DNR-Comfort Care-Arrest  [] DNR-Comfort Care  [] Limited   [] No CPR   [] No shock   [] No ET intubation/reintubation   [] No resuscitative medications   [] Other limitation:      Family/Patient Discussion: This Rn received call from Gricel Salgado,  RN, regarding the patient wanting to speak with palliative. Upon entering patient's room, patient was resting with eyes closed, brother, Spring Bojorquez at bedside. Spring Bojorquez stated patient had a rough night due to vomiting and not feeling well. Patient stated he has been vomiting and nauseated continuously, very fatigued. Medications not easing patient's symptoms. Patient vomiting at this time, this RN asked primary RN if patient could receive nausea medication. This RN brought patient new wash rags, a blanket, and emesis bags. Asked patient if he was okay with this RN talking with him for a few minutes. This Rn asked if patient has had any relief from symptoms, patient stated \"nothing really helps\" This RN explained that NG tube may benefit patient in easing symptoms, patient stated \"I don't even remember it coming out, it was brutal to put in. I kind of remember this coming out\" (referring to his dialysis catheter). RN stated it was patient's choice as to if he would want it replaced, patient agreed to try NG to help ease symptoms.  This Rn asked how often patient had been having paracentesis done, patient unable to updated as well.      Plan/Follow-Up:  Plan to meet with hospice     Audelia Richardson, RN, RN  5/27/2021,  9:21 AM

## 2021-05-27 NOTE — H&P
Formulation and discussion of sedation / procedure plans, risks, benefits, side effects and alternatives with patient and/or responsible adult completed.     Electronically signed by Marylu Ramirez MD on 5/27/2021 at 3:53 PM

## 2021-05-27 NOTE — CARE COORDINATION
Update: if [facility] tertiary referral planned (referred there in past for possible liver transplant), they are in network; await GI plans; transfer packet on chart if needed; collaborated w Attending, Fozia Parker RN  Electronically signed by Nancy Marcos, RN on 5/27/2021 at 11:15 AM    Update: Palliative Care will see today and call Attending w update: Hospice meeting w family [phone] tomorrow planned; await family decision on possible Hospice versus possible [facility] for further workup; continue to monitor; collaborated w Vivian Head, Palliative Care, Attending, Shruthi Christopher, CARLOS  Electronically signed by Nancy Marcos, RN on 5/27/2021 at 3:01 PM

## 2021-05-27 NOTE — FLOWSHEET NOTE
[address] [address]  PHYSICAL THERAPY MISSED TREATMENT NOTE  ACUTE CARE  [facility] ICU STEPDOWN TELEMETRY 4K              Missed Treatment  Pt in bed, ill appearing,  resting. pt politely declines session stating he does not feel well. Per pt and RN looking into tfer to [facility] and talking with hospice. Pt requesting therapy hold off this date. Will try back when able/appropriate.

## 2021-05-27 NOTE — CONSULTS
Consult History & Physical      Patient:  Sara Contreras  YOB: 1965  MRN: [id number]     Acct: [de-identified]    Chief Complaint:    Chief Complaint   Patient presents with   [facility] Fall    Back Pain       Date of Service: Pt seen/examined in consultation on 5/27/2021    History Of Present Illness:      55 y.o. male who we are asked to see/evaluate by Benjamin Salgado MD for medical management of intractable n/v, cirrhosis. HPI from chart review, patient, resident physician, and RN. He was admitted 05/20/21 for a fall at home. H/O ESRD, started on HD in April. 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 He require intermittent thoracenteses. He also follows at [facility] and is working on getting on the transplant list. H/O chronic anemia, coagulopathy, and thrombocytopenia from his ESLD. He is currently on Retacrit. Noted to have atrial flutter and started on Amio. H/O orthostatic hypotension in spite of being on Midodrine. H/O frequent falls and frequent hospitalizations. He has had intractable nausea and vomiting since admission. He is unable to keep fluids or medications down. Denies hematemesis. He had 2 NGTs in both of which \"came out. \" CT A/P 05/24/21 demonstrated diffusely dilated small bowel loops ileus vs partial/early small bowel obstruction, large volume abdominal & pelvic ascites with diffuse mesenteric edema, liver cirrhosis, splenomegaly, & portosystemic collaterals, small to moderate bilateral pleural effusions. Underwent paracentesis 05/21/21 with 6.2L removed, no labs ordered on the fluid. He has generalized abdominal discomfort. He has not had a bowel movement for 5 days. Last EGD 11/30/20 demonstrated non-bleeding grade I esophageal varices & portal hypertensive gastropathy. Last colonoscopy 12/30/20 demonstrated benign polyps & prolapse type change in the sigmoid colon.      Past Medical History:    Past Medical History:   Diagnosis Date    [facility] liver ([facility]     Alcoholic cirrhosis ([facility]     Atrial fibrillation ([facility]     CHF (congestive heart failure) (HCC)     Chronic kidney disease     COPD (chronic obstructive pulmonary disease) (HCC)     Diabetes mellitus (HCC)     Gallstones     GERD (gastroesophageal reflux disease)     Headache     Hydrothorax     Portal hypertension (HCC)        Home Medications:  Prior to Admission medications    Medication Sig Start Date End Date Taking? Authorizing Provider   rifaximin (XIFAXAN) 550 MG tablet Take 1 tablet by mouth 2 times daily 4/23/21  Yes FELIX Jang CNP   ammonium lactate (AMLACTIN) 12 % cream Apply topically as needed   Yes Historical Provider, MD   lactulose (CHRONULAC) 10 GM/15ML solution 45 ml by mouth TID 3/18/21  Yes Historical Provider, MD   albuterol sulfate HFA (VENTOLIN HFA) 108 (90 Base) MCG/ACT inhaler Inhale 2 puffs into the lungs every 6 hours as needed for Wheezing 3/5/21  Yes Kitty Coker MD   tiotropium [facility]) 18 MCG inhalation capsule Inhale 1 capsule into the lungs daily 3/5/21  Yes Kitty Coker MD   dilTIAZem (CARDIZEM) 30 MG tablet Take 1 tablet by mouth 3 times daily 2/27/21  Yes Phyllistine Rona, DO   Misc. Devices [facility]) [facility] Standard lightweight wheelchair 4/23/21   FELIX Jang CNP   varenicline (CHANTIX) 0.5 MG tablet Take 1 tablet by mouth daily x 1 week then increase to 1 tablet by mouth twice daily 4/21/21   Jennifer Kirby MD   gabapentin (NEURONTIN) 100 MG capsule Take 2 capsules by mouth 2 times daily for 30 days.  4/7/21 5/17/21  Celso Puckett MD   famotidine (PEPCID) 20 MG tablet Take 1 tablet by mouth daily 4/8/21 5/17/21  Celso Puckett MD   midodrine (PROAMATINE) 5 MG tablet Take 3 tablets by mouth 3 times daily 4/7/21 5/17/21  Celso Puckett MD   amiodarone (CORDARONE) 200 MG tablet Take 1 tablet by mouth daily 4/7/21 5/17/21  Celso Puckett MD   Continuous Blood Gluc  (DEXCOM G6 ) MENDY Use to test 4 times daily and as needed DX: E11.9 3/24/21   Betsy Osorio APRN - CNP   Continuous Blood Gluc Transmit (DEXCOM G6 TRANSMITTER) MISC Use to test 4 times daily and as needed DX: E11.9 3/24/21   Betsy Dura, APRN - CNP   Continuous Blood Gluc Sensor (DEXCOM G6 SENSOR) MISC Use to test 4 times daily and as needed DX: E11.9 3/24/21   Betsytrey Osorio APRN - CNP   bumetanide (BUMEX) 1 MG tablet Take 1 tablet by mouth 2 times daily 3/18/21   Anupama Jordan APRN - CNP   metoprolol (LOPRESSOR) 100 MG tablet Take 100 mg by mouth 2 times daily    Historical Provider, MD   Lancets MISC 1 each by Does not apply route daily Use to test blood sugar 4 times daily and as needed DX:E11.9 12/4/20   BetsyFELIX Martinez - CNP   blood glucose monitor strips Test 4 times daily and as needed for irregular blood glucose. Dispense sufficient amount for indicated testing frequency plus additional to accommodate PRN testing needs. DX E11.9 12/4/20   Betsy Dura APRN - CNP   Blood Glucose Monitoring Suppl (ONE TOUCH ULTRA 2) w/Device KIT Use to test 4 times daily and as needed DX: E11.9 12/4/20   Betsy Osorio APRN - CNP   ASPIRIN 81 PO Take by mouth daily    Historical Provider, MD       Surgical History:  Past Surgical History:   Procedure Laterality Date    APPENDECTOMY      HERNIA REPAIR      LUNG SURGERY Right 11-24-20, 11-30-20    [facility]       Family History:  Family History   Problem Relation Age of Onset    Heart Disease Mother     High Blood Pressure Mother     Diabetes Mother     Cancer Mother     Cancer Father        Past GI History:  Alcoholic cirrhosis, esophageal varices, hepatic hydrothorax with recurrent right pleural effusion, colon polyps, GERD, hepatic encephalopathy, EGD, colonoscopy, paracentesis  Dr. Genie Son patient  Follows at [address] [place] [address]:  Patient has no known allergies. Social History:   TOBACCO:   reports that he quit smoking about 6 months ago.  His smoking use included cigarettes. He has a 29.00 pack-year smoking history. He has never used smokeless tobacco.  ETOH:   reports previous alcohol use. Review Of Systems  GENERAL: No fever, chills or weight loss. EYES:  No  blurred vision, double vision   CARDIOVASCULAR: No chest pain or palpitations. RESPIRATORY:  No dyspnea or cough. GI:  See HPI  MUSCULOSKELETAL: No new painful or swollen joints or myalgias. :   No dysuria or hematuria. SKIN:  +jaundice  NEUROLOGIC:  No headaches or seizures, numbness or tingling of arms, or legs. PSYCH:  No anxiety or depression. ENDOCRINE:  No polyuria or polydipsia. BLOOD:  +anemia +thrombocytopenia      PHYSICAL EXAM:  BP 94/82   Pulse 124   Temp 97.6 °F (36.4 °C) (Axillary)   Resp 15   Wt 260 lb 5.8 oz (118.1 kg)   SpO2 94%   BMI 33.43 kg/m²     General appearance: Very acutely ill appearing male, lethargic  HEENT: Normal cephalic, atraumatic without obvious deformity. Pupils equal, round, and reactive to light. Scleral icterus. Neck: Supple, with full range of motion. No jugular venous distention. Trachea midline. Respiratory:  Normal respiratory effort. Clear, diminished to auscultation, bilaterally without Rales/Wheezes/Rhonchi. Cardiovascular: Regular rate and rhythm without murmurs, rubs or gallops. Abdomen: Semi-soft, tender throughout with palpation, moderately distended with absent bowel sounds. Musculoskeletal: No clubbing, cyanosis bilaterally. BLE edema. Skin: Jaundice, warm, dry. No rashes or lesions. Ecchymosis noted to mid abdomen.   Psychiatric: Alert and oriented, confused at times    Labs:   Recent Labs     05/27/21  0447   WBC 4.3*   HGB 7.3*   HCT 22.7*   PLT 50*     Recent Labs     05/27/21  0447      K 4.2   CL 93*   CO2 28   BUN 29*   CREATININE 4.1*   CALCIUM 9.7   PHOS 4.7     Recent Labs     05/26/21  0411   AST 33   ALT 15   BILIDIR 3.7*   BILITOT 8.3*   ALKPHOS 96     Recent Labs     05/27/21  0927   INR 2.48* Radiology:   CXR 05/20/21      Impression   Moderate right-sided and small left-sided pleural effusions with adjacent opacities which may represent atelectasis or infiltrates.         CT head 05/20/21      Impression   No acute intracranial hemorrhage, mass effect or midline shift. US paracentesis 05/21/21  Aspirated ascites volume: 6.2 liters   Aspirated ascites color: Yellow   Site of Puncture:RLQ   Complications: None observed            Impression   Successful ultrasound-guided paracentesis. HIDA scan 05/24/21      Impression       Patent cystic and common bile ducts without evidence of acute cholecystitis. CT abdomen/pelvis 05/24/21      Impression   Impression:   1.  Diffusely dilated small bowel loops with no definite transition point    suggests ileus.  Partial/early distal small bowel obstruction considered    less likely but not excluded.  Recommend serial abdominal radiographs to    assess progression of enteric contrast.   2.  Large volume abdominal and pelvic ascites with diffuse mesenteric    edema.  Liver cirrhosis, splenomegaly and evidence for portosystemic    collaterals. 3.  Small to moderate bilateral pleural effusions with adjacent    compressive atelectasis and/or consolidation. 4.  Cholelithiasis. 5.  Anasarca. 6.  No acute obstructive uropathy.  Nonobstructing right nephrolithiasis. Code Status: Full Code    ASSESSMENT:  1. S/P fall at home  2. Decompensated end stage alcoholic liver cirrhosis with ascites- s/p paracentesis 05/21/21, no labs done  3. Intractable n/v- secondary to ileus  4. Ileus- noted on imaging & exam  5. H/O right recurrent hepatic hydrothorax  6. Coagulopathy  7. Hepatic encephalopathy- unable to keep meds down  8. Hyperbilirubinemia  9. Chronic anemia  10. Pancytopenic  11. ESRD on HD  12. Chronic hypotension- on Midodrine  13. Aflutter/afib- on Amio  14. CHF  15. GERD  16. COPD  16. DM  18.  H/O esophageal varices on EGD 11/2020    PLAN:     Monitor H & H, transfuse prn   Nursing to monitor stool output & document   Stop Pepcid   IVP PPI daily   Stop Reglan   Zofran 8mg Qhrs    NPO   Neostigmine Q6hrs for 3 days   Tap water enema BID for 3 days   NO NGT given h/o esophageal varices   Monitor HFP & INR daily   Avoid hepatotoxic meds, stop/change Amio if okay with primary   Avoid narcotics & benzodiazepines as they can precipitate HE, recommend stopping Ultram. Tylenol prn   Daily weights   Strict I & O   Electrolyte management per nephrology   Continue Xifaxan BID   Continue Lactulose TID, may need to consider enema, will hold for now given ileus   Continue Midodrine TID   Nephrology on board  [facility] consult per primary, however patient is hopeful for a liver transplant   Case discussed at length resident physician working with primary attending   Supportive care per primary team  Will follow       Case reviewed and impression/plan reviewed in collaboration with Dr. Carl Moore  Electronically signed by FELIX Sanchez - CNP on 5/27/2021 at 3:08 PM    [facility]  Thank you for the consultation.

## 2021-05-27 NOTE — PLAN OF CARE
Problem: Falls - Risk of:  Goal: Will remain free from falls  Description: Will remain free from falls  5/27/2021 0026 by Dionicio Funes RN  Outcome: Ongoing  Note: Pt remains free of falls during the night. Bed in the lowest position, items and call light placed within reach, side rails up x2, and bed alarm placed on. Continue hourly rounding. Problem: Pain:  Goal: Pain level will decrease  Description: Pain level will decrease  5/27/2021 0026 by Dionicio Funes RN  Outcome: Ongoing  Note: Pt complains of lower mid back pain. He describes this as a constant aching that does not radiate. Pt request PRN tramadol and lidocaine patches. Medications given and pt is satisfied. Continue rest, repositioning, and evaluating pain during hourly rounding. Problem: Skin Integrity:  Goal: Absence of new skin breakdown  Description: Absence of new skin breakdown  5/27/2021 0026 by Dionicio Funes RN  Outcome: Ongoing  Note: No new skin breakdown noted. Continue q2hr turns, supporting bony prominences with pillows, and elevating the heels off the bed. Continue hourly rounding. Problem: Discharge Planning:  Goal: Discharged to appropriate level of care  Description: Discharged to appropriate level of care  5/27/2021 0026 by Dionicio Funes RN  Outcome: Ongoing  Note: Pt will be D/C to rehab when medically stable. Problem: Impaired respiratory status  Goal: Clear lung sounds  5/27/2021 0026 by Dionicio Funes RN  Outcome: Ongoing  Note: Lung sounds are clear and diminished bilaterally. SpO2 remains >90% on room air. Problem: Serum Glucose Level - Abnormal:  Goal: Ability to maintain appropriate glucose levels has stabilized  Description: Ability to maintain appropriate glucose levels has stabilized  5/27/2021 0026 by Dionicio Funes RN  Outcome: Ongoing  Note: Pt does not receive insulin during the night because order parameters were not met.      Problem: GI  Goal: No bowel complications  2/33/0478 0026 by Dionicio Funes RN  Outcome: Ongoing  Note: Bowel sounds active in all 4 quadrants. Pt has had 3 emesis occurrences during this shift. Continue aspiration precautions and PRN anti-emetic. Problem:   Goal: No urinary complication  5/78/4881 7575 by Ryanne Pa RN  Outcome: Ongoing  Note: Pt is anuric during this shift. This is baseline; continue to monitor. Problem: Cardiac:  Goal: Ability to maintain vital signs within normal range will improve  Description: Ability to maintain vital signs within normal range will improve  5/27/2021 0026 by Ryanne Pa RN  Outcome: Ongoing  Note:   Vitals:    05/26/21 1155 05/26/21 1522 05/26/21 2045 05/26/21 2300   BP: 96/68 98/73 96/74 93/65   Pulse: 100 99 115 115   Resp: 20 20 22 19   Temp: 97.7 °F (36.5 °C) 98.1 °F (36.7 °C) 98.6 °F (37 °C) 98.2 °F (36.8 °C)   TempSrc: Oral Oral Oral Oral   SpO2: 94% 95% 94% 95%   Weight:           Care plan reviewed with patient. Patient verbalize understanding of the plan of care and contribute to goal setting.

## 2021-05-27 NOTE — FLOWSHEET NOTE
[address] [address]  PHYSICAL THERAPY MISSED TREATMENT NOTE  ACUTE CARE  [facility] ICU STEPDOWN TELEMETRY 4K              Missed Treatment  Pt refused session stating \"all i've been doing is throwing up\" pt also stating that his MD does not want him getting up- educated pt on importance of mobility/role of PT/POC -cont to refuse.

## 2021-05-27 NOTE — PROGRESS NOTES
Hospitalist Progress Note      Patient:  Sylvia Live    Unit/Bed:4K-09/009-A  YOB: 1965  MRN: [id number]   Acct: [de-identified]   PCP: Melissa Donohue MD  Date of Admission: 5/20/2021    Assessment/Plan:  1. Hypotension, unspecified. Blood pressure had additional overnight drop. Currently relatively stable at 80/76. Probably secondary to intravascular volume depletion related to cirrhosis. Night team reduce Lopressor dose to 12.5 mg twice daily and add back amiodarone 200 mg daily for rate control as below. Maintain on midodrine 10 mg 3 times daily. If cannot keep blood pressure up adequately, increase dose of midodrine to 15 mg 3 times daily even though this is not typically recommended. Hold parameters for Lopressor. Albumin and cautious fluid boluses as needed. 2. Paroxsymal Atrial Fibrillation/Flutter with rapid ventricular response. On Lopressor and amiodarone for rate control as above. Poor control with a pulse of 122. GI highly recommends discontinuing amiodarone secondary to liver toxicity in a cirrhotic patient. However, patient has poor rate control even with amiodarone and Lopressor. Digoxin a possibility but dosing would be difficult in a cirrhotic patient and is associated with increased mortality in ESRD patients. Few good options. Therefore, will maintain on amiodarone briefly and carefully monitor blood pressure. We will titrate up Lopressor as able and remove amiodarone as able. We will likely have to tolerate borderline control. 3.  Decompensated cirrhosis. Jaundice noted along with scleral icterus. Estimated MELD-NA score of 37 points. Therefore, estimated 65 to 66% 90-day mortality. Ammonia level in the normal range. Maintain on lactulose 45 g daily and rifaximin 5 to 50 mg twice daily to control ammonia levels. Previous ammonia normal and one-time new ammonia level in the a.m.   Schedule biweekly disease. Maintain on tiotropium twice daily with as needed albuterol. No pulmonary function testing in the records. 8.  Back pain, unspecified. Exacerbation with multiple falls. Maintain gabapentin 200 mg twice daily. 9.  Type 2 diabetes. Maintain on medium dose corrective algorithm. Maintain on hypoglycemia protocols. Not on home diabetic medication. Expected discharge date: 5/24/2021    Disposition:    [] Home       [] TCU       [] Rehab       [] Psych       [x] SNF       [] [place]       [] Other-    Chief complaint: Fall from an incline with acute on chronic back pain for 1 day.     Opening statement:  Patient is a 81-year-old male former smoker with a 21-pack-year history and former alcoholic with a past medical history of atrial fibrillation, recent admission for elevated creatinine, diabetes, congestive heart failure, advanced cirrhosis, gallstones, GERD, end-stage renal disease on dialysis, COPD, nursing for chief complaint of fall with acute on chronic back pain for 1 day.     Summary Hospital course:  Came from home for acute on chronic Back Pain with Lower Extremity Weakness. Does not feel safe at home and wants to go to rehab. Had bilateral lower extremity swelling, shortness of breath, and abdominal distention from ascites. Recently discharged from [facility]. Diagnosed with acute respiratory insufficiency secondary to pulmonary edema, atrial flutter, decompensated alcoholic cirrhosis, end-stage renal disease, type 2 diabetes, fall, obesity, and end-stage renal disease. Transferred to [facility] on 5/26/2021 for hypertension. Was confused overnight and pulled out his nasogastric tube and pulled his right dialysis IJ. Worsening nausea and vomiting likely related to accumulation of ascites and underlying ileus. GI consulted provider knows patient well.   They recommended stopping Pepcid, starting on IV PPI, stopping Reglan, keeping n.p.o., adding neostigmine every 6 hours for 3 days, adding tapwater enemas for 3 days, holding narcotics and benzos, and holding nasogastric tube considering history of esophageal varices. Per discussion with care team, patient is awaiting psychological evaluation tomorrow at which point decision will be made next week as to whether he is eligible for liver transplant. Palliative Care consulted and patient will talk to Hospice tomorrow. Borderline vitals with a temperature 98.2, respirate 19, pulse 122, blood pressure 93/72, and SPO2 of 94% on room air. Net fluid intake of 512.1 cc yesterday. No blood transfusions. On rifaximin but no other antibiotics or steroids. Current medications include Zofran 8 mg intravenous every 8 hours, Protonix 40 mg daily, one-time dose of albumin, neostigmine 0.5 mg every 6 hours, Lopressor 12.5 mg twice daily, amiodarone 200 mg daily, Retacrit 5000 units 3 times a week, midodrine 10 mg 3 times daily, medium dose sliding scale insulin, gabapentin 200 mg twice daily, lactulose 45 mg 3 times daily, rifaximin 5 to 50 mg twice daily, and tiotropium 2 puffs twice daily. On continuous infusion of half-normal saline and D5W. Subjective (past 24 hours): The patient states that he is feeling worse with more nausea and vomiting along with an inability to keep things down. Past medical history, family history, social history and allergies reviewed again and is unchanged since admission. ROS:  Review of Systems   Constitutional: Negative for chills and fever. HENT: Negative for sinus pressure, sinus pain, sore throat and trouble swallowing. Eyes: Negative for pain, itching and visual disturbance. Respiratory: Positive for shortness of breath. Negative for chest tightness. Cardiovascular: Positive for leg swelling (But improved). Negative for chest pain and palpitations. Gastrointestinal: Positive for abdominal distention, abdominal pain and nausea. Skin: Positive for color change.  Negative for Comments: Trace bilateral edema significantly improved from before. Pulmonary:      Breath sounds: Decreased breath sounds present. Abdominal:      General: There is distension. Tenderness: There is no abdominal tenderness. There is no right CVA tenderness or left CVA tenderness. Musculoskeletal:      Right lower leg: Edema present. Left lower leg: Edema present. Skin:     Coloration: Skin is jaundiced. Neurological:      Mental Status: He is alert. Psychiatric:         Mood and Affect: Mood normal.         Behavior: Behavior normal.         Labs:   1. Electrolyte panel notable for normal sodium, low chloride, BUN of 29, creatinine 4.1, GFR 15, slightly hypoxia glucose 130, and normal calcium 9.7.  2.  Liver panel notable for normal albumin, highly elevated bilirubin of 8.3 with an elevated direct bilirubin 3.7.  3.  CBC notable for leukopenia, along with a macrocytic anemia with a hemoglobin of 7.3. Platelet count low at 50.  4.  Iron studies notable for an elevated ferritin of 548, an iron of 88, and iron saturation 84%, and a TIBC of less than 105. This indicates anemia chronic disease or more likely anemia of alcoholism. 5.  CT abdomen pelvis from 5/24/2021 shows possible ileus, large abdominal pelvic ascites along with liver cirrhosis, small to moderate bilateral pleural effusions, cholelithiasis, anasarca, and no obstructive uropathy. 6.  NG tube appropriate places of x-ray on 5/25/2021. 7.  MRI lumbar spine shows pathology causing moderate right and left neural foraminal stenosis at level of L5-S1.  8.  Hepatobiliary scan shows no signs of acute cholecystitis. 9.  Previous echo showed normal ejection fraction at [facility] in December 2020 and catheterization at the same time showed essentially no obstruction. 10.  EKG showed atrial fibrillation with rapid ventricular response. 11. INR elevated 2.48.     Support Devices (date placed):  [] ETT []Oral / [] Nasal  []

## 2021-05-27 NOTE — PROGRESS NOTES
[facility]   Nephrology progress note  5/27/2021, 9:43 AM      Pt Name:    Hemalatha Abbasi  MRN:     [id number]     [place]:    1965  Admit Date:    5/20/2021  1:21 PM  Primary Care Physician:  Gregorio Levine MD   Room number  [id number]    Chief Complaint: Nephrology following for KARAN/CKD (not ESRD)    Subjective:  Patient seen and examined  Clinically ill-appearing  Brother at bedside  Patient is vomiting      Objective:  24HR INTAKE/OUTPUT:      Intake/Output Summary (Last 24 hours) at 5/27/2021 0943  Last data filed at 5/27/2021 0318  Gross per 24 hour   Intake 1214.11 ml   Output 702 ml   Net 512.11 ml     I/O last 3 completed shifts: In: 1214.1 [P.O.:300; I.V.:514.1]  Out: 702 [Emesis/NG output:302]  No intake/output data recorded. Admission weight: 266 lb (120.7 kg)  Wt Readings from Last 3 Encounters:   05/27/21 260 lb 5.8 oz (118.1 kg)   05/17/21 246 lb (111.6 kg)   05/06/21 264 lb 6.4 oz (119.9 kg)     Body mass index is 33.43 kg/m².     Physical examination  VITALS:     Vitals:    05/26/21 2045 05/26/21 2300 05/27/21 0315 05/27/21 0831   BP: 96/74 93/65 95/70 81/60   Pulse: 115 115 116 125   Resp: 22 19 17 18   Temp: 98.6 °F (37 °C) 98.2 °F (36.8 °C) 98.4 °F (36.9 °C) 97.8 °F (36.6 °C)   TempSrc: Oral Oral Oral Oral   SpO2: 94% 95% 94%    Weight:   260 lb 5.8 oz (118.1 kg)      General Appearance: alert and cooperative with exam, appears comfortable, no distress, + visibly jaundiced  Mouth/Throat: Oral mucosa moist  Eyes: ++ Icteric sclera  Neck: No JVD  Lungs: Air entry B/L, no rales, no use of accessory muscles, diminished both bases  Heart:  S1, S2 heard  GI: soft, distention      Lab Data  CBC:   Recent Labs     05/26/21  0411 05/27/21  0447   WBC 2.9* 4.3*   HGB 7.5* 7.3*   HCT 23.7* 22.7*   PLT 59* 50*     BMP:  Recent Labs     05/25/21  0549 05/26/21  0411 05/27/21 0447   * 132* 135   K 4.9 4.5 4.2   CL 91* 92* 93*   CO2 25 24 28   BUN 31* 39* 29*   CREATININE 3. 5* 4.5* 4.1*   GLUCOSE 130* 103 123*   CALCIUM 9.7 10.0 9.7   MG  --  2.1  --    PHOS  --  6.1* 4.7     Hepatic:   Recent Labs     05/25/21  0904 05/26/21  0411   LABALBU  --  3.7   AST  --  33   ALT  --  15   BILITOT 10.6* 8.3*   ALKPHOS  --  96         Meds:  Infusion:    sodium chloride       Meds:    epoetin sharonda-epbx  5,000 Units Subcutaneous Once per day on Mon Wed Fri    metoprolol  12.5 mg Oral BID    amiodarone  200 mg Oral Daily    midodrine  10 mg Oral TID WC    metoclopramide  5 mg Intravenous Q6H    famotidine  20 mg Oral Daily    gabapentin  200 mg Oral BID    lactulose  45 g Oral TID    rifaximin  550 mg Oral BID    tiotropium  2 puff Inhalation Daily    sodium chloride flush  10 mL Intravenous 2 times per day    lidocaine  1 patch Transdermal Daily    insulin lispro  0-12 Units Subcutaneous TID WC    insulin lispro  0-6 Units Subcutaneous Nightly     Meds prn: albuterol, sodium chloride flush, sodium chloride, promethazine **OR** ondansetron, traMADol       Impression and Plan:  1. Acute kidney injury on CKD 4  Secondary to HRS  Patient is not ESRD yet  Currently on intermittent hemodialysis 3 times a week  Had hemodialysis treatment yesterday  Heart rate is in 120s to 130s today  Borderline low blood pressure  We will hold dialysis treatment today   Discussed with patient's brother in detail  Will reevaluate in a.m. for possible dialysis needs    2. Hyperkalemia: resolved. Monitor for now  3. Hypotension: BP in 80s, continue with midodrine if able to tolerate, IV albumin prn, asymptomatic  4. Chronic decompensated liver cirrhosis with ascites requiring weekly paracentesis  5. Anemia of multifactorial etiology including renal insufficiency. On SAMMIE  6. History of hepatic encephalopathy. On lactulose and rifaximin as outpatient  7. Back pain/fall  8. Pleural effusion  9. Ileus  10.  Await further decision/plans from hospitalist: They are considering possible transfer to [facility].    D/W patient     Alem Trinh MD  [facility]

## 2021-05-27 NOTE — OP NOTE
Pre-Procedure Diagnosis: Ascites    Procedure Performed:  Paracentesis    Anesthesia: local    Findings: clear yellow serous fluid removed    Immediate Complications:  None    Estimated Blood Loss: minimal    SEE DICTATED PROCEDURE NOTE FOR COMPLETE DETAILS.     Rich Peña MD   5/27/2021 3:53 PM

## 2021-05-27 NOTE — PROGRESS NOTES
[address] THERAPY MISSED TREATMENT NOTE  [facility] ICU STEPDOWN TELEMETRY 4K  1W-68/163-T      Date: 2021  Patient Name: Gracie Yousif        CSN: [id number]   : 1965  (55 y.o.)  Gender: male   Referring Practitioner: Kavitha Hernandes MD  Diagnosis: Fall on [address] TREATMENT: OT treatment attempted, Pt. Declined actively throwing up while in room. Will attempt back later this date as time allows.

## 2021-05-28 NOTE — PROGRESS NOTES
[address] ICU STEPDOWN TELEMETRY 4K  Clinical Swallow Evaluation & d/c       SLP Individual Minutes  Time In: 7216  Time Out: 4904  Minutes: 12  Timed Code Treatment Minutes: 0 Minutes       Date: 2021  Patient Name: Mary Weathers      CSN: [id number]   : 1965  (55 y.o.)  Gender: male   Referring Physician:  Maral Simon MD  Diagnosis: Fall   Secondary Diagnosis: aspiration precautions. History of Present Illness/Injury: Per chart review Cesar Polo is a 56 y/o male who was admitted to [facility] with the above medical dx. Pt has no hx with skilled ST, however, ST order was placed to further assess swallow function and cognition d/t acute confusion. Per ST discussion with GI, ST is okay to complete limited trials, however, GI is very familiar with the pt d/t continuum of care; verifying the pt is no longer confused and is at his baseline. Past Medical History:   Diagnosis Date    Advanced cirrhosis of liver (HCC)     Alcoholic cirrhosis (HCC)     Atrial fibrillation (HCC)     CHF (congestive heart failure) (HCC)     Chronic kidney disease     COPD (chronic obstructive pulmonary disease) (HCC)     Diabetes mellitus (HCC)     Gallstones     GERD (gastroesophageal reflux disease)     Headache     Hydrothorax     Portal hypertension (HCC)        SUBJECTIVE:  Patient is alert, upright in bed for clinical swallow evaluation. Pt was in agreement with GI and deferred the cognitive linguistic evaluation, however, did report some increased SOB while talking (occurring since hx of chest tube). *Pt would benefit from referral to follow-up with OP for formal voice evaluation to address pt concerns for diaphragmatic breathing, coordination, and phonation. Not addressed as a inpatient d/t not acute an onset. OBJECTIVE:    Pain:  No pain reported.     Current Diet: NPO d/t ileus     Respiratory Status:  Independent    Behavioral Observation:  Alert and Oriented    Oral Mechanism Evaluation:      Facial / Labial WFL    Lingual WFL    Dentition WFL    Velum WFL    Vocal Quality WFL    Sensation [facility]    Cough WFL      Patient Evaluated Using: Thin via ice, tsp, cup drink, straw drink (H2O), 1 tsp bite puree, 1 single bite of soft solids. Oral Phase:  WFL    Pharyngeal Phase: WFL:  Pharyngeal phase appears WFL but cannot rule out pharyngeal phase deficits from a bedside swallowing evaluation alone. Signs and Symptoms of Laryngeal Penetration/Aspiration: No signs/symptoms of aspiration evident in this evaluation, but cannot rule out silent aspiration. Impresssions: Pt seen with RN & GI permission on this date. The pt presented with [facility] oropharyngeal phases of the swallow. The pt presented with timely mastication, adequate bolus control/formation, AP movement demonstrated by no anterior loss and/or oral residue. In addition, pt completed BSE with no overt s/s of aspiration/penetration. ST cannot rule out pharyngeal dysfunction at the bedside alone, BUT does NOT clinically feel further evaluation is warranted as the pt is safely consuming a general diet without s/s of distress. ST is recommended to sign-off, no further dysphagia treatment as of 5/28/21. Please re-consult if the pt's medical status changes. Patient diet is okay to be upgrade when cleared by GI s/p resolution of ileus. RECOMMENDATIONS/ASSESSMENT:  Instrumental Evaluation: Instrumental evaluation not indicated at this time. Diet Recommendations:  NPO until cleared by GI   Strategies:  Full Upright Position   Rehabilitation Potential: good    EDUCATION:  Learner: Patient  Education:  Reviewed results and recommendations of this evaluation, Reviewed ST goals and Plan of Care and Reviewed recommendations for follow-up  Evaluation of Education: Verbalizes understanding    PLAN:  No further speech therapy services indicated.    *Pt would eventually benefit from ongoing skilled ST evaluation in

## 2021-05-28 NOTE — CARE COORDINATION
Update: 5/27 Paracentesis = 7.2L removed. Creatinine 4.8; Nephrology following; await Hospice family meeting at [phone];  Attending wants patient to keep Psychiatry, Dr. Charo Posadas @ [phone]; appointment @ [phone]; they will call patient on his phone w text link; await family decision re: Hospice versus [facility]; GI following; collaborated w Attending, Aliya Bo RN, patient  Electronically signed by Hilary Lai RN on 5/28/2021 at 11:32 AM    Update: [facility] not planned for tertiary referral; await therapy for [facility] precert  Electronically signed by Hilary Lai RN on 5/28/2021 at 1:50 PM

## 2021-05-28 NOTE — PROGRESS NOTES
[address] ICU STEPDOWN TELEMETRY 4K  Occupational Therapy  Daily Note  Time:   Time In: 4  Time Out: 1423  Timed Code Treatment Minutes: 32 Minutes  Minutes: 32          Date: 2021  Patient Name: Marivel Sorenson,   Gender: male      Room: [facility]009-A  MRN: [id number]  : 1965  (55 y.o.)  Referring Practitioner: Donna Ramirez MD  Diagnosis: Fall on Incline  Additional Pertinent Hx: Patient c/o worsening bilateral leg edema despite dialysis, difficulty him to move. Patient states he lost his balance because of the weight of his legs, fell and hit his back in his head. He has history of liver failure on the transplant list and renal failure on dialysis. Patient received dialysis today and although he says his legs reviewed significantly in size they are still significantly heavy. Moderate right-sided and small left-sided pleural effusions with adjacent opacities which may represent atelectasis or infiltrates. Restrictions/Precautions:  Restrictions/Precautions: General Precautions, Fall Risk      SUBJECTIVE: Pt seated in bed upon arrival, pleasant and agreeable to OT session. Pt stated his normal systolic BP runs in 31T. RN ok'd session, requesting to monitor BP throughout. Significant jandice noted. Per CM report, Dr. Chris Burger requiring PT/OT to see this date to start precert with CM reporting that MD is aware of low BP and states MD requests to continue therapy. OTR unable to find this documented in any internal med notes. CM reported pt will not be transferred to [facility] and family meeting with hospice later this afternoon is for discussion/answer questions and gain further information only. PAIN: Pt c/o abdominal cramping when seated at EOB. Vitals: Blood Pressure: 76/56 supine with HOB elevated ~30 degrees; EOB drops to 60/44 (BP finally read after pt had been returned to supine) as pt was symptomatic and c/o dizziness and \"ringing\" in ears;  After ~2 minutes in supine with HOB elevated to ~30 degrees, BP 71/56; after ~5 minutes BP 84/61  Heart Rate: HR 100s on arrival; increased to 120s/low 130s at times not directly related to activity (at rest noted to have HR into 120s)    COGNITION: Decreased Recall, Decreased Insight, Impaired Memory, Decreased Problem Solving, Impaired Attention and Tangential    ADL:   Feeding: with set-up. drinking lactulose. BALANCE:  Sitting Balance:  Contact Guard Assistance, Minimal Assistance. at EOB. Pt with head down, eyes closed primarily due to dizziness. Pt only able to tolerate sitting ~15 seconds, some shaking/tremoring noted in LEs, thus returned to supine. BED MOBILITY:  Supine to Sit: Minimal Assistance elevating trunk  Sit to Supine: Moderate Assistance bringing BLEs up into bed    TRANSFERS:  Not appropriate to complete at this time due to BP    FUNCTIONAL MOBILITY:  Not appropriate/safe to complete at this time due to low BP.     ADDITIONAL ACTIVITIES:  Pt completed BUE moderate resistance theraband exercises while supine in bed. Pt completed 1 set X 10 repetitions in all planes with short rest breaks in between variations. Exercises completed to increase overall strength/endurance needed for ADLs and transfers. Fatigue noted at end of exercises with pt only able to complete ~50% of exercises. Education provided to pt/brother re: elevating HOB throughout day to assist with increasing pt's upright tolerance/hopefully to decrease dizziness and drop in BP when attempts transitioning to sitting EOB. Pt/brother verbalized understanding. ASSESSMENT:     Activity Tolerance:  Patient tolerance of  treatment: poor. Pt significantly limited by low BP, symptomatic throughout. Goals may need downgraded if pt continues to be limited by hypotension.       Discharge Recommendations: Subacute/Skilled Nursing Facility, Continue to assess pending progress, Patient would benefit from continued therapy after discharge    Equipment Recommendations: Equipment Needed: No  Plan: Times per week: 3-5x  Current Treatment Recommendations: Strengthening, Balance Training, Functional Mobility Training, Endurance Training, Self-Care / ADL, Patient/Caregiver Education & Training, Safety Education & Training    Patient Education  Patient Education: Role of OT, Plan of Care, Home Exercise Program and upright sitting tolerance    Goals  Short term goals  Time Frame for Short term goals: By Discharge  Short term goal 1: Pt will complete functional [facility] distances with SBA and AD to increase indep with accessing environment for ADLs. Short term goal 2: Pt will complete t/fs to/from various height surfaces with SBA to increase indep with toileting routine. Short term goal 3: Pt will complete dynamic standing task with B hand release and SBA to increase balance required for ADLs. Short term goal 4: Pt will complete LB ADL with [place] to increase indep with self care. Following session, patient left in safe position with all fall risk precautions in place.

## 2021-05-28 NOTE — PROGRESS NOTES
Hospitalist Progress Note      Patient:  Mary Weathers    Unit/Bed:4K-09/009-A  YOB: 1965  MRN: [id number]   Acct: [de-identified]   PCP: Jr Parker MD  Date of Admission: 5/20/2021    Assessment/Plan:  1. Hypotension, unspecified. Blood pressure had additional overnight drop. Currently low at 84/66. Probably secondary to intravascular volume depletion related to cirrhosis. Night team reduce Lopressor dose to 12.5 mg twice daily and add back amiodarone 200 mg daily for rate control as below. Increase midodrine to 50 mg 3 times daily. Hold parameters for Lopressor. Albumin and cautious fluid boluses as needed. 2. Paroxsymal Atrial Fibrillation/Flutter with rapid ventricular response. On Lopressor and amiodarone for rate control as above. Poor control with a pulse of 114. GI recommends discontinuing amiodarone secondary to liver toxicity in a cirrhotic patient. However, patient has poor rate control even with amiodarone and Lopressor. Digoxin a possibility but dosing would be difficult in a cirrhotic patient and is associated with increased mortality in ESRD patients. Few good options. Therefore, will maintain on amiodarone briefly and carefully monitor blood pressure. We will titrate up Lopressor as able and remove amiodarone as able. We will likely have to tolerate borderline control. 3.  Decompensated cirrhosis. Jaundice noted along with scleral icterus. Estimated MELD-NA score of 37 points. Therefore, estimated 65 to 66% 90-day mortality. Ammonia level in the normal range. Maintain on lactulose 45 g daily and rifaximin 5 to 50 mg twice daily to control ammonia levels. Recent ammonia in the normal range. Dramatic improvement in symptoms of nausea and vomiting with paracentesis but with somewhat worsened hypotension. Holding diuretics secondary to hypotension as above. Long-term prognosis is poor.   Liver transplant 5/24/2021    Disposition:    [] Home       [] TCU       [] Rehab       [] Psych       [x] SNF       [] [place]       [] Other-    Chief complaint: Fall from an incline with acute on chronic back pain for 1 day.     Opening statement:  Patient is a 55-year-old male former smoker with a 21-pack-year history and former alcoholic with a past medical history of atrial fibrillation, recent admission for elevated creatinine, diabetes, congestive heart failure, advanced cirrhosis, gallstones, GERD, end-stage renal disease on dialysis, COPD, nursing for chief complaint of fall with acute on chronic back pain for 1 day.     Summary Hospital course:  Came from home for acute on chronic Back Pain with Lower Extremity Weakness. Does not feel safe at home and wants to go to rehab. Had bilateral lower extremity swelling, shortness of breath, and abdominal distention from ascites. Recently discharged from [place] [address]. Diagnosed with acute respiratory insufficiency secondary to pulmonary edema, atrial flutter, decompensated alcoholic cirrhosis, end-stage renal disease, type 2 diabetes, fall, obesity, and end-stage renal disease. Transferred to [facility] on 5/26/2021 for hypotension. Hypertension proved difficult to control and patient  was noted to have small bowel ileus and nausea/vomiting which improved with paracentesis. GI consulted. The provider knows patient well. They recommended stopping Pepcid, starting on IV PPI, stopping Reglan, keeping n.p.o., adding neostigmine every 6 hours for 3 days, adding tapwater enemas for 3 days, holding narcotics and benzos, and holding nasogastric tube considering history of esophageal varices. Patient had discussion with psychologist from [place] [address] and from Hospice and recommendations are pending. The patient had a large bowel movement overnight.     Vitals borderline with a temperature 97.4, respiratory 14, pulse 114, pressure of 84/66, and SPO2 of 96% on room Subcutaneous Q6H    epoetin sharonda-epbx  5,000 Units Subcutaneous Once per day on Mon Wed Fri    metoprolol  12.5 mg Oral BID    amiodarone  200 mg Oral Daily    gabapentin  200 mg Oral BID    lactulose  45 g Oral TID    rifaximin  550 mg Oral BID    tiotropium  2 puff Inhalation Daily    sodium chloride flush  10 mL Intravenous 2 times per day    lidocaine  1 patch Transdermal Daily    insulin lispro  0-12 Units Subcutaneous TID WC    insulin lispro  0-6 Units Subcutaneous Nightly     PRN Meds: albuterol, sodium chloride flush, sodium chloride      Intake/Output Summary (Last 24 hours) at 5/28/2021 1710  Last data filed at 5/28/2021 1412  Gross per 24 hour   Intake 1825.22 ml   Output 1 ml   Net 1824.22 ml       Diet:  Diet NPO Effective Now Exceptions are: Ice Chips, Sips of Water with Meds    Exam:  BP 84/66   Pulse 114   Temp 97.4 °F (36.3 °C) (Oral)   Resp 14   Wt 240 lb 4.8 oz (109 kg)   SpO2 96%   BMI 30.85 kg/m²   Physical Exam  Constitutional:       Appearance: Normal appearance. HENT:      Head: Normocephalic and atraumatic. Right Ear: External ear normal.      Left Ear: External ear normal.      Mouth/Throat:      Mouth: Mucous membranes are dry. Pharynx: Oropharynx is clear. Eyes:      General: Scleral icterus present. Cardiovascular:      Rate and Rhythm: Normal rate. Rhythm irregular. Pulses: Normal pulses. Heart sounds: Normal heart sounds. Comments: Trace bilateral edema significantly improved from before. Pulmonary:      Breath sounds: Decreased breath sounds present. Abdominal:      General: There is distension. Tenderness: There is no abdominal tenderness. There is no right CVA tenderness or left CVA tenderness. Musculoskeletal:      Right lower leg: Edema present. Left lower leg: Edema present. Skin:     Coloration: Skin is jaundiced. Neurological:      Mental Status: He is alert.    Psychiatric:         Mood and Affect: Mood normal. Behavior: Behavior normal.       Labs:   1. Electrolyte panel notable for a low sodium 133, slightly low chloride of 95, normal bicarb 25, a BUN of 37, creatinine 4.8, and a trivially elevated pocket glucose 122.  2.  Liver panel notable for slightly low albumin 3.3, and elevated bilirubin of 6.9, and a low total protein of 4.8. 3.    CBC notable for normal white blood cell count 6.5, macrocytic anemia with a hemoglobin 8.2 and .6, and a low but essentially stable platelet count of 57.  4.  Iron studies notable for an elevated ferritin of 548, an iron of 88, and iron saturation 84%, and a TIBC of less than 105. This indicates anemia chronic disease or more likely anemia of alcoholism. 5.  CT abdomen pelvis from 5/24/2021 shows possible ileus, large abdominal pelvic ascites along with liver cirrhosis, small to moderate bilateral pleural effusions, cholelithiasis, anasarca, and no obstructive uropathy. 6.  NG tube appropriate places of x-ray on 5/25/2021. 7.  MRI lumbar spine shows pathology causing moderate right and left neural foraminal stenosis at level of L5-S1.  8.  Hepatobiliary scan shows no signs of acute cholecystitis. 9.  Previous echo showed normal ejection fraction at [facility] in December 2020 and catheterization at the same time showed essentially no obstruction. 10.  EKG showed atrial fibrillation with rapid ventricular response. 11. INR elevated at 2.76.  12.  New KUB shows paralytic ileus versus obstruction which is not changed significantly. 13.  Recheck ammonia in the normal range.     Support Devices (date placed):  [] ETT []Oral / [] Nasal  [] Gastric Tube [] OG / [] NG    [] Central Venous Line (Specify Site):  [] Urinary Catheter  [] Arterial Line (Specify Site)  [x] Peripheral IV access  [] Other:    DVT prophylaxis: [] Lovenox                                 [] SCDs                                 [] SQ Heparin                                 [] Encourage ambulation           [] Already on Anticoagulation  Elevated INR, anticoagulation would be inappropriate.       Code Status: Full Code    Tele:   [x] yes             [] no    Electronically signed by Yossi Gardiner MD, MPH, [facility] on 5/28/2021 at 5:10 PM   Supervised by Dr. Maritza Chung

## 2021-05-28 NOTE — PROGRESS NOTES
[address] THERAPY MISSED TREATMENT NOTE  STRZ ICU STEPDOWN TELEMETRY 4K  9I-70/143-I      Date: 2021  Patient Name: Wendy Chahal        CSN: [id number]   : 1965  (55 y.o.)  Gender: male   Referring Practitioner: Michelle Guzman MD  Diagnosis: Fall on [address] TREATMENT: Hold Treatment per Nursing. Pt's blood pressure remains low (70s-80s) with RN reporting has family meeting with hospice to further determine POC. Will hold and await further decisions, decrease frequency of OT this date as pt has been unable to tolerate due to medical condition.

## 2021-05-28 NOTE — PROGRESS NOTES
Gastroenterology Progress Note:     Patient Name:  Stella De Los Santos   MRN: [id number]  [id number]  YOB: 1965  Admit Date: 5/20/2021  1:21 PM  Primary Care Physician: Ally Coyne MD   [id number]     Patient seen and examined. 24 hours events and chart reviewed. Subjective: Patient resting in bed, RN present. He had several loose stools overnight, no blood noted. Denies nausea & vomiting. He is much more alert and oriented today. Objective:  BP 80/60   Pulse 115   Temp 97.3 °F (36.3 °C) (Oral)   Resp 14   Wt 240 lb 4.8 oz (109 kg)   SpO2 94%   BMI 30.85 kg/m²     Physical Exam:    General:  Very acutely ill appearing male  HEENT: Atraumatic, normocephalic. Moist oral mucous membranes. Scleral icterus  Neck: Supple without adenopathy, JVD, thyromegaly or masses. Trachea midline. CV: Heart RRR, no murmurs, rubs, gallops. Resp: Even, easy without cough or accessory use. Lungs clear to ascultation bilaterally. Abd: Round, semi-soft, nontender. No hepatosplenomegaly or mass present. Occasional bowel sounds heard to RUQ & RLQ, absent bowel sounds on LUQ & LLQ. Mild distention noted, but improved. Ext:  Without cyanosis, clubbing. BLE edema. Skin: Jaundice, warm, dry. Ecchymosis noted to mid abdomen. Neuro:  Alert, oriented x 3 with no obvious deficits.        Rectal: deferred    Labs:   CBC:   Lab Results   Component Value Date    WBC 6.5 05/28/2021    HGB 8.2 05/28/2021    HCT 25.7 05/28/2021    .6 05/28/2021    PLT 57 05/28/2021     BMP:   Lab Results   Component Value Date     05/28/2021    K 4.3 05/28/2021    K 3.3 05/20/2021    CL 95 05/28/2021    CO2 25 05/28/2021    PHOS 4.7 05/28/2021    BUN 37 05/28/2021    CREATININE 4.8 05/28/2021    CALCIUM 9.3 05/28/2021     PT/INR:   Lab Results   Component Value Date    INR 2.76 05/28/2021     Lipids:   Lab Results   Component Value Date    ALKPHOS 82 05/28/2021    ALT 12 05/28/2021    AST 31 05/28/2021    BILITOT 6.9 05/28/2021    BILIDIR 3.7 05/26/2021    LABALBU 3.3 05/28/2021    LIPASE 27.5 05/20/2021      Ref. Range 5/27/2021 15:50   Character, Body Fluid Unknown CLOUDY   Glucose, Fluid Latest Units: mg/dl 126   LD, Fluid Latest Units: U/L 105   Protein, Fluid Latest Units: gm/dl 1.6   Amylase, Fluid Latest Units: U/L 5   Albumin, Fluid Latest Units: gm/dl 1.1   Body Fluid RBC Latest Units: /cumm < 2000   Mesothelial Cells Body Fluid Unknown 1+   Mononuclear Cells Body Fluid Latest Units: % 61.4   Polymorphonuclear Cells Body Fluid Latest Units: % 38.6   Total Nucleated cells Body Fluid Latest Ref Range: 0 - 500 /cumm 87   Total Volume Received Body Fluid Latest Units: ml 70.0     Significant Diagnostic Studies:   US paracentesis 05/27/21  FLUID WITHDRAWN: 7.2 L clear yellow fluid   ESTIMATED BLOOD LOSS: Minimal       PROCEDURE: Signed informed consent was obtained prior to performing this procedure.       The abdomen was initially evaluated sonographically to determine appropriate puncture site. The skin was marked, prepped, and draped in a sterile fashion. Following local anesthesia and utilizing aseptic technique, a 5 Indian one-step catheter was    successfully inserted into the ascitic fluid as indicated above. Ascitic  fluid in the amount as above was then aspirated and the needle was removed. A sample of the fluid was sent to laboratory for diagnostic testing.  Post procedure sonographic images    reveal no significant residual ascites. The patient tolerated the procedure well.           Impression   Status post paracentesis     Current Meds:  Scheduled Meds:   pantoprazole  40 mg Intravenous Daily    ondansetron  8 mg Intravenous Q8H    neostigmine  0.5 mg Subcutaneous Q6H    epoetin sharonda-epbx  5,000 Units Subcutaneous Once per day on Mon Wed Fri    metoprolol  12.5 mg Oral BID    amiodarone  200 mg Oral Daily    midodrine  10 mg Oral TID WC    gabapentin  200 mg Oral BID    lactulose  45 g Oral TID   Micheal Diggs rifaximin  550 mg Oral BID    tiotropium  2 puff Inhalation Daily    sodium chloride flush  10 mL Intravenous 2 times per day    lidocaine  1 patch Transdermal Daily    insulin lispro  0-12 Units Subcutaneous TID WC    insulin lispro  0-6 Units Subcutaneous Nightly     Continuous Infusions:   dextrose 5 % and 0.9 % NaCl 25 mL/hr at 05/27/21 2041    sodium chloride         Assessment:  54 yo M admitted 05/20/21 for a fall at home. H/O ESRD, started on dialysis 04/2021. H/O end stage alcoholic cirrhosis with recurrent right hepatic hydrothorax requiring thoracentesis. Follows at [facility], working on getting on transplant list. H/O chronic anemia, coagulopathy, and thrombocytopenia from his ESLD, on Retacrit. H/O orthostatic hypotension in spite of being on Midodrine. H/O frequent falls and frequent hospitalizations. Endorses intractable n/v. CT A/P 05/24/21 demonstrated diffusely dilated small bowel loops ileus vs partial/early small bowel obstruction, large volume abdominal & pelvic ascites with diffuse mesenteric edema, liver cirrhosis, splenomegaly, & portosystemic collaterals, small to moderate bilateral pleural effusions. Underwent paracentesis 05/21/21 with 6.2L removed, no labs ordered on the fluid. NGT x 2, both came out. Last EGD 11/2020 demonstrated esophageal varices. US paracentesis 05/27/21 with 7.2L removed, even though order state limit of 5L, negative for SBP. 1. S/P fall at home  2. Decompensated end stage alcoholic liver cirrhosis with ascites- s/p paracentesis 05/21/21, no labs done, US paracentesis 05/27/21 7.2L removed, negative for SBP- MELD 38, MELD Na 38  3. Intractable n/v- secondary to ileus  4. Ileus- noted on imaging & exam  5. H/O right recurrent hepatic hydrothorax  6. Coagulopathy  7. Hepatic encephalopathy- unable to keep meds down  8. Hyperbilirubinemia  9. Chronic anemia  10. Pancytopenic  11. ESRD on HD  12. Chronic hypotension- on Midodrine  13. Aflutter/afib- on Amio  14. CHF  15. GERD  16. COPD  16. DM  18. H/O esophageal varices on EGD 11/2020     Plan:    · Monitor H & H, transfuse prn  · Nursing to monitor stool output & document  · Continue IVP PPI daily  · Continue Zofran 8mg Qhrs   · NPO except ice chips  · Continue Neostigmine Q6hrs for 3 days  · Continue Tap water enema BID for 3 days  · KUB  · NO NGT given h/o esophageal varices  · Monitor HFP & INR daily  · Avoid hepatotoxic meds, stop/change Amio if okay with primary  · Avoid narcotics & benzodiazepines as they can precipitate HE, will stop Ultram. Tylenol prn  · Daily weights  · Strict I & O  · Electrolyte management per nephrology  · Continue Xifaxan BID  · Continue Lactulose TID  · Continue Midodrine TID  · Nephrology on board  · Hospice consult per primary, however patient is hopeful for a liver transplant  · He is having a telemedicine visit today with [facility], last visit to see if he will be placed on the transplant list  · Case discussed at length with primary Dr. Mihcael Tolbert  · Supportive care per primary team  Will follow    Case reviewed and impression/plan reviewed in collaboration with Dr. Jennifer Daniels  Electronically signed by FELIX David CNP on 5/28/2021 at 9:06 AM    [facility]     If there are any questions or concerns this weekend, please call Dr. Jennifer Daniels as he is covering for [facility].

## 2021-05-28 NOTE — PLAN OF CARE
Problem: Pain:  Goal: Pain level will decrease  Description: Pain level will decrease  Outcome: Met This Shift     Problem: Skin Integrity:  Goal: Absence of new skin breakdown  Description: Absence of new skin breakdown  Outcome: Met This Shift

## 2021-05-28 NOTE — PROGRESS NOTES
[address]  PHYSICAL THERAPY MISSED TREATMENT NOTE  STRZ ICU STEPDOWN TELEMETRY 4K    Date: 2021  Patient Name: Wendy Chahal        MRN: [id number]   : 1965  (55 y.o.)  Gender: male   Referring Practitioner: Michelle Guzman MD  Diagnosis: fall on/from incline         REASON FOR MISSED TREATMENT:   .      Hold Treatment per nursing as pts blood pressure remains low (70s-80s) and family meeting with hospice as well. Will check back next available date with the direction of plan of care.

## 2021-05-28 NOTE — PROGRESS NOTES
Nephrology Progress Note    Patient - Kimmie Stone   MRN -  [id number]   Acct # - [de-identified]      - 1965    55 y.o. Admit Date: 2021  Hospital Day: 7  Location: [facility]  Date of evaluation -  2021    Subjective:   CC: back pain  Denies shortness of breath on Room air   BP runs low   Freq stools overnight  UOP unknown, Urine with stool? ?   BP Range: Systolic (30PAC), FIT:54 , Min:76 , PHE:05      Diastolic (25PEZ), AYK:80, Min:56, Max:82    Objective:   VITALS:  BP    Pulse 114   Temp 98.5 °F (36.9 °C) (Oral)   Resp 18   Wt 240 lb 4.8 oz (109 kg)   SpO2 97%   BMI 30.85 kg/m²    Patient Vitals for the past 24 hrs:   BP Temp Temp src Pulse Resp SpO2 Weight   21 0630  -- -- 114 -- -- --   21 0430 (!)  -- -- 115 -- -- --   21 0305 -- -- -- -- -- -- 240 lb 4.8 oz (109 kg)   21 0200  -- -- 109 -- -- --   21 0100  -- -- 116 18 -- --   21 2345  -- -- 114 18 97 % --   21 2230 81/ -- -- 111 18 -- --   21 194 (!) 80/58 98.5 °F (36.9 °C) Oral 115 20 -- --   21 1845 (!)  -- -- -- -- -- --   21 1711 82/67 -- -- -- -- -- --   21 1700 (!)  -- -- -- -- -- --   21 1522 93/72 98.2 °F (36.8 °C) Oral 122 19 94 % --   21 1059 94/82 97.6 °F (36.4 °C) Axillary 124 15 94 % --   21 0831 81/60 97.8 °F (36.6 °C) Oral 125 18 -- --          Intake/Output Summary (Last 24 hours) at 2021 0741  Last data filed at 2021 0303  Gross per 24 hour   Intake 1261.72 ml   Output 1 ml   Net 1260.72 ml       Admission weight: 266 lb (120.7 kg)  Patient Vitals for the past 96 hrs (Last 3 readings):   Weight   21 0305 240 lb 4.8 oz (109 kg)   21 0315 260 lb 5.8 oz (118.1 kg)   21 1145 254 lb 10.1 oz (115.5 kg)     Body mass index is 30.85 kg/m². EXAM:  CONSTITUTIONAL:  No acute distress. Pleasant  HEENT:  Head is normocephalic, Extraocular movement intact. Neck is supple.

## 2021-05-28 NOTE — CARE COORDINATION
5/28/21, 3:43 PM EDT    DISCHARGE PLANNING EVALUATION      [facility] to start precert on patient if they are willing to accept. SW notified that PT and OT had seen patient.

## 2021-05-28 NOTE — FLOWSHEET NOTE
[facility]      Patient: Sara Contrersa  Room #: 9R-94/015-M            YOB: 1965  Age: 55 y.o. Gender: male            Admit Date & Time: 5/20/2021  1:21 PM    Assessment:  Pt was laying in bed with eye mask on, which he removed when  entered the room. There was a telehealth device present. Pt was very slow in answering questions and gave very terse answers. Pt stated that his siblings were supportive of him. Pt's phone went off twice during the encounter and pt seemed distracted. Interventions:   provided a listening and supportive presence and prayed with the pt for himself and his family. Outcomes:  Pt expressed gratitude for the encounter. Plan:  1. Provide spiritual care and support. 2.  Explore with pt his feelings about his extended hospital stay and chronic health issues.     Electronically signed by Poly Tobias on 5/27/2021 at 9:42 PM.  [address]  [phone]       05/27/21 1900   Encounter Summary   Services provided to: Patient   Referral/Consult From: [facility] Family members   [facility]   (none)   Continue Visiting Yes  (5/27)   Complexity of Encounter Moderate   Length of Encounter 15 minutes   Routine   Type Follow up   Assessment Approachable;Coping   Intervention Explored coping resources;Prayer;Sustaining presence/ Ministry of presence   Outcome Acceptance;Comfort

## 2021-05-29 NOTE — PROGRESS NOTES
Lizzy, patient's brother, was called at [phone] and made aware of transfer and time that [facility] will come to get patient.

## 2021-05-29 NOTE — PROGRESS NOTES
Moderate diffuse paralytic ileus versus partial mechanical small bowel obstruction. Appearance not appreciably changed from prior study       Current Meds:  Scheduled Meds:   midodrine  20 mg Oral TID WC    pantoprazole  40 mg Intravenous Daily    ondansetron  8 mg Intravenous Q8H    neostigmine  0.5 mg Subcutaneous Q6H    epoetin sharonda-epbx  5,000 Units Subcutaneous Once per day on Mon Wed Fri    metoprolol  12.5 mg Oral BID    amiodarone  200 mg Oral Daily    gabapentin  200 mg Oral BID    lactulose  45 g Oral TID    rifaximin  550 mg Oral BID    tiotropium  2 puff Inhalation Daily    sodium chloride flush  10 mL Intravenous 2 times per day    lidocaine  1 patch Transdermal Daily    insulin lispro  0-12 Units Subcutaneous TID WC    insulin lispro  0-6 Units Subcutaneous Nightly     Continuous Infusions:   sodium chloride         Assessment:    54 yo M admitted 05/20/21 for a fall at home. H/O ESRD, started on dialysis 04/2021. H/O end stage alcoholic cirrhosis with recurrent right hepatic hydrothorax requiring thoracentesis. Follows at [facility], for transplant eval. H/O chronic anemia, coagulopathy, and thrombocytopenia from his ESLD, on Retacrit. H/O orthostatic hypotension in spite of being on Midodrine. H/O frequent falls and frequent hospitalizations. Endorses intractable n/v. CT A/P 05/24/21 demonstrated diffusely dilated small bowel loops ileus vs partial/early small bowel obstruction, large volume abdominal & pelvic ascites with diffuse mesenteric edema, liver cirrhosis, splenomegaly, & portosystemic collaterals, small to moderate bilateral pleural effusions. Underwent paracentesis 05/21/21 with 6.2L removed, no labs ordered on the fluid. NGT x 2, both came out. Last EGD 11/2020 demonstrated esophageal varices. US paracentesis 05/27/21 with 7.2L removed, even though order state limit of 5L, negative for SBP. Waiting on bed at [facility]      1. S/P fall at home  2.  Decompensated end stage alcoholic liver cirrhosis with ascites- s/p paracentesis 05/21/21, no labs done, US paracentesis 05/27/21 7.2L removed, negative for SBP- MELD 38, MELD Na 38  3. Intractable n/v- secondary to ileus  4. Ileus- noted on imaging & exam  5. H/O right recurrent hepatic hydrothorax  6. Coagulopathy  7. Hepatic encephalopathy- unable to keep meds down  8. Hyperbilirubinemia  9. Chronic anemia  10. Pancytopenic  11. ESRD on HD  12. Chronic hypotension- on Midodrine  13. Aflutter/afib- on Amio  14. CHF  15. GERD  16. COPD  16. DM  18. H/O esophageal varices on EGD 11/2020      Plan:    Monitor H & H, transfuse prn  Nursing to monitor stool output & document  Continue IVP PPI daily  Continue Zofran 8mg Qhrs   NPO except ice chips  Continue Neostigmine Q6hrs for 3 days  Continue Tap water enema BID for 3 days  KUB  No NGT given h/o esophageal varices  Monitor HFP & INR daily  Avoid hepatotoxic meds, stop/change Amio if okay with primary  Avoid narcotics & benzodiazepines as they can precipitate HE, will stop Ultram. Tylenol prn  Daily weights  Strict I & O  Electrolyte management per nephrology  Continue Xifaxan BID  Continue Lactulose TID  Continue Midodrine TID  Nephrology on board  Hospice consult per primary, however patient is hopeful for a liver transplant  Waiting on bed with [facility] for liver transplant and SI (? Does patient have suicidal ideation? Or is this an error in documentation). Had Psych evaluation yesterday from Transplant team. However, unable to view any documentation from that visit or suicidal ideation.    Case discussed with primary Dr. Rocael Jack and RN  Supportive care per primary team    Will follow     Case reviewed and impression/plan reviewed in collaboration with Dr. Charles Alcaraz  Electronically signed by FELIX Zurita - CNP on 5/29/2021 at 12:57 PM    [facility]

## 2021-05-29 NOTE — SIGNIFICANT EVENT
Significant Event Note    Name: Mary Weathers  : 1965  MRN: [id number]  Date of Service: 21      5:34 AM: This physician was contacted via Am[employer] system by nurse Elmira Cervantes stating that [facility] transplant center called Saeed Smith [phone]) to inform that the patient will be accepted for transplant evaluation and transfer will need to be initiated. This physician contacted the [facility], transfer was initiated, and contact was made with the transplant team at [facility]. Patient demographics, diagnosis, explanation for need of higher level of care, current vitals, and medications were relayed to the team for evaluation. The transplant team stated they will staff with the transplant physician and provide a return call with more information on acceptance.       Electronically signed by Vishal Howell DO on 2021 at 5:34 AM

## 2021-05-29 NOTE — PROGRESS NOTES
Hospitalist Progress Note      Patient:  Julianne Aas    Unit/Bed:4K-09/009-A  YOB: 1965  MRN: [id number]   Acct: [de-identified]   PCP: Ayush Cline MD  Date of Admission: 5/20/2021    Special note: If further deterioration in blood pressure and/or increase in BUN on electrolyte recheck tomorrow morning and if not transferred to [facility], transferr to the unit for pressors and/or continuous renal replacement therapy. Assessment/Plan:  1. Hypotension, unspecified. Blood pressure had additional overnight drop. Currently low at 77/53. Probably secondary to intravascular volume depletion related to cirrhosis. Night team reduced Lopressor dose to 12.5 mg twice daily and added back amiodarone 200 mg daily for rate control as below. Increase midodrine to 20 mg 3 times daily. Hold parameters for Lopressor. Albumin and cautious fluid boluses as needed. 2. Paroxsymal Atrial Fibrillation/Flutter with rapid ventricular response. On Lopressor and amiodarone for rate control as above. Poor control with a pulse of 117. GI recommends discontinuing amiodarone secondary to liver toxicity in a cirrhotic patient. However, patient has poor rate control even with amiodarone and Lopressor. Digoxin a possibility but dosing would be difficult in a cirrhotic patient and is associated with increased mortality in ESRD patients. Few good options. Therefore, will maintain on amiodarone briefly and carefully monitor blood pressure. We will titrate up Lopressor as able and remove amiodarone as able. We will likely have to tolerate borderline control. 3.  Decompensated cirrhosis. Jaundice noted along with scleral icterus. Estimated MELD-NA score of 37 points. Therefore, estimated 65 to 66% 90-day mortality. Ammonia level now elevated. Daily ammonia checks.   Maintain on lactulose 45 g 3 times daily and rifaximin 550 mg twice daily to control ammonia levels. Worsening symptoms of nausea and vomiting along with increased abdominal distention. Holding diuretics secondary to hypotension as above. Long-term prognosis is poor. Has opted for liver transplant and [place] has accepted for transfer for transplant evaluation pending bed availability. If further deterioration in blood pressure and/or increase in BUN on electrolyte recheck tomorrow morning and if not transferred to [facility], transferr to the unit for pressors and/or continuous renal replacement therapy. GI Recommendations: They recommended stopping Pepcid, starting on IV PPI, stopping Reglan, keeping n.p.o., adding neostigmine every 6 hours for 3 days, holding narcotics and benzos, and holding all nasogastric tube considering history of esophageal varices. 3.  End-stage renal disease. Secondary to previous hepatorenal syndrome. Treated previous KARAN on stage III CKD with octreotide, midodrine, and IV albumin without success. Poor prospects for kidney recovery. Phosphorus levels have normalized so no need for phosphate binders at this time. Nephrology to follow to manage dialysis. 4.  Small bowel ileus. Seen on abdominal imaging. Keep patient n.p.o except for ice chips and sips of water with medications. 5.  Macrocytic anemia, unspecified. Appears to be acute on chronic. Normal iron, high iron saturation, and low total iron-binding capacity makes iron deficiency anemia unlikely. Probably secondary to cirrhosis and to kidney disease. Folate low normal range and vitamin B12 elevated. Monitor daily CBC. Maintain on Retacrit 5000 units Monday Wednesday Friday. 6.  Hypoosmolality and hyponatremia. Worsening, no longer on half-normal saline. Appears to be secondary to be chronic and secondary to cirrhosis. Monitor with daily BMP. 7.  Chronic obstructive pulmonary disease. Maintain on tiotropium twice daily with as needed albuterol.   No pulmonary function testing in the records. 8.  Back pain, unspecified. Exacerbation with multiple falls. Maintain on gabapentin 200 mg twice daily. 9.  Type 2 diabetes. Maintain on medium dose corrective algorithm. Maintain on hypoglycemia protocols. Not on home diabetic medication. Expected discharge date: 5/24/2021    Disposition:    [] Home       [] TCU       [] Rehab       [] Psych       [x] SNF       [] [facility]       [] Other-    Chief complaint: Fall from an incline with acute on chronic back pain for 1 day.     Opening statement:  Patient is a 42-year-old male former smoker with a 21-pack-year history and former alcoholic with a past medical history of atrial fibrillation, recent admission for elevated creatinine, diabetes, congestive heart failure, advanced cirrhosis, gallstones, GERD, end-stage renal disease on dialysis, COPD, nursing for chief complaint of fall with acute on chronic back pain for 1 day.     Summary Hospital course:  Came from home for acute on chronic Back Pain with Lower Extremity Weakness. Does not feel safe at home and wants to go to rehab. Had bilateral lower extremity swelling, shortness of breath, and abdominal distention from ascites. Recently discharged from [place]. Diagnosed with acute respiratory insufficiency secondary to pulmonary edema, atrial flutter, decompensated alcoholic cirrhosis, end-stage renal disease, type 2 diabetes, fall, obesity, and end-stage renal disease. Transferred to [facility] on 5/26/2021 for hypotension. Hypertension proved difficult to control and patient  was noted to have small bowel ileus and nausea/vomiting which improved with paracentesis. GI consulted. The provider knows patient well.   They recommended stopping Pepcid, starting on IV PPI, stopping Reglan, keeping n.p.o., adding neostigmine every 6 hours for 3 days, adding tapwater enemas for 3 days, holding narcotics and benzos, and holding nasogastric tube considering history of esophageal varices. [facility] stated they can take the patient to evaluate him for transplant pending available bed. Blood pressure and tachycardia continue to be borderline. Vitals notable for temperature 97.5, respiratory rate of 14, pulse of 117, blood pressure of 77/53, and SPO2 of 97% on room air. On rifaximin. One-time dose of dexamethasone. Net fluid intake of 646.9 cc yesterday. No blood transfusions. Current medications include midodrine 20 mg 3 times daily, Zofran 8 mg every 8 hours, Protonix 40 mg daily, neostigmine 0.5 mg every 6 hours, Lopressor 25 mg twice daily, amiodarone 200 mg daily, Retacrit 5000 units 3 times a week, medium dose sliding scale insulin, gabapentin 200 mg twice daily, lactulose 45 g 3 times daily, rifaximin 550 mg twice daily, lidocaine patch, and tiotropium 2 puffs daily. Subjective (past 24 hours): The patient states that he is feeling worse with more nausea and vomiting. Extensive discussion had with patient and family with regards to treatment options and long-term prognosis. Patient affirms that he wants to pursue full aggressive therapy. Past medical history, family history, social history and allergies reviewed again and is unchanged since admission. ROS:  Review of Systems   Constitutional: Negative for chills and fever. HENT: Negative for sinus pressure, sinus pain, sore throat and trouble swallowing. Eyes: Negative for pain, itching and visual disturbance. Respiratory: Positive for shortness of breath. Negative for chest tightness. Cardiovascular: Negative for chest pain and palpitations. Leg swelling: But improved. Gastrointestinal: Positive for abdominal distention, abdominal pain and nausea. Genitourinary: Negative for flank pain. Skin: Positive for color change. Negative for rash. Neurological: Positive for dizziness (But less than previous) and headaches.    Hematological: Bruises/bleeds Tenderness: There is no abdominal tenderness. There is no right CVA tenderness or left CVA tenderness. Skin:     Coloration: Skin is jaundiced. Neurological:      Mental Status: He is alert. Psychiatric:         Mood and Affect: Mood normal.         Behavior: Behavior normal.       Labs:   1. Electrolyte panel notable for a low sodium of 129, slightly low chloride of 95, low bicarb of 21, a BUN of 43, creatinine 5.5, and a trivially elevated pocket glucose 106.  2.  Liver panel notable for slightly low albumin 3.0, an elevated bilirubin of 7.0, and a low total protein of 4.7.  3.    CBC notable for normal white blood cell count 6.5, macrocytic anemia with a hemoglobin 8.2 and .6, and a low but essentially stable platelet count of 57.  4.  Iron studies notable for an elevated ferritin of 548, an iron of 88, and iron saturation 84%, and a TIBC of less than 105. This indicates anemia of chronic disease or more likely anemia of liver disease. 5.  CT abdomen pelvis from 5/24/2021 shows possible ileus, large abdominal pelvic ascites along with liver cirrhosis, small to moderate bilateral pleural effusions, cholelithiasis, anasarca, and no obstructive uropathy. 6.  NG tube appropriate places of x-ray on 5/25/2021. 7.  MRI lumbar spine shows pathology causing moderate right and left neural foraminal stenosis at level of L5-S1.  8.  Hepatobiliary scan shows no signs of acute cholecystitis. 9.  Previous echo showed normal ejection fraction at [facility] in December 2020 and catheterization at the same time showed essentially no obstruction. 10.  EKG showed atrial fibrillation with rapid ventricular response. 11. INR elevated at 2.76.  12.  New KUB shows paralytic ileus versus obstruction which is not changed significantly. 13.  Recheck ammonia elevated at 78.     Support Devices (date placed):  [] ETT []Oral / [] Nasal  [] Gastric Tube [] OG / [] NG    [] Central Venous Line (Specify Site):  [] Urinary Catheter  [] Arterial Line (Specify Site)  [x] Peripheral IV access  [] Other:    DVT prophylaxis: [] Lovenox                                 [] SCDs                                 [] SQ Heparin                                 [] Encourage ambulation           [] Already on Anticoagulation  Elevated INR, anticoagulation would be inappropriate.       Code Status: Full Code    Tele:   [x] yes             [] no    Electronically signed by Parker Hedrick MD, MPH, [facility] on 5/29/2021 at 4:12 PM   Supervised by Dr. Jose May

## 2021-05-29 NOTE — PROGRESS NOTES
Message I sent to DR. Story   [phone] From: Dionicio Powell RN [facility] Unit 4K RE: [facility] Transplant team called patient from [phone] her name is Fabrizio Dickinson she want patient to be transferred tomorrow to [facility] so they can stabilize patient and get him ready for a liver kidney transplant. They stated he must be transferred tomorrow in order to qualify.   Read 10:18 PM     Dr. Shonda Gutierrez stated he will evaluate and follow up

## 2021-05-29 NOTE — PROGRESS NOTES
Renal Progress Note    Assessment and Plan:    1. End-stage renal disease on hemodialysis. 2.  Alcoholic liver cirrhosis  3. Chronic ascites  4. Deconditioning  5. Atrial fibrillation with controlled ventricular rate  6. Hyponatremia multifactorial  7. Metabolic acidosis  8. Hypotension  9. Diabetes mellitus type 2  PLAN:   Labs reviewed  Medications reviewed  I had increased midodrine from 15 mg to 20 mg 3 times a day  Blood pressure remains low despite that  Unable to do hemodialysis today due to low blood pressure  Will monitor closely      Patient Active Problem List:     Pleural effusion associated with hepatic disorder     Acute on chronic diastolic congestive heart failure (HCC)     Alcoholic cirrhosis of liver with ascites (HCC)     Essential hypertension     Thrombocytopenia (HCC)     Type 2 diabetes mellitus without complication (HCC)     Acute kidney injury ([address]     Acute kidney injury superimposed on CKD (HCC)     Coagulopathy (HCC)     Chronic diastolic congestive heart failure (HCC)     Lower leg edema     Class 1 obesity without serious comorbidity with body mass index (BMI) of 30.0 to 30.9 in adult     Chronic liver failure without hepatic coma (HCC)     Hydrothorax     Decompensated hepatic cirrhosis (HCC)     Advanced cirrhosis of liver (HCC)     Permanent atrial fibrillation (HCC)     Sympathotonic orthostatic hypotension     Orthostatic dizziness     Fall     Degenerative cervical spinal stenosis     Hypo-osmolality and hyponatremia     Asymptomatic cholelithiasis     Weakness generalized     KARAN (acute kidney injury) ([address]     Epistaxis     Urinary retention     Fall (on)(from) incline, initial encounter     Acute exacerbation of chronic low back pain     Decompensation of cirrhosis of liver (HCC)     Neural foraminal stenosis of lumbar spine     Nausea and vomiting in adult     Adynamic ileus (HCC)     CKD (chronic kidney disease), stage IV (HCC)     Anemia in stage 4 chronic kidney disease [facility])      Subjective:   Admit Date: 5/20/2021    Interval History:   Seen for end-stage kidney disease on hemodialysis  Awake and alert this morning  No complaint  Blood pressure remains low      Medications:   Scheduled Meds:   albumin human  25 g Intravenous Once    midodrine  20 mg Oral TID WC    pantoprazole  40 mg Intravenous Daily    ondansetron  8 mg Intravenous Q8H    neostigmine  0.5 mg Subcutaneous Q6H    epoetin sharonda-epbx  5,000 Units Subcutaneous Once per day on Mon Wed Fri    metoprolol  12.5 mg Oral BID    amiodarone  200 mg Oral Daily    gabapentin  200 mg Oral BID    lactulose  45 g Oral TID    rifaximin  550 mg Oral BID    tiotropium  2 puff Inhalation Daily    sodium chloride flush  10 mL Intravenous 2 times per day    lidocaine  1 patch Transdermal Daily    insulin lispro  0-12 Units Subcutaneous TID     insulin lispro  0-6 Units Subcutaneous Nightly     Continuous Infusions:   sodium chloride         CBC:   Recent Labs     05/27/21 0447 05/28/21  0540 05/29/21 0423   WBC 4.3* 6.5 9.3   HGB 7.3* 8.2* 8.8*   PLT 50* 57* 65*     CMP:    Recent Labs     05/27/21 0447 05/28/21  0540 05/29/21  0423    133* 129*   K 4.2 4.3 4.2   CL 93* 95* 91*   CO2 28 25 21*   BUN 29* 37* 43*   CREATININE 4.1* 4.8* 5.5*   GLUCOSE 123* 101 96   CALCIUM 9.7 9.3 9.2   LABGLOM 15* 13* 11*     Troponin: No results for input(s): TROPONINI in the last 72 hours. BNP: No results for input(s): BNP in the last 72 hours. INR:   Recent Labs     05/27/21 0927 05/28/21  0540 05/29/21  0423   INR 2.48* 2.76* 2.76*     Lipids: No results for input(s): CHOL, LDLDIRECT, TRIG, HDL, AMYLASE, LIPASE in the last 72 hours. Liver:   Recent Labs     05/29/21 0423   AST 38   ALT 14   ALKPHOS 88   PROT 4.7*   LABALBU 3.0*   BILITOT 7.0*     Iron:  No results for input(s): IRONS, FERRITIN in the last 72 hours.     Invalid input(s): LABIRONS  XR ABDOMEN (KUB) (SINGLE AP VIEW)   Final Result   Moderate diffuse paralytic ileus versus partial mechanical small bowel obstruction. Appearance not appreciably changed from prior study            **This report has been created using voice recognition software. It may contain minor errors which are inherent in voice recognition technology. **         Final report electronically signed by Dr. Ana Ramirez on 5/28/2021 12:05 PM      [address]   Final Result   Status post paracentesis            **This report has been created using voice recognition software. It may contain minor errors which are inherent in voice recognition technology. **      Final report electronically signed by Dr. Ana Ramirez on 5/27/2021 4:54 PM      XR ABDOMEN FOR NG/OG/NE TUBE PLACEMENT   Final Result   NG tube tip in stomach. **This report has been created using voice recognition software. It may contain minor errors which are inherent in voice recognition technology. **      Final report electronically signed by Dr. Ana Ramirez on 5/25/2021 4:45 PM      XR ABDOMEN FOR NG/OG/NE TUBE PLACEMENT   Final Result   NG tube satisfactory. This document has been electronically signed by: Kirstie Vivas MD on    05/25/2021 05:39 AM      CT ABDOMEN PELVIS WO CONTRAST Additional Contrast? Oral   Final Result   Impression:   1. Diffusely dilated small bowel loops with no definite transition point    suggests ileus. Partial/early distal small bowel obstruction considered    less likely but not excluded. Recommend serial abdominal radiographs to    assess progression of enteric contrast.   2.  Large volume abdominal and pelvic ascites with diffuse mesenteric    edema. Liver cirrhosis, splenomegaly and evidence for portosystemic    collaterals. 3.  Small to moderate bilateral pleural effusions with adjacent    compressive atelectasis and/or consolidation. 4.  Cholelithiasis. 5.  Anasarca. 6.  No acute obstructive uropathy. Nonobstructing right nephrolithiasis. recognition software. It may contain minor errors which are inherent in voice recognition technology. **      Final report electronically signed by Dr Mami Ervin on 5/20/2021 3:01 PM      XR CHEST PORTABLE   Final Result   Moderate right-sided and small left-sided pleural effusions with adjacent opacities which may represent atelectasis or infiltrates. **This report has been created using voice recognition software. It may contain minor errors which are inherent in voice recognition technology. **      Final report electronically signed by Dr Mami Ervin on 5/20/2021 2:20 PM            Objective:   Vitals: BP (!) 70/51   Pulse 128   Temp 97.9 °F (36.6 °C) (Oral)   Resp 13   Wt 248 lb 3.8 oz (112.6 kg)   SpO2 92%   BMI 31.87 kg/m²    Wt Readings from Last 3 Encounters:   05/29/21 248 lb 3.8 oz (112.6 kg)   05/17/21 246 lb (111.6 kg)   05/06/21 264 lb 6.4 oz (119.9 kg)      24HR INTAKE/OUTPUT:      Intake/Output Summary (Last 24 hours) at 5/29/2021 1004  Last data filed at 5/29/2021 0931  Gross per 24 hour   Intake 656.9 ml   Output 0 ml   Net 656.9 ml       Constitutional: Well-developed middle-aged gentleman alert, awake, no apparent distress   Skin:normal with no rash or lesions. HEENT:Pupils are reactive . Throat is clear . Oral mucosa is moist   Neck:supple with no thyromegaly or carotid bruit  Cardiovascular: Irregularly irregular  Respiratory:  Clear to ausculation without wheezes, rhonchi or rales. Abdomen: +bs, soft, no tenderness to palpation and no palpable mass. No abdominal bruit   Ext: No LE edema  Musculoskeletal:Intact  Neuro:Alert and awake. Well oriented with no focal deficit. Speech is normal      Electronically signed by Lauren Jalloh MD on 5/29/2021 at 10:04 AM  **This report has been created using voice recognition software. It maycontain minor  errors which are inherent in voice recognition technology. **

## 2021-05-30 NOTE — PROGRESS NOTES
Patient was discharged with [facility] stable transferred on tele. Report given to transport. No complaints of pain/discomfort.  On coming nurse at [facility] was given report

## 2021-06-01 NOTE — DISCHARGE SUMMARY
Discharge Summary     Patient Identification:  Stella De Los Santos  : 1965  MRN: [id number]   Account: [de-identified]     Admit date: 2021  Discharge date: 2021  Attending provider: Dr. Eunice Stone  Primary care provider: Ally Coyne MD     Discharge Diagnoses:   Principal Problem:    Acute exacerbation of chronic low back pain  Active Problems:    Permanent atrial fibrillation (HCC)    Pleural effusion associated with hepatic disorder    Acute on chronic diastolic congestive heart failure (HCC)    Alcoholic cirrhosis of liver with ascites (HCC)    Essential hypertension    Thrombocytopenia (HCC)    Type 2 diabetes mellitus without complication (HCC)    Coagulopathy (HCC)    Chronic diastolic congestive heart failure (HCC)    Decompensated hepatic cirrhosis (HCC)    Sympathotonic orthostatic hypotension    Fall    Hypo-osmolality and hyponatremia    Weakness generalized    Fall (on)(from) incline, initial encounter    Decompensation of cirrhosis of liver (HCC)    Neural foraminal stenosis of lumbar spine    Nausea and vomiting in adult    Adynamic ileus (HCC)    CKD (chronic kidney disease), stage IV (HCC)    Anemia in stage 4 chronic kidney disease ([facility]  Resolved Problems:    * No resolved hospital problems. *    Assessment/Plan:  1. Hypotension, unspecified. Blood pressure had additional overnight drop. Currently low at 77/53. Probably secondary to intravascular volume depletion related to cirrhosis. Night team reduced Lopressor dose to 12.5 mg twice daily and added back amiodarone 200 mg daily for rate control as below. Increase midodrine to 20 mg 3 times daily. Hold parameters for Lopressor. Albumin and cautious fluid boluses as needed.     2. Paroxsymal Atrial Fibrillation/Flutter with rapid ventricular response. On Lopressor and amiodarone for rate control as above. Poor control with a pulse of 117.   GI recommends discontinuing amiodarone secondary to liver toxicity in a cirrhotic patient. However, patient has poor rate control even with amiodarone and Lopressor. Digoxin a possibility but dosing would be difficult in a cirrhotic patient and is associated with increased mortality in ESRD patients. Few good options. Therefore, will maintain on amiodarone briefly and carefully monitor blood pressure. We will titrate up Lopressor as able and remove amiodarone as able. We will likely have to tolerate borderline control.     3.  Decompensated cirrhosis. Jaundice noted along with scleral icterus. Estimated MELD-NA score of 37 points. Therefore, estimated 65 to 66% 90-day mortality. Ammonia level now elevated. Daily ammonia checks. Maintain on lactulose 45 g 3 times daily and rifaximin 550 mg twice daily to control ammonia levels. Worsening symptoms of nausea and vomiting along with increased abdominal distention. Holding diuretics secondary to hypotension as above. Long-term prognosis is poor. Has opted for liver transplant and [place] has accepted for transfer for transplant evaluation pending bed availability. If further deterioration in blood pressure and/or increase in BUN on electrolyte recheck tomorrow morning and if not transferred to [facility], transferr to the unit for pressors and/or continuous renal replacement therapy.     GI Recommendations: They recommended stopping Pepcid, starting on IV PPI, stopping Reglan, keeping n.p.o., adding neostigmine every 6 hours for 3 days, holding narcotics and benzos, and holding all nasogastric tube considering history of esophageal varices.     3.  End-stage renal disease. Secondary to previous hepatorenal syndrome. Treated previous KARAN on stage III CKD with octreotide, midodrine, and IV albumin without success. Poor prospects for kidney recovery. Phosphorus levels have normalized so no need for phosphate binders at this time. Nephrology to follow to manage dialysis.     4.  Small bowel ileus.   Seen on abdominal imaging. Keep patient n.p.o except for ice chips and sips of water with medications.     5. Macrocytic anemia, unspecified. Appears to be acute on chronic. Normal iron, high iron saturation, and low total iron-binding capacity makes iron deficiency anemia unlikely. Probably secondary to cirrhosis and to kidney disease. Folate low normal range and vitamin B12 elevated. Monitor daily CBC. Maintain on Retacrit 5000 units Monday Wednesday Friday.     6. Hypoosmolality and hyponatremia. Worsening, no longer on half-normal saline. Appears to be secondary to be chronic and secondary to cirrhosis. Monitor with daily BMP. 7.  Chronic obstructive pulmonary disease. Maintain on tiotropium twice daily with as needed albuterol. No pulmonary function testing in the records.     8.  Back pain, unspecified. Exacerbation with multiple falls. Maintain on gabapentin 200 mg twice daily.     9. Type 2 diabetes. Maintain on medium dose corrective algorithm. Maintain on hypoglycemia protocols. Not on home diabetic medication. Chief complaint: Fall from an incline with acute on chronic back pain for 1 day.     Opening statement:  Patient is a 50-year-old male former smoker with a 21-pack-year history and former alcoholic with a past medical history of atrial fibrillation, recent admission for elevated creatinine, diabetes, congestive heart failure, advanced cirrhosis, gallstones, GERD, end-stage renal disease on dialysis, COPD, nursing for chief complaint of fall with acute on chronic back pain for 1 day.     Summary Hospital course:  Came from home for acute on chronic Back Pain with Lower Extremity Weakness.    Does not feel safe at home and wants to go to rehab. Had bilateral lower extremity swelling, shortness of breath, and abdominal distention from ascites. [facility] discharged from [facility].   Diagnosed with acute respiratory insufficiency secondary to pulmonary edema, atrial flutter, decompensated alcoholic cirrhosis, end-stage renal disease, type 2 diabetes, fall, obesity, and end-stage renal disease. Transferred to [facility] on 5/26/2021 for hypotension. Hypertension proved difficult to control and patient  was noted to have small bowel ileus and nausea/vomiting which improved with paracentesis. The patient's blood pressure and heart rate continued to be tenuous. The patient was transferred to [facility] for evaluation for liver transplant. Discharge Medications:   Dalton Tabares   Home Medication Instructions V:[id number]    Printed on:05/31/21 2113   Medication Information                      albuterol sulfate HFA (VENTOLIN HFA) 108 (90 Base) MCG/ACT inhaler  Inhale 2 puffs into the lungs every 6 hours as needed for Wheezing             amiodarone (CORDARONE) 200 MG tablet  Take 1 tablet by mouth daily             ammonium lactate (AMLACTIN) 12 % cream  Apply topically as needed             ASPIRIN 81 PO  Take by mouth daily             blood glucose monitor strips  Test 4 times daily and as needed for irregular blood glucose. Dispense sufficient amount for indicated testing frequency plus additional to accommodate PRN testing needs.  DX E11.9             Blood Glucose Monitoring Suppl (ONE TOUCH ULTRA 2) w/Device KIT  Use to test 4 times daily and as needed DX: E11.9             bumetanide (BUMEX) 1 MG tablet  Take 1 tablet by mouth 2 times daily             Continuous Blood Gluc  (DEXCOM G6 ) MENDY  Use to test 4 times daily and as needed DX: E11.9             Continuous Blood Gluc Sensor (DEXCOM G6 SENSOR) MISC  Use to test 4 times daily and as needed DX: E11.9             Continuous Blood Gluc Transmit (DEXCOM G6 TRANSMITTER) MISC  Use to test 4 times daily and as needed DX: E11.9             dilTIAZem (CARDIZEM) 30 MG tablet  Take 1 tablet by mouth 3 times daily             famotidine (PEPCID) 20 MG tablet  Take 1 tablet by mouth daily             gabapentin (NEURONTIN) 100 MG Mouth: Mucous membranes are dry. Pharynx: Oropharynx is clear. Eyes:      General: Scleral icterus present. Cardiovascular:      Rate and Rhythm: Normal rate. Rhythm irregular. Pulses: Normal pulses. Heart sounds: Normal heart sounds. Comments: Trace bilateral edema significantly improved from before. Pulmonary:      Breath sounds: Decreased breath sounds present. Abdominal:      General: There is distension. Tenderness: There is no abdominal tenderness. There is no right CVA tenderness or left CVA tenderness. Skin:     Coloration: Skin is jaundiced. Neurological:      Mental Status: He is alert. Psychiatric:         Mood and Affect: Mood normal.         Behavior: Behavior normal.     Significant Diagnostics:   Radiology: CT HEAD WO CONTRAST    Result Date: 5/20/2021  PROCEDURE: CT HEAD WO CONTRAST CLINICAL INFORMATION: 69-year-old male who fell. COMPARISON: Head CT 4/23/2021. TECHNIQUE: Noncontrast 5 mm axial images were obtained through the brain. All CT scans at this facility use dose modulation, iterative reconstruction, and/or weight-based dosing when appropriate to reduce radiation dose to as low as reasonably achievable. FINDINGS: There is no hemorrhage. There are no intra-or extra-axial collections. There is no hydrocephalus, midline shift or mass effect. The gray-white matter differentiation is preserved. The paranasal sinuses and mastoid air cells are normally aerated. There is no suspicious calvarial abnormality. No acute intracranial hemorrhage, mass effect or midline shift. **This report has been created using voice recognition software. It may contain minor errors which are inherent in voice recognition technology. ** Final report electronically signed by Dr Alyson Flores on 5/20/2021 3:01 PM    XR CHEST PORTABLE    Result Date: 5/20/2021  PROCEDURE: XR CHEST PORTABLE CLINICAL INFORMATION: 69-year-old male who fell. Shortness of breath.  COMPARISON: Chest x-ray 5/18/2021. TECHNIQUE: AP upright view of the chest was obtained. FINDINGS: There is a dual-lumen dialysis catheter on the right side. There is a moderate right-sided and small to moderate left-sided pleural effusion. There are bibasilar opacities which may represent atelectasis or infiltrates. There is no pneumothorax. Visualized portions of the upper abdomen are within normal limits. The osseous structures are intact. No acute fractures or suspicious osseous lesions. Moderate right-sided and small left-sided pleural effusions with adjacent opacities which may represent atelectasis or infiltrates. **This report has been created using voice recognition software. It may contain minor errors which are inherent in voice recognition technology. ** Final report electronically signed by Dr Galina Jones on 5/20/2021 2:20 PM    US GUIDED PARACENTESIS    Result Date: 5/21/2021  PROCEDURE: US GUIDED PARACENTESIS CLINICAL INFORMATION: Ascites COMPARISON: 5/5/2021 TECHNIQUE: Ultrasound-guided paracentesis FINDINGS: The procedure was explained the patient. All risks were discussed. Written informed consent was obtained. Limited ultrasound was performed revealing the ascites. An acceptable location was identified. The skin was marked. The overlying area was sterilely prepped and draped. 2 percent lidocaine was given for local anesthesia. Next a 5 Liberian one-step catheter  was introduced into the ascites. Clear yellow fluid was removed. The catheter was removed. The patient tolerated the procedure. No immediate complications. Physician performing procedure: Dr Mayra Santiago Informed consent signed: Yes Local Anesthetic: 2 % Lidocaine Specimen volume: / ml Catheter: 19 ga 5 Portuguese Aspirated ascites volume: 6.2 liters Aspirated ascites color: Yellow Site of Puncture:RLQ Complications: None observed     Successful ultrasound-guided paracentesis. **This report has been created using voice recognition software.   It may AM   Result Value Ref Range    pH, Blood Gas 7.42 7.35 - 7.45    PCO2 36 35 - 45 mmhg    PO2 58 (L) 71 - 104 mmhg    HCO3 24 23 - 28 mmol/l    Base Excess (Calculated) -0.8 -2.5 - 2.5 mmol/l    O2 Sat 91 %    IFIO2 21     DEVICE Room [employer] Test N/A     Source: KENYATTA Peterson     COLLECTED BY: [id number]    POCT Glucose    Collection Time: 05/29/21 11:50 AM   Result Value Ref Range    POC Glucose 106 70 - 108 mg/dl   POCT Glucose    Collection Time: 05/29/21  4:07 PM   Result Value Ref Range    POC Glucose 93 70 - 108 mg/dl   POCT Glucose    Collection Time: 05/29/21  7:34 PM   Result Value Ref Range    POC Glucose 103 70 - 108 mg/dl       Discharge condition: serious  Disposition:  To a non-[facility] facility  Time spent on discharge: >35 minutes    Electronically signed by Kaz Sheldon MD, MPH, [facility] on 5/31/2021 at 9:13 PM   Supervised by Dr. Nay Elias

## 2021-06-07 NOTE — CARE COORDINATION
[facility]ella Finn [facility] there. Left message with brother Andrea Leblanc to follow up and provide support to Angella Finn and his family.

## 2021-06-14 ENCOUNTER — CARE COORDINATION (OUTPATIENT)
Dept: CARE COORDINATION | Age: 56
End: 2021-06-14

## 2021-06-14 NOTE — CARE COORDINATION
Left message with Lizzy to get update on Carla Ray and if still in hospital in [place] and any discharge plans. Will continue to follow up.

## 2021-06-14 NOTE — CARE COORDINATION
Dr. Madeleine Beck, Not sure if you were aware of this information so I wanted to pass it along. Katie Foote brother called me to inform me that Yonatan Simpson passed away at [address] [place] [address] on June 10th. States he developed pneumonia and was placed on Hospice before he passed. He wanted to thank everyone at PCP office for their care of Yonatan Simpson. Thank you.

## 2022-12-01 NOTE — PROGRESS NOTES
[address] 84  INPATIENT PHYSICAL THERAPY  EVALUATION  [facility] [place] MED SURG 8A - 8A-20/020-A    Time In: 0087  Time Out: 4407  Timed Code Treatment Minutes: 23 Minutes  Minutes: 30          Date: 3/30/2021  Patient Name: Otilio Carrasquillo,  Gender:  male        MRN: [id number]  : 1965  (48 y.o.)      Referring Practitioner: Dr. Mitch Craft  Diagnosis: sympathotonic orthostatic hypotension  Additional Pertinent Hx: admit with above diagnosis, s/p fall, ESLD, KARAN, hypotension     Restrictions/Precautions:  Restrictions/Precautions: General Precautions, Fall Risk  Position Activity Restriction  Other position/activity restrictions: orthostatic hypotension    Subjective:  Chart Reviewed: Yes  Patient assessed for rehabilitation services?: Yes  Subjective: pleasant and cooperative    General:    Hearing: Within functional limits      Pain: 8/10: BLE, per pt inc pain secondary to he hasn't received Gabapentin yet today for his neuropathy    Vitals: Vitals not assessed per clinical judgement, see nursing flowsheet, pt did say he felt his head was heavy after amb/std at walker so sat down, RN in at end of session and checking vitals    Social/Functional History:    Lives With: Alone  Type of Home: Apartment  Home Layout: One level  Home Access: Stairs to enter without rails  Entrance Stairs - Number of Steps: 1  Home Equipment: (used upright walker)     Ambulation Assistance: Independent  Transfer Assistance: Independent    Additional Comments: per pt has family that lives close by and he will call if needs assistance, per pt just ordered a lifealert, has a little brother that is around on weekends    OBJECTIVE:  Range of Motion:  Bilateral Lower Extremity: WFL    Strength:  Bilateral Lower Extremity: Impaired - grossly 3 to 3+/5, generalized weakness, edema noted BLE with RLE more than LLE    Balance:  Static Sitting Balance:  Stand By Assistance  Dynamic Sitting Balance: Stand By Assistance, performed BLE march x5 Body Location Override (Optional - Billing Will Still Be Based On Selected Body Map Location If Applicable): left superior medial upper back reps, inc time sitting edge of bed due to hx of hypotension, pt min lightheaded initially sitting edge of bed  Static Standing Balance: Minimal Assistance, to CGA with RW, after amb to chair pt tolerated around 30 seconds before needing to sit down    Bed Mobility:  Rolling to Left: Contact Guard Assistance   Supine to Sit: Minimal Assistance  Scooting: Stand By Assistance in [address] to edge of bed  HOB flat and use BR    Transfers:  Sit to Stand: Contact Guard Assistance  Stand to [address] for hand placement to sit down  Ambulation:  Minimal Assistance, to CGA  Distance: 4'x1  Surface: Level Tile  Device:Rolling Walker  Gait Deviations: Forward Flexed Posture, Slow Tonya, Decreased Step Length Bilaterally and min unsteady    Exercise:  Reviewed seated BLE ankle pumps, LAQ, marches for pt to cont throughout the day    Functional Outcome Measures: Completed  AM-PAC Inpatient Mobility Raw Score : 14  AM-PAC Inpatient T-Scale Score : 38.1    ASSESSMENT:  Activity Tolerance:  Patient tolerance of  treatment: fair. Treatment Initiated: Treatment and education initiated within context of evaluation. Evaluation time included review of current medical information, gathering information related to past medical, social and functional history, completion of standardized testing, formal and informal observation of tasks, assessment of data and development of plan of care and goals. Treatment time included skilled education and facilitation of tasks to increase safety and independence with functional mobility for improved independence and quality of life. Assessment:   Body structures, Functions, Activity limitations: Decreased functional mobility , Decreased balance, Decreased strength, Decreased endurance  Assessment: pt with orthostatic hypotension, generalized weakness, deconditioning, dec balance, lives alone in apt with step to enter with hx of falls, use of walker and inc assist for safe mobility, recommend cont PT to inc pt I with functional mobility  Prognosis: Good    REQUIRES PT FOLLOW UP: Yes    Discharge Recommendations:  Discharge Recommendations: Continue to assess pending progress, Patient would benefit from continued therapy after discharge(per pt he placed a call with [facility] agency and RN is to come out at discharge and assess his needs for services)    Patient Education:  PT Education: Goals, PT Role, Plan of Care, Functional Mobility Training    Equipment Recommendations:  Equipment Needed: No  Other: cont to assess needs    Plan:  Times per week: 5X GM  Times per day: Daily  Specific instructions for Next Treatment: therex and mobility    Goals:  Patient goals : return home  Short term goals  Time Frame for Short term goals: by discharge  Short term goal 1: bed mobility with MOD I to get in/out of bed  Short term goal 2: transfer with MOD I to get in/out of chairs  Short term goal 3: amb 50'x1 with walker and MOD I to walk safely in apt  Short term goal 4: negotiate 1 step without HR and MOD I to enter apt safely  Long term goals  Time Frame for Long term goals : no LTGs set secondary to short ELOS    Following session, patient left in safe position with all fall risk precautions in place.

## 2024-10-28 NOTE — CARE COORDINATION
4/26/21, 9:55 AM EDT  DISCHARGE PLANNING EVALUATION:    Francisco Chicas       Admitted: 4/23/2021/ [facility] day: 3   Location: [address] [place] [address]A Reason for admit: KARAN (acute kidney injury) ([facility] [address] [N17.9]   PMH:  has a past medical history of Advanced cirrhosis of liver ([address] Alcoholic cirrhosis ([facility] [address] Atrial fibrillation ([address] CHF (congestive heart failure) ([address] Chronic kidney disease, COPD (chronic obstructive pulmonary disease) ([facility] [address] Diabetes mellitus ([facility] [address] Gallstones, GERD (gastroesophageal reflux disease), Headache, Hydrothorax, and Portal hypertension ([facility] [address] Procedure:   4/23 CXR - Stable chest large right pleural effusion and possible underlying infiltrates. 4/23 CT head - No acute intracranial pathology. Air-fluid level right maxillary sinus (sinusitis or blood is uncertain. Dental caries. 4/25 US guided Thoracentesis - 1.1L drained. 4/26 Renal US to be done  Barriers to Discharge:  Sent to ED by Nephrology due to creatinine levels. Admitted through ED. Upon arrival BUN 54 and creatinine 5.1. Today BUN is 56 and creatinine is 6.7. Sodium 131. Hgb 8.8. BP has been running low, 86/60, now 93/67. Room air, sats 97%. Consulted Nephrology. Started iv fluids with KCL added. Albumin iv q8hr. Amiodarone po daily. Pepcid po daily, Neurontin bid, Lactulose daily, Midodrine, Rifaximin bid. Spiriva. Heparin subq q8hr. Diabetes management. PCP: Yue Wilder MD  Readmission Risk Score: 44%    Patient Goals/Plan/Treatment Preferences: Spoke with pt. He lives at home alone. He is independent, can drive. Family helps him get to appts. Wheelchair is ordered and waiting to be picked up. Shower bench, elevated toilet seat/with arms, walkers. Current with hospitals - [facility] for nursing, PT/OT. Transportation/Food Security/Housekeeping Addressed:  See above. Pt only drives small distances, but doesn't think he will be driving much. 71 y/o M w/PMH gastric ulcer (s/p partial gastrectomy and Bilroth II reconstruction in 1970s), VASILIY, HTN , PSH s/p appendectomy (1980s), s/p hydrocele (1990s) presents to [facility] ER c/o abd pain since last night. Wife endorses that pt ate spaghetti and Italian sausage for dinner. Pt developed diffuse abd pain after, associated with nausea and yellow vomiting. States relief in abd pain after vomiting. Last BM this morning. Has not passed gas today. Denies fever, chills, diarrhea, constipation, melena, BRBPR, hematochezia. Pt had partial gastrectomy and Bilroth II for gastric ulcer over 40 years ago. States never had complication after surgery, including SBO. Currently resting comfortably in stretcher.

## 2024-11-14 NOTE — ED NOTES
Patient transferred to [facility] room 20. Nurse notified.       Wilfredo Damon  02/24/21 2662
Pt resting in bed. VSS. No distress noted. Updated on POC. Will continue to monitor.       Ricky Sorto, RN  02/24/21 4746
Pt resting in bed. VSS. Updated on POC. No distress noted. Will continue to  Monitor.       Alec Arevalo RN  02/24/21 1800
No (0)

## 2025-03-10 NOTE — DISCHARGE SUMMARY
Patient left AMA despite understanding risks. His Heart rates have been in the 130-150s range with low blood pressures which complicated his management. See todays Progress note for management details. He was supposed to see EP for evaluation of his HR, but he states his HR is always in the 120s and he is asymptomatic. Patients cardiologist is out of [facility], and he is on a track to get a transplant for his liver. He will follow up with the cardiologist at that facility for his afib. He does virtual visits. His medicines were actively being switched, with dose adjustements. Pt understands risks/benefits. Has capacity and decided to leave AMA. Post-Op Assessment Note    CV Status:  Stable    Pain management: adequate       Mental Status:  Alert and awake   Hydration Status:  Euvolemic   PONV Controlled:  Controlled   Airway Patency:  Patent     Post Op Vitals Reviewed: Yes    No anethesia notable event occurred.    Staff: Anesthesiologist           Last Filed PACU Vitals:  Vitals Value Taken Time   Temp 97.5 °F (36.4 °C) 03/10/25 1900   Pulse 78 03/10/25 1912   /70 03/10/25 1902   Resp 16 03/10/25 1900   SpO2 93 % 03/10/25 1912   Vitals shown include unfiled device data.    Modified Ijeoma:     Vitals Value Taken Time   Activity 2 03/10/25 1845   Respiration 2 03/10/25 1845   Circulation 2 03/10/25 1845   Consciousness 2 03/10/25 1845   Oxygen Saturation 2 03/10/25 1845     Modified Ijeoma Score: 10

## 2025-05-31 NOTE — TELEPHONE ENCOUNTER
Group Topic: BH Therapeutic Activity    Date: 5/31/2025  Start Time: 1445  End Time: 1510  Facilitators: Domenica Kenny OT    Focus: Connection (Outside)  Number in attendance: 3    Method: Group  Attendance: Present  Participation: Active  Patient Response: Appropriate feedback  Mood: Normal  Affect: Type: Euthymic (normal mood)   Range: Full (normal)   Congruency: Congruent   Stability: Stable  Behavior/Socialization: Appropriate to group and Cooperative  Thought Process: Tracking  Task Performance: Follows directions  Patient Evaluation: Independent - full participation   Spoke to Lizzy  Pt is not in acute distress, he basically just needing to get established into practice and needs to be referred for sleep study, he is having trouble sleeping as a result of fluid being taken off lung when in the ED. Pt was on machine in hospital and was told to get a outpt sleep study done. Pt was told to do 4 things  1) est care with PCP locally  2) get set up with home health, which he has with a company in [facility]  3) get O2 at home, he does have this  4) get setup with sleep study    Ok to be seen sooner than 1/11/21?   He did have appt with TS 11/23/20 but ended up in hospital.